# Patient Record
Sex: MALE | Race: WHITE | Employment: OTHER | URBAN - METROPOLITAN AREA
[De-identification: names, ages, dates, MRNs, and addresses within clinical notes are randomized per-mention and may not be internally consistent; named-entity substitution may affect disease eponyms.]

---

## 2020-01-01 ENCOUNTER — HOSPITAL ENCOUNTER (INPATIENT)
Age: 70
LOS: 6 days | Discharge: HOME | DRG: 853 | End: 2020-01-07
Attending: FAMILY MEDICINE | Admitting: INTERNAL MEDICINE
Payer: MEDICARE

## 2020-01-01 PROBLEM — J94.2 HEMOTHORAX: Status: ACTIVE | Noted: 2020-01-01

## 2020-01-01 LAB
ANION GAP SERPL CALCULATED.3IONS-SCNC: 14 MMOL/L (ref 3–16)
BASOPHILS ABSOLUTE: 0.1 K/UL (ref 0–0.2)
BASOPHILS RELATIVE PERCENT: 1 %
BUN BLDV-MCNC: 16 MG/DL (ref 7–20)
CALCIUM SERPL-MCNC: 8.5 MG/DL (ref 8.3–10.6)
CHLORIDE BLD-SCNC: 99 MMOL/L (ref 99–110)
CO2: 21 MMOL/L (ref 21–32)
CREAT SERPL-MCNC: 0.9 MG/DL (ref 0.8–1.3)
EOSINOPHILS ABSOLUTE: 0.1 K/UL (ref 0–0.6)
EOSINOPHILS RELATIVE PERCENT: 0.7 %
GFR AFRICAN AMERICAN: >60
GFR NON-AFRICAN AMERICAN: >60
GLUCOSE BLD-MCNC: 225 MG/DL (ref 70–99)
HCT VFR BLD CALC: 28.4 % (ref 40.5–52.5)
HEMOGLOBIN: 9.8 G/DL (ref 13.5–17.5)
LYMPHOCYTES ABSOLUTE: 2.8 K/UL (ref 1–5.1)
LYMPHOCYTES RELATIVE PERCENT: 20.9 %
MCH RBC QN AUTO: 34.4 PG (ref 26–34)
MCHC RBC AUTO-ENTMCNC: 34.5 G/DL (ref 31–36)
MCV RBC AUTO: 99.7 FL (ref 80–100)
MONOCYTES ABSOLUTE: 1.5 K/UL (ref 0–1.3)
MONOCYTES RELATIVE PERCENT: 11 %
NEUTROPHILS ABSOLUTE: 8.8 K/UL (ref 1.7–7.7)
NEUTROPHILS RELATIVE PERCENT: 66.4 %
PDW BLD-RTO: 13.9 % (ref 12.4–15.4)
PLATELET # BLD: 178 K/UL (ref 135–450)
PMV BLD AUTO: 7.5 FL (ref 5–10.5)
POTASSIUM SERPL-SCNC: 4.7 MMOL/L (ref 3.5–5.1)
RBC # BLD: 2.85 M/UL (ref 4.2–5.9)
SODIUM BLD-SCNC: 134 MMOL/L (ref 136–145)
WBC # BLD: 13.3 K/UL (ref 4–11)

## 2020-01-01 PROCEDURE — 80048 BASIC METABOLIC PNL TOTAL CA: CPT

## 2020-01-01 PROCEDURE — 85025 COMPLETE CBC W/AUTO DIFF WBC: CPT

## 2020-01-01 PROCEDURE — 2700000000 HC OXYGEN THERAPY PER DAY

## 2020-01-01 PROCEDURE — 2580000003 HC RX 258: Performed by: INTERNAL MEDICINE

## 2020-01-01 PROCEDURE — 6370000000 HC RX 637 (ALT 250 FOR IP): Performed by: INTERNAL MEDICINE

## 2020-01-01 PROCEDURE — 83880 ASSAY OF NATRIURETIC PEPTIDE: CPT

## 2020-01-01 PROCEDURE — 94761 N-INVAS EAR/PLS OXIMETRY MLT: CPT

## 2020-01-01 PROCEDURE — 2060000000 HC ICU INTERMEDIATE R&B

## 2020-01-01 PROCEDURE — 94640 AIRWAY INHALATION TREATMENT: CPT

## 2020-01-01 PROCEDURE — 84484 ASSAY OF TROPONIN QUANT: CPT

## 2020-01-01 PROCEDURE — 93005 ELECTROCARDIOGRAM TRACING: CPT | Performed by: INTERNAL MEDICINE

## 2020-01-01 PROCEDURE — 2500000003 HC RX 250 WO HCPCS

## 2020-01-01 RX ORDER — PREDNISONE 20 MG/1
40 TABLET ORAL DAILY
Status: DISCONTINUED | OUTPATIENT
Start: 2020-01-01 | End: 2020-01-02

## 2020-01-01 RX ORDER — SODIUM CHLORIDE 9 MG/ML
INJECTION, SOLUTION INTRAVENOUS CONTINUOUS
Status: DISCONTINUED | OUTPATIENT
Start: 2020-01-01 | End: 2020-01-01

## 2020-01-01 RX ORDER — IPRATROPIUM BROMIDE AND ALBUTEROL SULFATE 2.5; .5 MG/3ML; MG/3ML
1 SOLUTION RESPIRATORY (INHALATION)
Status: DISCONTINUED | OUTPATIENT
Start: 2020-01-02 | End: 2020-01-07 | Stop reason: HOSPADM

## 2020-01-01 RX ORDER — DEXTROSE MONOHYDRATE 25 G/50ML
12.5 INJECTION, SOLUTION INTRAVENOUS PRN
Status: DISCONTINUED | OUTPATIENT
Start: 2020-01-01 | End: 2020-01-07 | Stop reason: HOSPADM

## 2020-01-01 RX ORDER — MECLIZINE HYDROCHLORIDE 25 MG/1
1 TABLET ORAL DAILY
COMMUNITY
End: 2020-03-16 | Stop reason: SDUPTHER

## 2020-01-01 RX ORDER — DEXTROSE MONOHYDRATE 50 MG/ML
100 INJECTION, SOLUTION INTRAVENOUS PRN
Status: DISCONTINUED | OUTPATIENT
Start: 2020-01-01 | End: 2020-01-07 | Stop reason: HOSPADM

## 2020-01-01 RX ORDER — PRASUGREL 10 MG/1
10 TABLET, FILM COATED ORAL DAILY
Status: ON HOLD | COMMUNITY
Start: 2010-12-08 | End: 2020-01-07 | Stop reason: HOSPADM

## 2020-01-01 RX ORDER — ALLOPURINOL 300 MG/1
300 TABLET ORAL DAILY
COMMUNITY
End: 2020-03-16 | Stop reason: SDUPTHER

## 2020-01-01 RX ORDER — NICOTINE POLACRILEX 4 MG
15 LOZENGE BUCCAL PRN
Status: DISCONTINUED | OUTPATIENT
Start: 2020-01-01 | End: 2020-01-07 | Stop reason: HOSPADM

## 2020-01-01 RX ORDER — AMLODIPINE BESYLATE 5 MG/1
5 TABLET ORAL DAILY
Status: ON HOLD | COMMUNITY
End: 2020-01-07 | Stop reason: HOSPADM

## 2020-01-01 RX ORDER — SODIUM CHLORIDE 0.9 % (FLUSH) 0.9 %
10 SYRINGE (ML) INJECTION EVERY 12 HOURS SCHEDULED
Status: DISCONTINUED | OUTPATIENT
Start: 2020-01-01 | End: 2020-01-07 | Stop reason: HOSPADM

## 2020-01-01 RX ORDER — ALLOPURINOL 300 MG/1
300 TABLET ORAL DAILY
Status: DISCONTINUED | OUTPATIENT
Start: 2020-01-02 | End: 2020-01-07 | Stop reason: HOSPADM

## 2020-01-01 RX ORDER — CARVEDILOL 25 MG/1
25 TABLET ORAL 2 TIMES DAILY
Status: ON HOLD | COMMUNITY
Start: 2012-05-16 | End: 2020-02-05 | Stop reason: HOSPADM

## 2020-01-01 RX ORDER — LISINOPRIL 10 MG/1
10 TABLET ORAL DAILY
Status: DISCONTINUED | OUTPATIENT
Start: 2020-01-02 | End: 2020-01-02

## 2020-01-01 RX ORDER — SODIUM CHLORIDE 0.9 % (FLUSH) 0.9 %
10 SYRINGE (ML) INJECTION PRN
Status: DISCONTINUED | OUTPATIENT
Start: 2020-01-01 | End: 2020-01-07 | Stop reason: HOSPADM

## 2020-01-01 RX ORDER — ATORVASTATIN CALCIUM 40 MG/1
40 TABLET, FILM COATED ORAL DAILY
Status: ON HOLD | COMMUNITY
Start: 2012-05-16 | End: 2020-02-05 | Stop reason: HOSPADM

## 2020-01-01 RX ORDER — ACETAMINOPHEN 325 MG/1
650 TABLET ORAL EVERY 4 HOURS PRN
Status: DISCONTINUED | OUTPATIENT
Start: 2020-01-01 | End: 2020-01-07 | Stop reason: HOSPADM

## 2020-01-01 RX ORDER — AMLODIPINE BESYLATE 5 MG/1
5 TABLET ORAL DAILY
Status: DISCONTINUED | OUTPATIENT
Start: 2020-01-02 | End: 2020-01-02

## 2020-01-01 RX ORDER — GLIPIZIDE 5 MG/1
5 TABLET ORAL DAILY
Status: DISCONTINUED | OUTPATIENT
Start: 2020-01-02 | End: 2020-01-02

## 2020-01-01 RX ORDER — LISINOPRIL 10 MG/1
10 TABLET ORAL DAILY
Status: ON HOLD | COMMUNITY
Start: 2012-05-16 | End: 2020-01-07 | Stop reason: HOSPADM

## 2020-01-01 RX ORDER — ONDANSETRON 2 MG/ML
4 INJECTION INTRAMUSCULAR; INTRAVENOUS EVERY 6 HOURS PRN
Status: DISCONTINUED | OUTPATIENT
Start: 2020-01-01 | End: 2020-01-06

## 2020-01-01 RX ORDER — GLIPIZIDE 5 MG/1
1 TABLET ORAL DAILY
COMMUNITY
End: 2020-03-16 | Stop reason: SDUPTHER

## 2020-01-01 RX ORDER — IPRATROPIUM BROMIDE AND ALBUTEROL SULFATE 2.5; .5 MG/3ML; MG/3ML
1 SOLUTION RESPIRATORY (INHALATION) EVERY 4 HOURS
Status: DISCONTINUED | OUTPATIENT
Start: 2020-01-01 | End: 2020-01-01

## 2020-01-01 RX ORDER — CARVEDILOL 25 MG/1
25 TABLET ORAL 2 TIMES DAILY
Status: DISCONTINUED | OUTPATIENT
Start: 2020-01-01 | End: 2020-01-02

## 2020-01-01 RX ORDER — ATORVASTATIN CALCIUM 40 MG/1
40 TABLET, FILM COATED ORAL DAILY
Status: DISCONTINUED | OUTPATIENT
Start: 2020-01-02 | End: 2020-01-07 | Stop reason: HOSPADM

## 2020-01-01 RX ORDER — MECLIZINE HCL 12.5 MG/1
25 TABLET ORAL DAILY
Status: DISCONTINUED | OUTPATIENT
Start: 2020-01-02 | End: 2020-01-02

## 2020-01-01 RX ORDER — FEBUXOSTAT 40 MG/1
40 TABLET, FILM COATED ORAL DAILY
Status: ON HOLD | COMMUNITY
End: 2020-02-14 | Stop reason: HOSPADM

## 2020-01-01 RX ORDER — FEBUXOSTAT 40 MG/1
40 TABLET, FILM COATED ORAL DAILY
Status: DISCONTINUED | OUTPATIENT
Start: 2020-01-02 | End: 2020-01-07 | Stop reason: HOSPADM

## 2020-01-01 RX ADMIN — IPRATROPIUM BROMIDE AND ALBUTEROL SULFATE 1 AMPULE: .5; 3 SOLUTION RESPIRATORY (INHALATION) at 23:32

## 2020-01-01 RX ADMIN — Medication 10 ML: at 23:09

## 2020-01-01 RX ADMIN — SODIUM CHLORIDE: 9 INJECTION, SOLUTION INTRAVENOUS at 23:08

## 2020-01-01 SDOH — HEALTH STABILITY: MENTAL HEALTH: HOW OFTEN DO YOU HAVE A DRINK CONTAINING ALCOHOL?: 4 OR MORE TIMES A WEEK

## 2020-01-01 SDOH — HEALTH STABILITY: MENTAL HEALTH: HOW MANY STANDARD DRINKS CONTAINING ALCOHOL DO YOU HAVE ON A TYPICAL DAY?: 5 OR 6

## 2020-01-01 ASSESSMENT — PAIN SCALES - GENERAL
PAINLEVEL_OUTOF10: 0
PAINLEVEL_OUTOF10: 0

## 2020-01-02 ENCOUNTER — APPOINTMENT (OUTPATIENT)
Dept: GENERAL RADIOLOGY | Age: 70
DRG: 853 | End: 2020-01-02
Attending: FAMILY MEDICINE
Payer: MEDICARE

## 2020-01-02 ENCOUNTER — ANESTHESIA EVENT (OUTPATIENT)
Dept: OPERATING ROOM | Age: 70
DRG: 853 | End: 2020-01-02
Payer: MEDICARE

## 2020-01-02 ENCOUNTER — ANESTHESIA (OUTPATIENT)
Dept: OPERATING ROOM | Age: 70
DRG: 853 | End: 2020-01-02
Payer: MEDICARE

## 2020-01-02 VITALS
TEMPERATURE: 95.2 F | DIASTOLIC BLOOD PRESSURE: 25 MMHG | RESPIRATION RATE: 16 BRPM | SYSTOLIC BLOOD PRESSURE: 83 MMHG | OXYGEN SATURATION: 100 %

## 2020-01-02 PROBLEM — J96.01 ACUTE RESPIRATORY FAILURE WITH HYPOXEMIA (HCC): Status: ACTIVE | Noted: 2020-01-02

## 2020-01-02 PROBLEM — E78.5 HYPERLIPIDEMIA: Status: ACTIVE | Noted: 2020-01-02

## 2020-01-02 PROBLEM — Z72.0 TOBACCO ABUSE: Status: ACTIVE | Noted: 2020-01-02

## 2020-01-02 PROBLEM — I25.10 CAD (CORONARY ARTERY DISEASE): Status: ACTIVE | Noted: 2020-01-02

## 2020-01-02 LAB
A/G RATIO: 1.8 (ref 1.1–2.2)
ABO/RH: NORMAL
ALBUMIN SERPL-MCNC: 2.1 G/DL (ref 3.4–5)
ALP BLD-CCNC: 29 U/L (ref 40–129)
ALT SERPL-CCNC: 103 U/L (ref 10–40)
ANION GAP SERPL CALCULATED.3IONS-SCNC: 10 MMOL/L (ref 3–16)
ANION GAP SERPL CALCULATED.3IONS-SCNC: 10 MMOL/L (ref 3–16)
ANION GAP SERPL CALCULATED.3IONS-SCNC: 15 MMOL/L (ref 3–16)
ANISOCYTOSIS: ABNORMAL
ANTIBODY SCREEN: NORMAL
APPEARANCE FLUID: NORMAL
APTT: 27.8 SEC (ref 24.2–36.2)
AST SERPL-CCNC: 122 U/L (ref 15–37)
ATYPICAL LYMPHOCYTE RELATIVE PERCENT: 1 % (ref 0–6)
BANDED NEUTROPHILS RELATIVE PERCENT: 26 % (ref 0–7)
BASE EXCESS ARTERIAL: -10 (ref -3–3)
BASE EXCESS ARTERIAL: -12.3 MMOL/L (ref -3–3)
BASE EXCESS ARTERIAL: -13 (ref -3–3)
BASE EXCESS ARTERIAL: -13 (ref -3–3)
BASE EXCESS ARTERIAL: -4.2 MMOL/L (ref -3–3)
BASE EXCESS ARTERIAL: -5.6 MMOL/L (ref -3–3)
BASE EXCESS ARTERIAL: -6 (ref -3–3)
BASE EXCESS ARTERIAL: -6.8 MMOL/L (ref -3–3)
BASE EXCESS VENOUS: -11 (ref -3–3)
BASOPHILS ABSOLUTE: 0 K/UL (ref 0–0.2)
BASOPHILS ABSOLUTE: 0.1 K/UL (ref 0–0.2)
BASOPHILS RELATIVE PERCENT: 0.2 %
BASOPHILS RELATIVE PERCENT: 1 %
BILIRUB SERPL-MCNC: 0.7 MG/DL (ref 0–1)
BLOOD BANK DISPENSE STATUS: NORMAL
BLOOD BANK PRODUCT CODE: NORMAL
BPU ID: NORMAL
BUN BLDV-MCNC: 19 MG/DL (ref 7–20)
CALCIUM IONIZED: 0.64 MMOL/L (ref 1.12–1.32)
CALCIUM IONIZED: 0.87 MMOL/L (ref 1.12–1.32)
CALCIUM IONIZED: 1.01 MMOL/L (ref 1.12–1.32)
CALCIUM IONIZED: 1.15 MMOL/L (ref 1.12–1.32)
CALCIUM IONIZED: 1.42 MMOL/L (ref 1.12–1.32)
CALCIUM SERPL-MCNC: 10 MG/DL (ref 8.3–10.6)
CALCIUM SERPL-MCNC: 6.8 MG/DL (ref 8.3–10.6)
CALCIUM SERPL-MCNC: 7.3 MG/DL (ref 8.3–10.6)
CARBOXYHEMOGLOBIN ARTERIAL: 0.5 % (ref 0–1.5)
CARBOXYHEMOGLOBIN ARTERIAL: 0.6 % (ref 0–1.5)
CARBOXYHEMOGLOBIN ARTERIAL: 0.9 % (ref 0–1.5)
CARBOXYHEMOGLOBIN ARTERIAL: 1 % (ref 0–1.5)
CELL COUNT FLUID TYPE: NORMAL
CHLORIDE BLD-SCNC: 101 MMOL/L (ref 99–110)
CHLORIDE BLD-SCNC: 102 MMOL/L (ref 99–110)
CHLORIDE BLD-SCNC: 103 MMOL/L (ref 99–110)
CLOT EVALUATION: NORMAL
CO2: 20 MMOL/L (ref 21–32)
CO2: 21 MMOL/L (ref 21–32)
CO2: 24 MMOL/L (ref 21–32)
COLOR FLUID: NORMAL
CREAT SERPL-MCNC: 1.1 MG/DL (ref 0.8–1.3)
CREAT SERPL-MCNC: 1.5 MG/DL (ref 0.8–1.3)
CREAT SERPL-MCNC: 1.5 MG/DL (ref 0.8–1.3)
DESCRIPTION BLOOD BANK: NORMAL
EKG ATRIAL RATE: 61 BPM
EKG ATRIAL RATE: 64 BPM
EKG DIAGNOSIS: NORMAL
EKG DIAGNOSIS: NORMAL
EKG Q-T INTERVAL: 380 MS
EKG Q-T INTERVAL: 428 MS
EKG QRS DURATION: 110 MS
EKG QRS DURATION: 124 MS
EKG QTC CALCULATION (BAZETT): 445 MS
EKG QTC CALCULATION (BAZETT): 495 MS
EKG R AXIS: 37 DEGREES
EKG R AXIS: 58 DEGREES
EKG T AXIS: 0 DEGREES
EKG T AXIS: 113 DEGREES
EKG VENTRICULAR RATE: 102 BPM
EKG VENTRICULAR RATE: 65 BPM
EOSINOPHILS ABSOLUTE: 0 K/UL (ref 0–0.6)
EOSINOPHILS ABSOLUTE: 0 K/UL (ref 0–0.6)
EOSINOPHILS RELATIVE PERCENT: 0 %
EOSINOPHILS RELATIVE PERCENT: 0.1 %
FIBRINOGEN: 139 MG/DL (ref 200–397)
FIBRINOGEN: 50 MG/DL (ref 200–397)
GFR AFRICAN AMERICAN: 56
GFR AFRICAN AMERICAN: 56
GFR AFRICAN AMERICAN: >60
GFR NON-AFRICAN AMERICAN: 47
GFR NON-AFRICAN AMERICAN: 47
GFR NON-AFRICAN AMERICAN: >60
GLOBULIN: 1.2 G/DL
GLUCOSE BLD-MCNC: 150 MG/DL (ref 70–99)
GLUCOSE BLD-MCNC: 161 MG/DL (ref 70–99)
GLUCOSE BLD-MCNC: 168 MG/DL (ref 70–99)
GLUCOSE BLD-MCNC: 172 MG/DL (ref 70–99)
GLUCOSE BLD-MCNC: 182 MG/DL (ref 70–99)
GLUCOSE BLD-MCNC: 224 MG/DL (ref 70–99)
GLUCOSE BLD-MCNC: 224 MG/DL (ref 70–99)
GLUCOSE BLD-MCNC: 305 MG/DL (ref 70–99)
GLUCOSE BLD-MCNC: 83 MG/DL (ref 70–99)
HCO3 ARTERIAL: 12.9 MMOL/L (ref 21–29)
HCO3 ARTERIAL: 14.7 MMOL/L (ref 21–29)
HCO3 ARTERIAL: 15.6 MMOL/L (ref 21–29)
HCO3 ARTERIAL: 18.3 MMOL/L (ref 21–29)
HCO3 ARTERIAL: 19.7 MMOL/L (ref 21–29)
HCO3 ARTERIAL: 21.6 MMOL/L (ref 21–29)
HCO3 ARTERIAL: 21.6 MMOL/L (ref 21–29)
HCO3 ARTERIAL: 21.8 MMOL/L (ref 21–29)
HCO3 VENOUS: 18.5 MMOL/L (ref 23–29)
HCT VFR BLD CALC: 16.9 % (ref 40.5–52.5)
HCT VFR BLD CALC: 17.8 % (ref 40.5–52.5)
HCT VFR BLD CALC: 29.5 % (ref 40.5–52.5)
HEMOGLOBIN, ART, EXTENDED: 5.9 G/DL (ref 13.5–17.5)
HEMOGLOBIN, ART, EXTENDED: 6.5 G/DL (ref 13.5–17.5)
HEMOGLOBIN, ART, EXTENDED: 7.2 G/DL (ref 13.5–17.5)
HEMOGLOBIN, ART, EXTENDED: 7.7 G/DL (ref 13.5–17.5)
HEMOGLOBIN: 10.2 G/DL (ref 13.5–17.5)
HEMOGLOBIN: 10.5 GM/DL (ref 13.5–17.5)
HEMOGLOBIN: 5.3 GM/DL (ref 13.5–17.5)
HEMOGLOBIN: 5.7 G/DL (ref 13.5–17.5)
HEMOGLOBIN: 6 G/DL (ref 13.5–17.5)
HEMOGLOBIN: 6.8 GM/DL (ref 13.5–17.5)
HEMOGLOBIN: 7 GM/DL (ref 13.5–17.5)
INR BLD: 1.56 (ref 0.86–1.14)
INR BLD: 2.04 (ref 0.86–1.14)
INR BLD: 4.54 (ref 0.86–1.14)
LACTATE: 1.92 MMOL/L (ref 0.4–2)
LACTATE: 10.91 MMOL/L (ref 0.4–2)
LACTATE: 11.9 MMOL/L (ref 0.4–2)
LACTATE: 4.9 MMOL/L (ref 0.4–2)
LACTATE: 8.34 MMOL/L (ref 0.4–2)
LACTIC ACID: 4.6 MMOL/L (ref 0.4–2)
LACTIC ACID: 6.7 MMOL/L (ref 0.4–2)
LV EF: 55 %
LVEF MODALITY: NORMAL
LYMPHOCYTES ABSOLUTE: 0.9 K/UL (ref 1–5.1)
LYMPHOCYTES ABSOLUTE: 1.5 K/UL (ref 1–5.1)
LYMPHOCYTES RELATIVE PERCENT: 5.7 %
LYMPHOCYTES RELATIVE PERCENT: 9 %
LYMPHOCYTES, BODY FLUID: 13 %
MACROPHAGE FLUID: 2 %
MCH RBC QN AUTO: 31 PG (ref 26–34)
MCH RBC QN AUTO: 32.9 PG (ref 26–34)
MCH RBC QN AUTO: 34.5 PG (ref 26–34)
MCHC RBC AUTO-ENTMCNC: 33.6 G/DL (ref 31–36)
MCHC RBC AUTO-ENTMCNC: 33.9 G/DL (ref 31–36)
MCHC RBC AUTO-ENTMCNC: 34.7 G/DL (ref 31–36)
MCV RBC AUTO: 101.6 FL (ref 80–100)
MCV RBC AUTO: 89.4 FL (ref 80–100)
MCV RBC AUTO: 97.9 FL (ref 80–100)
METHEMOGLOBIN ARTERIAL: 0.9 %
METHEMOGLOBIN ARTERIAL: 0.9 %
METHEMOGLOBIN ARTERIAL: 1.5 %
METHEMOGLOBIN ARTERIAL: 1.5 %
MONOCYTE, FLUID: 6 %
MONOCYTES ABSOLUTE: 0 K/UL (ref 0–1.3)
MONOCYTES ABSOLUTE: 1 K/UL (ref 0–1.3)
MONOCYTES RELATIVE PERCENT: 0 %
MONOCYTES RELATIVE PERCENT: 5.8 %
MYELOCYTE PERCENT: 1 %
NEUTROPHIL, FLUID: 79 %
NEUTROPHILS ABSOLUTE: 13.3 K/UL (ref 1.7–7.7)
NEUTROPHILS ABSOLUTE: 14.5 K/UL (ref 1.7–7.7)
NEUTROPHILS RELATIVE PERCENT: 62 %
NEUTROPHILS RELATIVE PERCENT: 88.2 %
NUCLEATED CELLS FLUID: 3181 /CUMM
NUMBER OF CELLS COUNTED FLUID: 100
O2 CONTENT ARTERIAL: 11 ML/DL
O2 CONTENT ARTERIAL: 11 ML/DL
O2 CONTENT ARTERIAL: 8 ML/DL
O2 CONTENT ARTERIAL: 9 ML/DL
O2 SAT, ARTERIAL: 100 % (ref 93–100)
O2 SAT, ARTERIAL: 100 % (ref 93–100)
O2 SAT, ARTERIAL: 92 % (ref 93–100)
O2 SAT, ARTERIAL: 96.3 %
O2 SAT, ARTERIAL: 98.8 %
O2 SAT, ARTERIAL: 99 %
O2 SAT, ARTERIAL: 99 % (ref 93–100)
O2 SAT, ARTERIAL: 99.3 %
O2 SAT, VEN: 100 %
O2 THERAPY: ABNORMAL
PCO2 ARTERIAL: 24.8 MM HG (ref 35–45)
PCO2 ARTERIAL: 39 MMHG (ref 35–45)
PCO2 ARTERIAL: 39.9 MM HG (ref 35–45)
PCO2 ARTERIAL: 43.2 MMHG (ref 35–45)
PCO2 ARTERIAL: 44.4 MMHG (ref 35–45)
PCO2 ARTERIAL: 50.8 MM HG (ref 35–45)
PCO2 ARTERIAL: 53.3 MM HG (ref 35–45)
PCO2 ARTERIAL: 54.7 MMHG (ref 35–45)
PCO2, VEN: 55 MM HG (ref 40–50)
PDW BLD-RTO: 13.8 % (ref 12.4–15.4)
PDW BLD-RTO: 14.7 % (ref 12.4–15.4)
PDW BLD-RTO: 14.8 % (ref 12.4–15.4)
PERFORMED ON: ABNORMAL
PH ARTERIAL: 7.16 (ref 7.35–7.45)
PH ARTERIAL: 7.19 (ref 7.35–7.45)
PH ARTERIAL: 7.2 (ref 7.35–7.45)
PH ARTERIAL: 7.22 (ref 7.35–7.45)
PH ARTERIAL: 7.22 (ref 7.35–7.45)
PH ARTERIAL: 7.26 (ref 7.35–7.45)
PH ARTERIAL: 7.32 (ref 7.35–7.45)
PH ARTERIAL: 7.32 (ref 7.35–7.45)
PH VENOUS: 7.13 (ref 7.35–7.45)
PH VENOUS: 7.34 (ref 7.35–7.45)
PHOSPHORUS: 3.2 MG/DL (ref 2.5–4.9)
PLATELET # BLD: 110 K/UL (ref 135–450)
PLATELET # BLD: 120 K/UL (ref 135–450)
PLATELET # BLD: 80 K/UL (ref 135–450)
PLATELET SLIDE REVIEW: ABNORMAL
PMV BLD AUTO: 7.4 FL (ref 5–10.5)
PMV BLD AUTO: 7.8 FL (ref 5–10.5)
PMV BLD AUTO: 8.1 FL (ref 5–10.5)
PO2 ARTERIAL: 104 MMHG (ref 75–108)
PO2 ARTERIAL: 170.9 MM HG (ref 75–108)
PO2 ARTERIAL: 237 MMHG (ref 75–108)
PO2 ARTERIAL: 253.2 MMHG (ref 75–108)
PO2 ARTERIAL: 255.1 MM HG (ref 75–108)
PO2 ARTERIAL: 342.7 MMHG (ref 75–108)
PO2 ARTERIAL: 359.3 MM HG (ref 75–108)
PO2 ARTERIAL: 79.6 MM HG (ref 75–108)
PO2, VEN: 249 MM HG
POC HEMATOCRIT: 16 % (ref 40.5–52.5)
POC HEMATOCRIT: 20 % (ref 40.5–52.5)
POC HEMATOCRIT: 21 % (ref 40.5–52.5)
POC HEMATOCRIT: 31 % (ref 40.5–52.5)
POC HEMATOCRIT: <10 % (ref 40.5–52.5)
POC POTASSIUM: 2.1 MMOL/L (ref 3.5–5.1)
POC POTASSIUM: 3.8 MMOL/L (ref 3.5–5.1)
POC POTASSIUM: 5.2 MMOL/L (ref 3.5–5.1)
POC POTASSIUM: 5.6 MMOL/L (ref 3.5–5.1)
POC SAMPLE TYPE: ABNORMAL
POC SODIUM: 139 MMOL/L (ref 136–145)
POC SODIUM: 141 MMOL/L (ref 136–145)
POC SODIUM: 144 MMOL/L (ref 136–145)
POC SODIUM: 150 MMOL/L (ref 136–145)
POTASSIUM REFLEX MAGNESIUM: 7.3 MMOL/L (ref 3.5–5.1)
POTASSIUM SERPL-SCNC: 4.5 MMOL/L (ref 3.5–5.1)
POTASSIUM SERPL-SCNC: 5.6 MMOL/L (ref 3.5–5.1)
PRO-BNP: 780 PG/ML (ref 0–124)
PROCALCITONIN: 0.19 NG/ML (ref 0–0.15)
PROTHROMBIN TIME: 18.2 SEC (ref 10–13.2)
PROTHROMBIN TIME: 23.8 SEC (ref 10–13.2)
PROTHROMBIN TIME: 53.5 SEC (ref 10–13.2)
RBC # BLD: 1.66 M/UL (ref 4.2–5.9)
RBC # BLD: 1.82 M/UL (ref 4.2–5.9)
RBC # BLD: 3.3 M/UL (ref 4.2–5.9)
RBC FLUID: NORMAL /CUMM
SLIDE REVIEW: ABNORMAL
SLIDE REVIEW: ABNORMAL
SODIUM BLD-SCNC: 132 MMOL/L (ref 136–145)
SODIUM BLD-SCNC: 136 MMOL/L (ref 136–145)
SODIUM BLD-SCNC: 138 MMOL/L (ref 136–145)
TCO2 ARTERIAL: 14 MMOL/L
TCO2 ARTERIAL: 15.9 MMOL/L
TCO2 ARTERIAL: 17 MMOL/L
TCO2 ARTERIAL: 20 MMOL/L
TCO2 ARTERIAL: 21.1 MMOL/L
TCO2 ARTERIAL: 22.9 MMOL/L
TCO2 ARTERIAL: 23 MMOL/L
TCO2 ARTERIAL: 23.5 MMOL/L
TCO2 CALC VENOUS: 20 MMOL/L
TOTAL CK: 97 U/L (ref 39–308)
TOTAL PROTEIN: 3.3 G/DL (ref 6.4–8.2)
TROPONIN: 0.02 NG/ML
TROPONIN: 0.09 NG/ML
WBC # BLD: 14.9 K/UL (ref 4–11)
WBC # BLD: 16.1 K/UL (ref 4–11)
WBC # BLD: 16.5 K/UL (ref 4–11)

## 2020-01-02 PROCEDURE — 2580000003 HC RX 258: Performed by: NURSE PRACTITIONER

## 2020-01-02 PROCEDURE — 71045 X-RAY EXAM CHEST 1 VIEW: CPT

## 2020-01-02 PROCEDURE — 6360000002 HC RX W HCPCS: Performed by: NURSE PRACTITIONER

## 2020-01-02 PROCEDURE — 36556 INSERT NON-TUNNEL CV CATH: CPT

## 2020-01-02 PROCEDURE — 89051 BODY FLUID CELL COUNT: CPT

## 2020-01-02 PROCEDURE — 0BBG4ZZ EXCISION OF LEFT UPPER LUNG LOBE, PERCUTANEOUS ENDOSCOPIC APPROACH: ICD-10-PCS | Performed by: THORACIC SURGERY (CARDIOTHORACIC VASCULAR SURGERY)

## 2020-01-02 PROCEDURE — 32505 WEDGE RESECT OF LUNG INITIAL: CPT | Performed by: THORACIC SURGERY (CARDIOTHORACIC VASCULAR SURGERY)

## 2020-01-02 PROCEDURE — P9045 ALBUMIN (HUMAN), 5%, 250 ML: HCPCS | Performed by: NURSE PRACTITIONER

## 2020-01-02 PROCEDURE — 84132 ASSAY OF SERUM POTASSIUM: CPT

## 2020-01-02 PROCEDURE — 2700000000 HC OXYGEN THERAPY PER DAY

## 2020-01-02 PROCEDURE — 87070 CULTURE OTHR SPECIMN AEROBIC: CPT

## 2020-01-02 PROCEDURE — 2580000003 HC RX 258: Performed by: NURSE ANESTHETIST, CERTIFIED REGISTERED

## 2020-01-02 PROCEDURE — 93005 ELECTROCARDIOGRAM TRACING: CPT | Performed by: INTERNAL MEDICINE

## 2020-01-02 PROCEDURE — 6360000002 HC RX W HCPCS

## 2020-01-02 PROCEDURE — 2580000003 HC RX 258

## 2020-01-02 PROCEDURE — 99291 CRITICAL CARE FIRST HOUR: CPT | Performed by: INTERNAL MEDICINE

## 2020-01-02 PROCEDURE — 85027 COMPLETE CBC AUTOMATED: CPT

## 2020-01-02 PROCEDURE — 6360000002 HC RX W HCPCS: Performed by: THORACIC SURGERY (CARDIOTHORACIC VASCULAR SURGERY)

## 2020-01-02 PROCEDURE — 36415 COLL VENOUS BLD VENIPUNCTURE: CPT

## 2020-01-02 PROCEDURE — 2709999900 HC NON-CHARGEABLE SUPPLY: Performed by: THORACIC SURGERY (CARDIOTHORACIC VASCULAR SURGERY)

## 2020-01-02 PROCEDURE — 86923 COMPATIBILITY TEST ELECTRIC: CPT

## 2020-01-02 PROCEDURE — P9045 ALBUMIN (HUMAN), 5%, 250 ML: HCPCS

## 2020-01-02 PROCEDURE — 2500000003 HC RX 250 WO HCPCS

## 2020-01-02 PROCEDURE — 0BH18EZ INSERTION OF ENDOTRACHEAL AIRWAY INTO TRACHEA, VIA NATURAL OR ARTIFICIAL OPENING ENDOSCOPIC: ICD-10-PCS | Performed by: INTERNAL MEDICINE

## 2020-01-02 PROCEDURE — 85610 PROTHROMBIN TIME: CPT

## 2020-01-02 PROCEDURE — 84484 ASSAY OF TROPONIN QUANT: CPT

## 2020-01-02 PROCEDURE — 88305 TISSUE EXAM BY PATHOLOGIST: CPT

## 2020-01-02 PROCEDURE — 3600000014 HC SURGERY LEVEL 4 ADDTL 15MIN: Performed by: THORACIC SURGERY (CARDIOTHORACIC VASCULAR SURGERY)

## 2020-01-02 PROCEDURE — P9045 ALBUMIN (HUMAN), 5%, 250 ML: HCPCS | Performed by: NURSE ANESTHETIST, CERTIFIED REGISTERED

## 2020-01-02 PROCEDURE — 82330 ASSAY OF CALCIUM: CPT

## 2020-01-02 PROCEDURE — 2500000003 HC RX 250 WO HCPCS: Performed by: INTERNAL MEDICINE

## 2020-01-02 PROCEDURE — 85384 FIBRINOGEN ACTIVITY: CPT

## 2020-01-02 PROCEDURE — 74018 RADEX ABDOMEN 1 VIEW: CPT

## 2020-01-02 PROCEDURE — 94640 AIRWAY INHALATION TREATMENT: CPT

## 2020-01-02 PROCEDURE — 82947 ASSAY GLUCOSE BLOOD QUANT: CPT

## 2020-01-02 PROCEDURE — 87205 SMEAR GRAM STAIN: CPT

## 2020-01-02 PROCEDURE — 2500000003 HC RX 250 WO HCPCS: Performed by: NURSE PRACTITIONER

## 2020-01-02 PROCEDURE — 88307 TISSUE EXAM BY PATHOLOGIST: CPT

## 2020-01-02 PROCEDURE — 86901 BLOOD TYPING SEROLOGIC RH(D): CPT

## 2020-01-02 PROCEDURE — 6360000002 HC RX W HCPCS: Performed by: INTERNAL MEDICINE

## 2020-01-02 PROCEDURE — 6370000000 HC RX 637 (ALT 250 FOR IP): Performed by: INTERNAL MEDICINE

## 2020-01-02 PROCEDURE — 3700000001 HC ADD 15 MINUTES (ANESTHESIA): Performed by: THORACIC SURGERY (CARDIOTHORACIC VASCULAR SURGERY)

## 2020-01-02 PROCEDURE — 2580000003 HC RX 258: Performed by: INTERNAL MEDICINE

## 2020-01-02 PROCEDURE — 3700000000 HC ANESTHESIA ATTENDED CARE: Performed by: THORACIC SURGERY (CARDIOTHORACIC VASCULAR SURGERY)

## 2020-01-02 PROCEDURE — P9017 PLASMA 1 DONOR FRZ W/IN 8 HR: HCPCS

## 2020-01-02 PROCEDURE — 85730 THROMBOPLASTIN TIME PARTIAL: CPT

## 2020-01-02 PROCEDURE — 32110 EXPLORE/REPAIR CHEST: CPT | Performed by: THORACIC SURGERY (CARDIOTHORACIC VASCULAR SURGERY)

## 2020-01-02 PROCEDURE — 6370000000 HC RX 637 (ALT 250 FOR IP): Performed by: NURSE PRACTITIONER

## 2020-01-02 PROCEDURE — 82550 ASSAY OF CK (CPK): CPT

## 2020-01-02 PROCEDURE — 82803 BLOOD GASES ANY COMBINATION: CPT

## 2020-01-02 PROCEDURE — 37799 UNLISTED PX VASCULAR SURGERY: CPT

## 2020-01-02 PROCEDURE — 83605 ASSAY OF LACTIC ACID: CPT

## 2020-01-02 PROCEDURE — 6370000000 HC RX 637 (ALT 250 FOR IP): Performed by: THORACIC SURGERY (CARDIOTHORACIC VASCULAR SURGERY)

## 2020-01-02 PROCEDURE — 6360000002 HC RX W HCPCS: Performed by: NURSE ANESTHETIST, CERTIFIED REGISTERED

## 2020-01-02 PROCEDURE — 02HV33Z INSERTION OF INFUSION DEVICE INTO SUPERIOR VENA CAVA, PERCUTANEOUS APPROACH: ICD-10-PCS | Performed by: INTERNAL MEDICINE

## 2020-01-02 PROCEDURE — 94750 HC PULMONARY COMPLIANCE STUDY: CPT

## 2020-01-02 PROCEDURE — 3600000004 HC SURGERY LEVEL 4 BASE: Performed by: THORACIC SURGERY (CARDIOTHORACIC VASCULAR SURGERY)

## 2020-01-02 PROCEDURE — 2580000003 HC RX 258: Performed by: THORACIC SURGERY (CARDIOTHORACIC VASCULAR SURGERY)

## 2020-01-02 PROCEDURE — 0WCB0ZZ EXTIRPATION OF MATTER FROM LEFT PLEURAL CAVITY, OPEN APPROACH: ICD-10-PCS | Performed by: THORACIC SURGERY (CARDIOTHORACIC VASCULAR SURGERY)

## 2020-01-02 PROCEDURE — 94761 N-INVAS EAR/PLS OXIMETRY MLT: CPT

## 2020-01-02 PROCEDURE — P9035 PLATELET PHERES LEUKOREDUCED: HCPCS

## 2020-01-02 PROCEDURE — 2000000000 HC ICU R&B

## 2020-01-02 PROCEDURE — 99292 CRITICAL CARE ADDL 30 MIN: CPT | Performed by: INTERNAL MEDICINE

## 2020-01-02 PROCEDURE — 84145 PROCALCITONIN (PCT): CPT

## 2020-01-02 PROCEDURE — 0BBP4ZX EXCISION OF LEFT PLEURA, PERCUTANEOUS ENDOSCOPIC APPROACH, DIAGNOSTIC: ICD-10-PCS | Performed by: THORACIC SURGERY (CARDIOTHORACIC VASCULAR SURGERY)

## 2020-01-02 PROCEDURE — 86850 RBC ANTIBODY SCREEN: CPT

## 2020-01-02 PROCEDURE — 36430 TRANSFUSION BLD/BLD COMPNT: CPT

## 2020-01-02 PROCEDURE — 93306 TTE W/DOPPLER COMPLETE: CPT

## 2020-01-02 PROCEDURE — 85025 COMPLETE CBC W/AUTO DIFF WBC: CPT

## 2020-01-02 PROCEDURE — 32551 INSERTION OF CHEST TUBE: CPT | Performed by: THORACIC SURGERY (CARDIOTHORACIC VASCULAR SURGERY)

## 2020-01-02 PROCEDURE — 94770 HC ETCO2 MONITOR DAILY: CPT

## 2020-01-02 PROCEDURE — 85014 HEMATOCRIT: CPT

## 2020-01-02 PROCEDURE — 94002 VENT MGMT INPAT INIT DAY: CPT

## 2020-01-02 PROCEDURE — 5A1945Z RESPIRATORY VENTILATION, 24-96 CONSECUTIVE HOURS: ICD-10-PCS | Performed by: INTERNAL MEDICINE

## 2020-01-02 PROCEDURE — P9045 ALBUMIN (HUMAN), 5%, 250 ML: HCPCS | Performed by: THORACIC SURGERY (CARDIOTHORACIC VASCULAR SURGERY)

## 2020-01-02 PROCEDURE — 31500 INSERT EMERGENCY AIRWAY: CPT

## 2020-01-02 PROCEDURE — 84100 ASSAY OF PHOSPHORUS: CPT

## 2020-01-02 PROCEDURE — 2500000003 HC RX 250 WO HCPCS: Performed by: THORACIC SURGERY (CARDIOTHORACIC VASCULAR SURGERY)

## 2020-01-02 PROCEDURE — 2720000010 HC SURG SUPPLY STERILE: Performed by: THORACIC SURGERY (CARDIOTHORACIC VASCULAR SURGERY)

## 2020-01-02 PROCEDURE — 0W9B30Z DRAINAGE OF LEFT PLEURAL CAVITY WITH DRAINAGE DEVICE, PERCUTANEOUS APPROACH: ICD-10-PCS | Performed by: THORACIC SURGERY (CARDIOTHORACIC VASCULAR SURGERY)

## 2020-01-02 PROCEDURE — 86900 BLOOD TYPING SEROLOGIC ABO: CPT

## 2020-01-02 PROCEDURE — 80053 COMPREHEN METABOLIC PANEL: CPT

## 2020-01-02 PROCEDURE — 84295 ASSAY OF SERUM SODIUM: CPT

## 2020-01-02 PROCEDURE — 2500000003 HC RX 250 WO HCPCS: Performed by: NURSE ANESTHETIST, CERTIFIED REGISTERED

## 2020-01-02 PROCEDURE — 36620 INSERTION CATHETER ARTERY: CPT

## 2020-01-02 PROCEDURE — P9016 RBC LEUKOCYTES REDUCED: HCPCS

## 2020-01-02 RX ORDER — 0.9 % SODIUM CHLORIDE 0.9 %
250 INTRAVENOUS SOLUTION INTRAVENOUS ONCE
Status: COMPLETED | OUTPATIENT
Start: 2020-01-02 | End: 2020-01-02

## 2020-01-02 RX ORDER — SODIUM CHLORIDE 9 MG/ML
INJECTION, SOLUTION INTRAVENOUS
Status: DISPENSED
Start: 2020-01-02 | End: 2020-01-02

## 2020-01-02 RX ORDER — SODIUM CHLORIDE 9 MG/ML
INJECTION, SOLUTION INTRAVENOUS
Status: COMPLETED
Start: 2020-01-02 | End: 2020-01-02

## 2020-01-02 RX ORDER — FAMOTIDINE 20 MG/1
20 TABLET, FILM COATED ORAL 2 TIMES DAILY
Status: DISCONTINUED | OUTPATIENT
Start: 2020-01-02 | End: 2020-01-07 | Stop reason: HOSPADM

## 2020-01-02 RX ORDER — TRANEXAMIC ACID 100 MG/ML
1000 INJECTION, SOLUTION INTRAVENOUS ONCE
Status: COMPLETED | OUTPATIENT
Start: 2020-01-02 | End: 2020-01-02

## 2020-01-02 RX ORDER — SODIUM CHLORIDE 0.9 % (FLUSH) 0.9 %
10 SYRINGE (ML) INJECTION EVERY 12 HOURS SCHEDULED
Status: DISCONTINUED | OUTPATIENT
Start: 2020-01-02 | End: 2020-01-06

## 2020-01-02 RX ORDER — CEFAZOLIN SODIUM 1 G/3ML
INJECTION, POWDER, FOR SOLUTION INTRAMUSCULAR; INTRAVENOUS PRN
Status: DISCONTINUED | OUTPATIENT
Start: 2020-01-02 | End: 2020-01-02 | Stop reason: SDUPTHER

## 2020-01-02 RX ORDER — CHLORHEXIDINE GLUCONATE 0.12 MG/ML
15 RINSE ORAL 2 TIMES DAILY
Status: DISCONTINUED | OUTPATIENT
Start: 2020-01-02 | End: 2020-01-03

## 2020-01-02 RX ORDER — PROPOFOL 10 MG/ML
INJECTION, EMULSION INTRAVENOUS PRN
Status: DISCONTINUED | OUTPATIENT
Start: 2020-01-02 | End: 2020-01-02 | Stop reason: SDUPTHER

## 2020-01-02 RX ORDER — POTASSIUM CHLORIDE 29.8 MG/ML
20 INJECTION INTRAVENOUS PRN
Status: DISCONTINUED | OUTPATIENT
Start: 2020-01-02 | End: 2020-01-07 | Stop reason: HOSPADM

## 2020-01-02 RX ORDER — METHYLPREDNISOLONE SODIUM SUCCINATE 125 MG/2ML
60 INJECTION, POWDER, LYOPHILIZED, FOR SOLUTION INTRAMUSCULAR; INTRAVENOUS EVERY 8 HOURS
Status: DISCONTINUED | OUTPATIENT
Start: 2020-01-02 | End: 2020-01-02

## 2020-01-02 RX ORDER — SODIUM CHLORIDE 9 MG/ML
INJECTION, SOLUTION INTRAVENOUS CONTINUOUS PRN
Status: DISCONTINUED | OUTPATIENT
Start: 2020-01-02 | End: 2020-01-02 | Stop reason: SDUPTHER

## 2020-01-02 RX ORDER — DOCUSATE SODIUM 100 MG/1
100 CAPSULE, LIQUID FILLED ORAL 2 TIMES DAILY
Status: DISCONTINUED | OUTPATIENT
Start: 2020-01-02 | End: 2020-01-07 | Stop reason: HOSPADM

## 2020-01-02 RX ORDER — MIDAZOLAM HYDROCHLORIDE 1 MG/ML
INJECTION INTRAMUSCULAR; INTRAVENOUS
Status: DISPENSED
Start: 2020-01-02 | End: 2020-01-02

## 2020-01-02 RX ORDER — SODIUM CHLORIDE 9 MG/ML
INJECTION, SOLUTION INTRAVENOUS CONTINUOUS
Status: DISCONTINUED | OUTPATIENT
Start: 2020-01-02 | End: 2020-01-03

## 2020-01-02 RX ORDER — MIDAZOLAM HYDROCHLORIDE 1 MG/ML
2 INJECTION INTRAMUSCULAR; INTRAVENOUS ONCE
Status: COMPLETED | OUTPATIENT
Start: 2020-01-02 | End: 2020-01-02

## 2020-01-02 RX ORDER — MAGNESIUM SULFATE 1 G/100ML
1 INJECTION INTRAVENOUS PRN
Status: DISCONTINUED | OUTPATIENT
Start: 2020-01-02 | End: 2020-01-07 | Stop reason: HOSPADM

## 2020-01-02 RX ORDER — FUROSEMIDE 10 MG/ML
40 INJECTION INTRAMUSCULAR; INTRAVENOUS DAILY
Status: DISCONTINUED | OUTPATIENT
Start: 2020-01-02 | End: 2020-01-04

## 2020-01-02 RX ORDER — CARBOXYMETHYLCELLULOSE SODIUM 10 MG/ML
1 GEL OPHTHALMIC EVERY 4 HOURS
Status: DISCONTINUED | OUTPATIENT
Start: 2020-01-02 | End: 2020-01-03

## 2020-01-02 RX ORDER — SODIUM CHLORIDE 0.9 % (FLUSH) 0.9 %
10 SYRINGE (ML) INJECTION PRN
Status: DISCONTINUED | OUTPATIENT
Start: 2020-01-02 | End: 2020-01-06

## 2020-01-02 RX ORDER — ROCURONIUM BROMIDE 10 MG/ML
INJECTION, SOLUTION INTRAVENOUS PRN
Status: DISCONTINUED | OUTPATIENT
Start: 2020-01-02 | End: 2020-01-02 | Stop reason: SDUPTHER

## 2020-01-02 RX ORDER — LIDOCAINE HYDROCHLORIDE 10 MG/ML
INJECTION, SOLUTION INFILTRATION; PERINEURAL
Status: COMPLETED
Start: 2020-01-02 | End: 2020-01-02

## 2020-01-02 RX ORDER — ALBUMIN, HUMAN INJ 5% 5 %
12.5 SOLUTION INTRAVENOUS ONCE
Status: COMPLETED | OUTPATIENT
Start: 2020-01-02 | End: 2020-01-02

## 2020-01-02 RX ORDER — BUPIVACAINE HYDROCHLORIDE AND EPINEPHRINE 2.5; 5 MG/ML; UG/ML
INJECTION, SOLUTION EPIDURAL; INFILTRATION; INTRACAUDAL; PERINEURAL PRN
Status: DISCONTINUED | OUTPATIENT
Start: 2020-01-02 | End: 2020-01-02 | Stop reason: ALTCHOICE

## 2020-01-02 RX ORDER — ALBUMIN, HUMAN INJ 5% 5 %
50 SOLUTION INTRAVENOUS ONCE
Status: COMPLETED | OUTPATIENT
Start: 2020-01-02 | End: 2020-01-02

## 2020-01-02 RX ORDER — 0.9 % SODIUM CHLORIDE 0.9 %
250 INTRAVENOUS SOLUTION INTRAVENOUS ONCE
Status: DISCONTINUED | OUTPATIENT
Start: 2020-01-02 | End: 2020-01-07 | Stop reason: HOSPADM

## 2020-01-02 RX ORDER — ALBUMIN, HUMAN INJ 5% 5 %
SOLUTION INTRAVENOUS PRN
Status: DISCONTINUED | OUTPATIENT
Start: 2020-01-02 | End: 2020-01-02 | Stop reason: SDUPTHER

## 2020-01-02 RX ORDER — PHENYLEPHRINE HCL IN 0.9% NACL 1 MG/10 ML
SYRINGE (ML) INTRAVENOUS PRN
Status: DISCONTINUED | OUTPATIENT
Start: 2020-01-02 | End: 2020-01-02 | Stop reason: SDUPTHER

## 2020-01-02 RX ORDER — ALBUMIN, HUMAN INJ 5% 5 %
SOLUTION INTRAVENOUS
Status: COMPLETED
Start: 2020-01-02 | End: 2020-01-02

## 2020-01-02 RX ORDER — MIDAZOLAM HYDROCHLORIDE 1 MG/ML
INJECTION INTRAMUSCULAR; INTRAVENOUS
Status: COMPLETED
Start: 2020-01-02 | End: 2020-01-02

## 2020-01-02 RX ORDER — ONDANSETRON 2 MG/ML
4 INJECTION INTRAMUSCULAR; INTRAVENOUS EVERY 6 HOURS PRN
Status: DISCONTINUED | OUTPATIENT
Start: 2020-01-02 | End: 2020-01-07 | Stop reason: HOSPADM

## 2020-01-02 RX ADMIN — PHENYLEPHRINE HYDROCHLORIDE 100 MCG/MIN: 10 INJECTION INTRAVENOUS at 03:06

## 2020-01-02 RX ADMIN — ALBUMIN (HUMAN) 12.5 G: 12.5 INJECTION, SOLUTION INTRAVENOUS at 16:22

## 2020-01-02 RX ADMIN — PROPOFOL 50 MG: 10 INJECTION, EMULSION INTRAVENOUS at 13:22

## 2020-01-02 RX ADMIN — LIDOCAINE HYDROCHLORIDE: 10 INJECTION, SOLUTION INFILTRATION; PERINEURAL at 04:38

## 2020-01-02 RX ADMIN — Medication 15 ML: at 20:36

## 2020-01-02 RX ADMIN — Medication 200 MCG: at 13:33

## 2020-01-02 RX ADMIN — DEXTROSE MONOHYDRATE 5 MCG/MIN: 50 INJECTION, SOLUTION INTRAVENOUS at 00:25

## 2020-01-02 RX ADMIN — MIDAZOLAM 2 MG: 1 INJECTION INTRAMUSCULAR; INTRAVENOUS at 04:35

## 2020-01-02 RX ADMIN — SODIUM CHLORIDE 250 ML: 9 INJECTION, SOLUTION INTRAVENOUS at 09:35

## 2020-01-02 RX ADMIN — CARBOXYMETHYLCELLULOSE SODIUM 1 DROP: 10 GEL OPHTHALMIC at 20:29

## 2020-01-02 RX ADMIN — CEFAZOLIN 2000 MG: 1 INJECTION, POWDER, FOR SOLUTION INTRAMUSCULAR; INTRAVENOUS at 11:27

## 2020-01-02 RX ADMIN — SODIUM BICARBONATE 50 MEQ: 84 INJECTION, SOLUTION INTRAVENOUS at 11:36

## 2020-01-02 RX ADMIN — SODIUM CHLORIDE 1000 ML: 9 INJECTION, SOLUTION INTRAVENOUS at 08:37

## 2020-01-02 RX ADMIN — DEXTROSE MONOHYDRATE 40 MCG/MIN: 50 INJECTION, SOLUTION INTRAVENOUS at 01:20

## 2020-01-02 RX ADMIN — Medication 100 MCG: at 12:21

## 2020-01-02 RX ADMIN — DEXTROSE MONOHYDRATE 50 MCG/MIN: 50 INJECTION, SOLUTION INTRAVENOUS at 06:53

## 2020-01-02 RX ADMIN — MIDAZOLAM 2 MG: 1 INJECTION INTRAMUSCULAR; INTRAVENOUS at 09:22

## 2020-01-02 RX ADMIN — IPRATROPIUM BROMIDE AND ALBUTEROL SULFATE 1 AMPULE: .5; 3 SOLUTION RESPIRATORY (INHALATION) at 20:14

## 2020-01-02 RX ADMIN — ROCURONIUM BROMIDE 50 MG: 10 SOLUTION INTRAVENOUS at 13:21

## 2020-01-02 RX ADMIN — CEFEPIME HYDROCHLORIDE 2 G: 2 INJECTION, POWDER, FOR SOLUTION INTRAVENOUS at 06:25

## 2020-01-02 RX ADMIN — CALCIUM GLUCONATE 2 G: 98 INJECTION, SOLUTION INTRAVENOUS at 08:10

## 2020-01-02 RX ADMIN — ALBUMIN (HUMAN) 12.5 G: 12.5 INJECTION, SOLUTION INTRAVENOUS at 07:53

## 2020-01-02 RX ADMIN — ROCURONIUM BROMIDE 50 MG: 10 SOLUTION INTRAVENOUS at 11:01

## 2020-01-02 RX ADMIN — FENTANYL CITRATE 50 MCG/HR: 50 INJECTION, SOLUTION INTRAMUSCULAR; INTRAVENOUS at 10:47

## 2020-01-02 RX ADMIN — PHENYLEPHRINE HYDROCHLORIDE 300 MCG/MIN: 10 INJECTION INTRAVENOUS at 10:33

## 2020-01-02 RX ADMIN — SODIUM BICARBONATE 25 MEQ: 84 INJECTION, SOLUTION INTRAVENOUS at 07:22

## 2020-01-02 RX ADMIN — Medication 10 ML: at 20:33

## 2020-01-02 RX ADMIN — INSULIN LISPRO 1 UNITS: 100 INJECTION, SOLUTION INTRAVENOUS; SUBCUTANEOUS at 16:32

## 2020-01-02 RX ADMIN — VASOPRESSIN 0.04 UNITS/MIN: 20 INJECTION INTRAVENOUS at 06:07

## 2020-01-02 RX ADMIN — IPRATROPIUM BROMIDE AND ALBUTEROL SULFATE 1 AMPULE: .5; 3 SOLUTION RESPIRATORY (INHALATION) at 16:08

## 2020-01-02 RX ADMIN — VASOPRESSIN 0.04 UNITS/MIN: 20 INJECTION INTRAVENOUS at 10:56

## 2020-01-02 RX ADMIN — INSULIN LISPRO 1 UNITS: 100 INJECTION, SOLUTION INTRAVENOUS; SUBCUTANEOUS at 20:32

## 2020-01-02 RX ADMIN — SODIUM CHLORIDE 2000 ML: 9 INJECTION, SOLUTION INTRAVENOUS at 03:07

## 2020-01-02 RX ADMIN — FUROSEMIDE 40 MG: 10 INJECTION, SOLUTION INTRAMUSCULAR; INTRAVENOUS at 15:10

## 2020-01-02 RX ADMIN — CEFEPIME HYDROCHLORIDE 2 G: 2 INJECTION, POWDER, FOR SOLUTION INTRAVENOUS at 15:14

## 2020-01-02 RX ADMIN — PHENYLEPHRINE HYDROCHLORIDE 300 MCG/MIN: 10 INJECTION INTRAVENOUS at 07:26

## 2020-01-02 RX ADMIN — Medication 10 ML: at 20:29

## 2020-01-02 RX ADMIN — ALBUMIN (HUMAN) 12.5 G: 12.5 INJECTION, SOLUTION INTRAVENOUS at 11:31

## 2020-01-02 RX ADMIN — SODIUM CHLORIDE 250 ML: 9 INJECTION, SOLUTION INTRAVENOUS at 07:27

## 2020-01-02 RX ADMIN — CARBOXYMETHYLCELLULOSE SODIUM 1 DROP: 10 GEL OPHTHALMIC at 23:57

## 2020-01-02 RX ADMIN — HYDROCORTISONE SODIUM SUCCINATE 100 MG: 100 INJECTION, POWDER, FOR SOLUTION INTRAMUSCULAR; INTRAVENOUS at 09:35

## 2020-01-02 RX ADMIN — VANCOMYCIN HYDROCHLORIDE 1250 MG: 10 INJECTION, POWDER, LYOPHILIZED, FOR SOLUTION INTRAVENOUS at 04:50

## 2020-01-02 RX ADMIN — Medication 2 G: at 20:28

## 2020-01-02 RX ADMIN — SODIUM CHLORIDE: 9 INJECTION, SOLUTION INTRAVENOUS at 14:52

## 2020-01-02 RX ADMIN — INSULIN HUMAN 20 UNITS: 100 INJECTION, SOLUTION PARENTERAL at 07:19

## 2020-01-02 RX ADMIN — METHYLPREDNISOLONE SODIUM SUCCINATE 60 MG: 125 INJECTION, POWDER, FOR SOLUTION INTRAMUSCULAR; INTRAVENOUS at 04:43

## 2020-01-02 RX ADMIN — VASOPRESSIN 0.04 UNITS/MIN: 20 INJECTION INTRAVENOUS at 01:01

## 2020-01-02 RX ADMIN — IPRATROPIUM BROMIDE AND ALBUTEROL SULFATE 1 AMPULE: .5; 3 SOLUTION RESPIRATORY (INHALATION) at 08:09

## 2020-01-02 RX ADMIN — SODIUM BICARBONATE 50 MEQ: 84 INJECTION, SOLUTION INTRAVENOUS at 12:12

## 2020-01-02 RX ADMIN — DEXTROSE MONOHYDRATE 50 MCG/MIN: 50 INJECTION, SOLUTION INTRAVENOUS at 06:07

## 2020-01-02 RX ADMIN — MIDAZOLAM: 1 INJECTION INTRAMUSCULAR; INTRAVENOUS at 04:15

## 2020-01-02 RX ADMIN — ALBUMIN (HUMAN) 12.5 G: 12.5 INJECTION, SOLUTION INTRAVENOUS at 07:51

## 2020-01-02 RX ADMIN — FENTANYL CITRATE 50 MCG/HR: 50 INJECTION, SOLUTION INTRAMUSCULAR; INTRAVENOUS at 03:41

## 2020-01-02 RX ADMIN — CARBOXYMETHYLCELLULOSE SODIUM 1 DROP: 10 GEL OPHTHALMIC at 16:25

## 2020-01-02 RX ADMIN — SODIUM CHLORIDE: 9 INJECTION, SOLUTION INTRAVENOUS at 10:56

## 2020-01-02 RX ADMIN — DEXTROSE MONOHYDRATE 2.5 MCG/MIN: 50 INJECTION, SOLUTION INTRAVENOUS at 15:43

## 2020-01-02 RX ADMIN — SODIUM BICARBONATE 50 MEQ: 84 INJECTION, SOLUTION INTRAVENOUS at 07:43

## 2020-01-02 RX ADMIN — SODIUM BICARBONATE 50 MEQ: 84 INJECTION, SOLUTION INTRAVENOUS at 03:16

## 2020-01-02 RX ADMIN — ROCURONIUM BROMIDE 50 MG: 10 SOLUTION INTRAVENOUS at 12:04

## 2020-01-02 RX ADMIN — TRANEXAMIC ACID 1000 MG: 1 INJECTION, SOLUTION INTRAVENOUS at 10:39

## 2020-01-02 ASSESSMENT — PULMONARY FUNCTION TESTS
PIF_VALUE: 23
PIF_VALUE: 28
PIF_VALUE: 26
PIF_VALUE: 23
PIF_VALUE: 28
PIF_VALUE: 27
PIF_VALUE: 27
PIF_VALUE: 19
PIF_VALUE: 28
PIF_VALUE: 26
PIF_VALUE: 19
PIF_VALUE: 20
PIF_VALUE: 27
PIF_VALUE: 25
PIF_VALUE: 29
PIF_VALUE: 19
PIF_VALUE: 23
PIF_VALUE: 20
PIF_VALUE: 27
PIF_VALUE: 30
PIF_VALUE: 24
PIF_VALUE: 28
PIF_VALUE: 22
PIF_VALUE: 25
PIF_VALUE: 20
PIF_VALUE: 28
PIF_VALUE: 23
PIF_VALUE: 19
PIF_VALUE: 25
PIF_VALUE: 28
PIF_VALUE: 25
PIF_VALUE: 22
PIF_VALUE: 19
PIF_VALUE: 57
PIF_VALUE: 24
PIF_VALUE: 27
PIF_VALUE: 23
PIF_VALUE: 24
PIF_VALUE: 23
PIF_VALUE: 23
PIF_VALUE: 19
PIF_VALUE: 24
PIF_VALUE: 22
PIF_VALUE: 33
PIF_VALUE: 23
PIF_VALUE: 23
PIF_VALUE: 28
PIF_VALUE: 23
PIF_VALUE: 20
PIF_VALUE: 23
PIF_VALUE: 28
PIF_VALUE: 22
PIF_VALUE: 19
PIF_VALUE: 28
PIF_VALUE: 19
PIF_VALUE: 19
PIF_VALUE: 28
PIF_VALUE: 24
PIF_VALUE: 40
PIF_VALUE: 20
PIF_VALUE: 27
PIF_VALUE: 21
PIF_VALUE: 23
PIF_VALUE: 23
PIF_VALUE: 16
PIF_VALUE: 24
PIF_VALUE: 26
PIF_VALUE: 17
PIF_VALUE: 24
PIF_VALUE: 23
PIF_VALUE: 24
PIF_VALUE: 23
PIF_VALUE: 22
PIF_VALUE: 20
PIF_VALUE: 16
PIF_VALUE: 20
PIF_VALUE: 19
PIF_VALUE: 25
PIF_VALUE: 25
PIF_VALUE: 23
PIF_VALUE: 23
PIF_VALUE: 19
PIF_VALUE: 24
PIF_VALUE: 28
PIF_VALUE: 21
PIF_VALUE: 15
PIF_VALUE: 6
PIF_VALUE: 28
PIF_VALUE: 19
PIF_VALUE: 19
PIF_VALUE: 23
PIF_VALUE: 26
PIF_VALUE: 23
PIF_VALUE: 19
PIF_VALUE: 23
PIF_VALUE: 19
PIF_VALUE: 28
PIF_VALUE: 26
PIF_VALUE: 20
PIF_VALUE: 24
PIF_VALUE: 28
PIF_VALUE: 19
PIF_VALUE: 28
PIF_VALUE: 19
PIF_VALUE: 24
PIF_VALUE: 28
PIF_VALUE: 28
PIF_VALUE: 23
PIF_VALUE: 25
PIF_VALUE: 20
PIF_VALUE: 21
PIF_VALUE: 24
PIF_VALUE: 20
PIF_VALUE: 23
PIF_VALUE: 28
PIF_VALUE: 25
PIF_VALUE: 14
PIF_VALUE: 23
PIF_VALUE: 28
PIF_VALUE: 28
PIF_VALUE: 21
PIF_VALUE: 19
PIF_VALUE: 21
PIF_VALUE: 28
PIF_VALUE: 20
PIF_VALUE: 23
PIF_VALUE: 23
PIF_VALUE: 5
PIF_VALUE: 25
PIF_VALUE: 26
PIF_VALUE: 26
PIF_VALUE: 28
PIF_VALUE: 20
PIF_VALUE: 28
PIF_VALUE: 25
PIF_VALUE: 28
PIF_VALUE: 25
PIF_VALUE: 26
PIF_VALUE: 25
PIF_VALUE: 24
PIF_VALUE: 27
PIF_VALUE: 23
PIF_VALUE: 21
PIF_VALUE: 27
PIF_VALUE: 2
PIF_VALUE: 23
PIF_VALUE: 20
PIF_VALUE: 19
PIF_VALUE: 23
PIF_VALUE: 16
PIF_VALUE: 28
PIF_VALUE: 22
PIF_VALUE: 25
PIF_VALUE: 20
PIF_VALUE: 22
PIF_VALUE: 13
PIF_VALUE: 9
PIF_VALUE: 24
PIF_VALUE: 30
PIF_VALUE: 28
PIF_VALUE: 25
PIF_VALUE: 25
PIF_VALUE: 23
PIF_VALUE: 40
PIF_VALUE: 19
PIF_VALUE: 24
PIF_VALUE: 23
PIF_VALUE: 19
PIF_VALUE: 24
PIF_VALUE: 26
PIF_VALUE: 19
PIF_VALUE: 24
PIF_VALUE: 23
PIF_VALUE: 28
PIF_VALUE: 23
PIF_VALUE: 23
PIF_VALUE: 28
PIF_VALUE: 26
PIF_VALUE: 26
PIF_VALUE: 32
PIF_VALUE: 19
PIF_VALUE: 21
PIF_VALUE: 28
PIF_VALUE: 19
PIF_VALUE: 28
PIF_VALUE: 26
PIF_VALUE: 28
PIF_VALUE: 20
PIF_VALUE: 23
PIF_VALUE: 19
PIF_VALUE: 19
PIF_VALUE: 24
PIF_VALUE: 28
PIF_VALUE: 20
PIF_VALUE: 20
PIF_VALUE: 23
PIF_VALUE: 26
PIF_VALUE: 24
PIF_VALUE: 25
PIF_VALUE: 23
PIF_VALUE: 25
PIF_VALUE: 19
PIF_VALUE: 30
PIF_VALUE: 25
PIF_VALUE: 20
PIF_VALUE: 19
PIF_VALUE: 19
PIF_VALUE: 34
PIF_VALUE: 28
PIF_VALUE: 28
PIF_VALUE: 25
PIF_VALUE: 23
PIF_VALUE: 23
PIF_VALUE: 27
PIF_VALUE: 28
PIF_VALUE: 16
PIF_VALUE: 23
PIF_VALUE: 23
PIF_VALUE: 9
PIF_VALUE: 26
PIF_VALUE: 28
PIF_VALUE: 19
PIF_VALUE: 21
PIF_VALUE: 25
PIF_VALUE: 28
PIF_VALUE: 24
PIF_VALUE: 17
PIF_VALUE: 22
PIF_VALUE: 25
PIF_VALUE: 23
PIF_VALUE: 18
PIF_VALUE: 20
PIF_VALUE: 20
PIF_VALUE: 19
PIF_VALUE: 28
PIF_VALUE: 28
PIF_VALUE: 24
PIF_VALUE: 25
PIF_VALUE: 23
PIF_VALUE: 31
PIF_VALUE: 25

## 2020-01-02 ASSESSMENT — LIFESTYLE VARIABLES: SMOKING_STATUS: 1

## 2020-01-02 ASSESSMENT — PAIN SCALES - GENERAL
PAINLEVEL_OUTOF10: 0
PAINLEVEL_OUTOF10: 5
PAINLEVEL_OUTOF10: 0
PAINLEVEL_OUTOF10: 0
PAINLEVEL_OUTOF10: 2

## 2020-01-02 NOTE — PROGRESS NOTES
Critical ABG results communicated with Rheba Kehr NP. Order for bicarbonate infusion in place at this time.

## 2020-01-02 NOTE — CONSULTS
last 72 hours. Invalid input(s): [address]     01/02/20  1207 01/02/20  1306   PHART 7.164* 7.323*   BQY1BPJ 50.8* 24.8*   PO2ART 79.6 359.3*       Vent Information  $Ventilation: $Initial Day  Vent Type: 840  Vent Mode: AC/VC+  Vt Ordered: 500 mL  Rate Set: 18 bmp  Peak Flow: 60 L/min  Pressure Support: 0 cmH20  FiO2 : 50 %  Sensitivity: 3  PEEP/CPAP: 10  I Time/ I Time %: 0 s  Cuff Pressure (cm H2O): 30 cm H2O  Humidification Source: [facility]    CXR personally reviewed, complete opacification of the left hemithorax. Assessment:     1. Acute resp failure, vent dependent, mixed   -left lung atelectasis, possible CABP  2. Left hemothorax, unclear bleeding source  3. Hemorrhagic shock  4. Acute blood loss anemia  5. PAL, prerenal  6.  Acute encephalopathy, metabolic    Plan:      -Vent support, titrate sedation  -Titrate vasopressor support, arterial line was placed for hemodynamic monitoring  -Aggressive blood product resuscitation, massive transfusion protocol initiated  -Appreciate thoracic surgery input, defer chest tube management to their expertise  -Empiric atb  -Serial labs  -Nephro involved, if renal function fails to improve will start CRRT  -Bicarbonate gtt for acidosis, stop once improved  -Hold tube feeds until more stable  -ICU level of care, discussed plan with family at the bedside    Due to life threatening hemodynamic instability and resp failure, this patient is critically ill, total critical care time involved in his care was 90 mins      Laura Ferguson MD

## 2020-01-02 NOTE — PROGRESS NOTES
01/02/20 0816   Vent Information   Vt Ordered 500 mL   Rate Set 24 bmp   Pressure Support 0 cmH20   FiO2  50 %   Sensitivity 3   PEEP/CPAP 5   I Time/ I Time % 0 s   Vent Patient Data   Peak Inspiratory Pressure 26 cmH2O   Mean Airway Pressure 15 cmH20   Rate Measured 32 br/min   Vt Exhaled 98 mL   Minute Volume 14.8 Liters   I:E Ratio 2.60:1   Spontaneous Breathing Trial (SBT) RT Doc   Pulse 73   SpO2 96 %   Additional Respiratory  Assessments   End Tidal CO2 41 (%)   Alarm Settings   High Pressure Alarm 45 cmH2O   Low Minute Volume Alarm 2 L/min   High Respiratory Rate 40 br/min     INCREASED RR 24   DECREASED FIO2 TO 50%

## 2020-01-02 NOTE — CARE COORDINATION
Writer went to bedside for initial assessment. Staff at bedside and not appropriate for CM to meet with patient at this time. Patient to return to OR. CM will attempt again later today or tomorrow.  Jaja Darnell RN

## 2020-01-02 NOTE — PROCEDURES
ENDOTRACHEAL INTUBATION    INDICATION: Respiratory failure    TIME OUT: taken    SEDATION Medication: 10 mg etomidate, 100 mg rocuronium, 2 mg IV Versed    PROCEDURE: Using video laryngoscopy, the vocal cords were well visualized and a 8 mm endotracheal tube was place directly through the cords at 25 cm at the mouth. Good breath sounds auscultated bilaterally without sounds over abdomen. Good return of CO2 on the monitor. CXR is pending.

## 2020-01-02 NOTE — PROGRESS NOTES
Nutrition Assessment    Type and Reason for Visit: Initial(New Vent )    Nutrition Recommendations:   NPO for now   Monitor for improvements in hemodynamic status and ability to initiate nutrition support when medically appropriate     Nutrition Assessment: Pt is a 78 y/o male admitted with L pleural effusion, hemothorax. Intubated in the ICU. Hemodynamically unstable requiring 3 high dose vasopressors. Lactic acid 11.9. OGT in place to suction. Not appropriate for TF at this time. Will monitor. Malnutrition Assessment:  · Malnutrition Status: At risk for malnutrition     Nutrition Risk Level: High    Nutrient Needs:  · Estimated Daily Total Kcal: 3525-5003 kcals   · Estimated Daily Protein (g):  gm   · Estimated Daily Total Fluid (ml/day): 1 mL/kcal     Nutrition Diagnosis:   · Problem: Inadequate energy intake  · Etiology: related to Insufficient energy/nutrient consumption     Signs and symptoms:  as evidenced by NPO status due to medical condition    Objective Information:  · Nutrition-Focused Physical Findings: Hypoactive bowel sounds, intubated, sedated, olesya hugger in place   · Wound Type: (Chest tube in place )  · Current Nutrition Therapies:  · Oral Diet Orders: NPO   · Anthropometric Measures:  · Ht: 5' 9\" (175.3 cm)   · Current Body Wt: 214 lb (97.1 kg)  · Ideal Body Wt: 160 lb (72.6 kg),   · BMI Classification: BMI 30.0 - 34.9 Obese Class I    Nutrition Interventions:   Continue NPO  Continued Inpatient Monitoring    Nutrition Evaluation:   · Evaluation: Goals set   · Goals: Tolerate most appropriate form of nutrition therapy this admission     · Monitoring: Nutrition Progression, Weight, Pertinent Labs, Monitor Hemodynamic Status, TF Intake, TF Tolerance, Nausea or Vomiting      Electronically signed by Mona Burr.  Jennifer Thornton RD, LD on 1/2/20 at 10:13 AM    Contact Number: [phone]

## 2020-01-02 NOTE — PROGRESS NOTES
01/02/20 0346   Vent Information   Vent Type 840   Vent Mode AC/VC   Vt Ordered 500 mL   Rate Set 18 bmp   Peak Flow 60 L/min   Pressure Support 0 cmH20   FiO2  70 %   Sensitivity 3   PEEP/CPAP 5   Cuff Pressure (cm H2O) 30 cm H2O   Humidification Source HME   Vent Patient Data   Peak Inspiratory Pressure 25 cmH2O   Mean Airway Pressure 13 cmH20   Rate Measured 24 br/min   Vt Exhaled 616 mL   Minute Volume 12.2 Liters   I:E Ratio 1:2.70   Spontaneous Breathing Trial (SBT) RT Doc   Pulse 92   Breath Sounds   Right Upper Lobe Diminished   Right Middle Lobe Diminished   Right Lower Lobe Diminished   Left Upper Lobe Diminished   Left Lower Lobe Diminished   Additional Respiratory  Assessments   End Tidal CO2 27 (%)   Alarm Settings   High Pressure Alarm 40 cmH2O   Low Minute Volume Alarm 2 L/min   High Respiratory Rate 40 br/min   Low Exhaled Vt  200 mL   ETT (adult)   Placement Date/Time: 01/02/20 0020   Timeout: Patient  Mask Ventilation: Ventilated by mask (1)  Technique: Direct laryngoscopy  Type: Cuffed  Tube Size: 8 mm  Blade Size: 4  Location: Oral  Grade View: Only epiglottis visible  Insertion attempts: 1  ...    Secured at 24 cm   Measured From Lips   ET Placement Right   Secured By Commercial tube romero

## 2020-01-02 NOTE — CONSULTS
PRN  ondansetron (ZOFRAN) injection 4 mg, 4 mg, Intravenous, Q6H PRN  acetaminophen (TYLENOL) tablet 650 mg, 650 mg, Oral, Q4H PRN  glucose (GLUTOSE) 40 % oral gel 15 g, 15 g, Oral, PRN  dextrose 50 % IV solution, 12.5 g, Intravenous, PRN  glucagon (rDNA) injection 1 mg, 1 mg, Intramuscular, PRN  dextrose 5 % solution, 100 mL/hr, Intravenous, PRN  insulin lispro (HUMALOG) injection vial 0-6 Units, 0-6 Units, Subcutaneous, TID WC  insulin lispro (HUMALOG) injection vial 0-3 Units, 0-3 Units, Subcutaneous, Nightly  perflutren lipid microspheres (DEFINITY) injection 1.65 mg, 1.5 mL, Intravenous, ONCE PRN  ipratropium-albuterol (DUONEB) nebulizer solution 1 ampule, 1 ampule, Inhalation, Q4H WA  Facility-Administered Medications Ordered in Other Encounters: rocuronium (ZEMURON) injection, , , PRN       Vitals :     Vitals:    01/02/20 1027   BP: (!) 96/51   Pulse: 71   Resp: 27   Temp: 94.5 °F (34.7 °C)   SpO2:        I & O :       Intake/Output Summary (Last 24 hours) at 1/2/2020 1125  Last data filed at 1/2/2020 1123  Gross per 24 hour   Intake 7928.72 ml   Output 5562 ml   Net 2366.72 ml        Physical Examination :     General appearance: unresponsive, intubated   HEENT: Lips- normal, teeth- ok , oral mucosa- moist  Neck : Mass- no, appears symmetrical, JVD- not raised  Respiratory: Respiratory effort-  On ventilation- FiO2- 100% wheeze- no, crackles - no  Cardiovascular:  Ausculation- No M/R/G, Edema none  Abdomen: visible mass- no, distention- no, scar- no, tenderness- unable to assess                           hepatosplenomegaly-  No--  Musculoskeletal:  clubbing no,cyanosis- no , digital ischemia- no                           muscle strength- patient unable to co-operate     , tone - patient unable to co-operate   Skin: rashes- no , ulcers- no, induration- no, tightening - no  Psychiatric:   Intubated, unable to assess  L- chest tube          LABS:     Recent Labs     01/01/20  2334 01/02/20  0605 01/02/20  1836 01/02/20  0749 01/02/20  1016   WBC 13.3* 14.9* 16.1*  --   --    HGB 9.8* 5.7* 6.0* 5.3* 6.8*   HCT 28.4* 16.9* 17.8*  --   --     110* 80*  --   --      Recent Labs     01/01/20  2334 01/02/20  0605 01/02/20  0742   * 132* 138   K 4.7 7.3* 5.6*   CL 99 101 103   CO2 21 21 20*   BUN 16 19 19   CREATININE 0.9 1.5* 1.5*   GLUCOSE 225* 305* 224*

## 2020-01-02 NOTE — PROGRESS NOTES
RESPIRATORY THERAPY ASSESSMENT    Name:  Amita Riojas  Medical Record Number:  [id number]  Age: 77 y.o. Gender: male  : 1950  Today's Date:  2020  Room:  0424/0424-01    Assessment     Is the patient being admitted for a COPD or Asthma exacerbation? 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 They will be assessed with the first treatment and at least every 72 hours thereafter. The following severity scale will be used to determine frequency of treatment intervention. Smoking History: Pulmonary Disease or Smoking History, Greater than 15 pack year = 2    Social History  Social History     Tobacco Use    Smoking status: Current Every Day Smoker     Packs/day: 2.00     Types: Cigarettes    Smokeless tobacco: Current User   Substance Use Topics    Alcohol use: Yes     Alcohol/week: 6.0 standard drinks     Types: 6 Cans of beer per week     Frequency: 4 or more times a week     Drinks per session: 5 or 6     Binge frequency: Daily or almost daily    Drug use: Not Currently       Recent Surgical History: None = 0  Past Surgical History  No past surgical history on file.     Level of Consciousness: Alert, Oriented, and Cooperative = 0    Level of Activity: Walking with the following criteria are met  a. Incognizant or uncooperative          b. Patients treated with HHN at Home        c. Unable to demonstrate proper use of MDI with spacer     d. RR > 30 bpm   5. Bronchodilators will be delivered via Metered Dose Inhaler (MDI), HHN, Aerogen to intubated patients on mechanical ventilation. 6. Inhalation medication orders will be delivered and/or substituted as outlined below. Aerosolized Medications Ordering and Administration Guidelines:    1. All Medications will be ordered by a physician, and their frequency and/or modality will be adjusted as defined by the patients Respiratory Severity Index (RSI) score. 2. If the patient does not have documented COPD, consider discontinuing anticholinergics when RSI is less than 9.  3. If the bronchospasm worsens (increased RSI), then the bronchodilator frequency can be increased to a maximum of every 4 hours. If greater than every 4 hours is required, the physician will be contacted. 4. If the bronchospasm improves, the frequency of the bronchodilator can be decreased, based on the patient's RSI, but not less than home treatment regimen frequency. 5. Bronchodilator(s) will be discontinued if patient has a RSI less than 9 and has received no scheduled or as needed treatment for 72  Hrs. Patients Ordered on a Mucolytic Agent:    1. Must always be administered with a bronchodilator. 2. Discontinue if patient experiences worsened bronchospasm, or secretions have lessened to the point that the patient is able to clear them with a cough. Anti-inflammatory and Combination Medications:    1. If the patient lacks prior history of lung disease, is not using inhaled anti-inflammatory medication at home, and lacks wheezing by examination or by history for at least 24 hours, contact physician for possible discontinuation.

## 2020-01-02 NOTE — PROGRESS NOTES
Pt arrived to C 234 intubated. Started on levophed and fluids wide open for pressure support. Dr. Tia Valente at bedside. Pt connected to monitor and chlorhexidine  Wipes completed per protocol. Orders for vasopressin obtained. Will continue to monitor.      Report at bedside from [address] [place] [address]

## 2020-01-02 NOTE — PROGRESS NOTES
Left chest tube placed by Dr. Brii Peña. 3200ml out of chest tube output dark bloody and thick. CT hooked up to -20 suction per order.

## 2020-01-02 NOTE — H&P
diaphoretic and became more hypotensive with obtundation. Systolics were in the 72H with a thready pulse. Patient became visibly cyanotic. Rapid response was called. When I reexamine the patient he was in impending respiratory arrest.  Patient was emergently intubated and transferred to ICU. Past Medical History:          Diagnosis Date    CAD (coronary artery disease)     [facility]     [facility]     Gout     Hyperlipidemia     Hypertension     Thyroid disease        Past Surgical History:      No past surgical history on file. Medications Prior to Admission:      Prior to Admission medications    Medication Sig Start Date End Date Taking? Authorizing Provider   atorvastatin (LIPITOR) 40 MG tablet Take 40 mg by mouth daily 5/16/12  Yes Historical Provider, MD   carvedilol (COREG) 25 MG tablet Take 25 mg by mouth 2 times daily 5/16/12  Yes Historical Provider, MD   lisinopril (PRINIVIL;ZESTRIL) 10 MG tablet Take 10 mg by mouth daily 5/16/12  Yes Historical Provider, MD   prasugrel (EFFIENT) 10 MG TABS Take 10 mg by mouth daily 12/8/10  Yes Historical Provider, MD   allopurinol (ZYLOPRIM) 300 MG tablet Take 300 mg by mouth daily   Yes Historical Provider, MD   amLODIPine (NORVASC) 5 MG tablet Take 5 mg by mouth daily   Yes Historical Provider, MD   choline fenofibrate (TRILIPIX) 135 MG CPDR delayed release capsule Take 135 mg by mouth daily    Historical Provider, MD   febuxostat (ULORIC) 40 MG TABS tablet Take 40 mg by mouth daily    Historical Provider, MD   glipiZIDE (GLUCOTROL) 5 MG tablet Take 1 tablet by mouth daily    Historical Provider, MD   meclizine (ANTIVERT) 25 MG tablet Take 1 tablet by mouth daily    Historical Provider, MD       Allergies:  Penicillins    Social History:      The patient currently lives at home    TOBACCO:   reports that he has been smoking cigarettes. He has been smoking about 2.00 packs per day.  He uses smokeless tobacco.  ETOH:   reports current alcohol use of about 6.0 standard drinks of alcohol per week. E-Cigarettes Vaping or Juuling     Questions Responses    E-Cigarette Use     Start Date     Does device contain nicotine? Quit Date     E-Cigarette Type             Family History:  Reviewed in detail and negative for DM, CAD, Cancer, CVA    REVIEW OF SYSTEMS:   Pertinent positives as noted in the HPI. All other systems reviewed and negative. PHYSICAL EXAM PERFORMED:    BP (!) 123/50   Pulse 78   Temp 97.6 °F (36.4 °C) (Axillary)   Resp 8   Ht 5' 9\" (1.753 m)   Wt 214 lb 6.4 oz (97.3 kg)   SpO2 97%   BMI 31.66 kg/m²     General appearance:  No apparent distress, appears stated age and cooperative. HEENT:  Normal cephalic, atraumatic without obvious deformity. Pupils equal, round, and reactive to light. Extra ocular muscles intact. Conjunctivae/corneas clear. Neck: Supple, with full range of motion. No jugular venous distention. Trachea midline. Respiratory: Increased respiratory effort. Wheeze to auscultation in the right lung fields , good air entry. Diminished to absent breath sounds in the left lung fields . Trachea shifted to the right . cardiovascular:  Regular rate and rhythm with normal S1/S2 without murmurs, rubs or gallops. Abdomen: Soft, non-tender, non-distended with normal bowel sounds. Musculoskeletal:  No clubbing, cyanosis or edema bilaterally. Full range of motion without deformity. Skin: Skin color, texture, turgor normal.  No rashes or lesions. Neurologic:  Neurovascularly intact without any focal sensory/motor deficits.  Cranial nerves: II-XII intact, grossly non-focal.  Psychiatric:  Alert and oriented, thought content appropriate, normal insight  Capillary Refill: Brisk,< 3 seconds   Peripheral Pulses: +2 palpable, equal bilaterally       Labs:     Recent Labs     01/01/20  2334   WBC 13.3*   HGB 9.8*   HCT 28.4*        Recent Labs     01/01/20  2334   *   K consulted    Dispo -IP stay, E LOS 3-4 nights    Total critical care time caring for this patient with life threatening, unstable organ failure, including direct patient contact, management of life support systems, review of data including imaging and labs, discussions with other team members and physicians at least 55 min so far today, excluding procedures. Jim Parkinson MD    Thank you No primary care provider on file. for the opportunity to be involved in this patient's care. If you have any questions or concerns please feel free to contact me at [phone].

## 2020-01-02 NOTE — PROGRESS NOTES
Nephrology consult was called to [facility]ph[facility] answering service.

## 2020-01-02 NOTE — PROGRESS NOTES
Recent Labs     01/02/20  0300   INR 1.56*     Recent Labs     01/01/20  2334 01/02/20  0742   TROPONINI 0.02* 0.09*       Urinalysis:    No results found for: Reggie Cornelius, BACTERIA, RBCUA, BLOODU, SPECGRAV, GLUCOSEU    Radiology:  XR CHEST PORTABLE   Final Result   Interval left thoracostomy tube placement. Continued large volume left   pleural effusion with now complete left lung atelectasis. Appropriate endotracheal tube repositioning. XR CHEST PORTABLE   Final Result   Appropriate right IJ central venous catheter positioning. No pneumothorax. Low-lying endotracheal tube. Recommend retracting approximately 2 cm. Ongoing large left pleural effusion with partial left lung atelectasis. The findings were sent to the Radiology Results [address] at 2:28   am on 1/2/2020to be communicated to a licensed caregiver. XR ABDOMEN (KUB) (SINGLE AP VIEW)   Final Result   ET tube and enteric tube as described      There is a large amount of opacification of the left hemithorax with a large   pleural effusion. No pneumothorax         XR CHEST PORTABLE   Final Result   ET tube and enteric tube as described      There is a large amount of opacification of the left hemithorax with a large   pleural effusion.       No pneumothorax                 Assessment/Plan:    Active Hospital Problems    Diagnosis    Acute respiratory failure with hypoxemia (HCC) [J96.01]    Tobacco abuse [Z72.0]    Hyperlipidemia [E78.5]    CAD (coronary artery disease) s/p stent in 2010 [I25.10]    Hemothorax [J94.2]    S/P AVR (aortic valve replacement) [Z95.2]    AS (aortic stenosis) [I35.0]    HTN (hypertension) [I10]     Acute respiratory failure   -Needed emergent intubation due to respiratory decompensation on the floor, likely multifactorial due to pneumonia, COPD exacerbation, large left pleural effusion causing lung collapse  -Placed on mechanical ventilator, adjust vent settings Status: Not ordered    Dispo -than 2 days    Zenaida Lynn MD

## 2020-01-02 NOTE — ANESTHESIA PRE PROCEDURE
Department of Anesthesiology  Preprocedure Note       Name:  Anne Beverly   Age:  77 y.o.  :  1950                                          MRN:  [id number]         Date:  2020      Surgeon: Margo Montenegro):  Pb Newby MD    Procedure: THORACOTOMY DECORTICATION (Left )    Medications prior to admission:   Prior to Admission medications    Medication Sig Start Date End Date Taking?  Authorizing Provider   atorvastatin (LIPITOR) 40 MG tablet Take 40 mg by mouth daily 12  Yes Historical Provider, MD   carvedilol (COREG) 25 MG tablet Take 25 mg by mouth 2 times daily 12  Yes Historical Provider, MD   lisinopril (PRINIVIL;ZESTRIL) 10 MG tablet Take 10 mg by mouth daily 12  Yes Historical Provider, MD   prasugrel (EFFIENT) 10 MG TABS Take 10 mg by mouth daily 12/8/10  Yes Historical Provider, MD   allopurinol (ZYLOPRIM) 300 MG tablet Take 300 mg by mouth daily   Yes Historical Provider, MD   amLODIPine (NORVASC) 5 MG tablet Take 5 mg by mouth daily   Yes Historical Provider, MD   choline fenofibrate (TRILIPIX) 135 MG CPDR delayed release capsule Take 135 mg by mouth daily    Historical Provider, MD   febuxostat (ULORIC) 40 MG TABS tablet Take 40 mg by mouth daily    Historical Provider, MD   glipiZIDE (GLUCOTROL) 5 MG tablet Take 1 tablet by mouth daily    Historical Provider, MD   meclizine (ANTIVERT) 25 MG tablet Take 1 tablet by mouth daily    Historical Provider, MD       Current medications:    Current Facility-Administered Medications   Medication Dose Route Frequency Provider Last Rate Last Dose    norepinephrine (LEVOPHED) 16 mg in dextrose 5 % 250 mL infusion  5 mcg/min Intravenous Continuous Radha Molina MD 42.2 mL/hr at 20 0955 45 mcg/min at 20 0955    vasopressin 20 Units in dextrose 5 % 100 mL infusion  0.04 Units/min Intravenous Continuous Radha Molina MD 12 mL/hr at 20 0607 0.04 Units/min at 20 0607    sodium chloride 0.9 % infusion Or    sodium phosphate 18 mmol in dextrose 5 % 500 mL IVPB  18 mmol Intravenous PRN La Ramos MD        Or    sodium phosphate 24 mmol in dextrose 5 % 500 mL IVPB  24 mmol Intravenous PRN La Ramos MD        prismaSATE BGK 2/0 5,000 mL with calcium chloride 1.1 g solution   Dialysis Continuous MD Quique SinclairTE BGK 2/0 5,000 mL with calcium chloride 1.1 g solution   Dialysis Continuous La Ramos MD        prismaMARCK BGK 2/0 5,000 mL with calcium chloride 1.1 g solution   Dialysis Continuous La Ramos MD        hydrocortisone sodium succinate PF (SOLU-CORTEF) injection 100 mg  100 mg Intravenous Q8H July Pop, APRN - CNP   100 mg at 01/02/20 0935    0.9 % sodium chloride bolus  250 mL Intravenous Once July Pop, APRN - CNP   Stopped at 01/02/20 1135    0.9 % sodium chloride bolus  250 mL Intravenous Once July Pop, APRN - CNP   Stopped at 01/02/20 1135    midazolam (VERSED) 2 MG/2ML injection             sodium chloride 0.9 % infusion             sodium chloride 0.9 % infusion             EPINEPHrine (EPINEPHrine HCL) 5 mg in dextrose 5 % 250 mL infusion  1 mcg/min Intravenous Continuous alix Mares MD        sodium bicarbonate 150 mEq in dextrose 5 % 1,000 mL infusion   Intravenous Continuous July Pop, APRN - CNP        allopurinol (ZYLOPRIM) tablet 300 mg  300 mg Oral Daily Lynne Cleary MD        atorvastatin (LIPITOR) tablet 40 mg  40 mg Oral Daily Lynne Cleary MD        febuxostat (ULORIC) tablet 40 mg  40 mg Oral Daily Lynne Cleary MD   Stopped at 01/02/20 0461    sodium chloride flush 0.9 % injection 10 mL  10 mL Intravenous 2 times per day Lynne Cleary MD   10 mL at 01/01/20 2309    sodium chloride flush 0.9 % injection 10 mL  10 mL Intravenous PRN Lynne Cleary MD        magnesium hydroxide (MILK OF MAGNESIA) 400 MG/5ML suspension 30 mL  30 mL Oral Daily PRN Amy Reyes Substance Use Topics    Alcohol use: Yes     Alcohol/week: 6.0 standard drinks     Types: 6 Cans of beer per week     Frequency: 4 or more times a week     Drinks per session: 5 or 6     Binge frequency: Daily or almost daily                                Ready to quit: No  Counseling given: Yes      Vital Signs (Current):   Vitals:    01/02/20 0947 01/02/20 1002 01/02/20 1014 01/02/20 1027   BP: (!) 119/50 107/74 (!) 98/50 (!) 96/51   Pulse: 72 71 73 71   Resp: 23 29 27 27   Temp: 94.5 °F (34.7 °C) 94.5 °F (34.7 °C) 94.5 °F (34.7 °C) 94.5 °F (34.7 °C)   TempSrc:  Core     SpO2:  93%     Weight:       Height:                                                  BP Readings from Last 3 Encounters:   01/02/20 (!) 96/51       NPO Status:  MN+, SEE MAR                                                                               BMI:   Wt Readings from Last 3 Encounters:   01/01/20 214 lb 6.4 oz (97.3 kg)     Body mass index is 31.66 kg/m².     CBC:   Lab Results   Component Value Date    WBC 16.1 01/02/2020    RBC 1.82 01/02/2020    HGB 6.8 01/02/2020    HCT 17.8 01/02/2020    MCV 97.9 01/02/2020    RDW 14.8 01/02/2020    PLT 80 01/02/2020       CMP:   Lab Results   Component Value Date     01/02/2020    K 5.6 01/02/2020    K 7.3 01/02/2020     01/02/2020    CO2 20 01/02/2020    BUN 19 01/02/2020    CREATININE 1.5 01/02/2020    GFRAA 56 01/02/2020    AGRATIO 1.8 01/02/2020    LABGLOM 47 01/02/2020    GLUCOSE 224 01/02/2020    PROT 3.3 01/02/2020    CALCIUM 10.0 01/02/2020    BILITOT 0.7 01/02/2020    ALKPHOS 29 01/02/2020     01/02/2020     01/02/2020       POC Tests:   Recent Labs     01/02/20  0749 01/02/20  1016   POCGLU 224* 161*   POCNA 141  --    POCK 5.6*  --    POCHCT 16.0* 20.0*       Coags:   Lab Results   Component Value Date    PROTIME 18.2 01/02/2020    INR 1.56 01/02/2020    APTT 27.8 01/02/2020       HCG (If Applicable): No results found for: PREGTESTUR, PREGSERUM, HCG, HCGQUANT ABGs:   Lab Results   Component Value Date    PHART 7.199 01/02/2020    PO2ART 170.9 01/02/2020    BGB6PTW 39.9 01/02/2020    UUF3JLD 15.6 01/02/2020    BEART -13 01/02/2020    R5YMPGMZ 99 01/02/2020        Type & Screen (If Applicable):  No results found for: LABABO, [address]    Anesthesia Evaluation  Patient summary reviewed and Nursing notes reviewed  Airway:        Comment: Intubated/ventilated , cannot evaluate airway   Dental:          Pulmonary:   (+) COPD:  decreased breath sounds (L),  current smoker          Patient did not smoke on day of surgery. Cardiovascular:    (+) hypertension:, valvular problems/murmurs (s/p avr, 2010): AS, CAD: obstructive, CABG/stent (2010):,       ECG reviewed  Rhythm: regular  Rate: abnormal  Echocardiogram reviewed         Beta Blocker:  Dose within 24 Hrs         Neuro/Psych:   (+) psychiatric history (hx etoh):            GI/Hepatic/Renal:   (+) renal disease: ARF,           Endo/Other:    (+) DiabetesType II DM, using insulin, blood dyscrasia: anemia, arthritis (and gout):., .                 Abdominal:   (+) obese,     Abdomen: soft. Vascular:                                        Anesthesia Plan      general     ASA 5 - emergent       Induction: inhalational.    MIPS: Postoperative opioids intended, Prophylactic antiemetics administered and One-lung ventilation. Anesthetic plan and risks discussed with child/children (PT UNABLE TO GIVE CONSENT, SPOKE W/ \" GIRLFRIEND\" , AND DAUGHTER, ALL VOICED UNDERSTANDING OF PLAN AND FLOW. CONSENT GIVEN, NO QUESTIONS). Use of blood products discussed with child/children whom consented to blood products. Plan discussed with CRNA. This pre-anesthesia assessment may be used as a history and physical.    DOS STAFF ADDENDUM:    Pt seen and examined, chart reviewed (including anesthesia, drug and allergy history). No interval changes to history and physical examination.   Anesthetic plan, risks, benefits,

## 2020-01-02 NOTE — CONSULTS
01/02/20 0101    sodium chloride 0.9 % infusion             cefepime (MAXIPIME) 2 g IVPB minibag  2 g Intravenous Q12H Chintan Wright MD        phenylephrine (SHAYY-SYNEPHRINE) 50 mg in dextrose 5 % 250 mL infusion  100 mcg/min Intravenous Continuous Chintan Wright MD 37.5 mL/hr at 01/02/20 0322 125 mcg/min at 01/02/20 0322    fentaNYL (SUBLIMAZE) 1,000 mcg in sodium chloride 0.9 % 100 mL infusion  25 mcg/hr Intravenous Continuous Chintan Wright MD 5 mL/hr at 01/02/20 0341 50 mcg/hr at 01/02/20 0341    vancomycin (VANCOCIN) 1,250 mg in dextrose 5 % 250 mL IVPB  1,250 mg Intravenous Q12H Chintan Wright MD        lidocaine 1 % injection             methylPREDNISolone sodium (SOLU-MEDROL) injection 60 mg  60 mg Intravenous Q8H Chintan Wright MD        allopurinol (ZYLOPRIM) tablet 300 mg  300 mg Oral Daily Chinatn Wright MD        atorvastatin (LIPITOR) tablet 40 mg  40 mg Oral Daily Chintan Wright MD        febuxostat (ULORIC) tablet 40 mg  40 mg Oral Daily Chintan Wright MD        sodium chloride flush 0.9 % injection 10 mL  10 mL Intravenous 2 times per day Chintan Wright MD   10 mL at 01/01/20 2309    sodium chloride flush 0.9 % injection 10 mL  10 mL Intravenous PRN Chintan Wright MD        magnesium hydroxide (MILK OF MAGNESIA) 400 MG/5ML suspension 30 mL  30 mL Oral Daily PRN Chintan Wright MD        ondansetron [facility]) injection 4 mg  4 mg Intravenous Q6H PRN Chintan Wright MD        acetaminophen (TYLENOL) tablet 650 mg  650 mg Oral Q4H PRN Chintan Wright MD        glucose (GLUTOSE) 40 % oral gel 15 g  15 g Oral PRN Chintan Wright MD        dextrose 50 % IV solution  12.5 g Intravenous PRN Chintan Wright MD        glucagon (rDNA) injection 1 mg  1 mg Intramuscular PRN Chintan Wright MD        dextrose 5 % solution  100 mL/hr Intravenous PRN Chintan Wright MD        insulin lispro (HUMALOG) injection vial 0-6 Units  0-6 Units Subcutaneous TID WC Katie Calhoun MD        insulin lispro (HUMALOG) injection vial 0-3 Units  0-3 Units Subcutaneous Nightly Katie Calhoun MD        perflutren lipid microspheres (DEFINITY) injection 1.65 mg  1.5 mL Intravenous ONCE PRN Katie Calohun MD        ipratropium-albuterol (DUONEB) nebulizer solution 1 ampule  1 ampule Inhalation Q4H WA Katie Calhoun MD           Prior to Admission medications    Medication Sig Start Date End Date Taking? Authorizing Provider   atorvastatin (LIPITOR) 40 MG tablet Take 40 mg by mouth daily 5/16/12  Yes Historical Provider, MD   carvedilol (COREG) 25 MG tablet Take 25 mg by mouth 2 times daily 5/16/12  Yes Historical Provider, MD   lisinopril (PRINIVIL;ZESTRIL) 10 MG tablet Take 10 mg by mouth daily 5/16/12  Yes Historical Provider, MD   prasugrel (EFFIENT) 10 MG TABS Take 10 mg by mouth daily 12/8/10  Yes Historical Provider, MD   allopurinol (ZYLOPRIM) 300 MG tablet Take 300 mg by mouth daily   Yes Historical Provider, MD   amLODIPine (NORVASC) 5 MG tablet Take 5 mg by mouth daily   Yes Historical Provider, MD   choline fenofibrate (TRILIPIX) 135 MG CPDR delayed release capsule Take 135 mg by mouth daily    Historical Provider, MD   febuxostat (ULORIC) 40 MG TABS tablet Take 40 mg by mouth daily    Historical Provider, MD   glipiZIDE (GLUCOTROL) 5 MG tablet Take 1 tablet by mouth daily    Historical Provider, MD   meclizine (ANTIVERT) 25 MG tablet Take 1 tablet by mouth daily    Historical Provider, MD         Allergies:  Penicillins    Social History:    TOBACCO:   reports that he has been smoking cigarettes. He has been smoking about 2.00 packs per day. He uses smokeless tobacco.  ETOH:   reports current alcohol use of about 6.0 standard drinks of alcohol per week. DRUGS:   reports previous drug use. Family History:    No family history on file.     REVIEW OF SYSTEMS:      Unobtainable as patient better after 1 amp bicarb which also markedly improved BP    ASSESSMENT AND PLAN:    Large likely hemothorax and likely underlying pneumonia given history and metabolic acidosis    Last stent a year or 2 ago, so will hold effient. Will place chest tube emergently.     Electronically signed by Trell Solorio MD on 1/2/2020 at 4:41 AM

## 2020-01-02 NOTE — PROGRESS NOTES
01/02/20 0759   Vent Information   Vent Type 840   Vent Mode AC/VC+   Vt Ordered 500 mL   Rate Set 18 bmp   Pressure Support 0 cmH20   FiO2  100 %   Sensitivity 3   PEEP/CPAP 5   I Time/ I Time % 0.9 s   Cuff Pressure (cm H2O) 30 cm H2O   Humidification Source HME   Vent Patient Data   Peak Inspiratory Pressure 14 cmH2O   Mean Airway Pressure 7.4 cmH20   Rate Measured 20 br/min   Vt Exhaled 570 mL   Minute Volume 9.82 Liters   I:E Ratio 1:3   Plateau Pressure 20 [id number]   Static Compliance 35 mL/cmH2O   Spontaneous Breathing Trial (SBT) RT Doc   Contraindications to SBT? FiO2 > 50%   Pulse 74   SpO2 100 %   Breath Sounds   Right Upper Lobe Diminished   Right Middle Lobe Diminished   Right Lower Lobe Diminished   Left Upper Lobe Diminished   Left Lower Lobe Diminished   Additional Respiratory  Assessments   Resp 18   End Tidal CO2 42 (%)   Position Semi-Jones's   Oral Care Mouth suctioned   Alarm Settings   High Pressure Alarm 45 cmH2O   Low Minute Volume Alarm 2 L/min   High Respiratory Rate 40 br/min   Low Exhaled Vt  200 mL   ETT (adult)   Placement Date/Time: 01/02/20 0020   Timeout: Patient  Mask Ventilation: Ventilated by mask (1)  Technique: Direct laryngoscopy  Type: Cuffed  Tube Size: 8 mm  Blade Size: 4  Location: Oral  Grade View: Only epiglottis visible  Insertion attempts: 1  ...    Secured at 24 cm   Measured From Lips   ET Placement Right   Secured By Commercial tube romero   Site Condition Dry   AMBU/SX HOB  SUCTIONED FOR LARGE YELLOW THICK

## 2020-01-02 NOTE — PROCEDURES
PROCEDURE: Central venous line placement right internal jugular vein    CONSENT:  Emergent    TIME OUT:  The patient and procedure were properly identified with the nurse in the room. STERILE PREP:  Full maximum sterile field/barrier technique was followed (with cap and mask and sterile gown and sterile gloves and large sterile sheet and hand hygeine and 2% chlorhexidine for cutaneous antisepsis). LOCAL ANESTHETIC:   Aqueous lidocaine 1%    PROCEDURE:   Using direct ultrasound guidance, a right internal jugular vein central venous catheter was placed using a modified seldinger technique without difficulty. Excellent return of non-pulsatile, dark venous blood from all lumens. All lumens were flushed with normal saline. Minimal bleeding occurred and there was hemostasis prior to conclusion of procedure. Catheter was sutured in place and a sterile dressing with biopatch was placed.     COMPLICATIONS: None    CHEST XRAY REVIEWED: Central line in place with tip in the SVC

## 2020-01-02 NOTE — PROGRESS NOTES
O negative PRBC initated, unit number [id number] un-crossed matched began as emergent transfusion secondary to low h/h and BP. Dual sign off with Sary Radford RN and Erna Abbott.

## 2020-01-02 NOTE — PLAN OF CARE
Pt high fall risk. Instructed to use call light before getting out of bed. Call light within reach. Bed in low position. Bed alarm on. Will continue to monitor. Patient is not demonstrating indications of the need to be restrained at this time. Visual safety checks performed every hour, and restraint monitoring performed every two hours. Patient has no signs of injuries from restraints at this time. Educated pt on the need for the restraints, no evidence of learning. Will continue to monitor.

## 2020-01-02 NOTE — PROGRESS NOTES
[address] Rodriguez Horse at bedside, VS obtained pt became diaphoretic, restless, 's, shallow tachypneic breathing, respiratory notified for breathing tx.       2332 during breathing tx, diaphoretic, restless, urinated on self  and pale in color. PerfectServe Dr Taz Daniel    [phone] Hospital or Facility: [facility] From: Alexei George RE: Gilmer Humbertoemmanuel 1950 RM: [id number] Pt is pale, diaphoretic, struggling to breath even after breathing treatment and restless. Will you please stop back by, condition has changed since you left.  Not sure he can wait for a chest tube in the AM    1215 bedside report given to Anival Carlisle

## 2020-01-02 NOTE — PROGRESS NOTES
01/02/20 1402   Vent Information   Vent Type 840   Vent Mode AC/VC+   Vt Ordered 500 mL   Rate Set 18 bmp   FiO2  50 %   Sensitivity 3   PEEP/CPAP 10   Cuff Pressure (cm H2O) 30 cm H2O   Humidification Source HME   Vent Patient Data   Peak Inspiratory Pressure 27 cmH2O   Mean Airway Pressure 14 cmH20   Rate Measured 18 br/min   Vt Exhaled 499 mL   Minute Volume 9 Liters   I:E Ratio 1:2   Plateau Pressure 18 [id number]   Static Compliance 40 mL/cmH2O   Spontaneous Breathing Trial (SBT) RT Doc   Pulse 67   SpO2 97 %   Breath Sounds   Right Upper Lobe Diminished   Right Middle Lobe Diminished   Right Lower Lobe Diminished   Left Upper Lobe Diminished   Left Lower Lobe Diminished   Additional Respiratory  Assessments   Resp 16   End Tidal CO2 34 (%)   Position Semi-Jones's   Alarm Settings   High Pressure Alarm 45 cmH2O   Low Minute Volume Alarm 2 L/min   Apnea (secs) 20 secs   High Respiratory Rate 40 br/min   Low Exhaled Vt  200 mL   [REMOVED] ETT (adult)   Removal Date/Time: 01/02/20 1344  Placement Date/Time: 01/02/20 0020   Timeout: Patient  Mask Ventilation: Ventilated by mask (1)  Technique: Direct laryngoscopy  Type: Cuffed  Tube Size: 8 mm  Blade Size: 4  Location: Oral  Grade View: Only epiglotti. ..    Secured at 24 cm   Measured From Lips   ET Placement Right   Secured By Commercial tube romero   Site Condition Dry   ambu/sx hob

## 2020-01-03 ENCOUNTER — APPOINTMENT (OUTPATIENT)
Dept: GENERAL RADIOLOGY | Age: 70
DRG: 853 | End: 2020-01-03
Attending: FAMILY MEDICINE
Payer: MEDICARE

## 2020-01-03 LAB
ANION GAP SERPL CALCULATED.3IONS-SCNC: 9 MMOL/L (ref 3–16)
BASE EXCESS ARTERIAL: 2 MMOL/L (ref -3–3)
BASE EXCESS ARTERIAL: 5 (ref -3–3)
BUN BLDV-MCNC: 27 MG/DL (ref 7–20)
CALCIUM IONIZED: 0.97 MMOL/L (ref 1.12–1.32)
CALCIUM IONIZED: 1.01 MMOL/L (ref 1.12–1.32)
CALCIUM SERPL-MCNC: 7.4 MG/DL (ref 8.3–10.6)
CARBOXYHEMOGLOBIN ARTERIAL: 0.3 % (ref 0–1.5)
CHLORIDE BLD-SCNC: 103 MMOL/L (ref 99–110)
CO2: 27 MMOL/L (ref 21–32)
CREAT SERPL-MCNC: 1.2 MG/DL (ref 0.8–1.3)
GFR AFRICAN AMERICAN: >60
GFR NON-AFRICAN AMERICAN: >60
GLUCOSE BLD-MCNC: 148 MG/DL (ref 70–99)
GLUCOSE BLD-MCNC: 162 MG/DL (ref 70–99)
GLUCOSE BLD-MCNC: 166 MG/DL (ref 70–99)
GLUCOSE BLD-MCNC: 179 MG/DL (ref 70–99)
GLUCOSE BLD-MCNC: 187 MG/DL (ref 70–99)
GLUCOSE BLD-MCNC: 192 MG/DL (ref 70–99)
GLUCOSE BLD-MCNC: 201 MG/DL (ref 70–99)
HCO3 ARTERIAL: 28.1 MMOL/L (ref 21–29)
HCO3 ARTERIAL: 30.7 MMOL/L (ref 21–29)
HCT VFR BLD CALC: 27.1 % (ref 40.5–52.5)
HEMOGLOBIN, ART, EXTENDED: 10.5 G/DL (ref 13.5–17.5)
HEMOGLOBIN: 9.4 G/DL (ref 13.5–17.5)
MAGNESIUM: 1.5 MG/DL (ref 1.8–2.4)
MCH RBC QN AUTO: 30.9 PG (ref 26–34)
MCHC RBC AUTO-ENTMCNC: 34.6 G/DL (ref 31–36)
MCV RBC AUTO: 89.3 FL (ref 80–100)
METHEMOGLOBIN ARTERIAL: 0.8 %
O2 CONTENT ARTERIAL: 14 ML/DL
O2 SAT, ARTERIAL: 95 % (ref 93–100)
O2 SAT, ARTERIAL: 96.5 %
O2 THERAPY: ABNORMAL
PCO2 ARTERIAL: 51.1 MMHG (ref 35–45)
PCO2 ARTERIAL: 54.3 MM HG (ref 35–45)
PDW BLD-RTO: 15.3 % (ref 12.4–15.4)
PERFORMED ON: ABNORMAL
PH ARTERIAL: 7.36 (ref 7.35–7.45)
PH ARTERIAL: 7.36 (ref 7.35–7.45)
PH VENOUS: 7.34 (ref 7.35–7.45)
PH VENOUS: 7.41 (ref 7.35–7.45)
PHOSPHORUS: 3.2 MG/DL (ref 2.5–4.9)
PHOSPHORUS: 4.5 MG/DL (ref 2.5–4.9)
PLATELET # BLD: 128 K/UL (ref 135–450)
PMV BLD AUTO: 8.1 FL (ref 5–10.5)
PO2 ARTERIAL: 79 MM HG (ref 75–108)
PO2 ARTERIAL: 99.4 MMHG (ref 75–108)
POC SAMPLE TYPE: ABNORMAL
POTASSIUM SERPL-SCNC: 4.6 MMOL/L (ref 3.5–5.1)
RBC # BLD: 3.04 M/UL (ref 4.2–5.9)
SODIUM BLD-SCNC: 139 MMOL/L (ref 136–145)
TCO2 ARTERIAL: 29.7 MMOL/L
TCO2 ARTERIAL: 32 MMOL/L
VANCOMYCIN TROUGH: 13.6 UG/ML (ref 10–20)
WBC # BLD: 16.5 K/UL (ref 4–11)

## 2020-01-03 PROCEDURE — 6370000000 HC RX 637 (ALT 250 FOR IP): Performed by: THORACIC SURGERY (CARDIOTHORACIC VASCULAR SURGERY)

## 2020-01-03 PROCEDURE — 2580000003 HC RX 258: Performed by: NURSE PRACTITIONER

## 2020-01-03 PROCEDURE — 94640 AIRWAY INHALATION TREATMENT: CPT

## 2020-01-03 PROCEDURE — 6370000000 HC RX 637 (ALT 250 FOR IP): Performed by: INTERNAL MEDICINE

## 2020-01-03 PROCEDURE — 94003 VENT MGMT INPAT SUBQ DAY: CPT

## 2020-01-03 PROCEDURE — 94770 HC ETCO2 MONITOR DAILY: CPT

## 2020-01-03 PROCEDURE — 85027 COMPLETE CBC AUTOMATED: CPT

## 2020-01-03 PROCEDURE — 6360000002 HC RX W HCPCS: Performed by: INTERNAL MEDICINE

## 2020-01-03 PROCEDURE — 93005 ELECTROCARDIOGRAM TRACING: CPT | Performed by: INTERNAL MEDICINE

## 2020-01-03 PROCEDURE — 2000000000 HC ICU R&B

## 2020-01-03 PROCEDURE — 80202 ASSAY OF VANCOMYCIN: CPT

## 2020-01-03 PROCEDURE — 2580000003 HC RX 258: Performed by: INTERNAL MEDICINE

## 2020-01-03 PROCEDURE — 6370000000 HC RX 637 (ALT 250 FOR IP): Performed by: NURSE PRACTITIONER

## 2020-01-03 PROCEDURE — 82330 ASSAY OF CALCIUM: CPT

## 2020-01-03 PROCEDURE — 97163 PT EVAL HIGH COMPLEX 45 MIN: CPT

## 2020-01-03 PROCEDURE — 6360000002 HC RX W HCPCS: Performed by: NURSE PRACTITIONER

## 2020-01-03 PROCEDURE — 97116 GAIT TRAINING THERAPY: CPT

## 2020-01-03 PROCEDURE — 2500000003 HC RX 250 WO HCPCS: Performed by: INTERNAL MEDICINE

## 2020-01-03 PROCEDURE — 82947 ASSAY GLUCOSE BLOOD QUANT: CPT

## 2020-01-03 PROCEDURE — 80048 BASIC METABOLIC PNL TOTAL CA: CPT

## 2020-01-03 PROCEDURE — 2700000000 HC OXYGEN THERAPY PER DAY

## 2020-01-03 PROCEDURE — 2580000003 HC RX 258: Performed by: THORACIC SURGERY (CARDIOTHORACIC VASCULAR SURGERY)

## 2020-01-03 PROCEDURE — 6360000002 HC RX W HCPCS: Performed by: THORACIC SURGERY (CARDIOTHORACIC VASCULAR SURGERY)

## 2020-01-03 PROCEDURE — 97110 THERAPEUTIC EXERCISES: CPT

## 2020-01-03 PROCEDURE — 94750 HC PULMONARY COMPLIANCE STUDY: CPT

## 2020-01-03 PROCEDURE — 2500000003 HC RX 250 WO HCPCS: Performed by: NURSE PRACTITIONER

## 2020-01-03 PROCEDURE — 94669 MECHANICAL CHEST WALL OSCILL: CPT

## 2020-01-03 PROCEDURE — 83735 ASSAY OF MAGNESIUM: CPT

## 2020-01-03 PROCEDURE — 82803 BLOOD GASES ANY COMBINATION: CPT

## 2020-01-03 PROCEDURE — 84100 ASSAY OF PHOSPHORUS: CPT

## 2020-01-03 PROCEDURE — 99024 POSTOP FOLLOW-UP VISIT: CPT | Performed by: THORACIC SURGERY (CARDIOTHORACIC VASCULAR SURGERY)

## 2020-01-03 PROCEDURE — 99291 CRITICAL CARE FIRST HOUR: CPT | Performed by: INTERNAL MEDICINE

## 2020-01-03 PROCEDURE — 94761 N-INVAS EAR/PLS OXIMETRY MLT: CPT

## 2020-01-03 PROCEDURE — 71045 X-RAY EXAM CHEST 1 VIEW: CPT

## 2020-01-03 RX ORDER — NICOTINE 21 MG/24HR
1 PATCH, TRANSDERMAL 24 HOURS TRANSDERMAL DAILY
Status: DISCONTINUED | OUTPATIENT
Start: 2020-01-03 | End: 2020-01-07 | Stop reason: HOSPADM

## 2020-01-03 RX ORDER — DILTIAZEM HYDROCHLORIDE 5 MG/ML
10 INJECTION INTRAVENOUS ONCE
Status: COMPLETED | OUTPATIENT
Start: 2020-01-03 | End: 2020-01-03

## 2020-01-03 RX ORDER — CARVEDILOL 6.25 MG/1
6.25 TABLET ORAL 2 TIMES DAILY WITH MEALS
Status: DISCONTINUED | OUTPATIENT
Start: 2020-01-03 | End: 2020-01-07

## 2020-01-03 RX ORDER — MAGNESIUM SULFATE IN WATER 40 MG/ML
2 INJECTION, SOLUTION INTRAVENOUS ONCE
Status: COMPLETED | OUTPATIENT
Start: 2020-01-03 | End: 2020-01-03

## 2020-01-03 RX ORDER — HYDROCODONE BITARTRATE AND ACETAMINOPHEN 5; 325 MG/1; MG/1
1 TABLET ORAL EVERY 6 HOURS PRN
Status: DISCONTINUED | OUTPATIENT
Start: 2020-01-03 | End: 2020-01-07 | Stop reason: HOSPADM

## 2020-01-03 RX ADMIN — Medication 2 G: at 03:06

## 2020-01-03 RX ADMIN — HYDROCODONE BITARTRATE AND ACETAMINOPHEN 1 TABLET: 5; 325 TABLET ORAL at 11:44

## 2020-01-03 RX ADMIN — INSULIN LISPRO 1 UNITS: 100 INJECTION, SOLUTION INTRAVENOUS; SUBCUTANEOUS at 20:35

## 2020-01-03 RX ADMIN — DILTIAZEM HYDROCHLORIDE 10 MG: 5 INJECTION INTRAVENOUS at 22:15

## 2020-01-03 RX ADMIN — MAGNESIUM SULFATE HEPTAHYDRATE 2 G: 40 INJECTION, SOLUTION INTRAVENOUS at 06:47

## 2020-01-03 RX ADMIN — ATORVASTATIN CALCIUM 40 MG: 40 TABLET, FILM COATED ORAL at 11:45

## 2020-01-03 RX ADMIN — INSULIN LISPRO 1 UNITS: 100 INJECTION, SOLUTION INTRAVENOUS; SUBCUTANEOUS at 08:22

## 2020-01-03 RX ADMIN — CARVEDILOL 6.25 MG: 6.25 TABLET, FILM COATED ORAL at 14:51

## 2020-01-03 RX ADMIN — Medication 10 ML: at 20:34

## 2020-01-03 RX ADMIN — Medication 15 ML: at 08:16

## 2020-01-03 RX ADMIN — FEBUXOSTAT 40 MG: 40 TABLET ORAL at 11:46

## 2020-01-03 RX ADMIN — FAMOTIDINE 20 MG: 20 TABLET, FILM COATED ORAL at 11:46

## 2020-01-03 RX ADMIN — DOCUSATE SODIUM 100 MG: 100 CAPSULE, LIQUID FILLED ORAL at 20:34

## 2020-01-03 RX ADMIN — HYDROCODONE BITARTRATE AND ACETAMINOPHEN 1 TABLET: 5; 325 TABLET ORAL at 19:21

## 2020-01-03 RX ADMIN — Medication 10 ML: at 08:16

## 2020-01-03 RX ADMIN — IPRATROPIUM BROMIDE AND ALBUTEROL SULFATE 1 AMPULE: .5; 3 SOLUTION RESPIRATORY (INHALATION) at 12:25

## 2020-01-03 RX ADMIN — FAMOTIDINE 20 MG: 20 TABLET, FILM COATED ORAL at 20:34

## 2020-01-03 RX ADMIN — DOCUSATE SODIUM 100 MG: 100 CAPSULE, LIQUID FILLED ORAL at 11:45

## 2020-01-03 RX ADMIN — CEFEPIME HYDROCHLORIDE 2 G: 2 INJECTION, POWDER, FOR SOLUTION INTRAVENOUS at 14:51

## 2020-01-03 RX ADMIN — ALLOPURINOL 300 MG: 300 TABLET ORAL at 11:45

## 2020-01-03 RX ADMIN — INSULIN LISPRO 1 UNITS: 100 INJECTION, SOLUTION INTRAVENOUS; SUBCUTANEOUS at 12:27

## 2020-01-03 RX ADMIN — VANCOMYCIN HYDROCHLORIDE 1250 MG: 1 INJECTION, POWDER, LYOPHILIZED, FOR SOLUTION INTRAVENOUS at 04:50

## 2020-01-03 RX ADMIN — CEFEPIME HYDROCHLORIDE 2 G: 2 INJECTION, POWDER, FOR SOLUTION INTRAVENOUS at 03:40

## 2020-01-03 RX ADMIN — CARBOXYMETHYLCELLULOSE SODIUM 1 DROP: 10 GEL OPHTHALMIC at 08:16

## 2020-01-03 RX ADMIN — IPRATROPIUM BROMIDE AND ALBUTEROL SULFATE 1 AMPULE: .5; 3 SOLUTION RESPIRATORY (INHALATION) at 20:25

## 2020-01-03 RX ADMIN — IPRATROPIUM BROMIDE AND ALBUTEROL SULFATE 1 AMPULE: .5; 3 SOLUTION RESPIRATORY (INHALATION) at 08:10

## 2020-01-03 RX ADMIN — FUROSEMIDE 40 MG: 10 INJECTION, SOLUTION INTRAMUSCULAR; INTRAVENOUS at 08:16

## 2020-01-03 RX ADMIN — INSULIN LISPRO 1 UNITS: 100 INJECTION, SOLUTION INTRAVENOUS; SUBCUTANEOUS at 17:28

## 2020-01-03 RX ADMIN — FENTANYL CITRATE 100 MCG/HR: 50 INJECTION, SOLUTION INTRAMUSCULAR; INTRAVENOUS at 00:44

## 2020-01-03 RX ADMIN — FOLIC ACID: 5 INJECTION, SOLUTION INTRAMUSCULAR; INTRAVENOUS; SUBCUTANEOUS at 10:40

## 2020-01-03 ASSESSMENT — PULMONARY FUNCTION TESTS
PIF_VALUE: 23
PIF_VALUE: 25
PIF_VALUE: 25
PIF_VALUE: 24
PIF_VALUE: 24
PIF_VALUE: 25
PIF_VALUE: 22
PIF_VALUE: 24
PIF_VALUE: 24
PIF_VALUE: 23
PIF_VALUE: 25
PIF_VALUE: 23
PIF_VALUE: 23
PIF_VALUE: 14
PIF_VALUE: 21
PIF_VALUE: 25
PIF_VALUE: 24
PIF_VALUE: 25
PIF_VALUE: 24
PIF_VALUE: 25
PIF_VALUE: 25
PIF_VALUE: 24
PIF_VALUE: 22
PIF_VALUE: 14
PIF_VALUE: 24
PIF_VALUE: 24
PIF_VALUE: 22
PIF_VALUE: 22
PIF_VALUE: 24
PIF_VALUE: 24
PIF_VALUE: 25
PIF_VALUE: 24
PIF_VALUE: 24

## 2020-01-03 ASSESSMENT — PAIN SCALES - GENERAL
PAINLEVEL_OUTOF10: 3
PAINLEVEL_OUTOF10: 3
PAINLEVEL_OUTOF10: 7
PAINLEVEL_OUTOF10: 5
PAINLEVEL_OUTOF10: 8

## 2020-01-03 ASSESSMENT — PAIN DESCRIPTION - PAIN TYPE: TYPE: SURGICAL PAIN

## 2020-01-03 ASSESSMENT — PAIN DESCRIPTION - ORIENTATION: ORIENTATION: LEFT

## 2020-01-03 ASSESSMENT — PAIN DESCRIPTION - LOCATION: LOCATION: BACK;RIB CAGE

## 2020-01-03 NOTE — PROGRESS NOTES
Per open heart protocol/verbal order from Rod Hylton CNP, patient has been successfully liberated from Mechanical Ventilation. RSBI before extubation was 25 with EtCO2 of 31 and SpO2 of 99 on 40% FiO2. Patient extubated and placed on 5 liters/min via nasal cannula. Post extubation SpO2 is 99% with HR  81 bpm and RR 16 breaths/min. Patient had strong cough that was non-productive. Extubation Well tolerated by patient. RN at bedside for extubation and is aware of the patient's change in status.      Electronically signed by Jerris Lundborg, RCP, RRT, RRT-ACCS on 1/3/2020 at 9:37 AM

## 2020-01-03 NOTE — PROGRESS NOTES
Pulmonary & Critical Care Inpatient Progress Note   Christ Kaba MD     REASON FOR TODAY'S VISIT:  Hemothorax, assistance with critical care management    SUBJECTIVE:   Hemodynamics improved, extubated    Scheduled Meds:   folic acid, thiamine, multi-vitamin with vitamin K infusion   Intravenous Daily    nicotine  1 patch Transdermal Daily    carvedilol  6.25 mg Oral BID WC    cefepime  2 g Intravenous Q12H    sodium chloride  250 mL Intravenous Once    sodium chloride flush  10 mL Intravenous 2 times per day    docusate sodium  100 mg Oral BID    famotidine  20 mg Oral BID    vancomycin  1,250 mg Intravenous Q24H    furosemide  40 mg Intravenous Daily    allopurinol  300 mg Oral Daily    atorvastatin  40 mg Oral Daily    febuxostat  40 mg Oral Daily    sodium chloride flush  10 mL Intravenous 2 times per day    insulin lispro  0-6 Units Subcutaneous TID WC    insulin lispro  0-3 Units Subcutaneous Nightly    ipratropium-albuterol  1 ampule Inhalation Q4H WA       Continuous Infusions:   dextrose         PRN Meds:  HYDROcodone 5 mg - acetaminophen, potassium chloride, magnesium sulfate, sodium chloride flush, ondansetron, sodium chloride flush, magnesium hydroxide, ondansetron, acetaminophen, glucose, dextrose, glucagon (rDNA), dextrose, perflutren lipid microspheres    ALLERGIES:  Patient is allergic to penicillins. Objective:   PHYSICAL EXAM:  BP (!) 161/88   Pulse 130   Temp 98.2 °F (36.8 °C) (Core)   Resp 16   Ht 5' 9\" (1.753 m)   Wt 216 lb 11.4 oz (98.3 kg)   SpO2 93%   BMI 32.00 kg/m²    Physical Exam  Constitutional:       General: He is not in acute distress. Appearance: He is well-developed. He is not diaphoretic. HENT:      Head: Normocephalic and atraumatic. Mouth/Throat:      Pharynx: No oropharyngeal exudate. Eyes:      Pupils: Pupils are equal, round, and reactive to light. Neck:      Musculoskeletal: Neck supple. Vascular: No JVD.    Cardiovascular: Heart sounds: Normal heart sounds. No murmur. No friction rub. No gallop. Pulmonary:      Effort: Pulmonary effort is normal.      Breath sounds: No wheezing or rales. Abdominal:      General: Bowel sounds are normal. There is no distension. Palpations: Abdomen is soft. Tenderness: There is no tenderness. Musculoskeletal: Normal range of motion. Lymphadenopathy:      Cervical: No cervical adenopathy. Skin:     General: Skin is warm and dry. Findings: No rash. Neurological:      Mental Status: He is alert. Cranial Nerves: No cranial nerve deficit. Comments: CN 2-12 grossly intact            Data Reviewed:   LABS:  CBC:  Recent Labs     01/02/20  0742  01/02/20  1207 01/02/20  1433 01/03/20  0550   WBC 16.1*  --   --  16.5* 16.5*   HGB 6.0*   < > 7.0* 10.2* 9.4*   HCT 17.8*  --   --  29.5* 27.1*   MCV 97.9  --   --  89.4 89.3   PLT 80*  --   --  120* 128*    < > = values in this interval not displayed. BMP:  Recent Labs     01/02/20  0742 01/02/20  1519 01/03/20  0000 01/03/20  0550 01/03/20  0819    136  --  139  --    K 5.6* 4.5  --  4.6  --     102  --  103  --    CO2 20* 24  --  27  --    PHOS  --  3.2 4.5  --  3.2   BUN 19 19  --  27*  --    CREATININE 1.5* 1.1  --  1.2  --      LIVER PROFILE:   Recent Labs     01/02/20  0742   *   *   BILITOT 0.7   ALKPHOS 29*     PT/INR:  Recent Labs     01/02/20  0300 01/02/20  1320 01/02/20  1433   PROTIME 18.2* 53.5* 23.8*   INR 1.56* 4.54* 2.04*     APTT:   Recent Labs     01/02/20  0300   APTT 27.8     UA:No results for input(s): NITRITE, COLORU, PHUR, LABCAST, WBCUA, RBCUA, MUCUS, TRICHOMONAS, YEAST, BACTERIA, CLARITYU, SPECGRAV, LEUKOCYTESUR, UROBILINOGEN, BILIRUBINUR, BLOODU, GLUCOSEU, AMORPHOUS in the last 72 hours.     Invalid input(s): KETONESU  Recent Labs     01/03/20  0550 01/03/20  0851   PHART 7.358 7.360   FIQ9GWP 51.1* 54.3*   PO2ART 99.4 79.0           CXR personally reviewed, left hemothorax          Assessment:     1. Acute resp failure, vent dependent, mixed              -left lung atelectasis, possible CABP  2. Left hemothorax, unclear bleeding source  3. Hemorrhagic shock  4. Acute blood loss anemia  5. PAL, prerenal  6.  Acute encephalopathy, metabolic    Plan:      -Wean supplemental oxygen  -Empiric atb pending cultures  -hemothorax and chest tube management per thoracic surgery  -Monitor hemodynamics  -Nephro following, serial labs to monitor renal function   -Serial chest imaging    Due to life threatening resp failure and associated acute bleeding due to hemothorax this patient is critically ill, total critical care time involved in his care was 31 mins     Richard Sandoval MD

## 2020-01-03 NOTE — PROGRESS NOTES
Pt assessed at this time. Currently no distress noted. Pt ventilated and on levophed and fentanyl gtts with maintenance fluids. Pt arterial line zeroed for accurate pressures. VSS. Warming blanket on pt. Unable to educate pt at this time d/t condition. Will continue to monitor.

## 2020-01-03 NOTE — PROGRESS NOTES
[facility]    [facility]              [phone]                       Plan : PAL getting better after controlling hematoma  Extubated  Ok to remove Noriega from renal perspective    Assessment :     Hyperkalemia  Likely due to PAL  CK normal    Disproportionately high K due to bleeding hematoma  absorption  K normal now    Acute Kidney Injury  Cr 1.5- now 1.2  Due to hypotension- due to hemothorax  Getting better    Hypotension  BP: (117-122)/(59-83) Pulse:  []   On vasopressors  Now off    Acidosis  Severe  Due to hypotension  Lactate is very high  improved    Large left hemothorax- sp emergent left thoracotomy, left upper lobe wedge resection    CAD  Sp AVR    [facility] would like to thank Elizabeth Rocha MD   for opportunity to serve this patient      Please call with questions at-   24 Hrs Answering service [phone] or  7 am- 5 pm via Perfect serve or cell phone  Dr.Sudhir Kyler Barrow          CC/reason for consult :     PAL     HPI :     From consult note-     Haley Yu is a 77 y.o. male presented to   the hospital on 1/1/2020 with syncope  To outside hospital. He is unable to provide  Details. Per family, he also fell at bathroom  And was down for at least an hour. He was  Eventually able to crawl and call his family. Per family he has had flu like symptoms for  A week. 911 was call, when EMS arrived  His BP was 50s, but improved to normal   On route. However now needing two vasopressors. He has has CTA done which showed left side  Pleural effusion, and has chest tube with profuse   Bleeding. He needed intubation and mechanical  Ventilation. He is in ICU. We are consulted for PAL.      Interval History:     Making urine now  Extubated this morning    ROS:     Seen with- NP and RN  Family    SOB-   none  Edema-   none  Nausea/vomiting/diarrhea-   none  Poor appetite/oral intake-  No  Confusion  No  Difficulty passing urine-  No  Drop in urine output-  No  Any

## 2020-01-03 NOTE — PROGRESS NOTES
Physical Therapy    Facility/Department: [facility] C2 CARD TELEMETRY  Initial Assessment and Treatment    NAME: Manuel Moya  : 1950  MRN: [id number]    Date of Service: 1/3/2020    Discharge Recommendations:  Subacute/Skilled Nursing Facility   PT Equipment Recommendations  Equipment Needed: No  Other: defer to facility. Barriers to home discharge:   [x] Steps to access home entry or bed/bath:   [x] No rail with steps to access home entry or bed/bath   [x] Reported available assist at home upon discharge limited   [] Patient or family requests DC to other than home    [] Patient reports expectation of post acute Discharge disposition to other than home   [x] Other: Patient lives alone. Assessment   Body structures, Functions, Activity limitations: Decreased functional mobility ; Decreased cognition;Decreased high-level IADLs;Decreased posture;Decreased ADL status; Decreased endurance;Decreased fine motor control;Decreased coordination;Decreased strength;Decreased balance;Decreased safe awareness; Increased pain  Assessment: Patient is a 77year old male who was [facility] to [facility] with hemothorax and is now status post left thoracotomy, evacuation of hematoma and left upper lobe wedge resection. Patient is significantly below his prior independent level of function. Patient's mobility today was limited by the therapy deficits listed above. In particular patient's mobility was limited by poor standing balance, increased confusion, increased tremors and decreased strength. PT recommends that this patient receive skilled PT in the SNF setting, when medically stable, in order to address these defictis and to help this patient maximize his safety and independence with all functional mobility. PT to continue to follow. Treatment Diagnosis: decreased independence with mobility.    Specific instructions for Next Treatment: progress mobility as tolerated  Prognosis: Good  Decision Making: High Complexity  PT Education: Subjective  Subjective: Patient agreed to participate in therapy session. Pain Screening  Patient Currently in Pain: Yes  Pain Assessment  Pain Assessment: 0-10  Pain Level: 8  Pain Type: Surgical pain  Pain Location: Back;Rib cage  Pain Orientation: Left  Non-Pharmaceutical Pain Intervention(s): Repositioned;Rest;Ambulation/Increased Activity; Therapeutic presence  Vital Signs  Patient Currently in Pain: Yes  Pre Treatment Pain Screening  Intervention List: Patient able to continue with treatment    Orientation  Orientation  Overall Orientation Status: Impaired  Orientation Level: Oriented to person;Oriented to place;Oriented to time;Disoriented to situation(unsure of today's date or situation. )  Social/Functional History  Social/Functional History  Lives With: Alone  Type of Home: Trailer  Home Layout: One level  Home Access: Stairs to enter without rails  Entrance Stairs - Number of Steps: 2  Bathroom Shower/Tub: Tub only  Bathroom Toilet: Handicap height  Bathroom Equipment: (no bath DME)  Home Equipment: (no DME)  ADL Assistance: Independent  Homemaking Assistance: Independent  Homemaking Responsibilities: Yes  Ambulation Assistance: Independent  Transfer Assistance: Independent  Active : Yes  Occupation: Retired  Type of occupation: heavy   Leisure & Hobbies: plays [employer], [employer]. Cognition   Cognition  Overall Cognitive Status: Exceptions  Arousal/Alertness: Delayed responses to stimuli  Following Commands: Follows multistep commands with increased time; Follows multistep commands with repitition  Attention Span: Attends with cues to redirect  Memory: Decreased recall of recent events  Safety Judgement: Decreased awareness of need for assistance;Decreased awareness of need for safety  Problem Solving: Assistance required to generate solutions;Assistance required to correct errors made;Assistance required to identify errors made;Assistance required to implement solutions;Decreased rotation;Staggers  Distance: 3 feet (bed <>chair) total ambulation distance limited due to patient's fatigue, pain, decreased command following and poor standing balance. Comments: No complaints of shortness of breath, chest pain or dizziness. Multiple losses of balance. Max assist to correct. Stairs/Curb  Stairs?: No     Balance  Posture: Fair  Sitting - Static: Fair  Sitting - Dynamic: Fair  Standing - Static: Poor  Standing - Dynamic: Poor(with RW)  Exercises  Straight Leg Raise: 2 x 10 bilateral AROM  Quad Sets: 1 x 10   Gluteal Sets: 1 x 10 bilateral  Hip Flexion: 2 x 15 bilateral AROM (seated in chair)  Hip Abduction: 2 x 15 bilateral (supine)  Knee Long Arc Quad: 2 x 15 bilateral AROM (seated in chair)  Ankle Pumps: 2 x 10 bilateral  Comments: cueing for sequencing and technique. Other exercises  Other exercises?: No     Plan   Plan  Times per week: 5-7 times a week while in ICU  Plan weeks: 1 week 1/10/19  Specific instructions for Next Treatment: progress mobility as tolerated  Current Treatment Recommendations: Strengthening, ROM, Balance Training, Transfer Training, Endurance Training, Gait Training, ADL/Self-care Training, Patient/Caregiver Education & Training, Stair training, Pain Management, Functional Mobility Training, Safety Education & Training, Positioning  Safety Devices  Type of devices: Left in chair, Call light within reach, Chair alarm in place, Gait belt, Patient at risk for falls, Nurse notified, All fall risk precautions in place    AM-PAC Score     AM-PAC Inpatient Mobility without Stair Climbing Raw Score : 12 (01/03/20 1239)  AM-PAC Inpatient without Stair Climbing T-Scale Score : 37.26 (01/03/20 1239)  Mobility Inpatient CMS 0-100% Score: 63.03 (01/03/20 1239)  Mobility Inpatient without Stair CMS G-Code Modifier : CL (01/03/20 [facility]9)       Goals  Short term goals  Time Frame for Short term goals: 1 week 1/10/19  Short term goal 1: Supine <> sit with modified independence. Short term goal 2: Sit <> stand with modified independence. Short term goal 3: Bed <> chair with LRAD and modified independence. Short term goal 4: Ambulate 150 feet with LRAD and modified independence. Short term goal 5: Ascend 2 steps with modified independence. Patient Goals   Patient goals : \"To feel better. \" \"To go home. \"       Therapy Time   Individual Concurrent Group Co-treatment   Time In 6339         Time Out 1210         Minutes 44         Timed Code Treatment Minutes: 34 Minutes(10 minute evaluation)       Bryce Lama, PT

## 2020-01-03 NOTE — PROGRESS NOTES
Pt instructed on the use of the acapella. Education included how to use the acapella, what it is used for, and the frequency that it should be used. Pt verbalized understanding.      Electronically signed by Bea Penaloza RCP, RRT, RRT-ACCS on 1/3/2020 at 12:29 PM

## 2020-01-03 NOTE — OP NOTE
male who presented to  our attention at [facility] status post mechanical fall. The  patient was found to be in extremis in the intensive care unit,  hypotensive with hemodynamic instability, on three different  vasopressors, with active volume and blood transfusion resuscitation. Earlier this morning, the patient on chest x-ray was found to have  opacified left hemithorax and a chest tube was placed. The patient had  high bloody chest tube output and this was clamped and became critically  unstable. The patient has additional history, has been on Effient, has  history of stents and open heart surgery. Risks, benefits, and  alternatives were explained bedside to the family including multiple  organ failure, prolonged intubation, stroke, bleeding with need for  further transfusion and reoperation, myocardial infarction, and death. The patient's girlfriend, daughter, and other relatives expressed  understanding and decided to go forth with surgery. OPERATIVE PROCEDURE:  The patient was brought to the operating room  emergently, transferred to the operating table. Double lumen  endotracheal tube was placed by the Anesthesia department. The patient  was placed in the right lateral decubitus position. The patient's left  chest was prepped and draped and he was padded appropriately. Time-out  was performed with everyone in agreeance of the correct patient,  identified by name and the correct surgical side, left side being the  correct side. A fourth intercostal space posterolateral thoracotomy was  carried out in standard fashion and access was gained carefully into the  pleural cavity. Mendy Jumbo blood was found as we entered this pleural space. Three liters of hematoma was removed with clots and sanguineous fluid. The chest cavity was packed with multiple lap sponges.   It was noted  that during the case, even with suction on the lungs, that the lungs  were not deflating and so, the endotracheal tube double lumen endotracheal tube was switched  to a single lumen endotracheal tube and a bronchoscopy was performed in  standard fashion. Both airways were intubated and suctioned out. All  instruments, needles and sponges were accounted for at the end of the  case.         Niru Siddiqui MD    D: 01/02/2020 21:49:19       T: 01/03/2020 1:10:51     [id number]  Job#: [id number]     Doc#: [id number]    CC:

## 2020-01-03 NOTE — PROGRESS NOTES
SBT initiated at this time with a PS of 8 cmH2O. Vital signs stable and RN at bedside with patient. Patient does not have any signs/symptoms of respiratory distress/increased WOB at this time.     Electronically signed by Christian Hernandes RCP, RRT, RRT-ACCS on 1/3/2020 at 8:16 AM        01/03/20 0813   Vent Information   Vent Type 840   Vent Mode PS   Vt Ordered 500 mL   Peak Flow 55 L/min   Pressure Support 8 cmH20   FiO2  40 %   Sensitivity 3   PEEP/CPAP 5   Vent Patient Data   Peak Inspiratory Pressure 14 cmH2O   Mean Airway Pressure 8 cmH20   Rate Measured 12 br/min   Vt Exhaled 783 mL   Spontaneous  mL   Minute Volume 10.8 Liters   I:E Ratio 1:3.40   Spontaneous Breathing Trial (SBT) RT Doc   Pulse 77   SpO2 100 %   RSBI Calculated 15.6   Additional Respiratory  Assessments   End Tidal CO2 38 (%)   Alarm Settings   High Pressure Alarm 45 cmH2O   Low Minute Volume Alarm 2 L/min   High Respiratory Rate 40 br/min

## 2020-01-03 NOTE — PROGRESS NOTES
Dr. Shoshana Su and Amilcar Ozuna bedside to see patient. Okay to wean vent and SBT patient, orders for precedex if needed for DTs and will order beer for patient when able to swallow.

## 2020-01-03 NOTE — PROGRESS NOTES
01/02/20 2014   Vent Information   Vent Type 840   Vent Mode AC/VC+   Vt Ordered 500 mL   Rate Set 18 bmp   Pressure Support 0 cmH20   FiO2  40 %   Sensitivity 3   PEEP/CPAP 10   I Time/ I Time % 1 s   Cuff Pressure (cm H2O) 30 cm H2O   Humidification Source HME   Vent Patient Data   Peak Inspiratory Pressure 23 cmH2O   Mean Airway Pressure 15 cmH20   Rate Measured 19 br/min   Vt Exhaled 568 mL   Minute Volume 9.52 Liters   I:E Ratio 1:2.30   Plateau Pressure 22 OAV81   Static Compliance 47.3 mL/cmH2O   Cough/Sputum   Sputum How Obtained Endotracheal   Cough Non-productive   Spontaneous Breathing Trial (SBT) RT Doc   Pulse 80   SpO2 97 %   Breath Sounds   Right Upper Lobe Diminished   Right Middle Lobe Diminished   Right Lower Lobe Diminished   Left Upper Lobe Diminished   Left Lower Lobe Diminished   Additional Respiratory  Assessments   End Tidal CO2 40 (%)   Position Semi-Jones's   Alarm Settings   High Pressure Alarm 45 cmH2O   Low Minute Volume Alarm 2 L/min   High Respiratory Rate 40 br/min   Low Exhaled Vt  200 mL   [REMOVED] ETT (adult)   Removal Date/Time: 01/02/20 1344  Placement Date/Time: 01/02/20 0020   Timeout: Patient  Mask Ventilation: Ventilated by mask (1)  Technique: Direct laryngoscopy  Type: Cuffed  Tube Size: 8 mm  Blade Size: 4  Location: Oral  Grade View: Only epiglotti. ..    Secured at 24 cm   Measured From Lips   ET Placement Right   Secured By Commercial tube romero   Site Condition Dry

## 2020-01-03 NOTE — PLAN OF CARE
Problem: Falls - Risk of:  Goal: Will remain free from falls  Description  Will remain free from falls  1/2/2020 2130 by Shana Donnelly RN  Outcome: Ongoing  1/2/2020 1231 by Verena James RN  Outcome: Ongoing  Goal: Absence of physical injury  Description  Absence of physical injury  1/2/2020 2130 by Shana Donnelly RN  Outcome: Ongoing  1/2/2020 1231 by Verena James RN  Outcome: Ongoing     Problem: Restraint Use - Nonviolent/Non-Self-Destructive Behavior:  Goal: Absence of restraint indications  Description  Absence of restraint indications  1/2/2020 2130 by Shana Donnelly RN  Outcome: Ongoing  1/2/2020 1231 by Verena James RN  Outcome: Ongoing  Goal: Absence of restraint-related injury  Description  Absence of restraint-related injury  1/2/2020 2130 by Shana Donnelly RN  Outcome: Ongoing  1/2/2020 1231 by Verena James RN  Outcome: Ongoing     Problem: Nutrition  Goal: Optimal nutrition therapy  1/2/2020 1231 by Verena James RN  Outcome: Ongoing  1/2/2020 1015 by Paola Kat RD, LD  Outcome: Ongoing  Note:   Nutrition Problem: Inadequate energy intake  Intervention: Food and/or Nutrient Delivery: Continue NPO  Nutritional Goals: Tolerate most appropriate form of nutrition therapy this admission        Problem: Risk for Impaired Skin Integrity  Goal: Tissue integrity - skin and mucous membranes  Description  Structural intactness and normal physiological function of skin and  mucous membranes.   1/2/2020 2130 by Shana Donnelly RN  Outcome: Ongoing  1/2/2020 1231 by Verena James RN  Outcome: Ongoing

## 2020-01-03 NOTE — PROGRESS NOTES
Hospitalist Progress Note      PCP: No primary care provider on file. Date of Admission: 1/1/2020    Chief Complaint: Fall, left side hemothorax    Hospital Course: See H&P    Subjective:   Patient is up in bed, comfortable, not in distress. Extubated earlier this morning. Complaining of left-sided chest pain at operative site. Denies any shortness of breath or cough. No new event overnight noted.       Medications:  Reviewed    Infusion Medications    dextrose       Scheduled Medications    folic acid, thiamine, multi-vitamin with vitamin K infusion   Intravenous Daily    nicotine  1 patch Transdermal Daily    cefepime  2 g Intravenous Q12H    sodium chloride  250 mL Intravenous Once    sodium chloride flush  10 mL Intravenous 2 times per day    docusate sodium  100 mg Oral BID    famotidine  20 mg Oral BID    vancomycin  1,250 mg Intravenous Q24H    furosemide  40 mg Intravenous Daily    allopurinol  300 mg Oral Daily    atorvastatin  40 mg Oral Daily    febuxostat  40 mg Oral Daily    sodium chloride flush  10 mL Intravenous 2 times per day    insulin lispro  0-6 Units Subcutaneous TID WC    insulin lispro  0-3 Units Subcutaneous Nightly    ipratropium-albuterol  1 ampule Inhalation Q4H WA     PRN Meds: HYDROcodone 5 mg - acetaminophen, potassium chloride, magnesium sulfate, sodium chloride flush, ondansetron, sodium chloride flush, magnesium hydroxide, ondansetron, acetaminophen, glucose, dextrose, glucagon (rDNA), dextrose, perflutren lipid microspheres      Intake/Output Summary (Last 24 hours) at 1/3/2020 1152  Last data filed at 1/3/2020 1040  Gross per 24 hour   Intake 2325.33 ml   Output 3565 ml   Net -1239.67 ml       Physical Exam Performed:    /81   Pulse 99   Temp 98 °F (36.7 °C) (Core)   Resp 20   Ht 5' 9\" (1.753 m)   Wt 216 lb 11.4 oz (98.3 kg)   SpO2 93%   BMI 32.00 kg/m²     General appearance: No apparent distress, appears stated age and account differences in technique, there has been no significant   change in the appearance of the chest since the examination of 01/02/2020. XR CHEST PORTABLE   Final Result   Resolution of large left pleural effusion status post chest tube placement. No evidence of pneumothorax. Ground-glass opacity in the mid and left lower   lung may represent atelectasis or edema         XR CHEST PORTABLE   Final Result   Interval left thoracostomy tube placement. Continued large volume left   pleural effusion with now complete left lung atelectasis. Appropriate endotracheal tube repositioning. XR CHEST PORTABLE   Final Result   Appropriate right IJ central venous catheter positioning. No pneumothorax. Low-lying endotracheal tube. Recommend retracting approximately 2 cm. Ongoing large left pleural effusion with partial left lung atelectasis. The findings were sent to the Radiology Results [address] at 2:28   am on 1/2/2020to be communicated to a licensed caregiver. XR ABDOMEN (KUB) (SINGLE AP VIEW)   Final Result   ET tube and enteric tube as described      There is a large amount of opacification of the left hemithorax with a large   pleural effusion. No pneumothorax         XR CHEST PORTABLE   Final Result   ET tube and enteric tube as described      There is a large amount of opacification of the left hemithorax with a large   pleural effusion.       No pneumothorax                 Assessment/Plan:    Active Hospital Problems    Diagnosis    Acute respiratory failure with hypoxemia (HCC) [J96.01]    Tobacco abuse [Z72.0]    Hyperlipidemia [E78.5]    CAD (coronary artery disease) s/p stent in 2010 [I25.10]    Hemothorax [J94.2]    S/P AVR (aortic valve replacement) [Z95.2]    AS (aortic stenosis) [I35.0]    HTN (hypertension) [I10]     Acute respiratory failure   -Needed emergent intubation due to respiratory decompensation on the floor, likely multifactorial due to pneumonia, COPD exacerbation, large left pleural effusion causing lung collapse  -Placed on mechanical ventilator, adjust vent settings accordingly  -Consult pulmonology for vent management  -Reviewed ABGs and repeat as needed  -duo nebs Q4H  -IV Solu-Medrol as patient was wheezing and I suspect COPD with bronchoconstriction  Clinically improving gradually, patient was extubated earlier this morning and now on nasal cannula oxygen. Pulmonary following.     Suspected PNA with syn-pneumonic effusion  Initial WBC 22,000 in OSH, improved with Vanco plus cefepime, patient also complained of one-week productive cough and shortness of breath  -Continue IV vancomycin plus cefepime  -Sputum culture  -CT surgery performing thoracocentesis and chest tube placement, will get fluid cultures to rule out infectious process     Suspect COPD  Significant smoking history, wheezing on exam  -On bronchodilator nebs and IV steroids     Hemorrhagic shock  -Likely septic due to pneumonia and hemorrhagic shock  -TLC placed in right IJ  -On vasopressor support with Levophed, vasopressin, Erick-Synephrine  -2 L IV fluids given, will check CVP  -On broad-spectrum empiric antibiotic coverage  -2D echo to rule out cardiogenic shock  -Likely due to massive blood loss through left-sided chest tube. Patient received 4 unit of packed RBC, multiple rounds of IV fluid boluses, FFP, and on 3+ pressure support.     Large Left pleural effusion/hemothorax  -Likely para pneumonic effusion, cannot rule out traumatic hemothorax, patient on Plavix  -CT surgery consult appreciated, performing chest tube placement now  -Patient has massive amount of blood loss through left-sided chest tube. CT surgery Dr. Nimo Todd at bedside. Discussed with critical care nurse. Plan to go in OR for surgical intervention to stop bleeding. S/p Surgery on 1/2/20-Procedure(s):  1. EMERGENT LEFT THORACOTOMY  2. EVACUATION OF HEMATOMA   3.  CHEST WALL

## 2020-01-04 ENCOUNTER — APPOINTMENT (OUTPATIENT)
Dept: GENERAL RADIOLOGY | Age: 70
DRG: 853 | End: 2020-01-04
Attending: FAMILY MEDICINE
Payer: MEDICARE

## 2020-01-04 LAB
ANION GAP SERPL CALCULATED.3IONS-SCNC: 9 MMOL/L (ref 3–16)
BUN BLDV-MCNC: 26 MG/DL (ref 7–20)
CALCIUM IONIZED: 1.05 MMOL/L (ref 1.12–1.32)
CALCIUM SERPL-MCNC: 7.6 MG/DL (ref 8.3–10.6)
CHLORIDE BLD-SCNC: 96 MMOL/L (ref 99–110)
CO2: 30 MMOL/L (ref 21–32)
CREAT SERPL-MCNC: 0.7 MG/DL (ref 0.8–1.3)
EKG ATRIAL RATE: 125 BPM
EKG DIAGNOSIS: NORMAL
EKG Q-T INTERVAL: 382 MS
EKG QRS DURATION: 156 MS
EKG QTC CALCULATION (BAZETT): 551 MS
EKG R AXIS: 7 DEGREES
EKG T AXIS: -32 DEGREES
EKG VENTRICULAR RATE: 125 BPM
GFR AFRICAN AMERICAN: >60
GFR NON-AFRICAN AMERICAN: >60
GLUCOSE BLD-MCNC: 130 MG/DL (ref 70–99)
GLUCOSE BLD-MCNC: 138 MG/DL (ref 70–99)
GLUCOSE BLD-MCNC: 156 MG/DL (ref 70–99)
GLUCOSE BLD-MCNC: 217 MG/DL (ref 70–99)
HCT VFR BLD CALC: 24.5 % (ref 40.5–52.5)
HEMOGLOBIN: 8.4 G/DL (ref 13.5–17.5)
MAGNESIUM: 1.7 MG/DL (ref 1.8–2.4)
MCH RBC QN AUTO: 30.8 PG (ref 26–34)
MCHC RBC AUTO-ENTMCNC: 34.4 G/DL (ref 31–36)
MCV RBC AUTO: 89.6 FL (ref 80–100)
PDW BLD-RTO: 15.3 % (ref 12.4–15.4)
PERFORMED ON: ABNORMAL
PH VENOUS: 7.4 (ref 7.35–7.45)
PHOSPHORUS: 1.6 MG/DL (ref 2.5–4.9)
PLATELET # BLD: 114 K/UL (ref 135–450)
PMV BLD AUTO: 8.3 FL (ref 5–10.5)
POTASSIUM SERPL-SCNC: 3.8 MMOL/L (ref 3.5–5.1)
RBC # BLD: 2.73 M/UL (ref 4.2–5.9)
SODIUM BLD-SCNC: 135 MMOL/L (ref 136–145)
VANCOMYCIN RANDOM: 6.8 UG/ML
WBC # BLD: 15.1 K/UL (ref 4–11)

## 2020-01-04 PROCEDURE — 6360000002 HC RX W HCPCS: Performed by: INTERNAL MEDICINE

## 2020-01-04 PROCEDURE — 99232 SBSQ HOSP IP/OBS MODERATE 35: CPT | Performed by: INTERNAL MEDICINE

## 2020-01-04 PROCEDURE — 97530 THERAPEUTIC ACTIVITIES: CPT

## 2020-01-04 PROCEDURE — 84100 ASSAY OF PHOSPHORUS: CPT

## 2020-01-04 PROCEDURE — 85027 COMPLETE CBC AUTOMATED: CPT

## 2020-01-04 PROCEDURE — 6370000000 HC RX 637 (ALT 250 FOR IP): Performed by: THORACIC SURGERY (CARDIOTHORACIC VASCULAR SURGERY)

## 2020-01-04 PROCEDURE — 80048 BASIC METABOLIC PNL TOTAL CA: CPT

## 2020-01-04 PROCEDURE — 2700000000 HC OXYGEN THERAPY PER DAY

## 2020-01-04 PROCEDURE — 6370000000 HC RX 637 (ALT 250 FOR IP): Performed by: INTERNAL MEDICINE

## 2020-01-04 PROCEDURE — 93010 ELECTROCARDIOGRAM REPORT: CPT | Performed by: INTERNAL MEDICINE

## 2020-01-04 PROCEDURE — 94761 N-INVAS EAR/PLS OXIMETRY MLT: CPT

## 2020-01-04 PROCEDURE — 6370000000 HC RX 637 (ALT 250 FOR IP): Performed by: NURSE PRACTITIONER

## 2020-01-04 PROCEDURE — 84443 ASSAY THYROID STIM HORMONE: CPT

## 2020-01-04 PROCEDURE — 2000000000 HC ICU R&B

## 2020-01-04 PROCEDURE — 97110 THERAPEUTIC EXERCISES: CPT

## 2020-01-04 PROCEDURE — 2580000003 HC RX 258: Performed by: INTERNAL MEDICINE

## 2020-01-04 PROCEDURE — 71045 X-RAY EXAM CHEST 1 VIEW: CPT

## 2020-01-04 PROCEDURE — 82330 ASSAY OF CALCIUM: CPT

## 2020-01-04 PROCEDURE — 80202 ASSAY OF VANCOMYCIN: CPT

## 2020-01-04 PROCEDURE — 2580000003 HC RX 258: Performed by: THORACIC SURGERY (CARDIOTHORACIC VASCULAR SURGERY)

## 2020-01-04 PROCEDURE — 83735 ASSAY OF MAGNESIUM: CPT

## 2020-01-04 PROCEDURE — 94150 VITAL CAPACITY TEST: CPT

## 2020-01-04 PROCEDURE — 2500000003 HC RX 250 WO HCPCS: Performed by: NURSE PRACTITIONER

## 2020-01-04 PROCEDURE — 94640 AIRWAY INHALATION TREATMENT: CPT

## 2020-01-04 PROCEDURE — 94669 MECHANICAL CHEST WALL OSCILL: CPT

## 2020-01-04 PROCEDURE — 2580000003 HC RX 258: Performed by: NURSE PRACTITIONER

## 2020-01-04 PROCEDURE — 6360000002 HC RX W HCPCS: Performed by: NURSE PRACTITIONER

## 2020-01-04 PROCEDURE — 97166 OT EVAL MOD COMPLEX 45 MIN: CPT

## 2020-01-04 PROCEDURE — 6360000002 HC RX W HCPCS: Performed by: THORACIC SURGERY (CARDIOTHORACIC VASCULAR SURGERY)

## 2020-01-04 RX ORDER — POTASSIUM CHLORIDE 7.45 MG/ML
10 INJECTION INTRAVENOUS
Status: COMPLETED | OUTPATIENT
Start: 2020-01-04 | End: 2020-01-04

## 2020-01-04 RX ORDER — MAGNESIUM SULFATE IN WATER 40 MG/ML
2 INJECTION, SOLUTION INTRAVENOUS ONCE
Status: DISCONTINUED | OUTPATIENT
Start: 2020-01-04 | End: 2020-01-07 | Stop reason: HOSPADM

## 2020-01-04 RX ADMIN — HYDROCODONE BITARTRATE AND ACETAMINOPHEN 1 TABLET: 5; 325 TABLET ORAL at 17:58

## 2020-01-04 RX ADMIN — VANCOMYCIN HYDROCHLORIDE 1000 MG: 10 INJECTION, POWDER, LYOPHILIZED, FOR SOLUTION INTRAVENOUS at 05:49

## 2020-01-04 RX ADMIN — CEFEPIME HYDROCHLORIDE 2 G: 2 INJECTION, POWDER, FOR SOLUTION INTRAVENOUS at 17:04

## 2020-01-04 RX ADMIN — IPRATROPIUM BROMIDE AND ALBUTEROL SULFATE 1 AMPULE: .5; 3 SOLUTION RESPIRATORY (INHALATION) at 16:04

## 2020-01-04 RX ADMIN — MAGNESIUM SULFATE HEPTAHYDRATE 1 G: 1 INJECTION, SOLUTION INTRAVENOUS at 17:59

## 2020-01-04 RX ADMIN — ALLOPURINOL 300 MG: 300 TABLET ORAL at 09:06

## 2020-01-04 RX ADMIN — DOCUSATE SODIUM 100 MG: 100 CAPSULE, LIQUID FILLED ORAL at 09:06

## 2020-01-04 RX ADMIN — FAMOTIDINE 20 MG: 20 TABLET, FILM COATED ORAL at 09:06

## 2020-01-04 RX ADMIN — CARVEDILOL 6.25 MG: 6.25 TABLET, FILM COATED ORAL at 09:06

## 2020-01-04 RX ADMIN — Medication 10 ML: at 19:39

## 2020-01-04 RX ADMIN — DOCUSATE SODIUM 100 MG: 100 CAPSULE, LIQUID FILLED ORAL at 19:38

## 2020-01-04 RX ADMIN — CEFEPIME HYDROCHLORIDE 2 G: 2 INJECTION, POWDER, FOR SOLUTION INTRAVENOUS at 03:05

## 2020-01-04 RX ADMIN — IPRATROPIUM BROMIDE AND ALBUTEROL SULFATE 1 AMPULE: .5; 3 SOLUTION RESPIRATORY (INHALATION) at 19:58

## 2020-01-04 RX ADMIN — Medication 10 ML: at 09:10

## 2020-01-04 RX ADMIN — VANCOMYCIN HYDROCHLORIDE 1000 MG: 10 INJECTION, POWDER, LYOPHILIZED, FOR SOLUTION INTRAVENOUS at 18:46

## 2020-01-04 RX ADMIN — INSULIN LISPRO 1 UNITS: 100 INJECTION, SOLUTION INTRAVENOUS; SUBCUTANEOUS at 20:36

## 2020-01-04 RX ADMIN — CARVEDILOL 6.25 MG: 6.25 TABLET, FILM COATED ORAL at 17:59

## 2020-01-04 RX ADMIN — IPRATROPIUM BROMIDE AND ALBUTEROL SULFATE 1 AMPULE: .5; 3 SOLUTION RESPIRATORY (INHALATION) at 09:01

## 2020-01-04 RX ADMIN — FUROSEMIDE 40 MG: 10 INJECTION, SOLUTION INTRAMUSCULAR; INTRAVENOUS at 09:06

## 2020-01-04 RX ADMIN — POTASSIUM CHLORIDE 10 MEQ: 7.46 INJECTION, SOLUTION INTRAVENOUS at 19:06

## 2020-01-04 RX ADMIN — FOLIC ACID: 5 INJECTION, SOLUTION INTRAMUSCULAR; INTRAVENOUS; SUBCUTANEOUS at 13:01

## 2020-01-04 RX ADMIN — POTASSIUM CHLORIDE 10 MEQ: 7.46 INJECTION, SOLUTION INTRAVENOUS at 18:03

## 2020-01-04 RX ADMIN — MAGNESIUM SULFATE HEPTAHYDRATE 1 G: 1 INJECTION, SOLUTION INTRAVENOUS at 15:48

## 2020-01-04 RX ADMIN — HYDROCODONE BITARTRATE AND ACETAMINOPHEN 1 TABLET: 5; 325 TABLET ORAL at 07:13

## 2020-01-04 RX ADMIN — IPRATROPIUM BROMIDE AND ALBUTEROL SULFATE 1 AMPULE: .5; 3 SOLUTION RESPIRATORY (INHALATION) at 12:41

## 2020-01-04 RX ADMIN — INSULIN LISPRO 1 UNITS: 100 INJECTION, SOLUTION INTRAVENOUS; SUBCUTANEOUS at 09:11

## 2020-01-04 RX ADMIN — FAMOTIDINE 20 MG: 20 TABLET, FILM COATED ORAL at 19:38

## 2020-01-04 RX ADMIN — ATORVASTATIN CALCIUM 40 MG: 40 TABLET, FILM COATED ORAL at 09:06

## 2020-01-04 ASSESSMENT — PAIN SCALES - GENERAL
PAINLEVEL_OUTOF10: 5
PAINLEVEL_OUTOF10: 8
PAINLEVEL_OUTOF10: 0

## 2020-01-04 NOTE — PROGRESS NOTES
performed by Bernardino Woody MD at [address]       Level of Consciousness: Alert, Oriented, and Cooperative = 0    Level of Activity: Mostly sedentary, minimal walking = 2    Respiratory Pattern: Regular Pattern; RR 8-20 = 0    Breath Sounds: Diminished unilaterally = 1    Sputum  Sputum Color: Cloudy, Red, Tenacity: Thick, Sputum How Obtained: None  Cough: Strong, productive = 1    Vital Signs   /80   Pulse 102   Temp 99.3 °F (37.4 °C) (Core)   Resp 18   Ht 5' 9\" (1.753 m)   Wt 213 lb 10 oz (96.9 kg)   SpO2 93%   BMI 31.55 kg/m²   SPO2 (COPD values may differ): 88-89% on room air or greater than 92% on FiO2 28- 35% = 2    Peak Flow (asthma only): not applicable = 0    RSI: 7-8 = BID and Q4HPRN (every four hours as needed) for dyspnea        Plan       Goals: medication delivery, mobilize retained secretions, volume expansion and improve oxygenation    Patient/caregiver was educated on the proper method of use for Respiratory Care Devices:  Yes      Level of patient/caregiver understanding able to:   ? Verbalize understanding   ? Demonstrate understanding       ? Teach back        ? Needs reinforcement       ? No available caregiver               ? Other:     Response to education:  Very Good     Is patient being placed on Home Treatment Regimen? No     Does the patient have everything they need prior to discharge? NA     Comments: spoke with pt,  Will cont rxs as ordered at [facility] time    Plan of Care: as above    Electronically signed by Jaya Baker RCP on 1/4/2020 at 4:06 PM    Respiratory Protocol Guidelines     1. Assessment and treatment by Respiratory Therapy will be initiated for medication and therapeutic interventions upon initiation of aerosolized medication. 2. Physician will be contacted for respiratory rate (RR) greater than 35 breaths per minute. Therapy will be held for heart rate (HR) greater than 140 beats per minute, pending direction from physician.   3. Bronchodilators will be administered via Metered Dose Inhaler (MDI) with spacer when the following criteria are met:  a. Alert and cooperative     b. HR < 140 bpm  c. RR < 30 bpm                d. Can demonstrate a 2-3 second inspiratory hold  4. Bronchodilators will be administered via Hand Held Nebulizer [facility]) to patients when ANY of the following criteria are met  a. Incognizant or uncooperative          b. Patients treated with HHN at Home        c. Unable to demonstrate proper use of MDI with spacer     d. RR > 30 bpm   5. Bronchodilators will be delivered via Metered Dose Inhaler (MDI), HHN, Aerogen to intubated patients on mechanical ventilation. 6. Inhalation medication orders will be delivered and/or substituted as outlined below. Aerosolized Medications Ordering and Administration Guidelines:    1. All Medications will be ordered by a physician, and their frequency and/or modality will be adjusted as defined by the patients Respiratory Severity Index (RSI) score. 2. If the patient does not have documented COPD, consider discontinuing anticholinergics when RSI is less than 9.  3. If the bronchospasm worsens (increased RSI), then the bronchodilator frequency can be increased to a maximum of every 4 hours. If greater than every 4 hours is required, the physician will be contacted. 4. If the bronchospasm improves, the frequency of the bronchodilator can be decreased, based on the patient's RSI, but not less than home treatment regimen frequency. 5. Bronchodilator(s) will be discontinued if patient has a RSI less than 9 and has received no scheduled or as needed treatment for 72  Hrs. Patients Ordered on a Mucolytic Agent:    1. Must always be administered with a bronchodilator. 2. Discontinue if patient experiences worsened bronchospasm, or secretions have lessened to the point that the patient is able to clear them with a cough. Anti-inflammatory and Combination Medications:    1.  If the patient lacks prior

## 2020-01-04 NOTE — PROGRESS NOTES
Dr. Sherlyn Pena bedside; update on patient status provided. Orders updated/modified; reviewed and acknowledged. Will continue to monitor.     Dimitris Salazar RN

## 2020-01-04 NOTE — PROGRESS NOTES
technique, there has been no significant   change in the appearance of the chest since the examination of 01/02/2020. XR CHEST PORTABLE   Final Result   Resolution of large left pleural effusion status post chest tube placement. No evidence of pneumothorax. Ground-glass opacity in the mid and left lower   lung may represent atelectasis or edema         XR CHEST PORTABLE   Final Result   Interval left thoracostomy tube placement. Continued large volume left   pleural effusion with now complete left lung atelectasis. Appropriate endotracheal tube repositioning. XR CHEST PORTABLE   Final Result   Appropriate right IJ central venous catheter positioning. No pneumothorax. Low-lying endotracheal tube. Recommend retracting approximately 2 cm. Ongoing large left pleural effusion with partial left lung atelectasis. The findings were sent to the Radiology Results [address] at 2:28   am on 1/2/2020to be communicated to a licensed caregiver. XR ABDOMEN (KUB) (SINGLE AP VIEW)   Final Result   ET tube and enteric tube as described      There is a large amount of opacification of the left hemithorax with a large   pleural effusion. No pneumothorax         XR CHEST PORTABLE   Final Result   ET tube and enteric tube as described      There is a large amount of opacification of the left hemithorax with a large   pleural effusion.       No pneumothorax                 Assessment/Plan:    Active Hospital Problems    Diagnosis    Acute respiratory failure with hypoxemia (HCC) [J96.01]    Tobacco abuse [Z72.0]    Hyperlipidemia [E78.5]    CAD (coronary artery disease) s/p stent in 2010 [I25.10]    Hemothorax [J94.2]    S/P AVR (aortic valve replacement) [Z95.2]    AS (aortic stenosis) [I35.0]    HTN (hypertension) [I10]     Acute respiratory failure   -Needed emergent intubation due to respiratory decompensation on the floor, likely multifactorial due to pneumonia,

## 2020-01-04 NOTE — PROGRESS NOTES
[facility]    [facility]              [phone]                       Plan : PAL getting better after controlling hematoma  Extubated  Ok to remove Noriega from renal perspective    Hold of lasix due to hypotension    Assessment :     Hyperkalemia  Likely due to PAL  CK normal    Disproportionately high K due to bleeding hematoma  absorption  K normal now    Acute Kidney Injury  Cr 1.5- now 0.7  Due to hypotension- due to hemothorax  Getting better    Hypotension  BP: ()/(59-80) Pulse:  []   On vasopressors  Now off  BP on low side  But asymptomatic  On fluids    Acidosis  Severe  Due to hypotension  Lactate is very high  improved    Large left hemothorax- sp emergent left thoracotomy, left upper lobe wedge resection    CAD  Sp AVR    [facility] would like to thank Norman Robert MD   for opportunity to serve this patient      Please call with questions at-   24 Hrs Answering service [phone] or  7 am- 5 pm via Perfect serve or cell phone  Dr.Sudhir Last Mascorro          CC/reason for consult :     PAL     HPI :     From consult note-     Shaun Santos is a 77 y.o. male presented to   the hospital on 1/1/2020 with syncope  To outside hospital. He is unable to provide  Details. Per family, he also fell at bathroom  And was down for at least an hour. He was  Eventually able to crawl and call his family. Per family he has had flu like symptoms for  A week. 911 was call, when EMS arrived  His BP was 50s, but improved to normal   On route. However now needing two vasopressors. He has has CTA done which showed left side  Pleural effusion, and has chest tube with profuse   Bleeding. He needed intubation and mechanical  Ventilation. He is in ICU. We are consulted for PAL.      Interval History:     Making urine now  On lasix  Low bp  But asymptomatic    ROS:     Seen with-  family  Family    SOB-   none  Edema-   none  Nausea/vomiting/diarrhea-   none  Poor 01/03/20  0550 01/03/20  0819 01/04/20  0415     --  139  --  135*   K 4.5  --  4.6  --  3.8     --  103  --  96*   CO2 24  --  27  --  30   BUN 19  --  27*  --  26*   CREATININE 1.1  --  1.2  --  0.7*   GLUCOSE 182*  --  201*  --  130*   MG  --   --  1.50*  --  1.70*   PHOS 3.2 4.5  --  3.2 1.6* none

## 2020-01-04 NOTE — PROGRESS NOTES
CC: s/p fall. S/p emergent Left Thoracotomy and evacuation of left Hemothorax, Left Upper lobe wedge resection on 1/2/2020    No major events overnight. HD stable, on rate controlled Afib. BB restarted. Cardizem drip was stopped    Hb: 84. Total CT output from both tubes 400cc/24hr overnight. No airleak on neither of the tubes. Put chest tubes to water seal today, chest X ray in am.    Noriega can come out from CT surgical standpoint can come out.

## 2020-01-04 NOTE — PROGRESS NOTES
4 eyes assessment and report completed with previous RN. Noriega to gravity. CT x2 on left; -20 suction; no signs of crepitus; SPO2 stable on 3 L NC. Advancing diet post toleration of full liquids. Up to side of bed. Full assessment completed and documented on flowsheet. Call light and overbed table within reach. Monitoring continues.     Yahir Hall, RN

## 2020-01-04 NOTE — PROGRESS NOTES
Perfect Serve:    [phone] Hospital or Facility: A From: Jada Polo MG= 1.7; K+= 3.8; phos = 1.6 CT surgery was replacements orders by hospitalists (want mg level to stay at 2.0). let me know if any questions.  thank you:) Need Callback: NO CALLBACK REQ CCU      Raissa Little RN

## 2020-01-04 NOTE — PROGRESS NOTES
Patient refusing blood draw from central line. Informed doctor needs lab results to monitor progress.

## 2020-01-04 NOTE — PROGRESS NOTES
Pulmonary & Critical Care Inpatient Progress Note   Antonia Perales MD     REASON FOR TODAY'S VISIT:  Hemothorax, assistance with critical care management    SUBJECTIVE:   On simple nasal cannula  Tolerating PO   No new complaints    Scheduled Meds:   vancomycin  1,000 mg Intravenous I73O    folic acid, thiamine, multi-vitamin with vitamin K infusion   Intravenous Daily    nicotine  1 patch Transdermal Daily    carvedilol  6.25 mg Oral BID WC    cefepime  2 g Intravenous Q12H    sodium chloride  250 mL Intravenous Once    sodium chloride flush  10 mL Intravenous 2 times per day    docusate sodium  100 mg Oral BID    famotidine  20 mg Oral BID    furosemide  40 mg Intravenous Daily    allopurinol  300 mg Oral Daily    atorvastatin  40 mg Oral Daily    febuxostat  40 mg Oral Daily    sodium chloride flush  10 mL Intravenous 2 times per day    insulin lispro  0-6 Units Subcutaneous TID WC    insulin lispro  0-3 Units Subcutaneous Nightly    ipratropium-albuterol  1 ampule Inhalation Q4H WA       Continuous Infusions:   dextrose         PRN Meds:  HYDROcodone 5 mg - acetaminophen, potassium chloride, magnesium sulfate, sodium chloride flush, ondansetron, sodium chloride flush, magnesium hydroxide, ondansetron, acetaminophen, glucose, dextrose, glucagon (rDNA), dextrose, perflutren lipid microspheres    ALLERGIES:  Patient is allergic to penicillins. Objective:   PHYSICAL EXAM:  /67   Pulse 92   Temp 99.3 °F (37.4 °C) (Core)   Resp 17   Ht 5' 9\" (1.753 m)   Wt 213 lb 10 oz (96.9 kg)   SpO2 92%   BMI 31.55 kg/m²    Physical Exam  Constitutional:       General: He is not in acute distress. Appearance: He is well-developed. He is not diaphoretic. HENT:      Head: Normocephalic and atraumatic. Mouth/Throat:      Pharynx: No oropharyngeal exudate. Eyes:      Pupils: Pupils are equal, round, and reactive to light. Neck:      Musculoskeletal: Neck supple. Vascular: No JVD. lung atelectasis, possible CABP  2. Left hemothorax, unclear bleeding source  3. Hemorrhagic shock  4. Acute blood loss anemia  5. PAL, prerenal  6.  Acute encephalopathy, metabolic    Plan:      -Wean supplemental oxygen, add diuresis if not improving  -Empiric atb for five days  -hemothorax and chest tube management per thoracic surgery  -Monitor hemodynamics, presently stable  -Nephro following, serial labs to monitor renal function   -OK to leave ICU from our standpoint    Please contact with questions    Berry Espinosa MD

## 2020-01-04 NOTE — PROGRESS NOTES
Occupational Therapy   Occupational Therapy Initial Assessment/Treatment   Date: 2020   Patient Name: Ping Almeida  MRN: [id number]     : 1950    Date of Service: 2020    Discharge Recommendations:  [address], Continue to assess pending progress, IP Rehab  OT Equipment Recommendations  Other: defer    Assessment   Performance deficits / Impairments: Decreased functional mobility ; Decreased ADL status; Decreased posture;Decreased balance;Decreased safe awareness;Decreased endurance;Decreased high-level IADLs    Assessment: Pt 78 yo male functioning with deficits in the areas listed above following emergent left thoracotomy and evacuation of left Hemothorax, Left Upper lobe wedge resection. Pt reports IND PLOF and lives at home alone with bathtub and no DME. Pt is currently at a min A x1-2 for transfers and functional mobility for safety awareness with line management. Pt educated on importance of BUE exercises to help with edema in both hands. Pt would benefit from skilled rehab to return to prior independent level. Prognosis: Good  Decision Making: Medium Complexity  OT Education: OT Role;Plan of Care;Transfer Training;ADL Adaptive Strategies; Family Education; Energy Conservation  REQUIRES OT FOLLOW UP: Yes  Activity Tolerance  Activity Tolerance: Patient Tolerated treatment well  Safety Devices  Safety Devices in place: Yes  Type of devices: Call light within reach; Left in chair;Gait belt;Nurse notified           Patient Diagnosis(es): There were no encounter diagnoses. has a past medical history of CAD (coronary artery disease), COPD (chronic obstructive pulmonary disease) ([facility] [address] Diabetes mellitus ([facility] [address] Gout, Hyperlipidemia, Hypertension, and Thyroid disease. has a past surgical history that includes thoracotomy (Left, 2020).            Restrictions  Restrictions/Precautions  Restrictions/Precautions: Cardiac, Fall Risk, General Precautions, Up as Tolerated  Position Activity Restriction  Other position/activity restrictions: Up as tolerated. 2 chest tubes. Noriega Catheter. IV. Telemetry. 3L    Subjective   General  Chart Reviewed: Yes  Patient assessed for rehabilitation services?: Yes  Family / Caregiver Present: Yes(girlfriend)  Referring Practitioner: MIKO Mccartney CNP  Diagnosis: hemothorax  Subjective  Subjective: Pt agreeable to therapy  General Comment  Comments: RN approved therapy  Patient Currently in Pain: No  Vital Signs  Temp Source: Core  Pulse: 106  Heart Rate Source: Monitor  BP: 107/71  BP Location: Right upper arm  BP Upper/Lower: Upper  MAP (mmHg): 83  Level of Consciousness: Alert  Patient Currently in Pain: No  Oxygen Therapy  SpO2: 94 %  Pulse Oximeter Device Mode: Continuous  Pulse Oximeter Device Location: Finger  O2 Device: Nasal cannula  O2 Flow Rate (L/min): 3 L/min  Patient Observation  Observations: pt working well with acabenedicta     Social/Functional History  Social/Functional History  Lives With: Alone  Type of Home: Trailer  Home Layout: One level  Home Access: Stairs to enter without rails  Entrance Stairs - Number of Steps: 2  Bathroom Shower/Tub: Tub only  Bathroom Toilet: Handicap height  Bathroom Equipment: (no bath DME)  Home Equipment: (no DME)  ADL Assistance: Independent  Homemaking Assistance: Independent  Homemaking Responsibilities: Yes  Ambulation Assistance: Independent  Transfer Assistance: Independent  Active : Yes  Occupation: Retired  Type of occupation: heavy   Leisure & Hobbies: plays [employer], [employer].         Objective   Vision: Impaired  Vision Exceptions: Wears glasses at all times  Hearing: Within functional limits    Orientation  Overall Orientation Status: Within Functional Limits  Observation/Palpation  Posture: Fair  Edema: edema noted in both hands   Balance  Sitting Balance: Stand by assistance(seated EOB)  Standing Balance: Minimal assistance(RW)  Functional Mobility  Functional - Mobility Device: Rolling Walker  Activity: Other(bed to chair)  Assist Level: Dependent/Total  Functional Mobility Comments: min A x2, cues needed for safety awareness and line management  ADL  LE Dressing: Dependent/Total(scks)  Toileting: Dependent/Total(hilton)  Additional Comments: pt declined further adls   Tone RUE  RUE Tone: Normotonic  Tone LUE  LUE Tone: Normotonic  Coordination  Movements Are Fluid And Coordinated: Yes     Bed mobility  Supine to Sit: Minimal assistance(HHA)  Sit to Supine: Unable to assess(up in chair at end of session)  Scooting: Minimal assistance(to EOB)  Transfers  Sit to stand: Minimal assistance  Stand to sit: Minimal assistance  Transfer Comments: RW     Cognition  Overall Cognitive Status: Exceptions  Following Commands:  Follows multistep commands with repitition  Safety Judgement: Decreased awareness of need for assistance  Problem Solving: Assistance required to generate solutions           Type of ROM/Therapeutic Exercise  Type of ROM/Therapeutic Exercise: AROM  Comment: BUE seated   Exercises  Elbow Flexion: x15  Elbow Extension: x15  Supination: x15  Pronation: x15  Wrist Flexion: x15  Wrist Extension: x15  Grasp/Release: x15     LUE AROM (degrees)  LUE AROM : WFL  RUE AROM (degrees)  RUE AROM : WFL  LUE Strength  Gross LUE Strength: WFL  RUE Strength  Gross RUE Strength: WFL                   Plan   Plan  Times per week: 4-5x/wk  Current Treatment Recommendations: Strengthening, Balance Training, Functional Mobility Training, Safety Education & Training, Self-Care / ADL      AM-[facility] Score        -[facility] Inpatient Daily Activity Raw Score: 14 (01/04/20 1051)  AM-PAC Inpatient ADL T-Scale Score : 33.39 (01/04/20 1051)  ADL Inpatient CMS 0-100% Score: 59.67 (01/04/20 1051)  ADL Inpatient CMS G-Code Modifier : CK (01/04/20 1051)    Goals  Short term goals  Time Frame for Short term goals: 1 week (1/11/20)  Short term goal 1: Pt will complete toilet transfer

## 2020-01-04 NOTE — PROGRESS NOTES
recliner to RW Pako x 2, cues for hand placement and sequence     Ambulation  Ambulation?: Yes  Ambulation 1  Surface: level tile  Device: Rolling Walker  Other Apparatus: O2(3L O2 NC)  Assistance: Minimal assistance  Quality of Gait: Pt ambulates with decreased beena, wide VANIA, B decreased step length/height, B decreased toe clearance, wide VANIA, shuffling gait pattern, forward flexed trunk. Cues provided for safety and sequence and for safety with positioning of RW. Pt mildly unsteady with Pako x 2 to maintain balance. Gait Deviations: Slow Beena;Decreased step length;Decreased step height;Decreased arm swing;Decreased head and trunk rotation;Staggers  Distance: 5' (Bed to chair)  Comments: Distances limited secondary to decreased strength  Stairs/Curb  Stairs?: No        Balance  Posture: Fair  Sitting - Static: Fair  Sitting - Dynamic: Fair  Standing - Static: Fair;-(CGA to Pako with RW)  Standing - Dynamic: Poor; +(Pako with RW)     Exercises  Hip Flexion: Seated marches BLE x 15  Knee Long Arc Quad: Seated BLE x 15  Ankle Pumps: Seated BLE x 15  Comments: cueing for sequencing and technique. Other exercises  Other exercises?: No                          AM-PAC Score     AM-PAC Inpatient Mobility without Stair Climbing Raw Score : 12 (01/04/20 1315)  AM-PAC Inpatient without Stair Climbing T-Scale Score : 37.26 (01/04/20 1315)  Mobility Inpatient CMS 0-100% Score: 63.03 (01/04/20 1315)  Mobility Inpatient without Stair CMS G-Code Modifier : CL (01/04/20 1315)       Goals  Short term goals  Time Frame for Short term goals: 1 week 1/10/19  Short term goal 1: Supine <> sit with modified independence. Short term goal 2: Sit <> stand with modified independence. Short term goal 3: Bed <> chair with LRAD and modified independence. Short term goal 4: Ambulate 150 feet with LRAD and modified independence. Short term goal 5: Ascend 2 steps with modified independence. Patient Goals   Patient goals :  \"To feel better. \" \"To go home. \"    Plan    Plan  Times per week: 5-7 times a week while in ICU  Times per day: Daily  Plan weeks: 1 week 1/10/19  Specific instructions for Next Treatment: progress mobility as tolerated  Current Treatment Recommendations: Strengthening, ROM, Balance Training, Transfer Training, Endurance Training, Gait Training, ADL/Self-care Training, Patient/Caregiver Education & Training, Stair training, Pain Management, Functional Mobility Training, Safety Education & Training, Positioning  Safety Devices  Type of devices: Left in chair, Call light within reach, Chair alarm in place, Gait belt, Patient at risk for falls, Nurse notified, All fall risk precautions in place  Restraints  Initially in place: No     Therapy Time   Individual Concurrent Group Co-treatment   Time In 0943         Time Out 1014         Minutes 31         Timed Code Treatment Minutes: Fran 91, PT, DPT

## 2020-01-05 ENCOUNTER — APPOINTMENT (OUTPATIENT)
Dept: GENERAL RADIOLOGY | Age: 70
DRG: 853 | End: 2020-01-05
Attending: FAMILY MEDICINE
Payer: MEDICARE

## 2020-01-05 LAB
ANION GAP SERPL CALCULATED.3IONS-SCNC: 7 MMOL/L (ref 3–16)
BODY FLUID CULTURE, STERILE: NORMAL
BUN BLDV-MCNC: 17 MG/DL (ref 7–20)
CALCIUM IONIZED: 1.03 MMOL/L (ref 1.12–1.32)
CALCIUM SERPL-MCNC: 7.9 MG/DL (ref 8.3–10.6)
CHLORIDE BLD-SCNC: 94 MMOL/L (ref 99–110)
CO2: 33 MMOL/L (ref 21–32)
CREAT SERPL-MCNC: <0.5 MG/DL (ref 0.8–1.3)
GFR AFRICAN AMERICAN: >60
GFR NON-AFRICAN AMERICAN: >60
GLUCOSE BLD-MCNC: 129 MG/DL (ref 70–99)
GLUCOSE BLD-MCNC: 140 MG/DL (ref 70–99)
GLUCOSE BLD-MCNC: 152 MG/DL (ref 70–99)
GLUCOSE BLD-MCNC: 158 MG/DL (ref 70–99)
GLUCOSE BLD-MCNC: 220 MG/DL (ref 70–99)
GRAM STAIN RESULT: NORMAL
HCT VFR BLD CALC: 26.2 % (ref 40.5–52.5)
HEMOGLOBIN: 8.9 G/DL (ref 13.5–17.5)
MAGNESIUM: 1.8 MG/DL (ref 1.8–2.4)
MCH RBC QN AUTO: 30.9 PG (ref 26–34)
MCHC RBC AUTO-ENTMCNC: 34.1 G/DL (ref 31–36)
MCV RBC AUTO: 90.5 FL (ref 80–100)
PDW BLD-RTO: 14.7 % (ref 12.4–15.4)
PERFORMED ON: ABNORMAL
PH VENOUS: 7.42 (ref 7.35–7.45)
PHOSPHORUS: 1.6 MG/DL (ref 2.5–4.9)
PLATELET # BLD: 144 K/UL (ref 135–450)
PMV BLD AUTO: 7.7 FL (ref 5–10.5)
POTASSIUM SERPL-SCNC: 3.6 MMOL/L (ref 3.5–5.1)
RBC # BLD: 2.9 M/UL (ref 4.2–5.9)
SODIUM BLD-SCNC: 134 MMOL/L (ref 136–145)
TSH REFLEX: 3.3 UIU/ML (ref 0.27–4.2)
VANCOMYCIN TROUGH: 8.4 UG/ML (ref 10–20)
WBC # BLD: 12.5 K/UL (ref 4–11)

## 2020-01-05 PROCEDURE — 2700000000 HC OXYGEN THERAPY PER DAY

## 2020-01-05 PROCEDURE — 6370000000 HC RX 637 (ALT 250 FOR IP): Performed by: THORACIC SURGERY (CARDIOTHORACIC VASCULAR SURGERY)

## 2020-01-05 PROCEDURE — 83735 ASSAY OF MAGNESIUM: CPT

## 2020-01-05 PROCEDURE — 6360000002 HC RX W HCPCS: Performed by: INTERNAL MEDICINE

## 2020-01-05 PROCEDURE — 94669 MECHANICAL CHEST WALL OSCILL: CPT

## 2020-01-05 PROCEDURE — 94761 N-INVAS EAR/PLS OXIMETRY MLT: CPT

## 2020-01-05 PROCEDURE — 6370000000 HC RX 637 (ALT 250 FOR IP): Performed by: INTERNAL MEDICINE

## 2020-01-05 PROCEDURE — 2580000003 HC RX 258

## 2020-01-05 PROCEDURE — 2580000003 HC RX 258: Performed by: INTERNAL MEDICINE

## 2020-01-05 PROCEDURE — 36592 COLLECT BLOOD FROM PICC: CPT

## 2020-01-05 PROCEDURE — 84100 ASSAY OF PHOSPHORUS: CPT

## 2020-01-05 PROCEDURE — 6360000002 HC RX W HCPCS: Performed by: NURSE PRACTITIONER

## 2020-01-05 PROCEDURE — 80048 BASIC METABOLIC PNL TOTAL CA: CPT

## 2020-01-05 PROCEDURE — 2500000003 HC RX 250 WO HCPCS: Performed by: NURSE PRACTITIONER

## 2020-01-05 PROCEDURE — 82330 ASSAY OF CALCIUM: CPT

## 2020-01-05 PROCEDURE — 2580000003 HC RX 258: Performed by: NURSE PRACTITIONER

## 2020-01-05 PROCEDURE — 2580000003 HC RX 258: Performed by: THORACIC SURGERY (CARDIOTHORACIC VASCULAR SURGERY)

## 2020-01-05 PROCEDURE — 71045 X-RAY EXAM CHEST 1 VIEW: CPT

## 2020-01-05 PROCEDURE — 80202 ASSAY OF VANCOMYCIN: CPT

## 2020-01-05 PROCEDURE — 99222 1ST HOSP IP/OBS MODERATE 55: CPT | Performed by: INTERNAL MEDICINE

## 2020-01-05 PROCEDURE — 85027 COMPLETE CBC AUTOMATED: CPT

## 2020-01-05 PROCEDURE — 94640 AIRWAY INHALATION TREATMENT: CPT

## 2020-01-05 PROCEDURE — 6370000000 HC RX 637 (ALT 250 FOR IP): Performed by: NURSE PRACTITIONER

## 2020-01-05 PROCEDURE — 2000000000 HC ICU R&B

## 2020-01-05 RX ORDER — POTASSIUM CHLORIDE 20 MEQ/1
20 TABLET, EXTENDED RELEASE ORAL 2 TIMES DAILY
Status: DISCONTINUED | OUTPATIENT
Start: 2020-01-05 | End: 2020-01-05

## 2020-01-05 RX ORDER — SODIUM CHLORIDE 9 MG/ML
INJECTION, SOLUTION INTRAVENOUS
Status: COMPLETED
Start: 2020-01-05 | End: 2020-01-05

## 2020-01-05 RX ORDER — MAGNESIUM SULFATE IN WATER 40 MG/ML
2 INJECTION, SOLUTION INTRAVENOUS ONCE
Status: COMPLETED | OUTPATIENT
Start: 2020-01-05 | End: 2020-01-05

## 2020-01-05 RX ADMIN — CARVEDILOL 6.25 MG: 6.25 TABLET, FILM COATED ORAL at 08:39

## 2020-01-05 RX ADMIN — HYDROCODONE BITARTRATE AND ACETAMINOPHEN 1 TABLET: 5; 325 TABLET ORAL at 00:05

## 2020-01-05 RX ADMIN — VANCOMYCIN HYDROCHLORIDE 1000 MG: 10 INJECTION, POWDER, LYOPHILIZED, FOR SOLUTION INTRAVENOUS at 18:16

## 2020-01-05 RX ADMIN — MAGNESIUM SULFATE HEPTAHYDRATE 2 G: 40 INJECTION, SOLUTION INTRAVENOUS at 14:45

## 2020-01-05 RX ADMIN — HYDROCODONE BITARTRATE AND ACETAMINOPHEN 1 TABLET: 5; 325 TABLET ORAL at 10:57

## 2020-01-05 RX ADMIN — IPRATROPIUM BROMIDE AND ALBUTEROL SULFATE 1 AMPULE: .5; 3 SOLUTION RESPIRATORY (INHALATION) at 08:25

## 2020-01-05 RX ADMIN — DOCUSATE SODIUM 100 MG: 100 CAPSULE, LIQUID FILLED ORAL at 08:39

## 2020-01-05 RX ADMIN — CEFEPIME HYDROCHLORIDE 2 G: 2 INJECTION, POWDER, FOR SOLUTION INTRAVENOUS at 02:19

## 2020-01-05 RX ADMIN — ATORVASTATIN CALCIUM 40 MG: 40 TABLET, FILM COATED ORAL at 08:39

## 2020-01-05 RX ADMIN — SODIUM CHLORIDE 500 ML: 9 INJECTION, SOLUTION INTRAVENOUS at 21:55

## 2020-01-05 RX ADMIN — VANCOMYCIN HYDROCHLORIDE 1000 MG: 10 INJECTION, POWDER, LYOPHILIZED, FOR SOLUTION INTRAVENOUS at 05:19

## 2020-01-05 RX ADMIN — HYDROCODONE BITARTRATE AND ACETAMINOPHEN 1 TABLET: 5; 325 TABLET ORAL at 07:07

## 2020-01-05 RX ADMIN — POTASSIUM CHLORIDE 20 MEQ: 20 TABLET, EXTENDED RELEASE ORAL at 14:45

## 2020-01-05 RX ADMIN — FOLIC ACID: 5 INJECTION, SOLUTION INTRAMUSCULAR; INTRAVENOUS; SUBCUTANEOUS at 08:45

## 2020-01-05 RX ADMIN — INSULIN LISPRO 1 UNITS: 100 INJECTION, SOLUTION INTRAVENOUS; SUBCUTANEOUS at 08:21

## 2020-01-05 RX ADMIN — FEBUXOSTAT 40 MG: 40 TABLET ORAL at 08:44

## 2020-01-05 RX ADMIN — Medication 10 ML: at 08:42

## 2020-01-05 RX ADMIN — DOCUSATE SODIUM 100 MG: 100 CAPSULE, LIQUID FILLED ORAL at 19:57

## 2020-01-05 RX ADMIN — ALLOPURINOL 300 MG: 300 TABLET ORAL at 08:39

## 2020-01-05 RX ADMIN — INSULIN LISPRO 1 UNITS: 100 INJECTION, SOLUTION INTRAVENOUS; SUBCUTANEOUS at 19:58

## 2020-01-05 RX ADMIN — IPRATROPIUM BROMIDE AND ALBUTEROL SULFATE 1 AMPULE: .5; 3 SOLUTION RESPIRATORY (INHALATION) at 20:07

## 2020-01-05 RX ADMIN — FAMOTIDINE 20 MG: 20 TABLET, FILM COATED ORAL at 08:39

## 2020-01-05 RX ADMIN — CEFEPIME HYDROCHLORIDE 2 G: 2 INJECTION, POWDER, FOR SOLUTION INTRAVENOUS at 14:44

## 2020-01-05 RX ADMIN — FAMOTIDINE 20 MG: 20 TABLET, FILM COATED ORAL at 19:57

## 2020-01-05 RX ADMIN — VANCOMYCIN HYDROCHLORIDE 500 MG: 500 INJECTION, POWDER, LYOPHILIZED, FOR SOLUTION INTRAVENOUS at 21:53

## 2020-01-05 RX ADMIN — INSULIN LISPRO 1 UNITS: 100 INJECTION, SOLUTION INTRAVENOUS; SUBCUTANEOUS at 18:00

## 2020-01-05 RX ADMIN — IPRATROPIUM BROMIDE AND ALBUTEROL SULFATE 1 AMPULE: .5; 3 SOLUTION RESPIRATORY (INHALATION) at 16:25

## 2020-01-05 RX ADMIN — Medication 10 ML: at 19:57

## 2020-01-05 RX ADMIN — INSULIN LISPRO 1 UNITS: 100 INJECTION, SOLUTION INTRAVENOUS; SUBCUTANEOUS at 12:55

## 2020-01-05 RX ADMIN — CARVEDILOL 6.25 MG: 6.25 TABLET, FILM COATED ORAL at 17:50

## 2020-01-05 ASSESSMENT — PAIN SCALES - GENERAL
PAINLEVEL_OUTOF10: 0
PAINLEVEL_OUTOF10: 1
PAINLEVEL_OUTOF10: 9
PAINLEVEL_OUTOF10: 8
PAINLEVEL_OUTOF10: 5
PAINLEVEL_OUTOF10: 8
PAINLEVEL_OUTOF10: 0

## 2020-01-05 NOTE — PROGRESS NOTES
Hospitalist Progress Note      PCP: No primary care provider on file. Date of Admission: 1/1/2020    Chief Complaint: Fall, left side hemothorax    Hospital Course: See H&P    Subjective: On room air. Family at bedside. Chest tube on the left side noted. Medications:  Reviewed    Infusion Medications    dextrose       Scheduled Medications    potassium chloride  20 mEq Oral BID    vancomycin  1,000 mg Intravenous Q12H    magnesium sulfate  2 g Intravenous Once    folic acid, thiamine, multi-vitamin with vitamin K infusion   Intravenous Daily    nicotine  1 patch Transdermal Daily    carvedilol  6.25 mg Oral BID WC    cefepime  2 g Intravenous Q12H    sodium chloride  250 mL Intravenous Once    sodium chloride flush  10 mL Intravenous 2 times per day    docusate sodium  100 mg Oral BID    famotidine  20 mg Oral BID    allopurinol  300 mg Oral Daily    atorvastatin  40 mg Oral Daily    febuxostat  40 mg Oral Daily    sodium chloride flush  10 mL Intravenous 2 times per day    insulin lispro  0-6 Units Subcutaneous TID WC    insulin lispro  0-3 Units Subcutaneous Nightly    ipratropium-albuterol  1 ampule Inhalation Q4H WA     PRN Meds: HYDROcodone 5 mg - acetaminophen, potassium chloride, magnesium sulfate, sodium chloride flush, ondansetron, sodium chloride flush, magnesium hydroxide, ondansetron, acetaminophen, glucose, dextrose, glucagon (rDNA), dextrose, perflutren lipid microspheres      Intake/Output Summary (Last 24 hours) at 1/5/2020 1332  Last data filed at 1/5/2020 0845  Gross per 24 hour   Intake 1346 ml   Output 1770 ml   Net -424 ml       Physical Exam Performed:    /67   Pulse 92   Temp 97.5 °F (36.4 °C) (Axillary)   Resp 22   Ht 5' 9\" (1.753 m)   Wt 212 lb 1.3 oz (96.2 kg)   SpO2 95%   BMI 31.32 kg/m²     General appearance: No apparent distress, appears stated age and cooperative. HEENT: Pupils equal, round, and reactive to light. Conjunctivae/corneas clear. Neck: Supple, with full range of motion. No jugular venous distention. Trachea midline. Respiratory:  Normal respiratory effort. Clear to auscultation,crackles in left base noted. .CT in place- to water seal.   Cardiovascular: irregular, without murmurs, rubs or gallops. Abdomen: Soft, non-tender, non-distended with normal bowel sounds. Musculoskeletal: No clubbing, cyanosis or edema bilaterally. Full range of motion without deformity. Skin: Skin color, texture, turgor normal.  No rashes or lesions. Neurologic:  Neurovascularly intact without any focal sensory/motor deficits. Cranial nerves: II-XII intact, grossly non-focal.  Psychiatric: Alert and oriented, thought content appropriate, normal insight  Capillary Refill: Brisk,< 3 seconds   Peripheral Pulses: +2 palpable, equal bilaterally       Labs:   Recent Labs     01/03/20  0550 01/04/20  0415 01/05/20  0908   WBC 16.5* 15.1* 12.5*   HGB 9.4* 8.4* 8.9*   HCT 27.1* 24.5* 26.2*   * 114* 144     Recent Labs     01/03/20  0550 01/03/20  0819 01/04/20  0415 01/05/20  0435     --  135* 134*   K 4.6  --  3.8 3.6     --  96* 94*   CO2 27  --  30 33*   BUN 27*  --  26* 17   CREATININE 1.2  --  0.7* <0.5*   CALCIUM 7.4*  --  7.6* 7.9*   PHOS  --  3.2 1.6* 1.6*     No results for input(s): AST, ALT, BILIDIR, BILITOT, ALKPHOS in the last 72 hours. Recent Labs     01/02/20  1433   INR 2.04*     No results for input(s): Claudette Dapper in the last 72 hours. Urinalysis:    No results found for: Liddie Amen, BACTERIA, RBCUA, BLOODU, SPECGRAV, [address]    Radiology:  XR CHEST PORTABLE   Final Result   No significant change in pulmonary vascular congestion centrally and streaky   left basilar opacity. XR CHEST 1 VW   Final Result   No significant change in appearance of the chest with persistent pulmonary   vascular congestion centrally.          XR CHEST PORTABLE   Preliminary Result   Taking into account

## 2020-01-05 NOTE — PROGRESS NOTES
CXR results:    No significant change in pulmonary vascular congestion centrally and streaky   left basilar opacity.          Ronny Phelps RN

## 2020-01-05 NOTE — PROGRESS NOTES
Results for Tamela Weaver (MRN [id number]) as of 1/5/2020 09:00   Ref.  Range 1/5/2020 04:35   Sodium Latest Ref Range: 136 - 145 mmol/L 134 (L)   Potassium Latest Ref Range: 3.5 - 5.1 mmol/L 3.6   Chloride Latest Ref Range: 99 - 110 mmol/L 94 (L)   CO2 Latest Ref Range: 21 - 32 mmol/L 33 (H)   BUN Latest Ref Range: 7 - 20 mg/dL 17   Creatinine Latest Ref Range: 0.8 - 1.3 mg/dL <0.5 (L)   Anion Gap Latest Ref Range: 3 - 16  7   Calcium, Ion Latest Ref Range: 1.12 - 1.32 mmol/L 1.03 (L)   GFR Non- Latest Ref Range: >60  >60   GFR  Latest Ref Range: >60  >60   Magnesium Latest Ref Range: 1.80 - 2.40 mg/dL 1.80   Glucose Latest Ref Range: 70 - 99 mg/dL 129 (H)   Calcium Latest Ref Range: 8.3 - 10.6 mg/dL 7.9 (L)   Phosphorus Latest Ref Range: 2.5 - 4.9 mg/dL 1.6 (L)       Mu Nguyen RN

## 2020-01-05 NOTE — PROGRESS NOTES
K+ and Mg+ replacement infusing per [facility]    Completed:  10 mEq K+; 1 g MG+    Infusing currently: K+ 10 mEq ( #2 for a total of 20 meq) and MG+ 1g (for a total of 2 g)

## 2020-01-05 NOTE — CONSULTS
Neurologic: Normal gross motor and sensory exam.         Labs  Recent Labs     01/04/20  0415 01/05/20  0908   WBC 15.1* 12.5*   HGB 8.4* 8.9*   HCT 24.5* 26.2*   MCV 89.6 90.5   * 144     Recent Labs     01/04/20  0415 01/05/20  0435   CREATININE 0.7* <0.5*   BUN 26* 17   * 134*   K 3.8 3.6   CL 96* 94*   CO2 30 33*     Recent Labs     01/02/20  1320 01/02/20  1433   INR 4.54* 2.04*   PROTIME 53.5* 23.8*     No results for input(s): TROPONINI in the last 72 hours. Invalid input(s): PRO-BNP  No results for input(s): CHOL, HDL in the last 72 hours. Invalid input(s): LDL, TG      Imaging:  I have reviewed the below testing personally and my interpretation is below. EKG:Atrial fibrillation with rapid ventricular responseRight bundle branch blockST and T changes, consider lateral ischemiaConfirmed by Emily Pena MD, Virginia Rivera ([id number]) on 1/4/2020 12:52:30 PM  CXR:      Assessment:  77 y.o. patient with:  Active Problems:    Hemothorax    Acute respiratory failure with hypoxemia (HCC)    Tobacco abuse    AS (aortic stenosis)    CAD (coronary artery disease) s/p stent in 2010    HTN (hypertension)    Hyperlipidemia    S/P AVR (aortic valve replacement)  Resolved Problems:    * No resolved hospital problems. *  Atrial fibrillation     Plan:  1. Rate control to keep his heart rate less than 100.  2. The patient's most recent surgery and acute blood loss anemia will likely continue to provide trigger for elevated heart rates. Until these underlying issues are corrected I suspect that his heart rate will remain elevated. 3.  Does not seem like he is an appropriate candidate for anticoagulation. With his recent hemothorax and procedural recovery, anticoagulation is likely to be unsafe. We did discuss this with the cardiothoracic surgery team.  We will look forward to some point down the remote future we can start anticoagulation.   4.  Echocardiogram will be helpful to document his left ventricular function left atrial size and aortic valve status. 5.  Can feel comfortable that we can hold his antiplatelet therapy at this point also until his bleeding issues are resolved. All questions and concerns were addressed to the patient/family. Alternatives to my treatment were discussed. The note was completed using EMR. Every effort was made to ensure accuracy; however, inadvertent computerized transcription errors may be present.     Rita Vasquez MD, [id number], [place], [place]  [phone] [facility] office  [phone] [facility]  1/5/2020  10:34 AM

## 2020-01-05 NOTE — PROGRESS NOTES
[facility]    [facility] [employer]              [phone]                       Plan :     Renal function normal  Now  Low sodium  No changes today    Assessment :     Hyperkalemia  Likely due to PAL  CK normal    Disproportionately high K due to bleeding hematoma  absorption  K normal now    Acute Kidney Injury  Cr 1.5- now 0.7  Due to hypotension- due to hemothorax  Getting better    Hypotension  BP: (106-135)/(65-86) Pulse:  []   On vasopressors  Now off  BP on low side  But asymptomatic  On fluids    Acidosis  Severe  Due to hypotension  Lactate is very high  improved    Large left hemothorax- sp emergent left thoracotomy, left upper lobe wedge resection    CAD  Sp AVR    [facility] would like to thank Lupe Wilde MD   for opportunity to serve this patient      Please call with questions at-   24 Hrs Answering service [phone] or  7 am- 5 pm via Perfect serve or cell phone  Dr.Sudhir Crystal Duggan          CC/reason for consult :     PAL     HPI :     From consult note-     Mike Mary is a 77 y.o. male presented to   the hospital on 1/1/2020 with syncope  To outside hospital. He is unable to provide  Details. Per family, he also fell at bathroom  And was down for at least an hour. He was  Eventually able to crawl and call his family. Per family he has had flu like symptoms for  A week. 911 was call, when EMS arrived  His BP was 50s, but improved to normal   On route. However now needing two vasopressors. He has has CTA done which showed left side  Pleural effusion, and has chest tube with profuse   Bleeding. He needed intubation and mechanical  Ventilation. He is in ICU. We are consulted for PAL.      Interval History:     Making urine now  On lasix  Low bp  But asymptomatic    ROS:     Seen with-  family  Family    SOB-   none  Edema-   none  Nausea/vomiting/diarrhea-   none  Poor appetite/oral intake-  No  Confusion  No  Difficulty passing urine-  No  Drop in urine

## 2020-01-05 NOTE — PROGRESS NOTES
Dr. Mendiola Bad bedside and update on patient status.     Due to volume of chest tube drainage, CT x2 remain in place and to waterseal.  Will re-evaluate in AM.    Mu Nguyen RN

## 2020-01-05 NOTE — PROGRESS NOTES
ECHO Findings from:  01/02/2020      Left Ventricle   Left ventricular systolic function is normal with ejection fraction   estimated at 55%. No regional wall motion abnormalities. There is   mild-moderate concentric left ventricular hypertrophy. Grade II diastolic   dysfunction with elevated left ventricular filling pressure. Left ventricular cavity size is normal.      Mitral Valve   Mild mitral annular calcification. Mild thickening of the anterior leaflet of mitral valve. Mild mitral regurgitation. Left Atrium   The left atrium is severely dilated. Aortic Valve   A bioprosthetic aortic valve appears well seated with a peak velocity of   4.01 msec., a maximum gradient of 64.32 mmHg and a mean gradient of 41 mmHg   this is consistent with severe aortic stenosis. Individual aortic valve   leaflets are not clearly visualized. No aortic regurgitation. Aorta   The aortic root is normal in size. Right Ventricle   The right ventricle is not well visualized. Tricuspid Valve   Tricuspid valve is structurally normal.   Trivial tricuspid regurgitation. Inadequate tricuspid regurgitation jet to estimate systolic artery pressure   (SPAP). Right Atrium   The right atrium is mildly dilated. Right atrial area is 22.9 cm2. Pulmonic Valve   The pulmonic valve is structurally normal.   No pulmonic regurgitation. Pericardial Effusion   No pericardial effusion noted. Pleural Effusion   No pleural effusion. Miscellaneous   IVC size is normal (<2.1 cm) but collapses < 50% with respiration consistent   with elevated RA pressure (8 mmHg). Pt on mechanical ventilation.      M-Mode/2D Measurements (cm)      LV Diastolic Dimension: 3.91 cm LV Systolic Dimension: 0.95 cm   LV Septum Diastolic: 8.99 cm   LV PW Diastolic: 8.36 cm        AO Root Dimension: 2.5 cm                                   LA Dimension: 4.1 cm                                   LA Area: 29.4 cm2   LVOT: 2 cm LA volume/Index: 107 ml /50 ml/m2     Doppler Measurements      AV Peak Velocity: 401 cm/s     MV Peak E-Wave: 117 cm/s   AV Peak Gradient: 64.32 mmHg   MV Peak A-Wave: 103 cm/s   AV Mean Gradient: 41 mmHg      MV E/A Ratio: 1.14   LVOT Peak Velocity: 99.3 cm/s   AV Area (Continuity):0.9 cm2      E' Septal Velocity: 4.73 cm/s   E' Lateral Velocity: 4.46 cm/s   PV Peak Velocity: 129 cm/s   PV Peak Gradient: 6.66 mmHg      Aortic Valve      Peak Velocity: 401 cm/s    Mean Velocity: 289 cm/s   Peak Gradient: 64.32 mmHg  Mean Gradient: 41 mmHg   Area (continuity): 0.9 cm2   AV VTI: 77.9 cm     Aorta      Aortic Root: 2.5 cm   LVOT Diameter: 2 cm

## 2020-01-05 NOTE — PROGRESS NOTES
Dr. Watson [facility] bedside; update on patient status. Verbal orders for potassium 20 mEq tab and magnesium replacement 2 grams IV    K+ = 3.6  Mg+ = 1.80    Orders repeated, confirmed, signed.     Daniel Ruth RN

## 2020-01-05 NOTE — PROGRESS NOTES
vanc trough collected and sent to the lab per order. Pharmacy notified it has been sent.     Yahir Hall RN

## 2020-01-06 ENCOUNTER — APPOINTMENT (OUTPATIENT)
Dept: GENERAL RADIOLOGY | Age: 70
DRG: 853 | End: 2020-01-06
Attending: FAMILY MEDICINE
Payer: MEDICARE

## 2020-01-06 LAB
ANION GAP SERPL CALCULATED.3IONS-SCNC: 8 MMOL/L (ref 3–16)
BUN BLDV-MCNC: 13 MG/DL (ref 7–20)
CALCIUM IONIZED: 1.05 MMOL/L (ref 1.12–1.32)
CALCIUM SERPL-MCNC: 8.1 MG/DL (ref 8.3–10.6)
CHLORIDE BLD-SCNC: 90 MMOL/L (ref 99–110)
CO2: 32 MMOL/L (ref 21–32)
CREAT SERPL-MCNC: 0.6 MG/DL (ref 0.8–1.3)
GFR AFRICAN AMERICAN: >60
GFR NON-AFRICAN AMERICAN: >60
GLUCOSE BLD-MCNC: 134 MG/DL (ref 70–99)
GLUCOSE BLD-MCNC: 152 MG/DL (ref 70–99)
GLUCOSE BLD-MCNC: 158 MG/DL (ref 70–99)
GLUCOSE BLD-MCNC: 162 MG/DL (ref 70–99)
GLUCOSE BLD-MCNC: 184 MG/DL (ref 70–99)
HCT VFR BLD CALC: 24.1 % (ref 40.5–52.5)
HEMOGLOBIN: 8.2 G/DL (ref 13.5–17.5)
MAGNESIUM: 1.8 MG/DL (ref 1.8–2.4)
MCH RBC QN AUTO: 31.4 PG (ref 26–34)
MCHC RBC AUTO-ENTMCNC: 34.1 G/DL (ref 31–36)
MCV RBC AUTO: 91.9 FL (ref 80–100)
PDW BLD-RTO: 15 % (ref 12.4–15.4)
PERFORMED ON: ABNORMAL
PH VENOUS: 7.44 (ref 7.35–7.45)
PHOSPHORUS: 2.1 MG/DL (ref 2.5–4.9)
PLATELET # BLD: 156 K/UL (ref 135–450)
PMV BLD AUTO: 7.6 FL (ref 5–10.5)
POTASSIUM SERPL-SCNC: 3.8 MMOL/L (ref 3.5–5.1)
RBC # BLD: 2.63 M/UL (ref 4.2–5.9)
SODIUM BLD-SCNC: 130 MMOL/L (ref 136–145)
WBC # BLD: 11.6 K/UL (ref 4–11)

## 2020-01-06 PROCEDURE — 80048 BASIC METABOLIC PNL TOTAL CA: CPT

## 2020-01-06 PROCEDURE — 94761 N-INVAS EAR/PLS OXIMETRY MLT: CPT

## 2020-01-06 PROCEDURE — 2500000003 HC RX 250 WO HCPCS: Performed by: NURSE PRACTITIONER

## 2020-01-06 PROCEDURE — 6370000000 HC RX 637 (ALT 250 FOR IP): Performed by: INTERNAL MEDICINE

## 2020-01-06 PROCEDURE — 6370000000 HC RX 637 (ALT 250 FOR IP): Performed by: THORACIC SURGERY (CARDIOTHORACIC VASCULAR SURGERY)

## 2020-01-06 PROCEDURE — 99233 SBSQ HOSP IP/OBS HIGH 50: CPT | Performed by: INTERNAL MEDICINE

## 2020-01-06 PROCEDURE — 97116 GAIT TRAINING THERAPY: CPT

## 2020-01-06 PROCEDURE — 85027 COMPLETE CBC AUTOMATED: CPT

## 2020-01-06 PROCEDURE — 94669 MECHANICAL CHEST WALL OSCILL: CPT

## 2020-01-06 PROCEDURE — 6360000002 HC RX W HCPCS: Performed by: NURSE PRACTITIONER

## 2020-01-06 PROCEDURE — 2700000000 HC OXYGEN THERAPY PER DAY

## 2020-01-06 PROCEDURE — 82330 ASSAY OF CALCIUM: CPT

## 2020-01-06 PROCEDURE — 84100 ASSAY OF PHOSPHORUS: CPT

## 2020-01-06 PROCEDURE — 6360000002 HC RX W HCPCS: Performed by: INTERNAL MEDICINE

## 2020-01-06 PROCEDURE — 97110 THERAPEUTIC EXERCISES: CPT

## 2020-01-06 PROCEDURE — 71045 X-RAY EXAM CHEST 1 VIEW: CPT

## 2020-01-06 PROCEDURE — 36592 COLLECT BLOOD FROM PICC: CPT

## 2020-01-06 PROCEDURE — 94640 AIRWAY INHALATION TREATMENT: CPT

## 2020-01-06 PROCEDURE — 2000000000 HC ICU R&B

## 2020-01-06 PROCEDURE — 2580000003 HC RX 258: Performed by: INTERNAL MEDICINE

## 2020-01-06 PROCEDURE — 83735 ASSAY OF MAGNESIUM: CPT

## 2020-01-06 PROCEDURE — 97530 THERAPEUTIC ACTIVITIES: CPT

## 2020-01-06 PROCEDURE — 2580000003 HC RX 258: Performed by: NURSE PRACTITIONER

## 2020-01-06 PROCEDURE — 99024 POSTOP FOLLOW-UP VISIT: CPT | Performed by: THORACIC SURGERY (CARDIOTHORACIC VASCULAR SURGERY)

## 2020-01-06 PROCEDURE — 6370000000 HC RX 637 (ALT 250 FOR IP): Performed by: NURSE PRACTITIONER

## 2020-01-06 RX ADMIN — INSULIN LISPRO 1 UNITS: 100 INJECTION, SOLUTION INTRAVENOUS; SUBCUTANEOUS at 12:00

## 2020-01-06 RX ADMIN — IPRATROPIUM BROMIDE AND ALBUTEROL SULFATE 1 AMPULE: .5; 3 SOLUTION RESPIRATORY (INHALATION) at 20:04

## 2020-01-06 RX ADMIN — CEFEPIME HYDROCHLORIDE 2 G: 2 INJECTION, POWDER, FOR SOLUTION INTRAVENOUS at 15:18

## 2020-01-06 RX ADMIN — IPRATROPIUM BROMIDE AND ALBUTEROL SULFATE 1 AMPULE: .5; 3 SOLUTION RESPIRATORY (INHALATION) at 15:38

## 2020-01-06 RX ADMIN — CEFEPIME HYDROCHLORIDE 2 G: 2 INJECTION, POWDER, FOR SOLUTION INTRAVENOUS at 02:59

## 2020-01-06 RX ADMIN — CARVEDILOL 6.25 MG: 6.25 TABLET, FILM COATED ORAL at 08:40

## 2020-01-06 RX ADMIN — MUPIROCIN: 20 OINTMENT TOPICAL at 10:25

## 2020-01-06 RX ADMIN — ALLOPURINOL 300 MG: 300 TABLET ORAL at 08:40

## 2020-01-06 RX ADMIN — DOCUSATE SODIUM 100 MG: 100 CAPSULE, LIQUID FILLED ORAL at 21:04

## 2020-01-06 RX ADMIN — CARVEDILOL 6.25 MG: 6.25 TABLET, FILM COATED ORAL at 17:32

## 2020-01-06 RX ADMIN — HYDROCODONE BITARTRATE AND ACETAMINOPHEN 1 TABLET: 5; 325 TABLET ORAL at 15:19

## 2020-01-06 RX ADMIN — DOCUSATE SODIUM 100 MG: 100 CAPSULE, LIQUID FILLED ORAL at 08:40

## 2020-01-06 RX ADMIN — INSULIN LISPRO 1 UNITS: 100 INJECTION, SOLUTION INTRAVENOUS; SUBCUTANEOUS at 17:32

## 2020-01-06 RX ADMIN — MUPIROCIN: 20 OINTMENT TOPICAL at 21:04

## 2020-01-06 RX ADMIN — FOLIC ACID: 5 INJECTION, SOLUTION INTRAMUSCULAR; INTRAVENOUS; SUBCUTANEOUS at 10:25

## 2020-01-06 RX ADMIN — INSULIN LISPRO 1 UNITS: 100 INJECTION, SOLUTION INTRAVENOUS; SUBCUTANEOUS at 08:41

## 2020-01-06 RX ADMIN — FAMOTIDINE 20 MG: 20 TABLET, FILM COATED ORAL at 08:40

## 2020-01-06 RX ADMIN — FAMOTIDINE 20 MG: 20 TABLET, FILM COATED ORAL at 21:04

## 2020-01-06 RX ADMIN — FEBUXOSTAT 40 MG: 40 TABLET ORAL at 08:41

## 2020-01-06 RX ADMIN — INSULIN LISPRO 1 UNITS: 100 INJECTION, SOLUTION INTRAVENOUS; SUBCUTANEOUS at 21:05

## 2020-01-06 RX ADMIN — VANCOMYCIN HYDROCHLORIDE 1.5 G: 10 INJECTION, POWDER, LYOPHILIZED, FOR SOLUTION INTRAVENOUS at 05:31

## 2020-01-06 RX ADMIN — VANCOMYCIN HYDROCHLORIDE 1.5 G: 10 INJECTION, POWDER, LYOPHILIZED, FOR SOLUTION INTRAVENOUS at 17:38

## 2020-01-06 RX ADMIN — HYDROCODONE BITARTRATE AND ACETAMINOPHEN 1 TABLET: 5; 325 TABLET ORAL at 08:40

## 2020-01-06 RX ADMIN — Medication 10 ML: at 08:40

## 2020-01-06 RX ADMIN — ATORVASTATIN CALCIUM 40 MG: 40 TABLET, FILM COATED ORAL at 08:40

## 2020-01-06 RX ADMIN — HYDROCODONE BITARTRATE AND ACETAMINOPHEN 1 TABLET: 5; 325 TABLET ORAL at 01:10

## 2020-01-06 ASSESSMENT — PAIN SCALES - GENERAL
PAINLEVEL_OUTOF10: 0
PAINLEVEL_OUTOF10: 7
PAINLEVEL_OUTOF10: 8
PAINLEVEL_OUTOF10: 7
PAINLEVEL_OUTOF10: 0
PAINLEVEL_OUTOF10: 0
PAINLEVEL_OUTOF10: 7

## 2020-01-06 NOTE — PROGRESS NOTES
Physical Therapy  Facility/Department: [facility] C2 CARD TELEMETRY  Daily Treatment Note  NAME: Yanira Haji  : 1950  MRN: [id number]    Date of Service: 2020    Discharge Recommendations:  24 hour supervision or assist, Home with Home health PT   PT Equipment Recommendations  Equipment Needed: Yes  Mobility Devices: Shaun Lobe: Rolling    Assessment   Body structures, Functions, Activity limitations: Decreased functional mobility ; Decreased cognition;Decreased high-level IADLs;Decreased posture;Decreased ADL status; Decreased endurance;Decreased fine motor control;Decreased coordination;Decreased strength;Decreased balance;Decreased safe awareness; Increased pain  Assessment: Patient is progressing well towards PT goals. He is SBA for transfers and bed mobility, CGA-SBA for 100' ambulation using rollator. Today pt completed 1 step up onto stepstool and should progress to 2 steps on stairs next session. Pt is very determined to d/c home and girlfriend does acknowledge ability to be present 24/7 initially. Based on progress towards goals, xochitl d/c home with 24/7 assist and [facility] PT. Prognosis: Good  PT Education: Goals;PT Role;Plan of Care;Home Exercise Program;Precautions;Transfer Training;Energy Conservation;General Safety; Disease Specific Education;Gait Training; Injury Prevention  Patient Education: patient verbalized understanding  REQUIRES PT FOLLOW UP: Yes  Activity Tolerance  Activity Tolerance: Patient Tolerated treatment well;Patient limited by endurance  Activity Tolerance: BP supine 121/72  SpO2 95%. /78 HR 93 after SPT to chair. /92 SpO2 86% after ambulation. Patient Diagnosis(es): There were no encounter diagnoses. has a past medical history of CAD (coronary artery disease), COPD (chronic obstructive pulmonary disease) ([facility] [address] Diabetes mellitus ([facility] [address] Gout, Hyperlipidemia, Hypertension, and Thyroid disease.    has a past surgical history that includes thoracotomy (Left, 1/2/2020). Restrictions  Restrictions/Precautions  Restrictions/Precautions: Cardiac, Fall Risk, General Precautions, Up as Tolerated  Position Activity Restriction  Other position/activity restrictions: Up as tolerated. 2 chest tubes. Noriega Catheter. IV. Telemetry. Room air. Subjective   General  Chart Reviewed: Yes  Family / Caregiver Present: Yes(girlfriend)  Subjective  Subjective: Pt resting in bed on approach, agreeable to PT tx  General Comment  Comments: RN cleared pt for session  Pain Screening  Patient Currently in Pain: No              Objective   Bed mobility  Supine to Sit: Stand by assistance(HOB elevated)  Sit to Supine: Unable to assess(Up to chair to end session)     Transfers  Sit to Stand: Stand by assistance(from EOB and from armchair)  Stand to sit: Stand by assistance  Bed to Chair: Stand by assistance  Comment: 3x STS from armchair, all SBA. Pt needed consistent cuing for hand placement in sit<>stand and stated that he believes it is unsafe to reach back for chair when sitting. Ambulation  Ambulation?: Yes  Ambulation 1  Surface: level tile  Device: Rollator  Assistance: Stand by assistance  Quality of Gait: Pt ambulates with decreased ruben, wide VANIA, B decreased step length/height, B decreased toe clearance, wide VANAI, shuffling gait pattern, forward flexed trunk. Pt manages 0MY (I) and safely during turns. Gait Deviations: Slow Ruben; Increased VANIA; Decreased step length;Decreased step height;Shuffles;Decreased head and trunk rotation  Distance: 100'   Comments: Pt reports that \"that thing is unsafe,\" referring to RW, and states that he wants to use 4WW upon d/c. He cannot further elaborate and he denies further ambulation trial with RW. Based on demonstrated ambulation, pt likely to be safe with RW. Stairs/Curb  Stairs?: Yes  Stairs  # Steps : 1  Stairs Height: 6\"  Rails: Bilateral(Pt steps onto portable stepstool using handrail on R side and light therapist HHA on L. )  Assistance: Contact guard assistance  Comment: Pt demonstrates adequate strength to complete without physical assist and no LOB. Pt steps up fwd and steps down backward. Progress to stairs at end of C2 hickey next session. Exercises  Hip Flexion: x15 BLE  Knee Long Arc Quad: x15 BLE  Ankle Pumps: x15 BLE heel/toe raises                        AM-PAC Score  AM-PAC Inpatient Mobility Raw Score : 18 (01/06/20 1526)  AM-PAC Inpatient T-Scale Score : 43.63 (01/06/20 1526)  Mobility Inpatient CMS 0-100% Score: 46.58 (01/06/20 1526)  Mobility Inpatient CMS G-Code Modifier : CK (01/06/20 1526)          Goals  Short term goals  Time Frame for Short term goals: 1 week 1/10/19  Short term goal 1: Supine <> sit with modified independence - 1/06/19 PROGRESSING  Short term goal 2: Sit <> stand with modified independence - 1/06/19 PROGRESSING  Short term goal 3: Bed <> chair with LRAD and modified independence - 1/06/19 PROGRESSING  Short term goal 4: Ambulate 150 feet with LRAD and modified independence - 1/06/19 PROGRESSING  Short term goal 5: Ascend 2 steps with modified independence - 1/06/19 PROGRESSING  Patient Goals   Patient goals : \"To feel better. \" \"To go home. \"    Plan    Plan  Times per week: 5-7 times a week while in ICU  Times per day: Daily  Plan weeks: 1 week 1/10/19  Specific instructions for Next Treatment: progress mobility as tolerated  Current Treatment Recommendations: Strengthening, ROM, Balance Training, Transfer Training, Endurance Training, Gait Training, ADL/Self-care Training, Patient/Caregiver Education & Training, Stair training, Pain Management, Functional Mobility Training, Safety Education & Training, Positioning  Safety Devices  Type of devices: Left in chair, Call light within reach, Chair alarm in place, Gait belt, Patient at risk for falls, Nurse notified, All fall risk precautions in place  Restraints  Initially in place: No     Therapy Time   Individual Concurrent Group Co-treatment Time In 0932         Time Out 1022         Minutes 50         Timed Code Treatment Minutes: [address] [facility], [address] [facility] , DPT

## 2020-01-06 NOTE — PROGRESS NOTES
Nutrition Assessment (Low Risk)    Type and Reason for Visit: Reassess    Nutrition Recommendations:   Continue current diet and ONS per MD  Monitor nutrition adequacy, pertinent labs, bowel habits, wt changes, and clinical progress    Nutrition Assessment:  Pt with significant improvement since last RD visit. He is extubated, s/p OR and hemodynamically stable. Ordered on carb controlled diet. Beer added to meals per MD. Pt reporting fair appetite. Consuming mostly % of meals. No concerns reported. Malnutrition Assessment:  · Malnutrition Status: No malnutrition    Nutrition Risk Level   Risk Level: Low    Nutrition Diagnosis:   · Problem: Inadequate energy intake  · Etiology: Insufficient energy/nutrient consumption    Signs and symptoms: NPO status due to medical condition    Nutrition Intervention:  Food and/or Delivery: Continue current diet  Nutrition Education/Counseling/Coordination of Care:  Continued Inpatient Monitoring      Electronically signed by Catarina Curran.  Chiara Finn RD, LD on 1/6/20 at 12:02 PM    Contact Number: [phone]

## 2020-01-06 NOTE — PROGRESS NOTES
CVTS Thoracic Progress Note:          CC:  Post op follow up 1/02/2020 Emergent left thoracotomy. Evacuation of hematoma. Chest wall hemostasis. Left pleural biopsy. Left upper lobe wedge resection. Subj: awake, sitting up on side of bed, girlfriend visiting at bedside. Obj:    Blood pressure 121/82, pulse 109, temperature 97.7 °F (36.5 °C), temperature source Oral, resp. rate 18, height 5' 9\" (1.753 m), weight 209 lb 7 oz (95 kg), SpO2 (!) 80 %. Lungs diminished in bases   Abdomen soft, nontender   Incisions with occlusive dressings, CDI   Chest tube # 1 with 0/70/10= 80 ml drainage in last 24 hours/no airleak   Chest tube # 2 with 30/80/90= 200 ml drainage in last 24 hours/no airleak    Diagnostics:   Recent Labs     01/04/20  0415 01/05/20  0908 01/06/20  0410   WBC 15.1* 12.5* 11.6*   HGB 8.4* 8.9* 8.2*   HCT 24.5* 26.2* 24.1*   * 144 156                                                                  Recent Labs     01/04/20  0415 01/05/20  0435 01/06/20  0410   * 134* 130*   K 3.8 3.6 3.8   CL 96* 94* 90*   CO2 30 33* 32   BUN 26* 17 13   CREATININE 0.7* <0.5* 0.6*   GLUCOSE 130* 129* 134*            Recent Labs     01/04/20  0415 01/05/20  0435 01/06/20  0410   MG 1.70* 1.80 1.80      CXR: 1/02/2020 s/p surgical intervention- Resolution of large hemothorax. No evidence of ptx. Assess/Plan:   Care per primary team     Hemothorax: s/p evacuation  Chest tube to waterseal over the weekend. Too much drainage to remove. Anticipate chest tube #1 (possibly both) will meet criteria today for removal.     ________________________________________________________________    SCIP Measures:     · Noriega catheter for:  ? IV Diuresis  ? Accurate I/O  ? D/C today  ·   · Antibiotics:  ? Have been stopped  ? Prophylaxis (POD #1 only)  ?  Continued for:     ________________________________________________________________    George De La Fuente CNP  1/6/2020  11:36 AM

## 2020-01-06 NOTE — PROGRESS NOTES
pneumonia and left pleural effusion, was given vancomycin and cefepime and admitted to the hospital.  Patient then had a CTA of the chest on 1/1/2020 which showed a large left-sided pleural effusion with mixed density probable hemothorax. Patient was transferred to [facility] for further management of this. Patient became severely dyspneic lightheaded, sweaty and diaphoretic and became more hypotensive with obtundation. Systolics were in the 46P with a thready pulse. Patient became visibly cyanotic. Rapid response was called. When I reexamine the patient he was in impending respiratory arrest.  Patient was emergently intubated and transferred to ICU. \"      Pneumonia  - vanc and cefepime    COPD  - inhaled bronchodilators. Encouraged smoking cessation. Large L hemithorax, with hemorrhagic shock  - required emergent thoracotomy and evaculation of hematoma, and XENA wedge resection. Postop chest tubes removed 1/6.    - benign pathology  - transfused 4u pRBCs and FFP. Weaned off vasopressors    Acute hypoxic respiratory failure  - due to above issues, treated accordingly. Successfully extubated 1/3.     - wean to RA as tolerated. The patient has no supplemental O2 requirement at baseline. CAD with h/o stenting  - cardiology recommended holding antiplatelet therapy. F/u with cardiology as outpatient. - statin, carvedilol    New onset Afib, complicated by RVR  - no anticoagulation per cardiology. Could reconsider in the future as outpatient. - carvedilol    Alcoholism  - he admits to 8 beers per day. Monitored informally for withdrawal, there was none. Severe stenosis of his bioprosthetic aortic valve  - patient will need to f/u with his surgeon as outpatient. He has already undergone two surgeries for this.        DVT Prophylaxis: SCDs  Diet: Dietary Nutrition Supplements: Other Oral Supplement (see comment)  DIET CARB CONTROL; Carb Control: 3 carb choices (45 gms)/meal  Code Status: Full

## 2020-01-06 NOTE — CARE COORDINATION
Writer met with patient and girlfriend at bedside to discuss therapy recommendations of SNF. Patient states he was planning to go home with [facility]. Writer explained therapy recommendations to him and he would like to work with therapy again today and readdress. Goal for him is to return home. Spoke to [employer] who stated that therapy is set to come see him soon. Will readdress later today.   Fady Schwab RN

## 2020-01-06 NOTE — PROGRESS NOTES
[address]  [facility]   Cardiovascular Evaluation    PATIENT: Amita Riojas  DATE: 2020  MRN: [id number]  CSN: [id number]  : 1950    Primary Care Doctor/Referring provider: Dr. Chente Morfin    Reason for evaluation/Chief complaint:   Atrial fibrillation    Subjective: Feeling great. Remains in AF    History of present illness on initial date of evaluation:   Amita Riojas is a 77 y.o. patient who presents to the hospital as a referral from [facility] for shortness of breath, weakness and a near syncopal episode. He was diagnosed with having a large pleural effusion which subsequently underwent drainage. The patient developed post operative bleeding and hemothorax requiring chest tube placement. Postoperatively the patient has had atrial fibrillation. We have been asked to see the patient for further recommendations. Patient has a long extensive history of heart disease. He had aortic valve replacement x2 with tissue valves. He had a remote history of coronary artery stenting and has been on antiplatelet therapy with Effient. Patient denies any previous history of having atrial fibrillation or being on warfarin therapy. He is currently in the medical intensive care unit and without any acute complaints. Patient Active Problem List   Diagnosis    Hemothorax    Acute respiratory failure with hypoxemia (HCC)    Tobacco abuse    AS (aortic stenosis)    CAD (coronary artery disease) s/p stent in     HTN (hypertension)    Hyperlipidemia    S/P AVR (aortic valve replacement)         Cardiac Testing: I have reviewed the findings below. EKG:  ECHO:   STRESS TEST:  CATH:  BYPASS:  VASCULAR:    Past Medical History:   has a past medical history of CAD (coronary artery disease), COPD (chronic obstructive pulmonary disease) ([address] Diabetes mellitus ([address] Gout, Hyperlipidemia, Hypertension, and Thyroid disease.     Surgical History:   has a past surgical history that includes thoracotomy (Left, 1/2/2020). Social History:   reports that he has been smoking cigarettes. He has been smoking about 2.00 packs per day. He uses smokeless tobacco. He reports current alcohol use of about 6.0 standard drinks of alcohol per week. He reports previous drug use. Family History:  No evidence for sudden cardiac death or premature CAD    Medications:  Reviewed and are listed in nursing record. and/or listed below  Outpatient Medications:  Prior to Admission medications    Medication Sig Start Date End Date Taking?  Authorizing Provider   atorvastatin (LIPITOR) 40 MG tablet Take 40 mg by mouth daily 5/16/12  Yes Historical Provider, MD   carvedilol (COREG) 25 MG tablet Take 25 mg by mouth 2 times daily 5/16/12  Yes Historical Provider, MD   lisinopril (PRINIVIL;ZESTRIL) 10 MG tablet Take 10 mg by mouth daily 5/16/12  Yes Historical Provider, MD   prasugrel (EFFIENT) 10 MG TABS Take 10 mg by mouth daily 12/8/10  Yes Historical Provider, MD   allopurinol (ZYLOPRIM) 300 MG tablet Take 300 mg by mouth daily   Yes Historical Provider, MD   amLODIPine (NORVASC) 5 MG tablet Take 5 mg by mouth daily   Yes Historical Provider, MD   choline fenofibrate (TRILIPIX) 135 MG CPDR delayed release capsule Take 135 mg by mouth daily    Historical Provider, MD   febuxostat (ULORIC) 40 MG TABS tablet Take 40 mg by mouth daily    Historical Provider, MD   glipiZIDE (GLUCOTROL) 5 MG tablet Take 1 tablet by mouth daily    Historical Provider, MD   meclizine (ANTIVERT) 25 MG tablet Take 1 tablet by mouth daily    Historical Provider, MD       In-patient schedule medications:   mupirocin   Nasal BID    vancomycin  1,500 mg Intravenous Q12H    magnesium sulfate  2 g Intravenous Once    folic acid, thiamine, multi-vitamin with vitamin K infusion   Intravenous Daily    nicotine  1 patch Transdermal Daily    carvedilol  6.25 mg Oral BID WC    cefepime  2 g Intravenous Q12H    sodium chloride  250 mL Intravenous Once    docusate sodium  100 mg Oral BID    famotidine  20 mg Oral BID    allopurinol  300 mg Oral Daily    atorvastatin  40 mg Oral Daily    febuxostat  40 mg Oral Daily    sodium chloride flush  10 mL Intravenous 2 times per day    insulin lispro  0-6 Units Subcutaneous TID WC    insulin lispro  0-3 Units Subcutaneous Nightly    ipratropium-albuterol  1 ampule Inhalation Q4H WA         Infusion Medications:   dextrose           Allergies:  Penicillins     Review of Systems:       Physical Examination:    [unfilled]  /76   Pulse 112   Temp 98 °F (36.7 °C) (Oral)   Resp 18   Ht 5' 9\" (1.753 m)   Wt 209 lb 7 oz (95 kg)   SpO2 100%   BMI 30.93 kg/m²    Weight: 209 lb 7 oz (95 kg)     Wt Readings from Last 3 Encounters:   01/06/20 209 lb 7 oz (95 kg)       Intake/Output Summary (Last 24 hours) at 1/6/2020 1525  Last data filed at 1/6/2020 0908  Gross per 24 hour   Intake 530 ml   Output 1135 ml   Net -605 ml       General Appearance:  Alert, cooperative, no distress, appears stated age   Head:  Normocephalic, without obvious abnormality, atraumatic   Eyes:  PERRL, conjunctiva/corneas clear       Nose: Nares normal, no drainage or sinus tenderness   Throat: Lips, mucosa, and tongue normal   Neck: Supple, symmetrical, trachea midline, no adenopathy, thyroid: not enlarged, symmetric, no tenderness/mass/nodules, no carotid bruit or JVD       Lungs:   Clear to auscultation bilaterally, respirations unlabored   Chest Wall:  No tenderness or deformity, chest tubes in place.    Heart:  irregular rhythm and fast rate; S1, S2 are normal; no murmur noted; no rub or gallop   Abdomen:   Soft, non-tender, bowel sounds active all four quadrants,  no masses, no organomegaly           Extremities: Extremities normal, atraumatic, no cyanosis or edema   Pulses: 2+ and symmetric   Skin: Skin color, texture, turgor normal, no rashes or lesions   Pysch: Normal mood and affect   Neurologic: Can feel comfortable that we can hold his antiplatelet therapy at this point also until his bleeding issues are resolved. All questions and concerns were addressed to the patient/family. Alternatives to my treatment were discussed. The note was completed using EMR. Every effort was made to ensure accuracy; however, inadvertent computerized transcription errors may be present.     Tarik Bain MD, [facility]  [phone] [facility] office  [phone] Main central  1/6/2020  3:25 PM

## 2020-01-07 ENCOUNTER — TELEPHONE (OUTPATIENT)
Dept: CARDIOTHORACIC SURGERY | Age: 70
End: 2020-01-07

## 2020-01-07 VITALS
HEIGHT: 69 IN | OXYGEN SATURATION: 78 % | SYSTOLIC BLOOD PRESSURE: 116 MMHG | BODY MASS INDEX: 31.02 KG/M2 | HEART RATE: 98 BPM | WEIGHT: 209.44 LBS | TEMPERATURE: 97.5 F | RESPIRATION RATE: 18 BRPM | DIASTOLIC BLOOD PRESSURE: 56 MMHG

## 2020-01-07 LAB
ANION GAP SERPL CALCULATED.3IONS-SCNC: 10 MMOL/L (ref 3–16)
BUN BLDV-MCNC: 13 MG/DL (ref 7–20)
CALCIUM IONIZED: 1.03 MMOL/L (ref 1.12–1.32)
CALCIUM SERPL-MCNC: 8.3 MG/DL (ref 8.3–10.6)
CHLORIDE BLD-SCNC: 93 MMOL/L (ref 99–110)
CO2: 30 MMOL/L (ref 21–32)
CREAT SERPL-MCNC: 0.6 MG/DL (ref 0.8–1.3)
GFR AFRICAN AMERICAN: >60
GFR NON-AFRICAN AMERICAN: >60
GLUCOSE BLD-MCNC: 121 MG/DL (ref 70–99)
GLUCOSE BLD-MCNC: 142 MG/DL (ref 70–99)
GLUCOSE BLD-MCNC: 150 MG/DL (ref 70–99)
HCT VFR BLD CALC: 25.2 % (ref 40.5–52.5)
HEMOGLOBIN: 8.6 G/DL (ref 13.5–17.5)
MAGNESIUM: 1.7 MG/DL (ref 1.8–2.4)
MCH RBC QN AUTO: 31.6 PG (ref 26–34)
MCHC RBC AUTO-ENTMCNC: 34 G/DL (ref 31–36)
MCV RBC AUTO: 92.7 FL (ref 80–100)
PDW BLD-RTO: 15.3 % (ref 12.4–15.4)
PERFORMED ON: ABNORMAL
PERFORMED ON: ABNORMAL
PH VENOUS: 7.45 (ref 7.35–7.45)
PHOSPHORUS: 2.3 MG/DL (ref 2.5–4.9)
PLATELET # BLD: 183 K/UL (ref 135–450)
PMV BLD AUTO: 8 FL (ref 5–10.5)
POTASSIUM SERPL-SCNC: 3.7 MMOL/L (ref 3.5–5.1)
RBC # BLD: 2.71 M/UL (ref 4.2–5.9)
SODIUM BLD-SCNC: 133 MMOL/L (ref 136–145)
VANCOMYCIN TROUGH: 13 UG/ML (ref 10–20)
WBC # BLD: 12.7 K/UL (ref 4–11)

## 2020-01-07 PROCEDURE — 2500000003 HC RX 250 WO HCPCS: Performed by: NURSE PRACTITIONER

## 2020-01-07 PROCEDURE — 6360000002 HC RX W HCPCS: Performed by: INTERNAL MEDICINE

## 2020-01-07 PROCEDURE — 6370000000 HC RX 637 (ALT 250 FOR IP): Performed by: INTERNAL MEDICINE

## 2020-01-07 PROCEDURE — 94669 MECHANICAL CHEST WALL OSCILL: CPT

## 2020-01-07 PROCEDURE — 2580000003 HC RX 258: Performed by: INTERNAL MEDICINE

## 2020-01-07 PROCEDURE — 83735 ASSAY OF MAGNESIUM: CPT

## 2020-01-07 PROCEDURE — 84100 ASSAY OF PHOSPHORUS: CPT

## 2020-01-07 PROCEDURE — 85027 COMPLETE CBC AUTOMATED: CPT

## 2020-01-07 PROCEDURE — 6370000000 HC RX 637 (ALT 250 FOR IP): Performed by: NURSE PRACTITIONER

## 2020-01-07 PROCEDURE — 6360000002 HC RX W HCPCS: Performed by: NURSE PRACTITIONER

## 2020-01-07 PROCEDURE — 94640 AIRWAY INHALATION TREATMENT: CPT

## 2020-01-07 PROCEDURE — 80048 BASIC METABOLIC PNL TOTAL CA: CPT

## 2020-01-07 PROCEDURE — 2580000003 HC RX 258: Performed by: NURSE PRACTITIONER

## 2020-01-07 PROCEDURE — 82330 ASSAY OF CALCIUM: CPT

## 2020-01-07 PROCEDURE — 6370000000 HC RX 637 (ALT 250 FOR IP): Performed by: THORACIC SURGERY (CARDIOTHORACIC VASCULAR SURGERY)

## 2020-01-07 PROCEDURE — 80202 ASSAY OF VANCOMYCIN: CPT

## 2020-01-07 RX ORDER — CARVEDILOL 6.25 MG/1
TABLET ORAL
Status: DISCONTINUED
Start: 2020-01-07 | End: 2020-01-07 | Stop reason: HOSPADM

## 2020-01-07 RX ORDER — CARVEDILOL 6.25 MG/1
12.5 TABLET ORAL 2 TIMES DAILY WITH MEALS
Status: DISCONTINUED | OUTPATIENT
Start: 2020-01-07 | End: 2020-01-07 | Stop reason: HOSPADM

## 2020-01-07 RX ORDER — NICOTINE 21 MG/24HR
1 PATCH, TRANSDERMAL 24 HOURS TRANSDERMAL DAILY
Qty: 30 PATCH | Refills: 0 | Status: ON HOLD | OUTPATIENT
Start: 2020-01-08 | End: 2020-02-05 | Stop reason: HOSPADM

## 2020-01-07 RX ORDER — CARVEDILOL 6.25 MG/1
6.25 TABLET ORAL ONCE
Status: COMPLETED | OUTPATIENT
Start: 2020-01-07 | End: 2020-01-07

## 2020-01-07 RX ORDER — HYDROCODONE BITARTRATE AND ACETAMINOPHEN 5; 325 MG/1; MG/1
1 TABLET ORAL EVERY 6 HOURS PRN
Qty: 10 TABLET | Refills: 0 | Status: ON HOLD | OUTPATIENT
Start: 2020-01-07 | End: 2020-02-05 | Stop reason: HOSPADM

## 2020-01-07 RX ADMIN — DOCUSATE SODIUM 100 MG: 100 CAPSULE, LIQUID FILLED ORAL at 07:55

## 2020-01-07 RX ADMIN — FOLIC ACID: 5 INJECTION, SOLUTION INTRAMUSCULAR; INTRAVENOUS; SUBCUTANEOUS at 09:35

## 2020-01-07 RX ADMIN — Medication 10 ML: at 08:07

## 2020-01-07 RX ADMIN — CARVEDILOL 6.25 MG: 6.25 TABLET, FILM COATED ORAL at 09:53

## 2020-01-07 RX ADMIN — ATORVASTATIN CALCIUM 40 MG: 40 TABLET, FILM COATED ORAL at 07:55

## 2020-01-07 RX ADMIN — CEFEPIME HYDROCHLORIDE 2 G: 2 INJECTION, POWDER, FOR SOLUTION INTRAVENOUS at 02:42

## 2020-01-07 RX ADMIN — FAMOTIDINE 20 MG: 20 TABLET, FILM COATED ORAL at 07:55

## 2020-01-07 RX ADMIN — HYDROCODONE BITARTRATE AND ACETAMINOPHEN 1 TABLET: 5; 325 TABLET ORAL at 04:43

## 2020-01-07 RX ADMIN — CARVEDILOL 6.25 MG: 6.25 TABLET, FILM COATED ORAL at 04:00

## 2020-01-07 RX ADMIN — INSULIN LISPRO 1 UNITS: 100 INJECTION, SOLUTION INTRAVENOUS; SUBCUTANEOUS at 08:00

## 2020-01-07 RX ADMIN — INSULIN LISPRO 1 UNITS: 100 INJECTION, SOLUTION INTRAVENOUS; SUBCUTANEOUS at 12:03

## 2020-01-07 RX ADMIN — VANCOMYCIN HYDROCHLORIDE 1.5 G: 10 INJECTION, POWDER, LYOPHILIZED, FOR SOLUTION INTRAVENOUS at 07:30

## 2020-01-07 RX ADMIN — ALLOPURINOL 300 MG: 300 TABLET ORAL at 07:55

## 2020-01-07 RX ADMIN — MUPIROCIN: 20 OINTMENT TOPICAL at 07:54

## 2020-01-07 RX ADMIN — FEBUXOSTAT 40 MG: 40 TABLET ORAL at 08:06

## 2020-01-07 RX ADMIN — IPRATROPIUM BROMIDE AND ALBUTEROL SULFATE 1 AMPULE: .5; 3 SOLUTION RESPIRATORY (INHALATION) at 09:06

## 2020-01-07 ASSESSMENT — PAIN DESCRIPTION - LOCATION: LOCATION: BACK;RIB CAGE

## 2020-01-07 ASSESSMENT — PAIN SCALES - GENERAL
PAINLEVEL_OUTOF10: 2
PAINLEVEL_OUTOF10: 7
PAINLEVEL_OUTOF10: 7
PAINLEVEL_OUTOF10: 0

## 2020-01-07 ASSESSMENT — PAIN DESCRIPTION - PAIN TYPE: TYPE: SURGICAL PAIN

## 2020-01-07 ASSESSMENT — PAIN DESCRIPTION - ORIENTATION: ORIENTATION: LEFT

## 2020-01-07 NOTE — DISCHARGE SUMMARY
obtundation.  Systolics were in the 49W with a thready pulse.  Patient became visibly cyanotic.  Rapid response was called.  When I reexamine the patient he was in impending respiratory arrest.  Patient was emergently intubated and transferred to ICU. \"       Pneumonia  - completed course of vanc and cefepime  - with sepsis present on arrival     COPD  - inhaled bronchodilators. Encouraged smoking cessation.      Large L hemithorax, with hemorrhagic shock  - required emergent thoracotomy and evaculation of hematoma, and XENA wedge resection. Postop chest tubes removed 1/6.    - benign pathology  - transfused 4u pRBCs and FFP. Weaned off vasopressors     Acute hypoxic respiratory failure  - due to above issues, treated accordingly. Successfully extubated 1/3.     - weaned to RA. The patient has no supplemental O2 requirement at baseline.       CAD with h/o stenting  - cardiology recommended holding antiplatelet therapy. F/u with cardiology as outpatient to consider resuming in the future. - statin, carvedilol     New onset Afib, complicated by RVR  - OK to hold anticoagulation until safe to resume per cardiology, but this should be resumed when bleeding risk is acceptable. Will need to reconsider in the future as outpatient, perhaps in a month or so. F/u with CT surgery as outpatient for re-evaluation of his bleeding risk. - carvedilol     Alcoholism  - he admits to 8 beers per day. Monitored informally for withdrawal, there was none. Encouraged cessation.     Severe stenosis of his bioprosthetic aortic valve  - patient will need to f/u with his surgeon as outpatient. He has already undergone two surgeries for this. Physical Exam Performed:     /63   Pulse 93   Temp 97.7 °F (36.5 °C) (Oral)   Resp 20   Ht 5' 9\" (1.753 m)   Wt 209 lb 7 oz (95 kg)   SpO2 97%   BMI 30.93 kg/m²       General appearance: No apparent distress, appears stated age and cooperative.   HEENT: Pupils equal, round, and reactive to light. Conjunctivae/corneas clear. Neck: Supple, with full range of motion. No jugular venous distention. Trachea midline. Respiratory:  Normal respiratory effort. Clear to auscultation, bilaterally without Rales/Wheezes/Rhonchi. Cardiovascular:  irregular rhythm, normal rate, with normal S1/S2 without murmurs, rubs or gallops. Abdomen: Soft, non-tender, non-distended with normal bowel sounds. Musculoskeletal: No clubbing, cyanosis or edema bilaterally. Full range of motion without deformity. Skin: Skin color, texture, turgor normal.  No rashes or lesions. Neurologic:  Neurovascularly intact without any focal sensory/motor deficits. Cranial nerves: II-XII intact, grossly non-focal.  Psychiatric: Alert and oriented, thought content appropriate, normal insight  Capillary Refill: Brisk,< 3 seconds   Peripheral Pulses: +2 palpable, equal bilaterally       Labs: For convenience and continuity at follow-up the following most recent labs are provided:      CBC:    Lab Results   Component Value Date    WBC 12.7 01/07/2020    HGB 8.6 01/07/2020    HCT 25.2 01/07/2020     01/07/2020       Renal:    Lab Results   Component Value Date     01/07/2020    K 3.7 01/07/2020    K 7.3 01/02/2020    CL 93 01/07/2020    CO2 30 01/07/2020    BUN 13 01/07/2020    CREATININE 0.6 01/07/2020    CALCIUM 8.3 01/07/2020    PHOS 2.3 01/07/2020         Significant Diagnostic Studies    Radiology:   XR CHEST PORTABLE   Final Result   Left-sided chest tubes have been removed. No pneumothorax. No significant   change otherwise. XR CHEST PORTABLE   Final Result   No significant change in pulmonary vascular congestion centrally and streaky   left basilar opacity. XR CHEST 1 VW   Final Result   No significant change in appearance of the chest with persistent pulmonary   vascular congestion centrally.          XR CHEST PORTABLE   Final Result   Taking into account differences in technique, is still severely narrow and not opening well. Code Status:  Full Code     Activity: activity as tolerated    Diet: cardiac diet      Discharge Medications:     Current Discharge Medication List           Details   HYDROcodone-acetaminophen (NORCO) 5-325 MG per tablet Take 1 tablet by mouth every 6 hours as needed for Pain for up to 3 days. Qty: 10 tablet, Refills: 0    Comments: Reduce doses taken as pain becomes manageable  Associated Diagnoses: Hemothorax      nicotine (NICODERM CQ) 14 MG/24HR Place 1 patch onto the skin daily  Qty: 30 patch, Refills: 0              Details   atorvastatin (LIPITOR) 40 MG tablet Take 40 mg by mouth daily      carvedilol (COREG) 25 MG tablet Take 25 mg by mouth 2 times daily      allopurinol (ZYLOPRIM) 300 MG tablet Take 300 mg by mouth daily      choline fenofibrate (TRILIPIX) 135 MG CPDR delayed release capsule Take 135 mg by mouth daily      febuxostat (ULORIC) 40 MG TABS tablet Take 40 mg by mouth daily      glipiZIDE (GLUCOTROL) 5 MG tablet Take 1 tablet by mouth daily      meclizine (ANTIVERT) 25 MG tablet Take 1 tablet by mouth daily               Time Spent on discharge is more than 30 minutes in the examination, evaluation, counseling and review of medications and discharge plan. Signed:    Sherine Cortez MD   1/7/2020      Thank you No primary care provider on file. for the opportunity to be involved in this patient's care. If you have any questions or concerns please feel free to contact me at [phone]. Danial Sandoval was evaluated today and a DME order was entered for a standard walker because he requires this to successfully complete daily living tasks of ambulating. A standard walker is necessary due to the patient's impaired ambulation and mobility restrictions and he can ambulate only by using a walker instead of a lesser assistive device, such as a cane or crutch.   The need for this equipment was discussed with the patient and he

## 2020-01-07 NOTE — PROGRESS NOTES
Reviewed with patient his medication instructions, dressing supplies & how to change it; phone number to schedule follow up appt with Dr. Kevin Urena. Pt apparently is planning to stop @ a pharmacy on the way home to look in to buying a walker since he wasn't happy to hear that he had a $500 deductible in order to get one before leaving. Pt discharged home with a friend via w/c to awaiting car. Pt had his cell phone &  with him as well as his norco prescription, nicotine patches, dressing supplies & respiratory exercise equipment.

## 2020-01-07 NOTE — PROGRESS NOTES
CVTS Thoracic Progress Note:          CC:  Post op follow up 1/02/2020 Emergent left thoracotomy. Evacuation of hematoma. Chest wall hemostasis. Left pleural biopsy. Left upper lobe wedge resection. Subj: awake, sitting up on side of bed, in NAD, saturating mid 90's on RA. Obj:    Blood pressure 124/63, pulse 93, temperature 97.7 °F (36.5 °C), temperature source Oral, resp. rate 20, height 5' 9\" (1.753 m), weight 209 lb 7 oz (95 kg), SpO2 97 %. Lungs diminished in bases   Abdomen soft, nontender   Incisions with occlusive dressings, CDI    Diagnostics:   Recent Labs     01/05/20  0908 01/06/20  0410 01/07/20  0515   WBC 12.5* 11.6* 12.7*   HGB 8.9* 8.2* 8.6*   HCT 26.2* 24.1* 25.2*    156 183                                                                  Recent Labs     01/05/20  0435 01/06/20  0410 01/07/20  0515   * 130* 133*   K 3.6 3.8 3.7   CL 94* 90* 93*   CO2 33* 32 30   BUN 17 13 13   CREATININE <0.5* 0.6* 0.6*   GLUCOSE 129* 134* 121*            Recent Labs     01/05/20  0435 01/06/20  0410 01/07/20  0515   MG 1.80 1.80 1.70*      CXR: 1/02/2020 s/p surgical intervention- Resolution of large hemothorax. No evidence of ptx. CXR: 1/07/2020 s/p chest tube removal- No ptx    Assess/Plan:   Care per primary team     Hemothorax: s/p evacuation  Chest tubes successfully removed 1/06, no ptx. Will sign off at this time. Call with questions. Pt will need to follow up with Dr. Atkinson in one week.      ________________________________________________________________    Bree Ryan Measures:     · Noriega catheter for:  ? IV Diuresis  ? Accurate I/O  ? D/C today  ·   · Antibiotics:  ? Have been stopped  ? Prophylaxis (POD #1 only)  ?  Continued for:     ________________________________________________________________    Shyann Srivastava CNP  1/7/2020  10:08 AM

## 2020-01-07 NOTE — DISCHARGE INSTR - COC
Continuity of Care Form    Patient Name: Aliza Biswas   :  1950  MRN:  [id number]    Admit date:  2020  Discharge date:  ***    Code Status Order: Full Code   Advance Directives:   Advance Care Flowsheet Documentation     Date/Time Healthcare Directive Type of Healthcare Directive Copy in [address] Agent's Name Healthcare Agent's Phone Number    [phone]  No, patient does not have an advance directive for healthcare treatment -- -- -- -- --          Admitting Physician:  Rolf Méndez MD  PCP: No primary care provider on file. Discharging Nurse: [facility] Unit/Room#: 3526/0987-28  Discharging Unit Phone Number: ***    Emergency Contact:   Extended Emergency Contact Information  Primary Emergency Contact: Ileana Lyle[address] Phone: [phone]  Relation: Child  Preferred language: English   needed?  No    Past Surgical History:  Past Surgical History:   Procedure Laterality Date    THORACOTOMY Left 2020    EMERGENT THORACOTOMY, EVACUATION OF HEMOTOMA CHEST WALL HEMOSTASIS, PLEUREAL BIOPSY, LEFT UPPER LOBE RESECTION performed by Vincent Henley MD at [address]       Immunization History:   Immunization History   Administered Date(s) Administered    Influenza Virus Vaccine 2019    Pneumococcal Conjugate 7-valent (Roseanna Brooking) 2019       Active Problems:  Patient Active Problem List   Diagnosis Code    Hemothorax J94.2    Acute respiratory failure with hypoxemia (HCC) J96.01    Tobacco abuse Z72.0    AS (aortic stenosis) I35.0    CAD (coronary artery disease) s/p stent in  I25.10    HTN (hypertension) I10    Hyperlipidemia E78.5    S/P AVR (aortic valve replacement) Z95.2       Isolation/Infection:   Isolation          No Isolation        Patient Infection Status     None to display          Nurse Assessment:  Last Vital Signs: /63   Pulse 93   Temp 97.7 °F (36.5 °C) (Oral)   Resp 20   Ht 5' 9\" only, NOT a DME order):  {EQUIPMENT:[id number]}  Other Treatments: ***    Patient's personal belongings (please select all that are sent with patient):  {CHP DME Belongings:[id number]}    RN SIGNATURE:  {Esignature:[id number]}    CASE MANAGEMENT/SOCIAL WORK SECTION    Inpatient Status Date: 1/1/20    Readmission Risk Assessment Score:  Readmission Risk              Risk of Unplanned Readmission:        13           Discharging to Facility/ Agency   · Name: [address] [place] [address] Drive   · Address:  · Phone:  · Fax:    Dialysis Facility (if applicable)   · Name:  · Address:  · Dialysis Schedule:  · Phone:  · Fax:    / signature: Electronically signed by Bryan Benjamin RN on 1/7/20 at 10:38 AM    PHYSICIAN SECTION    Prognosis: Good    Condition at Discharge: Stable    Rehab Potential (if transferring to Rehab): Good    Recommended Labs or Other Treatments After Discharge: Follow up with PCP within 1-2 weeks. Follow up with cardiology within one month to consider restarting aspirin or a blood thinner. Follow up with your heart surgeon regarding your heart valve - it is still severely narrow and not opening well. Physician Certification: I certify the above information and transfer of Pj Maldonado  is necessary for the continuing treatment of the diagnosis listed and that he requires [facility] for less 30 days.      Update Admission H&P: No change in H&P    PHYSICIAN SIGNATURE:  Electronically signed by Tequila Perez MD on 1/7/20 at 9:54 AM

## 2020-01-07 NOTE — CARE COORDINATION
Writer met with patient at bedside to discuss discharge plans. Patient still feels he would like to discharge home with [facility]. CASE MANAGEMENT DISCHARGE SUMMARY      Discharge to: home with [address] [place] ordered/agency: Patient asking for a walker as he does not have one. Therapy documented use. Writer sent message to Dr Sera Villanueva asking if he felt it necessary. Awaiting response. Transportation: private   Family/car:Girlfriend to transport home. Notified: Patient aware of discharge plans and in agreement. Family: patient to notify family   Facility/Agency: [facility] faxed to [facility]   RN: Jenn Monge RN aware.      Kirill Danielson RN

## 2020-01-08 ENCOUNTER — APPOINTMENT (OUTPATIENT)
Dept: GENERAL RADIOLOGY | Age: 70
DRG: 216 | End: 2020-01-08
Attending: INTERNAL MEDICINE
Payer: MEDICARE

## 2020-01-08 ENCOUNTER — HOSPITAL ENCOUNTER (INPATIENT)
Age: 70
LOS: 28 days | Discharge: NURSING, REHAB OR LONG-TERM CARE | DRG: 216 | End: 2020-02-05
Attending: INTERNAL MEDICINE | Admitting: THORACIC SURGERY (CARDIOTHORACIC VASCULAR SURGERY)
Payer: MEDICARE

## 2020-01-08 PROBLEM — I95.9 HYPOTENSION: Status: ACTIVE | Noted: 2020-01-08

## 2020-01-08 LAB
GLUCOSE BLD-MCNC: 177 MG/DL (ref 70–99)
LACTIC ACID: 2.6 MMOL/L (ref 0.4–2)
PERFORMED ON: ABNORMAL
PRO-BNP: 2940 PG/ML (ref 0–124)
TROPONIN: 0.07 NG/ML
TROPONIN: 0.07 NG/ML

## 2020-01-08 PROCEDURE — 94640 AIRWAY INHALATION TREATMENT: CPT

## 2020-01-08 PROCEDURE — 93005 ELECTROCARDIOGRAM TRACING: CPT | Performed by: INTERNAL MEDICINE

## 2020-01-08 PROCEDURE — 94150 VITAL CAPACITY TEST: CPT

## 2020-01-08 PROCEDURE — 2700000000 HC OXYGEN THERAPY PER DAY

## 2020-01-08 PROCEDURE — 84484 ASSAY OF TROPONIN QUANT: CPT

## 2020-01-08 PROCEDURE — 83605 ASSAY OF LACTIC ACID: CPT

## 2020-01-08 PROCEDURE — 2580000003 HC RX 258

## 2020-01-08 PROCEDURE — 6360000002 HC RX W HCPCS: Performed by: INTERNAL MEDICINE

## 2020-01-08 PROCEDURE — 83540 ASSAY OF IRON: CPT

## 2020-01-08 PROCEDURE — P9047 ALBUMIN (HUMAN), 25%, 50ML: HCPCS

## 2020-01-08 PROCEDURE — 36415 COLL VENOUS BLD VENIPUNCTURE: CPT

## 2020-01-08 PROCEDURE — 94761 N-INVAS EAR/PLS OXIMETRY MLT: CPT

## 2020-01-08 PROCEDURE — 83880 ASSAY OF NATRIURETIC PEPTIDE: CPT

## 2020-01-08 PROCEDURE — 2060000000 HC ICU INTERMEDIATE R&B

## 2020-01-08 PROCEDURE — 2500000003 HC RX 250 WO HCPCS

## 2020-01-08 PROCEDURE — 71045 X-RAY EXAM CHEST 1 VIEW: CPT

## 2020-01-08 PROCEDURE — 83550 IRON BINDING TEST: CPT

## 2020-01-08 PROCEDURE — 6360000002 HC RX W HCPCS

## 2020-01-08 RX ORDER — DOBUTAMINE HYDROCHLORIDE 200 MG/100ML
3 INJECTION INTRAVENOUS CONTINUOUS
Status: DISCONTINUED | OUTPATIENT
Start: 2020-01-08 | End: 2020-01-13

## 2020-01-08 RX ORDER — NICOTINE 21 MG/24HR
1 PATCH, TRANSDERMAL 24 HOURS TRANSDERMAL DAILY
Status: DISCONTINUED | OUTPATIENT
Start: 2020-01-08 | End: 2020-01-21

## 2020-01-08 RX ORDER — SODIUM CHLORIDE 0.9 % (FLUSH) 0.9 %
10 SYRINGE (ML) INJECTION PRN
Status: DISCONTINUED | OUTPATIENT
Start: 2020-01-08 | End: 2020-01-21

## 2020-01-08 RX ORDER — ALLOPURINOL 300 MG/1
300 TABLET ORAL DAILY
Status: DISCONTINUED | OUTPATIENT
Start: 2020-01-08 | End: 2020-01-21

## 2020-01-08 RX ORDER — FUROSEMIDE 10 MG/ML
20 INJECTION INTRAMUSCULAR; INTRAVENOUS ONCE
Status: COMPLETED | OUTPATIENT
Start: 2020-01-08 | End: 2020-01-08

## 2020-01-08 RX ORDER — SODIUM CHLORIDE 0.9 % (FLUSH) 0.9 %
10 SYRINGE (ML) INJECTION EVERY 12 HOURS SCHEDULED
Status: DISCONTINUED | OUTPATIENT
Start: 2020-01-08 | End: 2020-01-21

## 2020-01-08 RX ORDER — ONDANSETRON 2 MG/ML
4 INJECTION INTRAMUSCULAR; INTRAVENOUS EVERY 6 HOURS PRN
Status: DISCONTINUED | OUTPATIENT
Start: 2020-01-08 | End: 2020-01-09

## 2020-01-08 RX ORDER — ATORVASTATIN CALCIUM 40 MG/1
40 TABLET, FILM COATED ORAL DAILY
Status: DISCONTINUED | OUTPATIENT
Start: 2020-01-08 | End: 2020-01-21

## 2020-01-08 RX ADMIN — ALBUTEROL SULFATE 2.5 MG: 2.5 SOLUTION RESPIRATORY (INHALATION) at 17:57

## 2020-01-08 RX ADMIN — FUROSEMIDE 20 MG: 10 INJECTION, SOLUTION INTRAMUSCULAR; INTRAVENOUS at 17:55

## 2020-01-08 RX ADMIN — ALBUTEROL SULFATE 2.5 MG: 2.5 SOLUTION RESPIRATORY (INHALATION) at 21:42

## 2020-01-08 ASSESSMENT — PAIN SCALES - GENERAL
PAINLEVEL_OUTOF10: 0
PAINLEVEL_OUTOF10: 0

## 2020-01-08 NOTE — H&P
Hospital Medicine History & Physical      PCP: No primary care provider on file. Date of Admission: 1/8/2020    Date of Service: Pt seen/examined on 01/08/20 and Admitted to Inpatient with expected LOS greater than two midnights due to medical therapy. Chief Complaint:  SOB      History Of Present Illness:    77 y.o. male who presented to [facility] with SOB. Patient was discharged from [facility] for pneumonia and a large L hemothorax. He was discharged home much improved and felt well yesterday. However, overnight, patient became increasingly SOB. This worsened this AM and presented to OSH. Patient has known severe aortic stenosis and is being evaluated by CT surgery as an outpatient for repair of his bioprosthetic valve. At OSH, he was found to be having acute heart failure and mild cardiogenic shock. He was started on low dose dobutamine with good response and transferred to [facility]. Past Medical History:          Diagnosis Date    CAD (coronary artery disease)     COPD [facility] obstructive [facility]) [facility] [address] [facility]     Diabetes mellitus ([facility] [address] [facility]     Gout     Hyperlipidemia     Hypertension     Thyroid disease        Past Surgical History:          Procedure Laterality Date    THORACOTOMY Left 1/2/2020    EMERGENT THORACOTOMY, EVACUATION OF HEMOTOMA CHEST WALL HEMOSTASIS, PLEUREAL BIOPSY, LEFT UPPER LOBE RESECTION performed by Pb Newby MD at [address]       Medications Prior to Admission:      Prior to Admission medications    Medication Sig Start Date End Date Taking? Authorizing Provider   HYDROcodone-acetaminophen (NORCO) 5-325 MG per tablet Take 1 tablet by mouth every 6 hours as needed for Pain for up to 3 days.  1/7/20 1/10/20  Fitz Elliott MD   nicotine (NICODERM CQ) 14 MG/24HR Place 1 patch onto the skin daily 1/8/20   Fitz Elliott MD   atorvastatin (LIPITOR) 40 MG tablet Take 40 mg by mouth daily 5/16/12   Historical Provider, MD   carvedilol (COREG) 25 MG tablet Take 25 mg by mouth 2 times daily 5/16/12   Historical Provider, MD   choline fenofibrate (TRILIPIX) 135 MG CPDR delayed release capsule Take 135 mg by mouth daily    Historical Provider, MD   febuxostat (ULORIC) 40 MG TABS tablet Take 40 mg by mouth daily    Historical Provider, MD   glipiZIDE (GLUCOTROL) 5 MG tablet Take 1 tablet by mouth daily    Historical Provider, MD   meclizine (ANTIVERT) 25 MG tablet Take 1 tablet by mouth daily    Historical Provider, MD   allopurinol (ZYLOPRIM) 300 MG tablet Take 300 mg by mouth daily    Historical Provider, MD       Allergies:  Penicillins    Social History:      The patient currently lives at home    TOBACCO:   reports that he has been smoking cigarettes. He has been smoking about 2.00 packs per day. He uses smokeless tobacco.  ETOH:   reports current alcohol use of about 6.0 standard drinks of alcohol per week. E-Cigarettes Vaping or Juuling     Questions Responses    E-Cigarette Use     Start Date     Does device contain nicotine? Quit Date     E-Cigarette Type             Family History:      Reviewed in detail and negative for DM, CAD, Cancer, CVA. Positive as follows:    No family history on file. REVIEW OF SYSTEMS:   Pertinent positives as noted in the HPI. All other systems reviewed and negative. PHYSICAL EXAM PERFORMED:    /88   Pulse 111   Temp 97.8 °F (36.6 °C) (Oral)   Resp 18   SpO2 98%     General appearance:  No apparent distress, appears stated age and cooperative. HEENT:  Normal cephalic, atraumatic without obvious deformity. Pupils equal, round, and reactive to light. Extra ocular muscles intact. Conjunctivae/corneas clear. Neck: Supple, with full range of motion. No jugular venous distention. Trachea midline. Respiratory:  Normal respiratory effort. Diffuse rales bilaterally without Wheezes/Rhonchi. Cardiovascular:  Regular rate and rhythm with normal S1/S2 without murmurs, rubs or gallops.   Abdomen: Soft, non-tender, non-distended with normal bowel sounds. Musculoskeletal:  No clubbing, cyanosis or edema bilaterally. Full range of motion without deformity. Skin: Skin color, texture, turgor normal.  No rashes or lesions. Neurologic:  Neurovascularly intact without any focal sensory/motor deficits. Cranial nerves: II-XII intact, grossly non-focal.  Psychiatric:  Alert and oriented, thought content appropriate, normal insight  Capillary Refill: Brisk,< 3 seconds   Peripheral Pulses: +2 palpable, equal bilaterally       Labs:     Recent Labs     01/06/20  0410 01/07/20  0515   WBC 11.6* 12.7*   HGB 8.2* 8.6*   HCT 24.1* 25.2*    183     Recent Labs     01/06/20  0410 01/07/20  0515   * 133*   K 3.8 3.7   CL 90* 93*   CO2 32 30   BUN 13 13   CREATININE 0.6* 0.6*   CALCIUM 8.1* 8.3   PHOS 2.1* 2.3*     No results for input(s): AST, ALT, BILIDIR, BILITOT, ALKPHOS in the last 72 hours. No results for input(s): INR in the last 72 hours. No results for input(s): Avani Millin in the last 72 hours. Urinalysis:    No results found for: Rod Hazel, BACTERIA, [facility], [facility]    Radiology:     CXR: I have reviewed the CXR with the following interpretation: none  EKG:  I have reviewed the EKG with the following interpretation: none    XR CHEST PORTABLE    (Results Pending)       ASSESSMENT:    Active Hospital Problems    Diagnosis Date Noted    Hypotension [I95.9] 01/08/2020         PLAN:  Cardiogenic shock  - on dobutamine gtt  - cardiology consulted  - poor overall prognosis without intervention of his aortic valve  - tele    Acute on chronic diastolic heart failure  - last echo 1/20 with EF 55%, grade II diastolic dysfunction, bioprosthetic aortic valve with severe stenosis  - on dobutamine as above  - holding BB, ACE  - started IV lasix 20mg X1.     Severe aortic stenosis  - patient is s/p aortic valve replacement with subsequent repair  - cardiology consulted  - likely underlying cause of heart failure and shock  - CT surgery consulted. Already being evaluated as outpatient for possible further repair. Acute hypoxic respiratory failure  - 2/2 CHF  - CXR pending. Has diffuse rales, reportedly had pulmonary edema on CXR at OSH  - Continue O2 as needed, wean as able    Elevated trop with known CAD  - Trop 0.21 at OSH  - EKG pending  - Denies chest pain  - likely related to severe Aortic stenosis and cardiogenic shock  - continue to trend troponin  - cardiology consulted  - continue statin. Holding ASA due to recent hemothorax    Paroxysmal afib  - rate controlled  - holding coreg as above  - off AC at present due to recent hemothorax    COPD  - no evidence of exacerbation  - PRN nebs ordered    HLD  - continue statin    Tobacco dependence  - counseling given. Nicotine replacement ordered    DVT Prophylaxis: lovenox  Diet: DIET LOW SODIUM 2 GM;  Diet NPO, After Midnight  Code Status: DNR-CCA    PT/OT Eval Status: not ordered    Dispo - at least 2-3 days       Floyd Morris MD    Thank you No primary care provider on file. for the opportunity to be involved in this patient's care. If you have any questions or concerns please feel free to contact me at [phone].

## 2020-01-08 NOTE — PROGRESS NOTES
1/8/20 @ 17:02 left ms for [address] [place] [address] Cardiology for Cardiac consult.  Elliott Vasquez

## 2020-01-08 NOTE — PROGRESS NOTES
RESPIRATORY THERAPY ASSESSMENT    Name:  Shaun Santos  Medical Record Number:  [id number]  Age: 77 y.o. Gender: male  : 1950  Today's Date:  2020  Room:  [facility]2/0422-01    Assessment     Is the patient being admitted for a COPD or Asthma exacerbation? No   (If yes the patient will be seen every 4 hours for the first 24 hours and then reassessed)    Patient Admission Diagnosis      Allergies  Allergies   Allergen Reactions    Penicillins Hives       Minimum Predicted Vital Capacity:     1035          Actual Vital Capacity:      500              Pulmonary History:COPD  Home Oxygen Therapy:  room air  Home Respiratory Therapy:None   Current Respiratory Therapy:  PROVENTIL  HHN Q6 H PRN  Treatment Type: HHN, IS  Medications: Albuterol    Respiratory Severity Index(RSI)   Patients with orders for inhalation medications, oxygen, or any therapeutic treatment modality will be placed on Respiratory Protocol. They will be assessed with the first treatment and at least every 72 hours thereafter. The following severity scale will be used to determine frequency of treatment intervention. Smoking History: Pulmonary Disease or Smoking History, Greater than 15 pack year = 2    Social History  Social History     Tobacco Use    Smoking status: Current Every Day Smoker     Packs/day: 2.00     Types: Cigarettes    Smokeless tobacco: Current User   Substance Use Topics    Alcohol use:  Yes     Alcohol/week: 6.0 standard drinks     Types: 6 Cans of beer per week     Frequency: 4 or more times a week     Drinks per session: 5 or 6     Binge frequency: Daily or almost daily    Drug use: Not Currently       Recent Surgical History: Thoracic or Upper Airway = 3  Past Surgical History  Past Surgical History:   Procedure Laterality Date    THORACOTOMY Left 2020    EMERGENT THORACOTOMY, EVACUATION OF HEMOTOMA CHEST WALL HEMOSTASIS, PLEUREAL BIOPSY, LEFT UPPER LOBE RESECTION performed by Maira Bill MD at [address] physician for possible discontinuation.

## 2020-01-09 ENCOUNTER — APPOINTMENT (OUTPATIENT)
Dept: GENERAL RADIOLOGY | Age: 70
DRG: 216 | End: 2020-01-09
Attending: INTERNAL MEDICINE
Payer: MEDICARE

## 2020-01-09 PROBLEM — D72.829 LEUKOCYTOSIS: Status: ACTIVE | Noted: 2020-01-09

## 2020-01-09 PROBLEM — J98.01 ACUTE BRONCHOSPASM: Status: ACTIVE | Noted: 2020-01-09

## 2020-01-09 PROBLEM — J98.11 ATELECTASIS: Status: ACTIVE | Noted: 2020-01-09

## 2020-01-09 PROBLEM — I48.91 ATRIAL FIBRILLATION WITH RVR (HCC): Status: ACTIVE | Noted: 2020-01-09

## 2020-01-09 PROBLEM — R09.89 PULMONARY VENOUS CONGESTION: Status: ACTIVE | Noted: 2020-01-09

## 2020-01-09 PROBLEM — R73.9 HYPERGLYCEMIA: Status: ACTIVE | Noted: 2020-01-09

## 2020-01-09 PROBLEM — R91.8 PULMONARY INFILTRATE: Status: ACTIVE | Noted: 2020-01-09

## 2020-01-09 LAB
ANION GAP SERPL CALCULATED.3IONS-SCNC: 11 MMOL/L (ref 3–16)
ANION GAP SERPL CALCULATED.3IONS-SCNC: 11 MMOL/L (ref 3–16)
APPEARANCE BAL (LAVAGE): ABNORMAL
BASE EXCESS VENOUS: 3.8 MMOL/L (ref -3–3)
BUN BLDV-MCNC: 20 MG/DL (ref 7–20)
BUN BLDV-MCNC: 21 MG/DL (ref 7–20)
CALCIUM IONIZED: 1.09 MMOL/L (ref 1.12–1.32)
CALCIUM SERPL-MCNC: 8.2 MG/DL (ref 8.3–10.6)
CALCIUM SERPL-MCNC: 8.7 MG/DL (ref 8.3–10.6)
CARBOXYHEMOGLOBIN: 1.6 % (ref 0–1.5)
CHLORIDE BLD-SCNC: 94 MMOL/L (ref 99–110)
CHLORIDE BLD-SCNC: 94 MMOL/L (ref 99–110)
CHOLESTEROL, TOTAL: 93 MG/DL (ref 0–199)
CLOT EVALUATION BAL: ABNORMAL
CO2: 29 MMOL/L (ref 21–32)
CO2: 30 MMOL/L (ref 21–32)
COLOR LAVAGE: COLORLESS
CREAT SERPL-MCNC: 0.6 MG/DL (ref 0.8–1.3)
CREAT SERPL-MCNC: 0.6 MG/DL (ref 0.8–1.3)
EKG ATRIAL RATE: 104 BPM
EKG DIAGNOSIS: NORMAL
EKG Q-T INTERVAL: 406 MS
EKG QRS DURATION: 138 MS
EKG QTC CALCULATION (BAZETT): 569 MS
EKG R AXIS: 36 DEGREES
EKG T AXIS: 191 DEGREES
EKG VENTRICULAR RATE: 118 BPM
EPITHELIAL CELLS FLUID: 41 %
GFR AFRICAN AMERICAN: >60
GFR AFRICAN AMERICAN: >60
GFR NON-AFRICAN AMERICAN: >60
GFR NON-AFRICAN AMERICAN: >60
GLUCOSE BLD-MCNC: 187 MG/DL (ref 70–99)
GLUCOSE BLD-MCNC: 187 MG/DL (ref 70–99)
HCO3 VENOUS: 28.8 MMOL/L (ref 23–29)
HCT VFR BLD CALC: 25 % (ref 40.5–52.5)
HDLC SERPL-MCNC: 31 MG/DL (ref 40–60)
HEMOGLOBIN: 8.4 G/DL (ref 13.5–17.5)
LACTIC ACID, SEPSIS: 1.5 MMOL/L (ref 0.4–1.9)
LDL CHOLESTEROL CALCULATED: 45 MG/DL
LYMPHOCYTES, BAL: 26 % (ref 5–10)
MACROPHAGES, BAL: 2 % (ref 90–95)
MAGNESIUM: 1.8 MG/DL (ref 1.8–2.4)
MAGNESIUM: 1.9 MG/DL (ref 1.8–2.4)
MCH RBC QN AUTO: 31.8 PG (ref 26–34)
MCHC RBC AUTO-ENTMCNC: 33.7 G/DL (ref 31–36)
MCV RBC AUTO: 94.5 FL (ref 80–100)
METHEMOGLOBIN VENOUS: 0.9 %
MONOCYTES, BAL: 2 %
MONONUCLEAR UNIDENTIFIED CELLS FLUID BAL: 4 %
NUMBER OF CELLS COUNTED BAL (LAVAGE): 100
O2 CONTENT, VEN: 12 VOL %
O2 SAT, VEN: 96 %
O2 THERAPY: ABNORMAL
PATH REVIEW BAL (LAVAGE): YES
PCO2, VEN: 45.8 MMHG (ref 40–50)
PDW BLD-RTO: 16.3 % (ref 12.4–15.4)
PH VENOUS: 7.42 (ref 7.35–7.45)
PH VENOUS: 7.42 (ref 7.35–7.45)
PLATELET # BLD: 220 K/UL (ref 135–450)
PMV BLD AUTO: 7.8 FL (ref 5–10.5)
PO2, VEN: 93.1 MMHG (ref 25–40)
POTASSIUM SERPL-SCNC: 3.8 MMOL/L (ref 3.5–5.1)
POTASSIUM SERPL-SCNC: 4 MMOL/L (ref 3.5–5.1)
RAPID INFLUENZA  B AGN: NEGATIVE
RAPID INFLUENZA A AGN: NEGATIVE
RBC # BLD: 2.64 M/UL (ref 4.2–5.9)
RBC, BAL: 350 /CUMM
SEGMENTED NEUTROPHILS, BAL: 25 % (ref 5–10)
SODIUM BLD-SCNC: 134 MMOL/L (ref 136–145)
SODIUM BLD-SCNC: 135 MMOL/L (ref 136–145)
TCO2 CALC VENOUS: 30 MMOL/L
TRIGL SERPL-MCNC: 87 MG/DL (ref 0–150)
VLDLC SERPL CALC-MCNC: 17 MG/DL
WBC # BLD: 14.5 K/UL (ref 4–11)
WBC/EPI CELLS BAL: 260 /CUMM

## 2020-01-09 PROCEDURE — 6360000002 HC RX W HCPCS: Performed by: INTERNAL MEDICINE

## 2020-01-09 PROCEDURE — 99152 MOD SED SAME PHYS/QHP 5/>YRS: CPT | Performed by: INTERNAL MEDICINE

## 2020-01-09 PROCEDURE — 2500000003 HC RX 250 WO HCPCS: Performed by: INTERNAL MEDICINE

## 2020-01-09 PROCEDURE — 0B9F8ZX DRAINAGE OF RIGHT LOWER LUNG LOBE, VIA NATURAL OR ARTIFICIAL OPENING ENDOSCOPIC, DIAGNOSTIC: ICD-10-PCS | Performed by: INTERNAL MEDICINE

## 2020-01-09 PROCEDURE — 94761 N-INVAS EAR/PLS OXIMETRY MLT: CPT

## 2020-01-09 PROCEDURE — 36430 TRANSFUSION BLD/BLD COMPNT: CPT

## 2020-01-09 PROCEDURE — 94640 AIRWAY INHALATION TREATMENT: CPT

## 2020-01-09 PROCEDURE — 82803 BLOOD GASES ANY COMBINATION: CPT

## 2020-01-09 PROCEDURE — 87205 SMEAR GRAM STAIN: CPT

## 2020-01-09 PROCEDURE — 87186 SC STD MICRODIL/AGAR DIL: CPT

## 2020-01-09 PROCEDURE — 71045 X-RAY EXAM CHEST 1 VIEW: CPT

## 2020-01-09 PROCEDURE — 2580000003 HC RX 258: Performed by: INTERNAL MEDICINE

## 2020-01-09 PROCEDURE — 89051 BODY FLUID CELL COUNT: CPT

## 2020-01-09 PROCEDURE — 87804 INFLUENZA ASSAY W/OPTIC: CPT

## 2020-01-09 PROCEDURE — 87070 CULTURE OTHR SPECIMN AEROBIC: CPT

## 2020-01-09 PROCEDURE — 99223 1ST HOSP IP/OBS HIGH 75: CPT | Performed by: INTERNAL MEDICINE

## 2020-01-09 PROCEDURE — 87116 MYCOBACTERIA CULTURE: CPT

## 2020-01-09 PROCEDURE — 88112 CYTOPATH CELL ENHANCE TECH: CPT

## 2020-01-09 PROCEDURE — 36415 COLL VENOUS BLD VENIPUNCTURE: CPT

## 2020-01-09 PROCEDURE — 87106 FUNGI IDENTIFICATION YEAST: CPT

## 2020-01-09 PROCEDURE — 83735 ASSAY OF MAGNESIUM: CPT

## 2020-01-09 PROCEDURE — 82330 ASSAY OF CALCIUM: CPT

## 2020-01-09 PROCEDURE — 87102 FUNGUS ISOLATION CULTURE: CPT

## 2020-01-09 PROCEDURE — 2709999900 HC NON-CHARGEABLE SUPPLY: Performed by: INTERNAL MEDICINE

## 2020-01-09 PROCEDURE — 99221 1ST HOSP IP/OBS SF/LOW 40: CPT | Performed by: THORACIC SURGERY (CARDIOTHORACIC VASCULAR SURGERY)

## 2020-01-09 PROCEDURE — 80061 LIPID PANEL: CPT

## 2020-01-09 PROCEDURE — 2700000000 HC OXYGEN THERAPY PER DAY

## 2020-01-09 PROCEDURE — 31624 DX BRONCHOSCOPE/LAVAGE: CPT | Performed by: INTERNAL MEDICINE

## 2020-01-09 PROCEDURE — 80048 BASIC METABOLIC PNL TOTAL CA: CPT

## 2020-01-09 PROCEDURE — 6370000000 HC RX 637 (ALT 250 FOR IP): Performed by: INTERNAL MEDICINE

## 2020-01-09 PROCEDURE — 0BC18ZZ EXTIRPATION OF MATTER FROM TRACHEA, VIA NATURAL OR ARTIFICIAL OPENING ENDOSCOPIC: ICD-10-PCS | Performed by: INTERNAL MEDICINE

## 2020-01-09 PROCEDURE — 0B9D8ZX DRAINAGE OF RIGHT MIDDLE LUNG LOBE, VIA NATURAL OR ARTIFICIAL OPENING ENDOSCOPIC, DIAGNOSTIC: ICD-10-PCS | Performed by: INTERNAL MEDICINE

## 2020-01-09 PROCEDURE — P9016 RBC LEUKOCYTES REDUCED: HCPCS

## 2020-01-09 PROCEDURE — 87153 DNA/RNA SEQUENCING: CPT

## 2020-01-09 PROCEDURE — 85027 COMPLETE CBC AUTOMATED: CPT

## 2020-01-09 PROCEDURE — 93880 EXTRACRANIAL BILAT STUDY: CPT

## 2020-01-09 PROCEDURE — 94150 VITAL CAPACITY TEST: CPT

## 2020-01-09 PROCEDURE — 82607 VITAMIN B-12: CPT

## 2020-01-09 PROCEDURE — 88305 TISSUE EXAM BY PATHOLOGIST: CPT

## 2020-01-09 PROCEDURE — 0B9G8ZX DRAINAGE OF LEFT UPPER LUNG LOBE, VIA NATURAL OR ARTIFICIAL OPENING ENDOSCOPIC, DIAGNOSTIC: ICD-10-PCS | Performed by: INTERNAL MEDICINE

## 2020-01-09 PROCEDURE — 31645 BRNCHSC W/THER ASPIR 1ST: CPT | Performed by: INTERNAL MEDICINE

## 2020-01-09 PROCEDURE — 87015 SPECIMEN INFECT AGNT CONCNTJ: CPT

## 2020-01-09 PROCEDURE — 83605 ASSAY OF LACTIC ACID: CPT

## 2020-01-09 PROCEDURE — 3609010800 HC BRONCHOSCOPY ALVEOLAR LAVAGE: Performed by: INTERNAL MEDICINE

## 2020-01-09 PROCEDURE — 87077 CULTURE AEROBIC IDENTIFY: CPT

## 2020-01-09 PROCEDURE — 2060000000 HC ICU INTERMEDIATE R&B

## 2020-01-09 PROCEDURE — 87206 SMEAR FLUORESCENT/ACID STAI: CPT

## 2020-01-09 PROCEDURE — 87631 RESP VIRUS 3-5 TARGETS: CPT

## 2020-01-09 RX ORDER — IPRATROPIUM BROMIDE AND ALBUTEROL SULFATE 2.5; .5 MG/3ML; MG/3ML
1 SOLUTION RESPIRATORY (INHALATION)
Status: DISCONTINUED | OUTPATIENT
Start: 2020-01-10 | End: 2020-01-11

## 2020-01-09 RX ORDER — POTASSIUM CHLORIDE 7.45 MG/ML
10 INJECTION INTRAVENOUS PRN
Status: DISCONTINUED | OUTPATIENT
Start: 2020-01-09 | End: 2020-01-21

## 2020-01-09 RX ORDER — PROCHLORPERAZINE EDISYLATE 5 MG/ML
10 INJECTION INTRAMUSCULAR; INTRAVENOUS EVERY 6 HOURS PRN
Status: DISCONTINUED | OUTPATIENT
Start: 2020-01-09 | End: 2020-01-21

## 2020-01-09 RX ORDER — ACETYLCYSTEINE 200 MG/ML
SOLUTION ORAL; RESPIRATORY (INHALATION) PRN
Status: DISCONTINUED | OUTPATIENT
Start: 2020-01-09 | End: 2020-01-09 | Stop reason: ALTCHOICE

## 2020-01-09 RX ORDER — POTASSIUM CHLORIDE 20 MEQ/1
40 TABLET, EXTENDED RELEASE ORAL PRN
Status: DISCONTINUED | OUTPATIENT
Start: 2020-01-09 | End: 2020-01-21

## 2020-01-09 RX ORDER — METHYLPREDNISOLONE SODIUM SUCCINATE 125 MG/2ML
80 INJECTION, POWDER, LYOPHILIZED, FOR SOLUTION INTRAMUSCULAR; INTRAVENOUS ONCE
Status: COMPLETED | OUTPATIENT
Start: 2020-01-09 | End: 2020-01-09

## 2020-01-09 RX ORDER — FENTANYL CITRATE 50 UG/ML
INJECTION, SOLUTION INTRAMUSCULAR; INTRAVENOUS PRN
Status: DISCONTINUED | OUTPATIENT
Start: 2020-01-09 | End: 2020-01-09 | Stop reason: ALTCHOICE

## 2020-01-09 RX ORDER — LIDOCAINE HYDROCHLORIDE 20 MG/ML
INJECTION, SOLUTION INFILTRATION; PERINEURAL PRN
Status: DISCONTINUED | OUTPATIENT
Start: 2020-01-09 | End: 2020-01-09 | Stop reason: ALTCHOICE

## 2020-01-09 RX ORDER — FUROSEMIDE 10 MG/ML
40 INJECTION INTRAMUSCULAR; INTRAVENOUS 2 TIMES DAILY
Status: DISCONTINUED | OUTPATIENT
Start: 2020-01-09 | End: 2020-01-15

## 2020-01-09 RX ORDER — MAGNESIUM SULFATE 1 G/100ML
1 INJECTION INTRAVENOUS ONCE
Status: COMPLETED | OUTPATIENT
Start: 2020-01-09 | End: 2020-01-09

## 2020-01-09 RX ORDER — MIDAZOLAM HYDROCHLORIDE 5 MG/ML
INJECTION INTRAMUSCULAR; INTRAVENOUS PRN
Status: DISCONTINUED | OUTPATIENT
Start: 2020-01-09 | End: 2020-01-09 | Stop reason: ALTCHOICE

## 2020-01-09 RX ORDER — 0.9 % SODIUM CHLORIDE 0.9 %
INTRAVENOUS SOLUTION INTRAVENOUS CONTINUOUS PRN
Status: COMPLETED | OUTPATIENT
Start: 2020-01-09 | End: 2020-01-09

## 2020-01-09 RX ORDER — ASPIRIN 81 MG/1
81 TABLET, CHEWABLE ORAL DAILY
Status: DISCONTINUED | OUTPATIENT
Start: 2020-01-09 | End: 2020-01-21

## 2020-01-09 RX ORDER — MAGNESIUM HYDROXIDE 1200 MG/15ML
LIQUID ORAL CONTINUOUS PRN
Status: COMPLETED | OUTPATIENT
Start: 2020-01-09 | End: 2020-01-09

## 2020-01-09 RX ORDER — MAGNESIUM SULFATE 1 G/100ML
1 INJECTION INTRAVENOUS PRN
Status: DISCONTINUED | OUTPATIENT
Start: 2020-01-09 | End: 2020-01-21

## 2020-01-09 RX ADMIN — ALBUTEROL SULFATE 2.5 MG: 2.5 SOLUTION RESPIRATORY (INHALATION) at 07:16

## 2020-01-09 RX ADMIN — DEXTROSE MONOHYDRATE 1 MG/MIN: 50 INJECTION, SOLUTION INTRAVENOUS at 02:08

## 2020-01-09 RX ADMIN — ALBUTEROL SULFATE 2.5 MG: 2.5 SOLUTION RESPIRATORY (INHALATION) at 11:11

## 2020-01-09 RX ADMIN — ASPIRIN 81 MG 81 MG: 81 TABLET ORAL at 18:28

## 2020-01-09 RX ADMIN — FUROSEMIDE 40 MG: 10 INJECTION, SOLUTION INTRAMUSCULAR; INTRAVENOUS at 10:04

## 2020-01-09 RX ADMIN — MAGNESIUM SULFATE HEPTAHYDRATE 1 G: 1 INJECTION, SOLUTION INTRAVENOUS at 10:04

## 2020-01-09 RX ADMIN — METHYLPREDNISOLONE SODIUM SUCCINATE 80 MG: 125 INJECTION, POWDER, FOR SOLUTION INTRAMUSCULAR; INTRAVENOUS at 10:38

## 2020-01-09 RX ADMIN — ALBUTEROL SULFATE 2.5 MG: 2.5 SOLUTION RESPIRATORY (INHALATION) at 15:31

## 2020-01-09 RX ADMIN — DOBUTAMINE HYDROCHLORIDE 4 MCG/KG/MIN: 200 INJECTION INTRAVENOUS at 00:39

## 2020-01-09 RX ADMIN — Medication 10 ML: at 20:47

## 2020-01-09 RX ADMIN — AMIODARONE HYDROCHLORIDE 150 MG: 50 INJECTION, SOLUTION INTRAVENOUS at 01:55

## 2020-01-09 RX ADMIN — FUROSEMIDE 40 MG: 10 INJECTION, SOLUTION INTRAMUSCULAR; INTRAVENOUS at 18:27

## 2020-01-09 RX ADMIN — ALBUTEROL SULFATE 2.5 MG: 2.5 SOLUTION RESPIRATORY (INHALATION) at 19:36

## 2020-01-09 RX ADMIN — ATORVASTATIN CALCIUM 40 MG: 40 TABLET, FILM COATED ORAL at 08:35

## 2020-01-09 RX ADMIN — CALCIUM GLUCONATE 2 G: 98 INJECTION, SOLUTION INTRAVENOUS at 01:55

## 2020-01-09 RX ADMIN — Medication 10 ML: at 08:31

## 2020-01-09 RX ADMIN — ENOXAPARIN SODIUM 40 MG: 40 INJECTION SUBCUTANEOUS at 08:35

## 2020-01-09 RX ADMIN — SODIUM NITROPRUSSIDE 0.5 MCG/KG/MIN: 25 INJECTION, SOLUTION, CONCENTRATE INTRAVENOUS at 10:38

## 2020-01-09 RX ADMIN — ALLOPURINOL 300 MG: 300 TABLET ORAL at 08:35

## 2020-01-09 RX ADMIN — AMIODARONE HYDROCHLORIDE 0.5 MG/MIN: 50 INJECTION, SOLUTION INTRAVENOUS at 10:01

## 2020-01-09 RX ADMIN — ALBUTEROL SULFATE 2.5 MG: 2.5 SOLUTION RESPIRATORY (INHALATION) at 02:28

## 2020-01-09 ASSESSMENT — PAIN SCALES - GENERAL
PAINLEVEL_OUTOF10: 0
PAINLEVEL_OUTOF10: 0

## 2020-01-09 NOTE — PROGRESS NOTES
Speech Language Pathology  Attempt    Acknowledged order for SLP eval/treat, chart reviewed, evaluation attempted. Spoke with RN - pt reportedly NPO currently for a planned procedure. SLP will follow up with patient for eval as able / appropriate. Thank you!      Michael Mac 92 #[phone]  Speech-Language Pathologist

## 2020-01-09 NOTE — PROGRESS NOTES
Message sent to hospitalist.     [phone] Hospital or Facility: [facility] From: Kishore Gold RE: efraín cesar 1950 pt doesn't not have an order in for dobutamine that is infusing. currently infusing at 5 mcg/kg/min or 15 ml/hr. hr at rest is 113 remains in afib, I'm gonna place a dry dressing over his incision site from the previous surgery.  Need Callback: NO CALLBACK REQ C4 PROGRESSIVE CARE ROUTINE

## 2020-01-09 NOTE — CONSULTS
he mentions he knows about his diet. He did not know about limitation of fluid. Reviewed label reading for sodium content, advised on 2500 mg daily and FR of 64 oz daily. Pt believes he drinks more fluid than this daily. Written education left with pt and encouraged for him to continue to review. Patient provided with verbal and written education on CHF signs/symptoms, discharge medications, daily weights, low sodium diet, activity and follow-up. Heart Failure self-management education/written zone tool reviewed and encouraged to call at early signs of worsening CHF or when in yellow zone. Notified patient of scheduled hospital follow-up appointment with Dr Andrew Tracy for date TBD pending his hospital course and possible surgery plans. Instructed patient to call the doctor post discharge if they experience shortness of breath, chest pain, swelling, cough, or weight gain of 3 pounds in a day / 5 pounds in a week. Also, notified patient to call the doctor with dizziness, increased fatigue, decreased or difficulty urinating. Pt education needs reinforcement. No additional questions at this time. Education Time: 20 Minutes    Recommendations:  1. Encourage follow-up appointment compliance. Next appointment: TBD pending possible surgical plans. 2. Educate further on fluid restriction 48 oz - 64 oz during inpatient stay so they can understand how to measure intake at home. 3. Review sodium restrictions. Encourage patient to not add table salt to their foods and avoid foods that are high in sodium. 4. Continue to educate on S/S. Stress the importance of calling the MD with the earliest signs of an acute exacerbation. 5. Emphasize daily weights - instruct patient to call the MD if they gain 2-3 lb in a day or 5 lb in a week. 6. Provided patient with CHF Resource Line for questions and concerns. 7. ? Discharge needs - [facility] versus SNF for rehab if surgery occurs this admission. Pt lives alone. 1/9/2020 10:43 AM

## 2020-01-09 NOTE — FLOWSHEET NOTE
01/09/20 1414   Encounter Summary   Services provided to: Patient   Referral/Consult From: Nurse   Support System Family members   Continue Visiting   (1-9, initial, patient states has ADs.)   Complexity of Encounter Moderate   Length of Encounter 15 minutes  (Visit interrupted by visitors.)   Advance Directives (For Healthcare)   Healthcare Directive Yes, patient has an advance directive for healthcare treatment   Copy in Chart Other (Comment)  (Asked patient to bring copy if possible)    received referral for Code status documents? Patient is a DNR-CCA. When asked about advanced directives. He states he already has them but has not been at this hospital in the past.  He was unable to states who he named as a proxy if he loses decision-making capacity. I offered to help him complete new documents if he desires. He states he is facing heart surgery. I offered to return to discuss further if he wanted to considering updating documents. I asked him to try to obtain a copy of documents if possible.

## 2020-01-09 NOTE — PROGRESS NOTES
Notified Cardiothoracic Surgery office staff, Oumar Sahni, @ 0900 1/9/20 re: cardiothoracic consult. Pt's nurse, Maggie Sim, is aware. -[address] [facility]

## 2020-01-09 NOTE — PROGRESS NOTES
HOSPITAL MEDICINE  Nocturnist / Cross-Cover Note  Dr. Maci Thomason MD    SITUATION:  Responded well in terms of perfusion to dobutamine 5mcg/kg/min. HR increased though to the 120s-130s sustained in atrial fibrillation. Graudally tapered dobutamine infusion rate as tolerated. Began amiodarone IV loading for rate and rhythm control while on dobutamine.       Patient Vitals for the past 24 hrs:   BP Temp Temp src Pulse Resp SpO2 Height Weight   01/08/20 2333 108/70 97.6 °F (36.4 °C) Oral 120 20 98 % -- --   01/08/20 2149 -- -- -- -- -- -- 5' 10\" (1.778 m) 205 lb 12.8 oz (93.4 kg)   01/08/20 2143 -- -- -- -- -- 94 % -- --   01/08/20 2018 118/80 97.3 °F (36.3 °C) Oral 128 22 98 % -- --   01/08/20 1800 -- -- -- -- -- 96 % -- --   01/08/20 1548 125/88 97.8 °F (36.6 °C) Oral 111 18 98 % -- --

## 2020-01-09 NOTE — CONSULTS
Department of Cardiovascular & Thoracic Surgery  History and Physical          DIAGNOSIS: Severe bioprosthetic aortic valve stenosis    CHIEF COMPLAINT:  SOB    History Obtained From:  patient, electronic medical record    HISTORY OF PRESENT ILLNESS:      The patient is a 77 y.o. male with significant past medical history of tobacco use, AVR (2000 and 2011), HTN, HLD, T2DM, COPD, CAD (with PCI) and Gout. He was recently treated at [facility]  for a large left hemothorax (after a fall, and on Effient) and discharged home on 1/07. That night he had increasing shortness of breath, went to OSH, and was subsequently transferred here for further eval and treatment. Upon presentation he was hypotensive and in acute on chronic diastolic HF. He was started on dobutamine and diuresed. Cardiology was consulted. Per their note, a ZOEY and/or cardiac cath will be needed to quantify the severity of the AS. We have been consulted for possible surgical intervention. Past Medical History:        Diagnosis Date    CAD (coronary artery disease)     COPD (chronic obstructive pulmonary disease) ([facility] [address]     Diabetes mellitus ([facility] [address]     Gout     Hyperlipidemia     Hypertension     Thyroid disease        Past Surgical History:        Procedure Laterality Date    THORACOTOMY Left 1/2/2020    EMERGENT THORACOTOMY, EVACUATION OF HEMOTOMA CHEST WALL HEMOSTASIS, PLEUREAL BIOPSY, LEFT UPPER LOBE RESECTION performed by Vincent Henley MD at [address]       Medications Prior to Admission:   Medications Prior to Admission: HYDROcodone-acetaminophen (NORCO) 5-325 MG per tablet, Take 1 tablet by mouth every 6 hours as needed for Pain for up to 3 days.   atorvastatin (LIPITOR) 40 MG tablet, Take 40 mg by mouth daily  carvedilol (COREG) 25 MG tablet, Take 25 mg by mouth 2 times daily  choline fenofibrate (TRILIPIX) 135 MG CPDR delayed release capsule, Take 135 mg by mouth daily  febuxostat (ULORIC) 40 MG TABS tablet, Take 40 mg by mouth daily  glipiZIDE (GLUCOTROL) 5 MG tablet, Take 1 tablet by mouth daily  meclizine (ANTIVERT) 25 MG tablet, Take 1 tablet by mouth daily  allopurinol (ZYLOPRIM) 300 MG tablet, Take 300 mg by mouth daily  nicotine (NICODERM CQ) 14 MG/24HR, Place 1 patch onto the skin daily    Allergies:  Penicillins    Social History:    TOBACCO:   reports that he has been smoking cigarettes. He has been smoking about 2.00 packs per day. He uses smokeless tobacco.  ETOH:   reports current alcohol use of about 6.0 standard drinks of alcohol per week. CAFFEINE ABUSE:  No  DRUGS:  Denies using recreational drugs  LIFESTYLE: somewhat sedentary    MARITAL STATUS:  single  OCCUPATION: unknown    Family History:    No family history on file. REVIEW OF SYSTEMS:      Constitutional:  No night sweats, headaches, weight loss. Eyes:  No glaucoma, cataracts. ENMT:  No nosebleeds, deviated septum. Cardiac:  No chest pain. +atrial fibrillation (rate controlled)  Vascular:  No claudication, varicosities. GI:  No PUD, heartburn. :  No kidney stones, frequent UTIs  Musculoskeletal:  No arthritis, gout. Respiratory:  No emphysema, asthma. +SOB, +COPD  Integumentary:  No dermatitis, itching, rash. Neurological:  No stroke, TIAs, seizures. Psychiatric:  No depression, anxiety. Endocrine: No thyroid issues. +T2DM  Hematologic: +recent bleeding episode during last hospital stay, +easy bruising. Immunologic:  No known cancer, steroid therapies. PHYSICAL EXAM:    VITALS:  /69   Pulse 108   Temp 97.8 °F (36.6 °C) (Oral)   Resp 22   Ht 5' 10\" (1.778 m)   Wt 204 lb 12.8 oz (92.9 kg)   SpO2 97%   BMI 29.39 kg/m²     Constitutional:   Well developed and nourished male. No acute distress. +overweight. Eyes:  lids and lashes normal, pupils equal, round and reactive to light, extra ocular muscles intact, sclera clear, conjunctiva normal    Head/ENT:   normal teeth, gums, & palate. Moist mucus membranes.   No cyanosis or pallor. Neck:  supple, symmetrical, trachea midline, no lymphadenopathy, no jugular venous distension, no carotid bruits and MASSES:  no masses. No thyromegaly. Lungs:  no increased work of breathing, good air exchange, no retractions and clear to auscultation, no crackles or wheezing. No tactile fremitus. +diminished bilateral BS    Cardiovascular: S1, S2 normal, irregular rate, currently in atrial fibrillation (HR in low 847'N), +systolic murmur 3/6. Apical impulse in 5th intercostal space. Pulses:  Right dorsalis pedis 2, Left dorsalis pedis 2, Right posterior tibial 1, Left Posterior tibial 1, Right radial 2, and Left radial 2. Abdomen:  normal bowel sounds, non-tender, aorta normal and bruits absent. No hepatosplenomegaly or masses. Musculoskeletal:  Back is straight and non-tender, full ROM of upper and lower extremities. No kyphosis or scoliosis. Extremities:  Warm, pink, no clubbing, cyanosis, petechiae, ischemia, or deformities. 1+ BLE peripheral edema.     Skin: no rashes, no ecchymoses, no petechiae, no nodules, no jaundice    Neurological/Psychiatric: oriented, normal mood, CN II-XII intact    DATA:    EK20 Atrial fibrillation with rapid ventricular responseRight bundle branch blockT wave abnormality, consider inferolateral ischemia or digitalis effectAbnormal ECGWhen compared with ECG of 2020 21:51,No significant change was found     CBC with Differential:    Lab Results   Component Value Date    WBC 14.5 2020    RBC 2.64 2020    HGB 8.4 2020    HCT 25.0 2020     2020    MCV 94.5 2020    MCH 31.8 2020    MCHC 33.7 2020    RDW 16.3 2020    BANDSPCT 26 2020    LYMPHOPCT 5.7 2020    MONOPCT 5.8 2020    MYELOPCT 1 2020    BASOPCT 0.2 2020    MONOSABS 1.0 2020    LYMPHSABS 0.9 2020    EOSABS 0.0 2020    BASOSABS 0.0 2020     CMP:    Lab Results   Component wall motion abnormalities. There is   mild-moderate concentric left ventricular hypertrophy. Grade II diastolic   dysfunction with elevated left ventricular filling pressure. Left ventricular cavity size is normal.      Mitral Valve   Mild mitral annular calcification. Mild thickening of the anterior leaflet of mitral valve. Mild mitral regurgitation. Left Atrium   The left atrium is severely dilated. Aortic Valve   A bioprosthetic aortic valve appears well seated with a peak velocity of   4.01 msec., a maximum gradient of 64.32 mmHg and a mean gradient of 41 mmHg   this is consistent with severe aortic stenosis. Individual aortic valve   leaflets are not clearly visualized. No aortic regurgitation. Aorta   The aortic root is normal in size. Right Ventricle   The right ventricle is not well visualized. Tricuspid Valve   Tricuspid valve is structurally normal.   Trivial tricuspid regurgitation. Inadequate tricuspid regurgitation jet to estimate systolic artery pressure   (SPAP). Right Atrium   The right atrium is mildly dilated. Right atrial area is 22.9 cm2. Pulmonic Valve   The pulmonic valve is structurally normal.   No pulmonic regurgitation. Pericardial Effusion   No pericardial effusion noted. Pleural Effusion   No pleural effusion. ASSESSMENT AND PLAN:  STS Cardiac Surgery Risk profile: AVR     Mortality:  7.09%  Renal Failure:  3.86%  Permanent Stroke:  2.72%  Prolonged Ventilation:  21.72%  Deep Sternal Infection:  0.50%  Reoperation:  6.12%  Morbidity and Mortality:  31.69%  Short LOS:  12.42%  Long LOS:  27.39%    Pt is a 78 yo overweight current smoker whose 2nd AVR is now showing severe stenosis. Cardiology is also following and is recommending ZOEY and/or cath. We are in agreement with both procedures being performed to help quantify AS as well as evaluate his coronary arteries.  We will continue with preoperative testing and risk stratification. Carotid duplex. Jennie Brothers, CNP   1/9/2020  2:29 PM       ___________________________________________________________________________________  77year old male s/p bioprosthetic aortic valve replacements x 2, recently presented s/p mechanical fall with Left hemothorax requiring emergent thoracotomy. Conversation with cardiology regarding high risk cardiac surgery versus TAVR. I have discussed risks of re-do cardiac surgery with the patient. STS has been calculated above. Recommend for TAVR placement if favorable anatomy. ZOEY has been ordered. Please reconsult as needed. Case d/w Dr. Susana Delgado.      Liz Mary MD  Cardiothoracic Surgery

## 2020-01-09 NOTE — PROGRESS NOTES
Hospitalist Progress Note      PCP: No primary care provider on file. Date of Admission: 1/8/2020    Chief Complaint:  SOB     History Of Present Illness:    \"69 y.o. male who presented to [facility] with SOB. Patient was discharged from [facility] for pneumonia and a large L hemothorax. He was discharged home much improved and felt well yesterday. However, overnight, patient became increasingly SOB. This worsened this AM and presented to OSH. Patient has known severe aortic stenosis and is being evaluated by CT surgery as an outpatient for repair of his bioprosthetic valve. At OSH, he was found to be having acute heart failure and mild cardiogenic shock. He was started on low dose dobutamine with good response and transferred to [facility]. \"      Subjective:  He feels much better. Less dyspnea. CXR yesterday evening didn't show pulmonary edema. No chest pain. Not coughing much.         Medications:  Reviewed    Infusion Medications    amiodarone 0.5 mg/min (01/09/20 0830)    DOBUTamine 3 mcg/kg/min (01/09/20 0143)     Scheduled Medications    magnesium sulfate  1 g Intravenous Once    allopurinol  300 mg Oral Daily    atorvastatin  40 mg Oral Daily    nicotine  1 patch Transdermal Daily    sodium chloride flush  10 mL Intravenous 2 times per day    enoxaparin  40 mg Subcutaneous Daily    albuterol  2.5 mg Nebulization Q4H WA     PRN Meds: potassium chloride **OR** potassium alternative oral replacement **OR** potassium chloride, magnesium sulfate, prochlorperazine, sodium chloride flush, magnesium hydroxide      Intake/Output Summary (Last 24 hours) at 1/9/2020 0921  Last data filed at 1/9/2020 0555  Gross per 24 hour   Intake 0 ml   Output 300 ml   Net -300 ml       Physical Exam Performed:    /75   Pulse 115   Temp 97.9 °F (36.6 °C) (Oral)   Resp 22   Ht 5' 10\" (1.778 m)   Wt 204 lb 12.8 oz (92.9 kg)   SpO2 98%   BMI 29.39 kg/m²     General appearance: No apparent distress, appears stated Nadia Gandara with SOB. Patient was discharged from [facility] for pneumonia and a large L hemothorax. He was discharged home much improved and felt well yesterday. However, overnight, patient became increasingly SOB. This worsened this AM and presented to OSH. Patient has known severe aortic stenosis and is being evaluated by CT surgery as an outpatient for repair of his bioprosthetic valve. At OSH, he was found to be having acute heart failure and mild cardiogenic shock. He was started on low dose dobutamine with good response and transferred to [facility]. \"      Acute on chronic diastolic CHF, suspect due to severe bioprosthetic AS, with cardiogenic shock diagnosed on admission  - furosemide given IV on admission. Cardiology consulted. - dobutamine gtt started at OSH.  - CT surgery consulted regarding the aortic valve. He says he had a porcine valve put in 20 years ago, then exchanged for a bovine valve 8 years ago. Paroxysmal Afib with RVR  - held carvedilol initially. Not on anticoagulation due to recent hemothorax. COPD  - inhaled bronchodilators. Completed a course of vanc and cefepime last admission. Lungs with severe rhonchi and gurgly breath sounds, pulm consulted. - he quit smoking during the last admission, had been smoking 2 PPD. Acute hypoxic respiratory failure  - due to above issues, treat accordingly. Elevated troponin  - likely demand ischemia due to above issues. Cardiology consulted. DVT Prophylaxis: enoxaparin  Diet: Diet NPO, After Midnight  Code Status: DNR-CCA    PT/OT Eval Status: order when stable    Dispo - perhaps 1/14, when he seems less likely to quickly decompensate again as outpatient. He went home [place] [id number] last time.        Kaylan Freitas MD

## 2020-01-09 NOTE — CONSULTS
[address] [facility]  [phone]      Attending Physician: Trudy Caban MD  Reason for Consultation/Chief Complaint: Shortness of breath    Subjective   History of Present Illness:  Laurita Carbajal is a 77 y.o. patient who presented to the hospital with complaints of shortness of breath over the last 1 to 2 days. He was status post recent admission to the hospital from January 1 to January 7, 2020. He was transferred from [facility] where he originally was admitted on December 31, 2019 with syncope. He was diagnosed originally with pneumonia at [facility] was noted of a left pleural effusion, was given antibiotics but ultimately a CT scan showed a large left pleural effusion and probable hemothorax for which he was transferred to [facility] [place].  He was in distress and was intubated and hypotensive and CT surgery was consulted and chest tubes were placed (hemothorax was felt to be related to pneumonia). He required blood transfusion with 4 units of packed red blood cells. Course was noteworthy as well for atrial fibrillation. Our service was asked to consult on the patient and care was directed towards rate control for atrial fibrillation and he was felt to not be a good candidate for anticoagulation. He had an echocardiogram during that recent hospital admission which showed normal left ventricular function however there was concerns for severe bioprosthetic aortic valve stenosis. Patient has a cardiac history which dates back to at least 2011 when he was being evaluated and treated through [address] [place] [address] with Dr. Georgette Muller and had had 2 bioprosthetic aortic valve surgeries in 2000 and 2011. In 2011 he had a 23 mm magna ease pericardial tissue valve.   Records were reviewed through care everywhere and the discharge summary from the 2011 surgery indicates that he had had prior CABG surgery in 2000 with a previous bypass graft to his OM and previous stenting around 2010 of his circumflex artery. He did have a catheterization prior to the second aortic valve surgery and reports indicate he had patent coronary arteries. As such, records indicate that he did not have bypass surgery in 2011. In 2012, records indicate that he had been on Effient due to stent from the previous year. He presented back to the hospital on January 8, 2019 and was noted to be hypotensive and it was felt he had acute on chronic diastolic heart failure. As result of concerns for bioprosthetic aortic valve stenosis, he was placed on dobutamine drip and diuretics were ordered. Patient feels better today. Troponin levels were found to be elevated 0.07, proBNP was 2940. His weight at discharge is 209 pounds and currently his weight is between 204-205 pounds. Past Medical History:   has a past medical history of CAD (coronary artery disease), COPD (chronic obstructive pulmonary disease) ([address] Diabetes mellitus ([facility] [address] Gout, Hyperlipidemia, Hypertension, and Thyroid disease. Surgical History:   has a past surgical history that includes thoracotomy (Left, 1/2/2020). Social History:   reports that he has been smoking cigarettes. He has been smoking about 2.00 packs per day. He uses smokeless tobacco. He reports current alcohol use of about 6.0 standard drinks of alcohol per week. He reports previous drug use. Home Medications:  Were reviewed and are listed in nursing record and/or below  Prior to Admission medications    Medication Sig Start Date End Date Taking? Authorizing Provider   HYDROcodone-acetaminophen (NORCO) 5-325 MG per tablet Take 1 tablet by mouth every 6 hours as needed for Pain for up to 3 days.  1/7/20 1/10/20 Yes Missy Montgomery MD   atorvastatin (LIPITOR) 40 MG tablet Take 40 mg by mouth daily 5/16/12  Yes Historical Provider, MD   carvedilol (COREG) 25 MG tablet Take 25 mg by mouth 2 times daily 5/16/12  Yes Historical Provider, MD choline fenofibrate (TRILIPIX) 135 MG CPDR delayed release capsule Take 135 mg by mouth daily   Yes Historical Provider, MD   febuxostat (ULORIC) 40 MG TABS tablet Take 40 mg by mouth daily   Yes Historical Provider, MD   glipiZIDE (GLUCOTROL) 5 MG tablet Take 1 tablet by mouth daily   Yes Historical Provider, MD   meclizine (ANTIVERT) 25 MG tablet Take 1 tablet by mouth daily   Yes Historical Provider, MD   allopurinol (ZYLOPRIM) 300 MG tablet Take 300 mg by mouth daily   Yes Historical Provider, MD   nicotine (NICODERM CQ) 14 MG/24HR Place 1 patch onto the skin daily 1/8/20   Equilla Brunner, MD        CURRENT Medications:  potassium chloride (KLOR-CON M) extended release tablet 40 mEq, PRN    Or  potassium bicarb-citric acid (EFFER-K) effervescent tablet 40 mEq, PRN    Or  potassium chloride 10 mEq/100 mL IVPB (Peripheral Line), PRN  magnesium sulfate 1 g in dextrose 5% 100 mL IVPB, PRN  amiodarone (CORDARONE) 450 mg in dextrose 5 % 250 mL infusion, Continuous  prochlorperazine (COMPAZINE) injection 10 mg, Q6H PRN  furosemide (LASIX) injection 40 mg, BID  nitroPRUSSide (NIPRIDE) 50 mg in dextrose 5 % 250 mL infusion, Continuous  allopurinol (ZYLOPRIM) tablet 300 mg, Daily  atorvastatin (LIPITOR) tablet 40 mg, Daily  nicotine (NICODERM CQ) 14 MG/24HR 1 patch, Daily  sodium chloride flush 0.9 % injection 10 mL, 2 times per day  sodium chloride flush 0.9 % injection 10 mL, PRN  magnesium hydroxide (MILK OF MAGNESIA) 400 MG/5ML suspension 30 mL, Daily PRN  albuterol (PROVENTIL) nebulizer solution 2.5 mg, Q4H WA  DOBUTamine (DOBUTREX) 500 mg in dextrose 5 % 250 mL infusion, Continuous        Allergies:  Penicillins     Review of Systems:   A 14 point review of symptoms completed. Pertinent positives identified in the HPI, all other review of symptoms negative as below.       Objective   PHYSICAL EXAM:    Vitals:    01/09/20 1127   BP: (!) 137/90   Pulse: 101   Resp: 22   Temp: 97.8 °F (36.6 °C)   SpO2: 97%    Weight: 204 lb 12.8 oz (92.9 kg)         General Appearance:  Alert, cooperative, no distress, appears stated age   Head:  Normocephalic, without obvious abnormality, atraumatic   Eyes:  PERRL, conjunctiva/corneas clear   Nose: Nares normal, no drainage or sinus tenderness   Throat: Lips, mucosa, and tongue normal   Neck: Supple, symmetrical, trachea midline, no adenopathy, thyroid: not enlarged, symmetric, no tenderness/mass/nodules, elevated JVP   Lungs:    Decreased breath sounds, respirations mildly labored   Chest Wall:  No deformity or tenderness   Heart:  Regular rate and rhythm, S1, S2 decreased, 3-6 systolic and diastolic murmur   Abdomen:   Soft, non-tender, bowel sounds active all four quadrants,  no masses, no organomegaly   Extremities: Extremities normal, atraumatic, no cyanosis but does have +1 bilateral lower extremity edema   Pulses: 2+ and symmetric, in upper extremities   Skin: Skin color, texture, turgor normal, no rashes or lesions   Pysch: Normal mood and affect   Neurologic: Normal gross motor and sensory exam.         Labs   CBC:   Lab Results   Component Value Date    WBC 14.5 01/09/2020    RBC 2.64 01/09/2020    HGB 8.4 01/09/2020    HCT 25.0 01/09/2020    MCV 94.5 01/09/2020    RDW 16.3 01/09/2020     01/09/2020     CMP:  Lab Results   Component Value Date     01/09/2020    K 4.0 01/09/2020    K 7.3 01/02/2020    CL 94 01/09/2020    CO2 29 01/09/2020    BUN 20 01/09/2020    CREATININE 0.6 01/09/2020    GFRAA >60 01/09/2020    AGRATIO 1.8 01/02/2020    LABGLOM >60 01/09/2020    GLUCOSE 187 01/09/2020    PROT 3.3 01/02/2020    CALCIUM 8.7 01/09/2020    BILITOT 0.7 01/02/2020    ALKPHOS 29 01/02/2020     01/02/2020     01/02/2020     PT/INR:  No results found for: PTINR  HgBA1c:No results found for: LABA1C  Lab Results   Component Value Date    CKTOTAL 97 01/02/2020    TROPONINI 0.07 (H) 01/08/2020         Cardiac Data     Last EKG: Atrial fibrillation with rapid

## 2020-01-09 NOTE — PROGRESS NOTES
Brittnee BARNEY  [address] or Facility: [facility] From: Jem Horn RE: Pj Maldonado RM: [id number] Completed EKG.  Patient is in AFIB RVR right bundle branch block Need Callback: NO CALLBACK REQ C4 PROGRESSIVE CARE  Read 6:49 PM

## 2020-01-09 NOTE — PROGRESS NOTES
Message sent to [address] [facility] [address] or Facility: [facility] From: Óscar Guzman RE: efraín mauriciosarina 1950 pt hr has been 110 -120 most of the night, now he is starting to jump up into the 130 for a few moments then come back down. cont to be in afib when his rate goes down to the 110's.  Need Callback: NEEDS CALLBACK C4 PROGRESSIVE CARE ROUTINE

## 2020-01-10 ENCOUNTER — APPOINTMENT (OUTPATIENT)
Dept: CT IMAGING | Age: 70
DRG: 216 | End: 2020-01-10
Attending: INTERNAL MEDICINE
Payer: MEDICARE

## 2020-01-10 LAB
ANION GAP SERPL CALCULATED.3IONS-SCNC: 8 MMOL/L (ref 3–16)
BUN BLDV-MCNC: 21 MG/DL (ref 7–20)
CALCIUM SERPL-MCNC: 8.4 MG/DL (ref 8.3–10.6)
CHLORIDE BLD-SCNC: 94 MMOL/L (ref 99–110)
CO2: 33 MMOL/L (ref 21–32)
CREAT SERPL-MCNC: 0.6 MG/DL (ref 0.8–1.3)
GFR AFRICAN AMERICAN: >60
GFR NON-AFRICAN AMERICAN: >60
GLUCOSE BLD-MCNC: 194 MG/DL (ref 70–99)
HCT VFR BLD CALC: 25 % (ref 40.5–52.5)
HEMOGLOBIN: 8.2 G/DL (ref 13.5–17.5)
IRON SATURATION: 14 % (ref 20–50)
IRON: 42 UG/DL (ref 59–158)
MAGNESIUM: 2.1 MG/DL (ref 1.8–2.4)
MCH RBC QN AUTO: 31 PG (ref 26–34)
MCHC RBC AUTO-ENTMCNC: 33 G/DL (ref 31–36)
MCV RBC AUTO: 94.1 FL (ref 80–100)
PATH CONSULT FLUID: NORMAL
PDW BLD-RTO: 17 % (ref 12.4–15.4)
PLATELET # BLD: 266 K/UL (ref 135–450)
PMV BLD AUTO: 7.9 FL (ref 5–10.5)
POTASSIUM SERPL-SCNC: 3.7 MMOL/L (ref 3.5–5.1)
RBC # BLD: 2.65 M/UL (ref 4.2–5.9)
SODIUM BLD-SCNC: 135 MMOL/L (ref 136–145)
TOTAL IRON BINDING CAPACITY: 292 UG/DL (ref 260–445)
WBC # BLD: 17.1 K/UL (ref 4–11)

## 2020-01-10 PROCEDURE — 92610 EVALUATE SWALLOWING FUNCTION: CPT

## 2020-01-10 PROCEDURE — 93308 TTE F-UP OR LMTD: CPT

## 2020-01-10 PROCEDURE — 80048 BASIC METABOLIC PNL TOTAL CA: CPT

## 2020-01-10 PROCEDURE — 6370000000 HC RX 637 (ALT 250 FOR IP): Performed by: INTERNAL MEDICINE

## 2020-01-10 PROCEDURE — 2580000003 HC RX 258: Performed by: INTERNAL MEDICINE

## 2020-01-10 PROCEDURE — 97530 THERAPEUTIC ACTIVITIES: CPT

## 2020-01-10 PROCEDURE — 6360000002 HC RX W HCPCS: Performed by: INTERNAL MEDICINE

## 2020-01-10 PROCEDURE — 94640 AIRWAY INHALATION TREATMENT: CPT

## 2020-01-10 PROCEDURE — 92526 ORAL FUNCTION THERAPY: CPT

## 2020-01-10 PROCEDURE — 99233 SBSQ HOSP IP/OBS HIGH 50: CPT | Performed by: NURSE PRACTITIONER

## 2020-01-10 PROCEDURE — 2500000003 HC RX 250 WO HCPCS: Performed by: INTERNAL MEDICINE

## 2020-01-10 PROCEDURE — 6370000000 HC RX 637 (ALT 250 FOR IP): Performed by: NURSE PRACTITIONER

## 2020-01-10 PROCEDURE — 94761 N-INVAS EAR/PLS OXIMETRY MLT: CPT

## 2020-01-10 PROCEDURE — 97166 OT EVAL MOD COMPLEX 45 MIN: CPT

## 2020-01-10 PROCEDURE — 85027 COMPLETE CBC AUTOMATED: CPT

## 2020-01-10 PROCEDURE — 2060000000 HC ICU INTERMEDIATE R&B

## 2020-01-10 PROCEDURE — 97116 GAIT TRAINING THERAPY: CPT

## 2020-01-10 PROCEDURE — 36415 COLL VENOUS BLD VENIPUNCTURE: CPT

## 2020-01-10 PROCEDURE — 70450 CT HEAD/BRAIN W/O DYE: CPT

## 2020-01-10 PROCEDURE — 99233 SBSQ HOSP IP/OBS HIGH 50: CPT | Performed by: INTERNAL MEDICINE

## 2020-01-10 PROCEDURE — 83735 ASSAY OF MAGNESIUM: CPT

## 2020-01-10 PROCEDURE — 2700000000 HC OXYGEN THERAPY PER DAY

## 2020-01-10 PROCEDURE — 97162 PT EVAL MOD COMPLEX 30 MIN: CPT

## 2020-01-10 RX ORDER — AMIODARONE HYDROCHLORIDE 200 MG/1
200 TABLET ORAL DAILY
Status: DISCONTINUED | OUTPATIENT
Start: 2020-01-10 | End: 2020-01-21

## 2020-01-10 RX ORDER — METHYLPREDNISOLONE SODIUM SUCCINATE 40 MG/ML
40 INJECTION, POWDER, LYOPHILIZED, FOR SOLUTION INTRAMUSCULAR; INTRAVENOUS ONCE
Status: COMPLETED | OUTPATIENT
Start: 2020-01-10 | End: 2020-01-10

## 2020-01-10 RX ORDER — METHYLPREDNISOLONE SODIUM SUCCINATE 40 MG/ML
40 INJECTION, POWDER, LYOPHILIZED, FOR SOLUTION INTRAMUSCULAR; INTRAVENOUS EVERY 12 HOURS
Status: DISCONTINUED | OUTPATIENT
Start: 2020-01-10 | End: 2020-01-12

## 2020-01-10 RX ORDER — TRAMADOL HYDROCHLORIDE 50 MG/1
50 TABLET ORAL ONCE
Status: COMPLETED | OUTPATIENT
Start: 2020-01-10 | End: 2020-01-10

## 2020-01-10 RX ADMIN — TRAMADOL HYDROCHLORIDE 50 MG: 50 TABLET, FILM COATED ORAL at 21:40

## 2020-01-10 RX ADMIN — IPRATROPIUM BROMIDE AND ALBUTEROL SULFATE 1 AMPULE: .5; 3 SOLUTION RESPIRATORY (INHALATION) at 11:52

## 2020-01-10 RX ADMIN — ATORVASTATIN CALCIUM 40 MG: 40 TABLET, FILM COATED ORAL at 08:49

## 2020-01-10 RX ADMIN — IPRATROPIUM BROMIDE AND ALBUTEROL SULFATE 1 AMPULE: .5; 3 SOLUTION RESPIRATORY (INHALATION) at 15:52

## 2020-01-10 RX ADMIN — AMIODARONE HYDROCHLORIDE 200 MG: 200 TABLET ORAL at 10:55

## 2020-01-10 RX ADMIN — ALLOPURINOL 300 MG: 300 TABLET ORAL at 08:49

## 2020-01-10 RX ADMIN — METHYLPREDNISOLONE SODIUM SUCCINATE 40 MG: 40 INJECTION, POWDER, FOR SOLUTION INTRAMUSCULAR; INTRAVENOUS at 19:05

## 2020-01-10 RX ADMIN — AMIODARONE HYDROCHLORIDE 0.5 MG/MIN: 50 INJECTION, SOLUTION INTRAVENOUS at 02:29

## 2020-01-10 RX ADMIN — IPRATROPIUM BROMIDE AND ALBUTEROL SULFATE 1 AMPULE: .5; 3 SOLUTION RESPIRATORY (INHALATION) at 19:14

## 2020-01-10 RX ADMIN — Medication 10 ML: at 21:43

## 2020-01-10 RX ADMIN — FUROSEMIDE 40 MG: 10 INJECTION, SOLUTION INTRAMUSCULAR; INTRAVENOUS at 08:49

## 2020-01-10 RX ADMIN — Medication 10 ML: at 21:42

## 2020-01-10 RX ADMIN — SODIUM NITROPRUSSIDE 0.5 MCG/KG/MIN: 25 INJECTION, SOLUTION, CONCENTRATE INTRAVENOUS at 04:23

## 2020-01-10 RX ADMIN — IPRATROPIUM BROMIDE AND ALBUTEROL SULFATE 1 AMPULE: .5; 3 SOLUTION RESPIRATORY (INHALATION) at 08:16

## 2020-01-10 RX ADMIN — ASPIRIN 81 MG 81 MG: 81 TABLET ORAL at 08:49

## 2020-01-10 RX ADMIN — FUROSEMIDE 40 MG: 10 INJECTION, SOLUTION INTRAMUSCULAR; INTRAVENOUS at 19:05

## 2020-01-10 RX ADMIN — DOBUTAMINE HYDROCHLORIDE 3 MCG/KG/MIN: 200 INJECTION INTRAVENOUS at 04:58

## 2020-01-10 RX ADMIN — METHYLPREDNISOLONE SODIUM SUCCINATE 40 MG: 40 INJECTION, POWDER, FOR SOLUTION INTRAMUSCULAR; INTRAVENOUS at 10:55

## 2020-01-10 ASSESSMENT — PAIN SCALES - GENERAL
PAINLEVEL_OUTOF10: 0
PAINLEVEL_OUTOF10: 7
PAINLEVEL_OUTOF10: 0
PAINLEVEL_OUTOF10: 5

## 2020-01-10 NOTE — PRE SEDATION
Sedation Pre-Procedure Note    Patient Name: Haley Yu   YOB: 1950  Room/Bed: 2095/7364-23  Medical Record Number: [id number]  Date: 1/9/2020   Time: 11:20 PM       Indication: Respiratory failure, mucous plugging, chest congestion, pulmonary infiltrates along with aortic stenosis and pulmonary venous congestion    Consent: I have discussed with the patient and/or the patient representative the indication, alternatives, and the possible risks and/or complications of the planned procedure and the anesthesia methods. The patient and/or patient representative appear to understand and agree to proceed. Vital Signs:   Vitals:    01/09/20 2044   BP: 109/69   Pulse: 102   Resp: 16   Temp: 98.4 °F (36.9 °C)   SpO2: 97%       Past Medical History:   has a past medical history of CAD (coronary artery disease), COPD (chronic obstructive pulmonary disease) ([facility] [address] Diabetes mellitus ([facility] [address] Gout, Hyperlipidemia, Hypertension, and Thyroid disease. Past Surgical History:   has a past surgical history that includes thoracotomy (Left, 1/2/2020). Medications:   Scheduled Meds:    furosemide  40 mg Intravenous BID    aspirin  81 mg Oral Daily    [START ON 1/10/2020] ipratropium-albuterol  1 ampule Inhalation Q4H WA    allopurinol  300 mg Oral Daily    atorvastatin  40 mg Oral Daily    nicotine  1 patch Transdermal Daily    sodium chloride flush  10 mL Intravenous 2 times per day     Continuous Infusions:    amiodarone 0.5 mg/min (01/09/20 1001)    nitroprusside (NIPRIDE) 50 mg in D5W infusion 0.5 mcg/kg/min (01/09/20 1038)    DOBUTamine 3 mcg/kg/min (01/09/20 0143)     PRN Meds: potassium chloride **OR** potassium alternative oral replacement **OR** potassium chloride, magnesium sulfate, prochlorperazine, perflutren lipid microspheres, sodium chloride flush, magnesium hydroxide  Home Meds:   Prior to Admission medications    Medication Sig Start Date End Date Taking?  Authorizing Provider

## 2020-01-10 NOTE — PROGRESS NOTES
Nutrition Education    Type and Reason for Visit: Patient Education, Consult    Nutrition Assessment:     Patient reported \"eating whatever he wanted\". Poor tolerance of low sodium diet. RD encouraged trying other ways of enhancing foods (patient already has handout on sodium free flavoring tips). Patient verbalized understanding. RD also encouraged heart healthy cook books for fun recipe ideas, provided Beef Stew recipe from recipes. heart.org. Patient reported not eating his meat on tray consistently. Patient agreeable to Ensure with meals. Will continue to monitor. · Verbally reviewed information with Patient  · Written educational materials provided. · Contact name and number provided. · Refer to Patient Education activity for more details.     Electronically signed by Kai Vieyra [address],  on 1/10/20 at 10:50 AM    Contact Number: Office: [phone]; [address]: [phone]

## 2020-01-10 NOTE — PROGRESS NOTES
Hospitalist Progress Note      PCP: No primary care provider on file. Date of Admission: 1/8/2020    Chief Complaint:  SOB     History Of Present Illness:    \"69 y.o. male who presented to [facility] with SOB. Patient was discharged from [facility] for pneumonia and a large L hemothorax. He was discharged home much improved and felt well yesterday. However, overnight, patient became increasingly SOB. This worsened this AM and presented to OSH. Patient has known severe aortic stenosis and is being evaluated by CT surgery as an outpatient for repair of his bioprosthetic valve. At OSH, he was found to be having acute heart failure and mild cardiogenic shock. He was started on low dose dobutamine with good response and transferred to [facility]. \"      Subjective:  He feels well. Less dyspnea. No chest pain. Wheezing profusely today, reordered IV steroids.        Medications:  Reviewed    Infusion Medications    amiodarone 0.5 mg/min (01/10/20 0229)    nitroprusside (NIPRIDE) 50 mg in D5W infusion 0.5 mcg/kg/min (01/10/20 0423)    DOBUTamine 3 mcg/kg/min (01/10/20 2018)     Scheduled Medications    furosemide  40 mg Intravenous BID    aspirin  81 mg Oral Daily    ipratropium-albuterol  1 ampule Inhalation Q4H WA    allopurinol  300 mg Oral Daily    atorvastatin  40 mg Oral Daily    nicotine  1 patch Transdermal Daily    sodium chloride flush  10 mL Intravenous 2 times per day     PRN Meds: potassium chloride **OR** potassium alternative oral replacement **OR** potassium chloride, magnesium sulfate, prochlorperazine, perflutren lipid microspheres, sodium chloride flush, magnesium hydroxide      Intake/Output Summary (Last 24 hours) at 1/10/2020 1014  Last data filed at 1/10/2020 0855  Gross per 24 hour   Intake 1537.71 ml   Output 2750 ml   Net -1212.29 ml       Physical Exam Performed:    BP (!) 102/52   Pulse 74   Temp 97.8 °F (36.6 °C) (Oral)   Resp 22   Ht 5' 10\" (1.778 m)   Wt 204 lb 12.8 oz (92.9 kg) initially. - amiodarone gtt per cardiology. - not on anticoagulation due to recent hemothorax. Continue aspirin. AECOPD  - steroids, inhaled bronchodilators. - completed a course of vanc and cefepime last admission. Lungs with severe rhonchi, wheezing, and gurgly breath sounds, pulm consulted. S/p bronchoscopy 1/10, f/u culture results. - he quit smoking during the last admission, had been smoking 2 PPD. Acute hypoxic respiratory failure  - due to above issues, treat accordingly. Elevated troponin  - likely demand ischemia due to above issues. Cardiology consulted. Muscular deconditioning, mechanical falls  - PT/OT. - he says he had syncope a couple months ago while having a BM. This was presumably vasovagal.  Discussed with CT surgery, f/u B12 and head CT.  TSH wnl. DVT Prophylaxis: SCDs  Diet: DIET LOW SODIUM 2 GM;  Code Status: DNR-CCA    PT/OT Eval Status: eval ordered    Dispo - perhaps 1/14, when he seems less likely to quickly decompensate again as outpatient. He went home with [place] [id number] last time.        Phoebe Chavez MD

## 2020-01-10 NOTE — PROGRESS NOTES
solids. Prognosis  Prognosis  Prognosis for safe diet advancement: good  Individuals consulted  Consulted and agree with results and recommendations: Patient;RN    Education  Patient Education: Pt educated on role of ST, BSE findings and recommendations.   Patient Education Response: Verbalizes understanding;Demonstrated understanding  Safety Devices in place: Yes  Type of devices: Left in bed;Bed alarm in place;Call light within reach;Nurse notified       Therapy Time  SLP Individual Minutes  Time In: 1575  Time Out: 0845  Minutes: [address] [facility], [place] [id number] #[id number]  Speech Language Pathologist

## 2020-01-10 NOTE — PROGRESS NOTES
INPATIENT PULMONARY CRITICAL CARE PROGRESS NOTE      Reason for visit    SOB    SUBJECTIVE: Patient was subjected to a bronchoscopy and large amounts of thick mucous plugs were lavaged out yesterday, patient when seen this morning was saying that he was better, patient was still having some cough with no congestion, patient still has some shortness of breath but better, patient still has some wheezing, patient does not have any chest pain or palpitations, patient was afebrile and hemodynamically [place], patient was in atrial fibrillation with controlled ventricular rate on the monitor, patient was on 2 L of nasal cannula oxygen with saturation of 95%, maintained, patient had good urine output overnight with cumulative fluid balance of -2.1 L, patient's blood sugars are slightly on the higher side, patient was alert and communicative, no other pertinent review of system of concern      Physical Exam:  Blood pressure (!) 120/57, pulse 78, temperature 97.8 °F (36.6 °C), temperature source Oral, resp. rate 22, height 5' 10\" (1.778 m), weight 204 lb 12.8 oz (92.9 kg), SpO2 95 %.'     Constitutional: In no obvious respiratory distress. With increased audible congestion and wheezing along with increased work of breathing   HENT:  Oropharynx is clear and moist. No thyromegaly. Eyes:  Conjunctivae are normal. Pupils equal, round, and reactive to light. No scleral icterus. Neck: . No tracheal deviation present. No obvious thyroid mass. Short enlarged neck  Cardiovascular: Normal rate, regular rhythm, normal heart sounds. No right ventricular heave. (+) lower extremity edema. Pulmonary/Chest: Bilateral decreased wheezes. Bilateral rales. Chest wall is not dull to percussion. No accessory muscle usage or stridor. Patient also had oozing of blood from the chest wall on the left side where chest tube was present, patient also had some prolonged expiration with decreased breath sound density  Abdominal: Soft.  Bowel tip projects approximately 1.6 cm above the quinten. More optimal position would be approximately 2 cm above this level. Sternotomy wires are noted. Enteric tube extends the left upper quadrant. Tubular structure projects over the left lung base. Near complete consolidation of the left hemithorax is noted with large pleural effusion. There is mild ground-glass density on the right without right-sided consolidation. Abdomen: 2 images were provided. Detail is limited. Enteric tube extends into the left upper quadrant. Tip and side port are in the left upper quadrant. There are no disproportionately dilated bowel loops     ET tube and enteric tube as described There is a large amount of opacification of the left hemithorax with a large pleural effusion. No pneumothorax     Ct Head Wo Contrast    Result Date: 1/10/2020  EXAMINATION: CT OF THE HEAD WITHOUT CONTRAST  1/10/2020 12:52 pm TECHNIQUE: CT of the head was performed without the administration of intravenous contrast. Dose modulation, iterative reconstruction, and/or weight based adjustment of the mA/kV was utilized to reduce the radiation dose to as low as reasonably achievable. COMPARISON: None. HISTORY: ORDERING SYSTEM PROVIDED HISTORY: syncope TECHNOLOGIST PROVIDED HISTORY: Reason for exam:->syncope Has a \"code stroke\" or \"stroke alert\" been called? ->No Reason for Exam: syncope Acuity: Acute Type of Exam: Initial Mechanism of Injury: fall x 1 week ago Relevant Medical/Surgical History: pt sts no other problems FINDINGS: BRAIN/VENTRICLES: There is degradation of image quality related to motion/streak artifact. Ventricular system is within normal limits for patient age. No evidence of mass effect or midline shift. Prominence of sulci overlying convexities of cerebral hemispheres and cerebellum consistent with atrophy. Minimal abnormal low-attenuation identified within periventricular/subcortical white matter and central sylvian regions.  Finding findings are generally similar in appearance when compared to the study of 01/02/2020. No evidence of significant recurrent pleural effusion. No evidence of pneumothorax. Taking into account differences in technique, there has been no significant change in the appearance of the chest since the examination of 01/02/2020. Xr Chest Portable    Result Date: 1/5/2020  EXAMINATION: ONE XRAY VIEW OF THE CHEST 1/5/2020 7:09 am COMPARISON: January 4, 2020 HISTORY: ORDERING SYSTEM PROVIDED HISTORY: Followup chest tube TECHNOLOGIST PROVIDED HISTORY: Reason for exam:->Followup chest tube FINDINGS: Cardiac silhouette is enlarged. Again noted are 2 chest tubes in the left lung, unchanged. No pneumothorax. Right IJ catheter with tip in the lower SVC. Streaky left basilar opacity, similar to previous exam.  Pulmonary vascular congestion. No significant change in pulmonary vascular congestion centrally and streaky left basilar opacity. Xr Chest Portable    Result Date: 1/2/2020  EXAMINATION: ONE XRAY VIEW OF THE CHEST 1/2/2020 1:49 pm COMPARISON: Same day HISTORY: ORDERING SYSTEM PROVIDED HISTORY: s/p  - r/o ptx TECHNOLOGIST PROVIDED HISTORY: Reason for exam:->s/p  - r/o ptx Reason for Exam: s/p  - r/o ptx Acuity: Acute Type of Exam: Initial FINDINGS: Endotracheal tube and right IJ line remain in place. There are now 2 left sided chest tubes with tip is located in the thoracic apex and base. There has been resolution of left pleural effusion. There is no pneumothorax. There is ground-glass opacity in the mid and left lower lung. Right lung is grossly clear. There is cardiomegaly     Resolution of large left pleural effusion status post chest tube placement. No evidence of pneumothorax.   Ground-glass opacity in the mid and left lower lung may represent atelectasis or edema     Xr Chest Portable    Result Date: 1/2/2020  EXAMINATION: ONE XRAY VIEW OF THE CHEST 1/2/2020 5:46 am COMPARISON: Chest radiographs 01/02/2020 HISTORY: ORDERING SYSTEM PROVIDED HISTORY: post chest tube placement TECHNOLOGIST PROVIDED HISTORY: Reason for exam:->post chest tube placement Reason for Exam: post chest tube placement FINDINGS: Endotracheal tube now terminates 3.5 cm superior to the quinten. A left thoracostomy tube has been placed. Additional life support devices are unchanged. The entire left lung is now atelectatic. Large left effusion persists. Hypoexpanded right lung, clear. Cardiac silhouette is obscured. Aortic valve replacement present. Interval left thoracostomy tube placement. Continued large volume left pleural effusion with now complete left lung atelectasis. Appropriate endotracheal tube repositioning. Xr Chest Portable    Result Date: 1/2/2020  EXAMINATION: ONE XRAY VIEW OF THE CHEST 1/2/2020 1:41 am COMPARISON: Chest radiograph 01/02/2020 HISTORY: ORDERING SYSTEM PROVIDED HISTORY: Right IJ placement TECHNOLOGIST PROVIDED HISTORY: Reason for exam:->Right IJ placement Reason for Exam: Right IJ placement FINDINGS: Endotracheal tube terminates 0.8 cm superior to the quinten. Esophagogastric tube tip terminates below the diaphragm outside the field of view. Right IJ central venous catheter terminates over the SVC. Large left pleural effusion with partial left lung collapse. Hypoexpanded right lung, clear. No pneumothorax. Left cardiomediastinal borders are obscured. Appropriate right IJ central venous catheter positioning. No pneumothorax. Low-lying endotracheal tube. Recommend retracting approximately 2 cm. Ongoing large left pleural effusion with partial left lung atelectasis. The findings were sent to the Radiology Results [id number] at 2:28 am on 1/2/2020to be communicated to a licensed caregiver. Xr Chest Portable    Result Date: 1/2/2020  EXAMINATION: ONE XRAY VIEW OF THE CHEST; ONE SUPINE XRAY VIEW(S) OF THE ABDOMEN 1/2/2020 12:42 am COMPARISON: None.  HISTORY: ORDERING SYSTEM PROVIDED HISTORY: post intubation TECHNOLOGIST PROVIDED HISTORY: Reason for exam:->post intubation Reason for Exam: post intubation; ORDERING SYSTEM PROVIDED HISTORY: og placement TECHNOLOGIST PROVIDED HISTORY: Reason for exam:->og placement Reason for Exam: ng placement FINDINGS: Chest: Detail of the chest is limited. ET tube is noted. The tip projects approximately 1.6 cm above the quinten. More optimal position would be approximately 2 cm above this level. Sternotomy wires are noted. Enteric tube extends the left upper quadrant. Tubular structure projects over the left lung base. Near complete consolidation of the left hemithorax is noted with large pleural effusion. There is mild ground-glass density on the right without right-sided consolidation. Abdomen: 2 images were provided. Detail is limited. Enteric tube extends into the left upper quadrant. Tip and side port are in the left upper quadrant. There are no disproportionately dilated bowel loops     ET tube and enteric tube as described There is a large amount of opacification of the left hemithorax with a large pleural effusion. No pneumothorax     Xr Chest 1 Vw    Result Date: 1/4/2020  EXAMINATION: ONE XRAY VIEW OF THE CHEST 1/4/2020 9:21 am COMPARISON: January 3, 2020 HISTORY: ORDERING SYSTEM PROVIDED HISTORY: s/p thoracotomy TECHNOLOGIST PROVIDED HISTORY: Reason for exam:->s/p thoracotomy Reason for Exam: s/p thoracotomy Acuity: Acute Type of Exam: Subsequent/Follow-up FINDINGS: Cardiac silhouette is enlarged. Right IJ catheter with tip projecting in the region lower SVC. 2 left-sided chest tubes again noted, similar in appearance to previous exam.  No discernible pneumothorax. Pulmonary vascular congestion. No significant change in appearance of the chest with persistent pulmonary vascular congestion centrally.      Vl Dup Carotid Bilateral    Result Date: 1/9/2020  Vascular Carotid Procedure -- PRELIMINARY SONOGRAPHER REPORT -- Demographics   Patient Name       Edmond Spurling   Date of Study      01/09/2020         Gender              Male   Patient Number     [id number]         Date of Birth       1950   Visit Number       [id number]          Age                 77 year(s)   Accession Number   [id number]          Room Number         0422   Corporate ID       [id number]           Sonographer         Lela Santiago RVT   Ordering Physician Aneta Echols                     APRN - CNP         Physician           Readers  Procedure Type of Study:   Cerebral:Carotid, VL CAROTID DUPLEX BILATERAL. Tech Comments Right Technical limited exam due to movement from excessive respirations. The right internal carotid artery reveals a <50% stenosis, however, there is significant calcific shadowing present and therefore the percent of stenosis may be underestimated. The right vertebral artery demonstrates normal antegrade flow. The right subclavian artery is visualized and demonstrates multiphasic flow. Left Technical limited exam due to movement from excessive respirations. The left internal carotid artery reveals a <50% stenosis, however, there is significant calcific shadowing present and therefore the percent of stenosis may be underestimated. The left vertebral artery demonstrates normal antegrade flow. The left subclavian artery is visualized and demonstrates multiphasic flow. There were no previous studies to use for comparison. Velocities are measured in cm/s ; Diameters are measured in mm Carotid Right Measurements +---------------+----+----+-----+----+ ! Location       ! PSV ! EDV ! Angle! RI  ! +---------------+----+----+-----+----+ ! Prox CCA       !97. 4!75. 8!60   !0.65! +---------------+----+----+-----+----+ ! Mid CCA        !74. 5!22  !60   !0.7 ! +---------------+----+----+-----+----+ ! Dist CCA       !78. 4!25. 1! 60   !0.68! +---------------+----+----+-----+----+ ! Prox ICA       !91. 1!30. 4!42   !0.67! +---------------+----+----+-----+----+ ! Mid ICA        !91. 2!13. 8!63   !0.63! +---------------+----+----+-----+----+ ! Dist ICA       !83. 3!65. 9!60   !0.68! +---------------+----+----+-----+----+ ! Prox ECA       !83.1!    !60   !    ! +---------------+----+----+-----+----+ ! Vertebral      !89. 0!10. 6!60   !0.53! +---------------+----+----+-----+----+ ! Prox Subclavian!51.6!    !60   !    ! +---------------+----+----+-----+----+   - Additional Measurements:ICAPSV/CCAPSV 1.22. ICAEDV/CCAEDV 0.99. Carotid Left Measurements +---------------+----+----+-----+----+ ! Location       ! PSV ! EDV ! Angle! RI  ! +---------------+----+----+-----+----+ ! Prox CCA       !80. 1!24. 8!60   !0.69! +---------------+----+----+-----+----+ ! Mid CCA        !74. 5!25. 5!60   !0.66! +---------------+----+----+-----+----+ ! Dist CCA       !72. 7!15. 5!60   !0.79! +---------------+----+----+-----+----+ ! Prox ICA       !86. 7!17. 9!60   !0.6 ! +---------------+----+----+-----+----+ ! Mid ICA        ! 59  !28  !60   !0.53! +---------------+----+----+-----+----+ ! Dist ICA       !48. 3!23. 6! 60   !0.52! +---------------+----+----+-----+----+ ! Prox ECA       !693 !    !61   !    ! +---------------+----+----+-----+----+ ! Vertebral      !41. 3!15. 1! 60   !0.63! +---------------+----+----+-----+----+ ! Prox Subclavian!78.4!    !61   !    ! +---------------+----+----+-----+----+   - Additional Measurements:ICAPSV/CCAPSV 1.16. ICAEDV/CCAEDV 1.41. Rpt Echo - Conclusions      Summary   Bioprosthetic aortic valve is well seated. A peak velocity of 436 cm/sec,and   a mean pressure gradient of 41 mmhg. Doppler parameters are increased for valve, most consistent with moderate   bioprosthetic AVR stenosis. Trivial aortic regurgitation. Results for Patsy Su (MRN [id number]) as of 1/10/2020 15:13   Ref.  Range 1/9/2020 00:18 1/9/2020 00:19 1/9/2020 05:06 1/10/2020 05:05   Sodium Latest Ref Range: 136 - 145 mmol/L 135 (L)  134 (L) 135 (L)   Potassium Latest Ref Range: 3.5 Pulmonary venous congestion    Atelectasis    Leukocytosis    Hyperglycemia    Acute bronchospasm    Atrial fibrillation with RVR (HCC)    Cardiogenic shock (HCC)    Acute on chronic diastolic congestive heart failure ([facility])  Resolved Problems:    * No resolved hospital problems.  *          Plan:   · Oxygen supplementation to keep saturation between 90 to 94% only  · Pulmonary toilet  · Patient appears to have pulmonary infiltrates with atelectasis along with that patient also appears to have pulmonary venous congestion which may be causing patient's symptomatology  · Patient also has acute bronchospasm on auscultation  · Patient to be given bronchodilators and patient was using only albuterol  which was changed to DuoNeb  · Low-dose IV Solu-Medrol restarted  · Patient does have history of hyperglycemia which will worsen with IV steroids and needs to monitor closely  · Status post bronchoscopy-results are pending  · Smoking cessation advised  · Cardiac medications as per cardiology team  · Transthoracic echo results reviewed  · Cannot be given anticoagulation secondary to recent hemothorax  · Chest wall dressing as per protocol  · Will hold off on any antibiotics for now but will reassess as per clinical status and bronchoscopy findings along with cultures  · Patient was given dobutamine and nitroprusside infusions as per cardiology to decrease afterload  · Keep negative fluid balance  · Monitor input output and BMP  · Correct electrolytes on whenever necessary basis  · PUD prophylaxis as per IM     Further management depending on patient's clinical status and follow-up on above recommendations along with bronchoscopy results and cardiology/cardiothoracic surgery recommendations          Electronically signed by:  Gallito Lynch MD    1/10/2020    3:11 PM.

## 2020-01-10 NOTE — PROGRESS NOTES
(N/A, 1/9/2020). Restrictions  Restrictions/Precautions  Restrictions/Precautions: Up as Tolerated, General Precautions  Position Activity Restriction  Other position/activity restrictions: Up with assist  Vision/Hearing  Vision: Impaired  Vision Exceptions: Wears glasses at all times  Hearing: Within functional limits     Subjective  General  Chart Reviewed: Yes  Patient assessed for rehabilitation services?: Yes  Response To Previous Treatment: Not applicable  Family / Caregiver Present: No  Referring Practitioner: Bernarda Andrews MD  Referral Date : 01/10/20  Diagnosis: SOB  Subjective  Subjective: Pt is agreeable to therapy.   Pain Screening  Patient Currently in Pain: Denies  Vital Signs  Patient Currently in Pain: Denies       Orientation  Orientation  Overall Orientation Status: Within Normal Limits  Social/Functional History  Social/Functional History  Lives With: Alone  Type of Home: Trailer  Home Layout: One level  Home Access: Stairs to enter without rails  Entrance Stairs - Number of Steps: 2 THOMPSON  Bathroom Shower/Tub: Tub/Shower unit  Bathroom Toilet: Handicap height  Bathroom Equipment: (no bathroom DME)  Home Equipment: (no DME)  ADL Assistance: Independent  Homemaking Assistance: Independent  Homemaking Responsibilities: Yes  Ambulation Assistance: Independent  Transfer Assistance: Independent  Active : Yes  Occupation: Retired  Type of occupation: heavy   Leisure & Hobbies: plays guitar, casino  Cognition   Cognition  Overall Cognitive Status: Exceptions  Cognition Comment: pt demos decreased safety awareness    Objective          PROM RLE (degrees)  RLE PROM: WFL  AROM RLE (degrees)  RLE AROM: WFL  PROM LLE (degrees)  LLE PROM: WFL  AROM LLE (degrees)  LLE AROM : WFL  Strength RLE  Strength RLE: Exception  Comment: Grossly 3+/5  Strength LLE  Strength LLE: Exception  Comment: Grossly 3+/5  Tone RLE  RLE Tone: Normotonic  Tone LLE  LLE Tone: Normotonic  Sensation  Overall Sensation Status: WFL  Bed mobility  Supine to Sit: Supervision  Sit to Supine: Unable to assess(Pt up in chair at end of session.)  Transfers  Sit to Stand: Minimal Assistance  Stand to sit: Minimal Assistance  Bed to Chair: Minimal assistance  Comment: Uncontrolled eccentric descent onto the chair. Pt demos symptoms of decreased BP, though vitals stable. Ambulation  Ambulation?: Yes  More Ambulation?: No  Ambulation 1  Surface: level tile  Device: No Device;Hand-Held Assist  Assistance: Minimal assistance  Quality of Gait: Multiple LOB with ambulation, decreased step length, decreased awareness to enviornment, decreased balance without HHA or using objects in the room. Gait Deviations: Slow Beena;Decreased step length;Decreased step height;Decreased arm swing  Distance: 15 feet + 50 feet  Stairs/Curb  Stairs?: No     Balance  Posture: Fair  Sitting - Static: Good  Sitting - Dynamic: Good  Standing - Static: Fair;+  Standing - Dynamic: Fair;+  Comments: without AD. Exercises  Comments: Pt states, \"I can't believe you get paid to move people's legs. \" Declined ther ex this date. Plan   Plan  Times per week: 3-5x  Times per day: Daily  Plan weeks: 5 days (until 1/15/20)  Current Treatment Recommendations: Strengthening, ROM, Balance Training, Transfer Training, Functional Mobility Training, Endurance Training, Stair training, Gait Training, Neuromuscular Re-education, Safety Education & Training, Home Exercise Program, Patient/Caregiver Education & Training, Equipment Evaluation, Education, & procurement  Safety Devices  Type of devices:  All fall risk precautions in place, Call light within reach, Chair alarm in place, Gait belt, Left in chair  Restraints  Initially in place: No    AM-PAC Score  AM-PAC Inpatient Mobility Raw Score : 18 (01/10/20 1556)  AM-PAC Inpatient T-Scale Score : 43.63 (01/10/20 1556)  Mobility Inpatient CMS 0-100% Score: 46.58 (01/10/20 1556)  Mobility Inpatient CMS G-Code Modifier : CK (01/10/20 1556)        Goals  Short term goals  Time Frame for Short term goals: 4-5 days (unless otherwise stated) by 1/15/20  Short term goal 1: Pt will perform bed mobility with independence. Short term goal 2: Pt will perform sit<>stand with SBA. Short term goal 3: Pt will ambulate 100 feet with no AD, no LOB, and O2 stats remaining above 92% and CGA. Short term goal 4: Pt will perform 12-15 reps of BLE ther ex for strengthening and balance by 1/13/20. Patient Goals   Patient goals : To go home.      Therapy Time   Individual Concurrent Group Co-treatment   Time In 8735         Time Out 1537         Minutes 21         Timed Code Treatment Minutes: 8 Minutes     Chaya Issa, [place]

## 2020-01-10 NOTE — PROGRESS NOTES
Occupational Therapy   Occupational Therapy Initial Assessment/Treatment   Date: 1/10/2020   Patient Name: Brock Carmichael  MRN: [id number]     : 1950    Date of Service: 1/10/2020    Discharge Recommendations:  [address], Continue to assess pending progress  OT Equipment Recommendations  Other: defer    Assessment   Performance deficits / Impairments: Decreased functional mobility ; Decreased safe awareness;Decreased balance;Decreased ADL status; Decreased cognition;Decreased high-level IADLs    Assessment: Pt 78 yo male presenting with deficits in the areas listed above following SOB at home. Pt was recently d/c from hospital and then readmitted. Pt demo'd poor safety awareness with min A for functional ambulation. Pt required min A to correct multiple LOBs. Pt would benefit from skilled OT services to return to baseline functioning. Prognosis: Good  Decision Making: Medium Complexity  OT Education: OT Role;Plan of Care;Transfer Training;Energy Conservation;IADL Safety  REQUIRES OT FOLLOW UP: Yes  Activity Tolerance  Activity Tolerance: Patient Tolerated treatment well  Activity Tolerance: O2 96% on 2.5L at rest, O2 93% on RA after ambulation, RN notified, O2 returned to pt at end of session  Safety Devices  Safety Devices in place: Yes  Type of devices: Call light within reach; Chair alarm in place; Left in chair;Gait belt;Nurse notified           Patient Diagnosis(es): There were no encounter diagnoses. has a past medical history of CAD (coronary artery disease), COPD (chronic obstructive pulmonary disease) ([facility] [address] Diabetes mellitus ([facility] [address] Gout, Hyperlipidemia, Hypertension, and Thyroid disease. has a past surgical history that includes thoracotomy (Left, 2020) and bronchoscopy (N/A, 2020).            Restrictions  Restrictions/Precautions  Restrictions/Precautions: Up as Tolerated, General Precautions  Position Activity Restriction  Other position/activity restrictions: Up with assist    Subjective   General  Chart Reviewed: Yes  Patient assessed for rehabilitation services?: Yes  Family / Caregiver Present: No  Referring Practitioner: Bernarda Andrews MD  Subjective  Subjective: Pt agreeable to therapy  General Comment  Comments: RN approved therapy  Patient Currently in Pain: Denies  Vital Signs  Patient Currently in Pain: Denies  Oxygen Therapy  SpO2: 98 %  O2 Flow Rate (L/min): 2 L/min     Social/Functional History  Social/Functional History  Lives With: Alone  Type of Home: Trailer  Home Layout: One level  Home Access: Stairs to enter without rails  Entrance Stairs - Number of Steps: 2 THOMPSON  Bathroom Shower/Tub: Tub/Shower unit  Bathroom Toilet: Handicap height  Bathroom Equipment: (no bathroom DME)  Home Equipment: (no DME)  ADL Assistance: [address] [place] [address] Avenue: Independent  Homemaking Responsibilities: Yes  Ambulation Assistance: Independent  Transfer Assistance: Independent  Active : Yes  Occupation: Retired  Type of occupation: heavy   Leisure & Hobbies: plays [employer], [employer]       Objective        Orientation  Overall Orientation Status: Within Functional Limits  Observation/Palpation  Posture: Fair  Balance  Sitting Balance: Stand by assistance  Standing Balance: Contact guard assistance(no AD)  Functional Mobility  Functional - Mobility Device: No device  Activity: Other  Assist Level: Minimal assistance  Functional Mobility Comments: min A to correct multiplet LOB with ambulation  ADL  Feeding: Independent  Additional Comments: pt declined further adls   Tone RUE  RUE Tone: Normotonic  Tone LUE  LUE Tone: Normotonic  Coordination  Movements Are Fluid And Coordinated: Yes     Bed mobility  Supine to Sit: Supervision  Sit to Supine: Unable to assess(up in chair at end of session)  Transfers  Sit to stand: Stand by assistance  Stand to sit: Stand by assistance     Cognition  Overall Cognitive Status: Exceptions  Cognition Comment: pt crispin decreased safety awareness        Sensation  Overall Sensation Status: WFL        LUE AROM (degrees)  LUE AROM : WFL  RUE AROM (degrees)  RUE AROM : WFL  LUE Strength  Gross LUE Strength: WFL  RUE Strength  Gross RUE Strength: WFL                   Plan   Plan  Times per week: 3-5x/wk      AM-PAC Score        AM-PAC Inpatient Daily Activity Raw Score: 15 (01/10/20 1602)  AM-PAC Inpatient ADL T-Scale Score : 34.69 (01/10/20 1602)  ADL Inpatient CMS 0-100% Score: 56.46 (01/10/20 1602)  ADL Inpatient CMS G-Code Modifier : CK (01/10/20 1602)    Goals  Short term goals  Time Frame for Short term goals: 1 week (1/17/2020)  Short term goal 1: Pt will complete toilet transfer with S.   Short term goal 2: PT will complete bathroom mobility with S.   Short term goal 3: Pt will complete 15-20 reps of BUE exercises for increased strength.  (1/14/20)  Patient Goals   Patient goals : \"to go home\"       Therapy Time   Individual Concurrent Group Co-treatment   Time In 1516(10 minutes for evaluation)         Time Out 1537         Minutes 21         Timed Code Treatment Minutes: Via JANET Gonzalez/PURA

## 2020-01-10 NOTE — CONSULTS
INPATIENT PULMONARY CRITICAL CARE CONSULT NOTE      Chief Complaint/Referring Provider:  Patient is being seen at the request of Dr. Omid Medel  for a consultation for SOB     Presenting HPI: Patient was brought to the hospital with increasing shortness of breath    As per admitting provider- 77 y.o. male who presented to [facility] with SOB. Patient was discharged from [facility] for pneumonia and a large L hemothorax. He was discharged home much improved and felt well yesterday. However, overnight, patient became increasingly SOB. This worsened this AM and presented to OSH. Patient has known severe aortic stenosis and is being evaluated by CT surgery as an outpatient for repair of his bioprosthetic valve. At OSH, he was found to be having acute heart failure and mild cardiogenic shock. He was started on low dose dobutamine with good response and transferred to [facility].     Patient continues to be symptomatic and was having increased shortness of breath and a pulmonary consult was requested, patient when seen was having increased shortness of breath with increased work of breathing, patient was having cough which is congested but patient was not able to expectorate much, along with shortness of breath patient had some wheezing, patient was not complaining of any chest pain, patient had some oozing of blood from the chest tube site which was recently DC'd and had been placed because of hemothorax, patient does not complain of any increasing rhinorrhea or nasal congestion, patient was not complaining of sore throat or difficulty in swallowing, no coughing or choking while eating, patient was having chest heaviness but no pleuritic chest pain per se, patient was not having any significant palpitations or diaphoresis, patient was complaining of any nausea vomiting or any increasing leg edema,  Patient when seen was afebrile and hemodynamically Mathen, patient was on 2 L of nasal cannula oxygen with saturation of 98%, patient had Acuity: Acute   Type of Exam: Initial       FINDINGS:   There is vascular congestion without overt edema. Page Dirk is a persistent   nodule like density in the right mid lung at the level of the hilum. Ill-defined left mid to lower lung airspace disease remains present, no   significant change.  No evidence of pneumothorax or pleural effusion.           Impression   Vascular congestion without overt edema.       Persistent nodule like density of the right mid lung.  Follow-up to ensure   resolution and exclude malignancy is needed.       Unchanged left-sided atelectasis versus pneumonia over the past 24 hours     A. Pleural biopsy:       - Benign, consistent with a dense hyalinized fibrous pleural plaque       with focal calcification.     - Small fragment of benign fibrous and mesothelial tissue with chronic       inflammation.     - No neoplasm. B. Left upper lobe lung, wedge resection:       - Benign lung parenchyma with patchy subpleural fibrosis and scarring       and emphysematous changes.     - Marked congestion and focal intra-alveolar hemorrhage and numerous       intra-alveolar macrophages.     - Negative for granulomatous inflammation or neoplasm; see comment. COMMENT:  The patient had a recent redo of an aortic valve prosthesis on 12-27-19,  and subsequently had to undergo an emergent thoracotomy for evacuation of  a left hematoma/hemothorax. The pleural biopsy shows dense hyalinized fibrous tissue consistent with  a pleural plaque with focal calcification. The left upper lobe lung wedge  did not show any gross lesions. Sections show marked subpleural fibrosis  with fibrous scarring and emphysematous changes. There is parenchymal  congestion with focal intra-alveolar unorganized hemorrhage and numerous  intra-alveolar macrophages.  No pneumonitis is seen and no granulomatous  inflammation or neoplasm is seen.          Assessment:  Active Problems:    Acute respiratory failure with hypoxemia on whenever necessary basis  · PUD prophylaxis as per IM    Further management depending on patient's clinical status and follow-up on above recommendations along with bronchoscopy findings    Case discussed with patient, Dr. Charles So and nursing            Electronically signed by:  Walker Weinstien MD    1/9/2020    10:58 PM.

## 2020-01-10 NOTE — PROGRESS NOTES
Lasix 40 mg bid  2. Continue Dobutamine and Nipride while diuresing  3. Avoid hypotension as able  4. Change Amio to po  5. Continue ASA but holding other anticoagulation   6. Planning further evaluation once breathing/ clinical status improved    LibradoMIKO cat Arm, CNP, 1/10/2020, 10:08 AM  [place]   [phone]       Telemetry: Afib   NYHA: IV    Physical Exam:  General:  Awake, alert, NAD  Skin:  Warm and dry  Neck:  JVP 10 cm  Chest:  Course bilat with scattered wheezes, + rales  Cardiovascular:  Irreg Irreg, normal S1S2, 3-4/6 ALANNAH, no g/r  Abdomen:  Soft, nontender, +bowel sounds  Extremities:  1+ non-pitting BLE edema      Medications:    furosemide  40 mg Intravenous BID    aspirin  81 mg Oral Daily    ipratropium-albuterol  1 ampule Inhalation Q4H WA    allopurinol  300 mg Oral Daily    atorvastatin  40 mg Oral Daily    nicotine  1 patch Transdermal Daily    sodium chloride flush  10 mL Intravenous 2 times per day      amiodarone 0.5 mg/min (01/10/20 0229)    nitroprusside (NIPRIDE) 50 mg in D5W infusion 0.5 mcg/kg/min (01/10/20 0423)    DOBUTamine 3 mcg/kg/min (01/10/20 0458)       Lab Data: Lab results independently reviewed by myself 1/10/20   CBC:   Recent Labs     01/09/20  0506 01/10/20  0505   WBC 14.5* 17.1*   HGB 8.4* 8.2*    266     BMP:    Recent Labs     01/09/20  0018 01/09/20  0506 01/10/20  0505   * 134* 135*   K 3.8 4.0 3.7   CO2 30 29 33*   BUN 21* 20 21*   CREATININE 0.6* 0.6* 0.6*     INR:  No results for input(s): INR in the last 72 hours. BNP:    Recent Labs     01/08/20  1713   PROBNP 2,940*     Cardiac Enzymes:   Recent Labs     01/08/20  1713 01/08/20 2005   TROPONINI 0.07* 0.07*     Lipids:   Lab Results   Component Value Date    TRIG 87 01/09/2020    HDL 31 01/09/2020    LDLCALC 45 01/09/2020       Cardiac Imaging:   Limited echo 1/10/20:   Summary   Bioprosthetic aortic valve is well seated.  A peak velocity of 436 cm/sec,and a

## 2020-01-11 LAB
ANION GAP SERPL CALCULATED.3IONS-SCNC: 11 MMOL/L (ref 3–16)
BUN BLDV-MCNC: 19 MG/DL (ref 7–20)
CALCIUM SERPL-MCNC: 8.6 MG/DL (ref 8.3–10.6)
CHLORIDE BLD-SCNC: 91 MMOL/L (ref 99–110)
CO2: 35 MMOL/L (ref 21–32)
CREAT SERPL-MCNC: 0.6 MG/DL (ref 0.8–1.3)
EKG ATRIAL RATE: 84 BPM
EKG DIAGNOSIS: NORMAL
EKG Q-T INTERVAL: 470 MS
EKG QRS DURATION: 152 MS
EKG QTC CALCULATION (BAZETT): 555 MS
EKG R AXIS: 26 DEGREES
EKG T AXIS: 230 DEGREES
EKG VENTRICULAR RATE: 84 BPM
GFR AFRICAN AMERICAN: >60
GFR NON-AFRICAN AMERICAN: >60
GLUCOSE BLD-MCNC: 187 MG/DL (ref 70–99)
HCT VFR BLD CALC: 28.7 % (ref 40.5–52.5)
HEMOGLOBIN: 9.7 G/DL (ref 13.5–17.5)
INFLUENZA A BY PCR: NOT DETECTED
INFLUENZA B BY PCR: NOT DETECTED
MAGNESIUM: 2.1 MG/DL (ref 1.8–2.4)
MCH RBC QN AUTO: 31.5 PG (ref 26–34)
MCHC RBC AUTO-ENTMCNC: 33.8 G/DL (ref 31–36)
MCV RBC AUTO: 93.4 FL (ref 80–100)
PDW BLD-RTO: 17.4 % (ref 12.4–15.4)
PLATELET # BLD: 330 K/UL (ref 135–450)
PMV BLD AUTO: 7.6 FL (ref 5–10.5)
POTASSIUM SERPL-SCNC: 3.8 MMOL/L (ref 3.5–5.1)
PRO-BNP: 2039 PG/ML (ref 0–124)
RBC # BLD: 3.07 M/UL (ref 4.2–5.9)
RSV BY PCR: NOT DETECTED
RSV SOURCE: NORMAL
SODIUM BLD-SCNC: 137 MMOL/L (ref 136–145)
VITAMIN B-12: 1950 PG/ML (ref 211–911)
WBC # BLD: 15.3 K/UL (ref 4–11)

## 2020-01-11 PROCEDURE — 6370000000 HC RX 637 (ALT 250 FOR IP): Performed by: INTERNAL MEDICINE

## 2020-01-11 PROCEDURE — 94761 N-INVAS EAR/PLS OXIMETRY MLT: CPT

## 2020-01-11 PROCEDURE — 93010 ELECTROCARDIOGRAM REPORT: CPT | Performed by: INTERNAL MEDICINE

## 2020-01-11 PROCEDURE — 99232 SBSQ HOSP IP/OBS MODERATE 35: CPT | Performed by: INTERNAL MEDICINE

## 2020-01-11 PROCEDURE — 83735 ASSAY OF MAGNESIUM: CPT

## 2020-01-11 PROCEDURE — 94150 VITAL CAPACITY TEST: CPT

## 2020-01-11 PROCEDURE — 6360000002 HC RX W HCPCS: Performed by: INTERNAL MEDICINE

## 2020-01-11 PROCEDURE — 2580000003 HC RX 258: Performed by: INTERNAL MEDICINE

## 2020-01-11 PROCEDURE — 2700000000 HC OXYGEN THERAPY PER DAY

## 2020-01-11 PROCEDURE — 93005 ELECTROCARDIOGRAM TRACING: CPT | Performed by: NURSE PRACTITIONER

## 2020-01-11 PROCEDURE — 94640 AIRWAY INHALATION TREATMENT: CPT

## 2020-01-11 PROCEDURE — 2060000000 HC ICU INTERMEDIATE R&B

## 2020-01-11 PROCEDURE — 83880 ASSAY OF NATRIURETIC PEPTIDE: CPT

## 2020-01-11 PROCEDURE — 6370000000 HC RX 637 (ALT 250 FOR IP): Performed by: NURSE PRACTITIONER

## 2020-01-11 PROCEDURE — 36415 COLL VENOUS BLD VENIPUNCTURE: CPT

## 2020-01-11 PROCEDURE — 99222 1ST HOSP IP/OBS MODERATE 55: CPT | Performed by: THORACIC SURGERY (CARDIOTHORACIC VASCULAR SURGERY)

## 2020-01-11 PROCEDURE — 99233 SBSQ HOSP IP/OBS HIGH 50: CPT | Performed by: NURSE PRACTITIONER

## 2020-01-11 PROCEDURE — 2500000003 HC RX 250 WO HCPCS: Performed by: INTERNAL MEDICINE

## 2020-01-11 PROCEDURE — 80048 BASIC METABOLIC PNL TOTAL CA: CPT

## 2020-01-11 PROCEDURE — 85027 COMPLETE CBC AUTOMATED: CPT

## 2020-01-11 RX ORDER — IPRATROPIUM BROMIDE AND ALBUTEROL SULFATE 2.5; .5 MG/3ML; MG/3ML
1 SOLUTION RESPIRATORY (INHALATION) 2 TIMES DAILY
Status: DISCONTINUED | OUTPATIENT
Start: 2020-01-12 | End: 2020-01-17

## 2020-01-11 RX ORDER — TRAMADOL HYDROCHLORIDE 50 MG/1
50 TABLET ORAL EVERY 6 HOURS PRN
Status: DISCONTINUED | OUTPATIENT
Start: 2020-01-11 | End: 2020-01-21

## 2020-01-11 RX ADMIN — SODIUM NITROPRUSSIDE 0.5 MCG/KG/MIN: 25 INJECTION, SOLUTION, CONCENTRATE INTRAVENOUS at 02:11

## 2020-01-11 RX ADMIN — Medication 10 ML: at 20:41

## 2020-01-11 RX ADMIN — METHYLPREDNISOLONE SODIUM SUCCINATE 40 MG: 40 INJECTION, POWDER, FOR SOLUTION INTRAMUSCULAR; INTRAVENOUS at 18:20

## 2020-01-11 RX ADMIN — SODIUM NITROPRUSSIDE 0.5 MCG/KG/MIN: 25 INJECTION, SOLUTION, CONCENTRATE INTRAVENOUS at 20:41

## 2020-01-11 RX ADMIN — IPRATROPIUM BROMIDE AND ALBUTEROL SULFATE 1 AMPULE: .5; 3 SOLUTION RESPIRATORY (INHALATION) at 16:01

## 2020-01-11 RX ADMIN — IPRATROPIUM BROMIDE AND ALBUTEROL SULFATE 1 AMPULE: .5; 3 SOLUTION RESPIRATORY (INHALATION) at 11:50

## 2020-01-11 RX ADMIN — METHYLPREDNISOLONE SODIUM SUCCINATE 40 MG: 40 INJECTION, POWDER, FOR SOLUTION INTRAMUSCULAR; INTRAVENOUS at 06:46

## 2020-01-11 RX ADMIN — ATORVASTATIN CALCIUM 40 MG: 40 TABLET, FILM COATED ORAL at 09:53

## 2020-01-11 RX ADMIN — ASPIRIN 81 MG 81 MG: 81 TABLET ORAL at 09:53

## 2020-01-11 RX ADMIN — FUROSEMIDE 40 MG: 10 INJECTION, SOLUTION INTRAMUSCULAR; INTRAVENOUS at 09:53

## 2020-01-11 RX ADMIN — AMIODARONE HYDROCHLORIDE 200 MG: 200 TABLET ORAL at 09:53

## 2020-01-11 RX ADMIN — TRAMADOL HYDROCHLORIDE 50 MG: 50 TABLET, FILM COATED ORAL at 14:25

## 2020-01-11 RX ADMIN — ALLOPURINOL 300 MG: 300 TABLET ORAL at 09:53

## 2020-01-11 RX ADMIN — FUROSEMIDE 40 MG: 10 INJECTION, SOLUTION INTRAMUSCULAR; INTRAVENOUS at 18:19

## 2020-01-11 RX ADMIN — IPRATROPIUM BROMIDE AND ALBUTEROL SULFATE 1 AMPULE: .5; 3 SOLUTION RESPIRATORY (INHALATION) at 19:39

## 2020-01-11 RX ADMIN — Medication 10 ML: at 09:54

## 2020-01-11 RX ADMIN — IPRATROPIUM BROMIDE AND ALBUTEROL SULFATE 1 AMPULE: .5; 3 SOLUTION RESPIRATORY (INHALATION) at 08:01

## 2020-01-11 RX ADMIN — DOBUTAMINE HYDROCHLORIDE 3 MCG/KG/MIN: 200 INJECTION INTRAVENOUS at 13:44

## 2020-01-11 RX ADMIN — TRAMADOL HYDROCHLORIDE 50 MG: 50 TABLET, FILM COATED ORAL at 05:39

## 2020-01-11 ASSESSMENT — PAIN SCALES - GENERAL
PAINLEVEL_OUTOF10: 0
PAINLEVEL_OUTOF10: 8
PAINLEVEL_OUTOF10: 8
PAINLEVEL_OUTOF10: 7
PAINLEVEL_OUTOF10: 0

## 2020-01-11 ASSESSMENT — ENCOUNTER SYMPTOMS: SHORTNESS OF BREATH: 1

## 2020-01-11 NOTE — PLAN OF CARE
Patient's EF (Ejection Fraction) is greater than 40%    Heart Failure Medications:  Diuretics[de-identified] Furosemide  ACE[de-identified] None  ARB[de-identified] None  ARNI[de-identified] None  Evidenced Based Beta Blocker[de-identified] None    Patient's weights and intake/output reviewed: Yes    Patient's Last Weight: 204 lbs obtained by standing scale. Difference of 1lbs less than last documented weight. Intake/Output Summary (Last 24 hours) at 1/10/2020 1952  Last data filed at 1/10/2020 1911  Gross per 24 hour   Intake 1957.71 ml   Output 2475 ml   Net -517.29 ml       Comorbidities Reviewed Yes    Patient has a past medical history of CAD (coronary artery disease), COPD (chronic obstructive pulmonary disease) ([facility] [address] Diabetes mellitus ([facility] [address] Gout, Hyperlipidemia, Hypertension, and Thyroid disease. >>For CHF and Comorbidity documentation on Education Time and Topics, please see Education Tab    Patient stated Daily Functional Goal: (Note:help the patient identify a challenging but achievable goal per shift that can aid in progression towards increased functional capacity at discharge ie sit in the chair for x amount of time, stand or walk with minimal assistance  Pt resting in bed at this time on 2 L O2. Pt denies shortness of breath. Pt without lower extremity edema.      Patient and/or Family's stated Goal of Care this Admission: better understand heart failure and disease management and be more comfortable prior to discharge

## 2020-01-11 NOTE — PROGRESS NOTES
[address]  Cardiology  Progress Note    Admission date:  2020    Reason for follow up visit: Aortic stenosis, CHF, CAD    HPI/CC: Brock Carmichael is a 77 y.o. male who presented to the OSH for shortness of breath 2020. [place] to have CHF, treated with dobutamine and nipride along with IV diuretics and transferred to [facility]. Subjective: No chest pain, breathing improved, on 2L NC, room air at home. Vitals:  Blood pressure (!) 146/81, pulse 87, temperature 98.3 °F (36.8 °C), temperature source Oral, resp. rate 20, height 5' 10\" (1.778 m), weight 204 lb 12.8 oz (92.9 kg), SpO2 97 %.   Temp  Av.9 °F (36.6 °C)  Min: 97.6 °F (36.4 °C)  Max: 98.3 °F (36.8 °C)  Pulse  Av.6  Min: 74  Max: 101  BP  Min: 102/52  Max: 146/81  SpO2  Av.5 %  Min: 85 %  Max: 100 %    24 hour I/O    Intake/Output Summary (Last 24 hours) at 2020 0734  Last data filed at 2020 0550  Gross per 24 hour   Intake 1142 ml   Output 2075 ml   Net -933 ml     Current Facility-Administered Medications   Medication Dose Route Frequency Provider Last Rate Last Dose    traMADol (ULTRAM) tablet 50 mg  50 mg Oral Q6H PRN Carver Ahumada, MD   50 mg at 20 0539    methylPREDNISolone sodium (SOLU-MEDROL) injection 40 mg  40 mg Intravenous Q12H Alisha Friday, MD   40 mg at 20 0646    amiodarone (CORDARONE) tablet 200 mg  200 mg Oral Daily MIKO Jamil - CNP   200 mg at 01/10/20 1055    potassium chloride (KLOR-CON M) extended release tablet 40 mEq  40 mEq Oral PRN Carver Ahumada, MD        Or    potassium bicarb-citric acid (EFFER-K) effervescent tablet 40 mEq  40 mEq Oral PRN Carver Ahumada, MD        Or    potassium chloride 10 mEq/100 mL IVPB (Peripheral Line)  10 mEq Intravenous PRN Carver Ahumada, MD        magnesium sulfate 1 g in dextrose 5% 100 mL IVPB  1 g Intravenous PRN Carver Ahumada, MD        prochlorperazine (COMPAZINE) injection 10 mg  10 mg Intravenous Q6H PRN Jules Selby Date    INR 2.04 01/02/2020    INR 4.54 01/02/2020    INR 1.56 01/02/2020    PROTIME 23.8 01/02/2020    PROTIME 53.5 01/02/2020    PROTIME 18.2 01/02/2020     PTT No results found for: PTT   Lab Results   Component Value Date    MG 2.10 01/11/2020    No results found for: TSH    All labs and imaging reviewed today    Assessment:  Acute on chronic diastolic CHF: improved, -9.8B  CAD: s/p reported history of PCI  Aortic stenosis: moderate; s/p bioprosthetic AVR 2011 and 2003  Paroxysmal atrial fibrillation: sinus currently   - not on AC due to recent anemia and hemothorax  Cardiogenic shock: stable  Hemothorax: noted during admission 12/31/19-1/7/2020, required chest tube and blood transfusion, likely due to pneumonia  HTN  HLD  Leukocytosis  Acute respiratory failure    Plan:   1. EKG for QTc  2. Continue IV diuresis along with dobutamine and nipride another day  3. Continue aspirin and amiodarone  4. No anticoagulation due to recent anemia and hemothorax  5. Add beta blocker once stable  6.  Possible ZOEY/LHC Monday 1/13/2020    MIKO Parish-CNP  [facility]  [phone]

## 2020-01-11 NOTE — FLOWSHEET NOTE
Date First Assessed/Time First Assessed: 01/08/20 2000   Present on Hospital Admission: Yes  Primary Wound Type: Incision  Location: Back  Wound Location Orientation: Lateral;Left   Wound Assessment Clean;Dry; Intact   Luz-wound Assessment Clean;Dry; Intact  ([place] - dressing in place)   Closure Approximated  ([place]- surgical glue)   Drainage Amount None   Odor None   Dressing/Treatment   ([place])   Dressing Status Clean;Dry; Intact   Incision 01/02/20 Chest Lateral;Left;Lower   Date First Assessed: 01/02/20   Present on Hospital Admission: No  Primary Wound Type: Incision  Location: Chest  Wound Location Orientation: Lateral;Left;Lower   Wound Assessment Other (Comment)  ([place] - dressing in place)   Drainage Amount Small   Drainage Description Sanguinous; Yellow   Odor None   Dressing Status Intact; Old drainage   Psychosocial   Psychosocial (WDL) WDL   Patient Behaviors Calm; Cooperative

## 2020-01-11 NOTE — PROGRESS NOTES
Hospitalist Progress Note      PCP: No primary care provider on file. Date of Admission: 1/8/2020    Chief Complaint:  SOB     History Of Present Illness:    \"69 y.o. male who presented to [facility] with SOB. Patient was discharged from [facility] for pneumonia and a large L hemothorax. He was discharged home much improved and felt well yesterday. However, overnight, patient became increasingly SOB. This worsened this AM and presented to OSH. Patient has known severe aortic stenosis and is being evaluated by CT surgery as an outpatient for repair of his bioprosthetic valve. At OSH, he was found to be having acute heart failure and mild cardiogenic shock. He was started on low dose dobutamine with good response and transferred to [facility]. \"      Subjective:  He feels well in general.  Less dyspnea. No chest pain.         Medications:  Reviewed    Infusion Medications    nitroprusside (NIPRIDE) 50 mg in D5W infusion 0.5 mcg/kg/min (01/11/20 0211)    DOBUTamine 3 mcg/kg/min (01/10/20 0458)     Scheduled Medications    methylPREDNISolone  40 mg Intravenous Q12H    amiodarone  200 mg Oral Daily    furosemide  40 mg Intravenous BID    aspirin  81 mg Oral Daily    ipratropium-albuterol  1 ampule Inhalation Q4H WA    allopurinol  300 mg Oral Daily    atorvastatin  40 mg Oral Daily    nicotine  1 patch Transdermal Daily    sodium chloride flush  10 mL Intravenous 2 times per day     PRN Meds: traMADol, potassium chloride **OR** potassium alternative oral replacement **OR** potassium chloride, magnesium sulfate, prochlorperazine, perflutren lipid microspheres, sodium chloride flush, magnesium hydroxide      Intake/Output Summary (Last 24 hours) at 1/11/2020 0842  Last data filed at 1/11/2020 0550  Gross per 24 hour   Intake 1142 ml   Output 2075 ml   Net -933 ml       Physical Exam Performed:    /81   Pulse 83   Temp 97.6 °F (36.4 °C) (Oral)   Resp 20   Ht 5' 10\" (1.778 m)   Wt 199 lb 14.4 oz (90.7 kg) SpO2 90%   BMI 28.68 kg/m²     General appearance: No apparent distress, appears stated age and cooperative. HEENT: Pupils equal, round, and reactive to light. Conjunctivae/corneas clear. Neck: Supple, with full range of motion. No jugular venous distention. Trachea midline. Respiratory:  Normal respiratory effort. Prominent bilateral rhonchi and wheezing have resolved. Bibasilar rales are present. Mucus no longer heard gurgling in large airways. Cardiovascular: tachycardic rate and rhythm with without rubs or gallops. 3/6 systolic murmur at the RUSB. Abdomen: Soft, non-tender, non-distended with normal bowel sounds. Musculoskeletal: No clubbing, cyanosis or edema bilaterally. Full range of motion without deformity. Skin: Skin color, texture, turgor normal.  No rashes or lesions. Neurologic:  Neurovascularly intact without any focal sensory/motor deficits. Cranial nerves: II-XII intact, grossly non-focal.  Psychiatric: Alert and oriented, thought content appropriate, normal insight  Capillary Refill: Brisk,< 3 seconds   Peripheral Pulses: +2 palpable, equal bilaterally       Labs:   Recent Labs     01/09/20  0506 01/10/20  0505 01/11/20  0518   WBC 14.5* 17.1* 15.3*   HGB 8.4* 8.2* 9.7*   HCT 25.0* 25.0* 28.7*    266 330     Recent Labs     01/09/20  0506 01/10/20  0505 01/11/20  0518   * 135* 137   K 4.0 3.7 3.8   CL 94* 94* 91*   CO2 29 33* 35*   BUN 20 21* 19   CREATININE 0.6* 0.6* 0.6*   CALCIUM 8.7 8.4 8.6     No results for input(s): AST, ALT, BILIDIR, BILITOT, ALKPHOS in the last 72 hours. No results for input(s): INR in the last 72 hours. Recent Labs     01/08/20  1713 01/08/20 2005   TROPONINI 0.07* 0.07*       Urinalysis:    No results found for: Geri Honer, BACTERIA, RBCUA, BLOODU, SPECGRAV, GLUCOSEU    Radiology:  CT HEAD WO CONTRAST   Preliminary Result   No evidence of acute intracranial abnormality on a study mildly limited as   described above.          VL DUP valve put in 20 years ago, then exchanged for a bovine valve 8 years ago. CT surgery recommends TAVR. It was severely stenosed per 1/2 TTE, then repeat TTE on this admission showed that it was moderately stenosed. Cardiology is considering ZOEY and [facility]. Paroxysmal Afib with RVR  - held carvedilol initially. - amiodarone gtt per cardiology. - not on anticoagulation due to recent hemothorax. Continue aspirin. AECOPD  - steroids, inhaled bronchodilators. - completed a course of vanc and cefepime last admission. Lungs with severe rhonchi, wheezing, and gurgly breath sounds, pulm consulted. S/p bronchoscopy 1/10, f/u culture results. - he quit smoking during the last admission, had been smoking 2 PPD. Acute hypoxic respiratory failure  - due to above issues, treat accordingly. Elevated troponin  - likely demand ischemia due to above issues. Cardiology consulted. Aspirin as above. Muscular deconditioning, mechanical falls  - PT/OT. - he says he had syncope a couple months ago while having a BM. This was presumably vasovagal.  Discussed with CT surgery, f/u B12, head CT normal.  TSH wnl. DVT Prophylaxis: SCDs  Diet: DIET LOW SODIUM 2 GM;  Dietary Nutrition Supplements: Standard High Calorie Oral Supplement  Code Status: DNR-CCA    PT/OT Eval Status: rec'd SNF    Dispo - perhaps 1/13-14, when he seems less likely to quickly decompensate again as outpatient. He went home with [facility] last time.        Benitez Kaba MD

## 2020-01-11 NOTE — PROGRESS NOTES
Patient's EF (Ejection Fraction) is greater than 40%    Heart Failure Medications:  Diuretics[de-identified] Furosemide, Torsemide, Spironolactone, Metalozone, Other and None  ACE[de-identified] Lisinopril, Enalapril,  Ramipril, Other and None  ARB[de-identified] Valsartan, Losartan, Other and None  ARNI[de-identified] None  Evidenced Based Beta Blocker[de-identified] Metoprolol SUCCinate- Toprol XL, Carvedilol- Coreg, Bisoprolol and None    Patient's weights and intake/output reviewed: No    Patient's Last Weight:199.9   199.9 lbs. lbs obtained by standing scale. Difference of 4 lbs less than last documented weight. Intake/Output Summary (Last 24 hours) at 1/11/2020 0918  Last data filed at 1/11/2020 0600  Gross per 24 hour   Intake 1382 ml   Output 2425 ml   Net -1043 ml       Comorbidities Reviewed Yes    Patient has a past medical history of CAD (coronary artery disease), COPD (chronic obstructive pulmonary disease) ([facility] [address] Diabetes mellitus ([facility] [address] Gout, Hyperlipidemia, Hypertension, and Thyroid disease. >>For CHF and Comorbidity documentation on Education Time and Topics, please see Education Tab    Patient stated Daily Functional Goal: (Note:help the patient identify a challenging but achievable goal per shift that can aid in progression towards increased functional capacity at discharge ie sit in the chair for x amount of time, Decrease walk time by x seconds, stand or walk with minimal assistance, decrease pain to a level of x/10, etc) Please Delete Instructions and Fill In with the     Pt resting in bed at this time on 1.5 L O2. Pt denies shortness of breath. Pt with pitting lower extremity edema.      Patient and/or Family's stated Goal of Care this Admission: reduce shortness of breath, increase activity tolerance, better understand heart failure and disease management, be more comfortable and reduce lower extremity edema prior to discharge

## 2020-01-11 NOTE — FLOWSHEET NOTE
01/11/20 1630   Assessment   Charting Type Reassessment   Neurological   Neuro (WDL) X   Level of Consciousness 0   Orientation Level Oriented to place;Oriented to time;Oriented to situation;Disoriented to place   Cognition Appropriate judgement; Appropriate attention/concentration; Appropriate for developmental age; Appropriate safety awareness; Follows commands   Language Clear; Appropriate for developmental age   Size R Pupil (mm) 3   R Pupil Shape Round   R Pupil Reaction Brisk   Size L Pupil (mm) 3   L Pupil Shape Round   L Pupil Reaction Brisk   R Hand  Strong   L Hand  Strong   R Foot Dorsiflexion Strong   L Foot Dorsiflexion Strong   R Foot Plantar Flexion Strong   L Foot Plantar Flexion Strong   RUE Motor Response Responds to command   LUE Motor Response Responds to command   RLE Motor Response Responds to command   LLE Motor Response Responds to command   Gag Present   Leon Coma Scale   Eye Opening 4   Best Verbal Response 5   Best Motor Response 6   Leon Coma Scale Score 15   HEENT   HEENT (WDL) X   Right Eye Impaired vision; Intact   Left Eye Impaired vision; Intact   Teeth Dentures lower;Dentures upper   Respiratory   Respiratory (WDL) X   Respiratory Pattern Regular   Respiratory Depth Normal   Respiratory Quality/Effort Unlabored   Chest Assessment Chest expansion symmetrical;Trachea midline   L Breath Sounds Diminished; Expiratory Wheezes   R Breath Sounds Diminished   Breath Sounds   Right Upper Lobe Clear;Diminished   Right Middle Lobe Clear;Diminished   Right Lower Lobe Diminished;Fine Crackles   Left Upper Lobe Diminished;Fine Crackles   Left Lower Lobe Diminished;Fine Crackles   Cough/Sputum   Cough Non-productive   Cough Description   Sputum How Obtained Spontaneous cough   Cardiac   Cardiac (WDL) X   Cardiac Regularity Irregular   Heart Sounds S1, S2   Cardiac Rhythm Atrial fibrillation   Ectopy PVC   Ectopy Frequency Occasional   Cardiac Monitor   Telemetry Monitor On Yes   Telemetry Date First Assessed/Time First Assessed: 01/08/20 2000   Present on Hospital Admission: Yes  Primary Wound Type: Incision  Location: Back  Wound Location Orientation: Lateral;Left   Wound Assessment Clean;Dry; Intact   Luz-wound Assessment Clean;Dry; Intact   Closure Approximated  ([place]- surgical glue)   Drainage Amount None   Odor None   Dressing/Treatment   ([place])   Dressing Status Clean;Dry; Intact   Incision 01/02/20 Chest Lateral;Left;Lower   Date First Assessed: 01/02/20   Present on Hospital Admission: No  Primary Wound Type: Incision  Location: Chest  Wound Location Orientation: Lateral;Left;Lower   Wound Assessment Other (Comment)  ([place] - dressing in place)   Drainage Amount Small   Drainage Description Sanguinous; Yellow   Odor None   Dressing Status Intact; Old drainage   Psychosocial   Psychosocial (WDL) WDL   Patient Behaviors Calm; Cooperative

## 2020-01-11 NOTE — PROGRESS NOTES
RESPIRATORY THERAPY ASSESSMENT    Name:  Erin Quintanilla  Medical Record Number:  [id number]  Age: 77 y.o. Gender: male  : 1950  Today's Date:  2020  Room:  [facility]0422-01    Assessment     Is the patient being admitted for a COPD or Asthma exacerbation? No   (If yes the patient will be seen every 4 hours for the first 24 hours and then reassessed)    Patient Admission Diagnosis      Allergies  Allergies   Allergen Reactions    Penicillins Hives       Minimum Predicted Vital Capacity:     1035          Actual Vital Capacity:      1000              Pulmonary History:No history  Home Oxygen Therapy:  room air  Home Respiratory Therapy:None   Current Respiratory Therapy:  Duoneb  Treatment Type: HHN, IS  Medications: Albuterol/Ipratropium    Respiratory Severity Index(RSI)   Patients with orders for inhalation medications, oxygen, or any therapeutic treatment modality will be placed on Respiratory Protocol. They will be assessed with the first treatment and at least every 72 hours thereafter. The following severity scale will be used to determine frequency of treatment intervention. Smoking History: Pulmonary Disease or Smoking History, Greater than 15 pack year = 2    Social History  Social History     Tobacco Use    Smoking status: Current Every Day Smoker     Packs/day: 2.00     Types: Cigarettes    Smokeless tobacco: Current User   Substance Use Topics    Alcohol use:  Yes     Alcohol/week: 6.0 standard drinks     Types: 6 Cans of beer per week     Frequency: 4 or more times a week     Drinks per session: 5 or 6     Binge frequency: Daily or almost daily    Drug use: Not Currently       Recent Surgical History: Thoracic or Upper Airway = 3  Past Surgical History  Past Surgical History:   Procedure Laterality Date    BRONCHOSCOPY N/A 2020    BRONCHOSCOPY ALVEOLAR LAVAGE performed by Deny Diaz MD at [address] [facility] Left 2020    EMERGENT THORACOTOMY, EVACUATION OF

## 2020-01-11 NOTE — PROGRESS NOTES
orbits demonstrate no acute abnormality. There is evidence of prior surgery involving both optic globes. SINUSES: The visualized paranasal sinuses and mastoid air cells demonstrate no acute abnormality. Very tiny rounded density along medial right maxillary sinus (image 1) could be related to mucosal thickening versus very tiny mucous retention cyst or polyp. Subtle filling defects within the external auditory canals likely related to retained cerumen. SOFT TISSUES/SKULL:  No acute abnormality of the visualized skull or soft tissues. No evidence of acute intracranial abnormality on a study mildly limited as described above. Xr Chest Portable    Result Date: 1/9/2020  EXAMINATION: ONE XRAY VIEW OF THE CHEST 1/9/2020 9:01 am COMPARISON: January 8, 2020 HISTORY: ORDERING SYSTEM PROVIDED HISTORY: eval pulm edema TECHNOLOGIST PROVIDED HISTORY: Reason for exam:->eval pulm edema Reason for Exam: f/u pleural effusion Acuity: Acute Type of Exam: Initial FINDINGS: There is vascular congestion without overt edema. There is a persistent nodule like density in the right mid lung at the level of the hilum. Ill-defined left mid to lower lung airspace disease remains present, no significant change. No evidence of pneumothorax or pleural effusion. Vascular congestion without overt edema. Persistent nodule like density of the right mid lung. Follow-up to ensure resolution and exclude malignancy is needed. Unchanged left-sided atelectasis versus pneumonia over the past 24 hours     Xr Chest Portable    Result Date: 1/8/2020  EXAMINATION: ONE XRAY VIEW OF THE CHEST 1/8/2020 4:31 pm COMPARISON: 01/06/2020 HISTORY: ORDERING SYSTEM PROVIDED HISTORY: SOB TECHNOLOGIST PROVIDED HISTORY: Reason for exam:->SOB FINDINGS: Status post median sternotomy. Stable cardiomegaly. Right-sided central venous catheter has been removed. Right midlung airspace opacities. No other change identified.      Right midlung zone airspace opacities could represent atelectasis or pneumonia     Xr Chest Portable    Result Date: 1/6/2020  EXAMINATION: ONE XRAY VIEW OF THE CHEST 1/6/2020 4:35 pm COMPARISON: January 5, 2020 HISTORY: ORDERING SYSTEM PROVIDED HISTORY: Post chest tube removal TECHNOLOGIST PROVIDED HISTORY: Reason for exam:->Post chest tube removal Reason for Exam: post chest tube removal Acuity: Acute Type of Exam: Initial FINDINGS: Cardiac silhouette is enlarged. Right IJ catheter with tip projecting in the region lower SVC. No pneumothorax. Hazy airspace opacities throughout the lungs, similar to previous exam.  Removal of left-sided chest tube. No pneumothorax. Left-sided chest tubes have been removed. No pneumothorax. No significant change otherwise. Xr Chest Portable    Result Date: 1/6/2020  EXAMINATION: ONE XRAY VIEW OF THE CHEST 1/3/2020 5:50 am COMPARISON: Prior studies most recent 01/02/2020. HISTORY: ORDERING SYSTEM PROVIDED HISTORY: vent/ chest tubes TECHNOLOGIST PROVIDED HISTORY: Reason for exam:->vent/ chest tubes Reason for Exam: vent/ chest tubes FINDINGS: Film technique is mildly suboptimal due to patient body habitus. Endotracheal tube remains in place and is unchanged in position. Left-sided thoracostomy tubes are again identified and unchanged in position. Right internal jugular central line remains in place. Cardiopericardial silhouette is unchanged. There are atherosclerotic changes of the aorta. There is persistent ill-defined parenchymal disease in the left mid/lower lung zones and right infrahilar region. Taking into account differences in technique, findings are generally similar in appearance when compared to the study of 01/02/2020. No evidence of significant recurrent pleural effusion. No evidence of pneumothorax. Taking into account differences in technique, there has been no significant change in the appearance of the chest since the examination of 01/02/2020.      Xr Chest tip projects approximately 1.6 cm above the quinten. More optimal position would be approximately 2 cm above this level. Sternotomy wires are noted. Enteric tube extends the left upper quadrant. Tubular structure projects over the left lung base. Near complete consolidation of the left hemithorax is noted with large pleural effusion. There is mild ground-glass density on the right without right-sided consolidation. Abdomen: 2 images were provided. Detail is limited. Enteric tube extends into the left upper quadrant. Tip and side port are in the left upper quadrant. There are no disproportionately dilated bowel loops     ET tube and enteric tube as described There is a large amount of opacification of the left hemithorax with a large pleural effusion. No pneumothorax     Xr Chest 1 Vw    Result Date: 1/4/2020  EXAMINATION: ONE XRAY VIEW OF THE CHEST 1/4/2020 9:21 am COMPARISON: January 3, 2020 HISTORY: ORDERING SYSTEM PROVIDED HISTORY: s/p thoracotomy TECHNOLOGIST PROVIDED HISTORY: Reason for exam:->s/p thoracotomy Reason for Exam: s/p thoracotomy Acuity: Acute Type of Exam: Subsequent/Follow-up FINDINGS: Cardiac silhouette is enlarged. Right IJ catheter with tip projecting in the region lower SVC. 2 left-sided chest tubes again noted, similar in appearance to previous exam.  No discernible pneumothorax. Pulmonary vascular congestion. No significant change in appearance of the chest with persistent pulmonary vascular congestion centrally.      Vl Dup Carotid Bilateral    Result Date: 1/9/2020  Vascular Carotid Procedure -- PRELIMINARY SONOGRAPHER REPORT --   Demographics   Patient Name       Mariella Ang   Date of Study      01/09/2020         Gender              Male   Patient Number     [id number]         Date of Birth       1950   Visit Number       [id number]          Age                 77 year(s)   Accession Number   [id number]          Room Number         [address] ID       [id number] Sonographer         CAITLIN FultonT   Ordering Physician [facility]                     APRN - CNP         Physician           Readers  Procedure Type of Study:   Cerebral:Carotid, VL CAROTID DUPLEX BILATERAL. Tech Comments Right Technical limited exam due to movement from excessive respirations. The right internal carotid artery reveals a <50% stenosis, however, there is significant calcific shadowing present and therefore the percent of stenosis may be underestimated. The right vertebral artery demonstrates normal antegrade flow. The right subclavian artery is visualized and demonstrates multiphasic flow. Left Technical limited exam due to movement from excessive respirations. The left internal carotid artery reveals a <50% stenosis, however, there is significant calcific shadowing present and therefore the percent of stenosis may be underestimated. The left vertebral artery demonstrates normal antegrade flow. The left subclavian artery is visualized and demonstrates multiphasic flow. There were no previous studies to use for comparison. Velocities are measured in cm/s ; Diameters are measured in mm Carotid Right Measurements +---------------+----+----+-----+----+ ! Location       ! PSV ! EDV ! Angle! RI  ! +---------------+----+----+-----+----+ ! Prox CCA       !97. 2!18. 8!60   !0.65! +---------------+----+----+-----+----+ ! Mid CCA        !74. 5!22  !60   !0.7 ! +---------------+----+----+-----+----+ ! Dist CCA       !78. 4!25. 1! 60   !0.68! +---------------+----+----+-----+----+ ! Prox ICA       !91. 1!30. 4!42   !0.67! +---------------+----+----+-----+----+ ! Mid ICA        !91. 3!18. 1!09   !0.63! +---------------+----+----+-----+----+ ! Dist ICA       !83. 2!55. 9!60   !0.68! +---------------+----+----+-----+----+ ! Prox ECA       !83.1!    !60   !    ! +---------------+----+----+-----+----+ ! Vertebral      !89. 8!18. 6!60   !0.53! +---------------+----+----+-----+----+ ! Prox

## 2020-01-11 NOTE — PROGRESS NOTES
Consultation H&P    Date of Admission:  1/8/2020  3:33 PM  Date of Consultation:  1/11/2020    PCP:  No primary care provider on file. Chief Complaint: Shortness of breath    History of Present Illness: We are asked to see this patient in consultation by Dr. parker  Patricia Tena is a 77 y.o. male who shortness of breath worse with exertion progressively getting worse underwent reevaluation which showed severe aortic valve stenosis for the third time     Past Medical History:  Past Medical History:   Diagnosis Date    CAD (coronary artery disease)     [facility]chronic obstructive pulmonary disease) ([facility] [address]     Diabetes mellitus ([facility] [address]     Gout     Hyperlipidemia     Hypertension     Thyroid disease        Past Surgical History:  Past Surgical History:   Procedure Laterality Date    BRONCHOSCOPY N/A 1/9/2020    BRONCHOSCOPY ALVEOLAR LAVAGE performed by Jose Daniel Candelario MD at [address] Left 1/2/2020    EMERGENT THORACOTOMY, EVACUATION OF HEMOTOMA CHEST WALL HEMOSTASIS, PLEUREAL BIOPSY, LEFT UPPER LOBE RESECTION performed by Maria Eugenia Allen MD at [address] Medications:   Prior to Admission medications    Medication Sig Start Date End Date Taking?  Authorizing Provider   atorvastatin (LIPITOR) 40 MG tablet Take 40 mg by mouth daily 5/16/12  Yes Historical Provider, MD   carvedilol (COREG) 25 MG tablet Take 25 mg by mouth 2 times daily 5/16/12  Yes Historical Provider, MD   choline fenofibrate (TRILIPIX) 135 MG CPDR delayed release capsule Take 135 mg by mouth daily   Yes Historical Provider, MD   febuxostat (ULORIC) 40 MG TABS tablet Take 40 mg by mouth daily   Yes Historical Provider, MD   glipiZIDE (GLUCOTROL) 5 MG tablet Take 1 tablet by mouth daily   Yes Historical Provider, MD   meclizine (ANTIVERT) 25 MG tablet Take 1 tablet by mouth daily   Yes Historical Provider, MD   allopurinol (ZYLOPRIM) 300 MG tablet Take 300 mg by mouth daily   Yes Historical Provider, MD   nicotine (NICODERM CQ) 14 MG/24HR Place 1 patch onto the skin daily 1/8/20   Fitz Elliott MD        Facility Administered Medications:   Janette Failing ON 1/12/2020] ipratropium-albuterol  1 ampule Inhalation BID    methylPREDNISolone  40 mg Intravenous Q12H    amiodarone  200 mg Oral Daily    furosemide  40 mg Intravenous BID    aspirin  81 mg Oral Daily    allopurinol  300 mg Oral Daily    atorvastatin  40 mg Oral Daily    nicotine  1 patch Transdermal Daily    sodium chloride flush  10 mL Intravenous 2 times per day       Allergies: Allergies   Allergen Reactions    Penicillins Hives        Social History:    Working:   Caffeine:   Lifestyle:    Social History     Socioeconomic History    Marital status: Single     Spouse name: Not on file    Number of children: Not on file    Years of education: Not on file    Highest education level: Not on file   Occupational History    Not on file   Social Needs    Financial resource strain: Not on file    Food insecurity:     Worry: Not on file     Inability: Not on file    Transportation needs:     Medical: Not on file     Non-medical: Not on file   Tobacco Use    Smoking status: Current Every Day Smoker     Packs/day: 2.00     Types: Cigarettes    Smokeless tobacco: Current User   Substance and Sexual Activity    Alcohol use:  Yes     Alcohol/week: 6.0 standard drinks     Types: 6 Cans of beer per week     Frequency: 4 or more times a week     Drinks per session: 5 or 6     Binge frequency: Daily or almost daily    Drug use: Not Currently    Sexual activity: Not on file   Lifestyle    Physical activity:     Days per week: Not on file     Minutes per session: Not on file    Stress: Not on file   Relationships    Social connections:     Talks on phone: Not on file     Gets together: Not on file     Attends Sikhism service: Not on file     Active member of club or organization: Not on file     Attends meetings of clubs or organizations: Not on file     Relationship status: Not on file    Intimate partner violence:     Fear of current or ex partner: Not on file     Emotionally abused: Not on file     Physically abused: Not on file     Forced sexual activity: Not on file   Other Topics Concern    Not on file   Social History Narrative    Not on file       Family History:  Heart Disease:   Stroke:   Cancer:   Diabetes:   Hypertension:   Aneurysm/PVD:     No family history on file. Review of Systems:  Constitutional:  No night sweats, headaches, weight loss. Eyes:  No glaucoma, cataracts. ENMT:  No nosebleeds, deviated septum. Cardiac:  No arrhythmias, previous MI. Vascular:  No claudication, varicosities. GI:  No PUD, heartburn. :  No kidney stones, frequent UTIs  Musculoskeletal:  No arthritis, gout. Respiratory:  No SOB, emphysema, asthma. Integumentary:  No dermatitis, itching, rash. Neurological:  No stroke, TIAs, seizures. Psychiatric:  No depression, anxiety. Endocrine: No diabetes, thyroid issues. Hematologic:  No bleeding, easy bruising. Immunologic:  No known cancer, steroid therapies. Physical Examination:    BP (!) 145/68   Pulse 94   Temp 97.8 °F (36.6 °C) (Oral)   Resp 20   Ht 5' 10\" (1.778 m)   Wt 199 lb 14.4 oz (90.7 kg)   SpO2 97%   BMI 28.68 kg/m²      BP RUE:  BP LUE:   Admission Weight: 205 lb 12.8 oz (93.4 kg)   Hand dominance:    General appearance: NAD, well nourished  Eyes: anicteric, PERRLA  ENMT: no scars or lesions, no nasal deformity, normal dentition, no cyanosis of oral mucosa  Neck: no masses, no thyroid enlargement, no JVD. Respiratory: effort is unlabored, symmetric, no crackles, wheezes or rubs. No palpable/percussable abnormalities. Cardiovascular: regular, no murmur. PMI normal, no thrill. No carotid bruits. No edema or varicosities. Abdominal aorta cannot be appreciated given body habitus. GI: abdomen soft, nondistended, no organomegaly. No masses.   Lymphatic: no cervical/supraclavicular adenopathy  Musculoskeletal: strength and tone normal. Full ROM. No scoliosis. Extremities: warm and pink. No clubbing or petechiae. Skin: no dermatitis or ulceration. No nodularity or induration. Neuro: CN grossly intact. Sensation and motor function grossly intact. Psychiatric: oriented, appropriate mood/affect. MEDICAL DECISION MAKING/TESTING  Studies personally reviewed. Summary   Bioprosthetic aortic valve is well seated. A peak velocity of 436 cm/sec,and   a mean pressure gradient of 41 mmhg. Doppler parameters are increased for valve, most consistent with moderate   bioprosthetic AVR stenosis. Trivial aortic regurgitation. Signature    Labs:   CBC:   Recent Labs     01/09/20  0506 01/10/20  0505 01/11/20  0518   WBC 14.5* 17.1* 15.3*   HGB 8.4* 8.2* 9.7*   HCT 25.0* 25.0* 28.7*   MCV 94.5 94.1 93.4    266 330     BMP:   Recent Labs     01/09/20  0506 01/10/20  0505 01/11/20  0518   * 135* 137   K 4.0 3.7 3.8   CL 94* 94* 91*   CO2 29 33* 35*   BUN 20 21* 19   CREATININE 0.6* 0.6* 0.6*   CALCIUM 8.7 8.4 8.6   MG 1.90 2.10 2.10     Cardiac Enzymes:   Recent Labs     01/08/20 2005   TROPONINI 0.07*     PT/INR: No results for input(s): PROTIME, INR in the last 72 hours. APTT: No results for input(s): APTT in the last 72 hours.   Liver Profile:  Lab Results   Component Value Date     01/02/2020     01/02/2020    BILITOT 0.7 01/02/2020    ALKPHOS 29 01/02/2020     Lab Results   Component Value Date    CHOL 93 01/09/2020    HDL 31 01/09/2020    TRIG 87 01/09/2020     UA: No results found for: NITRITE, COLORU, PHUR, LABCAST, [address], RBCUA, MUCUS, TRICHOMONAS, YEAST, BACTERIA, CLARITYU, SPECGRAV, LEUKOCYTESUR, UROBILINOGEN, BILIRUBINUR, BLOODU, GLUCOSEU, AMORPHOUS    History obtained: chart, pt    Risk factors: Previous aortic valve surgery       ASSESSMENT AND PLAN:  STS Cardiac Surgery Risk profile: AVR      Mortality:  7.09%  Renal Failure:

## 2020-01-11 NOTE — PLAN OF CARE
tolerance, better understand heart failure and disease management, be more comfortable, and reduce lower extremity edema prior to discharge         Goal: Patient will achieve/maintain normal respiratory rate/effort  Description  Respiratory rate and effort will be within normal limits for the patient  Outcome: Ongoing     Problem: HEMODYNAMIC STATUS  Goal: Patient has stable vital signs and fluid balance  Outcome: Ongoing     Problem: FLUID AND ELECTROLYTE IMBALANCE  Goal: Fluid and electrolyte balance are achieved/maintained  Outcome: Ongoing

## 2020-01-12 LAB
ANION GAP SERPL CALCULATED.3IONS-SCNC: 11 MMOL/L (ref 3–16)
BUN BLDV-MCNC: 19 MG/DL (ref 7–20)
CALCIUM SERPL-MCNC: 8.5 MG/DL (ref 8.3–10.6)
CHLORIDE BLD-SCNC: 89 MMOL/L (ref 99–110)
CO2: 35 MMOL/L (ref 21–32)
CREAT SERPL-MCNC: 0.6 MG/DL (ref 0.8–1.3)
GFR AFRICAN AMERICAN: >60
GFR NON-AFRICAN AMERICAN: >60
GLUCOSE BLD-MCNC: 236 MG/DL (ref 70–99)
GLUCOSE BLD-MCNC: 281 MG/DL (ref 70–99)
GLUCOSE BLD-MCNC: 317 MG/DL (ref 70–99)
GLUCOSE BLD-MCNC: 382 MG/DL (ref 70–99)
MAGNESIUM: 2 MG/DL (ref 1.8–2.4)
PERFORMED ON: ABNORMAL
POTASSIUM SERPL-SCNC: 3.7 MMOL/L (ref 3.5–5.1)
SODIUM BLD-SCNC: 135 MMOL/L (ref 136–145)

## 2020-01-12 PROCEDURE — 36415 COLL VENOUS BLD VENIPUNCTURE: CPT

## 2020-01-12 PROCEDURE — 83735 ASSAY OF MAGNESIUM: CPT

## 2020-01-12 PROCEDURE — 6370000000 HC RX 637 (ALT 250 FOR IP): Performed by: INTERNAL MEDICINE

## 2020-01-12 PROCEDURE — 6370000000 HC RX 637 (ALT 250 FOR IP): Performed by: NURSE PRACTITIONER

## 2020-01-12 PROCEDURE — 6360000002 HC RX W HCPCS: Performed by: INTERNAL MEDICINE

## 2020-01-12 PROCEDURE — 94640 AIRWAY INHALATION TREATMENT: CPT

## 2020-01-12 PROCEDURE — 2060000000 HC ICU INTERMEDIATE R&B

## 2020-01-12 PROCEDURE — 2500000003 HC RX 250 WO HCPCS: Performed by: INTERNAL MEDICINE

## 2020-01-12 PROCEDURE — 99232 SBSQ HOSP IP/OBS MODERATE 35: CPT | Performed by: INTERNAL MEDICINE

## 2020-01-12 PROCEDURE — 2700000000 HC OXYGEN THERAPY PER DAY

## 2020-01-12 PROCEDURE — 99232 SBSQ HOSP IP/OBS MODERATE 35: CPT | Performed by: NURSE PRACTITIONER

## 2020-01-12 PROCEDURE — 94761 N-INVAS EAR/PLS OXIMETRY MLT: CPT

## 2020-01-12 PROCEDURE — 80048 BASIC METABOLIC PNL TOTAL CA: CPT

## 2020-01-12 PROCEDURE — 83036 HEMOGLOBIN GLYCOSYLATED A1C: CPT

## 2020-01-12 PROCEDURE — 2580000003 HC RX 258: Performed by: INTERNAL MEDICINE

## 2020-01-12 RX ORDER — DEXTROSE MONOHYDRATE 25 G/50ML
12.5 INJECTION, SOLUTION INTRAVENOUS PRN
Status: DISCONTINUED | OUTPATIENT
Start: 2020-01-12 | End: 2020-01-21

## 2020-01-12 RX ORDER — NICOTINE POLACRILEX 4 MG
15 LOZENGE BUCCAL PRN
Status: DISCONTINUED | OUTPATIENT
Start: 2020-01-12 | End: 2020-01-21

## 2020-01-12 RX ORDER — DEXTROSE MONOHYDRATE 50 MG/ML
100 INJECTION, SOLUTION INTRAVENOUS PRN
Status: DISCONTINUED | OUTPATIENT
Start: 2020-01-12 | End: 2020-01-21

## 2020-01-12 RX ORDER — INSULIN GLARGINE 100 [IU]/ML
10 INJECTION, SOLUTION SUBCUTANEOUS NIGHTLY
Status: DISCONTINUED | OUTPATIENT
Start: 2020-01-12 | End: 2020-01-21

## 2020-01-12 RX ORDER — PREDNISONE 20 MG/1
20 TABLET ORAL DAILY
Status: DISCONTINUED | OUTPATIENT
Start: 2020-01-12 | End: 2020-01-15

## 2020-01-12 RX ADMIN — Medication 10 ML: at 08:36

## 2020-01-12 RX ADMIN — TRAMADOL HYDROCHLORIDE 50 MG: 50 TABLET, FILM COATED ORAL at 10:58

## 2020-01-12 RX ADMIN — ATORVASTATIN CALCIUM 40 MG: 40 TABLET, FILM COATED ORAL at 08:35

## 2020-01-12 RX ADMIN — METHYLPREDNISOLONE SODIUM SUCCINATE 40 MG: 40 INJECTION, POWDER, FOR SOLUTION INTRAMUSCULAR; INTRAVENOUS at 06:10

## 2020-01-12 RX ADMIN — IPRATROPIUM BROMIDE AND ALBUTEROL SULFATE 1 AMPULE: .5; 3 SOLUTION RESPIRATORY (INHALATION) at 08:10

## 2020-01-12 RX ADMIN — ALLOPURINOL 300 MG: 300 TABLET ORAL at 08:35

## 2020-01-12 RX ADMIN — FUROSEMIDE 40 MG: 10 INJECTION, SOLUTION INTRAMUSCULAR; INTRAVENOUS at 17:12

## 2020-01-12 RX ADMIN — PREDNISONE 20 MG: 20 TABLET ORAL at 13:18

## 2020-01-12 RX ADMIN — AMIODARONE HYDROCHLORIDE 200 MG: 200 TABLET ORAL at 08:35

## 2020-01-12 RX ADMIN — ASPIRIN 81 MG 81 MG: 81 TABLET ORAL at 08:35

## 2020-01-12 RX ADMIN — FUROSEMIDE 40 MG: 10 INJECTION, SOLUTION INTRAMUSCULAR; INTRAVENOUS at 08:35

## 2020-01-12 RX ADMIN — INSULIN LISPRO 8 UNITS: 100 INJECTION, SOLUTION INTRAVENOUS; SUBCUTANEOUS at 12:35

## 2020-01-12 RX ADMIN — SODIUM NITROPRUSSIDE 0.5 MCG/KG/MIN: 25 INJECTION, SOLUTION, CONCENTRATE INTRAVENOUS at 16:10

## 2020-01-12 RX ADMIN — IPRATROPIUM BROMIDE AND ALBUTEROL SULFATE 1 AMPULE: .5; 3 SOLUTION RESPIRATORY (INHALATION) at 19:33

## 2020-01-12 RX ADMIN — INSULIN LISPRO 6 UNITS: 100 INJECTION, SOLUTION INTRAVENOUS; SUBCUTANEOUS at 17:15

## 2020-01-12 RX ADMIN — INSULIN GLARGINE 10 UNITS: 100 INJECTION, SOLUTION SUBCUTANEOUS at 20:58

## 2020-01-12 RX ADMIN — Medication 10 ML: at 06:11

## 2020-01-12 RX ADMIN — INSULIN LISPRO 4 UNITS: 100 INJECTION, SOLUTION INTRAVENOUS; SUBCUTANEOUS at 20:59

## 2020-01-12 RX ADMIN — DOBUTAMINE HYDROCHLORIDE 3 MCG/KG/MIN: 200 INJECTION INTRAVENOUS at 16:09

## 2020-01-12 ASSESSMENT — PAIN SCALES - GENERAL
PAINLEVEL_OUTOF10: 0
PAINLEVEL_OUTOF10: 6

## 2020-01-12 ASSESSMENT — PAIN DESCRIPTION - PAIN TYPE: TYPE: SURGICAL PAIN

## 2020-01-12 ASSESSMENT — PAIN DESCRIPTION - LOCATION: LOCATION: BACK;RIB CAGE

## 2020-01-12 ASSESSMENT — ENCOUNTER SYMPTOMS: SHORTNESS OF BREATH: 1

## 2020-01-12 ASSESSMENT — PAIN DESCRIPTION - ORIENTATION: ORIENTATION: LEFT

## 2020-01-12 NOTE — DISCHARGE INSTR - COC
replacement) Z95.2    Hypotension I95.9    Pulmonary infiltrate R91.8    Pulmonary venous congestion R09.89    Atelectasis J98.11    Leukocytosis D72.829    Hyperglycemia R73.9    Acute bronchospasm J98.01    Atrial fibrillation with RVR ([facility]) I48.91    Cardiogenic shock ([facility]) R57.0    Acute on chronic diastolic congestive heart failure ([facility]) I50.33       Isolation/Infection:   Isolation          No Isolation        Patient Infection Status     None to display          Nurse Assessment:  Last Vital Signs: BP (!) 109/54   Pulse 84   Temp 98.1 °F (36.7 °C) (Oral)   Resp 18   Ht 5' 10\" (1.778 m)   Wt 199 lb 6.4 oz (90.4 kg)   SpO2 95%   BMI 28.61 kg/m²     Last documented pain score (0-10 scale): Pain Level: 6  Last Weight:   Wt Readings from Last 1 Encounters:   01/12/20 199 lb 6.4 oz (90.4 kg)     Mental Status:  alert    IV Access:  - None    Nursing Mobility/ADLs:  Walking   Assisted  Transfer  Assisted  Bathing  Assisted  Dressing  Assisted  Toileting  Assisted  Feeding  Assisted  Med Admin  Assisted  Med Delivery   none    Wound Care Documentation and Therapy:        Elimination:  Continence:   · Bowel: No  · Bladder: No  Urinary Catheter: None   Colostomy/Ileostomy/Ileal Conduit: No       Date of Last BM: 2/4    Intake/Output Summary (Last 24 hours) at 1/12/2020 1358  Last data filed at 1/12/2020 1058  Gross per 24 hour   Intake 1538.04 ml   Output 2650 ml   Net -1111.96 ml     I/O last 3 completed shifts: In: 2258 [P.O.:1710;  I.V.:548]  Out: 2925 [Urine:2925]    Safety Concerns:     History of Falls (last 30 days)    Impairments/Disabilities:      None    Nutrition Therapy:  Current Nutrition Therapy:   - Oral Diet:  General    Routes of Feeding: Oral  Liquids: Nectar Thick Liquids  Daily Fluid Restriction: yes - amount 2000  Last Modified Barium Swallow with Video (Video Swallowing Test): not done    Treatments at the Time of Hospital Discharge:   Respiratory Treatments: None  Oxygen Therapy:

## 2020-01-12 NOTE — PROGRESS NOTES
extremities. Neurologic:  alert and oriented  when seen         Results:  CBC:   Recent Labs     01/10/20  0505 01/11/20  0518   WBC 17.1* 15.3*   HGB 8.2* 9.7*   HCT 25.0* 28.7*   MCV 94.1 93.4    330     BMP:   Recent Labs     01/10/20  0505 01/11/20  0518 01/12/20  0513   * 137 135*   K 3.7 3.8 3.7   CL 94* 91* 89*   CO2 33* 35* 35*   BUN 21* 19 19   CREATININE 0.6* 0.6* 0.6*       Imaging:  I have reviewed radiology images personally. CT HEAD WO CONTRAST   Preliminary Result   No evidence of acute intracranial abnormality on a study mildly limited as   described above. VL DUP CAROTID BILATERAL   Final Result      XR CHEST PORTABLE   Final Result   Vascular congestion without overt edema. Persistent nodule like density of the right mid lung. Follow-up to ensure   resolution and exclude malignancy is needed. Unchanged left-sided atelectasis versus pneumonia over the past 24 hours         XR CHEST PORTABLE   Final Result   Right midlung zone airspace opacities could represent atelectasis or pneumonia           Results for Susan Strong (MRN [id number]) as of 1/12/2020 11:40   Ref.  Range 1/9/2020 05:06 1/10/2020 05:05 1/11/2020 05:18 1/12/2020 05:13   Sodium Latest Ref Range: 136 - 145 mmol/L 134 (L) 135 (L) 137 135 (L)   Potassium Latest Ref Range: 3.5 - 5.1 mmol/L 4.0 3.7 3.8 3.7   Chloride Latest Ref Range: 99 - 110 mmol/L 94 (L) 94 (L) 91 (L) 89 (L)   CO2 Latest Ref Range: 21 - 32 mmol/L 29 33 (H) 35 (H) 35 (H)   BUN Latest Ref Range: 7 - 20 mg/dL 20 21 (H) 19 19   Creatinine Latest Ref Range: 0.8 - 1.3 mg/dL 0.6 (L) 0.6 (L) 0.6 (L) 0.6 (L)   Anion Gap Latest Ref Range: 3 - 16  11 8 11 11   GFR Non- Latest Ref Range: >60  >60 >60 >60 >60   GFR  Latest Ref Range: >60  >60 >60 >60 >60   Magnesium Latest Ref Range: 1.80 - 2.40 mg/dL 1.90 2.10 2.10 2.00   Glucose Latest Ref Range: 70 - 99 mg/dL 187 (H) 194 (H) 187 (H) 236 (H)   Calcium Latest Ref

## 2020-01-12 NOTE — PROGRESS NOTES
[address]  Cardiology  Progress Note    Admission date:  2020    Reason for follow up visit: Aortic stenosis, CHF, CAD    HPI/CC: Laurita Carbajal is a 77 y.o. male who presented to the OSH for shortness of breath 2020. [place] to have CHF, treated with dobutamine and nipride along with IV diuretics and transferred to [facility]. Subjective: Denies chest pain, shortness of breath, palpitations and dizziness. On 1.5L NC, room air at home. Vitals:  Blood pressure 126/70, pulse 86, temperature 97.8 °F (36.6 °C), temperature source Oral, resp. rate 20, height 5' 10\" (1.778 m), weight 199 lb 6.4 oz (90.4 kg), SpO2 95 %.   Temp  Av.7 °F (36.5 °C)  Min: 97.5 °F (36.4 °C)  Max: 98.1 °F (36.7 °C)  Pulse  Av.2  Min: 71  Max: 94  BP  Min: 97/53  Max: 145/68  SpO2  Av.3 %  Min: 90 %  Max: 98 %    24 hour I/O    Intake/Output Summary (Last 24 hours) at 2020 0714  Last data filed at 2020 3264  Gross per 24 hour   Intake 2258.04 ml   Output 2925 ml   Net -666.96 ml     Current Facility-Administered Medications   Medication Dose Route Frequency Provider Last Rate Last Dose    traMADol (ULTRAM) tablet 50 mg  50 mg Oral Q6H PRN Jose L Tijerina MD   50 mg at 20 1425    ipratropium-albuterol (DUONEB) nebulizer solution 1 ampule  1 ampule Inhalation BID Mariana Bill MD   1 ampule at 20 1939    methylPREDNISolone sodium (SOLU-MEDROL) injection 40 mg  40 mg Intravenous Q12H Trudy Caban MD   40 mg at 20 0610    amiodarone (CORDARONE) tablet 200 mg  200 mg Oral Daily MIKO Hatch - CNP   200 mg at 20 0774    potassium chloride (KLOR-CON M) extended release tablet 40 mEq  40 mEq Oral PRN Jose L Tijerina MD        Or    potassium bicarb-citric acid (EFFER-K) effervescent tablet 40 mEq  40 mEq Oral PRN Jose L Tijerina MD        Or    potassium chloride 10 mEq/100 mL IVPB (Peripheral Line)  10 mEq Intravenous PRN Jose L Tijerina MD        magnesium sulfate 1 g in dextrose 5% 100 mL IVPB  1 g Intravenous PRN Giulia Pan MD        prochlorperazine (COMPAZINE) injection 10 mg  10 mg Intravenous Q6H PRN Adonis Gill MD        furosemide (LASIX) injection 40 mg  40 mg Intravenous BID Cha Ulloa MD   40 mg at 01/11/20 1819    nitroPRUSSide (NIPRIDE) 50 mg in dextrose 5 % 250 mL infusion  0.5 mcg/kg/min Intravenous Continuous Cha Ulloa MD 13.9 mL/hr at 01/11/20 2041 0.5 mcg/kg/min at 01/11/20 2041    aspirin chewable tablet 81 mg  81 mg Oral Daily Cha Ulloa MD   81 mg at 01/11/20 6755    perflutren lipid microspheres (DEFINITY) injection 1.65 mg  1.5 mL Intravenous ONCE PRN Cha Ulloa MD        allopurinol (ZYLOPRIM) tablet 300 mg  300 mg Oral Daily Jimenez Bruno MD   300 mg at 01/11/20 4401    atorvastatin (LIPITOR) tablet 40 mg  40 mg Oral Daily Jimenez Bruno MD   40 mg at 01/11/20 0953    nicotine (NICODERM CQ) 14 MG/24HR 1 patch  1 patch Transdermal Daily Jimenez Bruno MD   1 patch at 01/11/20 0953    sodium chloride flush 0.9 % injection 10 mL  10 mL Intravenous 2 times per day Jimenez Bruno MD   10 mL at 01/11/20 2041    sodium chloride flush 0.9 % injection 10 mL  10 mL Intravenous PRN Jimenez Bruno MD   10 mL at 01/12/20 0611    magnesium hydroxide (MILK OF MAGNESIA) 400 MG/5ML suspension 30 mL  30 mL Oral Daily PRN Jimenez Bruno MD        DOBUTamine (DOBUTREX) 500 mg in dextrose 5 % 250 mL infusion  3 mcg/kg/min Intravenous Continuous Giulia Pan MD 8.4 mL/hr at 01/11/20 1344 3 mcg/kg/min at 01/11/20 1344     Review of Systems   Constitutional: Negative. Respiratory: Positive for shortness of breath. Cardiovascular: Negative. Neurological: Negative.       Objective:     Telemetry monitor: AF 90s    Physical Exam:  Constitutional:  Comfortable and alert, NAD, appears older than stated age  Eyes: PERRL, sclera nonicteric  Neck:  Supple, no masses, no thyroidmegaly, no JVD  Skin:  Warm and dry; no rash or lesions  Heart: Irregular, normal apex, S1 and S2 normal, harsh murmur  Lungs:  Normal respiratory effort; clear  Abdomen: soft, non tender, + bowel sounds  Extremities:  No BLE edema  Neuro: alert and oriented, moves legs and arms equally, normal mood and affect    Data Reviewed:    Echo 1/10/2020: Bioprosthetic aortic valve is well seated. A peak velocity of 436 cm/sec,and   a mean pressure gradient of 41 mmhg. Doppler parameters are increased for valve, most consistent with moderate   bioprosthetic AVR stenosis. Trivial aortic regurgitation. Echo 1/2/2020:  -- Left ventricular systolic function is normal with ejection fraction   estimated at 55%. No regional wall motion abnormalities. There is mild-moderate concentric left ventricular hypertrophy. Grade II   diastolic dysfunction with elevated left ventricular filling pressure. -- The left atrium is severely dilated. The right atrium is mildly dilated. -- A bioprosthetic aortic valve appears well seated with elevated velocities   consistent with severe aortic stenosis. Suggest ZOEY to further evaluate. Pt on mechanical ventilation.     Lab Reviewed:     Renal Profile:  Lab Results   Component Value Date    CREATININE 0.6 01/12/2020    BUN 19 01/12/2020     01/12/2020    K 3.7 01/12/2020    K 7.3 01/02/2020    CL 89 01/12/2020    CO2 35 01/12/2020     CBC:    Lab Results   Component Value Date    WBC 15.3 01/11/2020    RBC 3.07 01/11/2020    HGB 9.7 01/11/2020    HCT 28.7 01/11/2020    MCV 93.4 01/11/2020    RDW 17.4 01/11/2020     01/11/2020     BNP:    Lab Results   Component Value Date    PROBNP 2,039 01/11/2020    PROBNP 2,940 01/08/2020    PROBNP 780 01/01/2020     Fasting Lipid Panel:    Lab Results   Component Value Date    CHOL 93 01/09/2020    HDL 31 01/09/2020    TRIG 87 01/09/2020     Cardiac Enzymes:  CK/MbTroponin  Lab Results   Component Value Date    CKTOTAL 97 01/02/2020    TROPONINI 0.07 01/08/2020     PT/ INR   Lab

## 2020-01-12 NOTE — PLAN OF CARE
Hypertension, and Thyroid disease. >>For CHF and Comorbidity documentation on Education Time and Topics, please see Education Tab    Patient stated Daily Functional Goal:  Be more comfortable before discharge. Pt resting in bed at this time on 1.5 L O2. Pt denies shortness of breath. Pt with pitting lower extremity edema.      Patient and/or Family's stated Goal of Care this Admission: reduce shortness of breath, increase activity tolerance, better understand heart failure and disease management, be more comfortable, and reduce lower extremity edema prior to discharge         Problem: FLUID AND ELECTROLYTE IMBALANCE  Goal: Fluid and electrolyte balance are achieved/maintained  1/12/2020 0443 by Ninoska Arteaga RN  Outcome: Ongoing

## 2020-01-12 NOTE — FLOWSHEET NOTE
01/11/20 2037   Assessment   Charting Type Shift assessment   Neurological   Level of Consciousness 0   Orientation Level Oriented X4   Cognition Appropriate judgement; Appropriate safety awareness; Appropriate attention/concentration; Appropriate for developmental age   Language Clear; Appropriate for developmental age   Size R Pupil (mm) 3   R Pupil Shape Round   R Pupil Reaction Brisk   Size L Pupil (mm) 3   L Pupil Shape Round   L Pupil Reaction Brisk   R Hand  Strong   L Hand  Strong   R Foot Dorsiflexion Strong   L Foot Dorsiflexion Strong   R Foot Plantar Flexion Strong   L Foot Plantar Flexion Strong   RUE Motor Response Responds to command;Normal extension;Normal flexion   LUE Motor Response Responds to command;Normal extension;Normal flexion   RLE Motor Response Responds to command;Normal extension;Normal flexion   LLE Motor Response Responds to command;Normal extension;Normal flexion   Gag Present   Tongue Deviation None   Bernadette Coma Scale   Eye Opening 4   Best Verbal Response 5   Best Motor Response 6   Bernadette Coma Scale Score 15   HEENT   Right Eye Impaired vision   Left Eye Impaired vision   Teeth Dentures upper;Dentures lower   Respiratory   Respiratory Pattern Regular   Respiratory Depth Normal   Respiratory Quality/Effort Unlabored;Dyspnea with exertion   Chest Assessment Chest expansion symmetrical   Breath Sounds   Right Upper Lobe Clear   Right Middle Lobe Diminished   Right Lower Lobe Fine Crackles;Diminished   Left Upper Lobe Diminished   Left Lower Lobe Fine Crackles;Diminished   Cough/Sputum   Cough Dry;Moist;Productive   Frequency Occasional   Cough Description   Sputum Amount Small   Sputum Color UASTIN   Cardiac   Cardiac Regularity Irregular   Heart Sounds S1, S2   Cardiac Rhythm Atrial fibrillation   Rhythm Interpretation   Pulse 78   Cardiac Monitor   Telemetry Monitor On Yes   Telemetry Audible Yes   Telemetry Alarms Set Yes   Telemetry Box Number 52   Gastrointestinal   RUQ Bowel Sounds Active   LUQ Bowel Sounds Active   RLQ Bowel Sounds Active   LLQ Bowel Sounds Active   Abdomen Inspection Rounded; Soft   Tenderness Soft; No rebound   Last BM (including prior to admit) 01/10/20   Peripheral Vascular   RLE Edema Trace   LLE Edema +1;Pitting   Sensation RUE Full sensation   Sensation LUE Full sensation   Sensation RLE Full sensation   Sensation LLE Full sensation   RUE Neurovascular Assessment   Capillary Refill Less than/equal to 3 seconds   Color Appropriate for ethnicity; Ecchymosis   Temperature Warm   R Radial Pulse +1   LUE Neurovascular Assessment   Capillary Refill Less than/equal to 3 seconds   Color Appropriate for ethnicity; Ecchymosis   L Radial Pulse +1   RLE Neurovascular Assessment   Capillary Refill Less than/equal to 3 seconds   Color Appropriate for ethnicity   Temperature Warm   R Pedal Pulse +1   LLE Neurovascular Assessment   Capillary Refill Less than/equal to 3 seconds   Color Appropriate for ethnicity   Temperature Warm   L Pedal Pulse +1   Skin Color/Condition   Skin Color Appropriate for ethnicity; Ecchymosis   Skin Condition/Temp Dry;Poor turgor; Warm   Skin Integrity   Skin Integrity Other (Comment)  (Left lateral s/p wedge resection incision site dressing CDI)   Preventative Dressing No   Musculoskeletal   RL Extremity Full movement   LL Extremity Full movement   Genitourinary   Genitourinary (WDL) WDL   Flank Tenderness No   Suprapubic Tenderness No   Dysuria No   Anus/Rectum   Anus/Rectum (WDL) WDL   Incision 01/08/20 Back Lateral;Left   Date First Assessed/Time First Assessed: 01/08/20 2000   Present on Hospital Admission: Yes  Primary Wound Type: Incision  Location: Back  Wound Location Orientation: Lateral;Left   Wound Assessment Clean;Dry; Intact   Luz-wound Assessment Dry   Closure Approximated   Dressing/Treatment Dry dressing; Foam   Dressing Changed Changed/New  (1/11/20 Per Day shift RN)   Incision 01/02/20 Chest Lateral;Left;Lower   Date First Assessed:

## 2020-01-12 NOTE — PROGRESS NOTES
evidence of acute intracranial abnormality on a study mildly limited as   described above. VL DUP CAROTID BILATERAL   Final Result      XR CHEST PORTABLE   Final Result   Vascular congestion without overt edema. Persistent nodule like density of the right mid lung. Follow-up to ensure   resolution and exclude malignancy is needed. Unchanged left-sided atelectasis versus pneumonia over the past 24 hours         XR CHEST PORTABLE   Final Result   Right midlung zone airspace opacities could represent atelectasis or pneumonia                 Assessment/Plan:    Active Hospital Problems    Diagnosis    Cardiogenic shock ([address] [R57.0]    Acute on chronic diastolic congestive heart failure (HCC) [I50.33]    Pulmonary infiltrate [R91.8]    Pulmonary venous congestion [R09.89]    Atelectasis [J98.11]    Leukocytosis [D72.829]    Hyperglycemia [R73.9]    Acute bronchospasm [J98.01]    Atrial fibrillation with RVR ([facility]) [I48.91]    Hypotension [I95.9]    Acute respiratory failure with hypoxemia ([facility]) [J96.01]    Tobacco abuse [Z72.0]    S/P AVR (aortic valve replacement) [Z95.2]    AS (aortic stenosis) [I35.0]       \"69 y.o. male who presented to [facility] with SOB. Patient was discharged from [facility] for pneumonia and a large L hemothorax. He was discharged home much improved and felt well yesterday. However, overnight, patient became increasingly SOB. This worsened this AM and presented to OSH. Patient has known severe aortic stenosis and is being evaluated by CT surgery as an outpatient for repair of his bioprosthetic valve. At OSH, he was found to be having acute heart failure and mild cardiogenic shock. He was started on low dose dobutamine with good response and transferred to [facility]. \"      Acute on chronic diastolic CHF, suspect related to bioprosthetic AS, with cardiogenic shock diagnosed on admission  - furosemide started IV on admission.    - dobutamine gtt started at OSH.   Cardiology added nitroprusside gtt. - CT surgery consulted regarding the aortic valve. He says he had a porcine valve put in 20 years ago, then exchanged for a bovine valve 8 years ago. CT surgery recommends TAVR. It was severely stenosed per 1/2 TTE, then repeat TTE on this admission showed that it was moderately stenosed. Cardiology is considering ZOEY and LHC. Paroxysmal Afib with RVR  - held carvedilol initially. - amiodarone per cardiology. - not on anticoagulation due to recent hemothorax. Continue aspirin. AECOPD  - steroids, inhaled bronchodilators. - completed a course of vanc and cefepime last admission. Lungs with severe rhonchi, wheezing, and gurgly breath sounds, pulm consulted. S/p bronchoscopy 1/10, f/u culture results. - he quit smoking during the last admission, had been smoking 2 PPD. Acute hypoxic respiratory failure  - due to above issues, treat accordingly. Elevated troponin  - likely demand ischemia due to above issues. Cardiology consulted. Aspirin as above. DM2  - resume glipizide upon discharge. SSI while here on steroids. F/u A1c. Muscular deconditioning, mechanical falls  - PT/OT. - he says he had syncope a couple months ago while having a BM. This was presumably vasovagal.  Discussed with CT surgery, f/u B12, head CT normal.  TSH wnl. DVT Prophylaxis: SCDs  Diet: DIET LOW SODIUM 2 GM;  Dietary Nutrition Supplements: Standard High Calorie Oral Supplement  Code Status: DNR-CCA    PT/OT Eval Status: rec'd SNF    Dispo - perhaps 1/14, if he seems less likely to quickly decompensate again as outpatient. He went home [place] [id number] last time.        Bridger Baker MD

## 2020-01-13 ENCOUNTER — APPOINTMENT (OUTPATIENT)
Dept: CARDIAC CATH/INVASIVE PROCEDURES | Age: 70
DRG: 216 | End: 2020-01-13
Attending: INTERNAL MEDICINE
Payer: MEDICARE

## 2020-01-13 ENCOUNTER — ANESTHESIA EVENT (OUTPATIENT)
Dept: CARDIAC CATH/INVASIVE PROCEDURES | Age: 70
DRG: 216 | End: 2020-01-13
Payer: MEDICARE

## 2020-01-13 ENCOUNTER — ANESTHESIA (OUTPATIENT)
Dept: CARDIAC CATH/INVASIVE PROCEDURES | Age: 70
DRG: 216 | End: 2020-01-13
Payer: MEDICARE

## 2020-01-13 VITALS
OXYGEN SATURATION: 98 % | RESPIRATION RATE: 16 BRPM | SYSTOLIC BLOOD PRESSURE: 87 MMHG | TEMPERATURE: 98.6 F | DIASTOLIC BLOOD PRESSURE: 50 MMHG

## 2020-01-13 LAB
ANION GAP SERPL CALCULATED.3IONS-SCNC: 8 MMOL/L (ref 3–16)
BASE EXCESS ARTERIAL: 11 (ref -3–3)
BASE EXCESS VENOUS: 10 (ref -3–3)
BASE EXCESS VENOUS: 13 (ref -3–3)
BUN BLDV-MCNC: 20 MG/DL (ref 7–20)
CALCIUM SERPL-MCNC: 8.6 MG/DL (ref 8.3–10.6)
CHLORIDE BLD-SCNC: 92 MMOL/L (ref 99–110)
CO2: 36 MMOL/L (ref 21–32)
CREAT SERPL-MCNC: 0.6 MG/DL (ref 0.8–1.3)
ESTIMATED AVERAGE GLUCOSE: 96.8 MG/DL
GFR AFRICAN AMERICAN: >60
GFR NON-AFRICAN AMERICAN: >60
GLUCOSE BLD-MCNC: 143 MG/DL (ref 70–99)
GLUCOSE BLD-MCNC: 153 MG/DL (ref 70–99)
GLUCOSE BLD-MCNC: 186 MG/DL (ref 70–99)
GLUCOSE BLD-MCNC: 339 MG/DL (ref 70–99)
HBA1C MFR BLD: 5 %
HCO3 ARTERIAL: 34.9 MMOL/L (ref 21–29)
HCO3 VENOUS: 34.3 MMOL/L (ref 23–29)
HCO3 VENOUS: 37.9 MMOL/L (ref 23–29)
HCT VFR BLD CALC: 27.6 % (ref 40.5–52.5)
HEMOGLOBIN: 9.2 G/DL (ref 13.5–17.5)
MAGNESIUM: 2.1 MG/DL (ref 1.8–2.4)
MCH RBC QN AUTO: 31.3 PG (ref 26–34)
MCHC RBC AUTO-ENTMCNC: 33.1 G/DL (ref 31–36)
MCV RBC AUTO: 94.3 FL (ref 80–100)
O2 SAT, ARTERIAL: 87 % (ref 93–100)
O2 SAT, VEN: 48 %
O2 SAT, VEN: 56 %
PCO2 ARTERIAL: 47.2 MM HG (ref 35–45)
PCO2, VEN: 54.6 MM HG (ref 40–50)
PCO2, VEN: 59.5 MM HG (ref 40–50)
PDW BLD-RTO: 18 % (ref 12.4–15.4)
PERFORMED ON: ABNORMAL
PH ARTERIAL: 7.48 (ref 7.35–7.45)
PH VENOUS: 7.41 (ref 7.35–7.45)
PH VENOUS: 7.41 (ref 7.35–7.45)
PLATELET # BLD: 333 K/UL (ref 135–450)
PMV BLD AUTO: 7.4 FL (ref 5–10.5)
PO2 ARTERIAL: 50.5 MM HG (ref 75–108)
PO2, VEN: 27 MM HG
PO2, VEN: 30 MM HG
POC SAMPLE TYPE: ABNORMAL
POTASSIUM SERPL-SCNC: 3.7 MMOL/L (ref 3.5–5.1)
RBC # BLD: 2.93 M/UL (ref 4.2–5.9)
SODIUM BLD-SCNC: 136 MMOL/L (ref 136–145)
TCO2 ARTERIAL: 36 MMOL/L
TCO2 CALC VENOUS: 36 MMOL/L
TCO2 CALC VENOUS: 40 MMOL/L
WBC # BLD: 16.1 K/UL (ref 4–11)

## 2020-01-13 PROCEDURE — 2580000003 HC RX 258: Performed by: INTERNAL MEDICINE

## 2020-01-13 PROCEDURE — 94761 N-INVAS EAR/PLS OXIMETRY MLT: CPT

## 2020-01-13 PROCEDURE — 4A023N8 MEASUREMENT OF CARDIAC SAMPLING AND PRESSURE, BILATERAL, PERCUTANEOUS APPROACH: ICD-10-PCS | Performed by: INTERNAL MEDICINE

## 2020-01-13 PROCEDURE — C1751 CATH, INF, PER/CENT/MIDLINE: HCPCS

## 2020-01-13 PROCEDURE — 6360000002 HC RX W HCPCS: Performed by: INTERNAL MEDICINE

## 2020-01-13 PROCEDURE — 6360000002 HC RX W HCPCS

## 2020-01-13 PROCEDURE — B2161ZZ FLUOROSCOPY OF RIGHT AND LEFT HEART USING LOW OSMOLAR CONTRAST: ICD-10-PCS | Performed by: INTERNAL MEDICINE

## 2020-01-13 PROCEDURE — 93566 NJX CAR CTH SLCTV RV/RA ANG: CPT

## 2020-01-13 PROCEDURE — B3101ZZ FLUOROSCOPY OF THORACIC AORTA USING LOW OSMOLAR CONTRAST: ICD-10-PCS | Performed by: INTERNAL MEDICINE

## 2020-01-13 PROCEDURE — 93460 R&L HRT ART/VENTRICLE ANGIO: CPT | Performed by: INTERNAL MEDICINE

## 2020-01-13 PROCEDURE — C1894 INTRO/SHEATH, NON-LASER: HCPCS

## 2020-01-13 PROCEDURE — 6370000000 HC RX 637 (ALT 250 FOR IP): Performed by: INTERNAL MEDICINE

## 2020-01-13 PROCEDURE — C1725 CATH, TRANSLUMIN NON-LASER: HCPCS

## 2020-01-13 PROCEDURE — C1769 GUIDE WIRE: HCPCS

## 2020-01-13 PROCEDURE — 93460 R&L HRT ART/VENTRICLE ANGIO: CPT

## 2020-01-13 PROCEDURE — 94640 AIRWAY INHALATION TREATMENT: CPT

## 2020-01-13 PROCEDURE — 83735 ASSAY OF MAGNESIUM: CPT

## 2020-01-13 PROCEDURE — 82803 BLOOD GASES ANY COMBINATION: CPT

## 2020-01-13 PROCEDURE — C1887 CATHETER, GUIDING: HCPCS

## 2020-01-13 PROCEDURE — 6360000004 HC RX CONTRAST MEDICATION

## 2020-01-13 PROCEDURE — 93325 DOPPLER ECHO COLOR FLOW MAPG: CPT

## 2020-01-13 PROCEDURE — 93315 ECHO TRANSESOPHAGEAL: CPT

## 2020-01-13 PROCEDURE — 93567 NJX CAR CTH SPRVLV AORTGRPHY: CPT | Performed by: INTERNAL MEDICINE

## 2020-01-13 PROCEDURE — 2580000003 HC RX 258

## 2020-01-13 PROCEDURE — 2700000000 HC OXYGEN THERAPY PER DAY

## 2020-01-13 PROCEDURE — 6370000000 HC RX 637 (ALT 250 FOR IP): Performed by: NURSE PRACTITIONER

## 2020-01-13 PROCEDURE — 2060000000 HC ICU INTERMEDIATE R&B

## 2020-01-13 PROCEDURE — 36415 COLL VENOUS BLD VENIPUNCTURE: CPT

## 2020-01-13 PROCEDURE — 93567 NJX CAR CTH SPRVLV AORTGRPHY: CPT

## 2020-01-13 PROCEDURE — B2111ZZ FLUOROSCOPY OF MULTIPLE CORONARY ARTERIES USING LOW OSMOLAR CONTRAST: ICD-10-PCS | Performed by: INTERNAL MEDICINE

## 2020-01-13 PROCEDURE — 2500000003 HC RX 250 WO HCPCS

## 2020-01-13 PROCEDURE — 85027 COMPLETE CBC AUTOMATED: CPT

## 2020-01-13 PROCEDURE — 3700000000 HC ANESTHESIA ATTENDED CARE

## 2020-01-13 PROCEDURE — 3700000001 HC ADD 15 MINUTES (ANESTHESIA)

## 2020-01-13 PROCEDURE — 93320 DOPPLER ECHO COMPLETE: CPT

## 2020-01-13 PROCEDURE — 80048 BASIC METABOLIC PNL TOTAL CA: CPT

## 2020-01-13 PROCEDURE — 85347 COAGULATION TIME ACTIVATED: CPT

## 2020-01-13 PROCEDURE — 2709999900 HC NON-CHARGEABLE SUPPLY

## 2020-01-13 RX ORDER — CARVEDILOL 6.25 MG/1
6.25 TABLET ORAL 2 TIMES DAILY WITH MEALS
Status: DISCONTINUED | OUTPATIENT
Start: 2020-01-13 | End: 2020-01-14

## 2020-01-13 RX ORDER — HEPARIN SODIUM 1000 [USP'U]/ML
INJECTION, SOLUTION INTRAVENOUS; SUBCUTANEOUS
Status: COMPLETED | OUTPATIENT
Start: 2020-01-13 | End: 2020-01-13

## 2020-01-13 RX ORDER — SODIUM CHLORIDE, SODIUM LACTATE, POTASSIUM CHLORIDE, CALCIUM CHLORIDE 600; 310; 30; 20 MG/100ML; MG/100ML; MG/100ML; MG/100ML
INJECTION, SOLUTION INTRAVENOUS CONTINUOUS PRN
Status: DISCONTINUED | OUTPATIENT
Start: 2020-01-13 | End: 2020-01-13 | Stop reason: SDUPTHER

## 2020-01-13 RX ORDER — SODIUM CHLORIDE 0.9 % (FLUSH) 0.9 %
10 SYRINGE (ML) INJECTION EVERY 12 HOURS SCHEDULED
Status: DISCONTINUED | OUTPATIENT
Start: 2020-01-13 | End: 2020-01-21

## 2020-01-13 RX ORDER — ACETAMINOPHEN 325 MG/1
650 TABLET ORAL EVERY 4 HOURS PRN
Status: DISCONTINUED | OUTPATIENT
Start: 2020-01-13 | End: 2020-01-21

## 2020-01-13 RX ORDER — PROPOFOL 10 MG/ML
INJECTION, EMULSION INTRAVENOUS PRN
Status: DISCONTINUED | OUTPATIENT
Start: 2020-01-13 | End: 2020-01-13 | Stop reason: SDUPTHER

## 2020-01-13 RX ORDER — SODIUM CHLORIDE 0.9 % (FLUSH) 0.9 %
10 SYRINGE (ML) INJECTION PRN
Status: DISCONTINUED | OUTPATIENT
Start: 2020-01-13 | End: 2020-01-21

## 2020-01-13 RX ORDER — MIDAZOLAM HYDROCHLORIDE 5 MG/ML
INJECTION INTRAMUSCULAR; INTRAVENOUS
Status: COMPLETED | OUTPATIENT
Start: 2020-01-13 | End: 2020-01-13

## 2020-01-13 RX ORDER — FENTANYL CITRATE 50 UG/ML
INJECTION, SOLUTION INTRAMUSCULAR; INTRAVENOUS
Status: COMPLETED | OUTPATIENT
Start: 2020-01-13 | End: 2020-01-13

## 2020-01-13 RX ORDER — LIDOCAINE HYDROCHLORIDE 20 MG/ML
INJECTION, SOLUTION INFILTRATION; PERINEURAL PRN
Status: DISCONTINUED | OUTPATIENT
Start: 2020-01-13 | End: 2020-01-13 | Stop reason: SDUPTHER

## 2020-01-13 RX ADMIN — ALLOPURINOL 300 MG: 300 TABLET ORAL at 08:44

## 2020-01-13 RX ADMIN — INSULIN LISPRO 4 UNITS: 100 INJECTION, SOLUTION INTRAVENOUS; SUBCUTANEOUS at 20:55

## 2020-01-13 RX ADMIN — ATORVASTATIN CALCIUM 40 MG: 40 TABLET, FILM COATED ORAL at 08:44

## 2020-01-13 RX ADMIN — IPRATROPIUM BROMIDE AND ALBUTEROL SULFATE 1 AMPULE: .5; 3 SOLUTION RESPIRATORY (INHALATION) at 20:30

## 2020-01-13 RX ADMIN — ASPIRIN 81 MG 81 MG: 81 TABLET ORAL at 08:44

## 2020-01-13 RX ADMIN — HEPARIN SODIUM 1000 UNITS: 1000 INJECTION, SOLUTION INTRAVENOUS; SUBCUTANEOUS at 14:43

## 2020-01-13 RX ADMIN — Medication 10 ML: at 08:45

## 2020-01-13 RX ADMIN — FUROSEMIDE 40 MG: 10 INJECTION, SOLUTION INTRAMUSCULAR; INTRAVENOUS at 18:43

## 2020-01-13 RX ADMIN — LIDOCAINE HYDROCHLORIDE 75 MG: 20 INJECTION, SOLUTION INFILTRATION; PERINEURAL at 11:40

## 2020-01-13 RX ADMIN — HEPARIN SODIUM 5000 UNITS: 1000 INJECTION, SOLUTION INTRAVENOUS; SUBCUTANEOUS at 14:43

## 2020-01-13 RX ADMIN — MIDAZOLAM HYDROCHLORIDE 2 MG: 5 INJECTION INTRAMUSCULAR; INTRAVENOUS at 14:22

## 2020-01-13 RX ADMIN — FUROSEMIDE 40 MG: 10 INJECTION, SOLUTION INTRAMUSCULAR; INTRAVENOUS at 08:44

## 2020-01-13 RX ADMIN — PROPOFOL 300 MG: 10 INJECTION, EMULSION INTRAVENOUS at 11:40

## 2020-01-13 RX ADMIN — FENTANYL CITRATE 25 MCG: 50 INJECTION, SOLUTION INTRAMUSCULAR; INTRAVENOUS at 14:38

## 2020-01-13 RX ADMIN — PREDNISONE 20 MG: 20 TABLET ORAL at 08:44

## 2020-01-13 RX ADMIN — AMIODARONE HYDROCHLORIDE 200 MG: 200 TABLET ORAL at 08:44

## 2020-01-13 RX ADMIN — CARVEDILOL 6.25 MG: 6.25 TABLET, FILM COATED ORAL at 18:43

## 2020-01-13 RX ADMIN — SODIUM CHLORIDE, POTASSIUM CHLORIDE, SODIUM LACTATE AND CALCIUM CHLORIDE: 600; 310; 30; 20 INJECTION, SOLUTION INTRAVENOUS at 11:31

## 2020-01-13 RX ADMIN — IPRATROPIUM BROMIDE AND ALBUTEROL SULFATE 1 AMPULE: .5; 3 SOLUTION RESPIRATORY (INHALATION) at 07:59

## 2020-01-13 RX ADMIN — INSULIN GLARGINE 10 UNITS: 100 INJECTION, SOLUTION SUBCUTANEOUS at 20:55

## 2020-01-13 RX ADMIN — Medication 10 ML: at 20:55

## 2020-01-13 RX ADMIN — FENTANYL CITRATE 25 MCG: 50 INJECTION, SOLUTION INTRAMUSCULAR; INTRAVENOUS at 14:18

## 2020-01-13 ASSESSMENT — LIFESTYLE VARIABLES: SMOKING_STATUS: 1

## 2020-01-13 ASSESSMENT — PAIN SCALES - GENERAL: PAINLEVEL_OUTOF10: 0

## 2020-01-13 NOTE — PROGRESS NOTES
1/13/2020 0749  Gross per 24 hour   Intake 600 ml   Output 2830 ml   Net -2230 ml       Physical Exam Performed:    /78   Pulse 86   Temp 98 °F (36.7 °C) (Oral)   Resp 16   Ht 5' 10\" (1.778 m)   Wt 199 lb 6.4 oz (90.4 kg)   SpO2 94%   BMI 28.61 kg/m²     General appearance: No apparent distress, appears stated age and cooperative. HEENT: Pupils equal, round, and reactive to light. Conjunctivae/corneas clear. Neck: Supple, with full range of motion. No jugular venous distention. Trachea midline. Respiratory:  Normal respiratory effort. Prominent bilateral rhonchi and wheezing have resolved. Bibasilar rales are present. Mucus no longer heard gurgling in large airways. Cardiovascular: tachycardic rate and rhythm with without rubs or gallops. 3/6 systolic murmur at the RUSB. Abdomen: Soft, non-tender, non-distended with normal bowel sounds. Musculoskeletal: No clubbing, cyanosis or edema bilaterally. Full range of motion without deformity. Skin: Skin color, texture, turgor normal.  No rashes or lesions. Neurologic:  Neurovascularly intact without any focal sensory/motor deficits. Cranial nerves: II-XII intact, grossly non-focal.  Psychiatric: Alert and oriented, thought content appropriate, normal insight  Capillary Refill: Brisk,< 3 seconds   Peripheral Pulses: +2 palpable, equal bilaterally       Labs:   Recent Labs     01/11/20  0518 01/13/20  0518   WBC 15.3* 16.1*   HGB 9.7* 9.2*   HCT 28.7* 27.6*    333     Recent Labs     01/11/20  0518 01/12/20  0513 01/13/20  0518    135* 136   K 3.8 3.7 3.7   CL 91* 89* 92*   CO2 35* 35* 36*   BUN 19 19 20   CREATININE 0.6* 0.6* 0.6*   CALCIUM 8.6 8.5 8.6     No results for input(s): AST, ALT, BILIDIR, BILITOT, ALKPHOS in the last 72 hours. No results for input(s): INR in the last 72 hours. No results for input(s): Burt Flatter in the last 72 hours.     Urinalysis:    No results found for: Mike Tsai, [address], BACTERIA, RBCUA, BLOODU, Lindsay Mcpherson    Radiology:  CT HEAD WO CONTRAST   Preliminary Result   No evidence of acute intracranial abnormality on a study mildly limited as   described above. VL DUP CAROTID BILATERAL   Final Result      XR CHEST PORTABLE   Final Result   Vascular congestion without overt edema. Persistent nodule like density of the right mid lung. Follow-up to ensure   resolution and exclude malignancy is needed. Unchanged left-sided atelectasis versus pneumonia over the past 24 hours         XR CHEST PORTABLE   Final Result   Right midlung zone airspace opacities could represent atelectasis or pneumonia                 Assessment/Plan:    Active Hospital Problems    Diagnosis    Cardiogenic shock ([address] [R57.0]    Acute on chronic diastolic congestive heart failure (HCC) [I50.33]    Pulmonary infiltrate [R91.8]    Pulmonary venous congestion [R09.89]    Atelectasis [J98.11]    Leukocytosis [D72.829]    Hyperglycemia [R73.9]    Acute bronchospasm [J98.01]    Atrial fibrillation with RVR ([facility]) [I48.91]    Hypotension [I95.9]    Acute respiratory failure with hypoxemia (HCC) [J96.01]    Tobacco abuse [Z72.0]    S/P AVR (aortic valve replacement) [Z95.2]    AS (aortic stenosis) [I35.0]       \"69 y.o. male who presented to [facility] with SOB. Patient was discharged from [facility] for pneumonia and a large L hemothorax. He was discharged home much improved and felt well yesterday. However, overnight, patient became increasingly SOB. This worsened this AM and presented to OSH. Patient has known severe aortic stenosis and is being evaluated by CT surgery as an outpatient for repair of his bioprosthetic valve. At OSH, he was found to be having acute heart failure and mild cardiogenic shock. He was started on low dose dobutamine with good response and transferred to [facility]. \"      Acute on chronic diastolic CHF, suspect related to bioprosthetic AS, with cardiogenic shock diagnosed on admission  - furosemide started IV on admission.    - dobutamine gtt started at OSH. Cardiology added nitroprusside gtt. - CT surgery consulted regarding the aortic valve. He says he had a porcine valve put in 20 years ago, then exchanged for a bovine valve 8 years ago. It was severely stenosed per 1/2 TTE, then repeat TTE on this admission showed that it was moderately stenosed. CT surgery reportedly recommends TAVR.     - ZOEY and LHC 1/13    Paroxysmal Afib with RVR  - held carvedilol initially. - amiodarone per cardiology. - not on anticoagulation due to recent hemothorax. Continue aspirin. AECOPD  - steroids, inhaled bronchodilators. - completed a course of vanc and cefepime last admission. S/p bronchoscopy 1/10 - the BAL grew pseudomonas fluorescens, but the patient recently completed a course of cefepime and clinically appeared to be doing well without further abx.    - he quit smoking during the last admission, had been smoking 2 PPD. Acute hypoxic respiratory failure  - due to above issues, treat accordingly. Elevated troponin  - likely demand ischemia due to above issues. Cardiology consulted. Aspirin as above. DM2  - resume glipizide upon discharge. SSI while here on steroids. A1c only 5, in the setting of recent major blood loss. Muscular deconditioning, mechanical falls  - PT/OT. - he says he had syncope a couple months ago while having a BM. This was presumably vasovagal.  Discussed with CT surgery, normal B12, head CT normal, TSH wnl. DVT Prophylaxis: SCDs  Diet: Diet NPO Time Specified  Code Status: DNR-CCA    PT/OT Eval Status: rec'd SNF    Dispo - perhaps 1/14-15, pending cardiology and CT surgery input. He went home [place] [phone] last time.        Randy Matamoros MD

## 2020-01-13 NOTE — PROCEDURES
CARDIAC CATHETERIZATION REPORT     Procedure Date:  2020  Patient Name: Brock Carmichael  MRN: [id number] : 1950      INDICATION     Bioprosthetic valve aortic stenosis  Non-STEMI    PROCEDURES PERFORMED     Right heart cath  Left heart catheterization  Aortogram  Coronary angiogam  Coronary cath            PROCEDURE DESCRIPTION   Risks/benefits/alternatives/outcomes were discussed with patient and/or family and informed consent was obtained. Using the [facility] scale, the patient's right radial artery was found to be acceptable for cannulation. Patient was prepped draped in the usual sterile fashion. Local anaesthetic was applied over puncture sites. Using ultrasound guidance with a micropuncture kit a 5 Tajik sheath was inserted into the right internal jugular vein and 5 [place] PA catheter was used for hemodynamic and right heart catheterization assessment. The catheter was removed and the sheath was secured at the end of the case for later removal.  Using a back wall technique, a 6 [place] Terumo sheath was inserted into right radial artery. Verapamil, nitroglycerin were administered through the sheath. Heparin was administered. Initially a 5 [place] AL-1 catheter was used with a straight wire in an attempt to cross the aortic valve but this was unsuccessful. Ultimately the aortic valve was able to be crossed with a multipurpose and straight wire and this was exchanged for a dual-lumen pigtail catheter for aortic valve hemodynamic assessment. Pullback was performed across aortic valve and then the pigtail catheter was exchanged for other coronary catheters. The AL-1 was used for left-sided coronary angiography but it was felt to not entirely select the LAD for which ultimately a 6 [place] AL 0.75 catheter was used for diagnostic angiography. Aortic angiography was obtained with a pigtail catheter as it was difficult to find the right coronary artery as well as in locating grafts.   At the noted.  Vessel is only seen in retrograde filling. BYPASS GRAFTS    No bypass grafts were visualized. 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 ZOEY did show color flow signal near aortic root which may need further characterization and CTA may be needed for this versus cardiac MRI. Add beta-blocker back to medicine regimen.   Add back aspirin, will defer additional anticoagulation given due to recent hemothorax

## 2020-01-13 NOTE — PROGRESS NOTES
air    Sedation/Anesthesia Plan:    As per anesthesiology service. Addend, as per anaesthesia svc for ZOEY, will plan versed/fentanyl for cath    DISCUSSION OF CARDIAC CATHETERIZATION PROCEDURES: The procedures, indications, risks and alternatives have been discussed with the patient and, as appropriate, with the patient's guardian . Risks discussed included, but are not limited to, bleeding, development of blood clots/emboli, damage to blood vessels, renal failure, malignant cardiac arrhythmias, stroke, heart attack, emergent coronary bypass surgery, death, dye allergy. The patient (and guardian as appropriate) expressed understanding of the aforementioned and wished to proceed.               Electronically signed by Ana Little MD on 1/13/2020 at 11:35 AM

## 2020-01-13 NOTE — PROGRESS NOTES
Patient left for cath lab in stable condition with transport. Yoly Costa from [facility] notified.

## 2020-01-13 NOTE — ANESTHESIA POSTPROCEDURE EVALUATION
Department of Anesthesiology  Postprocedure Note    Patient: Aliza Biswas  MRN: [id number]  YOB: 1950  Date of evaluation: 1/13/2020  Time:  1:01 PM     Procedure Summary     Date:  01/13/20 Room / Location:  [facility] Cardiac Cath Lab    Anesthesia Start:  1135 Anesthesia Stop:  2024    Procedure:  TRANSESOPHAGEAL ECHO Diagnosis:      Scheduled Providers:   Responsible Provider:  Mona Trinidad MD    Anesthesia Type:  TIVA ASA Status:  3          Anesthesia Type: TIVA    Patricia Phase I:      Patricia Phase II:      Last vitals: Reviewed and per EMR flowsheets.        Anesthesia Post Evaluation    Patient location during evaluation: PACU  Level of consciousness: awake  Airway patency: patent  Nausea & Vomiting: no nausea  Complications: no  Cardiovascular status: blood pressure returned to baseline  Respiratory status: acceptable  Hydration status: euvolemic

## 2020-01-13 NOTE — ANESTHESIA PRE PROCEDURE
0-12 Units Subcutaneous TID WC Cee Omalley MD   6 Units at 01/12/20 1715    insulin lispro (HUMALOG) injection vial 0-6 Units  0-6 Units Subcutaneous Nightly Cee Omalley MD   4 Units at 01/12/20 2059    traMADol (ULTRAM) tablet 50 mg  50 mg Oral Q6H PRN Tad Meier MD   50 mg at 01/12/20 1058    ipratropium-albuterol (DUONEB) nebulizer solution 1 ampule  1 ampule Inhalation BID Faith Favre, MD   1 ampule at 01/13/20 0759    amiodarone (CORDARONE) tablet 200 mg  200 mg Oral Daily Dougie Banana, APRN - CNP   200 mg at 01/13/20 0844    potassium chloride (KLOR-CON M) extended release tablet 40 mEq  40 mEq Oral PRN Tad Meier MD        Or    potassium bicarb-citric acid (EFFER-K) effervescent tablet 40 mEq  40 mEq Oral PRN Tad Meier MD        Or    potassium chloride 10 mEq/100 mL IVPB (Peripheral Line)  10 mEq Intravenous PRN Tad Meier MD        magnesium sulfate 1 g in dextrose 5% 100 mL IVPB  1 g Intravenous PRN Tad Meier MD        prochlorperazine (COMPAZINE) injection 10 mg  10 mg Intravenous Q6H PRN Cee Omalley MD        furosemide (LASIX) injection 40 mg  40 mg Intravenous BID Christene Soulier, MD   40 mg at 01/13/20 0844    nitroPRUSSide (NIPRIDE) 50 mg in dextrose 5 % 250 mL infusion  0.5 mcg/kg/min Intravenous Continuous Christene Soulier, MD 13.9 mL/hr at 01/12/20 1610 0.5 mcg/kg/min at 01/12/20 1610    aspirin chewable tablet 81 mg  81 mg Oral Daily Christene Soulier, MD   81 mg at 01/13/20 0844    perflutren lipid microspheres (DEFINITY) injection 1.65 mg  1.5 mL Intravenous ONCE PRN Christene Soulier, MD        allopurinol (ZYLOPRIM) tablet 300 mg  300 mg Oral Daily Trudy Scales MD   300 mg at 01/13/20 0844    atorvastatin (LIPITOR) tablet 40 mg  40 mg Oral Daily Trudy Scales MD   40 mg at 01/13/20 0844    nicotine (NICODERM CQ) 14 MG/24HR 1 patch  1 patch Transdermal Daily Trudy Scales MD   1 patch at 01/13/20 0844    sodium chloride flush Smoking status: Current Every Day Smoker     Packs/day: 2.00     Types: Cigarettes    Smokeless tobacco: Current User   Substance Use Topics    Alcohol use: Yes     Alcohol/week: 6.0 standard drinks     Types: 6 Cans of beer per week     Frequency: 4 or more times a week     Drinks per session: 5 or 6     Binge frequency: Daily or almost daily                                Ready to quit: Not Answered  Counseling given: Not Answered      Vital Signs (Current):   Vitals:    01/12/20 1934 01/12/20 2045 01/13/20 0404 01/13/20 0842   BP:  128/64 110/62 123/78   Pulse:  92 84 86   Resp:  18 16 16   Temp:  97.9 °F (36.6 °C) 98 °F (36.7 °C) 98 °F (36.7 °C)   TempSrc:  Oral Oral Oral   SpO2: 93% 94% 92% 94%   Weight:       Height:                                                  BP Readings from Last 3 Encounters:   01/13/20 123/78   01/07/20 (!) 116/56   01/02/20 (!) 83/25       NPO Status: Time of last liquid consumption: 2330                        Time of last solid consumption: 2330                        Date of last liquid consumption: 01/08/20                        Date of last solid food consumption: 01/08/20    BMI:   Wt Readings from Last 3 Encounters:   01/12/20 199 lb 6.4 oz (90.4 kg)   01/06/20 209 lb 7 oz (95 kg)     Body mass index is 28.61 kg/m².     CBC:   Lab Results   Component Value Date    WBC 16.1 01/13/2020    RBC 2.93 01/13/2020    HGB 9.2 01/13/2020    HCT 27.6 01/13/2020    MCV 94.3 01/13/2020    RDW 18.0 01/13/2020     01/13/2020       CMP:   Lab Results   Component Value Date     01/13/2020    K 3.7 01/13/2020    K 7.3 01/02/2020    CL 92 01/13/2020    CO2 36 01/13/2020    BUN 20 01/13/2020    CREATININE 0.6 01/13/2020    GFRAA >60 01/13/2020    AGRATIO 1.8 01/02/2020    LABGLOM >60 01/13/2020    GLUCOSE 153 01/13/2020    PROT 3.3 01/02/2020    CALCIUM 8.6 01/13/2020    BILITOT 0.7 01/02/2020    ALKPHOS 29 01/02/2020     01/02/2020     01/02/2020       POC Tests: Recent Labs     01/13/20  0746   POCGLU 143*       Coags:   Lab Results   Component Value Date    PROTIME 23.8 01/02/2020    INR 2.04 01/02/2020    APTT 27.8 01/02/2020       HCG (If Applicable): No results found for: PREGTESTUR, PREGSERUM, HCG, HCGQUANT     ABGs:   Lab Results   Component Value Date    PHART 7.360 01/03/2020    PO2ART 79.0 01/03/2020    GZU1GDY 54.3 01/03/2020    HNG3JOM 30.7 01/03/2020    BEART 5 01/03/2020    D4CSYLUA 95 01/03/2020        Type & Screen (If Applicable):  No results found for: LABABO, [address]    Anesthesia Evaluation  Patient summary reviewed no history of anesthetic complications:   Airway: Mallampati: II  TM distance: >3 FB   Neck ROM: full  Mouth opening: > = 3 FB Dental:    (+) edentulous      Pulmonary:   (+) COPD:  current smoker                           Cardiovascular:    (+) hypertension:, valvular problems/murmurs (s/p AVR): AS, CAD:, CHF:,                   Neuro/Psych:   Negative Neuro/Psych ROS              GI/Hepatic/Renal: Neg GI/Hepatic/Renal ROS       (-) GERD, liver disease and no renal disease       Endo/Other:    (+) DiabetesType II DM, , .                 Abdominal:           Vascular: negative vascular ROS. Anesthesia Plan      TIVA     ASA 3       Induction: intravenous. Anesthetic plan and risks discussed with patient. Plan discussed with CRNA. All questions answered and agrees with plan.         Apple Freed MD   1/13/2020

## 2020-01-13 NOTE — PROGRESS NOTES
Occupational Therapy/Physical Therapy  Attempted OT/PT tx. Pt is at Cath Lab. Cont OT/PT.   Rey Reese OTR/L  Mohan Wells PT, DPT

## 2020-01-13 NOTE — PLAN OF CARE
Problem: Falls - Risk of:  Goal: Will remain free from falls  Description  Will remain free from falls  Outcome: Met This Shift  Goal: Absence of physical injury  Description  Absence of physical injury  Outcome: Met This Shift     Problem: Pain:  Goal: Control of acute pain  Description  Control of acute pain  Outcome: Met This Shift  Goal: Control of chronic pain  Description  Control of chronic pain  Outcome: Met This Shift     Problem: OXYGENATION/RESPIRATORY FUNCTION  Goal: Patient will maintain patent airway  Outcome: Met This Shift  Goal: Patient will achieve/maintain normal respiratory rate/effort  Description  Respiratory rate and effort will be within normal limits for the patient  Outcome: Met This Shift     Problem: HEMODYNAMIC STATUS  Goal: Patient has stable vital signs and fluid balance  Outcome: Met This Shift     Problem: FLUID AND ELECTROLYTE IMBALANCE  Goal: Fluid and electrolyte balance are achieved/maintained  Outcome: Met This Shift

## 2020-01-14 ENCOUNTER — APPOINTMENT (OUTPATIENT)
Dept: CT IMAGING | Age: 70
DRG: 216 | End: 2020-01-14
Attending: INTERNAL MEDICINE
Payer: MEDICARE

## 2020-01-14 LAB
ANION GAP SERPL CALCULATED.3IONS-SCNC: 8 MMOL/L (ref 3–16)
BUN BLDV-MCNC: 21 MG/DL (ref 7–20)
CALCIUM SERPL-MCNC: 8.2 MG/DL (ref 8.3–10.6)
CHLORIDE BLD-SCNC: 89 MMOL/L (ref 99–110)
CO2: 36 MMOL/L (ref 21–32)
CREAT SERPL-MCNC: 0.7 MG/DL (ref 0.8–1.3)
CULTURE, RESPIRATORY: ABNORMAL
CULTURE, RESPIRATORY: ABNORMAL
GFR AFRICAN AMERICAN: >60
GFR NON-AFRICAN AMERICAN: >60
GLUCOSE BLD-MCNC: 121 MG/DL (ref 70–99)
GLUCOSE BLD-MCNC: 153 MG/DL (ref 70–99)
GLUCOSE BLD-MCNC: 168 MG/DL (ref 70–99)
GLUCOSE BLD-MCNC: 297 MG/DL (ref 70–99)
GLUCOSE BLD-MCNC: 87 MG/DL (ref 70–99)
GRAM STAIN RESULT: ABNORMAL
HCT VFR BLD CALC: 30.4 % (ref 40.5–52.5)
HEMOGLOBIN: 10.2 G/DL (ref 13.5–17.5)
MAGNESIUM: 2 MG/DL (ref 1.8–2.4)
MCH RBC QN AUTO: 31.3 PG (ref 26–34)
MCHC RBC AUTO-ENTMCNC: 33.4 G/DL (ref 31–36)
MCV RBC AUTO: 93.9 FL (ref 80–100)
ORGANISM: ABNORMAL
PDW BLD-RTO: 17.8 % (ref 12.4–15.4)
PERFORMED ON: ABNORMAL
PLATELET # BLD: 329 K/UL (ref 135–450)
PMV BLD AUTO: 7.3 FL (ref 5–10.5)
POC ACT LR: 176 SEC
POC ACT LR: 221 SEC
POC ACT LR: 224 SEC
POC ACT LR: 258 SEC
POTASSIUM REFLEX MAGNESIUM: 3.2 MMOL/L (ref 3.5–5.1)
POTASSIUM SERPL-SCNC: 3.2 MMOL/L (ref 3.5–5.1)
PRO-BNP: 1491 PG/ML (ref 0–124)
RBC # BLD: 3.24 M/UL (ref 4.2–5.9)
SODIUM BLD-SCNC: 133 MMOL/L (ref 136–145)
WBC # BLD: 13.3 K/UL (ref 4–11)

## 2020-01-14 PROCEDURE — 6370000000 HC RX 637 (ALT 250 FOR IP): Performed by: INTERNAL MEDICINE

## 2020-01-14 PROCEDURE — 85027 COMPLETE CBC AUTOMATED: CPT

## 2020-01-14 PROCEDURE — 99231 SBSQ HOSP IP/OBS SF/LOW 25: CPT | Performed by: THORACIC SURGERY (CARDIOTHORACIC VASCULAR SURGERY)

## 2020-01-14 PROCEDURE — 6360000004 HC RX CONTRAST MEDICATION: Performed by: NURSE PRACTITIONER

## 2020-01-14 PROCEDURE — 97110 THERAPEUTIC EXERCISES: CPT

## 2020-01-14 PROCEDURE — 94640 AIRWAY INHALATION TREATMENT: CPT

## 2020-01-14 PROCEDURE — 6360000002 HC RX W HCPCS: Performed by: INTERNAL MEDICINE

## 2020-01-14 PROCEDURE — 2580000003 HC RX 258: Performed by: INTERNAL MEDICINE

## 2020-01-14 PROCEDURE — 97116 GAIT TRAINING THERAPY: CPT

## 2020-01-14 PROCEDURE — 71260 CT THORAX DX C+: CPT

## 2020-01-14 PROCEDURE — 83880 ASSAY OF NATRIURETIC PEPTIDE: CPT

## 2020-01-14 PROCEDURE — 36415 COLL VENOUS BLD VENIPUNCTURE: CPT

## 2020-01-14 PROCEDURE — 80048 BASIC METABOLIC PNL TOTAL CA: CPT

## 2020-01-14 PROCEDURE — 6370000000 HC RX 637 (ALT 250 FOR IP): Performed by: NURSE PRACTITIONER

## 2020-01-14 PROCEDURE — 2060000000 HC ICU INTERMEDIATE R&B

## 2020-01-14 PROCEDURE — 83735 ASSAY OF MAGNESIUM: CPT

## 2020-01-14 RX ORDER — CARVEDILOL 3.12 MG/1
3.12 TABLET ORAL 2 TIMES DAILY WITH MEALS
Status: DISCONTINUED | OUTPATIENT
Start: 2020-01-14 | End: 2020-01-21

## 2020-01-14 RX ADMIN — IOPAMIDOL 75 ML: 755 INJECTION, SOLUTION INTRAVENOUS at 12:53

## 2020-01-14 RX ADMIN — ASPIRIN 81 MG 81 MG: 81 TABLET ORAL at 09:13

## 2020-01-14 RX ADMIN — INSULIN GLARGINE 10 UNITS: 100 INJECTION, SOLUTION SUBCUTANEOUS at 21:27

## 2020-01-14 RX ADMIN — INSULIN LISPRO 6 UNITS: 100 INJECTION, SOLUTION INTRAVENOUS; SUBCUTANEOUS at 17:47

## 2020-01-14 RX ADMIN — Medication 10 ML: at 09:14

## 2020-01-14 RX ADMIN — CARVEDILOL 3.12 MG: 3.12 TABLET, FILM COATED ORAL at 17:47

## 2020-01-14 RX ADMIN — Medication 10 ML: at 21:31

## 2020-01-14 RX ADMIN — Medication 10 ML: at 09:15

## 2020-01-14 RX ADMIN — IPRATROPIUM BROMIDE AND ALBUTEROL SULFATE 1 AMPULE: .5; 3 SOLUTION RESPIRATORY (INHALATION) at 09:00

## 2020-01-14 RX ADMIN — IPRATROPIUM BROMIDE AND ALBUTEROL SULFATE 1 AMPULE: .5; 3 SOLUTION RESPIRATORY (INHALATION) at 19:59

## 2020-01-14 RX ADMIN — CARVEDILOL 6.25 MG: 6.25 TABLET, FILM COATED ORAL at 09:14

## 2020-01-14 RX ADMIN — AMIODARONE HYDROCHLORIDE 200 MG: 200 TABLET ORAL at 09:14

## 2020-01-14 RX ADMIN — ATORVASTATIN CALCIUM 40 MG: 40 TABLET, FILM COATED ORAL at 09:13

## 2020-01-14 RX ADMIN — FUROSEMIDE 40 MG: 10 INJECTION, SOLUTION INTRAMUSCULAR; INTRAVENOUS at 17:47

## 2020-01-14 RX ADMIN — ALLOPURINOL 300 MG: 300 TABLET ORAL at 09:13

## 2020-01-14 RX ADMIN — Medication 10 ML: at 21:30

## 2020-01-14 RX ADMIN — FUROSEMIDE 40 MG: 10 INJECTION, SOLUTION INTRAMUSCULAR; INTRAVENOUS at 09:14

## 2020-01-14 RX ADMIN — INSULIN LISPRO 1 UNITS: 100 INJECTION, SOLUTION INTRAVENOUS; SUBCUTANEOUS at 21:27

## 2020-01-14 RX ADMIN — PREDNISONE 20 MG: 20 TABLET ORAL at 09:13

## 2020-01-14 ASSESSMENT — PAIN SCALES - GENERAL
PAINLEVEL_OUTOF10: 0

## 2020-01-14 NOTE — PROGRESS NOTES
Hospitalist Progress Note      PCP: No primary care provider on file. Date of Admission: 1/8/2020    Chief Complaint:  SOB     History Of Present Illness:    \"69 y.o. male who presented to [facility] with SOB. Patient was discharged from [facility] for pneumonia and a large L hemothorax. He was discharged home much improved and felt well yesterday. However, overnight, patient became increasingly SOB. This worsened this AM and presented to OSH. Patient has known severe aortic stenosis and is being evaluated by CT surgery as an outpatient for repair of his bioprosthetic valve. At OSH, he was found to be having acute heart failure and mild cardiogenic shock. He was started on low dose dobutamine with good response and transferred to [facility]. \"      Subjective:  He feels well and looks well. Lungs clear, no peripheral edema. Planning surgery.       Medications:  Reviewed    Infusion Medications    dextrose       Scheduled Medications    carvedilol  3.125 mg Oral BID WC    sodium chloride flush  10 mL Intravenous 2 times per day    predniSONE  20 mg Oral Daily    insulin glargine  10 Units Subcutaneous Nightly    insulin lispro  0-12 Units Subcutaneous TID WC    insulin lispro  0-6 Units Subcutaneous Nightly    ipratropium-albuterol  1 ampule Inhalation BID    amiodarone  200 mg Oral Daily    furosemide  40 mg Intravenous BID    aspirin  81 mg Oral Daily    allopurinol  300 mg Oral Daily    atorvastatin  40 mg Oral Daily    nicotine  1 patch Transdermal Daily    sodium chloride flush  10 mL Intravenous 2 times per day     PRN Meds: sodium chloride flush, acetaminophen, magnesium hydroxide, glucose, dextrose, glucagon (rDNA), dextrose, traMADol, potassium chloride **OR** potassium alternative oral replacement **OR** potassium chloride, magnesium sulfate, prochlorperazine, perflutren lipid microspheres, sodium chloride flush, magnesium hydroxide      Intake/Output Summary (Last 24 hours) at 1/14/2020 1238  Last data filed at 1/14/2020 0534  Gross per 24 hour   Intake 240 ml   Output 3000 ml   Net -2760 ml       Physical Exam Performed:    BP (!) 93/53   Pulse 68   Temp 97.7 °F (36.5 °C) (Oral)   Resp 18   Ht 5' 10\" (1.778 m)   Wt 189 lb 9.6 oz (86 kg)   SpO2 98%   BMI 27.20 kg/m²     General appearance: No apparent distress, appears stated age and cooperative. HEENT: Pupils equal, round, and reactive to light. Conjunctivae/corneas clear. Neck: Supple, with full range of motion. No jugular venous distention. Trachea midline. Respiratory:  Normal respiratory effort. Prominent bilateral rhonchi and wheezing have resolved. Bibasilar rales are present. Mucus no longer heard gurgling in large airways. Cardiovascular: tachycardic rate and rhythm with without rubs or gallops. 3/6 systolic murmur at the RUSB. Abdomen: Soft, non-tender, non-distended with normal bowel sounds. Musculoskeletal: No clubbing, cyanosis or edema bilaterally. Full range of motion without deformity. Skin: Skin color, texture, turgor normal.  No rashes or lesions. Neurologic:  Neurovascularly intact without any focal sensory/motor deficits. Cranial nerves: II-XII intact, grossly non-focal.  Psychiatric: Alert and oriented, thought content appropriate, normal insight  Capillary Refill: Brisk,< 3 seconds   Peripheral Pulses: +2 palpable, equal bilaterally       Labs:   Recent Labs     01/13/20  0518 01/14/20  0502   WBC 16.1* 13.3*   HGB 9.2* 10.2*   HCT 27.6* 30.4*    329     Recent Labs     01/12/20  0513 01/13/20  0518 01/14/20  0502   * 136 133*   K 3.7 3.7 3.2*  3.2*   CL 89* 92* 89*   CO2 35* 36* 36*   BUN 19 20 21*   CREATININE 0.6* 0.6* 0.7*   CALCIUM 8.5 8.6 8.2*     No results for input(s): AST, ALT, BILIDIR, BILITOT, ALKPHOS in the last 72 hours. No results for input(s): INR in the last 72 hours. No results for input(s): Zulema Bhatia in the last 72 hours.     Urinalysis:    No results found for: Shawn Duenas, BACTERIA, RBCUA, BLOODU, [address]    Radiology:  CT HEAD WO CONTRAST   Preliminary Result   No evidence of acute intracranial abnormality on a study mildly limited as   described above. VL DUP CAROTID BILATERAL   Final Result      XR CHEST PORTABLE   Final Result   Vascular congestion without overt edema. Persistent nodule like density of the right mid lung. Follow-up to ensure   resolution and exclude malignancy is needed. Unchanged left-sided atelectasis versus pneumonia over the past 24 hours         XR CHEST PORTABLE   Final Result   Right midlung zone airspace opacities could represent atelectasis or pneumonia         CT CHEST W CONTRAST    (Results Pending)           Assessment/Plan:    Active Hospital Problems    Diagnosis    Cardiogenic shock ([address] [R57.0]    Acute on chronic diastolic congestive heart failure (HCC) [I50.33]    Pulmonary infiltrate [R91.8]    Pulmonary venous congestion [R09.89]    Atelectasis [J98.11]    Leukocytosis [D72.829]    Hyperglycemia [R73.9]    Acute bronchospasm [J98.01]    Atrial fibrillation with RVR (HCC) [I48.91]    Hypotension [I95.9]    Acute respiratory failure with hypoxemia (HCC) [J96.01]    Tobacco abuse [Z72.0]    S/P AVR (aortic valve replacement) [Z95.2]    AS (aortic stenosis) [I35.0]       \"69 y.o. male who presented to [facility] with SOB. Patient was discharged from [facility] for pneumonia and a large L hemothorax. He was discharged home much improved and felt well yesterday. However, overnight, patient became increasingly SOB. This worsened this AM and presented to OSH. Patient has known severe aortic stenosis and is being evaluated by CT surgery as an outpatient for repair of his bioprosthetic valve. At OSH, he was found to be having acute heart failure and mild cardiogenic shock. He was started on low dose dobutamine with good response and transferred to [facility]. \"      Acute on chronic diastolic CHF, related to moderate to severe bioprosthetic AS, with cardiogenic shock diagnosed on admission  - furosemide started IV on admission.    - dobutamine gtt started at OSH. Cardiology added nitroprusside gtt. Both weaned off 1/13.  - CT surgery consulted regarding the aortic valve. He says he had a porcine valve put in 20 years ago, then exchanged for a bovine valve 8 years ago. It was severely stenosed per 1/2 TTE, then repeat TTE on this admission showed that it was moderately stenosed. 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 CT surgery planning aortic root replacement and fistula repair during this hospital stay. Paroxysmal Afib with RVR  - held carvedilol initially, then restarted at low dose   - amiodarone per cardiology. - not on anticoagulation due to recent hemothorax. Continue aspirin. AECOPD  - steroids, inhaled bronchodilators. - completed a course of vanc and cefepime last admission. S/p bronchoscopy 1/10 - the BAL grew pseudomonas fluorescens, but the patient recently completed a course of cefepime and clinically appeared to be doing well without further abx.    - he quit smoking during the last admission, had been smoking 2 PPD. Acute hypoxic respiratory failure  - due to above issues, treat accordingly. Elevated troponin  - likely demand ischemia due to above issues. Cardiology consulted. Aspirin as above. DM2  - resume glipizide upon discharge. SSI while here on steroids. A1c only 5, in the setting of recent major blood loss. Muscular deconditioning, mechanical falls  - PT/OT. - he says he had syncope a couple months ago while having a BM. This was presumably vasovagal.  Discussed with CT surgery, normal B12, head CT normal, TSH wnl. DVT Prophylaxis: SCDs  Diet: DIET CARDIAC;  Code Status: Full Code    PT/OT Eval Status: rec'd SNF    Dispo - perhaps 1/25, pending post-op course. He [facility] with [facility] last time.        Usha Klein MD

## 2020-01-14 NOTE — PLAN OF CARE
Problem: Falls - Risk of:  Goal: Will remain free from falls  Description  Will remain free from falls  Outcome: Ongoing  Note:   Pt is free of falls at this time. Bed is in the lowest position, wheels locked, side rails up x 2, call light, and personal belongings within reach. Will continue to monitor. Problem: OXYGENATION/RESPIRATORY FUNCTION  Goal: Patient will maintain patent airway  Outcome: Ongoing  Note:     Patient's EF (Ejection Fraction) is greater than 40%    Heart Failure Medications:  Diuretics[de-identified] Lasix  ACE[de-identified] None  ARB[de-identified] None  ARNI[de-identified] None  Evidenced Based Beta Blocker[de-identified] Coreg    Patient's weights and intake/output reviewed: Yes    Patient's Last Weight: 199 lbs obtained by standing scale. Difference of 0 lbs  last documented weight. Intake/Output Summary (Last 24 hours) at 1/14/2020 0516  Last data filed at 1/14/2020 0426  Gross per 24 hour   Intake 200 ml   Output 3280 ml   Net -3080 ml       Comorbidities Reviewed Yes    Patient has a past medical history of CAD (coronary artery disease), COPD (chronic obstructive pulmonary disease) ([facility] [address] Diabetes mellitus ([facility] [address] Gout, Hyperlipidemia, Hypertension, and Thyroid disease. >>For CHF and Comorbidity documentation on Education Time and Topics, please see Education Tab    P  Pt resting in bed at this time on room air. Pt denies shortness of breath. Pt without lower extremity edema.      Patient and/or Family's stated Goal of Care this Admission: reduce shortness of breath, increase activity tolerance, better understand heart failure and disease management, be more comfortable, and reduce lower extremity edema prior to discharge

## 2020-01-14 NOTE — PROGRESS NOTES
[facility] Progress Note    Patient: Jorge Luis Garcia Date of Service: 8/24/2017   YOB: 1964 Admission Date: 8/23/2017   MRN: [id number] Attending: Pat Canas MD       ICU admit date:  8/23/2017  Intubation date:  N/A    Active problems:  - Heroin overdose  - Acute hypoxemic hypercarbic respiratory failure due to the above, improved  - Lactic acidosis, resolved  - Hyperkalemia, resolved  - Leukocytosis  - Elevated troponin  - Elevated LFTs  - Acute kidney injury, improved  - Hyperglycemia  - Hearing loss    Summary: Jorge Luis Garcia is a 53 year old male with a past medical history of diabetes mellitus type 2, polysubstance abuse who was found unresponsive in his car.  Responded well to Narcan, admitted to snorting heroin, but denies suicidal intent.  Was admitted to the ICU for needing repeated doses of Narcan, started on a Narcan drip.  Laboratory abnormalities improved with IV hydration and opiate reversal.    24 hour events: No acute events overnight.  No complaints this morning.  Hearing improving.    ASSESSMENT/PLAN:    Neuro: Initially unresponsive, responded to Narcan, requiring repeat doses.  Admitted to snorting heroin, uses cocaine, drinks 6 beers a day.  Was unable to hear, may have been due to heroin overdose or adulterants.  - Narcan drip titrated off  - Gabapentin PRN anxiety  - CIWA standard  - Consult to Psychiatry for AODA     CV: Initially hypotensive, blood pressure stable after Narcan administration.  Tachycardia improved with treatment of hyperkalemia.  Troponin elevated to 0.22, trended up to 1.49 thus far.  Sinus tachycardia on EKGs, no chest pain or shortness of breath.  - Telemetry monitoring for 24 hours  - Trend troponins until peak  - If no peak or EKG changes, consult to Cardiology     Pulmonary: Acute hypoxemic hypercarbic respiratory failure due to heroin overdose.  CXR unremarkable.  Doing well on room air.  - O2 to keep SpO2 > 92%  - Continuous pulse  RESPIRATORY THERAPY ASSESSMENT    Name:  Nati Peoples  Medical Record Number:  [id number]  Age: 77 y.o. Gender: male  : 1950  Today's Date:  2020  Room:  0422/0422-01    Assessment     Is the patient being admitted for a COPD or Asthma exacerbation? No   (If yes the patient will be seen every 4 hours for the first 24 hours and then reassessed)    Patient Admission Diagnosis      Allergies  Allergies   Allergen Reactions    Penicillins Hives       Minimum Predicted Vital Capacity:     1035          Actual Vital Capacity:      500              Pulmonary History:COPD  Home Oxygen Therapy:  room air  Home Respiratory Therapy:None   Current Respiratory Therapy:  Duoneb txs BID  Treatment Type: HHN  Medications: Albuterol/Ipratropium    Respiratory Severity Index(RSI)   Patients with orders for inhalation medications, oxygen, or any therapeutic treatment modality will be placed on Respiratory Protocol. They will be assessed with the first treatment and at least every 72 hours thereafter. The following severity scale will be used to determine frequency of treatment intervention. Smoking History: Pulmonary Disease or Smoking History, Greater than 15 pack year = 2    Social History  Social History     Tobacco Use    Smoking status: Current Every Day Smoker     Packs/day: 2.00     Types: Cigarettes    Smokeless tobacco: Current User   Substance Use Topics    Alcohol use:  Yes     Alcohol/week: 6.0 standard drinks     Types: 6 Cans of beer per week     Frequency: 4 or more times a week     Drinks per session: 5 or 6     Binge frequency: Daily or almost daily    Drug use: Not Currently       Recent Surgical History: Thoracic or Upper Airway = 3  Past Surgical History  Past Surgical History:   Procedure Laterality Date    BRONCHOSCOPY N/A 2020    BRONCHOSCOPY ALVEOLAR LAVAGE performed by Sacha Grey MD at [address] PLACEMENT  12/07/2010    NURIS- 4.0 x 28 to Cx    THORACOTOMY Left 1/2/2020    EMERGENT THORACOTOMY, EVACUATION OF HEMOTOMA CHEST WALL HEMOSTASIS, PLEUREAL BIOPSY, LEFT UPPER LOBE RESECTION performed by Jm Horowitz MD at [address]       Level of Consciousness: Alert, Oriented, and Cooperative = 0    Level of Activity: Walking with assistance = 1    Respiratory Pattern: Regular Pattern; RR 8-20 = 0    Breath Sounds: Diminshed bilaterally and/or crackles = 2    Sputum  Sputum Color: Unable to assess, Tenacity: Thin, Sputum How Obtained: Spontaneous cough  Cough: Strong, spontaneous, non-productive = 0    Vital Signs   /65   Pulse 61   Temp 98.3 °F (36.8 °C) (Oral)   Resp 20   Ht 5' 10\" (1.778 m)   Wt 189 lb 9.6 oz (86 kg)   SpO2 96%   BMI 27.20 kg/m²   SPO2 (COPD values may differ): Greater than or equal to 92% on room air = 0    Peak Flow (asthma only): not applicable = 0    RSI: 7-8 = BID and Q4HPRN (every four hours as needed) for dyspnea        Plan       Goals: medication delivery and improve oxygenation    Patient/caregiver was educated on the proper method of use for Respiratory Care Devices:  Yes      Level of patient/caregiver understanding able to:   ? Verbalize understanding   ? Demonstrate understanding       ? Teach back        ? Needs reinforcement       ? No available caregiver               ? Other:     Response to education:  Good     Is patient being placed on Home Treatment Regimen? No     Does the patient have everything they need prior to discharge? NA     Comments: chart reviewed and pt assessed    Plan of Care: keep current regimen    Electronically signed by Caroline Alvarado RCP on 1/14/2020 at 4:52 PM    Respiratory Protocol Guidelines     1. Assessment and treatment by Respiratory Therapy will be initiated for medication and therapeutic interventions upon initiation of aerosolized medication.   2. Physician will be contacted for respiratory rate (RR) greater than 35 breaths oximetry     Renal:  Lactic acidosis likely due to acute hypoxemic hypercarbic respiratory failure, resolved.  Hyperkalemia resolved.  Acute kidney injury, unknown baseline Cr, improved.   - Can D/C IVF 100mL/hr if taking good oral intake  - Strict I&O  - Avoid nephrotoxins     ID: Leukocytosis of 17.3, but afebrile, no clear source of infection, improved to 14.2 today. May be due to hemoconcentration vs demargination.  CXR unremarkable.  - Daily CBC     Heme: Likely hemoconcentration, elevated Hgb, resolved.  - Daily CBC  - Heparin for VTE prophylaxis      Endocrine: Hyperglycemia with history of \"borderline\" diabetes - not on medication.  - AC & HS glucose checks  - ISS     GI: No acute issues  - Regular diet  - D/C H2 blocker    Goals/Disposition: Stable for transfer to floor, likely discharge tomorrow after monitoring telemetry and until troponins peak.      ================================================================      I/O last 3 completed shifts:  In: 6339 [P.O.:960; I.V.:5379]  Out: 251 [Urine:251]  No intake/output data recorded.    Vital Last Value 24 Hour Range   Temperature 99.2 °F (37.3 °C) (08/24/17 0457) Temp  Min: 96.7 °F (35.9 °C)  Max: 99.2 °F (37.3 °C)   Pulse 91 (08/24/17 0600) Pulse  Min: 88  Max: 124   Respiratory 16 (08/24/17 0600) Resp  Min: 16  Max: 35   Non-Invasive  Blood Pressure 146/85 (08/24/17 0600) BP  Min: 65/48  Max: 173/107   Pulse Oximetry 96 % (08/24/17 0600) SpO2  Min: 91 %  Max: 100 %   Arterial   Blood Pressure   No Data Recorded     VENT SETTINGS       PHYSICAL EXAM  General:  NAD, comfortable appearing male  HEENT:  PERRL, no conjunctival pallor or scleral icterus, MMM  CV:  RRR, no m/r/g, S1/S2 present  Resp:  CTAB, no wheezing/crackles/rhonchi   Abd:  Soft, NT/ND, +BS throughout  Ext:  No B/L LE edema  Skin:  Nml temp and moisture to touch  Neuro:  A/O x3, strength and sensation intact throughout    LAB RESULTS    Recent Labs  Lab 08/23/17  1345   WBC 17.3*   HCT  53.4*   HGB 17.7*          Recent Labs  Lab 08/23/17  2225 08/23/17  1345   SODIUM 140 139   POTASSIUM 4.7 6.9*   CHLORIDE 106 97*   CO2 21 22   GLUCOSE 229* 263*   BUN 17 17   CREATININE 1.74* 2.57*       Best Practices  Sedation:   N/A  Analgesia:   N/A  SBT:    N/A  Head of Bed:   30 degrees  DVT Proph.:   SCDs (sequential compression devices) and heparin  Nutrition:   oral intake  Glycemic Control:  insulin subcutaneous  Line Necessity:  PIV  Ulcer proph:   Not indicated    Pertinent Reviewed:  Allergies, Medications, Labs, Imaging and Physician and Nursing Notes     Patient seen and examined with  Pat Canas MD  who agrees with the above assessment and plan.     Jane Fernandez MD  PGY-III    8/24/2017 7:12 AM  Pager:  [phone]    Mr. Garcia is significantly improved from critical illness  as documented above. He presented after heroin overdose with hypercapnic respiratory failure but is now improved. Acute kidney injury also improving and his troponin is trending down. Continue to monitor electrolytes and clinical status. Discussed in multidisciplinary ICU rounds. I examined and evaluated the patient with the resident and reviewed imaging and laboratory data. Together, we formulated the diagnostic and therapeutic strategy documented above.       LUIS ENRIQUE Canas MD [facility]  Pager [phone]      per minute. Therapy will be held for heart rate (HR) greater than 140 beats per minute, pending direction from physician. 3. Bronchodilators will be administered via Metered Dose Inhaler (MDI) with spacer when the following criteria are met:  a. Alert and cooperative     b. HR < 140 bpm  c. RR < 30 bpm                d. Can demonstrate a 2-3 second inspiratory hold  4. Bronchodilators will be administered via Hand Held Nebulizer [facility]) to patients when ANY of the following criteria are met  a. Incognizant or uncooperative          b. Patients treated with HHN at Home        c. Unable to demonstrate proper use of MDI with spacer     d. RR > 30 bpm   5. Bronchodilators will be delivered via Metered Dose Inhaler (MDI), HHN, Aerogen to intubated patients on mechanical ventilation. 6. Inhalation medication orders will be delivered and/or substituted as outlined below. Aerosolized Medications Ordering and Administration Guidelines:    1. All Medications will be ordered by a physician, and their frequency and/or modality will be adjusted as defined by the patients Respiratory Severity Index (RSI) score. 2. If the patient does not have documented COPD, consider discontinuing anticholinergics when RSI is less than 9.  3. If the bronchospasm worsens (increased RSI), then the bronchodilator frequency can be increased to a maximum of every 4 hours. If greater than every 4 hours is required, the physician will be contacted. 4. If the bronchospasm improves, the frequency of the bronchodilator can be decreased, based on the patient's RSI, but not less than home treatment regimen frequency. 5. Bronchodilator(s) will be discontinued if patient has a RSI less than 9 and has received no scheduled or as needed treatment for 72  Hrs. Patients Ordered on a Mucolytic Agent:    1. Must always be administered with a bronchodilator.     2. Discontinue if patient experiences worsened bronchospasm, or secretions have lessened to the point that the patient is able to clear them with a cough. Anti-inflammatory and Combination Medications:    1. If the patient lacks prior history of lung disease, is not using inhaled anti-inflammatory medication at home, and lacks wheezing by examination or by history for at least 24 hours, contact physician for possible discontinuation.

## 2020-01-14 NOTE — FLOWSHEET NOTE
to 3 seconds   Color Appropriate for ethnicity   Temperature Warm   L Pedal Pulse +1   Puncture Site Assessment 1   Location Jugular - right   Site Assessment No redness, drainage, swelling or hematoma   Dressing Applied Transparent occlusive dressing   Multiple puncture sites Yes   Location 2 Radial-right    Dressing Applied 2 Transparent occlusive   Skin Color/Condition   Skin Color/Condition (WDL) WDL   Skin Color Appropriate for ethnicity   Skin Condition/Temp Warm;Dry;Swollen   Skin Integrity   Skin Integrity (WDL) X  (Surgical)   Skin Integrity Other (Comment)   Location L flank   Preventative Dressing No   Musculoskeletal   Musculoskeletal (WDL) WDL   RL Extremity Full movement   LL Extremity Full movement   Genitourinary   Genitourinary (WDL) WDL   Anus/Rectum   Anus/Rectum (WDL) WDL   Incision 01/08/20 Back Lateral;Left   Date First Assessed/Time First Assessed: 01/08/20 2000   Present on Hospital Admission: Yes  Primary Wound Type: Incision  Location: Back  Wound Location Orientation: Lateral;Left   Wound Assessment Clean;Dry; Intact   Luz-wound Assessment Clean;Dry; Intact   Closure Approximated   Drainage Amount None   Dressing/Treatment   ([place])   Incision 01/02/20 Chest Lateral;Left;Lower   Date First Assessed: 01/02/20   Present on Hospital Admission: No  Primary Wound Type: Incision  Location: Chest  Wound Location Orientation: Lateral;Left;Lower   Wound Assessment Other (Comment)  ([place])   Drainage Amount Small   Dressing/Treatment Foam   Dressing Status Clean;Dry; Intact   Psychosocial   Psychosocial (WDL) WDL

## 2020-01-14 NOTE — PROGRESS NOTES
[facility]   Daily Cardiovascular Progress Note    Admit Date: 1/8/2020    Chief complaint: Shortness of breath  HPI:     Patient presented to the hospital in January 2020 with shortness of breath and had heart failure as well as recent pneumonia complicated by hemothorax, also being treated for COPD exacerbation. Feels better today, no chest pain no shortness of breath.       Medications/Labs all Reviewed:  Patient Active Problem List   Diagnosis    Hemothorax    Acute respiratory failure with hypoxemia (HCC)    Tobacco abuse    AS (aortic stenosis)    CAD (coronary artery disease) s/p stent in 2010    HTN (hypertension)    Hyperlipidemia    S/P AVR (aortic valve replacement)    Hypotension    Pulmonary infiltrate    Pulmonary venous congestion    Atelectasis    Leukocytosis    Hyperglycemia    Acute bronchospasm    Atrial fibrillation with RVR (HCC)    Cardiogenic shock (HCC)    Acute on chronic diastolic congestive heart failure ([facility])       Medications:  sodium chloride flush 0.9 % injection 10 mL, 2 times per day  sodium chloride flush 0.9 % injection 10 mL, PRN  acetaminophen (TYLENOL) tablet 650 mg, Q4H PRN  magnesium hydroxide (MILK OF MAGNESIA) 400 MG/5ML suspension 30 mL, Daily PRN  carvedilol (COREG) tablet 6.25 mg, BID WC  glucose (GLUTOSE) 40 % oral gel 15 g, PRN  dextrose 50 % IV solution, PRN  glucagon (rDNA) injection 1 mg, PRN  dextrose 5 % solution, PRN  predniSONE (DELTASONE) tablet 20 mg, Daily  insulin glargine (LANTUS) injection vial 10 Units, Nightly  insulin lispro (HUMALOG) injection vial 0-12 Units, TID WC  insulin lispro (HUMALOG) injection vial 0-6 Units, Nightly  traMADol (ULTRAM) tablet 50 mg, Q6H PRN  ipratropium-albuterol (DUONEB) nebulizer solution 1 ampule, BID  amiodarone (CORDARONE) tablet 200 mg, Daily  potassium chloride (KLOR-CON M) extended release tablet 40 mEq, PRN    Or  potassium bicarb-citric acid (EFFER-K) effervescent tablet 40 mEq, 93.9    329     BMP:   Recent Labs     20  0513 20  0518 20  0502   * 136 133*   K 3.7 3.7 3.2*  3.2*   CL 89* 92* 89*   CO2 35* 36* 36*   BUN 19 20 21*   CREATININE 0.6* 0.6* 0.7*     MG:    Recent Labs     20  0513 20  0518 20  0502   MG 2.00 2.10 2.00      PT/INR: No results for input(s): PROTIME, INR in the last 72 hours. APTT: No results for input(s): APTT in the last 72 hours. Cardiac Enzymes: No results for input(s): CKTOTAL, CKMB, CKMBINDEX, TROPONINI in the last 72 hours. Cardiac Studies:  Cardiac catheterization 2020:                         CARDIAC CATHETERIZATION REPORT      Procedure Date:  2020  Patient Name: Amita Riojas  MRN: [id number]      : 1950        INDICATION      Bioprosthetic valve aortic stenosis  Non-STEMI     PROCEDURES PERFORMED      Right heart cath  Left heart catheterization  Aortogram  Coronary angiogam  Coronary cath                 PROCEDURE DESCRIPTION   Risks/benefits/alternatives/outcomes were discussed with patient and/or family and informed consent was obtained. Using the [facility] scale, the patient's right radial artery was found to be acceptable for cannulation. Patient was prepped draped in the usual sterile fashion. Local anaesthetic was applied over puncture sites. Using ultrasound guidance with a micropuncture kit a 5 Georgian sheath was inserted into the right internal jugular vein and 5 [place] PA catheter was used for hemodynamic and right heart catheterization assessment. The catheter was removed and the sheath was secured at the end of the case for later removal.  Using a back wall technique, a 6 Urdu Terumo sheath was inserted into right radial artery. Verapamil, nitroglycerin were administered through the sheath. Heparin was administered. Initially a 5 [place] AL-1 catheter was used with a straight wire in an attempt to cross the aortic valve but this was unsuccessful.   Ultimately the aortic valve was able to be crossed with a multipurpose and straight wire and this was exchanged for a dual-lumen pigtail catheter for aortic valve hemodynamic assessment. Pullback was performed across aortic valve and then the pigtail catheter was exchanged for other coronary catheters. The AL-1 was used for left-sided coronary angiography but it was felt to not entirely select the LAD for which ultimately a 6 [place] AL 0.75 catheter was used for diagnostic angiography. Aortic angiography was obtained with a pigtail catheter as it was difficult to find the right coronary artery as well as in locating grafts. At the conclusion of the procedure, a TR band was placed over the puncture site and hemostasis was obtained. There were no immediate complications. I supervised sedation with versed 2 mg/fentanyl 50 Mcg during the procedure. 295 cc contrast was utilized. <20cc EBL.       FINDINGS      HEMODYNAMICS     CHAMBER PRESSURE SATURATION   RA  10  50 %   RV  27/10     PA  30/20  48 %   PW  25 with V waves up to 30     AORTA  87  102/53 %      RADHA CARDIAC OUTPUT  2.7 L/min   SVR  10 Wood units   PVR  1 Wood unit               LVGRAM     LVEDP  28   GRADIENT ACROSS AORTIC VALVE  53 mm mean gradient across bioprosthetic aortic valve with a valve area of 0.6 cm² which is consistent with moderate to severe bioprosthetic valve aortic stenosis      AORTAGRAM     CALIBER Moderate to severely dilated, aorta is severely calcified   AORTIC INSUFFICIENCY None     On ZOEY there was a concern for a coronary cameral fistula but this was not identified on this study nor was significant aortic insufficiency identified. BYPASS GRAFTS  1 graft marker was visualized on the ascending aorta, there was no flow noted through this. Previous reports have suggested occlusion of bypass grafts. There appeared to be no grafts to the LAD or RCA.             CORONARY ARTERIES     LM Calcified, proximal less than 10% stenosis, 25% regurgitation. Moderate tricuspid regurgitation. I have reviewed labs and imaging/xray/diagnostic testing in this note. Assessment and Plan       Patient Active Problem List   Diagnosis    Hemothorax    Acute respiratory failure with hypoxemia (HCC)    Tobacco abuse    AS (aortic stenosis)    CAD (coronary artery disease) s/p stent in 2010    HTN (hypertension)    Hyperlipidemia    S/P AVR (aortic valve replacement)    Hypotension    Pulmonary infiltrate    Pulmonary venous congestion    Atelectasis    Leukocytosis    Hyperglycemia    Acute bronchospasm    Atrial fibrillation with RVR (HCC)    Cardiogenic shock (HCC)    Acute on chronic diastolic congestive heart failure (HCC)     Acute heart failure, diastolic,, in part due to moderate to severe bioprosthetic aortic valve stenosis, continue IV diuresis. Continue beta-blocker. 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 Discussed with Dr. Jonathan Del Rosario from CT surgery.     Atrial fibrillation, currently on amiodarone drip for rate control, no anticoagulation due to recent hemothorax.     History of CAD/ASHD, will treat with baby aspirin daily. Previously on Effient, will hold due to recent history of hemothorax. Thank you for allowing me to participate in the care of your patient. Please call me with any questions [phone].       Candy Patel MD, [facility]   Interventional Cardiologist  [facility]  [phone] [facility]  [phone] [address] [place] [address]  1/14/2020 11:06 AM

## 2020-01-14 NOTE — PROGRESS NOTES
Cardiac, Vascular and Thoracic Surgeons  Progress Note    1/14/2020 11:51 AM  Surgeon [address]    Chief complaint: Congestive heart failure  Patient is known to the service severe aortic stenosis, complex coronary artery disease  Bioprosthetic aortic valve replacement twice most recent transesophageal echo showed questionable aortic root or outflow track fistula to the left atrium  Vital Signs: BP (!) 93/53   Pulse 68   Temp 97.7 °F (36.5 °C) (Oral)   Resp 18   Ht 5' 10\" (1.778 m)   Wt 189 lb 9.6 oz (86 kg)   SpO2 98%   BMI 27.20 kg/m²  O2 Flow Rate (L/min): (pt found on RA)     I/O:      Intake/Output Summary (Last 24 hours) at 1/14/2020 1151  Last data filed at 1/14/2020 0534  Gross per 24 hour   Intake 440 ml   Output 3000 ml   Net -2560 ml       Exam:   Cardiovascular: S1 plus S2 +0 no additional sound  Pulmonary: Bilaterally clear no additional sound    Summary   Left ventricular systolic function is normal with an estimated ejection   fraction of 50-55%. A bioprosthetic aortic valve appears thickened with fusing of the right and   non coronary cusps. Moderate to severe AS. Eccentric color flow seen in the left atrium could possibly represent a   coronary/aortic/cameral fistula. There is no evidence of mass or thrombus in the left atrium or appendage. Mild mitral regurgitation. Moderate tricuspid regurgitation. CORONARY ARTERIES     LM Calcified, proximal less than 10% stenosis, 25% mid-distal stenosis.       LAD Calcified, [id number] 30% stenosis.       LCX  large vessel, codominant, proximal-mid 40 to 50% stenosis. There is a mid vessel stent which is widely patent with less than 20 to 30% in-stent restenosis.     OM1 is a small vessel with a proximal-mid 90% stenosis. There are 4 additional OM branches that are small to medium in size. OM 2 is the largest OM branch and this vessel has proximal less than 10% stenosis, mid 90% stenosis.   OM 3, 4 and 5 has diffuse Severe aortic stenosis, bioprosthetic valve, aorta left atrial fistula    1. We will proceed with CTA cardiac delineate the relationship of the valve to the coronary artery including ascending aorta and arch his aorta look significantly calcified and the angiogram  2.  Will plan for surgical intervention for root replacement possible fistula repair next week    David Hoyos MD FACS

## 2020-01-14 NOTE — PROGRESS NOTES
Physical Therapy  Facility/Department: [facility] C4 PCU  Daily Treatment Note  NAME: Nati Peoples  : 1950  MRN: [id number]    Date of Service: 2020    Discharge Recommendations:  Subacute/Skilled Nursing Facility   PT Equipment Recommendations  Other: Pt states he has a rw of his own but hasn't been using it. Assessment   Body structures, Functions, Activity limitations: Decreased functional mobility ; Decreased strength;Decreased endurance;Decreased coordination;Decreased safe awareness;Decreased balance  Assessment: Pt currently CGA/Min A for all OOB activity used a rw walker today for ambulation d/t LE weakness and instability. Pt will benefit from SNF rehab to address deficits above. Pt would prefer to go home, but is not safe d/t no 24hr assist, multiple falls, and increased need for assist with ambulation. Treatment Diagnosis: Decreased (I) with ambulation  Prognosis: Fair  Decision Making: Medium Complexity  REQUIRES PT FOLLOW UP: Yes  Activity Tolerance  Activity Tolerance: Patient Tolerated treatment well;Patient limited by endurance     Patient Diagnosis(es): There were no encounter diagnoses. has a past medical history of CAD (coronary artery disease), COPD (chronic obstructive pulmonary disease) ([facility] [address] Diabetes mellitus ([facility] [address] Gout, Hyperlipidemia, Hypertension, and Thyroid disease. has a past surgical history that includes thoracotomy (Left, 2020); bronchoscopy (N/A, 2020); Coronary artery bypass graft; and Coronary angioplasty with stent (2010). Restrictions  Restrictions/Precautions  Restrictions/Precautions: Up as Tolerated, General Precautions  Position Activity Restriction  Other position/activity restrictions: Up with assist  Subjective   General  Chart Reviewed: Yes  Response To Previous Treatment: Patient with no complaints from previous session.   Family / Caregiver Present: No  Referring Practitioner: Hanny Kirkpatrick MD  Subjective  Subjective: Pt is agreeable to Plan  Times per week: 3-5x  Times per day: Daily  Plan weeks: 5 days (until 1/15/20)  Current Treatment Recommendations: Strengthening, ROM, Balance Training, Transfer Training, Functional Mobility Training, Endurance Training, Stair training, Gait Training, Neuromuscular Re-education, Safety Education & Training, Home Exercise Program, Patient/Caregiver Education & Training, Equipment Evaluation, Education, & procurement  Safety Devices  Type of devices: Call light within reach, Gait belt, Left in bed, Bed alarm in place, All fall risk precautions in place  Restraints  Initially in place: No     Therapy Time   Individual Concurrent Group Co-treatment   Time In 1356         Time Out 1420         Minutes 24         Timed Code Treatment Minutes: [address]

## 2020-01-14 NOTE — FLOWSHEET NOTE
01/13/20 2050   Assessment   Charting Type Shift assessment   Shepherd Coma Scale   Eye Opening 4   Best Verbal Response 5   Best Motor Response 6   Bernadette Coma Scale Score 15   HEENT   HEENT (WDL) X   Right Eye Impaired vision   Left Eye Impaired vision   Teeth Dentures upper;Dentures lower   Respiratory   Respiratory (WDL) X   Respiratory Pattern Regular   Respiratory Depth Normal   Respiratory Quality/Effort Unlabored;Dyspnea with exertion   Chest Assessment Chest expansion symmetrical;Trachea midline   L Breath Sounds Diminished   R Breath Sounds Diminished   Breath Sounds   Right Upper Lobe Diminished   Right Middle Lobe Diminished   Right Lower Lobe Diminished   Left Upper Lobe Diminished   Left Lower Lobe Diminished   Rhythm Interpretation   Pulse 72   Cardiac Monitor   Telemetry Monitor On Yes   Telemetry Audible Yes   Telemetry Alarms Set Yes   Gastrointestinal   Abdominal (WDL) WDL   RUQ Bowel Sounds Active   LUQ Bowel Sounds Active   RLQ Bowel Sounds Active   LLQ Bowel Sounds Active   Abdomen Inspection Rounded; Soft   Peripheral Vascular   Peripheral Vascular (WDL) WDL   Edema Right lower extremity; Left lower extremity   Edema Generalized Non-pitting   RLE Edema +1   LLE Edema +1   RUE Neurovascular Assessment   Capillary Refill Less than/equal to 3 seconds   Color Appropriate for ethnicity; Ecchymosis   Temperature Warm   R Radial Pulse +1   LUE Neurovascular Assessment   Capillary Refill Less than/equal to 3 seconds   Color Appropriate for ethnicity; Ecchymosis   Temperature Warm   L Radial Pulse +1   RLE Neurovascular Assessment   Capillary Refill Less than/equal to 3 seconds   Color Appropriate for ethnicity   Temperature Warm   LLE Neurovascular Assessment   Capillary Refill Less than/equal to 3 seconds   Color Appropriate for ethnicity   Temperature Warm   Skin Color/Condition   Skin Color/Condition (WDL) WDL   Skin Color Appropriate for ethnicity   Skin Condition/Temp Dry;Swollen; Warm   Skin

## 2020-01-15 LAB
ANION GAP SERPL CALCULATED.3IONS-SCNC: 10 MMOL/L (ref 3–16)
BUN BLDV-MCNC: 20 MG/DL (ref 7–20)
CALCIUM SERPL-MCNC: 8.8 MG/DL (ref 8.3–10.6)
CHLORIDE BLD-SCNC: 89 MMOL/L (ref 99–110)
CO2: 35 MMOL/L (ref 21–32)
CREAT SERPL-MCNC: 0.6 MG/DL (ref 0.8–1.3)
GFR AFRICAN AMERICAN: >60
GFR NON-AFRICAN AMERICAN: >60
GLUCOSE BLD-MCNC: 107 MG/DL (ref 70–99)
GLUCOSE BLD-MCNC: 117 MG/DL (ref 70–99)
GLUCOSE BLD-MCNC: 263 MG/DL (ref 70–99)
GLUCOSE BLD-MCNC: 331 MG/DL (ref 70–99)
GLUCOSE BLD-MCNC: 98 MG/DL (ref 70–99)
MAGNESIUM: 2.1 MG/DL (ref 1.8–2.4)
PERFORMED ON: ABNORMAL
PERFORMED ON: NORMAL
POTASSIUM SERPL-SCNC: 3.2 MMOL/L (ref 3.5–5.1)
SODIUM BLD-SCNC: 134 MMOL/L (ref 136–145)

## 2020-01-15 PROCEDURE — 6370000000 HC RX 637 (ALT 250 FOR IP): Performed by: INTERNAL MEDICINE

## 2020-01-15 PROCEDURE — 2060000000 HC ICU INTERMEDIATE R&B

## 2020-01-15 PROCEDURE — 2580000003 HC RX 258: Performed by: INTERNAL MEDICINE

## 2020-01-15 PROCEDURE — 6360000002 HC RX W HCPCS: Performed by: INTERNAL MEDICINE

## 2020-01-15 PROCEDURE — 99232 SBSQ HOSP IP/OBS MODERATE 35: CPT | Performed by: NURSE PRACTITIONER

## 2020-01-15 PROCEDURE — 36415 COLL VENOUS BLD VENIPUNCTURE: CPT

## 2020-01-15 PROCEDURE — 99231 SBSQ HOSP IP/OBS SF/LOW 25: CPT | Performed by: THORACIC SURGERY (CARDIOTHORACIC VASCULAR SURGERY)

## 2020-01-15 PROCEDURE — 97535 SELF CARE MNGMENT TRAINING: CPT

## 2020-01-15 PROCEDURE — 6370000000 HC RX 637 (ALT 250 FOR IP): Performed by: NURSE PRACTITIONER

## 2020-01-15 PROCEDURE — 80048 BASIC METABOLIC PNL TOTAL CA: CPT

## 2020-01-15 PROCEDURE — 83735 ASSAY OF MAGNESIUM: CPT

## 2020-01-15 PROCEDURE — 94640 AIRWAY INHALATION TREATMENT: CPT

## 2020-01-15 RX ORDER — FUROSEMIDE 40 MG/1
40 TABLET ORAL 2 TIMES DAILY
Status: DISCONTINUED | OUTPATIENT
Start: 2020-01-15 | End: 2020-01-21

## 2020-01-15 RX ADMIN — IPRATROPIUM BROMIDE AND ALBUTEROL SULFATE 1 AMPULE: .5; 3 SOLUTION RESPIRATORY (INHALATION) at 19:24

## 2020-01-15 RX ADMIN — INSULIN LISPRO 2 UNITS: 100 INJECTION, SOLUTION INTRAVENOUS; SUBCUTANEOUS at 20:40

## 2020-01-15 RX ADMIN — FUROSEMIDE 40 MG: 10 INJECTION, SOLUTION INTRAMUSCULAR; INTRAVENOUS at 08:45

## 2020-01-15 RX ADMIN — PREDNISONE 20 MG: 20 TABLET ORAL at 08:44

## 2020-01-15 RX ADMIN — Medication 10 ML: at 20:40

## 2020-01-15 RX ADMIN — POTASSIUM CHLORIDE 40 MEQ: 20 TABLET, EXTENDED RELEASE ORAL at 08:44

## 2020-01-15 RX ADMIN — ATORVASTATIN CALCIUM 40 MG: 40 TABLET, FILM COATED ORAL at 08:44

## 2020-01-15 RX ADMIN — CARVEDILOL 3.12 MG: 3.12 TABLET, FILM COATED ORAL at 16:56

## 2020-01-15 RX ADMIN — CARVEDILOL 3.12 MG: 3.12 TABLET, FILM COATED ORAL at 08:44

## 2020-01-15 RX ADMIN — ALLOPURINOL 300 MG: 300 TABLET ORAL at 08:44

## 2020-01-15 RX ADMIN — INSULIN LISPRO 8 UNITS: 100 INJECTION, SOLUTION INTRAVENOUS; SUBCUTANEOUS at 11:58

## 2020-01-15 RX ADMIN — AMIODARONE HYDROCHLORIDE 200 MG: 200 TABLET ORAL at 08:44

## 2020-01-15 RX ADMIN — Medication 10 ML: at 08:46

## 2020-01-15 RX ADMIN — Medication 10 ML: at 08:45

## 2020-01-15 RX ADMIN — INSULIN LISPRO 3 UNITS: 100 INJECTION, SOLUTION INTRAVENOUS; SUBCUTANEOUS at 11:58

## 2020-01-15 RX ADMIN — FUROSEMIDE 40 MG: 40 TABLET ORAL at 16:56

## 2020-01-15 RX ADMIN — INSULIN GLARGINE 10 UNITS: 100 INJECTION, SOLUTION SUBCUTANEOUS at 20:40

## 2020-01-15 RX ADMIN — ASPIRIN 81 MG 81 MG: 81 TABLET ORAL at 08:45

## 2020-01-15 ASSESSMENT — PAIN SCALES - GENERAL
PAINLEVEL_OUTOF10: 0

## 2020-01-15 ASSESSMENT — ENCOUNTER SYMPTOMS: RESPIRATORY NEGATIVE: 1

## 2020-01-15 NOTE — PROGRESS NOTES
Mobility  Functional - Mobility Device: Rolling Walker  Activity: To/from bathroom; Retrieve items;Transport items  Assist Level: Contact guard assistance  Functional Mobility Comments: CGA using RW, Pt retrieving items from refrigerator and knee level drawers with CGA and moderate cues for RW placement. Toilet Transfers  Toilet - Technique: Ambulating  Equipment Used: Grab bars  Toilet Transfer: Contact guard assistance  Toilet Transfers Comments: cues for hand placement and use of RW    Bed mobility  Supine to Sit: Supervision  Transfers  Stand Step Transfers: Contact guard assistance  Sit to stand: Contact guard assistance  Stand to sit: Contact guard assistance  Transfer Comments: cues for hand placement with RW     Plan   Plan  Times per week: 3-5x/wk  Current Treatment Recommendations: Strengthening, Positioning, Safety Education & Training, Balance Training, Patient/Caregiver Education & Training, Self-Care / ADL, Functional Mobility Training, Equipment Evaluation, Education, & procurement, Endurance Training    AM-PAC Score        AM-PAC Inpatient Daily Activity Raw Score: 18 (01/15/20 0855)  AM-PAC Inpatient ADL T-Scale Score : 38.66 (01/15/20 0855)  ADL Inpatient CMS 0-100% Score: 46.65 (01/15/20 0855)  ADL Inpatient CMS G-Code Modifier : CK (01/15/20 0855)    Goals  Short term goals  Time Frame for Short term goals: 1 week (1/17/2020)  Short term goal 1: Pt will complete toilet transfer with S.   Short term goal 2: PT will complete bathroom mobility with S.   Short term goal 3: Pt will complete 15-20 reps of BUE exercises for increased strength. (1/14/20)  Patient Goals   Patient goals : \"to go home\"       Therapy Time   Individual Concurrent Group Co-treatment   Time In 0822         Time Out 0839         Minutes 17         Timed Code Treatment Minutes: 17 Minutes    If pt is discharged prior to next OT session, this note will serve as the discharge summary.       Angela Mcdonough, OT

## 2020-01-15 NOTE — PLAN OF CARE
Problem: Falls - Risk of:  Goal: Will remain free from falls  Description  Will remain free from falls  Outcome: Ongoing  Note:   Pt is free of falls at this time. Bed is in the lowest position, wheels locked, side rails up x 2, call light, and personal belongings within reach. Will continue to monitor. Problem: OXYGENATION/RESPIRATORY FUNCTION  Goal: Patient will maintain patent airway  Outcome: Ongoing  Note:     Patient's EF (Ejection Fraction) is greater than 40%    Heart Failure Medications:  Diuretics[de-identified] Furosemide  ACE[de-identified] None  ARB[de-identified] Valsartan, Losartan, Other, and None  ARNI[de-identified] None  Evidenced Based Beta Blocker[de-identified] Carvedilol- Coreg    Patient's weights and intake/output reviewed: Yes    Patient's Last Weight: 189 lbs obtained by standing scale. Difference of 1 lbs less than last documented weight. Intake/Output Summary (Last 24 hours) at 1/15/2020 0641  Last data filed at 1/15/2020 0539  Gross per 24 hour   Intake 250 ml   Output 1025 ml   Net -775 ml       Comorbidities Reviewed Yes    Patient has a past medical history of CAD (coronary artery disease), COPD (chronic obstructive pulmonary disease) ([facility] [address] Diabetes mellitus ([facility] [address] Gout, Hyperlipidemia, Hypertension, and Thyroid disease. >>For CHF and Comorbidity documentation on Education Time and Topics, please see Education Tab      Pt resting in bed at this time on 2 L O2. Pt denies shortness of breath. Pt with pitting lower extremity edema.      Patient and/or Family's stated Goal of Care this Admission: reduce shortness of breath, increase activity tolerance, better understand heart failure and disease management, be more comfortable, and reduce lower extremity edema prior to discharge            Problem: Serum Glucose Level - Abnormal:  Goal: Ability to maintain appropriate glucose levels will improve  Description  Ability to maintain appropriate glucose levels will improve  Outcome: Ongoing

## 2020-01-15 NOTE — PROGRESS NOTES
IV solution  12.5 g Intravenous PRN Di Blake MD        glucagon (rDNA) injection 1 mg  1 mg Intramuscular PRN Di Blake MD        dextrose 5 % solution  100 mL/hr Intravenous PRN Di Blake MD        predniSONE (DELTASONE) tablet 20 mg  20 mg Oral Daily Megan Winslow MD   20 mg at 01/15/20 0844    insulin glargine (LANTUS) injection vial 10 Units  10 Units Subcutaneous Nightly Megan Winslow MD   10 Units at 01/14/20 2127    insulin lispro (HUMALOG) injection vial 0-12 Units  0-12 Units Subcutaneous TID WC Di Blake MD   6 Units at 01/14/20 1747    insulin lispro (HUMALOG) injection vial 0-6 Units  0-6 Units Subcutaneous Nightly Di Blake MD   1 Units at 01/14/20 2127    traMADol (ULTRAM) tablet 50 mg  50 mg Oral Q6H PRN Lyric Wang MD   50 mg at 01/12/20 1058    ipratropium-albuterol (DUONEB) nebulizer solution 1 ampule  1 ampule Inhalation BID Megan Winslow MD   1 ampule at 01/14/20 1959    amiodarone (CORDARONE) tablet 200 mg  200 mg Oral Daily MIKO Santos CNP   200 mg at 01/15/20 0844    potassium chloride (KLOR-CON M) extended release tablet 40 mEq  40 mEq Oral PRN Lyric Wang MD   40 mEq at 01/15/20 0844    Or    potassium bicarb-citric acid (EFFER-K) effervescent tablet 40 mEq  40 mEq Oral PRN Lyric Wang MD        Or    potassium chloride 10 mEq/100 mL IVPB (Peripheral Line)  10 mEq Intravenous PRN Lyric Wang MD        magnesium sulfate 1 g in dextrose 5% 100 mL IVPB  1 g Intravenous PRN Lyric Wang MD        prochlorperazine (COMPAZINE) injection 10 mg  10 mg Intravenous Q6H PRN Di Blake MD        furosemide (LASIX) injection 40 mg  40 mg Intravenous BID Cheikh Ceja MD   40 mg at 01/15/20 0845    aspirin chewable tablet 81 mg  81 mg Oral Daily Cheikh Ceja MD   81 mg at 01/15/20 0845    perflutren lipid microspheres (DEFINITY) injection 1.65 mg  1.5 mL Intravenous ONCE PRN Cheikh Ceja MD       [facility] disease  We will refer for consideration of TAVR, patient has not been felt to be a candidate for redo cardiac surgery  No grafts visualized, can consider CTA coronary angiography for further assessment if needed. ZOEY did show color flow signal near aortic root which may need further characterization and CTA may be needed for this versus cardiac MRI. Add beta-blocker back to medicine regimen. Add back aspirin, will defer additional anticoagulation given due to recent hemothorax    Echo 1/10/2020: Bioprosthetic aortic valve is well seated. A peak velocity of 436 cm/sec,and   a mean pressure gradient of 41 mmhg. Doppler parameters are increased for valve, most consistent with moderate   bioprosthetic AVR stenosis. Trivial aortic regurgitation. Echo 1/2/2020:  -- Left ventricular systolic function is normal with ejection fraction   estimated at 55%. No regional wall motion abnormalities. There is mild-moderate concentric left ventricular hypertrophy. Grade II   diastolic dysfunction with elevated left ventricular filling pressure. -- The left atrium is severely dilated. The right atrium is mildly dilated. -- A bioprosthetic aortic valve appears well seated with elevated velocities   consistent with severe aortic stenosis. Suggest ZOEY to further evaluate. Pt on mechanical ventilation.     Lab Reviewed:     Renal Profile:  Lab Results   Component Value Date    CREATININE 0.6 01/15/2020    BUN 20 01/15/2020     01/15/2020    K 3.2 01/15/2020    K 3.2 01/14/2020    CL 89 01/15/2020    CO2 35 01/15/2020     CBC:    Lab Results   Component Value Date    WBC 13.3 01/14/2020    RBC 3.24 01/14/2020    HGB 10.2 01/14/2020    HCT 30.4 01/14/2020    MCV 93.9 01/14/2020    RDW 17.8 01/14/2020     01/14/2020     BNP:    Lab Results   Component Value Date    PROBNP 1,491 01/14/2020    PROBNP 2,039 01/11/2020    PROBNP 2,940 01/08/2020    PROBNP 780 01/01/2020     Fasting Lipid Panel:    Lab Results

## 2020-01-15 NOTE — FLOWSHEET NOTE
seconds   Color Appropriate for ethnicity   Temperature Warm   R Pedal Pulse +1   LLE Neurovascular Assessment   Capillary Refill Less than/equal to 3 seconds   Color Appropriate for ethnicity   Temperature Warm   L Pedal Pulse +1   Skin Color/Condition   Skin Color/Condition (WDL) WDL   Skin Integrity   Skin Integrity (WDL) X   Skin Integrity Other (Comment)  (Surgical)   Location L flank   Preventative Dressing No   Musculoskeletal   Musculoskeletal (WDL) WDL   RL Extremity Full movement   LL Extremity Full movement   Genitourinary   Genitourinary (WDL) WDL   Anus/Rectum   Anus/Rectum (WDL) WDL   Incision 01/08/20 Back Lateral;Left   Date First Assessed/Time First Assessed: 01/08/20 2000   Present on Hospital Admission: Yes  Primary Wound Type: Incision  Location: Back  Wound Location Orientation: Lateral;Left   Wound Assessment Clean;Dry; Intact   Luz-wound Assessment Clean;Dry; Intact   Incision 01/02/20 Chest Lateral;Left;Lower   Date First Assessed: 01/02/20   Present on Hospital Admission: No  Primary Wound Type: Incision  Location: Chest  Wound Location Orientation: Lateral;Left;Lower   Wound Assessment Other (Comment)  ([place])   Drainage Amount Small   Drainage Description Serosanguinous; Yellow;Serous   Dressing/Treatment Foam   Dressing Status Clean;Dry; Intact   Psychosocial   Psychosocial (WDL) WDL

## 2020-01-15 NOTE — PROGRESS NOTES
Hospitalist Progress Note      PCP: No primary care provider on file. Date of Admission: 1/8/2020    Chief Complaint:  SOB     History Of Present Illness:    \"69 y.o. male who presented to [facility] with SOB. Patient was discharged from [facility] for pneumonia and a large L hemothorax. He was discharged home much improved and felt well yesterday. However, overnight, patient became increasingly SOB. This worsened this AM and presented to OSH. Patient has known severe aortic stenosis and is being evaluated by CT surgery as an outpatient for repair of his bioprosthetic valve. At OSH, he was found to be having acute heart failure and mild cardiogenic shock. He was started on low dose dobutamine with good response and transferred to [facility]. \"      Subjective:  He feels well and looks well. He has lots of questions about the logistics of surgery. I think we could probably switch to PO diuretic today if OK with cardiology.       Medications:  Reviewed    Infusion Medications    dextrose       Scheduled Medications    insulin lispro  3 Units Subcutaneous Daily before lunch    carvedilol  3.125 mg Oral BID WC    sodium chloride flush  10 mL Intravenous 2 times per day    predniSONE  20 mg Oral Daily    insulin glargine  10 Units Subcutaneous Nightly    insulin lispro  0-12 Units Subcutaneous TID WC    insulin lispro  0-6 Units Subcutaneous Nightly    ipratropium-albuterol  1 ampule Inhalation BID    amiodarone  200 mg Oral Daily    furosemide  40 mg Intravenous BID    aspirin  81 mg Oral Daily    allopurinol  300 mg Oral Daily    atorvastatin  40 mg Oral Daily    nicotine  1 patch Transdermal Daily    sodium chloride flush  10 mL Intravenous 2 times per day     PRN Meds: sodium chloride flush, acetaminophen, magnesium hydroxide, glucose, dextrose, glucagon (rDNA), dextrose, traMADol, potassium chloride **OR** potassium alternative oral replacement **OR** potassium chloride, magnesium sulfate, prochlorperazine, perflutren lipid microspheres, sodium chloride flush, magnesium hydroxide      Intake/Output Summary (Last 24 hours) at 1/15/2020 1043  Last data filed at 1/15/2020 0834  Gross per 24 hour   Intake 610 ml   Output 1225 ml   Net -615 ml       Physical Exam Performed:    BP (!) 95/47   Pulse 63   Temp 97.8 °F (36.6 °C) (Oral)   Resp 20   Ht 5' 10\" (1.778 m)   Wt 188 lb 1.6 oz (85.3 kg)   SpO2 98%   BMI 26.99 kg/m²     General appearance: No apparent distress, appears stated age and cooperative. HEENT: Pupils equal, round, and reactive to light. Conjunctivae/corneas clear. Neck: Supple, with full range of motion. No jugular venous distention. Trachea midline. Respiratory:  Normal respiratory effort. Prominent bilateral rhonchi and wheezing have resolved. Bibasilar rales are present. Mucus no longer heard gurgling in large airways. Cardiovascular: tachycardic rate and rhythm with without rubs or gallops. 3/6 systolic murmur at the RUSB. Abdomen: Soft, non-tender, non-distended with normal bowel sounds. Musculoskeletal: No clubbing, cyanosis or edema bilaterally. Full range of motion without deformity. Skin: Skin color, texture, turgor normal.  No rashes or lesions. Neurologic:  Neurovascularly intact without any focal sensory/motor deficits. Cranial nerves: II-XII intact, grossly non-focal.  Psychiatric: Alert and oriented, thought content appropriate, normal insight  Capillary Refill: Brisk,< 3 seconds   Peripheral Pulses: +2 palpable, equal bilaterally       Labs:   Recent Labs     01/13/20  0518 01/14/20  0502   WBC 16.1* 13.3*   HGB 9.2* 10.2*   HCT 27.6* 30.4*    329     Recent Labs     01/13/20  0518 01/14/20  0502 01/15/20  0522    133* 134*   K 3.7 3.2*  3.2* 3.2*   CL 92* 89* 89*   CO2 36* 36* 35*   BUN 20 21* 20   CREATININE 0.6* 0.7* 0.6*   CALCIUM 8.6 8.2* 8.8     No results for input(s): AST, ALT, BILIDIR, BILITOT, ALKPHOS in the last 72 hours.   No results for input(s): INR in the last 72 hours. No results for input(s): Monda Saupe in the last 72 hours. Urinalysis:    No results found for: Geri Hernandezr, BACTERIA, RBCUA, BLOODU, SPECGRAV, [address]    Radiology:  CT CHEST W CONTRAST   Final Result   Addendum 1 of 1   ADDENDUM:   Cystic nodule in the pancreas. Please see follow-up below. ACR    management   recommendations for incidental cystic pancreatic masses in asymptomatic*   patients on CT, MR, or US      < 2 cm:  Single follow-up in 1 yr (MR preferred)      *Stable:  Benign, no further follow-up   *Growth: As below based upon size   Geena ARAGON, et al.  Managing incidental findings on abdominal CT: white    paper   of the ACR Incidental Findings Committee. J Am Tyrel Radiol 2010;    9:341-214. Final      CT HEAD WO CONTRAST   Final Result   No evidence of acute intracranial abnormality on a study mildly limited as   described above. VL DUP CAROTID BILATERAL   Final Result      XR CHEST PORTABLE   Final Result   Vascular congestion without overt edema. Persistent nodule like density of the right mid lung. Follow-up to ensure   resolution and exclude malignancy is needed.       Unchanged left-sided atelectasis versus pneumonia over the past 24 hours         XR CHEST PORTABLE   Final Result   Right midlung zone airspace opacities could represent atelectasis or pneumonia                 Assessment/Plan:    Active Hospital Problems    Diagnosis    Cardiogenic shock ([address] [R57.0]    Acute on chronic diastolic congestive heart failure (HCC) [I50.33]    Pulmonary infiltrate [R91.8]    Pulmonary venous congestion [R09.89]    Atelectasis [J98.11]    Leukocytosis [D72.829]    Hyperglycemia [R73.9]    Acute bronchospasm [J98.01]    Atrial fibrillation with RVR (HCC) [I48.91]    Hypotension [I95.9]    Acute respiratory failure with hypoxemia (HCC) [J96.01]    Tobacco abuse [Z72.0]    S/P AVR (aortic valve replacement) [Z95.2]    AS (aortic stenosis) [I35.0]       \"69 y.o. male who presented to [facility] with SOB. Patient was discharged from [facility] for pneumonia and a large L hemothorax. He was discharged home much improved and felt well yesterday. However, overnight, patient became increasingly SOB. This worsened this AM and presented to OSH. Patient has known severe aortic stenosis and is being evaluated by CT surgery as an outpatient for repair of his bioprosthetic valve. At OSH, he was found to be having acute heart failure and mild cardiogenic shock. He was started on low dose dobutamine with good response and transferred to [facility]. \"      Acute on chronic diastolic CHF, related to moderate to severe bioprosthetic AS, with cardiogenic shock diagnosed on admission  - furosemide started IV on admission.    - dobutamine gtt started at OSH. Cardiology added nitroprusside gtt. Both weaned off 1/13.  - CT surgery consulted regarding the aortic valve. He says he had a porcine valve put in 20 years ago, then exchanged for a bovine valve 8 years ago. It was severely stenosed per 1/2 TTE, then repeat TTE on this admission showed that it was moderately stenosed. - ZOEY and [facility] 1/13    Cameral fistula involving the L atrium  - seen on ZOEY, confirmed on CTA. CT surgery planning aortic root replacement and fistula repair during this hospital stay. Paroxysmal Afib with RVR  - held carvedilol initially, then restarted at low dose   - amiodarone per cardiology. - not on anticoagulation due to recent hemothorax. Continue aspirin. AECOPD  - steroids, inhaled bronchodilators. - completed a course of vanc and cefepime last admission. S/p bronchoscopy 1/10 - the BAL grew pseudomonas fluorescens, but the patient recently completed a course of cefepime and clinically appeared to be doing well without further abx.    - he quit smoking during the last admission, had been smoking 2 PPD.     Acute hypoxic respiratory failure  - due to above

## 2020-01-15 NOTE — FLOWSHEET NOTE
01/14/20 1600   Assessment   Charting Type Reassessment   Neurological   Neuro (WDL) WDL   Level of Consciousness 0   Orientation Level Oriented X4   Cognition Appropriate judgement; Appropriate safety awareness; Appropriate attention/concentration; Appropriate for developmental age; Follows commands   Language Clear   [place] Coma Scale   Eye Opening 4   Best Verbal Response 5   Best Motor Response 6   [place] Coma Scale Score 15   HEENT   HEENT (WDL) X   Right Eye Impaired vision   Left Eye Impaired vision   Teeth Dentures upper;Dentures lower   Respiratory   Respiratory (WDL) WDL   Respiratory Pattern Regular   Respiratory Depth Normal   Respiratory Quality/Effort Unlabored;Dyspnea with exertion   Chest Assessment Chest expansion symmetrical;Trachea midline   L Breath Sounds Clear   R Breath Sounds Clear   Breath Sounds   Right Upper Lobe Clear   Right Middle Lobe Clear   Right Lower Lobe Clear   Left Upper Lobe Clear   Left Lower Lobe Clear   Cardiac   Cardiac (WDL) X   Cardiac Regularity Irregular   Heart Sounds S1, S2   Cardiac Rhythm Atrial fibrillation   Cardiac Monitor   Telemetry Monitor On Yes   Telemetry Audible Yes   Telemetry Alarms Set Yes   Gastrointestinal   Abdominal (WDL) WDL   RUQ Bowel Sounds Active   LUQ Bowel Sounds Active   RLQ Bowel Sounds Active   LLQ Bowel Sounds Active   Abdomen Inspection Rounded; Soft   Peripheral Vascular   Peripheral Vascular (WDL) WDL   Edema Right lower extremity; Left lower extremity   Edema Generalized Non-pitting   RLE Edema +1   LLE Edema +1   RUE Neurovascular Assessment   Capillary Refill Less than/equal to 3 seconds   Color Appropriate for ethnicity; Ecchymosis   Temperature Warm   R Radial Pulse +1   LUE Neurovascular Assessment   Capillary Refill Less than/equal to 3 seconds   Color Appropriate for ethnicity; Ecchymosis   Temperature Warm   L Radial Pulse +1   RLE Neurovascular Assessment   Capillary Refill Less than/equal to 3 seconds   Color Appropriate for ethnicity   Temperature Warm   R Pedal Pulse +1   LLE Neurovascular Assessment   Capillary Refill Less than/equal to 3 seconds   Color Appropriate for ethnicity   Temperature Warm   L Pedal Pulse +1   Puncture Site Assessment 1   Location Jugular - right   Site Assessment No redness, drainage, swelling or hematoma   Dressing Applied Transparent occlusive dressing    Dressing Applied 2 Transparent occlusive   Skin Color/Condition   Skin Color/Condition (WDL) WDL   Skin Integrity   Skin Integrity (WDL) X  (Surgical)   Skin Integrity Other (Comment)  (Surgical)   Location L flank   Preventative Dressing No   Musculoskeletal   Musculoskeletal (WDL) WDL   RL Extremity Full movement   LL Extremity Full movement   Genitourinary   Genitourinary (WDL) WDL   Anus/Rectum   Anus/Rectum (WDL) WDL   Incision 01/08/20 Back Lateral;Left   Date First Assessed/Time First Assessed: 01/08/20 2000   Present on Hospital Admission: Yes  Primary Wound Type: Incision  Location: Back  Wound Location Orientation: Lateral;Left   Wound Assessment Clean;Dry; Intact   Luz-wound Assessment Clean;Dry; Intact   Closure Approximated   Drainage Amount None   Dressing/Treatment   (MON[place]NA)   Incision 01/02/20 Chest Lateral;Left;Lower   Date First Assessed: 01/02/20   Present on Hospital Admission: No  Primary Wound Type: Incision  Location: Chest  Wound Location Orientation: Lateral;Left;Lower   Wound Assessment Other (Comment)  (AUSTIN)   Drainage Amount Small   Dressing/Treatment Foam   Dressing Status Clean;Dry; Intact   Psychosocial   Psychosocial (WDL) WDL      01/14/20 1600   Assessment   Charting Type Reassessment   Neurological   Neuro (WDL) WDL   Level of Consciousness 0   Orientation Level Oriented X4   Cognition Appropriate judgement; Appropriate safety awareness; Appropriate attention/concentration; Appropriate for developmental age; Follows commands   Language Clear   [place] Coma Scale   Eye Opening 4   Best Verbal Response 5   Best Motor Response 6 [place] Coma Scale Score 15   HEENT   HEENT (WDL) X   Right Eye Impaired vision   Left Eye Impaired vision   Teeth Dentures upper;Dentures lower   Respiratory   Respiratory (WDL) WDL   Respiratory Pattern Regular   Respiratory Depth Normal   Respiratory Quality/Effort Unlabored;Dyspnea with exertion   Chest Assessment Chest expansion symmetrical;Trachea midline   L Breath Sounds Clear   R Breath Sounds Clear   Breath Sounds   Right Upper Lobe Clear   Right Middle Lobe Clear   Right Lower Lobe Clear   Left Upper Lobe Clear   Left Lower Lobe Clear   Cardiac   Cardiac (WDL) X   Cardiac Regularity Irregular   Heart Sounds S1, S2   Cardiac Rhythm Atrial fibrillation   Cardiac Monitor   Telemetry Monitor On Yes   Telemetry Audible Yes   Telemetry Alarms Set Yes   Gastrointestinal   Abdominal (WDL) WDL   RUQ Bowel Sounds Active   LUQ Bowel Sounds Active   RLQ Bowel Sounds Active   LLQ Bowel Sounds Active   Abdomen Inspection Rounded; Soft   Peripheral Vascular   Peripheral Vascular (WDL) WDL   Edema Right lower extremity; Left lower extremity   Edema Generalized Non-pitting   RLE Edema +1   LLE Edema +1   RUE Neurovascular Assessment   Capillary Refill Less than/equal to 3 seconds   Color Appropriate for ethnicity; Ecchymosis   Temperature Warm   R Radial Pulse +1   LUE Neurovascular Assessment   Capillary Refill Less than/equal to 3 seconds   Color Appropriate for ethnicity; Ecchymosis   Temperature Warm   L Radial Pulse +1   RLE Neurovascular Assessment   Capillary Refill Less than/equal to 3 seconds   Color Appropriate for ethnicity   Temperature Warm   R Pedal Pulse +1   LLE Neurovascular Assessment   Capillary Refill Less than/equal to 3 seconds   Color Appropriate for ethnicity   Temperature Warm   L Pedal Pulse +1   Puncture Site Assessment 1   Location Jugular - right   Site Assessment No redness, drainage, swelling or hematoma   Dressing Applied Transparent occlusive dressing    Dressing Applied 2 Transparent occlusive   Skin Color/Condition   Skin Color/Condition (WDL) WDL   Skin Integrity   Skin Integrity (WDL) X  (Surgical)   Skin Integrity Other (Comment)  (Surgical)   Location L flank   Preventative Dressing No   Musculoskeletal   Musculoskeletal (WDL) WDL   RL Extremity Full movement   LL Extremity Full movement   Genitourinary   Genitourinary (WDL) WDL   Anus/Rectum   Anus/Rectum (WDL) WDL   Incision 01/08/20 Back Lateral;Left   Date First Assessed/Time First Assessed: 01/08/20 2000   Present on Hospital Admission: Yes  Primary Wound Type: Incision  Location: Back  Wound Location Orientation: Lateral;Left   Wound Assessment Clean;Dry; Intact   Luz-wound Assessment Clean;Dry; Intact   Closure Approximated   Drainage Amount None   Dressing/Treatment   (MON[place]NA)   Incision 01/02/20 Chest Lateral;Left;Lower   Date First Assessed: 01/02/20   Present on Hospital Admission: No  Primary Wound Type: Incision  Location: Chest  Wound Location Orientation: Lateral;Left;Lower   Wound Assessment Other (Comment)  ([place])   Drainage Amount Small   Dressing/Treatment Foam   Dressing Status Clean;Dry; Intact   Psychosocial   Psychosocial (WDL) WDL

## 2020-01-15 NOTE — PROGRESS NOTES
[facility]  Progress Note    1/15/2020 10:55 AM  Surgeon 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 A bioprosthetic aortic valve appears thickened with fusing of the right and   non coronary cusps. Moderate to severe AS. Eccentric color flow seen in the left atrium could possibly represent a   coronary/aortic/cameral fistula. There is no evidence of mass or thrombus in the left atrium or appendage. Mild mitral regurgitation. Moderate tricuspid regurgitation. CORONARY ARTERIES     LM Calcified, proximal less than 10% stenosis, 25% mid-distal stenosis.       LAD Calcified, [id number] 30% stenosis.       LCX  large vessel, codominant, proximal-mid 40 to 50% stenosis. There is a mid vessel stent which is widely patent with less than 20 to 30% in-stent restenosis.     OM1 is a small vessel with a proximal-mid 90% stenosis. There are 4 additional OM branches that are small to medium in size. OM 2 is the largest OM branch and this vessel has proximal less than 10% stenosis, mid 90% stenosis.   OM 3, 4 and 5 has diffuse

## 2020-01-16 LAB
ANION GAP SERPL CALCULATED.3IONS-SCNC: 11 MMOL/L (ref 3–16)
BUN BLDV-MCNC: 17 MG/DL (ref 7–20)
CALCIUM SERPL-MCNC: 9 MG/DL (ref 8.3–10.6)
CHLORIDE BLD-SCNC: 91 MMOL/L (ref 99–110)
CO2: 35 MMOL/L (ref 21–32)
CREAT SERPL-MCNC: 0.7 MG/DL (ref 0.8–1.3)
GFR AFRICAN AMERICAN: >60
GFR NON-AFRICAN AMERICAN: >60
GLUCOSE BLD-MCNC: 100 MG/DL (ref 70–99)
GLUCOSE BLD-MCNC: 164 MG/DL (ref 70–99)
GLUCOSE BLD-MCNC: 186 MG/DL (ref 70–99)
GLUCOSE BLD-MCNC: 243 MG/DL (ref 70–99)
GLUCOSE BLD-MCNC: 93 MG/DL (ref 70–99)
MAGNESIUM: 2 MG/DL (ref 1.8–2.4)
PERFORMED ON: ABNORMAL
POTASSIUM SERPL-SCNC: 3.8 MMOL/L (ref 3.5–5.1)
SODIUM BLD-SCNC: 137 MMOL/L (ref 136–145)

## 2020-01-16 PROCEDURE — 97110 THERAPEUTIC EXERCISES: CPT

## 2020-01-16 PROCEDURE — 94760 N-INVAS EAR/PLS OXIMETRY 1: CPT

## 2020-01-16 PROCEDURE — 94060 EVALUATION OF WHEEZING: CPT

## 2020-01-16 PROCEDURE — 6370000000 HC RX 637 (ALT 250 FOR IP): Performed by: NURSE PRACTITIONER

## 2020-01-16 PROCEDURE — 2060000000 HC ICU INTERMEDIATE R&B

## 2020-01-16 PROCEDURE — 94640 AIRWAY INHALATION TREATMENT: CPT

## 2020-01-16 PROCEDURE — 83735 ASSAY OF MAGNESIUM: CPT

## 2020-01-16 PROCEDURE — 2580000003 HC RX 258: Performed by: INTERNAL MEDICINE

## 2020-01-16 PROCEDURE — 6370000000 HC RX 637 (ALT 250 FOR IP): Performed by: INTERNAL MEDICINE

## 2020-01-16 PROCEDURE — 99232 SBSQ HOSP IP/OBS MODERATE 35: CPT | Performed by: NURSE PRACTITIONER

## 2020-01-16 PROCEDURE — 6370000000 HC RX 637 (ALT 250 FOR IP): Performed by: THORACIC SURGERY (CARDIOTHORACIC VASCULAR SURGERY)

## 2020-01-16 PROCEDURE — 36415 COLL VENOUS BLD VENIPUNCTURE: CPT

## 2020-01-16 PROCEDURE — 94729 DIFFUSING CAPACITY: CPT

## 2020-01-16 PROCEDURE — 97535 SELF CARE MNGMENT TRAINING: CPT

## 2020-01-16 PROCEDURE — 97116 GAIT TRAINING THERAPY: CPT

## 2020-01-16 PROCEDURE — 94726 PLETHYSMOGRAPHY LUNG VOLUMES: CPT

## 2020-01-16 PROCEDURE — 80048 BASIC METABOLIC PNL TOTAL CA: CPT

## 2020-01-16 RX ORDER — ALBUTEROL SULFATE 90 UG/1
4 AEROSOL, METERED RESPIRATORY (INHALATION) ONCE
Status: COMPLETED | OUTPATIENT
Start: 2020-01-16 | End: 2020-01-16

## 2020-01-16 RX ORDER — FAMOTIDINE 20 MG/1
20 TABLET, FILM COATED ORAL 2 TIMES DAILY
Status: DISCONTINUED | OUTPATIENT
Start: 2020-01-16 | End: 2020-01-16 | Stop reason: CLARIF

## 2020-01-16 RX ADMIN — INSULIN LISPRO 1 UNITS: 100 INJECTION, SOLUTION INTRAVENOUS; SUBCUTANEOUS at 21:43

## 2020-01-16 RX ADMIN — INSULIN LISPRO 3 UNITS: 100 INJECTION, SOLUTION INTRAVENOUS; SUBCUTANEOUS at 11:09

## 2020-01-16 RX ADMIN — CARVEDILOL 3.12 MG: 3.12 TABLET, FILM COATED ORAL at 16:42

## 2020-01-16 RX ADMIN — FUROSEMIDE 40 MG: 40 TABLET ORAL at 08:05

## 2020-01-16 RX ADMIN — ASPIRIN 81 MG 81 MG: 81 TABLET ORAL at 08:05

## 2020-01-16 RX ADMIN — Medication 10 ML: at 21:52

## 2020-01-16 RX ADMIN — TRAMADOL HYDROCHLORIDE 50 MG: 50 TABLET, FILM COATED ORAL at 22:34

## 2020-01-16 RX ADMIN — INSULIN GLARGINE 10 UNITS: 100 INJECTION, SOLUTION SUBCUTANEOUS at 21:43

## 2020-01-16 RX ADMIN — AMIODARONE HYDROCHLORIDE 200 MG: 200 TABLET ORAL at 08:05

## 2020-01-16 RX ADMIN — Medication 4 PUFF: at 08:45

## 2020-01-16 RX ADMIN — ATORVASTATIN CALCIUM 40 MG: 40 TABLET, FILM COATED ORAL at 08:05

## 2020-01-16 RX ADMIN — TRAMADOL HYDROCHLORIDE 50 MG: 50 TABLET, FILM COATED ORAL at 06:15

## 2020-01-16 RX ADMIN — FUROSEMIDE 40 MG: 40 TABLET ORAL at 21:43

## 2020-01-16 RX ADMIN — ALLOPURINOL 300 MG: 300 TABLET ORAL at 08:05

## 2020-01-16 RX ADMIN — IPRATROPIUM BROMIDE AND ALBUTEROL SULFATE 1 AMPULE: .5; 3 SOLUTION RESPIRATORY (INHALATION) at 20:54

## 2020-01-16 RX ADMIN — CARVEDILOL 3.12 MG: 3.12 TABLET, FILM COATED ORAL at 08:05

## 2020-01-16 RX ADMIN — Medication 10 ML: at 08:05

## 2020-01-16 RX ADMIN — INSULIN LISPRO 2 UNITS: 100 INJECTION, SOLUTION INTRAVENOUS; SUBCUTANEOUS at 11:10

## 2020-01-16 RX ADMIN — INSULIN LISPRO 4 UNITS: 100 INJECTION, SOLUTION INTRAVENOUS; SUBCUTANEOUS at 16:42

## 2020-01-16 ASSESSMENT — PAIN SCALES - GENERAL
PAINLEVEL_OUTOF10: 8
PAINLEVEL_OUTOF10: 0
PAINLEVEL_OUTOF10: 7

## 2020-01-16 ASSESSMENT — ENCOUNTER SYMPTOMS: RESPIRATORY NEGATIVE: 1

## 2020-01-16 NOTE — PROGRESS NOTES
Practitioner: Cornelius Todd MD  Subjective  Subjective: Pt is agreeable to therapy. General Comment  Comments: Pt resting in bed upon entry, RN cleared pt for therapy  Pain Screening  Patient Currently in Pain: Denies  Vital Signs  Patient Currently in Pain: Denies       Orientation  Orientation  Overall Orientation Status: Within Normal Limits        Objective   Bed mobility  Supine to Sit: Supervision  Sit to Supine: Supervision  Scooting: Supervision(to EOB)  Transfers  Sit to Stand: Stand by assistance  Stand to sit: Stand by assistance  Ambulation  Ambulation?: Yes  Ambulation 1  Surface: level tile  Device: Rolling Walker  Assistance: Contact guard assistance  Quality of Gait: improved dynamic balance and improved awareness of environment this date, mildly unsteady with cueing needed to keep VANIA within walker frame, forward flexed at trunk  Gait Deviations: Slow [place];Decreased step length;Decreased step height;Decreased arm swing  Distance: 160 ft  Stairs/Curb  Stairs?: No     Balance  Posture: Fair  Sitting - Static: Good  Sitting - Dynamic: Good  Standing - Static: Fair;+  Standing - Dynamic: Fair;+  Exercises  Straight Leg Raise: x 10 B  Quad Sets: x 10 B  Heelslides: x 10 B  Gluteal Sets: 10  Hip Flexion: 15 B  Hip Abduction: 15 B  Knee Long Arc Quad: 15 B  Ankle Pumps: 15 B                          AM-PAC Score  AM-PAC Inpatient Mobility Raw Score : 19 (01/16/20 [facility])  AM-PAC Inpatient T-Scale Score : 45.44 (01/16/20 [facility])  Mobility Inpatient CMS 0-100% Score: 41.77 (01/16/20 [facility])  Mobility Inpatient CMS G-Code Modifier : CK (01/16/20 [facility])          Goals  Short term goals  Time Frame for Short term goals: 4-5 days (unless otherwise stated) by 1/15/20  Short term goal 1: Pt will perform bed mobility with independence. Short term goal 2: Pt will perform sit<>stand with SBA. Short term goal 3: Pt will ambulate 100 feet with no AD, no LOB, and O2 stats remaining above 92% and CGA.   Short term goal 4: Pt will perform 12-15 reps of BLE ther ex for strengthening and balance by 1/13/20. Patient Goals   Patient goals : To go home. Plan    Plan  Times per week: 3-5x  Times per day: Daily  Plan weeks: 5 days (until 1/15/20); revise plan to until 1/19/20  Current Treatment Recommendations: Strengthening, ROM, Balance Training, Transfer Training, Functional Mobility Training, Endurance Training, Stair training, Gait Training, Neuromuscular Re-education, Safety Education & Training, Home Exercise Program, Patient/Caregiver Education & Training, Equipment Evaluation, Education, & procurement  Safety Devices  Type of devices: Call light within reach, Gait belt, Left in bed, Bed alarm in place, All fall risk precautions in place, Nurse notified  Restraints  Initially in place: No     Therapy Time   Individual Concurrent Group Co-treatment   Time In 0945         Time Out 1010         Minutes 25              If pt is discharged prior to next therapy session, this note will serve as discharge summary.     Heidi Jimenez, PT

## 2020-01-16 NOTE — FLOWSHEET NOTE
01/15/20 2033   Assessment   Charting Type Shift assessment   Neurological   Neuro (WDL) WDL   Level of Consciousness 0   Orientation Level Oriented X4;Oriented to place;Oriented to time;Oriented to situation;Oriented to person   Cognition Appropriate judgement; Appropriate attention/concentration; Appropriate safety awareness; Appropriate for developmental age; Follows commands   Language Appropriate for developmental age;Clear   Bernadette Coma Scale   Eye Opening 4   Best Verbal Response 5   Best Motor Response 6   [place] Coma Scale Score 15   HEENT   HEENT (WDL) X   Right Eye Impaired vision   Left Eye Impaired vision   Teeth Dentures upper;Dentures lower   Respiratory   Respiratory (WDL) WDL   Respiratory Pattern Regular   Respiratory Depth Normal   Respiratory Quality/Effort Unlabored;Dyspnea with exertion   Chest Assessment Chest expansion symmetrical;Trachea midline   L Breath Sounds Clear   R Breath Sounds Clear   Breath Sounds   Right Upper Lobe Clear   Right Middle Lobe Clear   Right Lower Lobe Clear   Left Upper Lobe Clear   Left Lower Lobe Clear   Cardiac   Cardiac (WDL) X   Cardiac Regularity Irregular   Heart Sounds S1, S2   Rhythm Interpretation   Pulse 85   Cardiac Monitor   Telemetry Monitor On Yes   Telemetry Audible Yes   Telemetry Alarms Set Yes   Gastrointestinal   Abdominal (WDL) WDL   RUQ Bowel Sounds Active   LUQ Bowel Sounds Active   RLQ Bowel Sounds Active   LLQ Bowel Sounds Active   Peripheral Vascular   Peripheral Vascular (WDL) WDL   Skin Color/Condition   Skin Color/Condition (WDL) WDL   Skin Integrity   Skin Integrity (WDL) X   Skin Integrity Other (Comment)  (surgical)   Location L flank   Musculoskeletal   Musculoskeletal (WDL) WDL   Genitourinary   Genitourinary (WDL) WDL   Incision 01/02/20 Chest Lateral;Left;Lower   Date First Assessed: 01/02/20   Present on Hospital Admission: No  Primary Wound Type:  Incision  Location: Chest  Wound Location Orientation: Lateral;Left;Lower

## 2020-01-16 NOTE — PROGRESS NOTES
[address]  Cardiology  Progress Note    Admission date:  2020    Reason for follow up visit: Aortic stenosis, CHF, CAD    HPI/CC: Kamlesh Sanchez is a 77 y.o. male who presented to the OSH for shortness of breath 2020. [place] to have CHF, treated with dobutamine and nipride along with IV diuretics and transferred to [facility]. LHC 2020 showed moderate-severe CAD. 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 Plan for aortic root reconstruction with AVR 2020. Subjective: Denies chest pain, shortness of breath, palpitations and dizziness. On room air. Vitals:  Blood pressure 121/64, pulse 69, temperature 97.4 °F (36.3 °C), temperature source Oral, resp. rate 18, height 5' 10\" (1.778 m), weight 186 lb 9.6 oz (84.6 kg), SpO2 99 %.   Temp  Av °F (36.7 °C)  Min: 97.4 °F (36.3 °C)  Max: 98.4 °F (36.9 °C)  Pulse  Av.9  Min: 62  Max: 96  BP  Min: 99/55  Max: 143/95  SpO2  Av.6 %  Min: 93 %  Max: 99 %    24 hour I/O    Intake/Output Summary (Last 24 hours) at 2020 0839  Last data filed at 2020 0801  Gross per 24 hour   Intake 1420 ml   Output 1700 ml   Net -280 ml     Current Facility-Administered Medications   Medication Dose Route Frequency Provider Last Rate Last Dose    albuterol sulfate  (90 Base) MCG/ACT inhaler 4 puff  4 puff Inhalation Once Ngozi Scherer MD        insulin lispro (HUMALOG) injection vial 3 Units  3 Units Subcutaneous Daily before lunch Di Blake MD   3 Units at 01/15/20 1158    furosemide (LASIX) tablet 40 mg  40 mg Oral BID MIKO Ventura - CNP   40 mg at 20 0805    carvedilol (COREG) tablet 3.125 mg  3.125 mg Oral BID  Di Blake MD   3.125 mg at 01/16/20 0805    sodium chloride flush 0.9 % injection 10 mL  10 mL Intravenous 2 times per day Cheikh Ceja MD   10 mL at 20 0805    sodium chloride flush 0.9 % injection 10 mL  10 mL Intravenous PRN Cheikh Ceja MD       [facility] Apt microspheres (DEFINITY) injection 1.65 mg  1.5 mL Intravenous ONCE PRN Ruchi Lofton MD        allopurinol (ZYLOPRIM) tablet 300 mg  300 mg Oral Daily Robert Correa MD   300 mg at 01/16/20 0805    atorvastatin (LIPITOR) tablet 40 mg  40 mg Oral Daily Robert Correa MD   40 mg at 01/16/20 0805    nicotine (NICODERM CQ) 14 MG/24HR 1 patch  1 patch Transdermal Daily Robert Correa MD   1 patch at 01/16/20 0805    sodium chloride flush 0.9 % injection 10 mL  10 mL Intravenous 2 times per day Robert Correa MD   10 mL at 01/15/20 0846    sodium chloride flush 0.9 % injection 10 mL  10 mL Intravenous PRN Robert Correa MD   10 mL at 01/12/20 0611    magnesium hydroxide (MILK OF MAGNESIA) 400 MG/5ML suspension 30 mL  30 mL Oral Daily PRN Robert Correa MD         Review of Systems   Constitutional: Negative. Respiratory: Negative. Cardiovascular: Negative. Neurological: Negative. Objective:     Telemetry monitor: PAF 80s with periods of NSR     Physical Exam:  Constitutional:  Comfortable and alert, NAD, appears older than stated age  Eyes: PERRL, sclera nonicteric  Neck:  Supple, no masses, no thyroidmegaly, no JVD  Skin:  Warm and dry; no rash or lesions  Heart: Irregular, normal apex, S1 and S2 normal, harsh murmur  Lungs:  Normal respiratory effort; clear  Abdomen: soft, non tender, + bowel sounds  Extremities:  No BLE edema  Neuro: alert and oriented, moves legs and arms equally, normal mood and affect    Data Reviewed:    ZOEY 1/13/2020:  Left ventricular systolic function is normal with an estimated ejection   fraction of 50-55%. A bioprosthetic aortic valve appears thickened with fusing of the right and   non coronary cusps. Moderate to severe AS. Eccentric color flow seen in the left atrium could possibly represent a   coronary/aortic/cameral fistula. There is no evidence of mass or thrombus in the left atrium or appendage. Mild mitral regurgitation.    Moderate tricuspid regurgitation. Coronary angiogram 1/13/2020: Bioprosthetic valve aortic stenosis  Non-STEMI  PROCEDURES PERFORMED    Right heart cath  Left heart catheterization  Aortogram  Coronary angiogam  Coronary cath  PROCEDURE DESCRIPTION   Risks/benefits/alternatives/outcomes were discussed with patient and/or family and informed consent was obtained. Using the [facility] scale, the patient's right radial artery was found to be acceptable for cannulation. Patient was prepped draped in the usual sterile fashion. Local anaesthetic was applied over puncture sites. Using ultrasound guidance with a micropuncture kit a 5 [place]cu[place]orean sheath was inserted into the right internal jugular vein and 5 [place] PA catheter was used for hemodynamic and right heart catheterization assessment. The catheter was removed and the sheath was secured at the end of the case for later removal.  Using a back wall technique, a 6 [place] Terumo sheath was inserted into right radial artery. Verapamil, nitroglycerin were administered through the sheath. Heparin was administered. Initially a 5 [place] AL-1 catheter was used with a straight wire in an attempt to cross the aortic valve but this was unsuccessful. Ultimately the aortic valve was able to be crossed with a multipurpose and straight wire and this was exchanged for a dual-lumen pigtail catheter for aortic valve hemodynamic assessment. Pullback was performed across aortic valve and then the pigtail catheter was exchanged for other coronary catheters. The AL-1 was used for left-sided coronary angiography but it was felt to not entirely select the LAD for which ultimately a 6 [place] AL 0.75 catheter was used for diagnostic angiography. Aortic angiography was obtained with a pigtail catheter as it was difficult to find the right coronary artery as well as in locating grafts. At the conclusion of the procedure, a TR band was placed over the puncture site and hemostasis was obtained.   There were no immediate complications. I supervised sedation with versed 2 mg/fentanyl 50 Mcg during the procedure. 295 cc contrast was utilized. <20cc EBL. FINDINGS     CHAMBER PRESSURE SATURATION   RA  10  50 %   RV  27/10     PA  30/20  48 %   PW  25 with V waves up to 30     AORTA  87  102/53 %      RADHA CARDIAC OUTPUT  2.7 L/min   SVR  10 Wood units   PVR  1 Wood unit     LVEDP  28   GRADIENT ACROSS AORTIC VALVE  53 mm mean gradient across bioprosthetic aortic valve with a valve area of 0.6 cm² which is consistent with moderate to severe bioprosthetic valve aortic stenosis     CALIBER Moderate to severely dilated, aorta is severely calcified   AORTIC INSUFFICIENCY None     On OZEY there was a concern for a coronary cameral fistula but this was not identified on this study nor was significant aortic insufficiency identified. BYPASS GRAFTS  1 graft marker was visualized on the ascending aorta, there was no flow noted through this. Previous reports have suggested occlusion of bypass grafts. There appeared to be no grafts to the LAD or RCA. LM Calcified, proximal less than 10% stenosis, 25% mid-distal stenosis.       LAD Calcified, [id number] 30% stenosis.       LCX  large vessel, codominant, proximal-mid 40 to 50% stenosis. There is a mid vessel stent which is widely patent with less than 20 to 30% in-stent restenosis.     OM1 is a small vessel with a proximal-mid 90% stenosis. There are 4 additional OM branches that are small to medium in size. OM 2 is the largest OM branch and this vessel has proximal less than 10% stenosis, mid 90% stenosis. OM 3, 4 and 5 has diffuse proximal-mid 60 to 70% stenosis.       RCA  Small vessel, appears to be 100% chronic total occlusion at the ostium. There are left to right collaterals noted. Vessel is only seen in retrograde filling. BYPASS GRAFTS  No bypass grafts were visualized.   CONCLUSIONS:    Elevated filling pressures, continue diuresis, have DC'ed PROBNP 2,039 01/11/2020    PROBNP 2,940 01/08/2020    PROBNP 780 01/01/2020     Fasting Lipid Panel:    Lab Results   Component Value Date    CHOL 93 01/09/2020    HDL 31 01/09/2020    TRIG 87 01/09/2020     Cardiac Enzymes:  CK/MbTroponin  Lab Results   Component Value Date    CKTOTAL 97 01/02/2020    TROPONINI 0.07 01/08/2020     PT/ INR   Lab Results   Component Value Date    INR 2.04 01/02/2020    INR 4.54 01/02/2020    INR 1.56 01/02/2020    PROTIME 23.8 01/02/2020    PROTIME 53.5 01/02/2020    PROTIME 18.2 01/02/2020     PTT No results found for: PTT   Lab Results   Component Value Date    MG 2.00 01/16/2020    No results found for: TSH    All labs and imaging reviewed today    Assessment:  Acute on chronic diastolic CHF: improved, -7.8S, -18 lbs  CAD: no current angina; moderate-severe on angiogram 1/13/2020; s/p reported history of PCI  Aortic stenosis: moderate-severe; s/p bioprosthetic AVR 2011 and 2003  Paroxysmal atrial fibrillation: rate controlled   - [id number] score >2   -  not on AC due to recent anemia and hemothorax  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 Continue po lasix  2. Discussed discontinuing amiodarone with CT surgery given that he is not anticoagulated. Continue for now per CT surgery  3. Continue aspirin, statin, carvedilol  4. No anticoagulation due to recent anemia and hemothorax  5. CT surgery planning aortic root reconstruction with AVR next week  6.  Cardiology will follow peripherally, please call with questions    Cherisse Goldberg, MIKO-MIKE  [address]  [phone]

## 2020-01-16 NOTE — PLAN OF CARE
Patient's EF (Ejection Fraction) is greater than 40%    Heart Failure Medications:  Diuretics[de-identified] Furosemide, Torsemide, Spironolactone, Metalozone, Other, and None  ACE[de-identified] Lisinopril, Enalapril,  Ramipril, Other, and None  ARB[de-identified] Valsartan, Losartan, Other, and None  ARNI[de-identified] None  Evidenced Based Beta Blocker[de-identified] Metoprolol SUCCinate- Toprol XL, Carvedilol- Coreg, Bisoprolol, and None    Patient's weights and intake/output reviewed: Yes    Patient's Last Weight: 186.9 lbs obtained by standing scale. Difference of 1.5 lbs less than last documented weight. Intake/Output Summary (Last 24 hours) at 1/16/2020 6152  Last data filed at 1/16/2020 0801  Gross per 24 hour   Intake 1420 ml   Output 1700 ml   Net -280 ml       Comorbidities Reviewed No    Patient has a past medical history of CAD (coronary artery disease), COPD (chronic obstructive pulmonary disease) ([facility] [address] Diabetes mellitus ([facility] [address] [address] Gout, Hyperlipidemia, Hypertension, and Thyroid disease. >>For CHF and Comorbidity documentation on Education Time and Topics, please see Education Tab    Patient stated Daily Functional Goal: (Note:help the patient identify a challenging but achievable goal per shift that can aid in progression towards increased functional capacity at discharge ie sit in the chair for x amount of time, Decrease walk time by x seconds, stand or walk with minimal assistance, decrease pain to a level of x/10, etc)   Pt sitting in bed at this time on 3.5 L O2. Pt with complaints of shortness of breath. Pt with pitting lower extremity edema.      Patient and/or Family's stated Goal of Care this Admission: reduce shortness of breath, increase activity tolerance, better understand heart failure and disease management, be more comfortable, and reduce lower extremity edema prior to discharge

## 2020-01-16 NOTE — PROGRESS NOTES
Cardiac, V[facility] and Thoracic Surgeons  Progress Note    1/16/2020 12:04 PM  Surgeon [address]    Chief complaint: Congestive heart failure  Patient is known to the service severe aortic stenosis, complex coronary artery disease  Bioprosthetic aortic valve replacement twice most recent transesophageal echo showed questionable aortic root or outflow track fistula to the left atrium  Vital Signs: BP (!) 102/59   Pulse 66   Temp 97.8 °F (36.6 °C) (Oral)   Resp 17   Ht 5' 10\" (1.778 m)   Wt 186 lb 9.6 oz (84.6 kg)   SpO2 98%   BMI 26.77 kg/m²  O2 Flow Rate (L/min): (pt found on RA)     I/O:      Intake/Output Summary (Last 24 hours) at 1/16/2020 1204  Last data filed at 1/16/2020 1108  Gross per 24 hour   Intake 1660 ml   Output 1800 ml   Net -140 ml       Exam:   Cardiovascular: S1 plus S2 +0 no additional sound  Pulmonary: Bilaterally clear no additional sound    Summary   Left ventricular systolic function is normal with an estimated ejection   fraction of 50-55%. A bioprosthetic aortic valve appears thickened with fusing of the right and   non coronary cusps. Moderate to severe AS. Eccentric color flow seen in the left atrium could possibly represent a   coronary/aortic/cameral fistula. There is no evidence of mass or thrombus in the left atrium or appendage. Mild mitral regurgitation. Moderate tricuspid regurgitation. CORONARY ARTERIES     LM Calcified, proximal less than 10% stenosis, 25% mid-distal stenosis.       LAD Calcified, [id number] 30% stenosis.       LCX  large vessel, codominant, proximal-mid 40 to 50% stenosis. There is a mid vessel stent which is widely patent with less than 20 to 30% in-stent restenosis.     OM1 is a small vessel with a proximal-mid 90% stenosis. There are 4 additional OM branches that are small to medium in size. OM 2 is the largest OM branch and this vessel has proximal less than 10% stenosis, mid 90% stenosis.   OM 3, 4 and 5 has diffuse proximal-mid 60 to 70% stenosis.       RCA  Small vessel, appears to be 100% chronic total occlusion at the ostium. There are left to right collaterals noted. Vessel is only seen in retrograde filling.          Labs:   CBC:   Recent Labs     01/14/20  0502   WBC 13.3*   HGB 10.2*   HCT 30.4*   MCV 93.9        BMP:   Recent Labs     01/14/20  0502 01/15/20  0522 01/16/20  0451   * 134* 137   K 3.2*  3.2* 3.2* 3.8   CL 89* 89* 91*   CO2 36* 35* 35*   BUN 21* 20 17   CREATININE 0.7* 0.6* 0.7*     Scheduled Meds:    insulin lispro  3 Units Subcutaneous Daily before lunch    furosemide  40 mg Oral BID    carvedilol  3.125 mg Oral BID WC    sodium chloride flush  10 mL Intravenous 2 times per day    insulin glargine  10 Units Subcutaneous Nightly    insulin lispro  0-12 Units Subcutaneous TID WC    insulin lispro  0-6 Units Subcutaneous Nightly    ipratropium-albuterol  1 ampule Inhalation BID    amiodarone  200 mg Oral Daily    aspirin  81 mg Oral Daily    allopurinol  300 mg Oral Daily    atorvastatin  40 mg Oral Daily    nicotine  1 patch Transdermal Daily    sodium chloride flush  10 mL Intravenous 2 times per day     Continuous Infusions:    dextrose           Patient Active Problem List   Diagnosis    Hemothorax    [facility] with [facility]emia [facility]    Tobacco abuse    AS (aortic stenosis)    CAD (coronary artery disease) s/p stent in 2010    HTN (hypertension)    Hyperlipidemia    S/P AVR (aortic valve replacement)    Hypotension    Pulmonary infiltrate    Pulmonary venous congestion    Atelectasis    Leukocytosis    Hyperglycemia    Acute bronchospasm    Atrial fibrillation with RVR ([facility]    Cardiogenic shock ([facility])    Acute on chronic diastolic congestive heart failure ([facility])     Echo: 1/13/20   Conclusions   Summary   Left ventricular systolic function is normal with an estimated ejection   fraction of 50-55%.    A bioprosthetic aortic valve appears patients include individuals with a history or smoking or known   risk factors.       Radiology 2017 http://pubs. rsna.org/doi/full/10.1148/radiol. 1588671174     Pre-operative testing:  PFT - pending 1/16     Assessment/Plan:   Severe aortic stenosis, 2nd bioprosthetic valve, aorta with left atrial fistula:    Continue with pre-op testing and risk stratification  Will plan for surgical intervention next week (aortic root and AVR) 1/21/20. Have discussed surgical risks with patient and his daughter. They are planning on talking more at length and letting us know for sure if they want to proceed. Humphrey Styles, APRN-CNP      __________________________________________________________________________________________  77year-old Severe aortic stenosis, s/p sternotomy/AVR x 2, s/p recent emergent left thoracotomy  Family discussion with daughter Jatinder Fan took place today; Mr Lois Handy becomes confused at times, and his daughter is making medical decisions. After explaining the intended procedure in detail, the daughter has expressed understanding and would like to proceed with surgery. Tentative surgery date 1/21 with Dr. Lennox Azul. All questions answered.     Karthikeyan Harris MD  Cardiothoracic Surgery

## 2020-01-16 NOTE — PROGRESS NOTES
Hospitalist Progress Note      PCP: No primary care provider on file. Date of Admission: 1/8/2020    Chief Complaint:  SOB     History Of Present Illness:    \"69 y.o. male who presented to [facility] with SOB. Patient was discharged from [facility] for pneumonia and a large L hemothorax. He was discharged home much improved and felt well yesterday. However, overnight, patient became increasingly SOB. This worsened this AM and presented to OSH. Patient has known severe aortic stenosis and is being evaluated by CT surgery as an outpatient for repair of his bioprosthetic valve. At OSH, he was found to be having acute heart failure and mild cardiogenic shock. He was started on low dose dobutamine with good response and transferred to [facility]. \"      Subjective:  He feels well and looks well. No chest pain. Down 19 lbs since admission.        Medications:  Reviewed    Infusion Medications    dextrose       Scheduled Medications    insulin lispro  3 Units Subcutaneous Daily before lunch    furosemide  40 mg Oral BID    carvedilol  3.125 mg Oral BID WC    sodium chloride flush  10 mL Intravenous 2 times per day    insulin glargine  10 Units Subcutaneous Nightly    insulin lispro  0-12 Units Subcutaneous TID WC    insulin lispro  0-6 Units Subcutaneous Nightly    ipratropium-albuterol  1 ampule Inhalation BID    amiodarone  200 mg Oral Daily    aspirin  81 mg Oral Daily    allopurinol  300 mg Oral Daily    atorvastatin  40 mg Oral Daily    nicotine  1 patch Transdermal Daily    sodium chloride flush  10 mL Intravenous 2 times per day     PRN Meds: sodium chloride flush, acetaminophen, magnesium hydroxide, glucose, dextrose, glucagon (rDNA), dextrose, traMADol, potassium chloride **OR** potassium alternative oral replacement **OR** potassium chloride, magnesium sulfate, prochlorperazine, perflutren lipid microspheres, sodium chloride flush, magnesium hydroxide      Intake/Output Summary (Last 24 hours) at 1/16/2020 0919  Last data filed at 1/16/2020 0801  Gross per 24 hour   Intake 1420 ml   Output 1700 ml   Net -280 ml       Physical Exam Performed:    /64   Pulse 69   Temp 97.4 °F (36.3 °C) (Oral)   Resp 18   Ht 5' 10\" (1.778 m)   Wt 186 lb 9.6 oz (84.6 kg)   SpO2 99%   BMI 26.77 kg/m²     General appearance: No apparent distress, appears stated age and cooperative. HEENT: Pupils equal, round, and reactive to light. Conjunctivae/corneas clear. Neck: Supple, with full range of motion. No jugular venous distention. Trachea midline. Respiratory:  Normal respiratory effort. Prominent bilateral rhonchi and wheezing have resolved. Bibasilar rales are present. Mucus no longer heard gurgling in large airways. Cardiovascular: tachycardic rate and rhythm with without rubs or gallops. 3/6 systolic murmur at the RUSB. Abdomen: Soft, non-tender, non-distended with normal bowel sounds. Musculoskeletal: No clubbing, cyanosis or edema bilaterally. Full range of motion without deformity. Skin: Skin color, texture, turgor normal.  No rashes or lesions. Neurologic:  Neurovascularly intact without any focal sensory/motor deficits. Cranial nerves: II-XII intact, grossly non-focal.  Psychiatric: Alert and oriented, thought content appropriate, normal insight  Capillary Refill: Brisk,< 3 seconds   Peripheral Pulses: +2 palpable, equal bilaterally       Labs:   Recent Labs     01/14/20  0502   WBC 13.3*   HGB 10.2*   HCT 30.4*        Recent Labs     01/14/20  0502 01/15/20  0522 01/16/20  0451   * 134* 137   K 3.2*  3.2* 3.2* 3.8   CL 89* 89* 91*   CO2 36* 35* 35*   BUN 21* 20 17   CREATININE 0.7* 0.6* 0.7*   CALCIUM 8.2* 8.8 9.0     No results for input(s): AST, ALT, BILIDIR, BILITOT, ALKPHOS in the last 72 hours. No results for input(s): INR in the last 72 hours. No results for input(s): Juanito Win in the last 72 hours.     Urinalysis:    No results found for: NITRU, [address], BACTERIA, discharged home much improved and felt well yesterday. However, overnight, patient became increasingly SOB. This worsened this AM and presented to OSH. Patient has known severe aortic stenosis and is being evaluated by CT surgery as an outpatient for repair of his bioprosthetic valve. At OSH, he was found to be having acute heart failure and mild cardiogenic shock. He was started on low dose dobutamine with good response and transferred to [facility]. \"      Acute on chronic diastolic CHF, related to moderate to severe bioprosthetic AS, with cardiogenic shock diagnosed on admission  - furosemide started IV on admission.    - dobutamine gtt started at OSH. Cardiology added nitroprusside gtt. Both weaned off 1/13.  - CT surgery consulted regarding the aortic valve. He says he had a porcine valve put in 20 years ago, then exchanged for a bovine valve 8 years ago. It was severely stenosed per 1/2 TTE, then repeat TTE on this admission showed that it was moderately stenosed. - had ZOEY and LHC 1/13. Ultimately it was decided that the AV would be replaced when they reconstruct his aortic root. Cameral fistula involving the L atrium  - seen on ZOEY, confirmed on CTA. CT surgery planning aortic root replacement and fistula repair during this hospital stay on 1/21. Paroxysmal Afib with RVR  - held carvedilol initially, then restarted at low dose   - amiodarone per cardiology. - not on anticoagulation due to recent hemothorax. Continue aspirin. AECOPD  - completed course of steroids, continue inhaled bronchodilators. - completed a course of vanc and cefepime last admission. S/p bronchoscopy 1/10 - the BAL grew pseudomonas fluorescens, but the patient recently completed a course of cefepime and clinically appeared to be doing well without further abx.    - he quit smoking during the last admission, had been smoking 2 PPD.     Acute hypoxic respiratory failure  - due to above issues, treated

## 2020-01-17 LAB
GLUCOSE BLD-MCNC: 140 MG/DL (ref 70–99)
GLUCOSE BLD-MCNC: 164 MG/DL (ref 70–99)
GLUCOSE BLD-MCNC: 270 MG/DL (ref 70–99)
GLUCOSE BLD-MCNC: 93 MG/DL (ref 70–99)
PERFORMED ON: ABNORMAL
PERFORMED ON: NORMAL
PRO-BNP: 524 PG/ML (ref 0–124)

## 2020-01-17 PROCEDURE — 94640 AIRWAY INHALATION TREATMENT: CPT

## 2020-01-17 PROCEDURE — 36415 COLL VENOUS BLD VENIPUNCTURE: CPT

## 2020-01-17 PROCEDURE — 97535 SELF CARE MNGMENT TRAINING: CPT

## 2020-01-17 PROCEDURE — 6370000000 HC RX 637 (ALT 250 FOR IP): Performed by: INTERNAL MEDICINE

## 2020-01-17 PROCEDURE — 97530 THERAPEUTIC ACTIVITIES: CPT

## 2020-01-17 PROCEDURE — 83880 ASSAY OF NATRIURETIC PEPTIDE: CPT

## 2020-01-17 PROCEDURE — 2580000003 HC RX 258: Performed by: INTERNAL MEDICINE

## 2020-01-17 PROCEDURE — 97110 THERAPEUTIC EXERCISES: CPT

## 2020-01-17 PROCEDURE — 6370000000 HC RX 637 (ALT 250 FOR IP): Performed by: NURSE PRACTITIONER

## 2020-01-17 PROCEDURE — 2060000000 HC ICU INTERMEDIATE R&B

## 2020-01-17 PROCEDURE — 99024 POSTOP FOLLOW-UP VISIT: CPT | Performed by: THORACIC SURGERY (CARDIOTHORACIC VASCULAR SURGERY)

## 2020-01-17 RX ORDER — IPRATROPIUM BROMIDE AND ALBUTEROL SULFATE 2.5; .5 MG/3ML; MG/3ML
1 SOLUTION RESPIRATORY (INHALATION) EVERY 4 HOURS PRN
Status: DISCONTINUED | OUTPATIENT
Start: 2020-01-17 | End: 2020-01-21

## 2020-01-17 RX ADMIN — Medication 10 ML: at 22:09

## 2020-01-17 RX ADMIN — CARVEDILOL 3.12 MG: 3.12 TABLET, FILM COATED ORAL at 17:06

## 2020-01-17 RX ADMIN — ASPIRIN 81 MG 81 MG: 81 TABLET ORAL at 09:20

## 2020-01-17 RX ADMIN — IPRATROPIUM BROMIDE AND ALBUTEROL SULFATE 1 AMPULE: .5; 3 SOLUTION RESPIRATORY (INHALATION) at 08:48

## 2020-01-17 RX ADMIN — FUROSEMIDE 40 MG: 40 TABLET ORAL at 09:20

## 2020-01-17 RX ADMIN — Medication 10 ML: at 09:20

## 2020-01-17 RX ADMIN — Medication 10 ML: at 09:21

## 2020-01-17 RX ADMIN — ALLOPURINOL 300 MG: 300 TABLET ORAL at 09:20

## 2020-01-17 RX ADMIN — INSULIN LISPRO 3 UNITS: 100 INJECTION, SOLUTION INTRAVENOUS; SUBCUTANEOUS at 12:52

## 2020-01-17 RX ADMIN — FUROSEMIDE 40 MG: 40 TABLET ORAL at 22:08

## 2020-01-17 RX ADMIN — INSULIN LISPRO 1 UNITS: 100 INJECTION, SOLUTION INTRAVENOUS; SUBCUTANEOUS at 22:08

## 2020-01-17 RX ADMIN — AMIODARONE HYDROCHLORIDE 200 MG: 200 TABLET ORAL at 09:20

## 2020-01-17 RX ADMIN — INSULIN LISPRO 6 UNITS: 100 INJECTION, SOLUTION INTRAVENOUS; SUBCUTANEOUS at 12:52

## 2020-01-17 RX ADMIN — TRAMADOL HYDROCHLORIDE 50 MG: 50 TABLET, FILM COATED ORAL at 11:58

## 2020-01-17 RX ADMIN — INSULIN GLARGINE 10 UNITS: 100 INJECTION, SOLUTION SUBCUTANEOUS at 22:08

## 2020-01-17 RX ADMIN — ATORVASTATIN CALCIUM 40 MG: 40 TABLET, FILM COATED ORAL at 09:20

## 2020-01-17 ASSESSMENT — PAIN SCALES - GENERAL
PAINLEVEL_OUTOF10: 0
PAINLEVEL_OUTOF10: 0
PAINLEVEL_OUTOF10: 8
PAINLEVEL_OUTOF10: 0
PAINLEVEL_OUTOF10: 0
PAINLEVEL_OUTOF10: 5

## 2020-01-17 ASSESSMENT — PAIN DESCRIPTION - LOCATION
LOCATION: BACK
LOCATION: BACK

## 2020-01-17 ASSESSMENT — PAIN DESCRIPTION - ORIENTATION: ORIENTATION: LEFT

## 2020-01-17 ASSESSMENT — PAIN DESCRIPTION - DESCRIPTORS: DESCRIPTORS: BURNING

## 2020-01-17 NOTE — PLAN OF CARE
diabetes and insulin administration.         Goal: Patient will achieve/maintain normal respiratory rate/effort  Description  Respiratory rate and effort will be within normal limits for the patient  Outcome: Ongoing     Problem: HEMODYNAMIC STATUS  Goal: Patient has stable vital signs and fluid balance  Outcome: Ongoing     Problem: FLUID AND ELECTROLYTE IMBALANCE  Goal: Fluid and electrolyte balance are achieved/maintained  Outcome: Ongoing     Problem: Discharge Planning:  Goal: Discharged to appropriate level of care  Description  Discharged to appropriate level of care  Outcome: Ongoing     Problem: Serum Glucose Level - Abnormal:  Goal: Ability to maintain appropriate glucose levels will improve  Description  Ability to maintain appropriate glucose levels will improve  Outcome: Ongoing     Problem: Sensory Perception - Impaired:  Goal: Ability to maintain a stable neurologic state will improve  Description  Ability to maintain a stable neurologic state will improve  Outcome: Ongoing

## 2020-01-17 NOTE — PROGRESS NOTES
Hospitalist Progress Note      PCP: No primary care provider on file. Date of Admission: 1/8/2020    Chief Complaint:  SOB     History Of Present Illness:    \"69 y.o. male who presented to [facility] with SOB. Patient was discharged from [facility] for pneumonia and a large L hemothorax. He was discharged home much improved and felt well yesterday. However, overnight, patient became increasingly SOB. This worsened this AM and presented to OSH. Patient has known severe aortic stenosis and is being evaluated by CT surgery as an outpatient for repair of his bioprosthetic valve. At OSH, he was found to be having acute heart failure and mild cardiogenic shock. He was started on low dose dobutamine with good response and transferred to [facility]. \"      Subjective:  He feels well and looks well. No chest pain. No dyspnea. Surgery next week.        Medications:  Reviewed    Infusion Medications    dextrose       Scheduled Medications    insulin lispro  3 Units Subcutaneous Daily before lunch    furosemide  40 mg Oral BID    carvedilol  3.125 mg Oral BID WC    sodium chloride flush  10 mL Intravenous 2 times per day    insulin glargine  10 Units Subcutaneous Nightly    insulin lispro  0-12 Units Subcutaneous TID WC    insulin lispro  0-6 Units Subcutaneous Nightly    ipratropium-albuterol  1 ampule Inhalation BID    amiodarone  200 mg Oral Daily    aspirin  81 mg Oral Daily    allopurinol  300 mg Oral Daily    atorvastatin  40 mg Oral Daily    nicotine  1 patch Transdermal Daily    sodium chloride flush  10 mL Intravenous 2 times per day     PRN Meds: sodium chloride flush, acetaminophen, magnesium hydroxide, glucose, dextrose, glucagon (rDNA), dextrose, traMADol, potassium chloride **OR** potassium alternative oral replacement **OR** potassium chloride, magnesium sulfate, prochlorperazine, perflutren lipid microspheres, sodium chloride flush, magnesium hydroxide      Intake/Output Summary (Last 24 hours) at 1/17/2020 1017  Last data filed at 1/17/2020 0239  Gross per 24 hour   Intake 1320 ml   Output 1600 ml   Net -280 ml       Physical Exam Performed:    BP (!) 91/45   Pulse 54   Temp 97.5 °F (36.4 °C) (Oral)   Resp 18   Ht 5' 10\" (1.778 m)   Wt 187 lb 9.6 oz (85.1 kg)   SpO2 100%   BMI 26.92 kg/m²     General appearance: No apparent distress, appears stated age and cooperative. HEENT: Pupils equal, round, and reactive to light. Conjunctivae/corneas clear. Neck: Supple, with full range of motion. No jugular venous distention. Trachea midline. Respiratory:  Normal respiratory effort. Prominent bilateral rhonchi and wheezing have resolved. Bibasilar rales are present. Mucus no longer heard gurgling in large airways. Cardiovascular: tachycardic rate and rhythm with without rubs or gallops. 3/6 systolic murmur at the RUSB. Abdomen: Soft, non-tender, non-distended with normal bowel sounds. Musculoskeletal: No clubbing, cyanosis or edema bilaterally. Full range of motion without deformity. Skin: Skin color, texture, turgor normal.  No rashes or lesions. Neurologic:  Neurovascularly intact without any focal sensory/motor deficits. Cranial nerves: II-XII intact, grossly non-focal.  Psychiatric: Alert and oriented, thought content appropriate, normal insight  Capillary Refill: Brisk,< 3 seconds   Peripheral Pulses: +2 palpable, equal bilaterally       Labs:   No results for input(s): WBC, HGB, HCT, PLT in the last 72 hours. Recent Labs     01/15/20  0522 01/16/20  0451   * 137   K 3.2* 3.8   CL 89* 91*   CO2 35* 35*   BUN 20 17   CREATININE 0.6* 0.7*   CALCIUM 8.8 9.0     No results for input(s): AST, ALT, BILIDIR, BILITOT, ALKPHOS in the last 72 hours. No results for input(s): INR in the last 72 hours. No results for input(s): Sy Abad in the last 72 hours.     Urinalysis:    No results found for: Gualberto Francois [facility] [address], [address] [facility] [address] [facility], [facility]    Radiology:  [facility] overnight, patient became increasingly SOB. This worsened this AM and presented to OSH. Patient has known severe aortic stenosis and is being evaluated by CT surgery as an outpatient for repair of his bioprosthetic valve. At OSH, he was found to be having acute heart failure and mild cardiogenic shock. He was started on low dose dobutamine with good response and transferred to [facility]. \"      Acute on chronic diastolic CHF, related to moderate to severe bioprosthetic AS, with cardiogenic shock diagnosed on admission  - furosemide started IV on admission.    - dobutamine gtt started at OSH. Cardiology added nitroprusside gtt. Both weaned off 1/13.  - CT surgery consulted regarding the aortic valve. He says he had a porcine valve put in 20 years ago, then exchanged for a bovine valve 8 years ago. It was severely stenosed per 1/2 TTE, then repeat TTE on this admission showed that it was moderately stenosed. - had ZOEY and LHC 1/13. Ultimately it was decided that the AV would be replaced when they reconstruct his aortic root. Cameral fistula involving the L atrium  - seen on ZOEY, confirmed on CTA. CT surgery planning aortic root replacement and fistula repair during this hospital stay on 1/21. Paroxysmal Afib with RVR  - held carvedilol initially, then restarted at low dose   - amiodarone per cardiology. - not on anticoagulation due to recent hemothorax. Continue aspirin. AECOPD  - completed course of steroids, continue inhaled bronchodilators. - completed a course of vanc and cefepime last admission. S/p bronchoscopy 1/10 - the BAL grew pseudomonas fluorescens, but the patient recently completed a course of cefepime and clinically appeared to be doing well without further abx.    - he quit smoking during the last admission, had been smoking 2 PPD. Acute hypoxic respiratory failure  - due to above issues, treated accordingly. Elevated troponin  - likely demand ischemia due to above issues. Cardiology consulted. Aspirin as above. DM2  - resume glipizide upon discharge. SSI while here on steroids. A1c only 5, in the setting of recent major blood loss. Muscular deconditioning, mechanical falls  - PT/OT. - he says he had syncope a couple months ago while having a BM. This was presumably vasovagal.  Discussed with CT surgery, normal B12, head CT normal, TSH wnl. DVT Prophylaxis: SCDs  Diet: DIET CARDIAC;  Code Status: Full Code    PT/OT Eval Status: rec'd SNF    Dispo - perhaps 1/26, pending post-op course. He went home with [place] [id number] last time.        Hari Valdes MD

## 2020-01-17 NOTE — PROCEDURES
[facility]                               PULMONARY FUNCTION    PATIENT NAME: Tobias Desir                          :        1950  MED REC NO:   [id number]                          ROOM:       0422  ACCOUNT NO:   [de-identified]                           ADMIT DATE: 2020  PROVIDER:     Jose Daniel Candelario MD    DATE OF PROCEDURE:  2020    PFT INTERPRETATION    The patient is a 20-year-old male who underwent a PFT for preop CABG. Spirometry shows FVC to be 70%, FEV1 to be 72%, FEV1 to FVC ratio was  103%, FEF 25-75% was 74%. The patient did not have any significant  postbronchodilator improvement. Lung volume shows the total lung  capacity was markedly reduced at 35%. The patient has air trapping. The patient also has decrease in ERV, which may be because of body  habitus. The patient's diffusion capacity when adjusted for volume was  normal.  The patient's flow volume loop was suggestive of restrictive  pattern. On the basis of this PFT, the patient has severe restrictive  lung disease. Along with that, the patient has mild obstructive airway  disease. Please correlate clinically.         Yahaira Colorado MD    D: 2020 17:09:55       T: 2020 20:14:19     [id number]  Job#: [id number]     Doc#: [id number]    CC:

## 2020-01-17 NOTE — PROGRESS NOTES
RESPIRATORY THERAPY ASSESSMENT    Name:  Kelvin Lee  Medical Record Number:  [id number]  Age: 77 y.o. Gender: male  : 1950  Today's Date:  2020  Room:  0422/0422-01    Assessment     Is the patient being admitted for a COPD or Asthma exacerbation? No   (If yes the patient will be seen every 4 hours for the first 24 hours and then reassessed)    Patient Admission Diagnosis      Allergies  Allergies   Allergen Reactions    Penicillins Hives       Minimum Predicted Vital Capacity:     1035          Actual Vital Capacity:      500              Pulmonary History:COPD  Home Oxygen Therapy:  room air  Home Respiratory Therapy:None   Current Respiratory Therapy:  Duoneb txs BID  Treatment Type: HHN  Medications: Albuterol/Ipratropium    Respiratory Severity Index(RSI)   Patients with orders for inhalation medications, oxygen, or any therapeutic treatment modality will be placed on Respiratory Protocol. They will be assessed with the first treatment and at least every 72 hours thereafter. The following severity scale will be used to determine frequency of treatment intervention. Smoking History: Pulmonary Disease or Smoking History, Greater than 15 pack year = 2    Social History  Social History     Tobacco Use    Smoking status: Current Every Day Smoker     Packs/day: 2.00     Types: Cigarettes    Smokeless tobacco: Current User   Substance Use Topics    Alcohol use:  Yes     Alcohol/week: 6.0 standard drinks     Types: 6 Cans of beer per week     Frequency: 4 or more times a week     Drinks per session: 5 or 6     Binge frequency: Daily or almost daily    Drug use: Not Currently       Recent Surgical History: None = 0  Past Surgical History  Past Surgical History:   Procedure Laterality Date    BRONCHOSCOPY N/A 2020    BRONCHOSCOPY ALVEOLAR LAVAGE performed by Roma Taveras MD at [facility] CABG [address] [place] [address] 12/07/2010    NURIS- 4.0 x 28 to Cx    THORACOTOMY Left 1/2/2020    EMERGENT THORACOTOMY, EVACUATION OF HEMOTOMA CHEST WALL HEMOSTASIS, PLEUREAL BIOPSY, LEFT UPPER LOBE RESECTION performed by Liz Mary MD at [address]       Level of Consciousness: Alert, Oriented, and Cooperative = 0    Level of Activity: Mostly sedentary, minimal walking = 2    Respiratory Pattern: Regular Pattern; RR 8-20 = 0    Breath Sounds: Diminshed bilaterally and/or crackles = 2    Sputum  Sputum Color: Unable to assess, Tenacity: Thin, Sputum How Obtained: Spontaneous cough  Cough: Strong, spontaneous, non-productive = 0    Vital Signs   BP 98/62   Pulse 67   Temp 97.5 °F (36.4 °C) (Oral)   Resp 18   Ht 5' 10\" (1.778 m)   Wt 187 lb 9.6 oz (85.1 kg)   SpO2 94%   BMI 26.92 kg/m²   SPO2 (COPD values may differ): Greater than or equal to 92% on room air = 0    Peak Flow (asthma only): not applicable = 0    RSI: 5-6 = Q4hr PRN (every four hours as needed) for dyspnea        Plan       Goals: medication delivery and improve oxygenation    Patient/caregiver was educated on the proper method of use for Respiratory Care Devices:  Yes      Level of patient/caregiver understanding able to:   ? Verbalize understanding   ? Demonstrate understanding       ? Teach back        ? Needs reinforcement       ? No available caregiver               ? Other:     Response to education:  Good     Is patient being placed on Home Treatment Regimen? No     Does the patient have everything they need prior to discharge? NA     Comments: chart reviewed and pt assessed    Plan of Care: change Duoneb txs to prn    Electronically signed by Mariah Flannery RCP on 1/17/2020 at 4:55 PM    Respiratory Protocol Guidelines     1. Assessment and treatment by Respiratory Therapy will be initiated for medication and therapeutic interventions upon initiation of aerosolized medication.   2. Physician will be contacted for respiratory rate (RR) greater than 35 breaths per minute. Therapy will be held for heart rate (HR) greater than 140 beats per minute, pending direction from physician. 3. Bronchodilators will be administered via Metered Dose Inhaler (MDI) with spacer when the following criteria are met:  a. Alert and cooperative     b. HR < 140 bpm  c. RR < 30 bpm                d. Can demonstrate a 2-3 second inspiratory hold  4. Bronchodilators will be administered via Hand Held Nebulizer [facility]) to patients when ANY of the following criteria are met  a. Incognizant or uncooperative          b. Patients treated with HHN at Home        c. Unable to demonstrate proper use of MDI with spacer     d. RR > 30 bpm   5. Bronchodilators will be delivered via Metered Dose Inhaler (MDI), HHN, Aerogen to intubated patients on mechanical ventilation. 6. Inhalation medication orders will be delivered and/or substituted as outlined below. Aerosolized Medications Ordering and Administration Guidelines:    1. All Medications will be ordered by a physician, and their frequency and/or modality will be adjusted as defined by the patients Respiratory Severity Index (RSI) score. 2. If the patient does not have documented COPD, consider discontinuing anticholinergics when RSI is less than 9.  3. If the bronchospasm worsens (increased RSI), then the bronchodilator frequency can be increased to a maximum of every 4 hours. If greater than every 4 hours is required, the physician will be contacted. 4. If the bronchospasm improves, the frequency of the bronchodilator can be decreased, based on the patient's RSI, but not less than home treatment regimen frequency. 5. Bronchodilator(s) will be discontinued if patient has a RSI less than 9 and has received no scheduled or as needed treatment for 72  Hrs. Patients Ordered on a Mucolytic Agent:    1. Must always be administered with a bronchodilator.     2. Discontinue if patient experiences worsened bronchospasm, or secretions have lessened to the point that the patient is able to clear them with a cough. Anti-inflammatory and Combination Medications:    1. If the patient lacks prior history of lung disease, is not using inhaled anti-inflammatory medication at home, and lacks wheezing by examination or by history for at least 24 hours, contact physician for possible discontinuation.

## 2020-01-17 NOTE — PROGRESS NOTES
Perfect Serve to Gemini Sahni NP:  \"patient more alert- pointing at his stomach and requesting zantac.  Thanks\"

## 2020-01-17 NOTE — FLOWSHEET NOTE
01/17/20 1600   Assessment   Charting Type Reassessment   Neurological   Neuro (WDL) WDL   Orientation Level Oriented X4;Oriented to place;Oriented to time;Oriented to situation;Oriented to person   Cognition Appropriate judgement; Appropriate attention/concentration; Appropriate safety awareness; Appropriate for developmental age; Follows commands   Language Appropriate for developmental age;Clear   Size R Pupil (mm) 3   R Pupil Shape Round   R Pupil Reaction Brisk   Size L Pupil (mm) 3   L Pupil Shape Round   L Pupil Reaction Brisk   R Hand  Strong   L Hand  Strong   R Foot Dorsiflexion Strong   L Foot Dorsiflexion Strong   R Foot Plantar Flexion Strong   L Foot Plantar Flexion Strong   RUE Motor Response Responds to command;Normal extension;Normal flexion   LUE Motor Response Responds to command;Normal extension;Normal flexion   RLE Motor Response Responds to command;Normal extension;Normal flexion   LLE Motor Response Responds to command;Normal extension;Normal flexion   Gag Present   Bernadette Coma Scale   Eye Opening 4   Best Verbal Response 5   Best Motor Response 6   Bernadette Coma Scale Score 15   HEENT   HEENT (WDL) X   Right Eye Impaired vision; Intact   Left Eye Impaired vision; Intact   Teeth Dentures upper;Dentures lower   Respiratory   Respiratory (WDL) WDL   Respiratory Pattern Regular   Respiratory Depth Normal   Respiratory Quality/Effort Unlabored;Dyspnea with exertion   Chest Assessment Chest expansion symmetrical;Trachea midline   L Breath Sounds Clear   R Breath Sounds Clear   Breath Sounds   Right Upper Lobe Clear   Right Middle Lobe Clear   Right Lower Lobe Clear   Left Upper Lobe Clear   Left Lower Lobe Clear   Cough/Sputum   Cough Non-productive   Cardiac   Cardiac (WDL) X   Cardiac Regularity Regular   Heart Sounds S1, S2   Cardiac Rhythm NSR   Ectopy PVC   Ectopy Frequency Occasional   Cardiac Monitor   Telemetry Monitor On Yes   Telemetry Audible Yes   Telemetry Alarms Set Yes Telemetry Box Number 52   Telemetry Monitor Alarm Parameters    Gastrointestinal   Abdominal (WDL) WDL   RUQ Bowel Sounds Active   LUQ Bowel Sounds Active   RLQ Bowel Sounds Active   LLQ Bowel Sounds Active   Abdomen Inspection Rounded; Soft   Tenderness Soft; No guarding;Nontender; No rebound   Peripheral Vascular   Peripheral Vascular (WDL) WDL   RLE Edema None   LLE Edema None   Sensation RUE Full sensation   Sensation LUE Full sensation   Sensation RLE Full sensation   Sensation LLE Full sensation   RUE Neurovascular Assessment   Capillary Refill Less than/equal to 3 seconds   Color Appropriate for ethnicity; Ecchymosis   Temperature Warm   R Radial Pulse +1   LUE Neurovascular Assessment   Capillary Refill Less than/equal to 3 seconds   Color Appropriate for ethnicity; Ecchymosis   Temperature Warm   L Radial Pulse +1   RLE Neurovascular Assessment   Capillary Refill Less than/equal to 3 seconds   Color Appropriate for ethnicity   Temperature Warm   R Post Tibial Pulse +1   R Pedal Pulse +1   LLE Neurovascular Assessment   Capillary Refill Less than/equal to 3 seconds   Color Appropriate for ethnicity   Temperature Warm   L Post Tibial Pulse +1   L Pedal Pulse +1   Skin Color/Condition   Skin Color/Condition (WDL) WDL   Skin Integrity   Skin Integrity (WDL) X   Skin Integrity Other (Comment)  (Surgical incision)   Location L flank   Preventative Dressing No   Musculoskeletal   Musculoskeletal (WDL) WDL   RL Extremity Full movement   LL Extremity Full movement   Genitourinary   Genitourinary (WDL) WDL   Flank Tenderness No   Suprapubic Tenderness No   Dysuria No   Urine Assessment   Incontinence No   Urine Color Yellow/straw   Urine Appearance Clear   Urine Odor No odor   Anus/Rectum   Anus/Rectum (WDL) WDL   Incision 01/08/20 Back Lateral;Left   Date First Assessed/Time First Assessed: 01/08/20 2000   Present on Hospital Admission: Yes  Primary Wound Type:  Incision  Location: Back  Wound Location Orientation: Lateral;Left   Wound Assessment Clean;Dry; Intact   Luz-wound Assessment Clean;Dry; Intact   Closure Approximated   Drainage Amount None   Odor None   Dressing/Treatment   ([place])   Incision 01/02/20 Chest Lateral;Left;Lower   Date First Assessed: 01/02/20   Present on Hospital Admission: No  Primary Wound Type: Incision  Location: Chest  Wound Location Orientation: Lateral;Left;Lower   Wound Assessment Other (Comment)  ([place])   Drainage Amount Scant   Drainage Description Yellow;Serous   Odor None   Dressing/Treatment Foam   Dressing Status Clean;Dry; Intact   Psychosocial   Psychosocial (WDL) WDL   Patient Behaviors Calm; Cooperative

## 2020-01-17 NOTE — PROGRESS NOTES
Positioning, Safety Education & Training, Balance Training, Patient/Caregiver Education & Training, Self-Care / ADL, Functional Mobility Training, Equipment Evaluation, Education, & procurement, Endurance Training    AM-PAC Score   AM-PAC Inpatient Daily Activity Raw Score: 24 (01/17/20 1043)  AM-PAC Inpatient ADL T-Scale Score : 57.54 (01/17/20 1043)  ADL Inpatient CMS 0-100% Score: 0 (01/17/20 1043)  ADL Inpatient CMS G-Code Modifier : [facility] (01/17/20 1043)    Goals  Short term goals  Time Frame for Short term goals: 1 week (1/17/2020)  Short term goal 1: Pt will complete toilet transfer with S. *1/16 goal met*  Short term goal 2: PT will complete bathroom mobility with S. *1/16 goal met*  Short term goal 3: Pt will complete 15-20 reps of BUE exercises for increased strength.  (1/14/20) *1/17 goal met*  Patient Goals   Patient goals : \"to go home\"       Therapy Time   Individual Concurrent Group Co-treatment   Time In 1011         Time Out 1050         Minutes 39         Timed Code Treatment Minutes: [address], [place]

## 2020-01-17 NOTE — FLOWSHEET NOTE
01/17/20 0830   Assessment   Charting Type Shift assessment   Neurological   Neuro (WDL) WDL   Orientation Level Oriented X4;Oriented to place;Oriented to time;Oriented to situation;Oriented to person   Cognition Appropriate judgement; Appropriate attention/concentration; Appropriate safety awareness; Appropriate for developmental age; Follows commands   Language Appropriate for developmental age;Clear   Size R Pupil (mm) 3   R Pupil Shape Round   R Pupil Reaction Brisk   Size L Pupil (mm) 3   L Pupil Shape Round   L Pupil Reaction Brisk   R Hand  Strong   L Hand  Strong   R Foot Dorsiflexion Strong   L Foot Dorsiflexion Strong   R Foot Plantar Flexion Strong   L Foot Plantar Flexion Strong   RUE Motor Response Responds to command;Normal extension;Normal flexion   LUE Motor Response Responds to command;Normal extension;Normal flexion   RLE Motor Response Responds to command;Normal extension;Normal flexion   LLE Motor Response Responds to command;Normal extension;Normal flexion   Gag Present   [place] Coma Scale   Eye Opening 4   Best Verbal Response 5   Best Motor Response 6   Bernadette Coma Scale Score 15   HEENT   HEENT (WDL) X   Right Eye Impaired vision; Intact   Left Eye Impaired vision; Intact   Teeth Dentures upper;Dentures lower   Respiratory   Respiratory (WDL) WDL   Respiratory Pattern Regular   Respiratory Depth Normal   Respiratory Quality/Effort Unlabored;Dyspnea with exertion   Chest Assessment Chest expansion symmetrical;Trachea midline   L Breath Sounds Clear   R Breath Sounds Clear   Breath Sounds   Right Upper Lobe Clear   Right Middle Lobe Clear   Right Lower Lobe Clear   Left Upper Lobe Clear   Left Lower Lobe Clear   Cough/Sputum   Cough Non-productive   Cardiac   Cardiac (WDL) X   Cardiac Regularity Regular   Heart Sounds S1, S2   Cardiac Rhythm NSR   Ectopy PVC   Ectopy Frequency Occasional   Cardiac Monitor   Telemetry Monitor On Yes   Telemetry Audible Yes   Telemetry Alarms Set Yes Telemetry Box Number 52   Telemetry Monitor Alarm Parameters    Gastrointestinal   Abdominal (WDL) WDL   RUQ Bowel Sounds Active   LUQ Bowel Sounds Active   RLQ Bowel Sounds Active   LLQ Bowel Sounds Active   Abdomen Inspection Rounded; Soft   Tenderness Soft; No guarding;Nontender; No rebound   Peripheral Vascular   Peripheral Vascular (WDL) WDL   RLE Edema None   LLE Edema None   Sensation RUE Full sensation   Sensation LUE Full sensation   Sensation RLE Full sensation   Sensation LLE Full sensation   RUE Neurovascular Assessment   Capillary Refill Less than/equal to 3 seconds   Color Appropriate for ethnicity; Ecchymosis   Temperature Warm   R Radial Pulse +1   LUE Neurovascular Assessment   Capillary Refill Less than/equal to 3 seconds   Color Appropriate for ethnicity; Ecchymosis   Temperature Warm   L Radial Pulse +1   RLE Neurovascular Assessment   Capillary Refill Less than/equal to 3 seconds   Color Appropriate for ethnicity   Temperature Warm   R Post Tibial Pulse +1   R Pedal Pulse +1   LLE Neurovascular Assessment   Capillary Refill Less than/equal to 3 seconds   Color Appropriate for ethnicity   Temperature Warm   L Post Tibial Pulse +1   L Pedal Pulse +1   Skin Color/Condition   Skin Color/Condition (WDL) WDL   Skin Integrity   Skin Integrity (WDL) X   Skin Integrity Other (Comment)  (Surgical)   Location L flank   Preventative Dressing No   Musculoskeletal   Musculoskeletal (WDL) WDL   RL Extremity Full movement   LL Extremity Full movement   Genitourinary   Genitourinary (WDL) WDL   Flank Tenderness No   Suprapubic Tenderness No   Dysuria No   Urine Assessment   Incontinence No   Urine Color Yellow/straw   Urine Appearance Clear   Urine Odor No odor   Anus/Rectum   Anus/Rectum (WDL) WDL   Incision 01/08/20 Back Lateral;Left   Date First Assessed/Time First Assessed: 01/08/20 2000   Present on Hospital Admission: Yes  Primary Wound Type:  Incision  Location: Back  Wound Location Orientation: Lateral;Left   Wound Assessment Clean;Dry; Intact   Luz-wound Assessment Clean;Dry; Intact   Closure Approximated   Drainage Amount None   Odor None   Dressing/Treatment   ([place])   Incision 01/02/20 Chest Lateral;Left;Lower   Date First Assessed: 01/02/20   Present on Hospital Admission: No  Primary Wound Type: Incision  Location: Chest  Wound Location Orientation: Lateral;Left;Lower   Wound Assessment Other (Comment)  ([place])   Drainage Amount Scant   Drainage Description Yellow;Serous   Odor None   Dressing/Treatment Foam   Dressing Status Clean;Dry; Intact   Psychosocial   Psychosocial (WDL) WDL   Patient Behaviors Calm; Cooperative

## 2020-01-17 NOTE — PROGRESS NOTES
Progress Note    S/P emergent thoracotomy / evacuation of hematoma    CC: no new complaints    Hospital Course:    Vital Signs:                                                 /70   Pulse 54   Temp 97.3 °F (36.3 °C) (Oral)   Resp 20   Ht 5' 10\" (1.778 m)   Wt 187 lb 9.6 oz (85.1 kg)   SpO2 100%   BMI 26.92 kg/m²  O2 Flow Rate (L/min): 0 L/min        Admission Weight: 205 lb 12.8 oz (93.4 kg)      Vent Settings:        Drips:  n/a    I/O:      Intake/Output Summary (Last 24 hours) at 1/17/2020 1207  Last data filed at 1/17/2020 0748  Gross per 24 hour   Intake 1080 ml   Output 1300 ml   Net -220 ml     Chest Tube:     O/E  GEN: unremarkable  HEENT: unremarkable  CV: reg, wound c/d/i  Pulm: good air entry b/l  Abd: soft, nt, nd, no peritoneal signs  Ext: warm, edema, wound c/d/i    Data Review:  CBC: No results for input(s): WBC, HGB, HCT, MCV, PLT in the last 72 hours. BMP:   Recent Labs     01/15/20  0522 01/16/20  0451   * 137   K 3.2* 3.8   CL 89* 91*   CO2 35* 35*   BUN 20 17   CREATININE 0.6* 0.7*   CALCIUM 8.8 9.0   MG 2.10 2.00     Cardiac Enzymes: No results for input(s): CKTOTAL, CKMB, CKMBINDEX, TROPONINI in the last 72 hours. PT/INR: No results for input(s): PROTIME, INR in the last 72 hours. APTT: No results for input(s): APTT in the last 72 hours. Assessment/Plan:  Severe Aortic Stenosis / complex fistulous tract into LA  Daughter and niece seem to be in agreement for 3rd time re-do cardiac surgery, however patient appears reluctant. I have personally re-explained risks, benefits, and alternatives to surgery.  Tentatively planned for Tuesday 1/21/2020.   -     Salvador Ivory MD  1/17/2020  12:07 PM

## 2020-01-18 LAB
ANION GAP SERPL CALCULATED.3IONS-SCNC: 8 MMOL/L (ref 3–16)
BUN BLDV-MCNC: 13 MG/DL (ref 7–20)
CALCIUM SERPL-MCNC: 8.4 MG/DL (ref 8.3–10.6)
CHLORIDE BLD-SCNC: 92 MMOL/L (ref 99–110)
CO2: 33 MMOL/L (ref 21–32)
CREAT SERPL-MCNC: 0.6 MG/DL (ref 0.8–1.3)
GFR AFRICAN AMERICAN: >60
GFR NON-AFRICAN AMERICAN: >60
GLUCOSE BLD-MCNC: 260 MG/DL (ref 70–99)
GLUCOSE BLD-MCNC: 287 MG/DL (ref 70–99)
GLUCOSE BLD-MCNC: 92 MG/DL (ref 70–99)
GLUCOSE BLD-MCNC: 97 MG/DL (ref 70–99)
GLUCOSE BLD-MCNC: 98 MG/DL (ref 70–99)
HCT VFR BLD CALC: 30 % (ref 40.5–52.5)
HEMOGLOBIN: 10.2 G/DL (ref 13.5–17.5)
MAGNESIUM: 1.9 MG/DL (ref 1.8–2.4)
MCH RBC QN AUTO: 31.6 PG (ref 26–34)
MCHC RBC AUTO-ENTMCNC: 34.1 G/DL (ref 31–36)
MCV RBC AUTO: 92.7 FL (ref 80–100)
PDW BLD-RTO: 16.8 % (ref 12.4–15.4)
PERFORMED ON: ABNORMAL
PERFORMED ON: ABNORMAL
PERFORMED ON: NORMAL
PERFORMED ON: NORMAL
PLATELET # BLD: 288 K/UL (ref 135–450)
PMV BLD AUTO: 7.7 FL (ref 5–10.5)
POTASSIUM SERPL-SCNC: 3.6 MMOL/L (ref 3.5–5.1)
RBC # BLD: 3.23 M/UL (ref 4.2–5.9)
SODIUM BLD-SCNC: 133 MMOL/L (ref 136–145)
WBC # BLD: 8.3 K/UL (ref 4–11)

## 2020-01-18 PROCEDURE — 83735 ASSAY OF MAGNESIUM: CPT

## 2020-01-18 PROCEDURE — 85027 COMPLETE CBC AUTOMATED: CPT

## 2020-01-18 PROCEDURE — 6370000000 HC RX 637 (ALT 250 FOR IP): Performed by: INTERNAL MEDICINE

## 2020-01-18 PROCEDURE — 2580000003 HC RX 258: Performed by: INTERNAL MEDICINE

## 2020-01-18 PROCEDURE — 6370000000 HC RX 637 (ALT 250 FOR IP): Performed by: NURSE PRACTITIONER

## 2020-01-18 PROCEDURE — 80048 BASIC METABOLIC PNL TOTAL CA: CPT

## 2020-01-18 PROCEDURE — 2060000000 HC ICU INTERMEDIATE R&B

## 2020-01-18 RX ADMIN — AMIODARONE HYDROCHLORIDE 200 MG: 200 TABLET ORAL at 08:34

## 2020-01-18 RX ADMIN — FUROSEMIDE 40 MG: 40 TABLET ORAL at 08:34

## 2020-01-18 RX ADMIN — CARVEDILOL 3.12 MG: 3.12 TABLET, FILM COATED ORAL at 18:55

## 2020-01-18 RX ADMIN — TRAMADOL HYDROCHLORIDE 50 MG: 50 TABLET, FILM COATED ORAL at 07:21

## 2020-01-18 RX ADMIN — FUROSEMIDE 40 MG: 40 TABLET ORAL at 20:41

## 2020-01-18 RX ADMIN — INSULIN LISPRO 6 UNITS: 100 INJECTION, SOLUTION INTRAVENOUS; SUBCUTANEOUS at 12:02

## 2020-01-18 RX ADMIN — Medication 10 ML: at 08:34

## 2020-01-18 RX ADMIN — INSULIN GLARGINE 10 UNITS: 100 INJECTION, SOLUTION SUBCUTANEOUS at 20:42

## 2020-01-18 RX ADMIN — INSULIN LISPRO 3 UNITS: 100 INJECTION, SOLUTION INTRAVENOUS; SUBCUTANEOUS at 20:41

## 2020-01-18 RX ADMIN — Medication 10 ML: at 20:44

## 2020-01-18 RX ADMIN — CARVEDILOL 3.12 MG: 3.12 TABLET, FILM COATED ORAL at 08:34

## 2020-01-18 RX ADMIN — ATORVASTATIN CALCIUM 40 MG: 40 TABLET, FILM COATED ORAL at 08:34

## 2020-01-18 RX ADMIN — ASPIRIN 81 MG 81 MG: 81 TABLET ORAL at 08:34

## 2020-01-18 RX ADMIN — INSULIN LISPRO 3 UNITS: 100 INJECTION, SOLUTION INTRAVENOUS; SUBCUTANEOUS at 12:01

## 2020-01-18 RX ADMIN — ALLOPURINOL 300 MG: 300 TABLET ORAL at 08:34

## 2020-01-18 ASSESSMENT — PAIN SCALES - GENERAL
PAINLEVEL_OUTOF10: 0
PAINLEVEL_OUTOF10: 8

## 2020-01-18 NOTE — PROGRESS NOTES
hour   Intake 1080 ml   Output 2950 ml   Net -1870 ml       Physical Exam Performed:    /65   Pulse 74   Temp 97.9 °F (36.6 °C) (Oral)   Resp 18   Ht 5' 10\" (1.778 m)   Wt 188 lb 14.4 oz (85.7 kg)   SpO2 97%   BMI 27.10 kg/m²     General appearance: No apparent distress, appears stated age and cooperative. HEENT: Pupils equal, round, and reactive to light. Conjunctivae/corneas clear. Neck: Supple, with full range of motion. No jugular venous distention. Trachea midline. Respiratory:  Normal respiratory effort. diminished bilaterally    Cardiovascular: tachycardic rate and rhythm with without rubs or gallops. 3/6 systolic murmur at the RUSB. Abdomen: Soft, non-tender, non-distended with normal bowel sounds. Musculoskeletal: No clubbing, cyanosis or edema bilaterally. Full range of motion without deformity. Skin: Skin color, texture, turgor normal.  No rashes or lesions. Neurologic:  Neurovascularly intact without any focal sensory/motor deficits. Cranial nerves: II-XII intact, grossly non-focal.  Psychiatric: Alert and oriented, thought content appropriate, normal insight  Capillary Refill: Brisk,< 3 seconds   Peripheral Pulses: +2 palpable, equal bilaterally       Labs:   Recent Labs     01/18/20  0436   WBC 8.3   HGB 10.2*   HCT 30.0*        Recent Labs     01/16/20  0451 01/18/20  0436    133*   K 3.8 3.6   CL 91* 92*   CO2 35* 33*   BUN 17 13   CREATININE 0.7* 0.6*   CALCIUM 9.0 8.4     No results for input(s): AST, ALT, BILIDIR, BILITOT, ALKPHOS in the last 72 hours. No results for input(s): INR in the last 72 hours. No results for input(s): Trevon Rabia in the last 72 hours. Urinalysis:    No results found for: Mily Pitch, BACTERIA, RBCUA, BLOODU, SPECGRAV, [address]    Radiology:  CT CHEST W CONTRAST   Final Result   Addendum 1 of 1   ADDENDUM:   Cystic nodule in the pancreas. Please see follow-up below.   ACR    management   recommendations for incidental cystic

## 2020-01-18 NOTE — PLAN OF CARE
Patient's EF (Ejection Fraction) is greater than 40%    Heart Failure Medications:  Diuretics[de-identified] Furosemide, Torsemide, Spironolactone, Metalozone, Other, and None  ACE[de-identified] Lisinopril, Enalapril,  Ramipril, Other, and None  ARB[de-identified] Valsartan, Losartan, Other, and None  ARNI[de-identified] None  Evidenced Based Beta Blocker[de-identified] Metoprolol SUCCinate- Toprol XL, Carvedilol- Coreg, Bisoprolol, and None    Patient's weights and intake/output reviewed: Yes    Patient's Last Weight: 188.14 lbs obtained by standing scale. Difference of 1 lbs more than last documented weight. Intake/Output Summary (Last 24 hours) at 1/18/2020 1743  Last data filed at 1/18/2020 1734  Gross per 24 hour   Intake 2040 ml   Output 2225 ml   Net -185 ml       Comorbidities Reviewed No    Patient has a past medical history of CAD (coronary artery disease), COPD (chronic obstructive pulmonary disease) ([facility] Diabetes mellitus ([facility] Gout, Hyperlipidemia, Hypertension, and Thyroid disease. >>For CHF and Comorbidity documentation on Education Time and Topics, please see Education Tab    Patient stated Daily Functional Goal: (Note:help the patient identify a challenging but achievable goal per shift that can aid in progression towards increased functional capacity at discharge ie sit in the chair for x amount of time, Decrease walk time by x seconds, stand or walk with minimal assistance, decrease pain to a level of x/10, etc)   Pt sitting in bed at this time on room air. Pt denies shortness of breath. Pt without lower extremity edema.      Patient and/or Family's stated Goal of Care this Admission: reduce shortness of breath, increase activity tolerance, better understand heart failure and disease management, be more comfortable, and reduce lower extremity edema prior to discharge

## 2020-01-18 NOTE — PLAN OF CARE
Problem: Falls - Risk of:  Goal: Will remain free from falls  Description  Will remain free from falls  Outcome: Ongoing  Goal: Absence of physical injury  Description  Absence of physical injury  Outcome: Ongoing     Problem: OXYGENATION/RESPIRATORY FUNCTION  Goal: Patient will maintain patent airway  Outcome: Ongoing  Goal: Patient will achieve/maintain normal respiratory rate/effort  Description  Respiratory rate and effort will be within normal limits for the patient  Outcome: Ongoing     Problem: HEMODYNAMIC STATUS  Goal: Patient has stable vital signs and fluid balance  Outcome: Ongoing     Problem: FLUID AND ELECTROLYTE IMBALANCE  Goal: Fluid and electrolyte balance are achieved/maintained  Outcome: Ongoing     Problem: Serum Glucose Level - Abnormal:  Goal: Ability to maintain appropriate glucose levels will improve  Description  Ability to maintain appropriate glucose levels will improve  1/18/2020 0508 by Andrew Snyder RN  Outcome: Ongoing  1/18/2020 0507 by Andrew Snyder RN  Outcome: Ongoing       Patient's EF (Ejection Fraction) is greater than 40%    Heart Failure Medications:  Diuretics[de-identified] Furosemide  ACE[de-identified] None  ARB[de-identified] None  ARNI[de-identified] None  Evidenced Based Beta Blocker[de-identified] Carvedilol- Coreg    Patient's weights and intake/output reviewed: Yes    Patient's Last Weight: 188 lbs obtained by bed scale. Difference of 1 lbs more than last documented weight. Intake/Output Summary (Last 24 hours) at 1/18/2020 0509  Last data filed at 1/18/2020 0446  Gross per 24 hour   Intake 720 ml   Output 2950 ml   Net -2230 ml       Comorbidities Reviewed Yes    Patient has a past medical history of CAD (coronary artery disease), COPD (chronic obstructive pulmonary disease) ([facility] [address] Diabetes mellitus ([facility] [address] Gout, Hyperlipidemia, Hypertension, and Thyroid disease.      >>For CHF and Comorbidity documentation on Education Time and Topics, please see Education Tab    Patient stated Daily Functional Goal: (Note:help the patient identify a challenging but achievable goal per shift that can aid in progression towards increased functional capacity at discharge ie sit in the chair for x amount of time, Decrease walk time by x seconds, stand or walk with minimal assistance, decrease pain to a level of x/10, etc) Maintain a proper fluid balance. Pt sitting in bed at this time on room air. Pt denies shortness of breath. Pt without lower extremity edema. Patient and/or Family's stated Goal of Care this Admission: reduce shortness of breath and be more comfortable prior to discharge    Diabetes education provided today:    Nutrition as a mainstream of diabetes therapy. Cheriton about label reading. Different diabetic medications. Managing high and low sugar readings. Effects of smoking and diabetes. Rotation of sites for subcutaneous medication injection.

## 2020-01-19 LAB
GLUCOSE BLD-MCNC: 100 MG/DL (ref 70–99)
GLUCOSE BLD-MCNC: 125 MG/DL (ref 70–99)
GLUCOSE BLD-MCNC: 258 MG/DL (ref 70–99)
GLUCOSE BLD-MCNC: 305 MG/DL (ref 70–99)
PERFORMED ON: ABNORMAL

## 2020-01-19 PROCEDURE — 6370000000 HC RX 637 (ALT 250 FOR IP): Performed by: NURSE PRACTITIONER

## 2020-01-19 PROCEDURE — 6370000000 HC RX 637 (ALT 250 FOR IP): Performed by: INTERNAL MEDICINE

## 2020-01-19 PROCEDURE — 2060000000 HC ICU INTERMEDIATE R&B

## 2020-01-19 PROCEDURE — 99024 POSTOP FOLLOW-UP VISIT: CPT | Performed by: THORACIC SURGERY (CARDIOTHORACIC VASCULAR SURGERY)

## 2020-01-19 PROCEDURE — 2580000003 HC RX 258: Performed by: INTERNAL MEDICINE

## 2020-01-19 RX ADMIN — INSULIN LISPRO 4 UNITS: 100 INJECTION, SOLUTION INTRAVENOUS; SUBCUTANEOUS at 20:26

## 2020-01-19 RX ADMIN — ASPIRIN 81 MG 81 MG: 81 TABLET ORAL at 07:51

## 2020-01-19 RX ADMIN — MAGNESIUM HYDROXIDE 30 ML: 2400 SUSPENSION ORAL at 10:36

## 2020-01-19 RX ADMIN — INSULIN LISPRO 3 UNITS: 100 INJECTION, SOLUTION INTRAVENOUS; SUBCUTANEOUS at 12:05

## 2020-01-19 RX ADMIN — FUROSEMIDE 40 MG: 40 TABLET ORAL at 07:51

## 2020-01-19 RX ADMIN — Medication 10 ML: at 20:29

## 2020-01-19 RX ADMIN — Medication 10 ML: at 07:52

## 2020-01-19 RX ADMIN — FUROSEMIDE 40 MG: 40 TABLET ORAL at 20:25

## 2020-01-19 RX ADMIN — AMIODARONE HYDROCHLORIDE 200 MG: 200 TABLET ORAL at 07:51

## 2020-01-19 RX ADMIN — ATORVASTATIN CALCIUM 40 MG: 40 TABLET, FILM COATED ORAL at 07:51

## 2020-01-19 RX ADMIN — CARVEDILOL 3.12 MG: 3.12 TABLET, FILM COATED ORAL at 18:06

## 2020-01-19 RX ADMIN — INSULIN LISPRO 6 UNITS: 100 INJECTION, SOLUTION INTRAVENOUS; SUBCUTANEOUS at 12:03

## 2020-01-19 RX ADMIN — ALLOPURINOL 300 MG: 300 TABLET ORAL at 07:51

## 2020-01-19 RX ADMIN — INSULIN GLARGINE 10 UNITS: 100 INJECTION, SOLUTION SUBCUTANEOUS at 20:25

## 2020-01-19 RX ADMIN — CARVEDILOL 3.12 MG: 3.12 TABLET, FILM COATED ORAL at 07:51

## 2020-01-19 NOTE — PROGRESS NOTES
Progress Note    S/P emergent thoracotomy / evacuation of hematoma    CC:  [address] Course:  Sitting up eating breakfast.  No complaints. Vital Signs:                                                 /67   Pulse 95   Temp 98.1 °F (36.7 °C) (Oral)   Resp 16   Ht 5' 10\" (1.778 m)   Wt 186 lb (84.4 kg)   SpO2 94%   BMI 26.69 kg/m²  O2 Flow Rate (L/min): 0 L/min     Admission Weight: 205 lb 12.8 oz (93.4 kg)      Drips:  n/a    I/O:      Intake/Output Summary (Last 24 hours) at 1/19/2020 0833  Last data filed at 1/19/2020 0748  Gross per 24 hour   Intake 2180 ml   Output 2575 ml   Net -395 ml     Chest Tube: NA    O/E  GEN: unremarkable  HEENT: unremarkable  CV: reg, wound c/d/i  Pulm: good air entry b/l  Abd: soft, nt, nd, no peritoneal signs  Ext: warm, edema, wound c/d/i    Data Review:  CBC:   Recent Labs     01/18/20  0436   WBC 8.3   HGB 10.2*   HCT 30.0*   MCV 92.7        BMP:   Recent Labs     01/18/20  0436   *   K 3.6   CL 92*   CO2 33*   BUN 13   CREATININE 0.6*   CALCIUM 8.4   MG 1.90     Cardiac Enzymes: No results for input(s): CKTOTAL, CKMB, CKMBINDEX, TROPONINI in the last 72 hours. PT/INR: No results for input(s): PROTIME, INR in the last 72 hours. APTT: No results for input(s): APTT in the last 72 hours. Assessment/Plan:  Severe Aortic Stenosis / complex fistulous tract into LA  Tentatively planned for Tuesday 1/21/2020.      Valerie Charlton MD  1/19/2020  8:33 AM

## 2020-01-19 NOTE — PROGRESS NOTES
Pt resting quietly in bed. No c/o pain/discomfort. Safety/fall prevention maintained. Personal belongings and call light within reach.  LORRIN

## 2020-01-19 NOTE — FLOWSHEET NOTE
Cardiac Rhythm NSR  (on tele monitor)   Ectopy PVC  (on tele monitor)   Rhythm Interpretation   Pulse 73   Cardiac Monitor   Telemetry Monitor On Yes   Telemetry Audible Yes   Telemetry Alarms Set Yes   Telemetry Box Number [id number]   Gastrointestinal   RUQ Bowel Sounds Active   LUQ Bowel Sounds Active   RLQ Bowel Sounds Active   LLQ Bowel Sounds Active   Abdomen Inspection Rounded; Soft   Tenderness Soft   Peripheral Vascular   Peripheral Vascular (WDL) WDL   Edema None   Sensation RUE Full sensation   Sensation LUE Full sensation   Sensation RLE Full sensation   Sensation LLE Full sensation   RUE Neurovascular Assessment   Capillary Refill Less than/equal to 3 seconds   Color Appropriate for ethnicity   Temperature Warm   R Radial Pulse +2   LUE Neurovascular Assessment   Capillary Refill Less than/equal to 3 seconds   Color Appropriate for ethnicity   Temperature Warm   L Radial Pulse +2   RLE Neurovascular Assessment   Capillary Refill Less than/equal to 3 seconds   Color Appropriate for ethnicity   Temperature Warm   R Pedal Pulse +1   LLE Neurovascular Assessment   Capillary Refill Less than/equal to 3 seconds   Color Appropriate for ethnicity   Temperature Warm   L Pedal Pulse +1   Skin Color/Condition   Skin Color Appropriate for ethnicity; Ecchymosis   Skin Condition/Temp Dry; Warm   Skin Integrity   Skin Integrity Other (Comment)  (incision sites  [place], Lower area with mepilex dressing CDI)   Location Left lateral    Preventative Dressing No   Dressing Site Sacrum   Assessed this shift? Yes   Skin Integrity Site 2   Skin Integrity Location 2 Bruising   Location 2 scattered BUE   Musculoskeletal   RUE Full movement   LUE Full movement   RL Extremity Full movement   LL Extremity Full movement   Incision 01/08/20 Back Lateral;Left   Date First Assessed/Time First Assessed: 01/08/20 2000   Present on Hospital Admission: Yes  Primary Wound Type:  Incision  Location: Back  Wound Location Orientation: Lateral;Left   Wound Assessment Clean;Dry; Intact;Edges well approximated   Luz-wound Assessment Clean;Dry; Intact   Dressing Status Dry; Intact   Incision 01/02/20 Chest Lateral;Left;Lower   Date First Assessed: 01/02/20   Present on Hospital Admission: No  Primary Wound Type: Incision  Location: Chest  Wound Location Orientation: Lateral;Left;Lower   Luz-wound Assessment Clean;Dry   Drainage Amount Scant  (Per RN report)   Drainage Description Serosanguinous  (Per RN report)   Dressing/Treatment Foam   Psychosocial   Patient Behaviors Calm; Cooperative   Pt alert and oriented, sitting @ side of bed. Pleasant. No c.o pain/discomfort. Pt updated with POC, OR on Tuesday, CHF s/s to watch for. Pt was given reading material ( [facility] 1/10) verbalized understanding. No c/o voiced. Denies any needs at this time. Safety/fall prevention maintained. Personal belongings and call light within reach.  MEET

## 2020-01-19 NOTE — PROGRESS NOTES
hour   Intake 2860 ml   Output 2600 ml   Net 260 ml       Physical Exam Performed:    /67   Pulse 87   Temp 97.8 °F (36.6 °C) (Oral)   Resp 16   Ht 5' 10\" (1.778 m)   Wt 186 lb (84.4 kg)   SpO2 94%   BMI 26.69 kg/m²     General appearance: No apparent distress, appears stated age and cooperative. HEENT: Pupils equal, round, and reactive to light. Conjunctivae/corneas clear. Neck: Supple, with full range of motion. No jugular venous distention. Trachea midline. Respiratory:  Normal respiratory effort. diminished bilaterally    Cardiovascular: tachycardic rate and rhythm with without rubs or gallops. 3/6 systolic murmur at the RUSB. Abdomen: Soft, non-tender, non-distended with normal bowel sounds. Musculoskeletal: No clubbing, cyanosis or edema bilaterally. Full range of motion without deformity. Skin: Skin color, texture, turgor normal.  No rashes or lesions. Neurologic:  Neurovascularly intact without any focal sensory/motor deficits. Cranial nerves: II-XII intact, grossly non-focal.  Psychiatric: Alert and oriented, thought content appropriate, normal insight  Capillary Refill: Brisk,< 3 seconds   Peripheral Pulses: +2 palpable, equal bilaterally       Labs:   Recent Labs     01/18/20  0436   WBC 8.3   HGB 10.2*   HCT 30.0*        Recent Labs     01/18/20  0436   *   K 3.6   CL 92*   CO2 33*   BUN 13   CREATININE 0.6*   CALCIUM 8.4     No results for input(s): AST, ALT, BILIDIR, BILITOT, ALKPHOS in the last 72 hours. No results for input(s): INR in the last 72 hours. No results for input(s): Claudetet Dapper in the last 72 hours. Urinalysis:    No results found for: Liddie Amen, BACTERIA, RBCUA, BLOODU, SPECGRAV, [address]    Radiology:  CT CHEST W CONTRAST   Final Result   Addendum 1 of 1   ADDENDUM:   Cystic nodule in the pancreas. Please see follow-up below.   ACR    management   recommendations for incidental cystic pancreatic masses in asymptomatic*   patients on CT, - he says he had syncope a couple months ago while having a BM. This was presumably vasovagal.  Discussed with CT surgery, normal B12, head CT normal, TSH wnl. DVT Prophylaxis: SCDs  Diet: DIET CARDIAC;  Code Status: Full Code    PT/OT Eval Status: rec'd SNF    Dispo - perhaps 1/26, pending post-op course. He went [facility] with [facility] last time.        Petr Crow, MIKO - CNP

## 2020-01-19 NOTE — PLAN OF CARE
Problem: Pain:  Goal: Pain level will decrease  Description  Pain level will decrease  Outcome: Met This Shift  Goal: Control of acute pain  Description  Control of acute pain  Outcome: Met This Shift     Problem: Falls - Risk of:  Goal: Will remain free from falls  Description  Will remain free from falls  Outcome: Ongoing  Goal: Absence of physical injury  Description  Absence of physical injury  Outcome: Ongoing     Problem: OXYGENATION/RESPIRATORY FUNCTION  Goal: Patient will maintain patent airway  Outcome: Ongoing  Goal: Patient will achieve/maintain normal respiratory rate/effort  Description  Respiratory rate and effort will be within normal limits for the patient  Outcome: Ongoing     Problem: HEMODYNAMIC STATUS  Goal: Patient has stable vital signs and fluid balance  Outcome: Ongoing  Note:     Patient's EF (Ejection Fraction) is greater than 40%    Heart Failure Medications:  Diuretics[de-identified] Furosemide  ACE[de-identified] Other  ARB::   ARNI:: None  Evidenced Based Beta Blocker[de-identified] Metoprolol SUCCinate- Toprol XL and Carvedilol- Coreg    Patient's weights and intake/output reviewed: Yes    Patient's Last Weight: 188 lbs obtained by standing scale. Difference of 2 lbs less than last documented weight. Intake/Output Summary (Last 24 hours) at 1/19/2020 0522  Last data filed at 1/19/2020 0518  Gross per 24 hour   Intake 2220 ml   Output 2475 ml   Net -255 ml       Comorbidities Reviewed No    Patient has a past medical history of CAD (coronary artery disease), COPD (chronic obstructive pulmonary disease) ([facility] [address] Diabetes mellitus ([facility] [address] Gout, Hyperlipidemia, Hypertension, and Thyroid disease. >>For CHF and Comorbidity documentation on Education Time and Topics, please see Education Tab    Patient stated Daily Functional Goal: Be More comfortable    Pt resting in bed at this time on room air. Pt denies shortness of breath. Pt without lower extremity edema.      Patient and/or Family's stated Goal of Care this Admission: reduce shortness of breath, increase activity tolerance, better understand heart failure and disease management, and be more comfortable prior to discharge            Problem: FLUID AND ELECTROLYTE IMBALANCE  Goal: Fluid and electrolyte balance are achieved/maintained  Outcome: Ongoing     Problem: Discharge Planning:  Goal: Discharged to appropriate level of care  Description  Discharged to appropriate level of care  Outcome: Ongoing     Problem: Serum Glucose Level - Abnormal:  Goal: Ability to maintain appropriate glucose levels will improve  Description  Ability to maintain appropriate glucose levels will improve  Outcome: Ongoing     Problem: Sensory Perception - Impaired:  Goal: Ability to maintain a stable neurologic state will improve  Description  Ability to maintain a stable neurologic state will improve  Outcome: Ongoing

## 2020-01-19 NOTE — PROGRESS NOTES
Pt. Sitting on side of bed. A&Ox4. Denies pain. Lungs diminished in bases. SR on the monitor. No distress noted.

## 2020-01-20 ENCOUNTER — ANESTHESIA EVENT (OUTPATIENT)
Dept: OPERATING ROOM | Age: 70
DRG: 216 | End: 2020-01-20
Payer: MEDICARE

## 2020-01-20 LAB
ABO/RH: NORMAL
ANION GAP SERPL CALCULATED.3IONS-SCNC: 10 MMOL/L (ref 3–16)
ANTIBODY SCREEN: NORMAL
BASOPHILS ABSOLUTE: 0.1 K/UL (ref 0–0.2)
BASOPHILS RELATIVE PERCENT: 1 %
BILIRUBIN URINE: NEGATIVE
BLOOD, URINE: NEGATIVE
BUN BLDV-MCNC: 12 MG/DL (ref 7–20)
CALCIUM SERPL-MCNC: 8.6 MG/DL (ref 8.3–10.6)
CHLORIDE BLD-SCNC: 89 MMOL/L (ref 99–110)
CLARITY: CLEAR
CO2: 28 MMOL/L (ref 21–32)
COLOR: YELLOW
CREAT SERPL-MCNC: 0.8 MG/DL (ref 0.8–1.3)
DLCO %PRED: 53 %
DLCO PRED: NORMAL
DLCO/VA %PRED: NORMAL
DLCO/VA PRED: NORMAL
DLCO/VA: NORMAL
DLCO: NORMAL
EOSINOPHILS ABSOLUTE: 0.1 K/UL (ref 0–0.6)
EOSINOPHILS RELATIVE PERCENT: 1.7 %
EXPIRATORY TIME-POST: NORMAL
EXPIRATORY TIME: NORMAL
FEF 25-75% %CHNG: NORMAL
FEF 25-75% %PRED-POST: NORMAL
FEF 25-75% %PRED-PRE: NORMAL
FEF 25-75% PRED: NORMAL
FEF 25-75%-POST: NORMAL
FEF 25-75%-PRE: NORMAL
FEV1 %PRED-POST: 74 %
FEV1 %PRED-PRE: 72 %
FEV1 PRED: NORMAL
FEV1-POST: NORMAL
FEV1-PRE: NORMAL
FEV1/FVC %PRED-POST: NORMAL
FEV1/FVC %PRED-PRE: NORMAL
FEV1/FVC PRED: NORMAL
FEV1/FVC-POST: 74 %
FEV1/FVC-PRE: 76 %
FVC %PRED-POST: NORMAL
FVC %PRED-PRE: NORMAL
FVC PRED: NORMAL
FVC-POST: NORMAL
FVC-PRE: NORMAL
GAW %PRED: NORMAL
GAW PRED: NORMAL
GAW: NORMAL
GFR AFRICAN AMERICAN: >60
GFR NON-AFRICAN AMERICAN: >60
GLUCOSE BLD-MCNC: 136 MG/DL (ref 70–99)
GLUCOSE BLD-MCNC: 168 MG/DL (ref 70–99)
GLUCOSE BLD-MCNC: 248 MG/DL (ref 70–99)
GLUCOSE BLD-MCNC: 250 MG/DL (ref 70–99)
GLUCOSE BLD-MCNC: 319 MG/DL (ref 70–99)
GLUCOSE URINE: NEGATIVE MG/DL
HCT VFR BLD CALC: 29.3 % (ref 40.5–52.5)
HEMOGLOBIN: 9.8 G/DL (ref 13.5–17.5)
IC %PRED: NORMAL
IC PRED: NORMAL
IC: NORMAL
KETONES, URINE: NEGATIVE MG/DL
LEUKOCYTE ESTERASE, URINE: NEGATIVE
LYMPHOCYTES ABSOLUTE: 0.8 K/UL (ref 1–5.1)
LYMPHOCYTES RELATIVE PERCENT: 10.3 %
MCH RBC QN AUTO: 31.1 PG (ref 26–34)
MCHC RBC AUTO-ENTMCNC: 33.6 G/DL (ref 31–36)
MCV RBC AUTO: 92.8 FL (ref 80–100)
MEP: NORMAL
MICROSCOPIC EXAMINATION: NORMAL
MIP: NORMAL
MONOCYTES ABSOLUTE: 1.2 K/UL (ref 0–1.3)
MONOCYTES RELATIVE PERCENT: 16.6 %
MVV %PRED-PRE: NORMAL
MVV PRED: NORMAL
MVV-PRE: NORMAL
NEUTROPHILS ABSOLUTE: 5.2 K/UL (ref 1.7–7.7)
NEUTROPHILS RELATIVE PERCENT: 70.4 %
NITRITE, URINE: NEGATIVE
PDW BLD-RTO: 16.2 % (ref 12.4–15.4)
PEF %PRED-POST: NORMAL
PEF %PRED-PRE: NORMAL
PEF PRED: NORMAL
PEF%CHNG: NORMAL
PEF-POST: NORMAL
PEF-PRE: NORMAL
PERFORMED ON: ABNORMAL
PH UA: 7 (ref 5–8)
PLATELET # BLD: 264 K/UL (ref 135–450)
PMV BLD AUTO: 7.8 FL (ref 5–10.5)
POTASSIUM REFLEX MAGNESIUM: 4.7 MMOL/L (ref 3.5–5.1)
PROTEIN UA: NEGATIVE MG/DL
RAW %PRED: NORMAL
RAW PRED: NORMAL
RAW: NORMAL
RBC # BLD: 3.16 M/UL (ref 4.2–5.9)
RV %PRED: NORMAL
RV PRED: NORMAL
RV: NORMAL
SODIUM BLD-SCNC: 127 MMOL/L (ref 136–145)
SPECIFIC GRAVITY UA: 1.01 (ref 1–1.03)
SVC %PRED: NORMAL
SVC PRED: NORMAL
SVC: NORMAL
TLC %PRED: 35 %
TLC PRED: NORMAL
TLC: NORMAL
URINE REFLEX TO CULTURE: NORMAL
URINE TYPE: NORMAL
UROBILINOGEN, URINE: 1 E.U./DL
VA %PRED: NORMAL
VA PRED: NORMAL
VA: NORMAL
VTG %PRED: NORMAL
VTG PRED: NORMAL
VTG: NORMAL
WBC # BLD: 7.4 K/UL (ref 4–11)

## 2020-01-20 PROCEDURE — 97110 THERAPEUTIC EXERCISES: CPT

## 2020-01-20 PROCEDURE — 6370000000 HC RX 637 (ALT 250 FOR IP): Performed by: INTERNAL MEDICINE

## 2020-01-20 PROCEDURE — 97116 GAIT TRAINING THERAPY: CPT

## 2020-01-20 PROCEDURE — 36415 COLL VENOUS BLD VENIPUNCTURE: CPT

## 2020-01-20 PROCEDURE — 6370000000 HC RX 637 (ALT 250 FOR IP): Performed by: NURSE PRACTITIONER

## 2020-01-20 PROCEDURE — P9017 PLASMA 1 DONOR FRZ W/IN 8 HR: HCPCS

## 2020-01-20 PROCEDURE — 86901 BLOOD TYPING SEROLOGIC RH(D): CPT

## 2020-01-20 PROCEDURE — 2060000000 HC ICU INTERMEDIATE R&B

## 2020-01-20 PROCEDURE — P9012 CRYOPRECIPITATE EACH UNIT: HCPCS

## 2020-01-20 PROCEDURE — 81003 URINALYSIS AUTO W/O SCOPE: CPT

## 2020-01-20 PROCEDURE — P9035 PLATELET PHERES LEUKOREDUCED: HCPCS

## 2020-01-20 PROCEDURE — 2580000003 HC RX 258: Performed by: INTERNAL MEDICINE

## 2020-01-20 PROCEDURE — P9016 RBC LEUKOCYTES REDUCED: HCPCS

## 2020-01-20 PROCEDURE — 85025 COMPLETE CBC W/AUTO DIFF WBC: CPT

## 2020-01-20 PROCEDURE — 80048 BASIC METABOLIC PNL TOTAL CA: CPT

## 2020-01-20 PROCEDURE — 86923 COMPATIBILITY TEST ELECTRIC: CPT

## 2020-01-20 PROCEDURE — 86850 RBC ANTIBODY SCREEN: CPT

## 2020-01-20 PROCEDURE — 86900 BLOOD TYPING SEROLOGIC ABO: CPT

## 2020-01-20 RX ORDER — BISACODYL 10 MG
10 SUPPOSITORY, RECTAL RECTAL ONCE
Status: DISCONTINUED | OUTPATIENT
Start: 2020-01-20 | End: 2020-01-21

## 2020-01-20 RX ORDER — SODIUM CHLORIDE 9 MG/ML
INJECTION, SOLUTION INTRAVENOUS CONTINUOUS
Status: DISCONTINUED | OUTPATIENT
Start: 2020-01-21 | End: 2020-01-21

## 2020-01-20 RX ORDER — ASPIRIN 81 MG/1
81 TABLET ORAL ONCE
Status: COMPLETED | OUTPATIENT
Start: 2020-01-21 | End: 2020-01-21

## 2020-01-20 RX ORDER — CHLORHEXIDINE GLUCONATE 4 G/100ML
SOLUTION TOPICAL SEE ADMIN INSTRUCTIONS
Status: DISCONTINUED | OUTPATIENT
Start: 2020-01-20 | End: 2020-01-21

## 2020-01-20 RX ORDER — CARVEDILOL 3.12 MG/1
3.12 TABLET ORAL ONCE
Status: COMPLETED | OUTPATIENT
Start: 2020-01-21 | End: 2020-01-21

## 2020-01-20 RX ORDER — MIDAZOLAM HYDROCHLORIDE 5 MG/ML
2 INJECTION INTRAMUSCULAR; INTRAVENOUS ONCE
Status: COMPLETED | OUTPATIENT
Start: 2020-01-21 | End: 2020-01-21

## 2020-01-20 RX ORDER — CHLORHEXIDINE GLUCONATE 0.12 MG/ML
15 RINSE ORAL ONCE
Status: COMPLETED | OUTPATIENT
Start: 2020-01-20 | End: 2020-01-21

## 2020-01-20 RX ADMIN — MUPIROCIN: 20 OINTMENT TOPICAL at 20:37

## 2020-01-20 RX ADMIN — AMIODARONE HYDROCHLORIDE 200 MG: 200 TABLET ORAL at 09:16

## 2020-01-20 RX ADMIN — ANTISEPTIC SURGICAL SCRUB 237 ML: 0.04 SOLUTION TOPICAL at 20:40

## 2020-01-20 RX ADMIN — INSULIN GLARGINE 10 UNITS: 100 INJECTION, SOLUTION SUBCUTANEOUS at 20:37

## 2020-01-20 RX ADMIN — INSULIN LISPRO 8 UNITS: 100 INJECTION, SOLUTION INTRAVENOUS; SUBCUTANEOUS at 11:43

## 2020-01-20 RX ADMIN — FUROSEMIDE 40 MG: 40 TABLET ORAL at 09:16

## 2020-01-20 RX ADMIN — ALLOPURINOL 300 MG: 300 TABLET ORAL at 09:16

## 2020-01-20 RX ADMIN — INSULIN LISPRO 2 UNITS: 100 INJECTION, SOLUTION INTRAVENOUS; SUBCUTANEOUS at 20:37

## 2020-01-20 RX ADMIN — INSULIN LISPRO 2 UNITS: 100 INJECTION, SOLUTION INTRAVENOUS; SUBCUTANEOUS at 17:46

## 2020-01-20 RX ADMIN — TRAMADOL HYDROCHLORIDE 50 MG: 50 TABLET, FILM COATED ORAL at 09:23

## 2020-01-20 RX ADMIN — INSULIN LISPRO 3 UNITS: 100 INJECTION, SOLUTION INTRAVENOUS; SUBCUTANEOUS at 11:43

## 2020-01-20 RX ADMIN — CARVEDILOL 3.12 MG: 3.12 TABLET, FILM COATED ORAL at 16:20

## 2020-01-20 RX ADMIN — Medication 10 ML: at 20:38

## 2020-01-20 RX ADMIN — ATORVASTATIN CALCIUM 40 MG: 40 TABLET, FILM COATED ORAL at 09:16

## 2020-01-20 RX ADMIN — Medication 10 ML: at 09:15

## 2020-01-20 RX ADMIN — ASPIRIN 81 MG 81 MG: 81 TABLET ORAL at 09:16

## 2020-01-20 RX ADMIN — FUROSEMIDE 40 MG: 40 TABLET ORAL at 20:37

## 2020-01-20 ASSESSMENT — PULMONARY FUNCTION TESTS
FEV1/FVC_POST: 74
FEV1/FVC_PRE: 76
FEV1_PERCENT_PREDICTED_PRE: 72
FEV1_PERCENT_PREDICTED_POST: 74

## 2020-01-20 ASSESSMENT — PAIN DESCRIPTION - LOCATION: LOCATION: BUTTOCKS

## 2020-01-20 ASSESSMENT — PAIN SCALES - GENERAL
PAINLEVEL_OUTOF10: 5
PAINLEVEL_OUTOF10: 0
PAINLEVEL_OUTOF10: 5

## 2020-01-20 ASSESSMENT — PAIN DESCRIPTION - PROGRESSION: CLINICAL_PROGRESSION: GRADUALLY IMPROVING

## 2020-01-20 ASSESSMENT — PAIN DESCRIPTION - PAIN TYPE: TYPE: ACUTE PAIN

## 2020-01-20 ASSESSMENT — LIFESTYLE VARIABLES: SMOKING_STATUS: 1

## 2020-01-20 ASSESSMENT — PAIN DESCRIPTION - ORIENTATION: ORIENTATION: RIGHT;LEFT

## 2020-01-20 NOTE — FLOWSHEET NOTE
01/19/20 2023   Assessment   Charting Type Shift assessment   Neurological   Level of Consciousness 0   Orientation Level Oriented X4   Cognition Appropriate judgement; Appropriate safety awareness; Appropriate attention/concentration; Appropriate for developmental age   Language Clear; Appropriate for developmental age   Size R Pupil (mm) 3   R Pupil Shape Round   R Pupil Reaction Brisk   Size L Pupil (mm) 3   L Pupil Shape Round   L Pupil Reaction Brisk   R Hand  Strong   L Hand  Strong   R Foot Dorsiflexion Strong   L Foot Dorsiflexion Strong   R Foot Plantar Flexion Strong   L Foot Plantar Flexion Strong   RUE Motor Response Responds to command;Normal extension;Normal flexion   LUE Motor Response Responds to command;Normal extension;Normal flexion   RLE Motor Response Responds to command;Normal extension;Normal flexion   LLE Motor Response Responds to command;Normal extension;Normal flexion   Gag Present   Tongue Deviation None   Swallow Screening   Is the patient able to remain alert for testing? Yes   Was the Patient Eating a Modified Diet Prior to being Admitted? No   Is there an Existing PEG or Abdominal Feeding Tube? No   Does the patient have a Head of Bed Banner restriction <30°? No   Is there a Tracheostomy Tube Present?  No   3 oz Water Swallow Screen Pass   [place] Coma Scale   Eye Opening 4   Best Verbal Response 5   Best Motor Response 6   [place] Coma Scale Score 15   HEENT   HEENT (WDL)   (wears eyeglasses)   Right Eye Impaired vision   Left Eye Impaired vision   Teeth Dentures upper;Dentures lower   Respiratory   Respiratory Pattern Regular   Respiratory Depth Normal   Respiratory Quality/Effort Unlabored;Dyspnea with exertion   Chest Assessment Chest expansion symmetrical   Breath Sounds   Right Upper Lobe Clear   Right Middle Lobe Clear   Right Lower Lobe Diminished   Left Upper Lobe Diminished   Left Lower Lobe Diminished   Cough/Sputum   Cough None   Cardiac   Cardiac Regularity Regular Yes  Primary Wound Type: Incision  Location: Back  Wound Location Orientation: Lateral;Left   Wound Assessment Dry; Intact;Edges well approximated   Luz-wound Assessment Clean;Dry; Intact   Closure Approximated;Open to air   Incision 01/02/20 Chest Lateral;Left;Lower   Date First Assessed: 01/02/20   Present on Hospital Admission: No  Primary Wound Type: Incision  Location: Chest  Wound Location Orientation: Lateral;Left;Lower   Luz-wound Assessment Clean;Dry   Dressing/Treatment Foam   Psychosocial   Patient Behaviors Calm; Cooperative   Pt in bed, alert and oriented, pleasant. Updated with POC, Pt was provided with education binder- Open heart surgery)  about upcoming surgery. Pt stated read prior, no concerns voiced at this time. Safety/fall prevention maintained. Personal belongings and call light  Placed within reach. Will continue to monitor.  MEET

## 2020-01-20 NOTE — ANESTHESIA PRE PROCEDURE
Encounter Date: 1/10/2018       History     Chief Complaint   Patient presents with    Insect Bite     pt thinks a spider bit her to left side of face, under left eye- pt c/o itching and mild discomfort. area is raised, red. pt states this happened to her last week to forehead but went away. this time began yesterday morning when she woke up and spreading. put warm water and alcohol on area.     71-year-old female presents with chief complaint of insect bite.  Patient reports left eye area swollen since yesterday.  Patient reports excessive itching to the area.  Believes was bitten by a spider.  Denies any fever or chills.  Has been told has hypertension by her primary care physician.  Denies any nausea or vomiting.          Review of patient's allergies indicates:  No Known Allergies  Past Medical History:   Diagnosis Date    Hypertension      Past Surgical History:   Procedure Laterality Date     SECTION      3    HYSTERECTOMY       History reviewed. No pertinent family history.  Social History   Substance Use Topics    Smoking status: Former Smoker     Types: Cigarettes    Smokeless tobacco: Not on file    Alcohol use No     Review of Systems   Constitutional: Negative for chills and fever.   All other systems reviewed and are negative.      Physical Exam     Initial Vitals [01/10/18 0742]   BP Pulse Resp Temp SpO2   (!) 182/93 81 18 98.6 °F (37 °C) 98 %      MAP       122.67         Physical Exam    Nursing note and vitals reviewed.  Constitutional: She appears well-developed and well-nourished.   HENT:   Head: Normocephalic and atraumatic.   Eyes: Pupils are equal, round, and reactive to light.   Neck: Normal range of motion. Neck supple.   Cardiovascular: Normal rate, regular rhythm and normal heart sounds.   Pulmonary/Chest: Breath sounds normal.   Abdominal: Soft.   Skin: Skin is warm. There is erythema (Left swelling noted inferior to the left eye with erythema).         ED Course    Procedures  Labs Reviewed - No data to display                            ED Course      Clinical Impression:   The primary encounter diagnosis was Insect bite, initial encounter. A diagnosis of Hypertension, unspecified type was also pertinent to this visit.                           Parish Underwood MD  01/10/18 0912     solution 1 ampule  1 ampule Inhalation Q4H PRN Anna Mary MD        insulin lispro (HUMALOG) injection vial 3 Units  3 Units Subcutaneous Daily before lunch Anna Mary MD   3 Units at 01/19/20 1205    furosemide (LASIX) tablet 40 mg  40 mg Oral BID Joaquin Prescott, APRN - CNP   40 mg at 01/20/20 0916    carvedilol (COREG) tablet 3.125 mg  3.125 mg Oral BID  Anna Mary MD   3.125 mg at 01/19/20 1806    sodium chloride flush 0.9 % injection 10 mL  10 mL Intravenous 2 times per day Daniel Lin MD   10 mL at 01/20/20 0915    sodium chloride flush 0.9 % injection 10 mL  10 mL Intravenous PRN Daniel Lin MD        acetaminophen (TYLENOL) tablet 650 mg  650 mg Oral Q4H PRN Daniel Lin MD        magnesium hydroxide (MILK OF MAGNESIA) 400 MG/5ML suspension 30 mL  30 mL Oral Daily PRN Daniel Lin MD   30 mL at 01/19/20 1036    glucose (GLUTOSE) 40 % oral gel 15 g  15 g Oral PRN Anna Mary MD        dextrose 50 % IV solution  12.5 g Intravenous PRN Anna Mary MD        glucagon (rDNA) injection 1 mg  1 mg Intramuscular PRN Anna Mary MD        dextrose 5 % solution  100 mL/hr Intravenous PRN Anna Mary MD        insulin glargine (LANTUS) injection vial 10 Units  10 Units Subcutaneous Nightly Angelica Gutiérrez MD   10 Units at 01/19/20 2025    insulin lispro (HUMALOG) injection vial 0-12 Units  0-12 Units Subcutaneous TID SHAKIRA Mary MD   6 Units at 01/19/20 1203    insulin lispro (HUMALOG) injection vial 0-6 Units  0-6 Units Subcutaneous Nightly Anna Mary MD   4 Units at 01/19/20 2026    traMADol (ULTRAM) tablet 50 mg  50 mg Oral Q6H PRN Bijal Raymundo MD   50 mg at 01/20/20 4241    amiodarone (CORDARONE) tablet 200 mg  200 mg Oral Daily MIKO Vogel - CNP   200 mg at 01/20/20 8330    potassium chloride (KLOR-CON M) extended release tablet 40 mEq  40 mEq Oral PRN Bijal Raymundo MD   40 mEq at 01/15/20 0844    Or    potassium bicarb-citric acid (EFFER-K) effervescent tablet 40 mEq  40 mEq Oral PRN Sarai Staley MD        Or    potassium chloride 10 mEq/100 mL IVPB (Peripheral Line)  10 mEq Intravenous PRN Sarai Staley MD        magnesium sulfate 1 g in dextrose 5% 100 mL IVPB  1 g Intravenous PRN Sarai Staley MD        prochlorperazine (COMPAZINE) injection 10 mg  10 mg Intravenous Q6H PRN Nereida Garcia MD        aspirin chewable tablet 81 mg  81 mg Oral Daily Dustin Peterson MD   81 mg at 01/20/20 0214    perflutren lipid microspheres (DEFINITY) injection 1.65 mg  1.5 mL Intravenous ONCE PRN Dustin Peterson MD        allopurinol (ZYLOPRIM) tablet 300 mg  300 mg Oral Daily Isaac Wells MD   300 mg at 01/20/20 5513    atorvastatin (LIPITOR) tablet 40 mg  40 mg Oral Daily Isaac Wells MD   40 mg at 01/20/20 0916    nicotine (NICODERM CQ) 14 MG/24HR 1 patch  1 patch Transdermal Daily Isaac Wells MD   1 patch at 01/20/20 0915    sodium chloride flush 0.9 % injection 10 mL  10 mL Intravenous 2 times per day Isaac Wells MD   10 mL at 01/17/20 2209    sodium chloride flush 0.9 % injection 10 mL  10 mL Intravenous PRN Isaac Wells MD   10 mL at 01/12/20 0611    magnesium hydroxide (MILK OF MAGNESIA) 400 MG/5ML suspension 30 mL  30 mL Oral Daily PRN Isaac Wells MD           Allergies:     Allergies   Allergen Reactions    Penicillins Hives       Problem List:    Patient Active Problem List   Diagnosis Code    Hemothorax J94.2    Acute respiratory failure with hypoxemia (HCC) J96.01    Tobacco abuse Z72.0    AS (aortic stenosis) I35.0    CAD (coronary artery disease) s/p stent in 2010 I25.10    HTN (hypertension) I10    Hyperlipidemia E78.5    S/P AVR (aortic valve replacement) Z95.2    Hypotension I95.9    Pulmonary infiltrate R91.8    Pulmonary venous congestion R09.89    Atelectasis J98.11    Leukocytosis D72.829    Hyperglycemia R73.9    Acute bronchospasm J98.01    Atrial fibrillation last liquid consumption: 01/08/20                        Date of last solid food consumption: 01/08/20    BMI:   Wt Readings from Last 3 Encounters:   01/20/20 185 lb 14.4 oz (84.3 kg)   01/06/20 209 lb 7 oz (95 kg)     Body mass index is 26.67 kg/m².     CBC:   Lab Results   Component Value Date    WBC 7.4 01/20/2020    RBC 3.16 01/20/2020    HGB 9.8 01/20/2020    HCT 29.3 01/20/2020    MCV 92.8 01/20/2020    RDW 16.2 01/20/2020     01/20/2020       CMP:   Lab Results   Component Value Date     01/18/2020    K 3.6 01/18/2020    K 3.2 01/14/2020    CL 92 01/18/2020    CO2 33 01/18/2020    BUN 13 01/18/2020    CREATININE 0.6 01/18/2020    GFRAA >60 01/18/2020    AGRATIO 1.8 01/02/2020    LABGLOM >60 01/18/2020    GLUCOSE 97 01/18/2020    PROT 3.3 01/02/2020    CALCIUM 8.4 01/18/2020    BILITOT 0.7 01/02/2020    ALKPHOS 29 01/02/2020     01/02/2020     01/02/2020       POC Tests:   Recent Labs     01/20/20  0737   POCGLU 136*       Coags:   Lab Results   Component Value Date    PROTIME 23.8 01/02/2020    INR 2.04 01/02/2020    APTT 27.8 01/02/2020       HCG (If Applicable): No results found for: PREGTESTUR, PREGSERUM, HCG, HCGQUANT     ABGs:   Lab Results   Component Value Date    PHART 7.477 01/13/2020    PO2ART 50.5 01/13/2020    HUT8XWV 47.2 01/13/2020    WEC9TXV 34.9 01/13/2020    BEART 11 01/13/2020    P2VPPSIB 87 01/13/2020        Type & Screen (If Applicable):  No results found for: LABABO, LABRH    Anesthesia Evaluation    Airway: Mallampati: II  TM distance: >3 FB   Neck ROM: full  Mouth opening: > = 3 FB Dental:    (+) edentulous      Pulmonary:   (+) COPD:  current smoker                           Cardiovascular:    (+) hypertension:, CAD:, CHF: diastolic, hyperlipidemia                  Neuro/Psych:               GI/Hepatic/Renal:             Endo/Other:    (+) DiabetesType II DM, , .                 Abdominal:           Vascular:                                      Anesthesia Plan      general     ASA 4     Milly Mcmanus  PRE-ANESTHESIA EVALUATION FORM       Name:  Delmar Gross                                         Age:  77 y.o. MRN:  [id number]           I discussed intravenous with inhalational endotracheal anesthesia with pulmonary artery catheter, radial ( or other artery if required) catheter, possible transesophageal echocardiography and post-operative ventilation including risks and alternatives with the patient . The patient has no further questions. Zohra Saul MD  January 20, 2020  12:18 PM)  Induction: inhalational and intravenous. arterial line, BIS, central line, PA catheter and ZOEY    Anesthetic plan and risks discussed with patient.                       Zohra Saul MD   1/20/2020

## 2020-01-20 NOTE — PROGRESS NOTES
RESPIRATORY THERAPY ASSESSMENT    Name:  Haley Yu  Medical Record Number:  [id number]  Age: 77 y.o. Gender: male  : 1950  Today's Date:  2020  Room:  0422/0422-01    Assessment     Is the patient being admitted for a COPD or Asthma exacerbation? No   (If yes the patient will be seen every 4 hours for the first 24 hours and then reassessed)    Patient Admission Diagnosis      Allergies  Allergies   Allergen Reactions    Penicillins Hives       Minimum Predicted Vital Capacity:     1035          Actual Vital Capacity:      N/A              Pulmonary History:COPD  Home Oxygen Therapy:  room air  Home Respiratory Therapy:None   Current Respiratory Therapy:  Duoneb Q4 prn HHN  Treatment Type: HHN  Medications: Albuterol/Ipratropium    Respiratory Severity Index(RSI)   Patients with orders for inhalation medications, oxygen, or any therapeutic treatment modality will be placed on Respiratory Protocol. They will be assessed with the first treatment and at least every 72 hours thereafter. The following severity scale will be used to determine frequency of treatment intervention. Smoking History: Pulmonary Disease or Smoking History, Greater than 15 pack year = 2    Social History  Social History     Tobacco Use    Smoking status: Current Every Day Smoker     Packs/day: 2.00     Types: Cigarettes    Smokeless tobacco: Current User   Substance Use Topics    Alcohol use:  Yes     Alcohol/week: 6.0 standard drinks     Types: 6 Cans of beer per week     Frequency: 4 or more times a week     Drinks per session: 5 or 6     Binge frequency: Daily or almost daily    Drug use: Not Currently       Recent Surgical History: None = 0  Past Surgical History  Past Surgical History:   Procedure Laterality Date    BRONCHOSCOPY N/A 2020    BRONCHOSCOPY ALVEOLAR LAVAGE performed by Baron Tristan MD at [address] 12/07/2010    NURIS- 4.0 x 28 to Cx    THORACOTOMY Left 1/2/2020    EMERGENT THORACOTOMY, EVACUATION OF HEMOTOMA CHEST WALL HEMOSTASIS, PLEUREAL BIOPSY, LEFT UPPER LOBE RESECTION performed by Jonathon Bermudez MD at [address]       Level of Consciousness: Alert, Oriented, and Cooperative = 0    Level of Activity: Walking unassisted = 0    Respiratory Pattern: Regular Pattern; RR 8-20 = 0    Breath Sounds: Diminshed bilaterally and/or crackles = 2    Sputum  Sputum Color: Unable to assess, Tenacity: Thin, Sputum How Obtained: Spontaneous cough  Cough: Strong, spontaneous, non-productive = 0    Vital Signs   /71   Pulse 81   Temp 97.5 °F (36.4 °C)   Resp 18   Ht 5' 10\" (1.778 m)   Wt 185 lb 14.4 oz (84.3 kg)   SpO2 98%   BMI 26.67 kg/m²   SPO2 (COPD values may differ): Greater than or equal to 92% on room air = 0    Peak Flow (asthma only): not applicable = 0    RSI: 0-4 = See once and convert to home regimen or discontinue        Plan       Goals: medication delivery    Patient/caregiver was educated on the proper method of use for Respiratory Care Devices:  Yes      Level of patient/caregiver understanding able to:   ? Verbalize understanding   ? Demonstrate understanding       ? Teach back        ? Needs reinforcement       ? No available caregiver               ? Other:     Response to education:  Good     Is patient being placed on Home Treatment Regimen? Yes     Does the patient have everything they need prior to discharge? NA     Comments: Patient and chart assessed    Plan of Care: Home regimen    Electronically signed by Sharon Perez RCP on 1/20/2020 at 4:34 PM    Respiratory Protocol Guidelines     1. Assessment and treatment by Respiratory Therapy will be initiated for medication and therapeutic interventions upon initiation of aerosolized medication. 2. Physician will be contacted for respiratory rate (RR) greater than 35 breaths per minute.  Therapy will be held for heart rate (HR)

## 2020-01-20 NOTE — PROGRESS NOTES
Pt resting quietly in bed. No c/o voiced. Safety/fall prevention , personal belongings and call light within reach.  BOBBYRN

## 2020-01-20 NOTE — PROGRESS NOTES
Hospitalist Progress Note      PCP: No primary care provider on file. Date of Admission: 1/8/2020    Chief Complaint:  SOB     History Of Present Illness:    \"69 y.o. male who presented to [facility] with SOB. Patient was discharged from [facility] for pneumonia and a large L hemothorax. He was discharged home much improved and felt well yesterday. However, overnight, patient became increasingly SOB. This worsened this AM and presented to OSH. Patient has known severe aortic stenosis and is being evaluated by CT surgery as an outpatient for repair of his bioprosthetic valve. At OSH, he was found to be having acute heart failure and mild cardiogenic shock. He was started on low dose dobutamine with good response and transferred to [facility]. \"      Subjective:  No acute issues pt w/o current complaints.      Medications:  Reviewed    Infusion Medications    dextrose       Scheduled Medications    insulin lispro  3 Units Subcutaneous Daily before lunch    furosemide  40 mg Oral BID    carvedilol  3.125 mg Oral BID WC    sodium chloride flush  10 mL Intravenous 2 times per day    insulin glargine  10 Units Subcutaneous Nightly    insulin lispro  0-12 Units Subcutaneous TID WC    insulin lispro  0-6 Units Subcutaneous Nightly    amiodarone  200 mg Oral Daily    aspirin  81 mg Oral Daily    allopurinol  300 mg Oral Daily    atorvastatin  40 mg Oral Daily    nicotine  1 patch Transdermal Daily    sodium chloride flush  10 mL Intravenous 2 times per day     PRN Meds: ipratropium-albuterol, sodium chloride flush, acetaminophen, magnesium hydroxide, glucose, dextrose, glucagon (rDNA), dextrose, traMADol, potassium chloride **OR** potassium alternative oral replacement **OR** potassium chloride, magnesium sulfate, prochlorperazine, perflutren lipid microspheres, sodium chloride flush, magnesium hydroxide      Intake/Output Summary (Last 24 hours) at 1/20/2020 1112  Last data filed at 1/20/2020 0915  Gross per 24 recommendations for incidental cystic pancreatic masses in asymptomatic*   patients on CT, MR, or US      < 2 cm:  Single follow-up in 1 yr (MR preferred)      *Stable:  Benign, no further follow-up   *Growth: As below based upon size   Geena ARAGON, et al.  Managing incidental findings on abdominal CT: white    paper   of the ACR Incidental Findings Committee. J Am Tyrel Radiol 2010;    8:913-884. Final      CT HEAD WO CONTRAST   Final Result   No evidence of acute intracranial abnormality on a study mildly limited as   described above. VL DUP CAROTID BILATERAL   Final Result      XR CHEST PORTABLE   Final Result   Vascular congestion without overt edema. Persistent nodule like density of the right mid lung. Follow-up to ensure   resolution and exclude malignancy is needed. Unchanged left-sided atelectasis versus pneumonia over the past 24 hours         XR CHEST PORTABLE   Final Result   Right midlung zone airspace opacities could represent atelectasis or pneumonia                 Assessment/Plan:    Active Hospital Problems    Diagnosis    Cardiogenic shock ([address] [R57.0]    Acute on chronic diastolic congestive heart failure (HCC) [I50.33]    Pulmonary infiltrate [R91.8]    Pulmonary venous congestion [R09.89]    Atelectasis [J98.11]    Leukocytosis [D72.829]    Hyperglycemia [R73.9]    Acute bronchospasm [J98.01]    Atrial fibrillation with RVR (HCC) [I48.91]    Hypotension [I95.9]    Acute respiratory failure with hypoxemia (HCC) [J96.01]    Tobacco abuse [Z72.0]    S/P AVR (aortic valve replacement) [Z95.2]    AS (aortic stenosis) [I35.0]       \"69 y.o. male who presented to [facility] with SOB. Patient was discharged from [facility] for pneumonia and a large L hemothorax. He was discharged home much improved and felt well yesterday. However, overnight, patient became increasingly SOB. This worsened this AM and presented to OSH.  Patient has known severe aortic stenosis and setting of recent major blood loss. Muscular deconditioning, mechanical falls  - PT/OT. - he says he had syncope a couple months ago while having a BM. This was presumably vasovagal.  Discussed with CT surgery, normal B12, head CT normal, TSH wnl. DVT Prophylaxis: SCDs  Diet: DIET CARDIAC;  Code Status: Full Code    PT/OT Eval Status: rec'd SNF    Dispo - perhaps 1/26, pending post-op course. [place] [id number] last time.        Steph Lopez, APRN - CNP

## 2020-01-20 NOTE — PROGRESS NOTES
(12/07/2010). Restrictions  Restrictions/Precautions  Restrictions/Precautions: Up as Tolerated, General Precautions  Position Activity Restriction  Other position/activity restrictions: Up with assist  Subjective   General  Chart Reviewed: Yes  Response To Previous Treatment: Patient with no complaints from previous session. Family / Caregiver Present: No  Subjective  Subjective: Patient is agreeable to therapy. General Comment  Comments: Patient resting in bed upon entry. RN cleared patient for therapy. Pain Screening  Patient Currently in Pain: No  Vital Signs  Patient Currently in Pain: No       Orientation  Orientation  Overall Orientation Status: Within Normal Limits  Cognition   Cognition  Overall Cognitive Status: WFL  Objective   Bed mobility  Rolling to Left: Independent  Rolling to Right: Independent  Supine to Sit: Independent  Sit to Supine: Independent  Scooting: Independent  Transfers  Sit to Stand: Independent  Stand to sit: Independent  Bed to Chair: Independent  Ambulation  Ambulation?: Yes  More Ambulation?: No  Ambulation 1  Surface: level tile  Device: [place]; No Device  Assistance: Modified Independent; Independent  Quality of Gait: gait within normal limits except for decreased gait velocity, mildly narrow base of support, forward flexed posture. Gait Deviations: Slow Beena  Distance: 200 feet (modified independent with rolling walker). 150 feet independent with no assistive device. (patient initially said that he needed the walker and then later agreed that his balance has improved and did not need RW). Patient displayed good balance both with and without walker   Comments: No complaints of shortness of breath, chest pain or dizziness. No loss of balance. Stairs/Curb  Stairs?: Yes  Stairs  # Steps : 4  Stairs Height: 6\"  Rails: Right ascending  Device: No Device  Assistance: Independent  Comment: No complaints of shortness of breath, chest pain or dizziness.  No loss of balance. Safe non-reciprocal step pattern. Balance  Posture: Fair  Sitting - Static: Good  Sitting - Dynamic: Good  Standing - Static: Good  Standing - Dynamic: Good  Exercises  Heelslides: 1 x 10  Hip Flexion: 1 x 15  Hip Abduction: 1 x 15, followed by 1 x 15 seated step outs. Knee Long Arc Quad: 1 x 15  Ankle Pumps: 1 x 15  Other exercises  Other exercises?: No                       AM-PAC Score  AM-PAC Inpatient Mobility Raw Score : 24 (01/20/20 0937)  AM-PAC Inpatient T-Scale Score : 61.14 (01/20/20 0937)  Mobility Inpatient CMS 0-100% Score: 0 (01/20/20 0937)  Mobility Inpatient CMS G-Code Modifier : [facility] (01/20/20 3345)          Goals  Short term goals  Time Frame for Short term goals: N/A PT signing off. Short term goal 1: Pt will perform bed mobility with independence. Goal met 1/20/20  Short term goal 2: Pt will perform sit<>stand with SBA. Goal met 1/20/20  Short term goal 3: Pt will ambulate 100 feet with no AD, no LOB, and O2 stats remaining above 92% and CGA. Goal met 1/20/20  Short term goal 4: Pt will perform 12-15 reps of BLE ther ex for strengthening and balance by 1/13/20. Goal met 1/20/20  Patient Goals   Patient goals : To feel better. To have his surgery. Plan    Plan  Times per week: N/A PT signing off. Times per day: Daily  Plan weeks: N/A PT signing off. Specific instructions for Next Treatment: N/A PT signing off.    Current Treatment Recommendations: Strengthening, ROM, Balance Training, Transfer Training, Functional Mobility Training, Endurance Training, Stair training, Gait Training, Neuromuscular Re-education, Safety Education & Training, Home Exercise Program, Patient/Caregiver Education & Training, Equipment Evaluation, Education, & procurement  Safety Devices  Type of devices: Call light within reach, Gait belt, Left in bed, All fall risk precautions in place, Nurse notified(patient's RN in room at end of session)  Restraints  Initially in place: No     Therapy Time Individual Concurrent Group Co-treatment   Time In 0903         Time Out 0927         Minutes 24         Timed Code Treatment Minutes: 24 Minutes       Rajani Meyers PT

## 2020-01-20 NOTE — PLAN OF CARE
Problem: Serum Glucose Level - Abnormal:  Goal: Ability to maintain appropriate glucose levels will improve  Description  Ability to maintain appropriate glucose levels will improve  Outcome: Ongoing     Problem: Sensory Perception - Impaired:  Goal: Ability to maintain a stable neurologic state will improve  Description  Ability to maintain a stable neurologic state will improve  Outcome: Ongoing

## 2020-01-21 ENCOUNTER — APPOINTMENT (OUTPATIENT)
Dept: GENERAL RADIOLOGY | Age: 70
DRG: 216 | End: 2020-01-21
Attending: INTERNAL MEDICINE
Payer: MEDICARE

## 2020-01-21 ENCOUNTER — ANESTHESIA (OUTPATIENT)
Dept: OPERATING ROOM | Age: 70
DRG: 216 | End: 2020-01-21
Payer: MEDICARE

## 2020-01-21 VITALS
TEMPERATURE: 99.3 F | SYSTOLIC BLOOD PRESSURE: 65 MMHG | RESPIRATION RATE: 12 BRPM | DIASTOLIC BLOOD PRESSURE: 41 MMHG | OXYGEN SATURATION: 100 %

## 2020-01-21 LAB
ALBUMIN SERPL-MCNC: 3.3 G/DL (ref 3.4–5)
ALP BLD-CCNC: 109 U/L (ref 40–129)
ALT SERPL-CCNC: 29 U/L (ref 10–40)
ANION GAP SERPL CALCULATED.3IONS-SCNC: 11 MMOL/L (ref 3–16)
ANION GAP SERPL CALCULATED.3IONS-SCNC: 15 MMOL/L (ref 3–16)
APTT: 36.7 SEC (ref 24.2–36.2)
AST SERPL-CCNC: 18 U/L (ref 15–37)
BASE EXCESS ARTERIAL: -1 (ref -3–3)
BASE EXCESS ARTERIAL: -1 (ref -3–3)
BASE EXCESS ARTERIAL: -2 (ref -3–3)
BASE EXCESS ARTERIAL: 0 (ref -3–3)
BASE EXCESS ARTERIAL: 0 (ref -3–3)
BASE EXCESS ARTERIAL: 1 (ref -3–3)
BASE EXCESS ARTERIAL: 1 (ref -3–3)
BASE EXCESS ARTERIAL: 2 (ref -3–3)
BASE EXCESS ARTERIAL: 2 (ref -3–3)
BASE EXCESS ARTERIAL: 3 (ref -3–3)
BASE EXCESS ARTERIAL: 4 (ref -3–3)
BASE EXCESS ARTERIAL: 5 (ref -3–3)
BASE EXCESS ARTERIAL: 7 (ref -3–3)
BASE EXCESS ARTERIAL: 8 (ref -3–3)
BASOPHILS ABSOLUTE: 0.1 K/UL (ref 0–0.2)
BASOPHILS RELATIVE PERCENT: 0.8 %
BILIRUB SERPL-MCNC: 0.6 MG/DL (ref 0–1)
BILIRUBIN DIRECT: <0.2 MG/DL (ref 0–0.3)
BILIRUBIN, INDIRECT: ABNORMAL MG/DL (ref 0–1)
BLOOD BANK DISPENSE STATUS: NORMAL
BLOOD BANK PRODUCT CODE: NORMAL
BPU ID: NORMAL
BUN BLDV-MCNC: 10 MG/DL (ref 7–20)
BUN BLDV-MCNC: 11 MG/DL (ref 7–20)
CALCIUM IONIZED: 0.83 MMOL/L (ref 1.12–1.32)
CALCIUM IONIZED: 0.84 MMOL/L (ref 1.12–1.32)
CALCIUM IONIZED: 0.86 MMOL/L (ref 1.12–1.32)
CALCIUM IONIZED: 0.88 MMOL/L (ref 1.12–1.32)
CALCIUM IONIZED: 0.9 MMOL/L (ref 1.12–1.32)
CALCIUM IONIZED: 0.91 MMOL/L (ref 1.12–1.32)
CALCIUM IONIZED: 0.93 MMOL/L (ref 1.12–1.32)
CALCIUM IONIZED: 0.99 MMOL/L (ref 1.12–1.32)
CALCIUM IONIZED: 1.08 MMOL/L (ref 1.12–1.32)
CALCIUM IONIZED: 1.2 MMOL/L (ref 1.12–1.32)
CALCIUM IONIZED: 1.23 MMOL/L (ref 1.12–1.32)
CALCIUM IONIZED: 1.26 MMOL/L (ref 1.12–1.32)
CALCIUM IONIZED: 1.27 MMOL/L (ref 1.12–1.32)
CALCIUM IONIZED: 1.28 MMOL/L (ref 1.12–1.32)
CALCIUM IONIZED: 1.49 MMOL/L (ref 1.12–1.32)
CALCIUM IONIZED: 1.58 MMOL/L (ref 1.12–1.32)
CALCIUM SERPL-MCNC: 8.5 MG/DL (ref 8.3–10.6)
CALCIUM SERPL-MCNC: 9.3 MG/DL (ref 8.3–10.6)
CHLORIDE BLD-SCNC: 91 MMOL/L (ref 99–110)
CHLORIDE BLD-SCNC: 99 MMOL/L (ref 99–110)
CHOLESTEROL, TOTAL: 109 MG/DL (ref 0–199)
CO2: 24 MMOL/L (ref 21–32)
CO2: 30 MMOL/L (ref 21–32)
CREAT SERPL-MCNC: 0.6 MG/DL (ref 0.8–1.3)
CREAT SERPL-MCNC: 0.8 MG/DL (ref 0.8–1.3)
DESCRIPTION BLOOD BANK: NORMAL
EOSINOPHILS ABSOLUTE: 0.2 K/UL (ref 0–0.6)
EOSINOPHILS RELATIVE PERCENT: 2.5 %
ESTIMATED AVERAGE GLUCOSE: 88.2 MG/DL
GFR AFRICAN AMERICAN: >60
GFR AFRICAN AMERICAN: >60
GFR NON-AFRICAN AMERICAN: >60
GFR NON-AFRICAN AMERICAN: >60
GLUCOSE BLD-MCNC: 108 MG/DL (ref 70–99)
GLUCOSE BLD-MCNC: 135 MG/DL (ref 70–99)
GLUCOSE BLD-MCNC: 139 MG/DL (ref 70–99)
GLUCOSE BLD-MCNC: 143 MG/DL (ref 70–99)
GLUCOSE BLD-MCNC: 146 MG/DL (ref 70–99)
GLUCOSE BLD-MCNC: 155 MG/DL (ref 70–99)
GLUCOSE BLD-MCNC: 155 MG/DL (ref 70–99)
GLUCOSE BLD-MCNC: 160 MG/DL (ref 70–99)
GLUCOSE BLD-MCNC: 173 MG/DL (ref 70–99)
GLUCOSE BLD-MCNC: 176 MG/DL (ref 70–99)
GLUCOSE BLD-MCNC: 176 MG/DL (ref 70–99)
GLUCOSE BLD-MCNC: 186 MG/DL (ref 70–99)
GLUCOSE BLD-MCNC: 198 MG/DL (ref 70–99)
GLUCOSE BLD-MCNC: 201 MG/DL (ref 70–99)
GLUCOSE BLD-MCNC: 203 MG/DL (ref 70–99)
GLUCOSE BLD-MCNC: 203 MG/DL (ref 70–99)
GLUCOSE BLD-MCNC: 231 MG/DL (ref 70–99)
GLUCOSE BLD-MCNC: 312 MG/DL (ref 70–99)
GLUCOSE BLD-MCNC: 313 MG/DL (ref 70–99)
GLUCOSE BLD-MCNC: 98 MG/DL (ref 70–99)
HBA1C MFR BLD: 4.7 %
HCO3 ARTERIAL: 22.9 MMOL/L (ref 21–29)
HCO3 ARTERIAL: 23.9 MMOL/L (ref 21–29)
HCO3 ARTERIAL: 24.3 MMOL/L (ref 21–29)
HCO3 ARTERIAL: 24.7 MMOL/L (ref 21–29)
HCO3 ARTERIAL: 26.1 MMOL/L (ref 21–29)
HCO3 ARTERIAL: 26.7 MMOL/L (ref 21–29)
HCO3 ARTERIAL: 27.2 MMOL/L (ref 21–29)
HCO3 ARTERIAL: 27.3 MMOL/L (ref 21–29)
HCO3 ARTERIAL: 27.4 MMOL/L (ref 21–29)
HCO3 ARTERIAL: 27.9 MMOL/L (ref 21–29)
HCO3 ARTERIAL: 28.2 MMOL/L (ref 21–29)
HCO3 ARTERIAL: 28.4 MMOL/L (ref 21–29)
HCO3 ARTERIAL: 29.2 MMOL/L (ref 21–29)
HCO3 ARTERIAL: 29.2 MMOL/L (ref 21–29)
HCO3 ARTERIAL: 30.4 MMOL/L (ref 21–29)
HCO3 ARTERIAL: 32.4 MMOL/L (ref 21–29)
HCT VFR BLD CALC: 28.7 % (ref 40.5–52.5)
HCT VFR BLD CALC: 28.9 % (ref 40.5–52.5)
HDLC SERPL-MCNC: 47 MG/DL (ref 40–60)
HEMOGLOBIN: 10 GM/DL (ref 13.5–17.5)
HEMOGLOBIN: 10.7 GM/DL (ref 13.5–17.5)
HEMOGLOBIN: 6.1 GM/DL (ref 13.5–17.5)
HEMOGLOBIN: 6.3 GM/DL (ref 13.5–17.5)
HEMOGLOBIN: 6.5 GM/DL (ref 13.5–17.5)
HEMOGLOBIN: 6.6 GM/DL (ref 13.5–17.5)
HEMOGLOBIN: 6.9 GM/DL (ref 13.5–17.5)
HEMOGLOBIN: 7 GM/DL (ref 13.5–17.5)
HEMOGLOBIN: 7 GM/DL (ref 13.5–17.5)
HEMOGLOBIN: 7.3 GM/DL (ref 13.5–17.5)
HEMOGLOBIN: 8.6 GM/DL (ref 13.5–17.5)
HEMOGLOBIN: 9.4 G/DL (ref 13.5–17.5)
HEMOGLOBIN: 9.4 GM/DL (ref 13.5–17.5)
HEMOGLOBIN: 9.5 GM/DL (ref 13.5–17.5)
HEMOGLOBIN: 9.7 G/DL (ref 13.5–17.5)
INR BLD: 1.24 (ref 0.86–1.14)
INR BLD: 1.57 (ref 0.86–1.14)
LACTATE: 5.72 MMOL/L (ref 0.4–2)
LACTATE: 6.55 MMOL/L (ref 0.4–2)
LACTATE: 6.58 MMOL/L (ref 0.4–2)
LACTATE: 6.81 MMOL/L (ref 0.4–2)
LACTATE: 7.48 MMOL/L (ref 0.4–2)
LDL CHOLESTEROL CALCULATED: 49 MG/DL
LYMPHOCYTES ABSOLUTE: 1.4 K/UL (ref 1–5.1)
LYMPHOCYTES RELATIVE PERCENT: 19.2 %
MAGNESIUM: 1.9 MG/DL (ref 1.8–2.4)
MAGNESIUM: 2.8 MG/DL (ref 1.8–2.4)
MCH RBC QN AUTO: 30.3 PG (ref 26–34)
MCH RBC QN AUTO: 30.5 PG (ref 26–34)
MCHC RBC AUTO-ENTMCNC: 32.9 G/DL (ref 31–36)
MCHC RBC AUTO-ENTMCNC: 33.5 G/DL (ref 31–36)
MCV RBC AUTO: 90.4 FL (ref 80–100)
MCV RBC AUTO: 92.9 FL (ref 80–100)
MONOCYTES ABSOLUTE: 1.2 K/UL (ref 0–1.3)
MONOCYTES RELATIVE PERCENT: 16.2 %
NEUTROPHILS ABSOLUTE: 4.4 K/UL (ref 1.7–7.7)
NEUTROPHILS RELATIVE PERCENT: 61.3 %
O2 SAT, ARTERIAL: 100 % (ref 93–100)
PCO2 ARTERIAL: 24.8 MM HG (ref 35–45)
PCO2 ARTERIAL: 25.3 MM HG (ref 35–45)
PCO2 ARTERIAL: 30.6 MM HG (ref 35–45)
PCO2 ARTERIAL: 33.4 MM HG (ref 35–45)
PCO2 ARTERIAL: 35.7 MM HG (ref 35–45)
PCO2 ARTERIAL: 37.4 MM HG (ref 35–45)
PCO2 ARTERIAL: 38.7 MM HG (ref 35–45)
PCO2 ARTERIAL: 38.9 MM HG (ref 35–45)
PCO2 ARTERIAL: 40.4 MM HG (ref 35–45)
PCO2 ARTERIAL: 41.1 MM HG (ref 35–45)
PCO2 ARTERIAL: 43.1 MM HG (ref 35–45)
PCO2 ARTERIAL: 45 MM HG (ref 35–45)
PCO2 ARTERIAL: 45.7 MM HG (ref 35–45)
PCO2 ARTERIAL: 46.4 MM HG (ref 35–45)
PCO2 ARTERIAL: 48.5 MM HG (ref 35–45)
PCO2 ARTERIAL: 48.8 MM HG (ref 35–45)
PDW BLD-RTO: 16.5 % (ref 12.4–15.4)
PDW BLD-RTO: 16.7 % (ref 12.4–15.4)
PERFORMED ON: ABNORMAL
PH ARTERIAL: 7.34 (ref 7.35–7.45)
PH ARTERIAL: 7.37 (ref 7.35–7.45)
PH ARTERIAL: 7.37 (ref 7.35–7.45)
PH ARTERIAL: 7.39 (ref 7.35–7.45)
PH ARTERIAL: 7.39 (ref 7.35–7.45)
PH ARTERIAL: 7.4 (ref 7.35–7.45)
PH ARTERIAL: 7.41 (ref 7.35–7.45)
PH ARTERIAL: 7.41 (ref 7.35–7.45)
PH ARTERIAL: 7.42 (ref 7.35–7.45)
PH ARTERIAL: 7.45 (ref 7.35–7.45)
PH ARTERIAL: 7.46 (ref 7.35–7.45)
PH ARTERIAL: 7.47 (ref 7.35–7.45)
PH ARTERIAL: 7.48 (ref 7.35–7.45)
PH ARTERIAL: 7.53 (ref 7.35–7.45)
PH ARTERIAL: 7.56 (ref 7.35–7.45)
PH ARTERIAL: 7.59 (ref 7.35–7.45)
PLATELET # BLD: 112 K/UL (ref 135–450)
PLATELET # BLD: 253 K/UL (ref 135–450)
PMV BLD AUTO: 7.1 FL (ref 5–10.5)
PMV BLD AUTO: 7.2 FL (ref 5–10.5)
PO2 ARTERIAL: 255.8 MM HG (ref 75–108)
PO2 ARTERIAL: 289.1 MM HG (ref 75–108)
PO2 ARTERIAL: 306.4 MM HG (ref 75–108)
PO2 ARTERIAL: 327.3 MM HG (ref 75–108)
PO2 ARTERIAL: 329.6 MM HG (ref 75–108)
PO2 ARTERIAL: 341.8 MM HG (ref 75–108)
PO2 ARTERIAL: 345 MM HG (ref 75–108)
PO2 ARTERIAL: 377.9 MM HG (ref 75–108)
PO2 ARTERIAL: 398.6 MM HG (ref 75–108)
PO2 ARTERIAL: 415.3 MM HG (ref 75–108)
PO2 ARTERIAL: 417.2 MM HG (ref 75–108)
PO2 ARTERIAL: 417.6 MM HG (ref 75–108)
PO2 ARTERIAL: 417.9 MM HG (ref 75–108)
PO2 ARTERIAL: 445.7 MM HG (ref 75–108)
PO2 ARTERIAL: 529.4 MM HG (ref 75–108)
PO2 ARTERIAL: 568.1 MM HG (ref 75–108)
POC HEMATOCRIT: 18 % (ref 40.5–52.5)
POC HEMATOCRIT: 19 % (ref 40.5–52.5)
POC HEMATOCRIT: 20 % (ref 40.5–52.5)
POC HEMATOCRIT: 21 % (ref 40.5–52.5)
POC HEMATOCRIT: 25 % (ref 40.5–52.5)
POC HEMATOCRIT: 28 % (ref 40.5–52.5)
POC HEMATOCRIT: 28 % (ref 40.5–52.5)
POC HEMATOCRIT: 29 % (ref 40.5–52.5)
POC HEMATOCRIT: 31 % (ref 40.5–52.5)
POC PATIENT TEMP: 18
POC PATIENT TEMP: 19
POC PATIENT TEMP: 25
POC PATIENT TEMP: 25
POC POTASSIUM: 3.8 MMOL/L (ref 3.5–5.1)
POC POTASSIUM: 3.8 MMOL/L (ref 3.5–5.1)
POC POTASSIUM: 3.9 MMOL/L (ref 3.5–5.1)
POC POTASSIUM: 3.9 MMOL/L (ref 3.5–5.1)
POC POTASSIUM: 4 MMOL/L (ref 3.5–5.1)
POC POTASSIUM: 4.1 MMOL/L (ref 3.5–5.1)
POC POTASSIUM: 4.2 MMOL/L (ref 3.5–5.1)
POC POTASSIUM: 4.2 MMOL/L (ref 3.5–5.1)
POC POTASSIUM: 4.3 MMOL/L (ref 3.5–5.1)
POC POTASSIUM: 4.5 MMOL/L (ref 3.5–5.1)
POC POTASSIUM: 4.7 MMOL/L (ref 3.5–5.1)
POC POTASSIUM: 4.8 MMOL/L (ref 3.5–5.1)
POC POTASSIUM: 5 MMOL/L (ref 3.5–5.1)
POC POTASSIUM: 5.2 MMOL/L (ref 3.5–5.1)
POC POTASSIUM: 5.3 MMOL/L (ref 3.5–5.1)
POC POTASSIUM: 5.5 MMOL/L (ref 3.5–5.1)
POC SAMPLE TYPE: ABNORMAL
POC SODIUM: 130 MMOL/L (ref 136–145)
POC SODIUM: 131 MMOL/L (ref 136–145)
POC SODIUM: 134 MMOL/L (ref 136–145)
POC SODIUM: 135 MMOL/L (ref 136–145)
POC SODIUM: 136 MMOL/L (ref 136–145)
POC SODIUM: 136 MMOL/L (ref 136–145)
POC SODIUM: 137 MMOL/L (ref 136–145)
POC SODIUM: 137 MMOL/L (ref 136–145)
POC SODIUM: 138 MMOL/L (ref 136–145)
POC SODIUM: 140 MMOL/L (ref 136–145)
POC SODIUM: 142 MMOL/L (ref 136–145)
POC SODIUM: 142 MMOL/L (ref 136–145)
POC SODIUM: 143 MMOL/L (ref 136–145)
POC SODIUM: 144 MMOL/L (ref 136–145)
POTASSIUM REFLEX MAGNESIUM: 3.1 MMOL/L (ref 3.5–5.1)
POTASSIUM SERPL-SCNC: 4 MMOL/L (ref 3.5–5.1)
PROTHROMBIN TIME: 14.4 SEC (ref 10–13.2)
PROTHROMBIN TIME: 18.3 SEC (ref 10–13.2)
RBC # BLD: 3.09 M/UL (ref 4.2–5.9)
RBC # BLD: 3.2 M/UL (ref 4.2–5.9)
SODIUM BLD-SCNC: 132 MMOL/L (ref 136–145)
SODIUM BLD-SCNC: 138 MMOL/L (ref 136–145)
TCO2 ARTERIAL: 24 MMOL/L
TCO2 ARTERIAL: 25 MMOL/L
TCO2 ARTERIAL: 26 MMOL/L
TCO2 ARTERIAL: 26 MMOL/L
TCO2 ARTERIAL: 28 MMOL/L
TCO2 ARTERIAL: 28 MMOL/L
TCO2 ARTERIAL: 29 MMOL/L
TCO2 ARTERIAL: 30 MMOL/L
TCO2 ARTERIAL: 31 MMOL/L
TCO2 ARTERIAL: 31 MMOL/L
TCO2 ARTERIAL: 32 MMOL/L
TCO2 ARTERIAL: 34 MMOL/L
TOTAL PROTEIN: 6.2 G/DL (ref 6.4–8.2)
TRIGL SERPL-MCNC: 67 MG/DL (ref 0–150)
VLDLC SERPL CALC-MCNC: 13 MG/DL
WBC # BLD: 14.9 K/UL (ref 4–11)
WBC # BLD: 7.3 K/UL (ref 4–11)

## 2020-01-21 PROCEDURE — C1894 INTRO/SHEATH, NON-LASER: HCPCS | Performed by: THORACIC SURGERY (CARDIOTHORACIC VASCULAR SURGERY)

## 2020-01-21 PROCEDURE — 82947 ASSAY GLUCOSE BLOOD QUANT: CPT

## 2020-01-21 PROCEDURE — 80061 LIPID PANEL: CPT

## 2020-01-21 PROCEDURE — 88300 SURGICAL PATH GROSS: CPT

## 2020-01-21 PROCEDURE — 85347 COAGULATION TIME ACTIVATED: CPT

## 2020-01-21 PROCEDURE — 80048 BASIC METABOLIC PNL TOTAL CA: CPT

## 2020-01-21 PROCEDURE — 6360000002 HC RX W HCPCS: Performed by: NURSE PRACTITIONER

## 2020-01-21 PROCEDURE — 85025 COMPLETE CBC W/AUTO DIFF WBC: CPT

## 2020-01-21 PROCEDURE — 88304 TISSUE EXAM BY PATHOLOGIST: CPT

## 2020-01-21 PROCEDURE — 33530 CORONARY ARTERY BYPASS/REOP: CPT | Performed by: THORACIC SURGERY (CARDIOTHORACIC VASCULAR SURGERY)

## 2020-01-21 PROCEDURE — 2580000003 HC RX 258: Performed by: NURSE PRACTITIONER

## 2020-01-21 PROCEDURE — 02HV33Z INSERTION OF INFUSION DEVICE INTO SUPERIOR VENA CAVA, PERCUTANEOUS APPROACH: ICD-10-PCS | Performed by: INTERNAL MEDICINE

## 2020-01-21 PROCEDURE — 82803 BLOOD GASES ANY COMBINATION: CPT

## 2020-01-21 PROCEDURE — 3600000008 HC SURGERY OHS BASE: Performed by: THORACIC SURGERY (CARDIOTHORACIC VASCULAR SURGERY)

## 2020-01-21 PROCEDURE — 3600000018 HC SURGERY OHS ADDTL 15MIN: Performed by: THORACIC SURGERY (CARDIOTHORACIC VASCULAR SURGERY)

## 2020-01-21 PROCEDURE — C1729 CATH, DRAINAGE: HCPCS | Performed by: THORACIC SURGERY (CARDIOTHORACIC VASCULAR SURGERY)

## 2020-01-21 PROCEDURE — 33859 AS-AORT GRF F/DS OTH/THN DSJ: CPT | Performed by: THORACIC SURGERY (CARDIOTHORACIC VASCULAR SURGERY)

## 2020-01-21 PROCEDURE — 2780000006 HC MISC HEART VALVE: Performed by: THORACIC SURGERY (CARDIOTHORACIC VASCULAR SURGERY)

## 2020-01-21 PROCEDURE — 94002 VENT MGMT INPAT INIT DAY: CPT

## 2020-01-21 PROCEDURE — 33999 UNLISTED PX CARDIAC SURGERY: CPT | Performed by: THORACIC SURGERY (CARDIOTHORACIC VASCULAR SURGERY)

## 2020-01-21 PROCEDURE — 6360000002 HC RX W HCPCS: Performed by: THORACIC SURGERY (CARDIOTHORACIC VASCULAR SURGERY)

## 2020-01-21 PROCEDURE — 2100000000 HC CCU R&B

## 2020-01-21 PROCEDURE — 2580000003 HC RX 258: Performed by: THORACIC SURGERY (CARDIOTHORACIC VASCULAR SURGERY)

## 2020-01-21 PROCEDURE — 83036 HEMOGLOBIN GLYCOSYLATED A1C: CPT

## 2020-01-21 PROCEDURE — 94640 AIRWAY INHALATION TREATMENT: CPT

## 2020-01-21 PROCEDURE — 82330 ASSAY OF CALCIUM: CPT

## 2020-01-21 PROCEDURE — 80076 HEPATIC FUNCTION PANEL: CPT

## 2020-01-21 PROCEDURE — 3700000000 HC ANESTHESIA ATTENDED CARE: Performed by: THORACIC SURGERY (CARDIOTHORACIC VASCULAR SURGERY)

## 2020-01-21 PROCEDURE — 2720000010 HC SURG SUPPLY STERILE: Performed by: THORACIC SURGERY (CARDIOTHORACIC VASCULAR SURGERY)

## 2020-01-21 PROCEDURE — 6370000000 HC RX 637 (ALT 250 FOR IP): Performed by: THORACIC SURGERY (CARDIOTHORACIC VASCULAR SURGERY)

## 2020-01-21 PROCEDURE — 85014 HEMATOCRIT: CPT

## 2020-01-21 PROCEDURE — C1760 CLOSURE DEV, VASC: HCPCS | Performed by: THORACIC SURGERY (CARDIOTHORACIC VASCULAR SURGERY)

## 2020-01-21 PROCEDURE — C1768 GRAFT, VASCULAR: HCPCS | Performed by: THORACIC SURGERY (CARDIOTHORACIC VASCULAR SURGERY)

## 2020-01-21 PROCEDURE — 85730 THROMBOPLASTIN TIME PARTIAL: CPT

## 2020-01-21 PROCEDURE — C1786 PMKR, SINGLE, RATE-RESP: HCPCS | Performed by: THORACIC SURGERY (CARDIOTHORACIC VASCULAR SURGERY)

## 2020-01-21 PROCEDURE — C9290 INJ, BUPIVACAINE LIPOSOME: HCPCS | Performed by: THORACIC SURGERY (CARDIOTHORACIC VASCULAR SURGERY)

## 2020-01-21 PROCEDURE — 94770 HC ETCO2 MONITOR DAILY: CPT

## 2020-01-21 PROCEDURE — 6360000002 HC RX W HCPCS: Performed by: ANESTHESIOLOGY

## 2020-01-21 PROCEDURE — 94761 N-INVAS EAR/PLS OXIMETRY MLT: CPT

## 2020-01-21 PROCEDURE — B24BZZ4 ULTRASONOGRAPHY OF HEART WITH AORTA, TRANSESOPHAGEAL: ICD-10-PCS | Performed by: THORACIC SURGERY (CARDIOTHORACIC VASCULAR SURGERY)

## 2020-01-21 PROCEDURE — 36430 TRANSFUSION BLD/BLD COMPNT: CPT

## 2020-01-21 PROCEDURE — 83735 ASSAY OF MAGNESIUM: CPT

## 2020-01-21 PROCEDURE — 88305 TISSUE EXAM BY PATHOLOGIST: CPT

## 2020-01-21 PROCEDURE — 3700000001 HC ADD 15 MINUTES (ANESTHESIA): Performed by: THORACIC SURGERY (CARDIOTHORACIC VASCULAR SURGERY)

## 2020-01-21 PROCEDURE — 2500000003 HC RX 250 WO HCPCS: Performed by: THORACIC SURGERY (CARDIOTHORACIC VASCULAR SURGERY)

## 2020-01-21 PROCEDURE — 02RF08Z REPLACEMENT OF AORTIC VALVE WITH ZOOPLASTIC TISSUE, OPEN APPROACH: ICD-10-PCS | Performed by: THORACIC SURGERY (CARDIOTHORACIC VASCULAR SURGERY)

## 2020-01-21 PROCEDURE — 2500000003 HC RX 250 WO HCPCS: Performed by: ANESTHESIOLOGY

## 2020-01-21 PROCEDURE — 6370000000 HC RX 637 (ALT 250 FOR IP): Performed by: NURSE PRACTITIONER

## 2020-01-21 PROCEDURE — 5A1221Z PERFORMANCE OF CARDIAC OUTPUT, CONTINUOUS: ICD-10-PCS | Performed by: THORACIC SURGERY (CARDIOTHORACIC VASCULAR SURGERY)

## 2020-01-21 PROCEDURE — 33405 REPLACEMENT AORTIC VALVE OPN: CPT | Performed by: THORACIC SURGERY (CARDIOTHORACIC VASCULAR SURGERY)

## 2020-01-21 PROCEDURE — 84295 ASSAY OF SERUM SODIUM: CPT

## 2020-01-21 PROCEDURE — C1769 GUIDE WIRE: HCPCS | Performed by: THORACIC SURGERY (CARDIOTHORACIC VASCULAR SURGERY)

## 2020-01-21 PROCEDURE — 2700000000 HC OXYGEN THERAPY PER DAY

## 2020-01-21 PROCEDURE — 2580000003 HC RX 258: Performed by: ANESTHESIOLOGY

## 2020-01-21 PROCEDURE — 94750 HC PULMONARY COMPLIANCE STUDY: CPT

## 2020-01-21 PROCEDURE — 2709999900 HC NON-CHARGEABLE SUPPLY: Performed by: THORACIC SURGERY (CARDIOTHORACIC VASCULAR SURGERY)

## 2020-01-21 PROCEDURE — 71045 X-RAY EXAM CHEST 1 VIEW: CPT

## 2020-01-21 PROCEDURE — 85610 PROTHROMBIN TIME: CPT

## 2020-01-21 PROCEDURE — 6370000000 HC RX 637 (ALT 250 FOR IP): Performed by: INTERNAL MEDICINE

## 2020-01-21 PROCEDURE — 83605 ASSAY OF LACTIC ACID: CPT

## 2020-01-21 PROCEDURE — 84132 ASSAY OF SERUM POTASSIUM: CPT

## 2020-01-21 PROCEDURE — 85027 COMPLETE CBC AUTOMATED: CPT

## 2020-01-21 DEVICE — PERCLOSE PROGLIDE™ SUTURE-MEDIATED CLOSURE SYSTEM
Type: IMPLANTABLE DEVICE | Site: GROIN | Status: FUNCTIONAL
Brand: PERCLOSE PROGLIDE™

## 2020-01-21 DEVICE — FELT SURG W6XL6IN THK1.65MM PTFE FOR THE REP OF SEPT DEFCT: Type: IMPLANTABLE DEVICE | Site: CHEST | Status: FUNCTIONAL

## 2020-01-21 DEVICE — TIM-SHEET PERIPATCH 8CMX14CM: Type: IMPLANTABLE DEVICE | Site: CHEST | Status: FUNCTIONAL

## 2020-01-21 DEVICE — GRAFT VASC WOVN DBL VELR STR 34MMX30CM: Type: IMPLANTABLE DEVICE | Site: CHEST | Status: FUNCTIONAL

## 2020-01-21 DEVICE — IMPLANTABLE DEVICE: Type: IMPLANTABLE DEVICE | Site: HEART | Status: FUNCTIONAL

## 2020-01-21 RX ORDER — ACETAMINOPHEN 10 MG/ML
INJECTION, SOLUTION INTRAVENOUS EVERY 8 HOURS
Status: COMPLETED | OUTPATIENT
Start: 2020-01-21 | End: 2020-01-22

## 2020-01-21 RX ORDER — HEPARIN SODIUM 1000 [USP'U]/ML
INJECTION, SOLUTION INTRAVENOUS; SUBCUTANEOUS PRN
Status: DISCONTINUED | OUTPATIENT
Start: 2020-01-21 | End: 2020-01-21 | Stop reason: SDUPTHER

## 2020-01-21 RX ORDER — DOBUTAMINE HYDROCHLORIDE 200 MG/100ML
2 INJECTION INTRAVENOUS CONTINUOUS PRN
Status: DISCONTINUED | OUTPATIENT
Start: 2020-01-21 | End: 2020-01-23

## 2020-01-21 RX ORDER — POTASSIUM CHLORIDE 750 MG/1
10 TABLET, EXTENDED RELEASE ORAL
Status: DISCONTINUED | OUTPATIENT
Start: 2020-01-22 | End: 2020-01-23

## 2020-01-21 RX ORDER — ACETAMINOPHEN 325 MG/1
650 TABLET ORAL EVERY 4 HOURS PRN
Status: DISCONTINUED | OUTPATIENT
Start: 2020-01-21 | End: 2020-02-05 | Stop reason: HOSPADM

## 2020-01-21 RX ORDER — OXYCODONE HYDROCHLORIDE 5 MG/1
10 TABLET ORAL EVERY 4 HOURS PRN
Status: DISCONTINUED | OUTPATIENT
Start: 2020-01-21 | End: 2020-01-23

## 2020-01-21 RX ORDER — NOREPINEPHRINE BITARTRATE 1 MG/ML
INJECTION, SOLUTION INTRAVENOUS PRN
Status: DISCONTINUED | OUTPATIENT
Start: 2020-01-21 | End: 2020-01-21 | Stop reason: SDUPTHER

## 2020-01-21 RX ORDER — ATORVASTATIN CALCIUM 10 MG/1
20 TABLET, FILM COATED ORAL NIGHTLY
Status: DISCONTINUED | OUTPATIENT
Start: 2020-01-22 | End: 2020-02-05 | Stop reason: HOSPADM

## 2020-01-21 RX ORDER — POTASSIUM CHLORIDE 29.8 MG/ML
20 INJECTION INTRAVENOUS PRN
Status: DISCONTINUED | OUTPATIENT
Start: 2020-01-21 | End: 2020-02-05 | Stop reason: HOSPADM

## 2020-01-21 RX ORDER — CHLORHEXIDINE GLUCONATE 0.12 MG/ML
15 RINSE ORAL 2 TIMES DAILY
Status: DISCONTINUED | OUTPATIENT
Start: 2020-01-21 | End: 2020-02-05 | Stop reason: HOSPADM

## 2020-01-21 RX ORDER — DOBUTAMINE HYDROCHLORIDE 200 MG/100ML
INJECTION INTRAVENOUS CONTINUOUS PRN
Status: DISCONTINUED | OUTPATIENT
Start: 2020-01-21 | End: 2020-01-21 | Stop reason: SDUPTHER

## 2020-01-21 RX ORDER — FAMOTIDINE 20 MG/1
20 TABLET, FILM COATED ORAL 2 TIMES DAILY
Status: DISCONTINUED | OUTPATIENT
Start: 2020-01-22 | End: 2020-01-23

## 2020-01-21 RX ORDER — NICOTINE POLACRILEX 4 MG
15 LOZENGE BUCCAL PRN
Status: DISCONTINUED | OUTPATIENT
Start: 2020-01-21 | End: 2020-02-05 | Stop reason: HOSPADM

## 2020-01-21 RX ORDER — OXYCODONE HYDROCHLORIDE 5 MG/1
5 TABLET ORAL EVERY 4 HOURS PRN
Status: DISCONTINUED | OUTPATIENT
Start: 2020-01-21 | End: 2020-01-23

## 2020-01-21 RX ORDER — PROTAMINE SULFATE 10 MG/ML
INJECTION, SOLUTION INTRAVENOUS PRN
Status: DISCONTINUED | OUTPATIENT
Start: 2020-01-21 | End: 2020-01-21 | Stop reason: SDUPTHER

## 2020-01-21 RX ORDER — ACETAMINOPHEN 650 MG/1
650 SUPPOSITORY RECTAL EVERY 4 HOURS PRN
Status: DISCONTINUED | OUTPATIENT
Start: 2020-01-21 | End: 2020-02-05 | Stop reason: HOSPADM

## 2020-01-21 RX ORDER — DEXTROSE MONOHYDRATE 25 G/50ML
12.5 INJECTION, SOLUTION INTRAVENOUS PRN
Status: DISCONTINUED | OUTPATIENT
Start: 2020-01-21 | End: 2020-02-05 | Stop reason: HOSPADM

## 2020-01-21 RX ORDER — INSULIN GLARGINE 100 [IU]/ML
0.25 INJECTION, SOLUTION SUBCUTANEOUS NIGHTLY
Status: COMPLETED | OUTPATIENT
Start: 2020-01-22 | End: 2020-01-22

## 2020-01-21 RX ORDER — ACETAMINOPHEN 500 MG
1000 TABLET ORAL EVERY 8 HOURS
Status: DISCONTINUED | OUTPATIENT
Start: 2020-01-21 | End: 2020-01-21

## 2020-01-21 RX ORDER — MAGNESIUM SULFATE IN WATER 40 MG/ML
2 INJECTION, SOLUTION INTRAVENOUS PRN
Status: DISCONTINUED | OUTPATIENT
Start: 2020-01-21 | End: 2020-02-05 | Stop reason: HOSPADM

## 2020-01-21 RX ORDER — PROTAMINE SULFATE 10 MG/ML
50 INJECTION, SOLUTION INTRAVENOUS
Status: ACTIVE | OUTPATIENT
Start: 2020-01-21 | End: 2020-01-21

## 2020-01-21 RX ORDER — ASPIRIN 81 MG/1
81 TABLET ORAL DAILY
Status: DISCONTINUED | OUTPATIENT
Start: 2020-01-22 | End: 2020-01-30

## 2020-01-21 RX ORDER — SODIUM CHLORIDE 0.9 % (FLUSH) 0.9 %
10 SYRINGE (ML) INJECTION PRN
Status: DISCONTINUED | OUTPATIENT
Start: 2020-01-21 | End: 2020-02-05 | Stop reason: HOSPADM

## 2020-01-21 RX ORDER — 0.9 % SODIUM CHLORIDE 0.9 %
20 INTRAVENOUS SOLUTION INTRAVENOUS ONCE
Status: DISCONTINUED | OUTPATIENT
Start: 2020-01-21 | End: 2020-01-22

## 2020-01-21 RX ORDER — FONDAPARINUX SODIUM 2.5 MG/.5ML
2.5 INJECTION SUBCUTANEOUS DAILY
Status: DISCONTINUED | OUTPATIENT
Start: 2020-01-22 | End: 2020-01-23

## 2020-01-21 RX ORDER — ONDANSETRON 2 MG/ML
4 INJECTION INTRAMUSCULAR; INTRAVENOUS EVERY 8 HOURS PRN
Status: DISCONTINUED | OUTPATIENT
Start: 2020-01-21 | End: 2020-02-05 | Stop reason: HOSPADM

## 2020-01-21 RX ORDER — 0.9 % SODIUM CHLORIDE 0.9 %
20 INTRAVENOUS SOLUTION INTRAVENOUS ONCE
Status: DISCONTINUED | OUTPATIENT
Start: 2020-01-21 | End: 2020-01-29

## 2020-01-21 RX ORDER — DEXTROSE MONOHYDRATE 50 MG/ML
100 INJECTION, SOLUTION INTRAVENOUS PRN
Status: DISCONTINUED | OUTPATIENT
Start: 2020-01-21 | End: 2020-02-05 | Stop reason: HOSPADM

## 2020-01-21 RX ORDER — SODIUM CHLORIDE 9 MG/ML
INJECTION, SOLUTION INTRAVENOUS CONTINUOUS PRN
Status: DISCONTINUED | OUTPATIENT
Start: 2020-01-21 | End: 2020-01-21 | Stop reason: SDUPTHER

## 2020-01-21 RX ORDER — DEXTROSE AND SODIUM CHLORIDE 5; .45 G/100ML; G/100ML
INJECTION, SOLUTION INTRAVENOUS CONTINUOUS
Status: DISCONTINUED | OUTPATIENT
Start: 2020-01-21 | End: 2020-01-22

## 2020-01-21 RX ORDER — FUROSEMIDE 10 MG/ML
40 INJECTION INTRAMUSCULAR; INTRAVENOUS 2 TIMES DAILY
Status: DISCONTINUED | OUTPATIENT
Start: 2020-01-22 | End: 2020-01-23

## 2020-01-21 RX ORDER — KETAMINE HYDROCHLORIDE 100 MG/ML
INJECTION, SOLUTION INTRAMUSCULAR; INTRAVENOUS PRN
Status: DISCONTINUED | OUTPATIENT
Start: 2020-01-21 | End: 2020-01-21 | Stop reason: SDUPTHER

## 2020-01-21 RX ORDER — SODIUM CHLORIDE, SODIUM LACTATE, POTASSIUM CHLORIDE, CALCIUM CHLORIDE 600; 310; 30; 20 MG/100ML; MG/100ML; MG/100ML; MG/100ML
INJECTION, SOLUTION INTRAVENOUS CONTINUOUS PRN
Status: DISCONTINUED | OUTPATIENT
Start: 2020-01-21 | End: 2020-01-21 | Stop reason: SDUPTHER

## 2020-01-21 RX ORDER — 0.9 % SODIUM CHLORIDE 0.9 %
20 INTRAVENOUS SOLUTION INTRAVENOUS ONCE
Status: COMPLETED | OUTPATIENT
Start: 2020-01-21 | End: 2020-01-22

## 2020-01-21 RX ORDER — ALBUMIN, HUMAN INJ 5% 5 %
25 SOLUTION INTRAVENOUS PRN
Status: DISCONTINUED | OUTPATIENT
Start: 2020-01-21 | End: 2020-01-29

## 2020-01-21 RX ORDER — MIDAZOLAM HYDROCHLORIDE 1 MG/ML
1 INJECTION INTRAMUSCULAR; INTRAVENOUS
Status: DISCONTINUED | OUTPATIENT
Start: 2020-01-21 | End: 2020-01-23

## 2020-01-21 RX ORDER — CLOPIDOGREL BISULFATE 75 MG/1
75 TABLET ORAL DAILY
Status: DISCONTINUED | OUTPATIENT
Start: 2020-01-22 | End: 2020-01-30

## 2020-01-21 RX ORDER — SODIUM CHLORIDE 0.9 % (FLUSH) 0.9 %
10 SYRINGE (ML) INJECTION EVERY 12 HOURS SCHEDULED
Status: DISCONTINUED | OUTPATIENT
Start: 2020-01-21 | End: 2020-02-05 | Stop reason: HOSPADM

## 2020-01-21 RX ORDER — ALBUTEROL SULFATE 2.5 MG/3ML
2.5 SOLUTION RESPIRATORY (INHALATION)
Status: DISCONTINUED | OUTPATIENT
Start: 2020-01-21 | End: 2020-02-05 | Stop reason: HOSPADM

## 2020-01-21 RX ORDER — MEPERIDINE HYDROCHLORIDE 50 MG/ML
25 INJECTION INTRAMUSCULAR; INTRAVENOUS; SUBCUTANEOUS
Status: ACTIVE | OUTPATIENT
Start: 2020-01-21 | End: 2020-01-21

## 2020-01-21 RX ORDER — MORPHINE SULFATE 2 MG/ML
2 INJECTION, SOLUTION INTRAMUSCULAR; INTRAVENOUS
Status: DISCONTINUED | OUTPATIENT
Start: 2020-01-21 | End: 2020-01-23

## 2020-01-21 RX ORDER — MORPHINE SULFATE 4 MG/ML
4 INJECTION, SOLUTION INTRAMUSCULAR; INTRAVENOUS
Status: DISCONTINUED | OUTPATIENT
Start: 2020-01-21 | End: 2020-01-23

## 2020-01-21 RX ORDER — MILRINONE LACTATE 0.2 MG/ML
0.12 INJECTION, SOLUTION INTRAVENOUS CONTINUOUS
Status: DISCONTINUED | OUTPATIENT
Start: 2020-01-21 | End: 2020-01-23

## 2020-01-21 RX ORDER — MILRINONE LACTATE 0.2 MG/ML
INJECTION, SOLUTION INTRAVENOUS CONTINUOUS PRN
Status: DISCONTINUED | OUTPATIENT
Start: 2020-01-21 | End: 2020-01-21 | Stop reason: SDUPTHER

## 2020-01-21 RX ORDER — DOCUSATE SODIUM 100 MG/1
100 CAPSULE, LIQUID FILLED ORAL 2 TIMES DAILY
Status: DISCONTINUED | OUTPATIENT
Start: 2020-01-22 | End: 2020-02-05 | Stop reason: HOSPADM

## 2020-01-21 RX ORDER — ACETAMINOPHEN 10 MG/ML
INJECTION, SOLUTION INTRAVENOUS PRN
Status: DISCONTINUED | OUTPATIENT
Start: 2020-01-21 | End: 2020-01-21 | Stop reason: SDUPTHER

## 2020-01-21 RX ORDER — AMINOCAPROIC ACID 250 MG/ML
INJECTION, SOLUTION INTRAVENOUS PRN
Status: DISCONTINUED | OUTPATIENT
Start: 2020-01-21 | End: 2020-01-21 | Stop reason: SDUPTHER

## 2020-01-21 RX ORDER — CALCIUM CHLORIDE 100 MG/ML
INJECTION INTRAVENOUS; INTRAVENTRICULAR PRN
Status: DISCONTINUED | OUTPATIENT
Start: 2020-01-21 | End: 2020-01-21 | Stop reason: SDUPTHER

## 2020-01-21 RX ORDER — MILRINONE LACTATE 0.2 MG/ML
0.38 INJECTION, SOLUTION INTRAVENOUS CONTINUOUS
Status: DISCONTINUED | OUTPATIENT
Start: 2020-01-21 | End: 2020-01-22

## 2020-01-21 RX ORDER — FENTANYL CITRATE 50 UG/ML
INJECTION, SOLUTION INTRAMUSCULAR; INTRAVENOUS PRN
Status: DISCONTINUED | OUTPATIENT
Start: 2020-01-21 | End: 2020-01-21 | Stop reason: SDUPTHER

## 2020-01-21 RX ORDER — ACETAMINOPHEN 500 MG
1000 TABLET ORAL EVERY 8 HOURS
Status: DISPENSED | OUTPATIENT
Start: 2020-01-22 | End: 2020-01-23

## 2020-01-21 RX ORDER — CISATRACURIUM BESYLATE 2 MG/ML
INJECTION, SOLUTION INTRAVENOUS PRN
Status: DISCONTINUED | OUTPATIENT
Start: 2020-01-21 | End: 2020-01-21 | Stop reason: SDUPTHER

## 2020-01-21 RX ORDER — HYDRALAZINE HYDROCHLORIDE 20 MG/ML
5 INJECTION INTRAMUSCULAR; INTRAVENOUS EVERY 5 MIN PRN
Status: DISCONTINUED | OUTPATIENT
Start: 2020-01-21 | End: 2020-01-23

## 2020-01-21 RX ORDER — 0.9 % SODIUM CHLORIDE 0.9 %
250 INTRAVENOUS SOLUTION INTRAVENOUS ONCE
Status: COMPLETED | OUTPATIENT
Start: 2020-01-21 | End: 2020-01-23

## 2020-01-21 RX ORDER — MIDAZOLAM HYDROCHLORIDE 1 MG/ML
INJECTION INTRAMUSCULAR; INTRAVENOUS PRN
Status: DISCONTINUED | OUTPATIENT
Start: 2020-01-21 | End: 2020-01-21 | Stop reason: SDUPTHER

## 2020-01-21 RX ORDER — EPHEDRINE SULFATE 50 MG/ML
INJECTION INTRAVENOUS PRN
Status: DISCONTINUED | OUTPATIENT
Start: 2020-01-21 | End: 2020-01-21 | Stop reason: SDUPTHER

## 2020-01-21 RX ADMIN — MUPIROCIN: 20 OINTMENT TOPICAL at 20:54

## 2020-01-21 RX ADMIN — HEPARIN SODIUM 30000 UNITS: 1000 INJECTION, SOLUTION INTRAVENOUS; SUBCUTANEOUS at 11:20

## 2020-01-21 RX ADMIN — CEFAZOLIN SODIUM 2 G: 10 INJECTION, POWDER, FOR SOLUTION INTRAVENOUS at 10:00

## 2020-01-21 RX ADMIN — FENTANYL CITRATE 500 MCG: 50 INJECTION, SOLUTION INTRAMUSCULAR; INTRAVENOUS at 09:30

## 2020-01-21 RX ADMIN — EPHEDRINE SULFATE 10 MG: 50 INJECTION INTRAVENOUS at 10:12

## 2020-01-21 RX ADMIN — SODIUM CHLORIDE, POTASSIUM CHLORIDE, SODIUM LACTATE AND CALCIUM CHLORIDE: 600; 310; 30; 20 INJECTION, SOLUTION INTRAVENOUS at 09:28

## 2020-01-21 RX ADMIN — KETAMINE HYDROCHLORIDE 75 MG: 100 INJECTION, SOLUTION INTRAMUSCULAR; INTRAVENOUS at 09:30

## 2020-01-21 RX ADMIN — METHOHEXITAL SODIUM 500 MG: 500 INJECTION, POWDER, LYOPHILIZED, FOR SOLUTION INTRAMUSCULAR; INTRAVENOUS; RECTAL at 12:51

## 2020-01-21 RX ADMIN — CISATRACURIUM BESYLATE 10 MG: 2 INJECTION INTRAVENOUS at 14:30

## 2020-01-21 RX ADMIN — MIDAZOLAM HYDROCHLORIDE 2 MG: 5 INJECTION, SOLUTION INTRAMUSCULAR; INTRAVENOUS at 06:31

## 2020-01-21 RX ADMIN — ACETAMINOPHEN 1000 MG: 10 INJECTION, SOLUTION INTRAVENOUS at 10:00

## 2020-01-21 RX ADMIN — CISATRACURIUM BESYLATE 10 MG: 2 INJECTION INTRAVENOUS at 10:30

## 2020-01-21 RX ADMIN — EPINEPHRINE 0.05 MCG/KG/MIN: 1 INJECTION, SOLUTION, CONCENTRATE INTRAVENOUS at 16:38

## 2020-01-21 RX ADMIN — PROTAMINE SULFATE 50 MG: 10 INJECTION, SOLUTION INTRAVENOUS at 17:25

## 2020-01-21 RX ADMIN — PHENYTOIN SODIUM 840 MG: 50 INJECTION INTRAMUSCULAR; INTRAVENOUS at 11:16

## 2020-01-21 RX ADMIN — Medication 10 ML: at 20:46

## 2020-01-21 RX ADMIN — MILRINONE LACTATE IN DEXTROSE 0.5 MCG/KG/MIN: 200 INJECTION, SOLUTION INTRAVENOUS at 22:54

## 2020-01-21 RX ADMIN — SODIUM CHLORIDE 2518.75 MG: 9 INJECTION, SOLUTION INTRAVENOUS at 10:15

## 2020-01-21 RX ADMIN — SODIUM CHLORIDE 20.24 UNITS/HR: 9 INJECTION, SOLUTION INTRAVENOUS at 23:11

## 2020-01-21 RX ADMIN — CISATRACURIUM BESYLATE 10 MG: 2 INJECTION INTRAVENOUS at 12:30

## 2020-01-21 RX ADMIN — CISATRACURIUM BESYLATE 10 MG: 2 INJECTION INTRAVENOUS at 15:30

## 2020-01-21 RX ADMIN — Medication 15 ML: at 05:00

## 2020-01-21 RX ADMIN — CALCIUM CHLORIDE 0.5 G: 100 INJECTION, SOLUTION INTRAVENOUS at 16:13

## 2020-01-21 RX ADMIN — CHLORHEXIDINE GLUCONATE 15 ML: 1.2 RINSE ORAL at 20:45

## 2020-01-21 RX ADMIN — SODIUM CHLORIDE: 9 INJECTION, SOLUTION INTRAVENOUS at 05:00

## 2020-01-21 RX ADMIN — ACETAMINOPHEN: 10 INJECTION, SOLUTION INTRAVENOUS at 20:43

## 2020-01-21 RX ADMIN — Medication 1.5 G: at 10:00

## 2020-01-21 RX ADMIN — CALCIUM CHLORIDE 0.5 G: 100 INJECTION, SOLUTION INTRAVENOUS at 16:35

## 2020-01-21 RX ADMIN — EPHEDRINE SULFATE 5 MG: 50 INJECTION INTRAVENOUS at 09:36

## 2020-01-21 RX ADMIN — AMINOCAPROIC ACID 5000 MG: 250 INJECTION, SOLUTION INTRAVENOUS at 11:30

## 2020-01-21 RX ADMIN — CISATRACURIUM BESYLATE 10 MG: 2 INJECTION INTRAVENOUS at 11:30

## 2020-01-21 RX ADMIN — CISATRACURIUM BESYLATE 20 MG: 2 INJECTION INTRAVENOUS at 09:32

## 2020-01-21 RX ADMIN — DEXTROSE AND SODIUM CHLORIDE: 5; 450 INJECTION, SOLUTION INTRAVENOUS at 21:05

## 2020-01-21 RX ADMIN — DEXMEDETOMIDINE 0.2 MCG/KG/HR: 100 INJECTION, SOLUTION, CONCENTRATE INTRAVENOUS at 20:36

## 2020-01-21 RX ADMIN — PROTAMINE SULFATE 50 MG: 10 INJECTION, SOLUTION INTRAVENOUS at 17:10

## 2020-01-21 RX ADMIN — NOREPINEPHRINE BITARTRATE 8 MCG: 1 INJECTION INTRAVENOUS at 11:17

## 2020-01-21 RX ADMIN — MILRINONE LACTATE 0.5 MCG/KG/MIN: 0.2 INJECTION, SOLUTION INTRAVENOUS at 16:04

## 2020-01-21 RX ADMIN — VASOPRESSIN 0.02 UNITS/MIN: 20 INJECTION INTRAVENOUS at 17:07

## 2020-01-21 RX ADMIN — ALBUTEROL SULFATE 2.5 MG: 2.5 SOLUTION RESPIRATORY (INHALATION) at 20:16

## 2020-01-21 RX ADMIN — VANCOMYCIN HYDROCHLORIDE 1.5 G: 10 INJECTION, POWDER, LYOPHILIZED, FOR SOLUTION INTRAVENOUS at 20:43

## 2020-01-21 RX ADMIN — PROTAMINE SULFATE 350 MG: 10 INJECTION, SOLUTION INTRAVENOUS at 16:35

## 2020-01-21 RX ADMIN — MIDAZOLAM HYDROCHLORIDE 5 MG: 2 INJECTION, SOLUTION INTRAMUSCULAR; INTRAVENOUS at 09:30

## 2020-01-21 RX ADMIN — SODIUM CHLORIDE: 9 INJECTION, SOLUTION INTRAVENOUS at 11:50

## 2020-01-21 RX ADMIN — SODIUM CHLORIDE 20 ML: 9 INJECTION, SOLUTION INTRAVENOUS at 20:44

## 2020-01-21 RX ADMIN — SODIUM CHLORIDE 3 UNITS/HR: 9 INJECTION, SOLUTION INTRAVENOUS at 11:50

## 2020-01-21 RX ADMIN — CARVEDILOL 3.12 MG: 3.12 TABLET, FILM COATED ORAL at 05:00

## 2020-01-21 RX ADMIN — CALCIUM CHLORIDE 0.5 G: 100 INJECTION, SOLUTION INTRAVENOUS at 17:26

## 2020-01-21 RX ADMIN — MORPHINE SULFATE 4 MG: 4 INJECTION, SOLUTION INTRAMUSCULAR; INTRAVENOUS at 20:57

## 2020-01-21 RX ADMIN — DOBUTAMINE HYDROCHLORIDE 2 MCG/KG/MIN: 200 INJECTION INTRAVENOUS at 15:30

## 2020-01-21 RX ADMIN — CEFAZOLIN SODIUM 1 G: 10 INJECTION, POWDER, FOR SOLUTION INTRAVENOUS at 13:00

## 2020-01-21 RX ADMIN — POTASSIUM CHLORIDE 40 MEQ: 20 TABLET, EXTENDED RELEASE ORAL at 05:26

## 2020-01-21 RX ADMIN — CEFAZOLIN SODIUM 2 G: 10 INJECTION, POWDER, FOR SOLUTION INTRAVENOUS at 20:45

## 2020-01-21 RX ADMIN — FAMOTIDINE 20 MG: 10 INJECTION, SOLUTION INTRAVENOUS at 20:45

## 2020-01-21 RX ADMIN — ASPIRIN 81 MG: 81 TABLET, COATED ORAL at 05:00

## 2020-01-21 RX ADMIN — EPHEDRINE SULFATE 5 MG: 50 INJECTION INTRAVENOUS at 11:17

## 2020-01-21 RX ADMIN — EPHEDRINE SULFATE 15 MG: 50 INJECTION INTRAVENOUS at 17:05

## 2020-01-21 ASSESSMENT — PULMONARY FUNCTION TESTS
PIF_VALUE: 20
PIF_VALUE: 22
PIF_VALUE: 22
PIF_VALUE: 23
PIF_VALUE: 28
PIF_VALUE: 23
PIF_VALUE: 1
PIF_VALUE: 22
PIF_VALUE: 25
PIF_VALUE: 22
PIF_VALUE: 19
PIF_VALUE: 22
PIF_VALUE: 22
PIF_VALUE: 23
PIF_VALUE: 25
PIF_VALUE: 23
PIF_VALUE: 24
PIF_VALUE: 27
PIF_VALUE: 22
PIF_VALUE: 23
PIF_VALUE: 25
PIF_VALUE: 1
PIF_VALUE: 31
PIF_VALUE: 23
PIF_VALUE: 25
PIF_VALUE: 24
PIF_VALUE: 23
PIF_VALUE: 2
PIF_VALUE: 22
PIF_VALUE: 0
PIF_VALUE: 18
PIF_VALUE: 21
PIF_VALUE: 22
PIF_VALUE: 27
PIF_VALUE: 27
PIF_VALUE: 31
PIF_VALUE: 23
PIF_VALUE: 22
PIF_VALUE: 27
PIF_VALUE: 16
PIF_VALUE: 1
PIF_VALUE: 0
PIF_VALUE: 23
PIF_VALUE: 1
PIF_VALUE: 0
PIF_VALUE: 24
PIF_VALUE: 27
PIF_VALUE: 25
PIF_VALUE: 22
PIF_VALUE: 22
PIF_VALUE: 23
PIF_VALUE: 27
PIF_VALUE: 30
PIF_VALUE: 0
PIF_VALUE: 0
PIF_VALUE: 28
PIF_VALUE: 22
PIF_VALUE: 0
PIF_VALUE: 23
PIF_VALUE: 26
PIF_VALUE: 24
PIF_VALUE: 25
PIF_VALUE: 0
PIF_VALUE: 23
PIF_VALUE: 25
PIF_VALUE: 21
PIF_VALUE: 24
PIF_VALUE: 1
PIF_VALUE: 20
PIF_VALUE: 23
PIF_VALUE: 0
PIF_VALUE: 22
PIF_VALUE: 23
PIF_VALUE: 0
PIF_VALUE: 23
PIF_VALUE: 18
PIF_VALUE: 1
PIF_VALUE: 0
PIF_VALUE: 25
PIF_VALUE: 0
PIF_VALUE: 25
PIF_VALUE: 24
PIF_VALUE: 27
PIF_VALUE: 26
PIF_VALUE: 0
PIF_VALUE: 32
PIF_VALUE: 2
PIF_VALUE: 16
PIF_VALUE: 1
PIF_VALUE: 1
PIF_VALUE: 18
PIF_VALUE: 23
PIF_VALUE: 1
PIF_VALUE: 23
PIF_VALUE: 25
PIF_VALUE: 24
PIF_VALUE: 1
PIF_VALUE: 23
PIF_VALUE: 22
PIF_VALUE: 24
PIF_VALUE: 28
PIF_VALUE: 22
PIF_VALUE: 23
PIF_VALUE: 24
PIF_VALUE: 1
PIF_VALUE: 25
PIF_VALUE: 21
PIF_VALUE: 1
PIF_VALUE: 2
PIF_VALUE: 23
PIF_VALUE: 24
PIF_VALUE: 28
PIF_VALUE: 25
PIF_VALUE: 22
PIF_VALUE: 24
PIF_VALUE: 25
PIF_VALUE: 0
PIF_VALUE: 27
PIF_VALUE: 25
PIF_VALUE: 23
PIF_VALUE: 18
PIF_VALUE: 24
PIF_VALUE: 27
PIF_VALUE: 23
PIF_VALUE: 21
PIF_VALUE: 0
PIF_VALUE: 26
PIF_VALUE: 2
PIF_VALUE: 23
PIF_VALUE: 31
PIF_VALUE: 21
PIF_VALUE: 23
PIF_VALUE: 0
PIF_VALUE: 24
PIF_VALUE: 21
PIF_VALUE: 23
PIF_VALUE: 16
PIF_VALUE: 23
PIF_VALUE: 31
PIF_VALUE: 22
PIF_VALUE: 28
PIF_VALUE: 23
PIF_VALUE: 29
PIF_VALUE: 25
PIF_VALUE: 0
PIF_VALUE: 18
PIF_VALUE: 22
PIF_VALUE: 21
PIF_VALUE: 0
PIF_VALUE: 2
PIF_VALUE: 1
PIF_VALUE: 24
PIF_VALUE: 1
PIF_VALUE: 22
PIF_VALUE: 25
PIF_VALUE: 23
PIF_VALUE: 24
PIF_VALUE: 0
PIF_VALUE: 24
PIF_VALUE: 23
PIF_VALUE: 23
PIF_VALUE: 2
PIF_VALUE: 2
PIF_VALUE: 0
PIF_VALUE: 25
PIF_VALUE: 12
PIF_VALUE: 26
PIF_VALUE: 22
PIF_VALUE: 2
PIF_VALUE: 25
PIF_VALUE: 22
PIF_VALUE: 27
PIF_VALUE: 21
PIF_VALUE: 23
PIF_VALUE: 23
PIF_VALUE: 24
PIF_VALUE: 24
PIF_VALUE: 3
PIF_VALUE: 29
PIF_VALUE: 24
PIF_VALUE: 24
PIF_VALUE: 23
PIF_VALUE: 24
PIF_VALUE: 0
PIF_VALUE: 24
PIF_VALUE: 23
PIF_VALUE: 23
PIF_VALUE: 25
PIF_VALUE: 23
PIF_VALUE: 25
PIF_VALUE: 22
PIF_VALUE: 0
PIF_VALUE: 26
PIF_VALUE: 28
PIF_VALUE: 25
PIF_VALUE: 22
PIF_VALUE: 22
PIF_VALUE: 0
PIF_VALUE: 1
PIF_VALUE: 22
PIF_VALUE: 27
PIF_VALUE: 22
PIF_VALUE: 25
PIF_VALUE: 0
PIF_VALUE: 25
PIF_VALUE: 26
PIF_VALUE: 22
PIF_VALUE: 25
PIF_VALUE: 28
PIF_VALUE: 21
PIF_VALUE: 1
PIF_VALUE: 24
PIF_VALUE: 22
PIF_VALUE: 0
PIF_VALUE: 21
PIF_VALUE: 23
PIF_VALUE: 23
PIF_VALUE: 22
PIF_VALUE: 25
PIF_VALUE: 29
PIF_VALUE: 24
PIF_VALUE: 27
PIF_VALUE: 23
PIF_VALUE: 24
PIF_VALUE: 22
PIF_VALUE: 1
PIF_VALUE: 22
PIF_VALUE: 1
PIF_VALUE: 16
PIF_VALUE: 26
PIF_VALUE: 3
PIF_VALUE: 2
PIF_VALUE: 23
PIF_VALUE: 1
PIF_VALUE: 22
PIF_VALUE: 22
PIF_VALUE: 23
PIF_VALUE: 4
PIF_VALUE: 27
PIF_VALUE: 23
PIF_VALUE: 22
PIF_VALUE: 16
PIF_VALUE: 30
PIF_VALUE: 0
PIF_VALUE: 23
PIF_VALUE: 24
PIF_VALUE: 29
PIF_VALUE: 23
PIF_VALUE: 25
PIF_VALUE: 26
PIF_VALUE: 1
PIF_VALUE: 2
PIF_VALUE: 30
PIF_VALUE: 27
PIF_VALUE: 23
PIF_VALUE: 24
PIF_VALUE: 23
PIF_VALUE: 0
PIF_VALUE: 23
PIF_VALUE: 22
PIF_VALUE: 24
PIF_VALUE: 25
PIF_VALUE: 1
PIF_VALUE: 22
PIF_VALUE: 22
PIF_VALUE: 2
PIF_VALUE: 22
PIF_VALUE: 23
PIF_VALUE: 2
PIF_VALUE: 18
PIF_VALUE: 28
PIF_VALUE: 21
PIF_VALUE: 22
PIF_VALUE: 27
PIF_VALUE: 0
PIF_VALUE: 22
PIF_VALUE: 25
PIF_VALUE: 20
PIF_VALUE: 32
PIF_VALUE: 26
PIF_VALUE: 1
PIF_VALUE: 28
PIF_VALUE: 21
PIF_VALUE: 24
PIF_VALUE: 21
PIF_VALUE: 21
PIF_VALUE: 24
PIF_VALUE: 1
PIF_VALUE: 25
PIF_VALUE: 22
PIF_VALUE: 26
PIF_VALUE: 22
PIF_VALUE: 25
PIF_VALUE: 22
PIF_VALUE: 23
PIF_VALUE: 0
PIF_VALUE: 22
PIF_VALUE: 24
PIF_VALUE: 27
PIF_VALUE: 30
PIF_VALUE: 24
PIF_VALUE: 22
PIF_VALUE: 26
PIF_VALUE: 23
PIF_VALUE: 2
PIF_VALUE: 1
PIF_VALUE: 18
PIF_VALUE: 22
PIF_VALUE: 18
PIF_VALUE: 1
PIF_VALUE: 28
PIF_VALUE: 18
PIF_VALUE: 16
PIF_VALUE: 0
PIF_VALUE: 0
PIF_VALUE: 26
PIF_VALUE: 28
PIF_VALUE: 25
PIF_VALUE: 23
PIF_VALUE: 23
PIF_VALUE: 0
PIF_VALUE: 21
PIF_VALUE: 18
PIF_VALUE: 22
PIF_VALUE: 18
PIF_VALUE: 28
PIF_VALUE: 24
PIF_VALUE: 28
PIF_VALUE: 22
PIF_VALUE: 23
PIF_VALUE: 21
PIF_VALUE: 1
PIF_VALUE: 2
PIF_VALUE: 1
PIF_VALUE: 23
PIF_VALUE: 18
PIF_VALUE: 2
PIF_VALUE: 20
PIF_VALUE: 24
PIF_VALUE: 23
PIF_VALUE: 24
PIF_VALUE: 22
PIF_VALUE: 2
PIF_VALUE: 25
PIF_VALUE: 22
PIF_VALUE: 22
PIF_VALUE: 1
PIF_VALUE: 24
PIF_VALUE: 0
PIF_VALUE: 2
PIF_VALUE: 22
PIF_VALUE: 22
PIF_VALUE: 16
PIF_VALUE: 25
PIF_VALUE: 23
PIF_VALUE: 23
PIF_VALUE: 28
PIF_VALUE: 25
PIF_VALUE: 25
PIF_VALUE: 22
PIF_VALUE: 0
PIF_VALUE: 2
PIF_VALUE: 21
PIF_VALUE: 22
PIF_VALUE: 1
PIF_VALUE: 23
PIF_VALUE: 0
PIF_VALUE: 24
PIF_VALUE: 23
PIF_VALUE: 18
PIF_VALUE: 22
PIF_VALUE: 23
PIF_VALUE: 24
PIF_VALUE: 23
PIF_VALUE: 2
PIF_VALUE: 22
PIF_VALUE: 25

## 2020-01-21 ASSESSMENT — PAIN SCALES - GENERAL: PAINLEVEL_OUTOF10: 0

## 2020-01-21 NOTE — ANESTHESIA POSTPROCEDURE EVALUATION
Department of Anesthesiology  Postprocedure Note    Patient: Shaun Santos  MRN: [id number]  YOB: 1950  Date of evaluation: 1/21/2020  Time:  6:31 PM     Procedure Summary     Date:  01/21/20 Room / Location:  [address]    Anesthesia Start:  9437 Anesthesia Stop:  6993    Procedure:  RE-DO STERNOTOMY X3, REPLACEMENT OF ASCENDING AORTA, RE-DO AORTIC VALVE REPLACEMENT WITH 19MM BLOCK INTUITY VALVE, CLOSURE OF OUTFLOW TRACK ATRIAL FISTULA, HYPOTHERMIC ARREST,  WITH TRANSESOPHAGEAL ECHOCARDIOGRAM, CIRCULATORY ARREST, 5 LEVEL BILATERAL INTERCOSTAL NERVE BLOCK, (N/A Chest) Diagnosis:  (-)    Surgeon:  Homar Rod MD Responsible Provider:  Sandy Simpson MD    Anesthesia Type:  general ASA Status:  4          Anesthesia Type: general    Patricia Phase I:      Patricia Phase II:      Last vitals: Reviewed and per EMR flowsheets. Anesthesia Post Evaluation    Patient location during evaluation: ICU  Patient participation: waiting for patient participation  Level of consciousness: sedated and ventilated  Pain scale: see nsg notes. Airway patency: patent  Nausea: see nsg notes.   Respiratory status: intubated and ventilator  Hydration status: hypovolemic  Comments: ZOEY : mild MVR ; functioning tissue AV prosthesis ; no PVL ; no MANUEL ; ef~ 50% ; RVFW - mild hk ; probe out intact

## 2020-01-21 NOTE — PROGRESS NOTES
01/21/20 1817   Vent Information   $Ventilation $Initial Day   Ventilator Started Yes   Vent Type 840   Vent Mode SIMV/VC   Vt Ordered 650 mL   Rate Set 12 bmp   Peak Flow 50 L/min   Pressure Support 10 cmH20   FiO2  100 %   Sensitivity 3   PEEP/CPAP 7   Cuff Pressure (cm H2O) 30 cm H2O   Humidification Source HME   Vent Patient Data   Peak Inspiratory Pressure 23 cmH2O   Mean Airway Pressure 12 cmH20   Rate Measured 16 br/min   Vt Exhaled 592 mL   Minute Volume 8.42 Liters   I:E Ratio 1:1.90   Cough/Sputum   Sputum How Obtained Endotracheal;Suctioned   Cough Non-productive   Sputum Amount None   Spontaneous Breathing Trial (SBT) RT Doc   Pulse 74   SpO2 100 %   Breath Sounds   Right Upper Lobe Diminished   Right Middle Lobe Diminished   Right Lower Lobe Diminished   Left Upper Lobe Diminished   Left Lower Lobe Diminished   Additional Respiratory  Assessments   Resp 12   End Tidal CO2 35 (%)   Position Semi-Jones's   Alarm Settings   High Pressure Alarm 40 cmH2O   Low Minute Volume Alarm 3 L/min   High Respiratory Rate 40 br/min   Low Exhaled Vt  200 mL   Patient Observation   Observations 7.5 ETT, ambu bag @ bedside   ETT (adult)   Placement Date/Time: 01/21/20 1000   Type: Cuffed  Tube Size: 7.5 mm  Secured at: 21 cm  Measured From: Lips   Secured at 22 cm   Measured From [address] By Commercial tube romero

## 2020-01-21 NOTE — PROGRESS NOTES
Verified with lab that blood and platelets are ready. Consents for blood and surgery signed by patient and placed in chart. New 20 gauge placed in left AC.

## 2020-01-21 NOTE — PROGRESS NOTES
Patient admitted to CVU from [place] [address] and attached to ventilator and monitors. Report received from anesthesiologist.  Chest x-ray ordered. Labs drawn and sent. Assessment complete. Hemodymanics stable and will continue to monitor. Family let back to see patient. Visiting hours reviewed and all questions answered. Family aware of discharge class. Primary RN Seven Mcdonough.     RECOVERY PERIOD BEGINS  1815

## 2020-01-21 NOTE — PROGRESS NOTES
Second shower complete with hibiclens. BP in both arms taken, weight taken, and sacral mepilex in place.

## 2020-01-21 NOTE — BRIEF OP NOTE
Brief Postoperative Note  ______________________________________________________________    Patient: Hany Smith  YOB: 1950  MRN: [id number]  Date of Procedure: 1/21/2020    Pre-Op Diagnosis: -Aortic valve stenosis, outflow track left atrium for still, porCeline ascending aorta      Post-Op Diagnosis: Same       Procedure(s):  RE-DO STERNOTOMY X3, REPLACEMENT OF ASCENDING AORTA, RE-DO AORTIC VALVE REPLACEMENT WITH 19MM BLOCK INTUITY VALVE, CLOSURE OF OUTFLOW TRACK ATRIAL FISTULA, HYPOTHERMIC ARREST,  WITH TRANSESOPHAGEAL ECHOCARDIOGRAM, CIRCULATORY ARREST, 5 LEVEL BILATERAL INTERCOSTAL NERVE BLOCK,    Anesthesia: General    Surgeon(s):  MD Zoe Wong MD    Assistant: Andrea Trejo    Estimated Blood Loss (mL): 048    Complications: None    Specimens:   ID Type Source Tests Collected by Time Destination   A : STERNAL WIRES X 5 - [address] [facility] [address], MD 1/21/2020 1207    B : AORTA Tissue Heart SURGICAL PATHOLOGY Kelsey Strauss MD 1/21/2020 1330    C :  AORTIC VALVE Tissue Heart SURGICAL PATHOLOGY Kelsey Strauss MD 1/21/2020 1330        Implants:  Implant Name Type Inv. Item Serial No.  Lot No. LRB No. Used   SYSTEM PERCLOSE VESSEL CLOSURE Vascular/Graft/Patch/Filter SYSTEM PERCLOSE VESSEL CLOSURE  ID AMERICA  N/A 2   GRAFT CV FELT PTFE 6X6IN [id number] Bone/Graft/Tissue/Human/Synth GRAFT CV FELT PTFE 6X6IN [id number]  CR [employer] [id number] N/A 1   GRAFT VASC WOVN DBL VELR STR [id number] - [id number] Vascular/Graft/Patch/Filter GRAFT VASC WOVN DBL VELR STR [id number] GETINGE: [id number]  N/A 1   WEI-SHEET PERIPATCH [id number] Vascular/Graft/Patch/Filter WEI-SHEET PERIPATCH [id number]  [employer] [id number] N/A 1   WEI-VALVE AORTIC INTUITY ELITE 19MM Valve: Aortic/Mechanical/Tissue WEI-VALVE AORTIC INTUITY ELITE 19MM  [employer] MICHAEL  N/A 1         Drains:   Chest Tube 1 Mediastinal  (Active)       Chest Tube 2

## 2020-01-22 ENCOUNTER — APPOINTMENT (OUTPATIENT)
Dept: GENERAL RADIOLOGY | Age: 70
DRG: 216 | End: 2020-01-22
Attending: INTERNAL MEDICINE
Payer: MEDICARE

## 2020-01-22 LAB
ACTIVATED CLOTTING TIME: 110 SEC (ref 99–130)
ACTIVATED CLOTTING TIME: 128 SEC (ref 99–130)
ACTIVATED CLOTTING TIME: 189 SEC (ref 99–130)
ACTIVATED CLOTTING TIME: 422 SEC (ref 99–130)
ACTIVATED CLOTTING TIME: 478 SEC (ref 99–130)
ACTIVATED CLOTTING TIME: 482 SEC (ref 99–130)
ACTIVATED CLOTTING TIME: 505 SEC (ref 99–130)
ACTIVATED CLOTTING TIME: 506 SEC (ref 99–130)
ACTIVATED CLOTTING TIME: 518 SEC (ref 99–130)
ACTIVATED CLOTTING TIME: 576 SEC (ref 99–130)
ACTIVATED CLOTTING TIME: 700 SEC (ref 99–130)
ANION GAP SERPL CALCULATED.3IONS-SCNC: 12 MMOL/L (ref 3–16)
BASE EXCESS ARTERIAL: -2 (ref -3–3)
BASE EXCESS ARTERIAL: 0 (ref -3–3)
BASE EXCESS ARTERIAL: 0 (ref -3–3)
BASE EXCESS ARTERIAL: 5 (ref -3–3)
BUN BLDV-MCNC: 16 MG/DL (ref 7–20)
CALCIUM IONIZED: 1.11 MMOL/L (ref 1.12–1.32)
CALCIUM IONIZED: 1.21 MMOL/L (ref 1.12–1.32)
CALCIUM SERPL-MCNC: 8.8 MG/DL (ref 8.3–10.6)
CHLORIDE BLD-SCNC: 103 MMOL/L (ref 99–110)
CO2: 26 MMOL/L (ref 21–32)
CREAT SERPL-MCNC: 1.1 MG/DL (ref 0.8–1.3)
GFR AFRICAN AMERICAN: >60
GFR NON-AFRICAN AMERICAN: >60
GLUCOSE BLD-MCNC: 113 MG/DL (ref 70–99)
GLUCOSE BLD-MCNC: 120 MG/DL (ref 70–99)
GLUCOSE BLD-MCNC: 149 MG/DL (ref 70–99)
GLUCOSE BLD-MCNC: 184 MG/DL (ref 70–99)
GLUCOSE BLD-MCNC: 187 MG/DL (ref 70–99)
GLUCOSE BLD-MCNC: 199 MG/DL (ref 70–99)
GLUCOSE BLD-MCNC: 208 MG/DL (ref 70–99)
GLUCOSE BLD-MCNC: 214 MG/DL (ref 70–99)
GLUCOSE BLD-MCNC: 216 MG/DL (ref 70–99)
GLUCOSE BLD-MCNC: 241 MG/DL (ref 70–99)
GLUCOSE BLD-MCNC: 269 MG/DL (ref 70–99)
GLUCOSE BLD-MCNC: 273 MG/DL (ref 70–99)
GLUCOSE BLD-MCNC: 339 MG/DL (ref 70–99)
GLUCOSE BLD-MCNC: 67 MG/DL (ref 70–99)
HCO3 ARTERIAL: 22.3 MMOL/L (ref 21–29)
HCO3 ARTERIAL: 24.1 MMOL/L (ref 21–29)
HCO3 ARTERIAL: 24.7 MMOL/L (ref 21–29)
HCO3 ARTERIAL: 29.1 MMOL/L (ref 21–29)
HCT VFR BLD CALC: 25.1 % (ref 40.5–52.5)
HEMOGLOBIN: 7.4 GM/DL (ref 13.5–17.5)
HEMOGLOBIN: 8.5 GM/DL (ref 13.5–17.5)
HEMOGLOBIN: 8.6 G/DL (ref 13.5–17.5)
HEMOGLOBIN: 8.9 GM/DL (ref 13.5–17.5)
LACTATE: 2.39 MMOL/L (ref 0.4–2)
LACTATE: 5.01 MMOL/L (ref 0.4–2)
MAGNESIUM: 2.5 MG/DL (ref 1.8–2.4)
MCH RBC QN AUTO: 30.6 PG (ref 26–34)
MCHC RBC AUTO-ENTMCNC: 34.1 G/DL (ref 31–36)
MCV RBC AUTO: 89.8 FL (ref 80–100)
O2 SAT, ARTERIAL: 100 % (ref 93–100)
O2 SAT, ARTERIAL: 100 % (ref 93–100)
O2 SAT, ARTERIAL: 96 % (ref 93–100)
O2 SAT, ARTERIAL: 99 % (ref 93–100)
PCO2 ARTERIAL: 32.5 MM HG (ref 35–45)
PCO2 ARTERIAL: 34.6 MM HG (ref 35–45)
PCO2 ARTERIAL: 39.7 MM HG (ref 35–45)
PCO2 ARTERIAL: 42.9 MM HG (ref 35–45)
PDW BLD-RTO: 15.8 % (ref 12.4–15.4)
PERFORMED ON: ABNORMAL
PH ARTERIAL: 7.4 (ref 7.35–7.45)
PH ARTERIAL: 7.44 (ref 7.35–7.45)
PH ARTERIAL: 7.45 (ref 7.35–7.45)
PH ARTERIAL: 7.45 (ref 7.35–7.45)
PLATELET # BLD: 66 K/UL (ref 135–450)
PMV BLD AUTO: 7.7 FL (ref 5–10.5)
PO2 ARTERIAL: 135.2 MM HG (ref 75–108)
PO2 ARTERIAL: 302.3 MM HG (ref 75–108)
PO2 ARTERIAL: 306.8 MM HG (ref 75–108)
PO2 ARTERIAL: 75.4 MM HG (ref 75–108)
POC HEMATOCRIT: 22 % (ref 40.5–52.5)
POC HEMATOCRIT: 25 % (ref 40.5–52.5)
POC HEMATOCRIT: 26 % (ref 40.5–52.5)
POC POTASSIUM: 3.1 MMOL/L (ref 3.5–5.1)
POC POTASSIUM: 3.6 MMOL/L (ref 3.5–5.1)
POC POTASSIUM: 4 MMOL/L (ref 3.5–5.1)
POC POTASSIUM: 4.4 MMOL/L (ref 3.5–5.1)
POC SAMPLE TYPE: ABNORMAL
POC SODIUM: 142 MMOL/L (ref 136–145)
POC SODIUM: 142 MMOL/L (ref 136–145)
POTASSIUM SERPL-SCNC: 4.1 MMOL/L (ref 3.5–5.1)
RBC # BLD: 2.8 M/UL (ref 4.2–5.9)
SODIUM BLD-SCNC: 141 MMOL/L (ref 136–145)
TCO2 ARTERIAL: 23 MMOL/L
TCO2 ARTERIAL: 25 MMOL/L
TCO2 ARTERIAL: 26 MMOL/L
TCO2 ARTERIAL: 30 MMOL/L
WBC # BLD: 9.6 K/UL (ref 4–11)

## 2020-01-22 PROCEDURE — 7100000011 HC PHASE II RECOVERY - ADDTL 15 MIN

## 2020-01-22 PROCEDURE — 82330 ASSAY OF CALCIUM: CPT

## 2020-01-22 PROCEDURE — 94003 VENT MGMT INPAT SUBQ DAY: CPT

## 2020-01-22 PROCEDURE — 2700000000 HC OXYGEN THERAPY PER DAY

## 2020-01-22 PROCEDURE — 6370000000 HC RX 637 (ALT 250 FOR IP): Performed by: THORACIC SURGERY (CARDIOTHORACIC VASCULAR SURGERY)

## 2020-01-22 PROCEDURE — 2580000003 HC RX 258: Performed by: THORACIC SURGERY (CARDIOTHORACIC VASCULAR SURGERY)

## 2020-01-22 PROCEDURE — 6360000002 HC RX W HCPCS: Performed by: THORACIC SURGERY (CARDIOTHORACIC VASCULAR SURGERY)

## 2020-01-22 PROCEDURE — 82803 BLOOD GASES ANY COMBINATION: CPT

## 2020-01-22 PROCEDURE — 85027 COMPLETE CBC AUTOMATED: CPT

## 2020-01-22 PROCEDURE — 2140000000 HC CCU INTERMEDIATE R&B

## 2020-01-22 PROCEDURE — 82947 ASSAY GLUCOSE BLOOD QUANT: CPT

## 2020-01-22 PROCEDURE — 80048 BASIC METABOLIC PNL TOTAL CA: CPT

## 2020-01-22 PROCEDURE — 83735 ASSAY OF MAGNESIUM: CPT

## 2020-01-22 PROCEDURE — 85014 HEMATOCRIT: CPT

## 2020-01-22 PROCEDURE — 94761 N-INVAS EAR/PLS OXIMETRY MLT: CPT

## 2020-01-22 PROCEDURE — 71045 X-RAY EXAM CHEST 1 VIEW: CPT

## 2020-01-22 PROCEDURE — 83605 ASSAY OF LACTIC ACID: CPT

## 2020-01-22 PROCEDURE — 94770 HC ETCO2 MONITOR DAILY: CPT

## 2020-01-22 PROCEDURE — 6360000002 HC RX W HCPCS: Performed by: ANESTHESIOLOGY

## 2020-01-22 PROCEDURE — 84295 ASSAY OF SERUM SODIUM: CPT

## 2020-01-22 PROCEDURE — 94640 AIRWAY INHALATION TREATMENT: CPT

## 2020-01-22 PROCEDURE — 94750 HC PULMONARY COMPLIANCE STUDY: CPT

## 2020-01-22 PROCEDURE — 6370000000 HC RX 637 (ALT 250 FOR IP): Performed by: NURSE PRACTITIONER

## 2020-01-22 PROCEDURE — 99024 POSTOP FOLLOW-UP VISIT: CPT | Performed by: THORACIC SURGERY (CARDIOTHORACIC VASCULAR SURGERY)

## 2020-01-22 PROCEDURE — 2500000003 HC RX 250 WO HCPCS: Performed by: THORACIC SURGERY (CARDIOTHORACIC VASCULAR SURGERY)

## 2020-01-22 PROCEDURE — 2100000000 HC CCU R&B

## 2020-01-22 PROCEDURE — 94669 MECHANICAL CHEST WALL OSCILL: CPT

## 2020-01-22 PROCEDURE — 7100000010 HC PHASE II RECOVERY - FIRST 15 MIN

## 2020-01-22 PROCEDURE — 84132 ASSAY OF SERUM POTASSIUM: CPT

## 2020-01-22 RX ORDER — SODIUM CHLORIDE 9 MG/ML
INJECTION, SOLUTION INTRAVENOUS
Status: DISPENSED
Start: 2020-01-22 | End: 2020-01-23

## 2020-01-22 RX ADMIN — ALBUTEROL SULFATE 2.5 MG: 2.5 SOLUTION RESPIRATORY (INHALATION) at 11:40

## 2020-01-22 RX ADMIN — DEXTROSE MONOHYDRATE 1 MG/HR: 50 INJECTION, SOLUTION INTRAVENOUS at 00:49

## 2020-01-22 RX ADMIN — ATORVASTATIN CALCIUM 20 MG: 10 TABLET, FILM COATED ORAL at 20:52

## 2020-01-22 RX ADMIN — Medication 10 ML: at 11:29

## 2020-01-22 RX ADMIN — DEXTROSE MONOHYDRATE 5 MG/HR: 50 INJECTION, SOLUTION INTRAVENOUS at 16:51

## 2020-01-22 RX ADMIN — MUPIROCIN: 20 OINTMENT TOPICAL at 21:01

## 2020-01-22 RX ADMIN — POTASSIUM CHLORIDE 20 MEQ: 29.8 INJECTION, SOLUTION INTRAVENOUS at 11:26

## 2020-01-22 RX ADMIN — POTASSIUM CHLORIDE 20 MEQ: 29.8 INJECTION, SOLUTION INTRAVENOUS at 15:37

## 2020-01-22 RX ADMIN — VANCOMYCIN HYDROCHLORIDE 1.5 G: 10 INJECTION, POWDER, LYOPHILIZED, FOR SOLUTION INTRAVENOUS at 09:15

## 2020-01-22 RX ADMIN — ACETAMINOPHEN 650 MG: 325 TABLET ORAL at 19:27

## 2020-01-22 RX ADMIN — ALBUTEROL SULFATE 2.5 MG: 2.5 SOLUTION RESPIRATORY (INHALATION) at 15:47

## 2020-01-22 RX ADMIN — ACETAMINOPHEN: 10 INJECTION, SOLUTION INTRAVENOUS at 05:00

## 2020-01-22 RX ADMIN — MORPHINE SULFATE 2 MG: 2 INJECTION, SOLUTION INTRAMUSCULAR; INTRAVENOUS at 14:33

## 2020-01-22 RX ADMIN — MUPIROCIN: 20 OINTMENT TOPICAL at 11:29

## 2020-01-22 RX ADMIN — FUROSEMIDE 40 MG: 10 INJECTION, SOLUTION INTRAMUSCULAR; INTRAVENOUS at 11:50

## 2020-01-22 RX ADMIN — DOCUSATE SODIUM 100 MG: 100 CAPSULE, LIQUID FILLED ORAL at 20:53

## 2020-01-22 RX ADMIN — VANCOMYCIN HYDROCHLORIDE 1.5 G: 10 INJECTION, POWDER, LYOPHILIZED, FOR SOLUTION INTRAVENOUS at 19:27

## 2020-01-22 RX ADMIN — POTASSIUM CHLORIDE 20 MEQ: 29.8 INJECTION, SOLUTION INTRAVENOUS at 10:26

## 2020-01-22 RX ADMIN — FAMOTIDINE 20 MG: 20 TABLET, FILM COATED ORAL at 20:53

## 2020-01-22 RX ADMIN — ALBUTEROL SULFATE 2.5 MG: 2.5 SOLUTION RESPIRATORY (INHALATION) at 07:58

## 2020-01-22 RX ADMIN — HYDRALAZINE HYDROCHLORIDE 5 MG: 20 INJECTION INTRAMUSCULAR; INTRAVENOUS at 15:37

## 2020-01-22 RX ADMIN — FUROSEMIDE 40 MG: 10 INJECTION, SOLUTION INTRAMUSCULAR; INTRAVENOUS at 17:41

## 2020-01-22 RX ADMIN — MAGNESIUM GLUCONATE 500 MG ORAL TABLET 400 MG: 500 TABLET ORAL at 20:53

## 2020-01-22 RX ADMIN — MILRINONE LACTATE IN DEXTROSE 0.5 MCG/KG/MIN: 200 INJECTION, SOLUTION INTRAVENOUS at 06:46

## 2020-01-22 RX ADMIN — CEFAZOLIN SODIUM 2 G: 10 INJECTION, POWDER, FOR SOLUTION INTRAVENOUS at 21:02

## 2020-01-22 RX ADMIN — INSULIN LISPRO 4 UNITS: 100 INJECTION, SOLUTION INTRAVENOUS; SUBCUTANEOUS at 17:41

## 2020-01-22 RX ADMIN — POTASSIUM CHLORIDE 20 MEQ: 29.8 INJECTION, SOLUTION INTRAVENOUS at 05:50

## 2020-01-22 RX ADMIN — ACETAMINOPHEN: 10 INJECTION, SOLUTION INTRAVENOUS at 12:32

## 2020-01-22 RX ADMIN — METOPROLOL TARTRATE 12.5 MG: 25 TABLET ORAL at 20:53

## 2020-01-22 RX ADMIN — ALBUTEROL SULFATE 2.5 MG: 2.5 SOLUTION RESPIRATORY (INHALATION) at 20:10

## 2020-01-22 RX ADMIN — SODIUM CHLORIDE 18.7 UNITS/HR: 9 INJECTION, SOLUTION INTRAVENOUS at 04:04

## 2020-01-22 RX ADMIN — POTASSIUM CHLORIDE 20 MEQ: 29.8 INJECTION, SOLUTION INTRAVENOUS at 06:50

## 2020-01-22 RX ADMIN — CHLORHEXIDINE GLUCONATE 15 ML: 1.2 RINSE ORAL at 21:02

## 2020-01-22 RX ADMIN — INSULIN GLARGINE 21 UNITS: 100 INJECTION, SOLUTION SUBCUTANEOUS at 21:07

## 2020-01-22 RX ADMIN — DEXMEDETOMIDINE 0.5 MCG/KG/HR: 100 INJECTION, SOLUTION, CONCENTRATE INTRAVENOUS at 03:56

## 2020-01-22 RX ADMIN — VASOPRESSIN 0.02 UNITS/MIN: 20 INJECTION INTRAVENOUS at 00:24

## 2020-01-22 RX ADMIN — POTASSIUM CHLORIDE 20 MEQ: 29.8 INJECTION, SOLUTION INTRAVENOUS at 02:30

## 2020-01-22 RX ADMIN — CEFAZOLIN SODIUM 2 G: 10 INJECTION, POWDER, FOR SOLUTION INTRAVENOUS at 14:39

## 2020-01-22 RX ADMIN — CEFAZOLIN SODIUM 2 G: 10 INJECTION, POWDER, FOR SOLUTION INTRAVENOUS at 05:17

## 2020-01-22 RX ADMIN — INSULIN LISPRO 5 UNITS: 100 INJECTION, SOLUTION INTRAVENOUS; SUBCUTANEOUS at 21:07

## 2020-01-22 ASSESSMENT — PULMONARY FUNCTION TESTS
PIF_VALUE: 18
PIF_VALUE: 21
PIF_VALUE: 22
PIF_VALUE: 22
PIF_VALUE: 14
PIF_VALUE: 22
PIF_VALUE: 14
PIF_VALUE: 17
PIF_VALUE: 14
PIF_VALUE: 21
PIF_VALUE: 22
PIF_VALUE: 21

## 2020-01-22 ASSESSMENT — PAIN SCALES - GENERAL
PAINLEVEL_OUTOF10: 0
PAINLEVEL_OUTOF10: 4
PAINLEVEL_OUTOF10: 0
PAINLEVEL_OUTOF10: 0

## 2020-01-22 NOTE — PROGRESS NOTES
01/22/20 0801   Vent Information   Vt Ordered 650 mL   Rate Set 12 bmp   Peak Flow 50 L/min   Pressure Support 8 cmH20   FiO2  50 %   Sensitivity 3   PEEP/CPAP 5   Vent Patient Data   Peak Inspiratory Pressure 14 cmH2O   Mean Airway Pressure 9.6 cmH20   Rate Measured 21 br/min   Vt Exhaled 484 mL   Minute Volume 12.2 Liters   I:E Ratio 1:1.20   Plateau Pressure 14 NMD44   Static Compliance 54 mL/cmH2O   Dynamic Compliance 54 mL/cmH2O   Spontaneous Breathing Trial (SBT) RT Doc   Pulse 80   SpO2 100 %   Breath Sounds   Right Upper Lobe Diminished   Right Middle Lobe Diminished   Right Lower Lobe Diminished   Left Upper Lobe Diminished   Left Lower Lobe Diminished   Additional Respiratory  Assessments   Resp 18   End Tidal CO2 31 (%)   Position Semi-Jones's   Alarm Settings   High Pressure Alarm 40 cmH2O   Low Minute Volume Alarm 3 L/min   Apnea (secs) 20 secs   High Respiratory Rate 40 br/min   Low Exhaled Vt  300 mL   ETT (adult)   Placement Date/Time: 01/21/20 1000   Type: Cuffed  Tube Size: 7.5 mm  Secured at: 21 cm  Measured From: Lips   Secured at 22 cm   Measured From [address] [place] [address],[address] By Commercial tube romero   Site Condition Dry   Cuff Pressure 30 cm H2O

## 2020-01-22 NOTE — PROGRESS NOTES
Patient opened eyes moves arms able to wiggle toes and squeeze hands when commanded.    Patient opens eyes to command or voice   Weak pedal pulses and posterior pulses heard with doppler

## 2020-01-22 NOTE — PROGRESS NOTES
01/21/20 2342   Vent Information   Vent Type 840   Vent Mode SIMV/VC   Vt Ordered 650 mL   Rate Set 12 bmp   Peak Flow 50 L/min   Pressure Support 10 cmH20   FiO2  80 %   Sensitivity 3   PEEP/CPAP 7   Cuff Pressure (cm H2O) 30 cm H2O   Humidification Source HME   Vent Patient Data   Peak Inspiratory Pressure 22 cmH2O   Mean Airway Pressure 11 cmH20   Rate Measured 16 br/min   Vt Exhaled 674 mL   Minute Volume 10.9 Liters   I:E Ratio 1:1.70   Cough/Sputum   Sputum How Obtained None   Spontaneous Breathing Trial (SBT) RT Doc   Pulse 63   SpO2 100 %   Breath Sounds   Right Upper Lobe Diminished   Right Middle Lobe Diminished   Right Lower Lobe Diminished   Left Upper Lobe Diminished   Left Lower Lobe Diminished   Additional Respiratory  Assessments   Resp 14   End Tidal CO2 31 (%)   Position Semi-Jones's   Alarm Settings   High Pressure Alarm 40 cmH2O   Low Minute Volume Alarm 3 L/min   High Respiratory Rate 40 br/min   Low Exhaled Vt  200 mL   ETT (adult)   Placement Date/Time: 01/21/20 1000   Type: Cuffed  Tube Size: 7.5 mm  Secured at: 21 cm  Measured From: Lips   Secured at 22 cm   Measured From [address] [place] [address],[address] By Commercial tube romero   Site Condition Dry

## 2020-01-22 NOTE — PROGRESS NOTES
Per Romayne Lauber RN , call received from Dr. [place] Martinez at this time. Per Dr. Elizbaeth Martinez verbal order to increase pt Dobutamine to 3mcg/kg/min and to slowly wean Milrinone as able. Dobutamine increased to 3mcg/kg/min at this time.

## 2020-01-22 NOTE — PROGRESS NOTES
Dr. Ashly Torres called. Reviewed ggts. He stated he is ok with SBP of 120 or less. He stated continue to wean off milrinone and dobutamine, pull swan once milrinone is off. Continue with cardene until morning.

## 2020-01-22 NOTE — PROGRESS NOTES
01/21/20 2016   Vent Information   Vent Type 840   Vent Mode SIMV/VC   Vt Ordered 650 mL   Rate Set 12 bmp   Peak Flow 50 L/min   Pressure Support 10 cmH20   FiO2  100 %   Sensitivity 3   PEEP/CPAP 5   Cuff Pressure (cm H2O) 30 cm H2O   Humidification Source HME   Vent Patient Data   Peak Inspiratory Pressure 21 cmH2O   Mean Airway Pressure 9.8 cmH20   Rate Measured 22 br/min   Vt Exhaled 603 mL   Minute Volume 12.3 Liters   I:E Ratio 1:1.10   Plateau Pressure 19 RFY96   Static Compliance 43 mL/cmH2O   Dynamic Compliance 37.6 mL/cmH2O   Cough/Sputum   Sputum How Obtained Endotracheal   Cough None   Spontaneous Breathing Trial (SBT) RT Doc   Pulse 79   SpO2 100 %   Breath Sounds   Right Upper Lobe Diminished   Right Middle Lobe Diminished   Right Lower Lobe Diminished   Left Upper Lobe Diminished   Left Lower Lobe Diminished   Additional Respiratory  Assessments   Resp 21   End Tidal CO2 31 (%)   Alarm Settings   High Pressure Alarm 40 cmH2O   Low Minute Volume Alarm 3 L/min   High Respiratory Rate 40 br/min   ETT (adult)   Placement Date/Time: 01/21/20 1000   Type: Cuffed  Tube Size: 7.5 mm  Secured at: 21 cm  Measured From: Lips   Secured at 22 cm   Measured From Lips   ET Placement Right   Secured By Commercial tube romero   Site Condition Dry

## 2020-01-22 NOTE — PROGRESS NOTES
Dr. Ziegler Lunch in room, made him aware that SBP was just in the 140's gave the prn dose of Apresoline, he stated to go ahead and start him on Cardene gtt, SBP goal of 115 and map 65 or below.

## 2020-01-22 NOTE — PROGRESS NOTES
CVTS Cardiothoracic Progress Note:                                CC:  Post op follow up     Surgery: 1/21/20 RE-DO STERNOTOMY X3, REPLACEMENT OF ASCENDING AORTA, RE-DO AORTIC VALVE REPLACEMENT WITH 19MM BLOCK INTUITY VALVE, CLOSURE OF OUTFLOW TRACK ATRIAL FISTULA, HYPOTHERMIC ARREST,  WITH TRANSESOPHAGEAL ECHOCARDIOGRAM, CIRCULATORY ARREST, 5 LEVEL BILATERAL INTERCOSTAL NERVE BLOCK with Dr.'s Myrtle Patel and Spencer Sandoval.     Hospital course:   1/22 resting in bed, awake, alert, responding appropriately on SBT    Past Medical History:   Diagnosis Date    CAD (coronary artery disease)     [facility] [address] [facility]     Diabetes mellitus [facility] [facility]     Gout     Hyperlipidemia     Hypertension     Thyroid disease         Past Surgical History:   Procedure Laterality Date    BRONCHOSCOPY N/A 1/9/2020    BRONCHOSCOPY ALVEOLAR LAVAGE performed by Marlene Wadsworth MD at [facility] CABG WITH AORTIC VALVE REPLACEMENT      CORONARY ANGIOPLASTY WITH STENT PLACEMENT  12/07/2010    NURIS- 4.0 x 28 to Cx    THORACOTOMY Left 1/2/2020    EMERGENT THORACOTOMY, EVACUATION OF HEMOTOMA CHEST WALL HEMOSTASIS, PLEUREAL BIOPSY, LEFT UPPER LOBE RESECTION performed by Leidy Marie MD at [address] [place] [address] as of 01/08/2020 - Review Complete 01/08/2020   Allergen Reaction Noted    Penicillins Hives 05/28/2010        Patient Active Problem List   Diagnosis    Hemothorax    Acute respiratory failure with hypoxemia (HCC)    Tobacco abuse    AS (aortic stenosis)    CAD (coronary artery disease) s/p stent in 2010    HTN (hypertension)    Hyperlipidemia    S/P AVR (aortic valve replacement)    Hypotension    Pulmonary infiltrate    Pulmonary venous congestion    Atelectasis    Leukocytosis    Hyperglycemia    Acute bronchospasm    Atrial fibrillation with RVR (HCC)    Cardiogenic shock (HCC)    Acute on chronic diastolic congestive heart failure (HCC)        Vital Signs: BP (!) 98/40 monitor  Chest: symmetrical expansion with inspiration and expirations; no rocking of sternum noted   Abdomen: round, soft, non-tender   Bowel sounds: hypoactive  Kidneys: /903/435= 1688 ml in 24 hrs; Cr 1.1  Wound/Incisions: Midsternal incision with dressing CDI; LLE incisions with dressings CDI; Pacing wires taped, secured, and attached  Extremities: BLE pulses by doppler; 2+ swelling noted in BLE  Neurological: intact, responds appropriately (still intubated on SBT)  Chest tubes/Drains: Chest tube # 1 with 226/60= 286 ml serosanguinous drainage in 16 hours overnight; Chest tube # 2 with 196/90= 286 ml serosanguinous drainage in 16 hours overnight; no airleaks noted in either tube   ________________________________________________________________________    SCIP Measures:     · Noriega catheter for:  ? IV Diuresis  ? Accurate I/O  ? D/C today  ·   · Antibiotics:  ? Have been stopped  ? Prophylaxis (POD #1 only)  ? Continued for:   ________________________________________________________________________    Assessment:   Post-op: 1 days. Condition: In stable condition. Plan:   1. Cardiovascular: s/p Aorta repair and AV Replace redo-ASA, Plavix,Statin  Remains atrial paced at 80. Will hold BB. Plan to wean milrinone to 0.25 then park it. Will wean dobutamine next. 2. Pulmonary: needs pulmonary expansion maneuvers- IS, acapella, PT/OT, ambulation once able. Currently on SBT, hoping to get him extubated later this morning. 3. Neurology: analgesia as needed. 4. Nephrology: fluid management- diurese as tolerated; continue IV lasix BID.    5. Endocrinology: insulin drip    6. Hematology: acute blood loss anemia; expected, stable, monitor. Thrombocytopenia- plt 66 today, will hold Arixtra, ASA. 7. Microbiology: nothing at this time    7. Nutrition: NPO    8. Labs: monitor    9. Post-op Drains/Wires:  Keeping all for now    10. D/C Goals: too soon to tell, DCP following.     11. Continue post-op

## 2020-01-22 NOTE — PLAN OF CARE
[address] REHAB PHASE I  CABG x3    Ping Salvage   1950 01/22/20    Previous cardiac rehab notes: none previously documented      Activity limitations:  shortness of breath  fatigue   limited d/t not transitioned    Patient and family's stated goal of care prior to discharge:  Patient goal(s): reduce shortness of breath  increase activity tolerance  be more comfortable  complete cardiac rehabilitation program  ambulate independently without any assist device      History:  Past Medical History:   Diagnosis Date    CAD (coronary artery disease)     [facility] [facility]     Diabetes mellitus [facility] [facility]     Gout     Hyperlipidemia     Hypertension     Thyroid disease          ACTIVITY TOLERANCE    Patient's baseline reported from RN prior to activity:  RPE: 6/20   RPD: 0.5/10   O2:    HR:  Pulse Readings from Last 1 Encounters:   01/22/20 60      BP:  BP Readings from Last 1 Encounters:   01/21/20 (!) 98/40          Patient's tolerance during activity:  Stage 1  Dangling:          RPE:    RPD:    Reps ** reps   Rest breaks ** breaks   BP    HR    O2      Time: *** Minutes    Discussed with RN to see patient. Per RN-okay to work with pt. Assisted pt from _____ to _______ position. Pt ambulated in hallway with _______ assist and _____ assistive device. Assisted pt back to _______ with call light within reach. Pt has no needs at this time. Touched base with RN after pt's session, discussed pt toleration. Will continue to follow up during the duration of the CR order.      ELI Crooks 01/22/20 1:44 PM    Exercise Physiologist    Phone: [phone]

## 2020-01-22 NOTE — CONSULTS
[address] REHAB PHASE I  CABG x3/ AVR      Hany Duty   1950 01/22/20    Previous cardiac rehab notes: none previously documented      Pt has not transitioned yet. Will follow up with activity and/or ambulation when appropriate. Will continue to follow up during the duration of the CR order.      ELI Crooks 01/22/20 8:00 AM    Exercise Physiologist    Phone: [phone]

## 2020-01-22 NOTE — PLAN OF CARE
Volume - Imbalance:  Goal: Ability to achieve a balanced intake and output will improve  Description  Ability to achieve a balanced intake and output will improve  Outcome: Ongoing  Goal: Chest tube drainage is within specified parameters  Description  Chest tube drainage is within specified parameters  Outcome: Ongoing     Problem: Gas Exchange - Impaired:  Goal: Levels of oxygenation will improve  Description  Levels of oxygenation will improve  Outcome: Ongoing  Goal: Ability to maintain adequate ventilation will improve  Description  Ability to maintain adequate ventilation will improve  Outcome: Ongoing     Problem: Tissue Perfusion - Cardiopulmonary, Altered:  Goal: Absence of angina  Description  Absence of angina  Outcome: Ongoing  Goal: Hemodynamic stability will improve  Description  Hemodynamic stability will improve  Outcome: Ongoing  Goal: Will show no evidence of cardiac arrhythmias  Description  Will show no evidence of cardiac arrhythmias  Outcome: Ongoing

## 2020-01-23 ENCOUNTER — ANESTHESIA (OUTPATIENT)
Dept: ICU | Age: 70
DRG: 216 | End: 2020-01-23
Payer: MEDICARE

## 2020-01-23 ENCOUNTER — APPOINTMENT (OUTPATIENT)
Dept: GENERAL RADIOLOGY | Age: 70
DRG: 216 | End: 2020-01-23
Attending: INTERNAL MEDICINE
Payer: MEDICARE

## 2020-01-23 ENCOUNTER — ANESTHESIA EVENT (OUTPATIENT)
Dept: ICU | Age: 70
DRG: 216 | End: 2020-01-23
Payer: MEDICARE

## 2020-01-23 LAB
ACTIVATED CLOTTING TIME: >1005 SEC (ref 99–130)
ACTIVATED CLOTTING TIME: >1005 SEC (ref 99–130)
ALBUMIN SERPL-MCNC: 3.6 G/DL (ref 3.4–5)
ALP BLD-CCNC: 81 U/L (ref 40–129)
ALT SERPL-CCNC: 277 U/L (ref 10–40)
ANION GAP SERPL CALCULATED.3IONS-SCNC: 13 MMOL/L (ref 3–16)
ANION GAP SERPL CALCULATED.3IONS-SCNC: 14 MMOL/L (ref 3–16)
ANION GAP SERPL CALCULATED.3IONS-SCNC: 19 MMOL/L (ref 3–16)
APTT: 28.4 SEC (ref 24.2–36.2)
APTT: 72 SEC (ref 24.2–36.2)
AST SERPL-CCNC: 673 U/L (ref 15–37)
BASE EXCESS ARTERIAL: -1.6 MMOL/L (ref -3–3)
BASE EXCESS ARTERIAL: -7 (ref -3–3)
BASE EXCESS ARTERIAL: 0.2 MMOL/L (ref -3–3)
BASE EXCESS ARTERIAL: 0.7 MMOL/L (ref -3–3)
BASE EXCESS ARTERIAL: 2 (ref -3–3)
BASE EXCESS ARTERIAL: 2 MMOL/L (ref -3–3)
BASE EXCESS ARTERIAL: 3.2 MMOL/L (ref -3–3)
BASE EXCESS VENOUS: 0 (ref -3–3)
BASOPHILS ABSOLUTE: 0 K/UL (ref 0–0.2)
BASOPHILS RELATIVE PERCENT: 0.1 %
BILIRUB SERPL-MCNC: 1.2 MG/DL (ref 0–1)
BILIRUBIN DIRECT: 0.6 MG/DL (ref 0–0.3)
BILIRUBIN, INDIRECT: 0.6 MG/DL (ref 0–1)
BUN BLDV-MCNC: 29 MG/DL (ref 7–20)
BUN BLDV-MCNC: 34 MG/DL (ref 7–20)
BUN BLDV-MCNC: 37 MG/DL (ref 7–20)
CALCIUM IONIZED: 1.14 MMOL/L (ref 1.12–1.32)
CALCIUM SERPL-MCNC: 8.3 MG/DL (ref 8.3–10.6)
CALCIUM SERPL-MCNC: 8.6 MG/DL (ref 8.3–10.6)
CALCIUM SERPL-MCNC: 8.7 MG/DL (ref 8.3–10.6)
CARBOXYHEMOGLOBIN ARTERIAL: 0 % (ref 0–1.5)
CARBOXYHEMOGLOBIN ARTERIAL: 0.3 % (ref 0–1.5)
CARBOXYHEMOGLOBIN ARTERIAL: 0.6 % (ref 0–1.5)
CARBOXYHEMOGLOBIN ARTERIAL: 0.8 % (ref 0–1.5)
CARBOXYHEMOGLOBIN ARTERIAL: 1.2 % (ref 0–1.5)
CHLORIDE BLD-SCNC: 97 MMOL/L (ref 99–110)
CO2: 18 MMOL/L (ref 21–32)
CO2: 21 MMOL/L (ref 21–32)
CO2: 25 MMOL/L (ref 21–32)
CREAT SERPL-MCNC: 1.3 MG/DL (ref 0.8–1.3)
CREAT SERPL-MCNC: 1.6 MG/DL (ref 0.8–1.3)
CREAT SERPL-MCNC: 1.9 MG/DL (ref 0.8–1.3)
EOSINOPHILS ABSOLUTE: 0 K/UL (ref 0–0.6)
EOSINOPHILS RELATIVE PERCENT: 0 %
GFR AFRICAN AMERICAN: 43
GFR AFRICAN AMERICAN: 52
GFR AFRICAN AMERICAN: >60
GFR NON-AFRICAN AMERICAN: 35
GFR NON-AFRICAN AMERICAN: 43
GFR NON-AFRICAN AMERICAN: 55
GLUCOSE BLD-MCNC: 157 MG/DL (ref 70–99)
GLUCOSE BLD-MCNC: 162 MG/DL (ref 70–99)
GLUCOSE BLD-MCNC: 190 MG/DL (ref 70–99)
GLUCOSE BLD-MCNC: 190 MG/DL (ref 70–99)
GLUCOSE BLD-MCNC: 192 MG/DL (ref 70–99)
HCO3 ARTERIAL: 18.6 MMOL/L (ref 21–29)
HCO3 ARTERIAL: 22.6 MMOL/L (ref 21–29)
HCO3 ARTERIAL: 23.5 MMOL/L (ref 21–29)
HCO3 ARTERIAL: 25.9 MMOL/L (ref 21–29)
HCO3 ARTERIAL: 26.1 MMOL/L (ref 21–29)
HCO3 ARTERIAL: 26.3 MMOL/L (ref 21–29)
HCO3 ARTERIAL: 27.6 MMOL/L (ref 21–29)
HCO3 VENOUS: 24.8 MMOL/L (ref 23–29)
HCT VFR BLD CALC: 24.3 % (ref 40.5–52.5)
HCT VFR BLD CALC: 25 % (ref 40.5–52.5)
HCT VFR BLD CALC: 25.6 % (ref 40.5–52.5)
HEMOGLOBIN, ART, EXTENDED: 8.3 G/DL (ref 13.5–17.5)
HEMOGLOBIN, ART, EXTENDED: 8.4 G/DL (ref 13.5–17.5)
HEMOGLOBIN, ART, EXTENDED: 8.5 G/DL (ref 13.5–17.5)
HEMOGLOBIN, ART, EXTENDED: 8.6 G/DL (ref 13.5–17.5)
HEMOGLOBIN, ART, EXTENDED: 8.7 G/DL (ref 13.5–17.5)
HEMOGLOBIN: 7.8 GM/DL (ref 13.5–17.5)
HEMOGLOBIN: 8 G/DL (ref 13.5–17.5)
HEMOGLOBIN: 8.3 G/DL (ref 13.5–17.5)
HEMOGLOBIN: 8.4 G/DL (ref 13.5–17.5)
HEPARIN INDUCED PLATELET ANTIBODY: POSITIVE
LACTATE: 3.79 MMOL/L (ref 0.4–2)
LACTATE: 9.71 MMOL/L (ref 0.4–2)
LACTIC ACID: 3.1 MMOL/L (ref 0.4–2)
LACTIC ACID: 7 MMOL/L (ref 0.4–2)
LYMPHOCYTES ABSOLUTE: 0.3 K/UL (ref 1–5.1)
LYMPHOCYTES RELATIVE PERCENT: 1.9 %
MAGNESIUM: 2.5 MG/DL (ref 1.8–2.4)
MCH RBC QN AUTO: 29.6 PG (ref 26–34)
MCH RBC QN AUTO: 29.7 PG (ref 26–34)
MCH RBC QN AUTO: 30.2 PG (ref 26–34)
MCHC RBC AUTO-ENTMCNC: 32.4 G/DL (ref 31–36)
MCHC RBC AUTO-ENTMCNC: 32.8 G/DL (ref 31–36)
MCHC RBC AUTO-ENTMCNC: 33.4 G/DL (ref 31–36)
MCV RBC AUTO: 90.3 FL (ref 80–100)
MCV RBC AUTO: 90.4 FL (ref 80–100)
MCV RBC AUTO: 91.7 FL (ref 80–100)
METHEMOGLOBIN ARTERIAL: 0.6 %
METHEMOGLOBIN ARTERIAL: 0.8 %
METHEMOGLOBIN ARTERIAL: 1 %
METHEMOGLOBIN ARTERIAL: 1.1 %
METHEMOGLOBIN ARTERIAL: 1.3 %
MONOCYTES ABSOLUTE: 0.6 K/UL (ref 0–1.3)
MONOCYTES RELATIVE PERCENT: 3.5 %
NEUTROPHILS ABSOLUTE: 15.5 K/UL (ref 1.7–7.7)
NEUTROPHILS RELATIVE PERCENT: 94.5 %
O2 CONTENT ARTERIAL: 11 ML/DL
O2 CONTENT ARTERIAL: 11 ML/DL
O2 CONTENT ARTERIAL: 12 ML/DL
O2 SAT, ARTERIAL: 88.8 %
O2 SAT, ARTERIAL: 92 % (ref 93–100)
O2 SAT, ARTERIAL: 93.6 %
O2 SAT, ARTERIAL: 94 % (ref 93–100)
O2 SAT, ARTERIAL: 97.3 %
O2 SAT, ARTERIAL: 98.3 %
O2 SAT, ARTERIAL: 98.8 %
O2 SAT, VEN: 53 %
O2 THERAPY: ABNORMAL
PCO2 ARTERIAL: 32.5 MMHG (ref 35–45)
PCO2 ARTERIAL: 34 MM HG (ref 35–45)
PCO2 ARTERIAL: 34.8 MM HG (ref 35–45)
PCO2 ARTERIAL: 35.9 MMHG (ref 35–45)
PCO2 ARTERIAL: 39.7 MMHG (ref 35–45)
PCO2 ARTERIAL: 41.3 MMHG (ref 35–45)
PCO2 ARTERIAL: 46.1 MMHG (ref 35–45)
PCO2, VEN: 40.5 MM HG (ref 40–50)
PDW BLD-RTO: 16.4 % (ref 12.4–15.4)
PDW BLD-RTO: 16.4 % (ref 12.4–15.4)
PDW BLD-RTO: 16.6 % (ref 12.4–15.4)
PERFORMED ON: ABNORMAL
PERFORMED ON: NORMAL
PH ARTERIAL: 7.35 (ref 7.35–7.45)
PH ARTERIAL: 7.37 (ref 7.35–7.45)
PH ARTERIAL: 7.42 (ref 7.35–7.45)
PH ARTERIAL: 7.44 (ref 7.35–7.45)
PH ARTERIAL: 7.44 (ref 7.35–7.45)
PH ARTERIAL: 7.48 (ref 7.35–7.45)
PH ARTERIAL: 7.48 (ref 7.35–7.45)
PH VENOUS: 7.39 (ref 7.35–7.45)
PLATELET # BLD: 47 K/UL (ref 135–450)
PLATELET # BLD: 49 K/UL (ref 135–450)
PLATELET # BLD: 51 K/UL (ref 135–450)
PMV BLD AUTO: 7.5 FL (ref 5–10.5)
PMV BLD AUTO: 8.6 FL (ref 5–10.5)
PMV BLD AUTO: 9.1 FL (ref 5–10.5)
PO2 ARTERIAL: 109.7 MMHG (ref 75–108)
PO2 ARTERIAL: 154.7 MMHG (ref 75–108)
PO2 ARTERIAL: 176 MMHG (ref 75–108)
PO2 ARTERIAL: 60.8 MMHG (ref 75–108)
PO2 ARTERIAL: 65.2 MM HG (ref 75–108)
PO2 ARTERIAL: 65.6 MM HG (ref 75–108)
PO2 ARTERIAL: 71.2 MMHG (ref 75–108)
PO2, VEN: 28 MM HG
POC HEMATOCRIT: 23 % (ref 40.5–52.5)
POC POTASSIUM: 5.6 MMOL/L (ref 3.5–5.1)
POC SAMPLE TYPE: ABNORMAL
POC SAMPLE TYPE: ABNORMAL
POC SAMPLE TYPE: NORMAL
POC SODIUM: 133 MMOL/L (ref 136–145)
POTASSIUM SERPL-SCNC: 4.6 MMOL/L (ref 3.5–5.1)
POTASSIUM SERPL-SCNC: 5.1 MMOL/L (ref 3.5–5.1)
POTASSIUM SERPL-SCNC: 5.9 MMOL/L (ref 3.5–5.1)
RBC # BLD: 2.7 M/UL (ref 4.2–5.9)
RBC # BLD: 2.77 M/UL (ref 4.2–5.9)
RBC # BLD: 2.78 M/UL (ref 4.2–5.9)
SODIUM BLD-SCNC: 132 MMOL/L (ref 136–145)
SODIUM BLD-SCNC: 134 MMOL/L (ref 136–145)
SODIUM BLD-SCNC: 135 MMOL/L (ref 136–145)
TCO2 ARTERIAL: 20 MMOL/L
TCO2 ARTERIAL: 23.7 MMOL/L
TCO2 ARTERIAL: 24.5 MMOL/L
TCO2 ARTERIAL: 27 MMOL/L
TCO2 ARTERIAL: 27.5 MMOL/L
TCO2 ARTERIAL: 27.5 MMOL/L
TCO2 ARTERIAL: 28.9 MMOL/L
TCO2 CALC VENOUS: 26 MMOL/L
TOTAL PROTEIN: 5.5 G/DL (ref 6.4–8.2)
WBC # BLD: 14.5 K/UL (ref 4–11)
WBC # BLD: 16.4 K/UL (ref 4–11)
WBC # BLD: 18.6 K/UL (ref 4–11)

## 2020-01-23 PROCEDURE — 85025 COMPLETE CBC W/AUTO DIFF WBC: CPT

## 2020-01-23 PROCEDURE — 31500 INSERT EMERGENCY AIRWAY: CPT

## 2020-01-23 PROCEDURE — 6360000002 HC RX W HCPCS: Performed by: INTERNAL MEDICINE

## 2020-01-23 PROCEDURE — 84132 ASSAY OF SERUM POTASSIUM: CPT

## 2020-01-23 PROCEDURE — 6360000002 HC RX W HCPCS: Performed by: THORACIC SURGERY (CARDIOTHORACIC VASCULAR SURGERY)

## 2020-01-23 PROCEDURE — 86022 PLATELET ANTIBODIES: CPT

## 2020-01-23 PROCEDURE — 2500000003 HC RX 250 WO HCPCS: Performed by: NURSE PRACTITIONER

## 2020-01-23 PROCEDURE — 94640 AIRWAY INHALATION TREATMENT: CPT

## 2020-01-23 PROCEDURE — 2580000003 HC RX 258: Performed by: INTERNAL MEDICINE

## 2020-01-23 PROCEDURE — 80048 BASIC METABOLIC PNL TOTAL CA: CPT

## 2020-01-23 PROCEDURE — 74018 RADEX ABDOMEN 1 VIEW: CPT

## 2020-01-23 PROCEDURE — 71045 X-RAY EXAM CHEST 1 VIEW: CPT

## 2020-01-23 PROCEDURE — 87086 URINE CULTURE/COLONY COUNT: CPT

## 2020-01-23 PROCEDURE — 6360000002 HC RX W HCPCS

## 2020-01-23 PROCEDURE — 2500000003 HC RX 250 WO HCPCS: Performed by: INTERNAL MEDICINE

## 2020-01-23 PROCEDURE — 6360000002 HC RX W HCPCS: Performed by: ANESTHESIOLOGY

## 2020-01-23 PROCEDURE — 93308 TTE F-UP OR LMTD: CPT

## 2020-01-23 PROCEDURE — 31500 INSERT EMERGENCY AIRWAY: CPT | Performed by: ANESTHESIOLOGY

## 2020-01-23 PROCEDURE — 2700000000 HC OXYGEN THERAPY PER DAY

## 2020-01-23 PROCEDURE — 94761 N-INVAS EAR/PLS OXIMETRY MLT: CPT

## 2020-01-23 PROCEDURE — 2140000000 HC CCU INTERMEDIATE R&B

## 2020-01-23 PROCEDURE — 94770 HC ETCO2 MONITOR DAILY: CPT

## 2020-01-23 PROCEDURE — 85730 THROMBOPLASTIN TIME PARTIAL: CPT

## 2020-01-23 PROCEDURE — 85014 HEMATOCRIT: CPT

## 2020-01-23 PROCEDURE — 2580000003 HC RX 258: Performed by: THORACIC SURGERY (CARDIOTHORACIC VASCULAR SURGERY)

## 2020-01-23 PROCEDURE — 82947 ASSAY GLUCOSE BLOOD QUANT: CPT

## 2020-01-23 PROCEDURE — 99291 CRITICAL CARE FIRST HOUR: CPT | Performed by: INTERNAL MEDICINE

## 2020-01-23 PROCEDURE — 80076 HEPATIC FUNCTION PANEL: CPT

## 2020-01-23 PROCEDURE — 2500000003 HC RX 250 WO HCPCS: Performed by: THORACIC SURGERY (CARDIOTHORACIC VASCULAR SURGERY)

## 2020-01-23 PROCEDURE — 82803 BLOOD GASES ANY COMBINATION: CPT

## 2020-01-23 PROCEDURE — 84295 ASSAY OF SERUM SODIUM: CPT

## 2020-01-23 PROCEDURE — 94660 CPAP INITIATION&MGMT: CPT

## 2020-01-23 PROCEDURE — 36620 INSERTION CATHETER ARTERY: CPT

## 2020-01-23 PROCEDURE — 2580000003 HC RX 258: Performed by: NURSE PRACTITIONER

## 2020-01-23 PROCEDURE — 6370000000 HC RX 637 (ALT 250 FOR IP): Performed by: NURSE PRACTITIONER

## 2020-01-23 PROCEDURE — 6370000000 HC RX 637 (ALT 250 FOR IP): Performed by: THORACIC SURGERY (CARDIOTHORACIC VASCULAR SURGERY)

## 2020-01-23 PROCEDURE — 83605 ASSAY OF LACTIC ACID: CPT

## 2020-01-23 PROCEDURE — 83735 ASSAY OF MAGNESIUM: CPT

## 2020-01-23 PROCEDURE — 85027 COMPLETE CBC AUTOMATED: CPT

## 2020-01-23 PROCEDURE — 82330 ASSAY OF CALCIUM: CPT

## 2020-01-23 PROCEDURE — 6360000002 HC RX W HCPCS: Performed by: NURSE PRACTITIONER

## 2020-01-23 PROCEDURE — 87040 BLOOD CULTURE FOR BACTERIA: CPT

## 2020-01-23 PROCEDURE — 94003 VENT MGMT INPAT SUBQ DAY: CPT

## 2020-01-23 PROCEDURE — 6370000000 HC RX 637 (ALT 250 FOR IP): Performed by: INTERNAL MEDICINE

## 2020-01-23 RX ORDER — FENTANYL CITRATE 50 UG/ML
INJECTION, SOLUTION INTRAMUSCULAR; INTRAVENOUS PRN
Status: DISCONTINUED | OUTPATIENT
Start: 2020-01-23 | End: 2020-01-24 | Stop reason: HOSPADM

## 2020-01-23 RX ORDER — FUROSEMIDE 10 MG/ML
20 INJECTION INTRAMUSCULAR; INTRAVENOUS ONCE
Status: COMPLETED | OUTPATIENT
Start: 2020-01-23 | End: 2020-01-23

## 2020-01-23 RX ORDER — DOBUTAMINE HYDROCHLORIDE 200 MG/100ML
2.5 INJECTION INTRAVENOUS CONTINUOUS
Status: DISCONTINUED | OUTPATIENT
Start: 2020-01-23 | End: 2020-01-31

## 2020-01-23 RX ORDER — FENTANYL CITRATE 50 UG/ML
INJECTION, SOLUTION INTRAMUSCULAR; INTRAVENOUS
Status: COMPLETED
Start: 2020-01-23 | End: 2020-01-23

## 2020-01-23 RX ORDER — PROPOFOL 10 MG/ML
10 INJECTION, EMULSION INTRAVENOUS
Status: DISCONTINUED | OUTPATIENT
Start: 2020-01-23 | End: 2020-01-24

## 2020-01-23 RX ORDER — HEPARIN SODIUM 5000 [USP'U]/ML
5000 INJECTION, SOLUTION INTRAVENOUS; SUBCUTANEOUS EVERY 8 HOURS SCHEDULED
Status: DISCONTINUED | OUTPATIENT
Start: 2020-01-23 | End: 2020-01-23

## 2020-01-23 RX ORDER — FENTANYL CITRATE 50 UG/ML
100 INJECTION, SOLUTION INTRAMUSCULAR; INTRAVENOUS ONCE
Status: COMPLETED | OUTPATIENT
Start: 2020-01-23 | End: 2020-01-23

## 2020-01-23 RX ORDER — FENTANYL CITRATE 50 UG/ML
25 INJECTION, SOLUTION INTRAMUSCULAR; INTRAVENOUS
Status: DISCONTINUED | OUTPATIENT
Start: 2020-01-23 | End: 2020-01-29

## 2020-01-23 RX ORDER — SODIUM CHLORIDE 9 MG/ML
INJECTION, SOLUTION INTRAVENOUS
Status: COMPLETED
Start: 2020-01-23 | End: 2020-01-23

## 2020-01-23 RX ORDER — PROPOFOL 10 MG/ML
INJECTION, EMULSION INTRAVENOUS
Status: COMPLETED
Start: 2020-01-23 | End: 2020-01-23

## 2020-01-23 RX ADMIN — FENTANYL CITRATE 100 MCG: 50 INJECTION, SOLUTION INTRAMUSCULAR; INTRAVENOUS at 16:46

## 2020-01-23 RX ADMIN — ALBUTEROL SULFATE 2.5 MG: 2.5 SOLUTION RESPIRATORY (INHALATION) at 15:57

## 2020-01-23 RX ADMIN — Medication 10 ML: at 08:21

## 2020-01-23 RX ADMIN — Medication 10 ML: at 11:10

## 2020-01-23 RX ADMIN — Medication 10 ML: at 08:20

## 2020-01-23 RX ADMIN — Medication 10 ML: at 21:32

## 2020-01-23 RX ADMIN — ALBUTEROL SULFATE 2.5 MG: 2.5 SOLUTION RESPIRATORY (INHALATION) at 20:16

## 2020-01-23 RX ADMIN — Medication 10 ML: at 08:19

## 2020-01-23 RX ADMIN — PROPOFOL 10 MCG/KG/MIN: 10 INJECTION, EMULSION INTRAVENOUS at 16:53

## 2020-01-23 RX ADMIN — SODIUM ZIRCONIUM CYCLOSILICATE 10 G: 5 POWDER, FOR SUSPENSION ORAL at 14:23

## 2020-01-23 RX ADMIN — FUROSEMIDE 20 MG: 10 INJECTION, SOLUTION INTRAMUSCULAR; INTRAVENOUS at 23:36

## 2020-01-23 RX ADMIN — SODIUM CHLORIDE 5 MG/HR: 9 INJECTION, SOLUTION INTRAVENOUS at 10:23

## 2020-01-23 RX ADMIN — FUROSEMIDE 10 MG/HR: 10 INJECTION, SOLUTION INTRAMUSCULAR; INTRAVENOUS at 17:43

## 2020-01-23 RX ADMIN — FENTANYL CITRATE 100 MCG: 50 INJECTION INTRAMUSCULAR; INTRAVENOUS at 16:40

## 2020-01-23 RX ADMIN — SODIUM ZIRCONIUM CYCLOSILICATE 10 G: 5 POWDER, FOR SUSPENSION ORAL at 11:45

## 2020-01-23 RX ADMIN — ALBUTEROL SULFATE 2.5 MG: 2.5 SOLUTION RESPIRATORY (INHALATION) at 12:45

## 2020-01-23 RX ADMIN — SODIUM CHLORIDE 250 ML: 9 INJECTION, SOLUTION INTRAVENOUS at 09:20

## 2020-01-23 RX ADMIN — FAMOTIDINE 20 MG: 10 INJECTION, SOLUTION INTRAVENOUS at 23:49

## 2020-01-23 RX ADMIN — INSULIN LISPRO 2 UNITS: 100 INJECTION, SOLUTION INTRAVENOUS; SUBCUTANEOUS at 13:40

## 2020-01-23 RX ADMIN — SODIUM CHLORIDE 5 MG/HR: 9 INJECTION, SOLUTION INTRAVENOUS at 22:50

## 2020-01-23 RX ADMIN — ACETAMINOPHEN 1000 MG: 500 TABLET ORAL at 03:21

## 2020-01-23 RX ADMIN — CEFAZOLIN SODIUM 2 G: 10 INJECTION, POWDER, FOR SOLUTION INTRAVENOUS at 05:49

## 2020-01-23 RX ADMIN — FUROSEMIDE 10 MG/HR: 10 INJECTION, SOLUTION INTRAMUSCULAR; INTRAVENOUS at 08:58

## 2020-01-23 RX ADMIN — INSULIN LISPRO 2 UNITS: 100 INJECTION, SOLUTION INTRAVENOUS; SUBCUTANEOUS at 08:36

## 2020-01-23 RX ADMIN — MIDAZOLAM 2 MG/HR: 5 INJECTION INTRAMUSCULAR; INTRAVENOUS at 21:51

## 2020-01-23 RX ADMIN — ALBUTEROL SULFATE 2.5 MG: 2.5 SOLUTION RESPIRATORY (INHALATION) at 08:21

## 2020-01-23 RX ADMIN — DEXTROSE MONOHYDRATE 2 MCG/KG/MIN: 50 INJECTION, SOLUTION INTRAVENOUS at 18:49

## 2020-01-23 RX ADMIN — CHLORHEXIDINE GLUCONATE 15 ML: 1.2 RINSE ORAL at 21:30

## 2020-01-23 RX ADMIN — FENTANYL CITRATE 25 MCG/HR: 50 INJECTION, SOLUTION INTRAMUSCULAR; INTRAVENOUS at 20:00

## 2020-01-23 RX ADMIN — ACETAMINOPHEN 1000 MG: 500 TABLET ORAL at 13:37

## 2020-01-23 RX ADMIN — MEROPENEM 1 G: 1 INJECTION, POWDER, FOR SOLUTION INTRAVENOUS at 09:18

## 2020-01-23 RX ADMIN — MUPIROCIN: 20 OINTMENT TOPICAL at 21:30

## 2020-01-23 RX ADMIN — METOPROLOL TARTRATE 12.5 MG: 25 TABLET ORAL at 23:55

## 2020-01-23 RX ADMIN — FUROSEMIDE 40 MG: 10 INJECTION, SOLUTION INTRAMUSCULAR; INTRAVENOUS at 08:11

## 2020-01-23 RX ADMIN — OXYCODONE 5 MG: 5 TABLET ORAL at 00:28

## 2020-01-23 RX ADMIN — NOREPINEPHRINE BITARTRATE 2 MCG/MIN: 1 INJECTION INTRAVENOUS at 08:58

## 2020-01-23 RX ADMIN — SODIUM ZIRCONIUM CYCLOSILICATE 10 G: 5 POWDER, FOR SUSPENSION ORAL at 23:49

## 2020-01-23 RX ADMIN — DOBUTAMINE HYDROCHLORIDE 5 MCG/KG/MIN: 200 INJECTION INTRAVENOUS at 09:45

## 2020-01-23 RX ADMIN — INSULIN LISPRO 2 UNITS: 100 INJECTION, SOLUTION INTRAVENOUS; SUBCUTANEOUS at 16:08

## 2020-01-23 RX ADMIN — MEROPENEM 1 G: 1 INJECTION, POWDER, FOR SOLUTION INTRAVENOUS at 19:03

## 2020-01-23 RX ADMIN — CHLORHEXIDINE GLUCONATE 15 ML: 1.2 RINSE ORAL at 09:27

## 2020-01-23 RX ADMIN — PROPOFOL 50 MG: 10 INJECTION, EMULSION INTRAVENOUS at 16:40

## 2020-01-23 RX ADMIN — MUPIROCIN 1 EACH: 20 OINTMENT TOPICAL at 09:27

## 2020-01-23 ASSESSMENT — PAIN SCALES - GENERAL
PAINLEVEL_OUTOF10: 4
PAINLEVEL_OUTOF10: 0
PAINLEVEL_OUTOF10: 5
PAINLEVEL_OUTOF10: 3
PAINLEVEL_OUTOF10: 0

## 2020-01-23 ASSESSMENT — PAIN DESCRIPTION - ONSET: ONSET: UNABLE TO TELL

## 2020-01-23 ASSESSMENT — PAIN DESCRIPTION - LOCATION: LOCATION: BACK

## 2020-01-23 ASSESSMENT — PULMONARY FUNCTION TESTS
PIF_VALUE: 21
PIF_VALUE: 34
PIF_VALUE: 33

## 2020-01-23 ASSESSMENT — PAIN DESCRIPTION - PAIN TYPE: TYPE: CHRONIC PAIN

## 2020-01-23 ASSESSMENT — PAIN DESCRIPTION - DESCRIPTORS: DESCRIPTORS: ACHING

## 2020-01-23 ASSESSMENT — PAIN DESCRIPTION - ORIENTATION: ORIENTATION: MID;LOWER

## 2020-01-23 NOTE — PROGRESS NOTES
Clinical update:   Paged this morning around 6am regarding acute pulmonary issues  ABG and CXR obtained  F/u - patient with acute pulmonary edema  Plan for lasix gtt, q4-6 bmp/lactic acid/cbc, placed on bipap with serial abgs    Geri Berumen MD  Cardiothoracic Surgery

## 2020-01-23 NOTE — PROGRESS NOTES
Increased FiO2 to 70%. RN notified and aware. Therejustin Smith, NP @ bedside and gave verbal order.

## 2020-01-23 NOTE — PROGRESS NOTES
Patient using arms, trying to get of bed. RN redirected patient, he just stated he was ready to get up. Asked patient orientation questions again, he is alert and oriented x 4, has no understanding of sternal precautions, or that he cant get up by himself or take showers.

## 2020-01-23 NOTE — PROGRESS NOTES
Patient requested shower, made him aware of precautions and he would be able to take a shower in a few days. He continued to ask repeatedly. Gave patient bed bath. Changed chest tube dressings, changed old chest tube dressing. Changed CVC dressing.

## 2020-01-23 NOTE — PROGRESS NOTES
Patients niece is visiting, he is unable to recognize her,and she stated that he is normally alert with mild confusion, not this confused. Patient able to follow commands, only oriented to self.

## 2020-01-23 NOTE — PROGRESS NOTES
Dr Charles Durand here  Started A pacing rate 90, MA 13. Dobutamine started 5 mcg/kg/min. MD's here viewing echo with tech.

## 2020-01-23 NOTE — PROGRESS NOTES
Dr Boogie Martinez here. Patient just lifted back to bed via nico lift. Remains on bipap.   Levo started at 2 mcg/min and lasix at 10 mg/hr

## 2020-01-23 NOTE — PROGRESS NOTES
Increasing respiratory distress on 100% FiO2 and BiPAP. Will electively intubate. 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 Will start argatroban. DC heparin meds. Heme/Onc consult. Dr. Janey Lau aware.     Tee Wade MD  FACS

## 2020-01-23 NOTE — PROGRESS NOTES
CVTS Cardiothoracic Progress Note:                                CC:  Post op follow up     Surgery: 1/21/20 RE-DO STERNOTOMY X3, REPLACEMENT OF ASCENDING AORTA, RE-DO AORTIC VALVE REPLACEMENT WITH 19MM BLOCK INTUITY VALVE, CLOSURE OF OUTFLOW TRACK ATRIAL FISTULA, HYPOTHERMIC ARREST,  WITH TRANSESOPHAGEAL ECHOCARDIOGRAM, CIRCULATORY ARREST, 5 LEVEL BILATERAL INTERCOSTAL NERVE BLOCK with Dr.'s Heather Kaur and Bhavik Small. Hospital course:   1/22 resting in bed, awake, alert, responding appropriately on SBT  1/23 Pt with increasing oxygen demand overnight, Cr up to 1.6, hyperkalemia 5.9, and lactic 9.7    Past Medical History:   Diagnosis Date    CAD (coronary artery disease)     [facility] [address]     Diabetes mellitus [facility] [facility]     Gout     Hyperlipidemia     Hypertension     Thyroid disease         Past Surgical History:   Procedure Laterality Date    BRONCHOSCOPY N/A 1/9/2020    BRONCHOSCOPY ALVEOLAR LAVAGE performed by Declan Oliveira MD at [facility] CABG WITH AORTIC VALVE REPLACEMENT      CORONARY ANGIOPLASTY WITH STENT PLACEMENT  12/07/2010    NURIS- 4.0 x 28 to Cx    OR ASCEND AORTA GRAFT W ROOT REPLACMENT. VALVE CONDUIT/CORON RECONSTR N/A 1/21/2020    RE-DO STERNOTOMY X3, REPLACEMENT OF ASCENDING AORTA, RE-DO AORTIC VALVE REPLACEMENT WITH 19MM BLOCK INTUITY VALVE, CLOSURE OF OUTFLOW TRACK ATRIAL FISTULA, HYPOTHERMIC ARREST,  WITH TRANSESOPHAGEAL ECHOCARDIOGRAM, CIRCULATORY ARREST, 5 LEVEL BILATERAL INTERCOSTAL NERVE BLOCK, performed by Sailaja Ferris MD at [place] [address] Left 1/2/2020    EMERGENT THORACOTOMY, EVACUATION OF HEMOTOMA CHEST WALL HEMOSTASIS, PLEUREAL BIOPSY, LEFT UPPER LOBE RESECTION performed by Samantha Gonzales MD at [address] [place] [address] as of 01/08/2020 - Review Complete 01/08/2020   Allergen Reaction Noted    Penicillins Hives 05/28/2010        Patient Active Problem List   Diagnosis    Hemothorax    Acute respiratory failure with hypoxemia (HCC)    Tobacco abuse    AS (aortic stenosis)    CAD (coronary artery disease) s/p stent in 2010    HTN (hypertension)    Hyperlipidemia    S/P AVR (aortic valve replacement)    Hypotension    Pulmonary infiltrate    Pulmonary venous congestion    Atelectasis    Leukocytosis    Hyperglycemia    Acute bronchospasm    Atrial fibrillation with RVR (HCC)    Cardiogenic shock (HCC)    Acute on chronic diastolic congestive heart failure (HCC)        Vital Signs: /60   Pulse 91   Temp 97.7 °F (36.5 °C) (Oral)   Resp 16   Ht 5' 9\" (1.753 m)   Wt 204 lb 5.9 oz (92.7 kg)   SpO2 96%   BMI 30.18 kg/m²  O2 Flow Rate (L/min): 2 L/min     Admission Weight: Weight: 205 lb 12.8 oz (93.4 kg)    Weight on 1/21 (83.9 kg) Pre-op   Weight on 1/22 (95.4 kg)  1/23  92.7 kg    Intake/Output:     Intake/Output Summary (Last 24 hours) at 1/23/2020 1210  Last data filed at 1/23/2020 1145  Gross per 24 hour   Intake 828.19 ml   Output 1620 ml   Net -791.81 ml      GTTS: all drips off this morning, was transitioned 1/22 in evening    Extubation Time: 1/22 @ 0900  Transition Time: 1/22 @ 22:00     LABORATORY DATA:     CBC:   Recent Labs     01/22/20  0430  01/23/20  0345 01/23/20  0635 01/23/20  1046   WBC 9.6  --  18.6*  --  16.4*   HGB 8.6*   < > 8.3* 7.8* 8.4*   HCT 25.1*  --  25.6*  --  25.0*   MCV 89.8  --  91.7  --  90.4   PLT 66*  --  47*  --  49*    < > = values in this interval not displayed. BMP:   Recent Labs     01/22/20  0430 01/23/20  0345 01/23/20  1046    134* 132*   K 4.1 5.9* 5.1    97* 97*   CO2 26 18* 21   BUN 16 29* 34*   CREATININE 1.1 1.6* 1.9*     MG:    Recent Labs     01/21/20  1845 01/22/20  0430 01/23/20  0345   MG 2.80* 2.50* 2.50*      PT/INR:   Recent Labs     01/21/20  0430 01/21/20  1909   PROTIME 14.4* 18.3*   INR 1.24* 1.57*     APTT:   Recent Labs     01/21/20 1909   APTT 36.7*     CXR: 1/22   Impression   Improving left basilar atelectasis. % FiO2. Want to keep oxygen saturation >95%. Will check ABG's Q2 hours; hoping to avoid need to re-intubate pt. 3. Neurology: analgesia as needed. 4. Nephrology: fluid management- diurese as tolerated. UOP was dropping off overnight to approximately 30 ml/hr. Lasix gtt started 1/23, will keep at 10 mg/hr. UOP is improving 132 ml/hr. Nephrology consulted. Hyperkalemia - giving Lokelma X3 doses. Monitor. BMP Q6 hours for next 2 days. 5. Endocrinology: SSI    6. Hematology: acute blood loss anemia; expected, stable, monitor. Thrombocytopenia- plt 47 today, will hold Arixtra, ASA. HIT panel sent. CBC Q6 hours for next 2 days. 7. Microbiology:   Leukocytosis: WBC 18.6 this morning; was 9.6 yesterday. BC X2 and urine culture sent. Empiric broad spectrum antibiotic started (Rolando Kelsey). Lactic this morning was 9.7 mmol/L at POC. Redrawn and sent to lab 2 hrs later is 7 mmol/L. Will recheck lactic's Q6 hours X24 hrs.    8. Nutrition: NPO    9. Labs: monitor    10. Post-op Drains/Wires:  Keeping all for now    11. D/C Goals: too soon to tell, DCP following. 12. Continue post-op care of patient in the ICU    Meds:    The patient is not on a beta-blocker- 2/2 being paced   The patient is not on an ace-i/ARB- not indicated   The patient is on a statin   The patient is on oral antiplatelet therapy   ________________________________________________________________________  *Only bolded items apply to this patient at this time:     ? Acidemia (pH < 7.35) ______  ? Alkalemia (pH > 7.45) ______  ? Acidosis (Bicarb <20) ______  ? Electrolyte abnormality (specify)  ? Acute post-op respiratory insufficiency (difficulty weaning from vent)  ? Acute post-op blood loss anemia (hct ? 30)        ? Transfused this admission post-op  ? Cardiac arrythmia:  ? Ventricular Tachycardia     ? Atrial Flutter     ? Atrial fibrillation  ? Acute pulmonary edema due to:      ? Noncardiogenic causes  ?  Acute heart failure

## 2020-01-23 NOTE — PROGRESS NOTES
Dr Jorge Luis Waggoner and Dr Nirali Castillo here. Right radial A line inserted per Dr Jorge Luis Waggoner.

## 2020-01-23 NOTE — PROGRESS NOTES
Patient has another visitor, he was more alert able to say this visitors name. He was able to say his name , that he is in a hospital, that he had heart surgery, unable to get the year. Will continue to monitor.

## 2020-01-23 NOTE — PROGRESS NOTES
Pt has been off dobutamine since 2000 and nicardipine  since 2100. Milrinone has been off since 1830. [place] removed at 2200.

## 2020-01-23 NOTE — PROGRESS NOTES
01/23/20 0827   NIV Type   Equipment Type v60   Mode Bilevel   Mask Type Full face mask   Mask Size Medium   Settings/Measurements   IPAP 12 cmH20   CPAP/EPAP 6 cmH2O   Rate Ordered 8   Resp 23   FiO2  50 %   Vt Exhaled 894 mL   Mask Leak (lpm) 108 lpm  (large beard)   Comfort Level Good   Using Accessory Muscles No   SpO2 98   Breath Sounds   Right Upper Lobe Diminished   Right Middle Lobe Diminished   Right Lower Lobe Diminished   Left Upper Lobe Diminished   Left Lower Lobe Diminished   Alarm Settings   Alarms On Y   Oxygen Therapy/Pulse Ox   SpO2 99 %

## 2020-01-23 NOTE — PROGRESS NOTES
Spoke to Dr Reuben Dumont, referral recieved. Updated MD.  OK to give am lasix then will start lasix infusion.

## 2020-01-23 NOTE — PROGRESS NOTES
Patient did better with orientation questions at 400 assessment. He knew all his family members and is starting to remember the sequence of events.

## 2020-01-23 NOTE — PLAN OF CARE
Problem: Falls - Risk of:  Goal: Will remain free from falls  Description  Will remain free from falls  Outcome: Ongoing     Problem: Pain:  Goal: Pain level will decrease  Description  Pain level will decrease  Outcome: Ongoing     Problem: HEMODYNAMIC STATUS  Goal: Patient has stable vital signs and fluid balance  Outcome: Ongoing     Problem: Sensory Perception - Impaired:  Goal: Ability to maintain a stable neurologic state will improve  Description  Ability to maintain a stable neurologic state will improve  Outcome: Ongoing     Problem: Anxiety:  Goal: Level of anxiety will decrease  Description  Level of anxiety will decrease  Outcome: Ongoing     Problem: Fluid Volume - Imbalance:  Goal: Chest tube drainage is within specified parameters  Description  Chest tube drainage is within specified parameters  Outcome: Ongoing

## 2020-01-23 NOTE — PROGRESS NOTES
Attempted to get patient up into chair, patient unable to stand with 3 Rns. Patient placed back in bed and sonny lift to chair. Once patient in chair, WOB increased. Patient breathing 40-50 times a minute. Paged CT surgery. Spoke with Dr. No Ball, New orders for stat Abg and place bipap on. [facility] settings 12/6. Abg Results  Results for Erika Henderson (MRN [id number]) as of 1/23/2020 06:48   Ref. Range 1/23/2020 06:35   Lactate, ser/plas Latest Ref Range: 0.40 - 2.00 mmol/L 9.71 (HH)   POC Glucose Latest Ref Range: 70 - 99 mg/dl 190 (H)   POC Sodium Latest Ref Range: 136 - 145 mmol/L 133 (L)   POC Potassium Latest Ref Range: 3.5 - 5.1 mmol/L 5.6 (H)   POC Hematocrit Latest Ref Range: 40.5 - 52.5 % 23.0 (L)   Hemoglobin Quant Latest Ref Range: 13.5 - 17.5 gm/dL 7.8 (L)   pH, Arterial Latest Ref Range: 7.350 - 7.450  7.346 (L)   pCO2, Arterial Latest Ref Range: 35.0 - 45.0 mm Hg 34.0 (L)   pO2, Arterial Latest Ref Range: 75.0 - 108.0 mm Hg 65.6 (L)   HCO3, Arterial Latest Ref Range: 21.0 - 29.0 mmol/L 18.6 (L)   TCO2 (calc), Art Latest Ref Range: Not Established mmol/L 20   Base Excess, Arterial Latest Ref Range: -3 - 3  -7 (L)   O2 Sat, Arterial Latest Ref Range: 93 - 100 % 92 (L)   Sample Type Unknown ART   Spoke with Aaron Flanagan NP about results.

## 2020-01-23 NOTE — PROGRESS NOTES
01/23/20 1240   NIV Type   NIV Started/Stopped On   Equipment Type v60   Mode Bilevel   Mask Type Full face mask   Mask Size Medium   Settings/Measurements   IPAP 12 cmH20   CPAP/EPAP 6 cmH2O   Rate Ordered 8   Resp 24   FiO2  70 %   Vt Exhaled 746 mL   Minute Volume 16.1 Liters   Mask Leak (lpm) 90 lpm   Comfort Level Good   Using Accessory Muscles No   SpO2 95   Oxygen Therapy/Pulse Ox   SpO2 92 %

## 2020-01-23 NOTE — PROGRESS NOTES
Patient taking off gown, and pealing off chest tube dressings. Another RN went to check on him and he cussed her out.

## 2020-01-23 NOTE — PROGRESS NOTES
01/23/20 0617   NIV Type   $NIV $Daily Charge   Skin Assessment Clean, dry, & intact   Skin Protection for O2 Device Yes   NIV Started/Stopped On   Equipment Type V60   Mode Bilevel   Mask Type Full face mask   Mask Size Medium   Settings/Measurements   IPAP 12 cmH20   CPAP/EPAP 6 cmH2O   Resp 22   FiO2  60 %   Vt Exhaled 784 mL   Minute Volume 16.2 Liters   Comfort Level Good   Using Accessory Muscles Yes   SpO2 97   Breath Sounds   Right Upper Lobe Diminished   Right Middle Lobe Diminished   Right Lower Lobe Diminished   Left Upper Lobe Diminished   Left Lower Lobe Diminished   Alarm Settings   Alarms On Y   Press Low Alarm 6 cmH2O   High Pressure Alarm 30 cmH2O   Delay Alarm 20 sec(s)   Resp Rate Low Alarm 6   High Respiratory Rate 45 br/min

## 2020-01-23 NOTE — CONSULTS
Pharmacy to Manage Argatroban Infusion per [facility]    Dx: HIT positive  Pt wt = 92.7 kg, will use 90 kg   Baseline aPTT = 28.4 sec at 1730     Will start Argatroban Drip at 2 mcg/kg/min (10.8 mL/hr)  Will obtain aPTT 2 hours after start of infusion  Colin Novoa PharmDANELLE 1/23/2020 4:59 PM    1/23/20  2230  Argatroban Infusion Protocol:  APTT at 2205 is 72 seconds. No changes. Continue infusion at 10.8mL/hr and repeat the aPTT at [facility]. Therapeutic aPTT range is 49-76 seconds. Giovanni Betancourt PharmD 1/23/2020 10:32 PM     1/24 0015  aptt = 82.3 sec  Rate = 8.1 ml/hr  Next aptt 0400  Sarahi Chaney Pharm D.1/24/2020 1:54 AM    HIT+ (ARTEMIO pending)  PLT 40  Delay in lab collect today. aPTT 83.9 at 1055. Reduce Argatroban to 1mg/kg/min. Recheck aPTT in 2 hours  [employer]. Cristiano Bridges, BCPS   1/24/2020 11:25 AM    aPTT = 103.8 seconds (ordered for 1330 resulted at 1438). Argatroban placed on hold. NP notified. Recheck aPTT now to confirm results. aPTT now. [employer]. Cristiano Bridges, BCPS   1/24/2020 2:55 PM     aPTT = 97.6 seconds   Restart Argatroban at 2.7 ml/hr   Next aPTT @ 2000     aPTT = 73.5 seconds @ 2038   Continue Argatroban at 2.7 ml/hr    Repeat aPTT @ 2230 1/24 2318  aptt = 66.1 sec  Continue current rate. Next aptt daily  Sarahi Chaney Pharm D.1/25/2020 12:40 AM    1/25 0449  aptt = 52.9 sec. Continue current rate. Next aptt daily 1/26  Sarahi Chaney Pharm D.1/25/2020 6:59 AM    1/26 0900  aPTT - 49.6 sec  Continue drip at 5.4 mL/hr  Next aPTT at [address] Chris Martínez PharmD 1/26/2020 9:43 AM    1/26 1100  aPTT - 48.4 sec  Increase drip rate to 8.1 mL/hr  Next aPTT at [address] Jules Telfair, PharmD 1/26/2020 12:02 PM      1/26 1425   aPTT - 56.5 sec  Continue drip rate of 8.1 mL/hr  Next aPTT Dillan Cool, PharmD 1/26/2020 2:46 PM    Aptt = 57.6 at 1615. Continue drip at 8.1ml/hr.    Daily aptt ordered  Ariella Smith [facility] 4:48 PM 1/26/2020    aPTT = 57.5 seconds at

## 2020-01-23 NOTE — CONSULTS
Pharmacy Consult    Sodium zirconium cyclosilicate (Lokelma) 86R PO TID x 3 doses today per Dr. Sue Melton. Ramiro Osman.  Cristiano Bridges, [facility]   1/23/2020 10:31 AM

## 2020-01-24 ENCOUNTER — APPOINTMENT (OUTPATIENT)
Dept: GENERAL RADIOLOGY | Age: 70
DRG: 216 | End: 2020-01-24
Attending: INTERNAL MEDICINE
Payer: MEDICARE

## 2020-01-24 PROBLEM — I21.9 MYOCARDIAL INFARCTION (HCC): Status: ACTIVE | Noted: 2020-01-24

## 2020-01-24 PROBLEM — J94.2 HEMOTHORAX, LEFT: Status: ACTIVE | Noted: 2020-01-01

## 2020-01-24 PROBLEM — E66.9 OBESITY, CLASS I, BMI 30.0-34.9 (SEE ACTUAL BMI): Status: ACTIVE | Noted: 2020-01-01

## 2020-01-24 PROBLEM — Z90.2 STATUS POST PARTIAL REMOVAL OF LUNG: Status: ACTIVE | Noted: 2020-01-01

## 2020-01-24 PROBLEM — Z98.890 S/P THORACOTOMY: Status: ACTIVE | Noted: 2020-01-01

## 2020-01-24 LAB
ABO/RH: NORMAL
ANION GAP SERPL CALCULATED.3IONS-SCNC: 10 MMOL/L (ref 3–16)
ANION GAP SERPL CALCULATED.3IONS-SCNC: 12 MMOL/L (ref 3–16)
ANION GAP SERPL CALCULATED.3IONS-SCNC: 13 MMOL/L (ref 3–16)
ANION GAP SERPL CALCULATED.3IONS-SCNC: 14 MMOL/L (ref 3–16)
ANION GAP SERPL CALCULATED.3IONS-SCNC: 9 MMOL/L (ref 3–16)
ANTIBODY SCREEN: NORMAL
APPEARANCE BAL (LAVAGE): CLEAR
APTT: 103.8 SEC (ref 24.2–36.2)
APTT: 66.1 SEC (ref 24.2–36.2)
APTT: 73.5 SEC (ref 24.2–36.2)
APTT: 82.3 SEC (ref 24.2–36.2)
APTT: 83.9 SEC (ref 24.2–36.2)
APTT: 97.6 SEC (ref 24.2–36.2)
BASE EXCESS ARTERIAL: 0.4 MMOL/L (ref -3–3)
BASE EXCESS ARTERIAL: 0.7 MMOL/L (ref -3–3)
BASE EXCESS ARTERIAL: 1.5 MMOL/L (ref -3–3)
BASE EXCESS ARTERIAL: 2.7 MMOL/L (ref -3–3)
BASE EXCESS ARTERIAL: 4.3 MMOL/L (ref -3–3)
BLOOD BANK DISPENSE STATUS: NORMAL
BLOOD BANK PRODUCT CODE: NORMAL
BPU ID: NORMAL
BUN BLDV-MCNC: 32 MG/DL (ref 7–20)
BUN BLDV-MCNC: 35 MG/DL (ref 7–20)
BUN BLDV-MCNC: 36 MG/DL (ref 7–20)
BUN BLDV-MCNC: 37 MG/DL (ref 7–20)
BUN BLDV-MCNC: 40 MG/DL (ref 7–20)
CALCIUM SERPL-MCNC: 8 MG/DL (ref 8.3–10.6)
CALCIUM SERPL-MCNC: 8.1 MG/DL (ref 8.3–10.6)
CALCIUM SERPL-MCNC: 8.2 MG/DL (ref 8.3–10.6)
CARBOXYHEMOGLOBIN ARTERIAL: 0.2 % (ref 0–1.5)
CARBOXYHEMOGLOBIN ARTERIAL: 0.3 % (ref 0–1.5)
CARBOXYHEMOGLOBIN ARTERIAL: 1.1 % (ref 0–1.5)
CHLORIDE BLD-SCNC: 100 MMOL/L (ref 99–110)
CHLORIDE BLD-SCNC: 94 MMOL/L (ref 99–110)
CHLORIDE BLD-SCNC: 97 MMOL/L (ref 99–110)
CHLORIDE BLD-SCNC: 98 MMOL/L (ref 99–110)
CHLORIDE BLD-SCNC: 99 MMOL/L (ref 99–110)
CLOT EVALUATION BAL: ABNORMAL
CO2: 25 MMOL/L (ref 21–32)
CO2: 25 MMOL/L (ref 21–32)
CO2: 27 MMOL/L (ref 21–32)
CO2: 29 MMOL/L (ref 21–32)
CO2: 29 MMOL/L (ref 21–32)
COLOR LAVAGE: COLORLESS
CREAT SERPL-MCNC: 1 MG/DL (ref 0.8–1.3)
CREAT SERPL-MCNC: 1.1 MG/DL (ref 0.8–1.3)
CREAT SERPL-MCNC: 1.2 MG/DL (ref 0.8–1.3)
CREAT SERPL-MCNC: 1.2 MG/DL (ref 0.8–1.3)
CREAT SERPL-MCNC: 1.3 MG/DL (ref 0.8–1.3)
D DIMER: 703 NG/ML DDU (ref 0–229)
DESCRIPTION BLOOD BANK: NORMAL
EPITHELIAL CELLS FLUID: 20 %
FIBRINOGEN: 231 MG/DL (ref 200–397)
GFR AFRICAN AMERICAN: >60
GFR NON-AFRICAN AMERICAN: 55
GFR NON-AFRICAN AMERICAN: >60
GLUCOSE BLD-MCNC: 255 MG/DL (ref 70–99)
GLUCOSE BLD-MCNC: 255 MG/DL (ref 70–99)
GLUCOSE BLD-MCNC: 262 MG/DL (ref 70–99)
GLUCOSE BLD-MCNC: 266 MG/DL (ref 70–99)
GLUCOSE BLD-MCNC: 283 MG/DL (ref 70–99)
GLUCOSE BLD-MCNC: 284 MG/DL (ref 70–99)
GLUCOSE BLD-MCNC: 289 MG/DL (ref 70–99)
GLUCOSE BLD-MCNC: 289 MG/DL (ref 70–99)
GLUCOSE BLD-MCNC: 307 MG/DL (ref 70–99)
HCO3 ARTERIAL: 26.5 MMOL/L (ref 21–29)
HCO3 ARTERIAL: 26.7 MMOL/L (ref 21–29)
HCO3 ARTERIAL: 27.3 MMOL/L (ref 21–29)
HCO3 ARTERIAL: 28.5 MMOL/L (ref 21–29)
HCO3 ARTERIAL: 30 MMOL/L (ref 21–29)
HCT VFR BLD CALC: 21.5 % (ref 40.5–52.5)
HCT VFR BLD CALC: 22.5 % (ref 40.5–52.5)
HCT VFR BLD CALC: 23.6 % (ref 40.5–52.5)
HCT VFR BLD CALC: 25.7 % (ref 40.5–52.5)
HCT VFR BLD CALC: 25.8 % (ref 40.5–52.5)
HCT VFR BLD CALC: 25.9 % (ref 40.5–52.5)
HEMOGLOBIN, ART, EXTENDED: 7.6 G/DL (ref 13.5–17.5)
HEMOGLOBIN, ART, EXTENDED: 8.1 G/DL (ref 13.5–17.5)
HEMOGLOBIN, ART, EXTENDED: 8.3 G/DL (ref 13.5–17.5)
HEMOGLOBIN, ART, EXTENDED: 9 G/DL (ref 13.5–17.5)
HEMOGLOBIN, ART, EXTENDED: 9.4 G/DL (ref 13.5–17.5)
HEMOGLOBIN: 7.1 G/DL (ref 13.5–17.5)
HEMOGLOBIN: 7.4 G/DL (ref 13.5–17.5)
HEMOGLOBIN: 7.9 G/DL (ref 13.5–17.5)
HEMOGLOBIN: 8.6 G/DL (ref 13.5–17.5)
HEMOGLOBIN: 8.6 G/DL (ref 13.5–17.5)
HEMOGLOBIN: 8.7 G/DL (ref 13.5–17.5)
LACTIC ACID: 1.4 MMOL/L (ref 0.4–2)
LACTIC ACID: 1.7 MMOL/L (ref 0.4–2)
LACTIC ACID: 2.2 MMOL/L (ref 0.4–2)
LACTIC ACID: 2.4 MMOL/L (ref 0.4–2)
LYMPHOCYTES, BAL: 8 % (ref 5–10)
MACROPHAGES, BAL: 35 % (ref 90–95)
MAGNESIUM: 2.2 MG/DL (ref 1.8–2.4)
MCH RBC QN AUTO: 29.5 PG (ref 26–34)
MCH RBC QN AUTO: 30.2 PG (ref 26–34)
MCH RBC QN AUTO: 30.3 PG (ref 26–34)
MCH RBC QN AUTO: 30.3 PG (ref 26–34)
MCH RBC QN AUTO: 30.5 PG (ref 26–34)
MCHC RBC AUTO-ENTMCNC: 32.8 G/DL (ref 31–36)
MCHC RBC AUTO-ENTMCNC: 33.3 G/DL (ref 31–36)
MCHC RBC AUTO-ENTMCNC: 33.4 G/DL (ref 31–36)
MCHC RBC AUTO-ENTMCNC: 33.5 G/DL (ref 31–36)
MCHC RBC AUTO-ENTMCNC: 33.7 G/DL (ref 31–36)
MCV RBC AUTO: 90.1 FL (ref 80–100)
MCV RBC AUTO: 90.5 FL (ref 80–100)
MCV RBC AUTO: 90.7 FL (ref 80–100)
METHEMOGLOBIN ARTERIAL: 0.7 %
METHEMOGLOBIN ARTERIAL: 0.8 %
METHEMOGLOBIN ARTERIAL: 0.9 %
NUMBER OF CELLS COUNTED BAL (LAVAGE): 100
O2 CONTENT ARTERIAL: 10 ML/DL
O2 CONTENT ARTERIAL: 11 ML/DL
O2 CONTENT ARTERIAL: 12 ML/DL
O2 SAT, ARTERIAL: 82.2 %
O2 SAT, ARTERIAL: 93.1 %
O2 SAT, ARTERIAL: 93.6 %
O2 SAT, ARTERIAL: 94.8 %
O2 SAT, ARTERIAL: 97.7 %
O2 THERAPY: ABNORMAL
PATH REVIEW BAL (LAVAGE): NO
PCO2 ARTERIAL: 45.5 MMHG (ref 35–45)
PCO2 ARTERIAL: 48.9 MMHG (ref 35–45)
PCO2 ARTERIAL: 51 MMHG (ref 35–45)
PCO2 ARTERIAL: 51.2 MMHG (ref 35–45)
PCO2 ARTERIAL: 57 MMHG (ref 35–45)
PDW BLD-RTO: 15.9 % (ref 12.4–15.4)
PDW BLD-RTO: 16.2 % (ref 12.4–15.4)
PDW BLD-RTO: 16.3 % (ref 12.4–15.4)
PDW BLD-RTO: 16.5 % (ref 12.4–15.4)
PDW BLD-RTO: 16.6 % (ref 12.4–15.4)
PERFORMED ON: ABNORMAL
PH ARTERIAL: 7.3 (ref 7.35–7.45)
PH ARTERIAL: 7.35 (ref 7.35–7.45)
PH ARTERIAL: 7.37 (ref 7.35–7.45)
PH ARTERIAL: 7.39 (ref 7.35–7.45)
PH ARTERIAL: 7.39 (ref 7.35–7.45)
PLATELET # BLD: 40 K/UL (ref 135–450)
PLATELET # BLD: 40 K/UL (ref 135–450)
PLATELET # BLD: 42 K/UL (ref 135–450)
PLATELET # BLD: 42 K/UL (ref 135–450)
PLATELET # BLD: 45 K/UL (ref 135–450)
PMV BLD AUTO: 10 FL (ref 5–10.5)
PMV BLD AUTO: 8.6 FL (ref 5–10.5)
PMV BLD AUTO: 8.7 FL (ref 5–10.5)
PMV BLD AUTO: 9.4 FL (ref 5–10.5)
PMV BLD AUTO: 9.8 FL (ref 5–10.5)
PO2 ARTERIAL: 123.4 MMHG (ref 75–108)
PO2 ARTERIAL: 49.6 MMHG (ref 75–108)
PO2 ARTERIAL: 75.7 MMHG (ref 75–108)
PO2 ARTERIAL: 87.1 MMHG (ref 75–108)
PO2 ARTERIAL: 89.9 MMHG (ref 75–108)
POTASSIUM SERPL-SCNC: 4.2 MMOL/L (ref 3.5–5.1)
POTASSIUM SERPL-SCNC: 4.3 MMOL/L (ref 3.5–5.1)
POTASSIUM SERPL-SCNC: 4.6 MMOL/L (ref 3.5–5.1)
POTASSIUM SERPL-SCNC: 4.8 MMOL/L (ref 3.5–5.1)
POTASSIUM SERPL-SCNC: 4.9 MMOL/L (ref 3.5–5.1)
RBC # BLD: 2.36 M/UL (ref 4.2–5.9)
RBC # BLD: 2.49 M/UL (ref 4.2–5.9)
RBC # BLD: 2.61 M/UL (ref 4.2–5.9)
RBC # BLD: 2.83 M/UL (ref 4.2–5.9)
RBC # BLD: 2.84 M/UL (ref 4.2–5.9)
RBC, BAL: 28 /CUMM
SEGMENTED NEUTROPHILS, BAL: 37 % (ref 5–10)
SODIUM BLD-SCNC: 133 MMOL/L (ref 136–145)
SODIUM BLD-SCNC: 135 MMOL/L (ref 136–145)
SODIUM BLD-SCNC: 136 MMOL/L (ref 136–145)
SODIUM BLD-SCNC: 138 MMOL/L (ref 136–145)
SODIUM BLD-SCNC: 139 MMOL/L (ref 136–145)
TCO2 ARTERIAL: 28 MMOL/L
TCO2 ARTERIAL: 28.1 MMOL/L
TCO2 ARTERIAL: 29 MMOL/L
TCO2 ARTERIAL: 30.1 MMOL/L
TCO2 ARTERIAL: 31.6 MMOL/L
URINE CULTURE, ROUTINE: NORMAL
WBC # BLD: 12 K/UL (ref 4–11)
WBC # BLD: 12.2 K/UL (ref 4–11)
WBC # BLD: 12.7 K/UL (ref 4–11)
WBC # BLD: 12.9 K/UL (ref 4–11)
WBC # BLD: 13.9 K/UL (ref 4–11)
WBC/EPI CELLS BAL: 28 /CUMM

## 2020-01-24 PROCEDURE — 2580000003 HC RX 258: Performed by: THORACIC SURGERY (CARDIOTHORACIC VASCULAR SURGERY)

## 2020-01-24 PROCEDURE — 80048 BASIC METABOLIC PNL TOTAL CA: CPT

## 2020-01-24 PROCEDURE — 99291 CRITICAL CARE FIRST HOUR: CPT | Performed by: INTERNAL MEDICINE

## 2020-01-24 PROCEDURE — 85018 HEMOGLOBIN: CPT

## 2020-01-24 PROCEDURE — 2140000000 HC CCU INTERMEDIATE R&B

## 2020-01-24 PROCEDURE — 31645 BRNCHSC W/THER ASPIR 1ST: CPT | Performed by: INTERNAL MEDICINE

## 2020-01-24 PROCEDURE — 6360000002 HC RX W HCPCS: Performed by: THORACIC SURGERY (CARDIOTHORACIC VASCULAR SURGERY)

## 2020-01-24 PROCEDURE — 85730 THROMBOPLASTIN TIME PARTIAL: CPT

## 2020-01-24 PROCEDURE — 71045 X-RAY EXAM CHEST 1 VIEW: CPT

## 2020-01-24 PROCEDURE — 85014 HEMATOCRIT: CPT

## 2020-01-24 PROCEDURE — 6360000002 HC RX W HCPCS: Performed by: INTERNAL MEDICINE

## 2020-01-24 PROCEDURE — 0BC18ZZ EXTIRPATION OF MATTER FROM TRACHEA, VIA NATURAL OR ARTIFICIAL OPENING ENDOSCOPIC: ICD-10-PCS | Performed by: INTERNAL MEDICINE

## 2020-01-24 PROCEDURE — 2700000000 HC OXYGEN THERAPY PER DAY

## 2020-01-24 PROCEDURE — 3609010900 HC BRONCHOSCOPY THERAPUTIC ASPIRATION INITIAL: Performed by: INTERNAL MEDICINE

## 2020-01-24 PROCEDURE — 6370000000 HC RX 637 (ALT 250 FOR IP): Performed by: THORACIC SURGERY (CARDIOTHORACIC VASCULAR SURGERY)

## 2020-01-24 PROCEDURE — 0B9F8ZX DRAINAGE OF RIGHT LOWER LUNG LOBE, VIA NATURAL OR ARTIFICIAL OPENING ENDOSCOPIC, DIAGNOSTIC: ICD-10-PCS | Performed by: INTERNAL MEDICINE

## 2020-01-24 PROCEDURE — 89051 BODY FLUID CELL COUNT: CPT

## 2020-01-24 PROCEDURE — 87116 MYCOBACTERIA CULTURE: CPT

## 2020-01-24 PROCEDURE — 86923 COMPATIBILITY TEST ELECTRIC: CPT

## 2020-01-24 PROCEDURE — 36415 COLL VENOUS BLD VENIPUNCTURE: CPT

## 2020-01-24 PROCEDURE — 87015 SPECIMEN INFECT AGNT CONCNTJ: CPT

## 2020-01-24 PROCEDURE — 2580000003 HC RX 258: Performed by: INTERNAL MEDICINE

## 2020-01-24 PROCEDURE — 31624 DX BRONCHOSCOPE/LAVAGE: CPT | Performed by: INTERNAL MEDICINE

## 2020-01-24 PROCEDURE — 87206 SMEAR FLUORESCENT/ACID STAI: CPT

## 2020-01-24 PROCEDURE — 36430 TRANSFUSION BLD/BLD COMPNT: CPT

## 2020-01-24 PROCEDURE — 94640 AIRWAY INHALATION TREATMENT: CPT

## 2020-01-24 PROCEDURE — 87070 CULTURE OTHR SPECIMN AEROBIC: CPT

## 2020-01-24 PROCEDURE — 83735 ASSAY OF MAGNESIUM: CPT

## 2020-01-24 PROCEDURE — 2580000003 HC RX 258: Performed by: NURSE PRACTITIONER

## 2020-01-24 PROCEDURE — 88305 TISSUE EXAM BY PATHOLOGIST: CPT

## 2020-01-24 PROCEDURE — 94750 HC PULMONARY COMPLIANCE STUDY: CPT

## 2020-01-24 PROCEDURE — P9016 RBC LEUKOCYTES REDUCED: HCPCS

## 2020-01-24 PROCEDURE — 6360000002 HC RX W HCPCS: Performed by: NURSE PRACTITIONER

## 2020-01-24 PROCEDURE — 85027 COMPLETE CBC AUTOMATED: CPT

## 2020-01-24 PROCEDURE — 2500000003 HC RX 250 WO HCPCS: Performed by: INTERNAL MEDICINE

## 2020-01-24 PROCEDURE — 88112 CYTOPATH CELL ENHANCE TECH: CPT

## 2020-01-24 PROCEDURE — 94770 HC ETCO2 MONITOR DAILY: CPT

## 2020-01-24 PROCEDURE — 86850 RBC ANTIBODY SCREEN: CPT

## 2020-01-24 PROCEDURE — 87102 FUNGUS ISOLATION CULTURE: CPT

## 2020-01-24 PROCEDURE — 88312 SPECIAL STAINS GROUP 1: CPT

## 2020-01-24 PROCEDURE — 85384 FIBRINOGEN ACTIVITY: CPT

## 2020-01-24 PROCEDURE — 85379 FIBRIN DEGRADATION QUANT: CPT

## 2020-01-24 PROCEDURE — 86900 BLOOD TYPING SEROLOGIC ABO: CPT

## 2020-01-24 PROCEDURE — 82803 BLOOD GASES ANY COMBINATION: CPT

## 2020-01-24 PROCEDURE — 87205 SMEAR GRAM STAIN: CPT

## 2020-01-24 PROCEDURE — 99292 CRITICAL CARE ADDL 30 MIN: CPT | Performed by: INTERNAL MEDICINE

## 2020-01-24 PROCEDURE — 86901 BLOOD TYPING SEROLOGIC RH(D): CPT

## 2020-01-24 PROCEDURE — 86023 IMMUNOGLOBULIN ASSAY: CPT

## 2020-01-24 PROCEDURE — 3609010800 HC BRONCHOSCOPY ALVEOLAR LAVAGE: Performed by: INTERNAL MEDICINE

## 2020-01-24 PROCEDURE — 83605 ASSAY OF LACTIC ACID: CPT

## 2020-01-24 PROCEDURE — 99024 POSTOP FOLLOW-UP VISIT: CPT | Performed by: THORACIC SURGERY (CARDIOTHORACIC VASCULAR SURGERY)

## 2020-01-24 PROCEDURE — 94761 N-INVAS EAR/PLS OXIMETRY MLT: CPT

## 2020-01-24 PROCEDURE — 6370000000 HC RX 637 (ALT 250 FOR IP): Performed by: NURSE PRACTITIONER

## 2020-01-24 PROCEDURE — 2709999900 HC NON-CHARGEABLE SUPPLY: Performed by: INTERNAL MEDICINE

## 2020-01-24 PROCEDURE — 94003 VENT MGMT INPAT SUBQ DAY: CPT

## 2020-01-24 RX ORDER — SODIUM CHLORIDE 9 MG/ML
INJECTION, SOLUTION INTRAVENOUS
Status: DISPENSED
Start: 2020-01-24 | End: 2020-01-24

## 2020-01-24 RX ORDER — 0.9 % SODIUM CHLORIDE 0.9 %
20 INTRAVENOUS SOLUTION INTRAVENOUS ONCE
Status: COMPLETED | OUTPATIENT
Start: 2020-01-24 | End: 2020-01-24

## 2020-01-24 RX ORDER — INSULIN GLARGINE 100 [IU]/ML
10 INJECTION, SOLUTION SUBCUTANEOUS DAILY
Status: DISCONTINUED | OUTPATIENT
Start: 2020-01-24 | End: 2020-01-25

## 2020-01-24 RX ORDER — MAGNESIUM HYDROXIDE 1200 MG/15ML
LIQUID ORAL CONTINUOUS PRN
Status: COMPLETED | OUTPATIENT
Start: 2020-01-24 | End: 2020-01-24

## 2020-01-24 RX ORDER — PROPOFOL 10 MG/ML
10 INJECTION, EMULSION INTRAVENOUS
Status: DISCONTINUED | OUTPATIENT
Start: 2020-01-24 | End: 2020-01-25

## 2020-01-24 RX ADMIN — DOBUTAMINE HYDROCHLORIDE 5 MCG/KG/MIN: 200 INJECTION INTRAVENOUS at 21:18

## 2020-01-24 RX ADMIN — DOBUTAMINE HYDROCHLORIDE 5 MCG/KG/MIN: 200 INJECTION INTRAVENOUS at 01:27

## 2020-01-24 RX ADMIN — INSULIN LISPRO 8 UNITS: 100 INJECTION, SOLUTION INTRAVENOUS; SUBCUTANEOUS at 12:46

## 2020-01-24 RX ADMIN — ALBUTEROL SULFATE 2.5 MG: 2.5 SOLUTION RESPIRATORY (INHALATION) at 08:51

## 2020-01-24 RX ADMIN — MEROPENEM 1 G: 1 INJECTION, POWDER, FOR SOLUTION INTRAVENOUS at 20:08

## 2020-01-24 RX ADMIN — DEXTROSE 1.5 MCG/KG/MIN: 5 SOLUTION INTRAVENOUS at 02:59

## 2020-01-24 RX ADMIN — FUROSEMIDE 10 MG/HR: 10 INJECTION, SOLUTION INTRAMUSCULAR; INTRAVENOUS at 12:51

## 2020-01-24 RX ADMIN — INSULIN GLARGINE 10 UNITS: 100 INJECTION, SOLUTION SUBCUTANEOUS at 09:59

## 2020-01-24 RX ADMIN — CHLORHEXIDINE GLUCONATE 15 ML: 1.2 RINSE ORAL at 20:08

## 2020-01-24 RX ADMIN — FENTANYL CITRATE 200 MCG/HR: 50 INJECTION, SOLUTION INTRAMUSCULAR; INTRAVENOUS at 06:47

## 2020-01-24 RX ADMIN — ALBUTEROL SULFATE 2.5 MG: 2.5 SOLUTION RESPIRATORY (INHALATION) at 16:19

## 2020-01-24 RX ADMIN — INSULIN LISPRO 6 UNITS: 100 INJECTION, SOLUTION INTRAVENOUS; SUBCUTANEOUS at 09:59

## 2020-01-24 RX ADMIN — MUPIROCIN: 20 OINTMENT TOPICAL at 20:08

## 2020-01-24 RX ADMIN — AMIODARONE HYDROCHLORIDE 1 MG/MIN: 50 INJECTION, SOLUTION INTRAVENOUS at 17:41

## 2020-01-24 RX ADMIN — FUROSEMIDE 10 MG/HR: 10 INJECTION, SOLUTION INTRAMUSCULAR; INTRAVENOUS at 03:56

## 2020-01-24 RX ADMIN — MUPIROCIN: 20 OINTMENT TOPICAL at 09:19

## 2020-01-24 RX ADMIN — FAMOTIDINE 20 MG: 10 INJECTION, SOLUTION INTRAVENOUS at 20:19

## 2020-01-24 RX ADMIN — INSULIN LISPRO 3 UNITS: 100 INJECTION, SOLUTION INTRAVENOUS; SUBCUTANEOUS at 01:54

## 2020-01-24 RX ADMIN — MEROPENEM 1 G: 1 INJECTION, POWDER, FOR SOLUTION INTRAVENOUS at 12:56

## 2020-01-24 RX ADMIN — AMIODARONE HYDROCHLORIDE 1 MG/MIN: 50 INJECTION, SOLUTION INTRAVENOUS at 09:18

## 2020-01-24 RX ADMIN — ALBUTEROL SULFATE 2.5 MG: 2.5 SOLUTION RESPIRATORY (INHALATION) at 12:11

## 2020-01-24 RX ADMIN — FUROSEMIDE 10 MG/HR: 10 INJECTION, SOLUTION INTRAMUSCULAR; INTRAVENOUS at 22:23

## 2020-01-24 RX ADMIN — FENTANYL CITRATE 150 MCG/HR: 50 INJECTION, SOLUTION INTRAMUSCULAR; INTRAVENOUS at 19:00

## 2020-01-24 RX ADMIN — INSULIN LISPRO 6 UNITS: 100 INJECTION, SOLUTION INTRAVENOUS; SUBCUTANEOUS at 20:39

## 2020-01-24 RX ADMIN — CHLORHEXIDINE GLUCONATE 15 ML: 1.2 RINSE ORAL at 09:19

## 2020-01-24 RX ADMIN — INSULIN LISPRO 6 UNITS: 100 INJECTION, SOLUTION INTRAVENOUS; SUBCUTANEOUS at 17:51

## 2020-01-24 RX ADMIN — INSULIN LISPRO 6 UNITS: 100 INJECTION, SOLUTION INTRAVENOUS; SUBCUTANEOUS at 23:53

## 2020-01-24 RX ADMIN — Medication 10 ML: at 20:18

## 2020-01-24 RX ADMIN — METOPROLOL TARTRATE 12.5 MG: 25 TABLET ORAL at 20:19

## 2020-01-24 RX ADMIN — SODIUM CHLORIDE 20 ML: 9 INJECTION, SOLUTION INTRAVENOUS at 09:18

## 2020-01-24 RX ADMIN — FAMOTIDINE 20 MG: 10 INJECTION, SOLUTION INTRAVENOUS at 09:15

## 2020-01-24 RX ADMIN — ALBUTEROL SULFATE 2.5 MG: 2.5 SOLUTION RESPIRATORY (INHALATION) at 19:55

## 2020-01-24 RX ADMIN — FENTANYL CITRATE 200 MCG/HR: 50 INJECTION, SOLUTION INTRAMUSCULAR; INTRAVENOUS at 12:39

## 2020-01-24 RX ADMIN — Medication 10 ML: at 09:19

## 2020-01-24 RX ADMIN — SODIUM CHLORIDE: 9 INJECTION, SOLUTION INTRAVENOUS at 07:18

## 2020-01-24 RX ADMIN — FENTANYL CITRATE 200 MCG/HR: 50 INJECTION, SOLUTION INTRAMUSCULAR; INTRAVENOUS at 00:43

## 2020-01-24 RX ADMIN — DEXTROSE MONOHYDRATE 0.5 MCG/KG/MIN: 50 INJECTION, SOLUTION INTRAVENOUS at 17:49

## 2020-01-24 RX ADMIN — PROPOFOL 10 MCG/KG/MIN: 10 INJECTION, EMULSION INTRAVENOUS at 09:15

## 2020-01-24 ASSESSMENT — PULMONARY FUNCTION TESTS
PIF_VALUE: 22
PIF_VALUE: 28
PIF_VALUE: 28
PIF_VALUE: 36
PIF_VALUE: 28
PIF_VALUE: 19
PIF_VALUE: 16
PIF_VALUE: 28
PIF_VALUE: 21
PIF_VALUE: 17
PIF_VALUE: 22
PIF_VALUE: 30
PIF_VALUE: 28
PIF_VALUE: 29
PIF_VALUE: 24
PIF_VALUE: 28
PIF_VALUE: 25
PIF_VALUE: 22
PIF_VALUE: 28
PIF_VALUE: 31
PIF_VALUE: 29
PIF_VALUE: 20
PIF_VALUE: 28
PIF_VALUE: 28

## 2020-01-24 ASSESSMENT — PAIN SCALES - GENERAL
PAINLEVEL_OUTOF10: 0

## 2020-01-24 NOTE — PLAN OF CARE
Patient placed in restraints due to not being able to follow commands pulling at tubes and lines.  Unable to redirect or reorient

## 2020-01-24 NOTE — PROGRESS NOTES
This note also relates to the following rows which could not be included:  Pulse - Cannot attach notes to unvalidated device data  SpO2 - Cannot attach notes to unvalidated device data  Resp - Cannot attach notes to unvalidated device data  End Tidal CO2 - Cannot attach notes to unvalidated device data  Low Minute Volume Alarm - Cannot attach notes to unvalidated device data       01/23/20 2335   Vent Information   Vent Type 840   Vent Mode SIMV/VC   Vt Ordered 400 mL   Rate Set 15 bmp   Peak Flow 50 L/min   Pressure Support 10 cmH20   FiO2  100 %   Sensitivity 3   PEEP/CPAP 10   Cuff Pressure (cm H2O) 30 cm H2O   Humidification Source HME   Vent Patient Data   Peak Inspiratory Pressure 21 cmH2O   Mean Airway Pressure 13 cmH20   Rate Measured 22 br/min   Vt Exhaled 526 mL   Minute Volume 9.9 Liters   I:E Ratio 1:2.3   Breath Sounds   Right Upper Lobe Clear   Right Middle Lobe Clear   Right Lower Lobe Diminished   Left Upper Lobe Clear   Left Lower Lobe Diminished   Alarm Settings   High Pressure Alarm 30 cmH2O   Press Low Alarm 6 cmH2O   High Respiratory Rate 45 br/min   Low Exhaled Vt  300 mL   ETT (adult)   Placement Date/Time: 01/23/20 3575   Timeout: Patient;Site/Side;Appropriate Equipment;Correct Position  Preoxygenation: Yes  Mask Ventilation: Ventilated by mask (1)  Technique: Video laryngoscopy  Type: Cuffed  Tube Size: 7.5 mm  Laryngoscope: Barbara. ..    Secured at 22 cm   Measured From Lips   ET Placement Left   Secured By Commercial tube romero

## 2020-01-24 NOTE — PROGRESS NOTES
Dr. Akanksha Shields called for update and abg results. Orders for a peep of 10 Fi02 of 100%. May go up on PEEP increments of 2 if need be.

## 2020-01-24 NOTE — PROGRESS NOTES
% 100 mL IVPB, 1 g, Intravenous, PRN  calcium chloride 2 g in sodium chloride 0.9 % 100 mL IVPB, 2 g, Intravenous, PRN  acetaminophen (TYLENOL) tablet 650 mg, 650 mg, Oral, Q4H PRN  acetaminophen (TYLENOL) suppository 650 mg, 650 mg, Rectal, Q4H PRN  docusate sodium (COLACE) capsule 100 mg, 100 mg, Oral, BID  ondansetron (ZOFRAN) injection 4 mg, 4 mg, Intravenous, Q8H PRN  metoprolol tartrate (LOPRESSOR) tablet 12.5 mg, 12.5 mg, Oral, BID  chlorhexidine (PERIDEX) 0.12 % solution 15 mL, 15 mL, Mouth/Throat, BID  mupirocin (BACTROBAN) 2 % ointment, , Nasal, BID  [Held by provider] atorvastatin (LIPITOR) tablet 20 mg, 20 mg, Oral, Nightly  [Held by provider] aspirin EC tablet 81 mg, 81 mg, Oral, Daily  [Held by provider] clopidogrel (PLAVIX) tablet 75 mg, 75 mg, Oral, Daily  albuterol (PROVENTIL) nebulizer solution 2.5 mg, 2.5 mg, Nebulization, Q4H WA  albumin human 5 % IV solution 25 g, 25 g, Intravenous, PRN  glucose (GLUTOSE) 40 % oral gel 15 g, 15 g, Oral, PRN  dextrose 50 % IV solution, 12.5 g, Intravenous, PRN  glucagon (rDNA) injection 1 mg, 1 mg, Intramuscular, PRN  dextrose 5 % solution, 100 mL/hr, Intravenous, PRN  0.9 % sodium chloride bolus, 20 mL, Intravenous, Once       Vitals :     Vitals:    01/24/20 0855   BP:    Pulse: 112   Resp: 14   Temp:    SpO2: 98%       I & O :       Intake/Output Summary (Last 24 hours) at 1/24/2020 0915  Last data filed at 1/24/2020 0631  Gross per 24 hour   Intake 1211 ml   Output 3620 ml   Net -2409 ml        Physical Examination :     General appearance: unresponsive, intubated   HEENT: Lips- normal, teeth- ok , oral mucosa- moist  Neck : Mass- no, appears symmetrical, JVD- not raised  Respiratory: Respiratory effort-  On ventilation- FiO2- 80%  wheeze- no, crackles - no  Cardiovascular:  Ausculation- No M/R/G, Edema none  Abdomen: visible mass- no, distention- no, scar- no, tenderness- unable to assess                           hepatosplenomegaly-  No--  Musculoskeletal: clubbing no,cyanosis- no , digital ischemia- no                           muscle strength- patient unable to co-operate     , tone - patient unable to co-operate   Skin: rashes- no , ulcers- no, induration- no, tightening - no  Psychiatric:   Intubated, unable to assess  Has chest tubes also       LABS:     Recent Labs     01/23/20 1730 01/24/20  0015 01/24/20  0435   WBC 14.5* 13.9* 12.9*   HGB 8.0* 7.4* 7.1*   HCT 24.3* 22.5* 21.5*   PLT 51* 45* 42*     Recent Labs     01/22/20  0430 01/23/20  0345  01/23/20 1730 01/24/20 0015 01/24/20  0435    134*   < > 135* 135* 138   K 4.1 5.9*   < > 4.6 4.8 4.9    97*   < > 97* 97* 99   CO2 26 18*   < > 25 25 25   BUN 16 29*   < > 37* 35* 40*   CREATININE 1.1 1.6*   < > 1.3 1.2 1.3   GLUCOSE 199* 192*   < > 192* 255* 262*   MG 2.50* 2.50*  --   --   --  2.20    < > = values in this interval not displayed.

## 2020-01-24 NOTE — PROGRESS NOTES
Nutrition Assessment (Enteral Nutrition)    Type and Reason for Visit: Reassess    Nutrition Recommendations:   1. NPO for now  2. When medically appropriate, Recommend TF initiation. Order: \"Diet: Tube feed continuous/ NPO\". Initiate Glucerna 1.5 (Diabetic 1.5 formula) at 10 mL/hr and as tolerated, increase by 10 mL/hr q 4 hours until goal of 40 mL/hr is met via N/G  3. Recommend 30 mL H20 flush q 4 hours. Monitor IVF infusion, Na labs and need for adjustments in water flush  4. Recommend 1 Bottle Proteinex P2Go BID via syringe. Flush with 30 mL H20 before and after  5. Ensure head of bed is at least 30 - 45 degrees during continuous feeding. 6. Monitor TF tolerance (abd distention, bowel habits, N/V, cramping)  7. Check TG q 3 days while on diprivan infusion  8. Monitor nutrition adequacy, pertinent labs, bowel habits, wt changes, and clinical progress      Nutrition Assessment: Pt is declining nutritionally AEB NPO status. Intubated 1/23. Sedated on propofol at 5.7 mLhr (150 kcals from lipids). NGT placed, but RN reported some bloody output, possibly d.t traumatic placement. Plans for bronch and EGD today. NPO for now. Will monitor ability to start TF when medically appropriate. Malnutrition Assessment:  · Malnutrition Status:  At risk for malnutrition    Nutrition Risk Level: High    Nutrition Needs:  · Estimated Daily Total Kcal: 8514-7637 kcals   · Estimated Daily Protein (g): 113-150 gm   · Estimated Daily Fluid (ml/day): 1 mL/kcal per MD    Nutrition Diagnosis:   · Problem: Inadequate energy intake  · Etiology: related to Impaired respiratory function-inability to consume food     Signs and symptoms:  as evidenced by Intubation, NPO status due to medical condition    Objective Information:  · Nutrition-Focused Physical Findings: Intubated/sedated  · Wound Type: None  · Current Nutrition Therapies:  · Oral Diet Orders: NPO   · Oral Diet intake: NPO  · Oral Nutrition Supplement (ONS) Orders:

## 2020-01-24 NOTE — PROGRESS NOTES
Diagnosis    Hemothorax    Acute respiratory failure with hypoxemia (HCC)    Tobacco abuse    AS (aortic stenosis)    CAD (coronary artery disease) s/p stent in 2010    HTN (hypertension)    Hyperlipidemia    S/P AVR (aortic valve replacement)    Hypotension    Pulmonary infiltrate    Pulmonary venous congestion    Atelectasis    Leukocytosis    Hyperglycemia    Acute bronchospasm    Atrial fibrillation with RVR (HCC)    Cardiogenic shock (HCC)    Acute on chronic diastolic congestive heart failure (HCC)        Vital Signs: BP (!) 137/52   Pulse 101   Temp 98.7 °F (37.1 °C) (Core)   Resp 14   Ht 5' 9\" (1.753 m)   Wt 207 lb 14.3 oz (94.3 kg)   SpO2 98%   BMI 30.70 kg/m²  O2 Flow Rate (L/min): 2 L/min     Admission Weight: Weight: 205 lb 12.8 oz (93.4 kg)    Weight on 1/21 (83.9 kg) Pre-op   Weight on 1/22 (95.4 kg)  1/23  92.7 kg  1/24  94.3 kg    Intake/Output:     Intake/Output Summary (Last 24 hours) at 1/24/2020 0945  Last data filed at 1/24/2020 0919  Gross per 24 hour   Intake 1221 ml   Output 3810 ml   Net -2589 ml      GTTS:   Dobutamine at 5 mcg/kg/hr  Furosemide at 10 mg/hr  Fentanyl at 200 mcg/hr  Versed at 3 mg/hr  Argatroban at 1.5 mcg/kg/min  Amiodarone started at 1 mg/min, no bolus    Extubation Time: 1/22 @ 0900  Transition Time: 1/22 @ 22:00     LABORATORY DATA:     CBC:   Recent Labs     01/23/20 1730 01/24/20 0015 01/24/20  0435   WBC 14.5* 13.9* 12.9*   HGB 8.0* 7.4* 7.1*   HCT 24.3* 22.5* 21.5*   MCV 90.3 90.1 90.7   PLT 51* 45* 42*     BMP:   Recent Labs     01/23/20 1730 01/24/20 0015 01/24/20  0435   * 135* 138   K 4.6 4.8 4.9   CL 97* 97* 99   CO2 25 25 25   BUN 37* 35* 40*   CREATININE 1.3 1.2 1.3     MG:    Recent Labs     01/22/20  0430 01/23/20  0345 01/24/20  0435   MG 2.50* 2.50* 2.20      PT/INR:   Recent Labs     01/21/20  1909   PROTIME 18.3*   INR 1.57*     APTT:   Recent Labs     01/23/20  1730 01/23/20  2205 01/24/20  0015   APTT 28.4 72.0*

## 2020-01-24 NOTE — CONSULTS
Left 1/2/2020    EMERGENT THORACOTOMY, EVACUATION OF HEMOTOMA CHEST WALL HEMOSTASIS, PLEUREAL BIOPSY, LEFT UPPER LOBE RESECTION performed by Maira Bill MD at [address]       Current Medications:     Current Facility-Administered Medications   Medication Dose Route Frequency Provider Last Rate Last Dose    argatroban 250 mg in dextrose 5 % 250 mL infusion  1.5 mcg/kg/min (Order-Specific) Intravenous Continuous Shanell Yang MD 8.1 mL/hr at 01/24/20 0259 1.5 mcg/kg/min at 01/24/20 0259    sodium chloride 0.9 % infusion             amiodarone (CORDARONE) 450 mg in dextrose 5 % 250 mL infusion  1 mg/min Intravenous Continuous Jeffrey Boyer MD 33.3 mL/hr at 01/24/20 0918 1 mg/min at 01/24/20 0918    insulin glargine (LANTUS) injection vial 10 Units  10 Units Subcutaneous Daily MIKO Villalta CNP        insulin lispro (HUMALOG) injection vial 0-12 Units  0-12 Units Subcutaneous Q4H MIKO Villalta CNP        propofol injection  10 mcg/kg/min Intravenous Titrated Maira Bill MD 5.7 mL/hr at 01/24/20 0915 10 mcg/kg/min at 01/24/20 0915    0.9 % sodium chloride bolus  20 mL Intravenous Once Maira Bill MD   Stopped at 01/24/20 1118    meropenem (MERREM) 1 g in sodium chloride 0.9 % 100 mL IVPB (mini-bag)  1 g Intravenous Q12H MIKO Villalta CNP   Stopped at 01/23/20 1933    furosemide (LASIX) 100 mg in dextrose 5 % 100 mL infusion  10 mg/hr Intravenous Continuous Marguerite Birch MD 10 mL/hr at 01/24/20 0356 10 mg/hr at 01/24/20 0356    norepinephrine (LEVOPHED) 16 mg in dextrose 5 % 250 mL infusion  2 mcg/min Intravenous Titrated MIKO Villalta CNP 1.9 mL/hr at 01/23/20 0858 2 mcg/min at 01/23/20 0858    DOBUTamine (DOBUTREX) 500 mg in dextrose 5 % 250 mL infusion  5 mcg/kg/min Intravenous Continuous Jeffrey Boyer MD 13.9 mL/hr at 01/24/20 0127 5 mcg/kg/min at 01/24/20 0127    niCARdipine (CARDENE) 50 mg in sodium chloride 0.9 % 250 mL infusion  5 mg/hr Intravenous Titrated Anuradha Paulson MD 25 mL/hr at 01/24/20 0934 5 mg/hr at 01/24/20 0934    fentaNYL (SUBLIMAZE) 1,000 mcg in sodium chloride 0.9 % 100 mL infusion  25 mcg/hr Intravenous Continuous Steph Rain MD 20 mL/hr at 01/24/20 0647 200 mcg/hr at 01/24/20 0647    fentaNYL (SUBLIMAZE) injection 25 mcg  25 mcg Intravenous Q1H PRN Steph Rain MD        famotidine (PEPCID) injection 20 mg  20 mg Intravenous BID Loco Novoa MD   20 mg at 01/24/20 0915    sodium chloride flush 0.9 % injection 10 mL  10 mL Intravenous 2 times per day Anuradha Paulson MD   10 mL at 01/24/20 0919    sodium chloride flush 0.9 % injection 10 mL  10 mL Intravenous PRN Jeffrey Boyer MD   10 mL at 01/23/20 1110    potassium chloride 20 mEq/50 mL IVPB (Central Line)  20 mEq Intravenous PRN Anuradha Paulson MD   Stopped at 01/22/20 1639    magnesium sulfate 2 g in 50 mL IVPB premix  2 g Intravenous PRN Jeffrey Mathur MD        calcium chloride 1 g in sodium chloride 0.9 % 100 mL IVPB  1 g Intravenous PRN Anuradha Paulson MD        calcium chloride 2 g in sodium chloride 0.9 % 100 mL IVPB  2 g Intravenous PRN Jeffrey Mathur MD        acetaminophen (TYLENOL) tablet 650 mg  650 mg Oral Q4H PRN Jeffrey Mathur MD   650 mg at 01/22/20 1927    acetaminophen (TYLENOL) suppository 650 mg  650 mg Rectal Q4H PRN Jeffrey Mathur MD        docusate sodium (COLACE) capsule 100 mg  100 mg Oral BID Jeffrey Mathur MD   Stopped at 01/23/20 0823    ondansetron (ZOFRAN) injection 4 mg  4 mg Intravenous Q8H PRN Jeffrey Boyer MD        metoprolol tartrate (LOPRESSOR) tablet 12.5 mg  12.5 mg Oral BID Jeffrey Boyer MD   12.5 mg at 01/24/20 0915    chlorhexidine (PERIDEX) 0.12 % solution 15 mL  15 mL Mouth/Throat BID Jeffrey Boyer MD   15 mL at 01/24/20 0919    mupirocin (BACTROBAN) 2 % ointment   Nasal BID Jeffrey Mathur MD        [Held by provider] atorvastatin (LIPITOR) tablet 20 mg  20 mg Oral Nightly Jeffrey Boyer MD   20 mg at 01/22/20 2052    [Held by provider] aspirin EC tablet 81 mg  81 mg Oral Daily Jeffrey Blue MD   Stopped at 01/22/20 0918    [Held by provider] clopidogrel (PLAVIX) tablet 75 mg  75 mg Oral Daily Jeffrey Blue MD   Stopped at 01/22/20 0919    albuterol (PROVENTIL) nebulizer solution 2.5 mg  2.5 mg Nebulization Q4H WA Jeffrey Boyer MD   2.5 mg at 01/24/20 0851    albumin human 5 % IV solution 25 g  25 g Intravenous PRN Jeffrey Boyer MD        glucose (GLUTOSE) 40 % oral gel 15 g  15 g Oral PRN Jeffrey Boyer MD        dextrose 50 % IV solution  12.5 g Intravenous PRN Jeffrey Boyer MD        glucagon (rDNA) injection 1 mg  1 mg Intramuscular PRN Jeffrey Boyer MD        dextrose 5 % solution  100 mL/hr Intravenous PRN Jeffrey Boyer MD        0.9 % sodium chloride bolus  20 mL Intravenous Once Anne Silveira MD           Allergies: Allergies   Allergen Reactions    Penicillins Hives       Social History:     Social History     Socioeconomic History    Marital status: Single     Spouse name: Not on file    Number of children: Not on file    Years of education: Not on file    Highest education level: Not on file   Occupational History    Not on file   Social Needs    Financial resource strain: Not on file    Food insecurity:     Worry: Not on file     Inability: Not on file    Transportation needs:     Medical: Not on file     Non-medical: Not on file   Tobacco Use    Smoking status: Current Every Day Smoker     Packs/day: 2.00     Types: Cigarettes    Smokeless tobacco: Current User   Substance and Sexual Activity    Alcohol use:  Yes     Alcohol/week: 6.0 standard drinks     Types: 6 Cans of beer per week     Frequency: 4 or more times a week     Drinks per session: 5 or 6     Binge frequency: Daily or almost daily    Drug use: Not Currently    Sexual activity: Not on file   Lifestyle    Physical activity:     Days per week: Not on file     Minutes per session: Not on file    Stress: Not on file Relationships    Social connections:     Talks on phone: Not on file     Gets together: Not on file     Attends Latter day service: Not on file     Active member of club or organization: Not on file     Attends meetings of clubs or organizations: Not on file     Relationship status: Not on file    Intimate partner violence:     Fear of current or ex partner: Not on file     Emotionally abused: Not on file     Physically abused: Not on file     Forced sexual activity: Not on file   Other Topics Concern    Not on file   Social History Narrative    Not on file     Social History     Substance and Sexual Activity   Drug Use Not Currently     Social History     Substance and Sexual Activity   Alcohol Use Yes    Alcohol/week: 6.0 standard drinks    Types: 6 Cans of beer per week    Frequency: 4 or more times a week    Drinks per session: 5 or 6    Binge frequency: Daily or almost daily     Social History     Substance and Sexual Activity   Sexual Activity Not on file     Social History     Tobacco Use   Smoking Status Current Every Day Smoker    Packs/day: 2.00    Types: Cigarettes   Smokeless Tobacco Current User       Family History:     History reviewed. No pertinent family history.     Review of Systems:     Unable to be obtained due to patient factors     Physical Exam:     BP (!) 137/52   Pulse 101   Temp 98.7 °F (37.1 °C) (Core)   Resp 14   Ht 5' 9\" (1.753 m)   Wt 207 lb 14.3 oz (94.3 kg)   SpO2 98%   BMI 30.70 kg/m²     CONSTITUTIONAL: intubated, sedated, appears acutely ill   ENT:  Intubated   NECK:  Supple, symmetrical, trachea midline, no adenopathy, thyroid symmetric, not enlarged and no tenderness, skin normal  HEMATOLOGIC/LYMPHATICS:  no cervical lymphadenopathy, no supraclavicular lymphadenopathy, no axillary lymphadenopathy and no inguinal lymphadenopathy non-tender on palpation, no costal vertebral tenderness  LUNGS:  Clear to auscultation bilaterally, no crackles or wheezing to 1/24/2020 8:24 am       COMPARISON:   Frontal view of the chest and abdomen 01/23/2020 at 4:46 p.m., frontal view   of the chest 01/23/2020 at 5:08 a.m. CT thorax 01/14/2020.       HISTORY:   ORDERING SYSTEM PROVIDED HISTORY: PNA   TECHNOLOGIST PROVIDED HISTORY:   Reason for exam:->PNA       FINDINGS:   Multiple leads overlie the mediastinum limiting evaluation.  Within these   limitations:       Support devices:       Endotracheal tube terminates approximately 4 cm above the quinten.       An enteric tube is noted, however, it is difficult to follow the entire   course of the enteric tube, would recommend dedicated radiographs of the   upper abdomen for better evaluation.  Contrast material does opacify the   stomach.       Right IJ sheath remains in place with tip terminating in the proximal SVC.       Redemonstration of a right basilar chest tube and suspected mediastinal   drains/2.       The cardiopericardial silhouette is again noted to be enlarged and partially   obscured.       No significant interval change in bilateral hazy airspace disease.  No   definite pneumothorax identified.           Impression   1. An enteric tube is noted, however, it is difficult to follow the entire   course of the enteric tube, would recommend dedicated radiographs of the   upper abdomen for better evaluation.  Contrast material does opacify the   stomach. 2. No significant interval change in bilateral hazy airspace disease. 3. No definite pneumothorax identified. 4. Additional support devices as described above.             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 If ARTEMIO positive, will need at least 3 months of Coumadin. Will plan to bridge to Coumadin once plt are near normal and at least over 100k.      TCP  - could be multifactorial given severe illness   - Treat for HIT and await ARTEMIO   - Would not transfuse plt unless less than 10 and signs of bleeding   - Differential includes shock liver and early sequestration of plt to the spleen and DIC secondary to PNA. INR 1.57 on 1-21 (prior to argatroban). Argatroban is known to increase the INR.   - Check coags - ddimer and fibrinogen added   - Check plt antibody     Anemia  - blood loss secondary to surgical losses, will follow     PNA- Merrem     AFIBB - Amiodarone drip             Aortic repair, AV valve redo per CT surgery team     Dr. Mir Lam to follow up in the AM.     Thank you very much for allowing me to participate in the care of this patient.     Nita Madison CNP   Medical Oncology/Hematology    [facility] - [address],  [place]   Phone: [phone]  Fax: [phone]

## 2020-01-24 NOTE — PROGRESS NOTES
Bronchoscopy complete at bedside at this time per Dr. Shellie Rivering . Pt tolerated well. Cultures sent per bronch team. Per Dr. Hensley Fearing current respiratory support needs appear to be cardiac/fluid related. Pt FiO2 decreased to 80% and PEEP 8 per Dr. Hensley Fearing . Kimani Grande RT aware.

## 2020-01-24 NOTE — PLAN OF CARE
Problem: Nutrition  Goal: Optimal nutrition therapy  Outcome: Ongoing  Note:   Nutrition Problem: Inadequate energy intake  Intervention: Food and/or Nutrient Delivery: Start Tube Feeding(when medically appropriate )  Nutritional Goals:  Tolerate nutrition support at goal this admission

## 2020-01-24 NOTE — PROGRESS NOTES
Updated Dr. Rashid Orosco on patient No orders at this time pulmonary to manage vent.  P02 on blood gas decreasing

## 2020-01-24 NOTE — PROGRESS NOTES
Malcolm Freitas  Nocturnist / Cross-Cover Note  Dr. Jim Florian MD    I was contacted by RN to manage ventilator settings. Vent settings were adjusted extensively at bedside. Despite adequate infusion rate of fentanyl patient continues to have a strong respiratory drive and is initiating most breaths. Per request of Dr. Marti Bedoya paralysis will be avoided. Pronation does not seem a realistic option either. Lung-protective vent settings are warranted. Oxygenation, airway pressure, and patient-vent synchrony goals were achieved using the following settings:  SIMV  15 / 400 / 18 + 10 / Titrate  Vmax set to 50 L/min. ABG trend was followed throughout the night.

## 2020-01-24 NOTE — PROGRESS NOTES
Update via phone provided to Dr. Yessica Valencia at this time. POC reviewed and updated. Order to leave Dobutamine parked @ 5mcg/kg/min. Per Dr. Yessica Valencia, Dobutamine to not be weaned without orders from Dr. Yessica Valencia or without discussion with Dr. Yessica Valencia.

## 2020-01-24 NOTE — PROGRESS NOTES
Dr. Hiral Obrien at bedside at this time. POC reviewed and updated. Per Dr. Hiral Obrien verbal order, pt pacemaker to be placed on VDI with a back up rate of 60. Order to obtain VBG for SvO2 also received. Orders verified and placed per writer.

## 2020-01-24 NOTE — PROGRESS NOTES
Labs drawn per central line and sent to lab at this time. 2 purple, 1 blue, 1 green, 1 pink, 1 grey drawn and sent.

## 2020-01-24 NOTE — PROGRESS NOTES
Pt pacemaker placed on VDI rate of 60 mA 13. Pt intrinsic rate 90-100s with rhythm Afib rate controled. Pacemaker sensing appropriately.

## 2020-01-24 NOTE — CONSULTS
Packs/day: 2.00     Types: Cigarettes    Smokeless tobacco: Current User   Substance Use Topics    Alcohol use: Yes     Alcohol/week: 6.0 standard drinks     Types: 6 Cans of beer per week     Frequency: 4 or more times a week     Drinks per session: 5 or 6     Binge frequency: Daily or almost daily        Scheduled Meds:   insulin glargine  10 Units Subcutaneous Daily    insulin lispro  0-12 Units Subcutaneous Q4H    meropenem  1 g Intravenous Q12H    famotidine (PEPCID) injection  20 mg Intravenous BID    sodium chloride flush  10 mL Intravenous 2 times per day    docusate sodium  100 mg Oral BID    metoprolol tartrate  12.5 mg Oral BID    chlorhexidine  15 mL Mouth/Throat BID    mupirocin   Nasal BID    [Held by provider] atorvastatin  20 mg Oral Nightly    [Held by provider] aspirin  81 mg Oral Daily    [Held by provider] clopidogrel  75 mg Oral Daily    albuterol  2.5 mg Nebulization Q4H WA    sodium chloride  20 mL Intravenous Once       Continuous Infusions:   argatroban infusion 1 mcg/kg/min (01/24/20 1145)    sodium chloride      amiodarone 450mg/250ml D5W infusion 1 mg/min (01/24/20 0918)    propofol 10 mcg/kg/min (01/24/20 0915)    furosemide (LASIX) 1mg/ml infusion 10 mg/hr (01/24/20 1251)    norepinephrine 2 mcg/min (01/23/20 0858)    DOBUTamine 5 mcg/kg/min (01/24/20 0127)    niCARdipene (CARDENE) 50 mg in 250 mL 0.9 % sodium chloride infusion 5 mg/hr (01/24/20 0934)    fentaNYL (SUBLIMAZE) infusion 200 mcg/hr (01/24/20 1239)    dextrose         PRN Meds:   fentanNYL, sodium chloride flush, potassium chloride, magnesium sulfate, calcium chloride IVPB, calcium chloride IVPB, acetaminophen, acetaminophen, ondansetron, albumin human, glucose, dextrose, glucagon (rDNA), dextrose   Inpatient consult to Critical Care  Consult performed by: Evelyn Mortimer, MD  Consult ordered by: Ramiro Patel MD        ALLERGIES:  Patient is allergic to penicillins.     REVIEW OF SYSTEMS:  Review of Systems   Unable to perform ROS: Intubated       Objective:   PHYSICAL EXAM:  BP (!) 114/59   Pulse 96   Temp 99.1 °F (37.3 °C) (Core)   Resp 15   Ht 5' 9\" (1.753 m)   Wt 207 lb 14.3 oz (94.3 kg)   SpO2 91%   BMI 30.70 kg/m²    Physical Exam  Constitutional:       General: He is not in acute distress. Appearance: He is well-developed. He is not diaphoretic. HENT:      Head: Normocephalic and atraumatic. Mouth/Throat:      Pharynx: No oropharyngeal exudate. Eyes:      Pupils: Pupils are equal, round, and reactive to light. Neck:      Musculoskeletal: Neck supple. Vascular: No JVD. Cardiovascular:      Heart sounds: Normal heart sounds. No murmur. No friction rub. No gallop. Pulmonary:      Effort: Respiratory distress present. Breath sounds: No wheezing or rales. Abdominal:      General: Bowel sounds are normal. There is no distension. Palpations: Abdomen is soft. Tenderness: There is no tenderness. Musculoskeletal: Normal range of motion. Lymphadenopathy:      Cervical: No cervical adenopathy. Skin:     General: Skin is warm and dry. Findings: No rash. Neurological:      Mental Status: He is alert. Cranial Nerves: Cranial nerve deficit present.       Comments: Intubated, sedated            Data Reviewed:   LABS:  CBC:   Recent Labs     01/24/20  0015 01/24/20  0435 01/24/20  1055   WBC 13.9* 12.9* 12.2*   HGB 7.4* 7.1* 7.9*   HCT 22.5* 21.5* 23.6*   MCV 90.1 90.7 90.5   PLT 45* 42* 40*     BMP:   Recent Labs     01/24/20  0015 01/24/20  0435 01/24/20  1055   * 138 133*   K 4.8 4.9 4.6   CL 97* 99 94*   CO2 25 25 27   BUN 35* 40* 36*   CREATININE 1.2 1.3 1.2     LIVER PROFILE:   Recent Labs     01/23/20  1410   *   *   BILIDIR 0.6*   BILITOT 1.2*   ALKPHOS 81     PT/INR:   Recent Labs     01/21/20  1909   PROTIME 18.3*   INR 1.57*     APTT:  Recent Labs     01/23/20  2205 01/24/20  0015 01/24/20  1055   APTT 72.0* 82.3* 83.9* UA:No results for input(s): NITRITE, COLORU, PHUR, LABCAST, WBCUA, RBCUA, MUCUS, TRICHOMONAS, YEAST, BACTERIA, CLARITYU, SPECGRAV, LEUKOCYTESUR, UROBILINOGEN, BILIRUBINUR, BLOODU, GLUCOSEU, AMORPHOUS in the last 72 hours. Invalid input(s): Bretttawana Starks  Recent Labs     01/24/20  0435 01/24/20  0809   PHART 7.351 7.298*   OZF8HTR 48.9* 57.0*   PO2ART 89.9 87.1       Vent Information  $Ventilation: $Subsequent Day  Ventilator Started: Yes  Skin Assessment: Clean, dry, & intact  Vent Type: 840  Vent Mode: AC/PC  Vt Ordered: 400 mL  Pressure Ordered: 20  Rate Set: 15 bmp  Peak Flow: 50 L/min  Pressure Support: 0 cmH20  FiO2 : 80 %  Sensitivity: 3  PEEP/CPAP: 8  I Time/ I Time %: 2 s  Cuff Pressure (cm H2O): 30 cm H2O  Humidification Source: E    CXR personally reviewed, pulmonary edema           Assessment:     1. Acute resp failure, mixed   -acute pulm edema   -ards  2. Cardiogenic shock  3. PAL  4. Acute blood loss anemia    Plan:      -Continue vent support, will check serial ABGs. Lung protective vent strategy.  Plan to bronch tomorrow  -Titrate sedation as needed  -Inotropic/vasopressor management per primary service  -Fluid removal will most definitely help his respiratory status, per nephro  -Empiric atb  -Monitor H/H, doubt alveolar hemorrhage  -Serial chest imaging    Due to life threatening resp failure this patient is critically ill, total critical care time involved in his care was 31 mins     Yonathan Barnes MD

## 2020-01-24 NOTE — PROGRESS NOTES
Vent settings changed by Dr. Den Piper at this time.  Patient is now on the following settings:    AC/PC  Rate: 15  Inspiratory Pressure: 20 cmH2O  Ti: 2.0 seconds  FiO2: 100%   PEEP: 12 cmH2O    Electronically signed by Zoë Valente RCP, RRT, RRT-ACCS on 1/24/2020 at 9:06 AM

## 2020-01-24 NOTE — PROGRESS NOTES
Writer contacted blood bank at this time to inquire about previously ordered 1 unit PRBCS. Spoke with Disha Alves. Per Emy Canchola, it's ready. I just assumed you knew that. \" Writer informed blood bank that writer was not aware unit was ready and was awaiting unit which is also dictating further POC decisions. Also made it aware that it is common practice that blood bank notifies nursing staff when unit is available and ready for transfusion.

## 2020-01-24 NOTE — PLAN OF CARE
patient identify a challenging but achievable goal per shift that can aid in progression towards increased functional capacity at discharge ie sit in the chair for x amount of time, Decrease walk time by x seconds, stand or walk with minimal assistance, decrease pain to a level of x/10, etc- ok to document [place] or use family POA goals if the patient is unable to communicate) [place] vented/sedated . No family present    Pt is currently intubated on a ventilator. Pt with nonpitting lower extremity edema. Patient's weights and intake/output reviewed:      Patient and/or Family's stated Goal of Care this Admission: [place] intubated sedated. No family present          Problem: Risk for Impaired Skin Integrity  Goal: Tissue integrity - skin and mucous membranes  Description  Structural intactness and normal physiological function of skin and  mucous membranes. Outcome: Ongoing  Note:   Encouraging q2 turns, sacral heart dressing in place. Legs and heels elevated off bed. Will continue to monitor.        Problem: Restraint Use - Nonviolent/Non-Self-Destructive Behavior:  Goal: Absence of restraint indications  Description  Absence of restraint indications  Outcome: Ongoing  Note:   Pt restraints removed d/t absence of bhaviors

## 2020-01-24 NOTE — PROGRESS NOTES
Dr. Justin Stover at bedside at this time. POC reviewed and updated. Per Dr. Justin Stover verbal order to prepare and transfuse 1 unit PRBCs, pending post transfusion labs possible need for EGD. Per Dr. Justin Stover verbal order pt current PPI to be BID. Writer verified pt currently on Pepcid 20mg BID. Per Dr. Justin Stover OK to continue current PPI order. Per Dr. Justin Stover verbal order [facility] ordered. No further orders at this time.

## 2020-01-24 NOTE — PROGRESS NOTES
01/24/20 1621   Vent Information   Vent Type 840   Vent Mode AC/PC   Pressure Ordered 20   Rate Set 15 bmp   Pressure Support 0 cmH20   FiO2  80 %   Sensitivity 3   PEEP/CPAP 8   I Time/ I Time % 2 s   Cuff Pressure (cm H2O) 30 cm H2O   Humidification Source HME   Vent Patient Data   High Peep/I Pressure 20   Peak Inspiratory Pressure 28 cmH2O   Mean Airway Pressure 19 cmH20   Rate Measured 16 br/min   Vt Exhaled 531 mL   Minute Volume 8.4 Liters   I:E Ratio 1.00:1   Plateau Pressure 28 [id number]   Cough/Sputum   Sputum How Obtained Endotracheal;Suctioned   Cough Strong;Non-productive   Sputum Amount None   Spontaneous Breathing Trial (SBT) RT Doc   Pulse 102   SpO2 90 %   Breath Sounds   Right Upper Lobe Clear   Right Middle Lobe Clear   Right Lower Lobe Diminished   Left Upper Lobe Clear   Left Lower Lobe Diminished   Additional Respiratory  Assessments   Resp 15   End Tidal CO2 34 (%)   Alarm Settings   High Pressure Alarm 45 cmH2O   Low Minute Volume Alarm 2 L/min   Apnea (secs) 20 secs   High Respiratory Rate 40 br/min   Low Exhaled Vt  300 mL   Patient Observation   Observations ambu @ bedside   ETT (adult)   Placement Date/Time: 01/23/20 1645   Timeout: Patient;Site/Side;Appropriate Equipment;Correct Position  Preoxygenation: Yes  Mask Ventilation: Ventilated by mask (1)  Technique: Video laryngoscopy  Type: Cuffed  Tube Size: 7.5 mm  Laryngoscope: GlideS. ..    Secured at 22 cm   Measured From Lips   ET Placement Right  (moved to the center at this time.)   Secured By Commercial tube romero   Site Condition Dry

## 2020-01-25 ENCOUNTER — APPOINTMENT (OUTPATIENT)
Dept: GENERAL RADIOLOGY | Age: 70
DRG: 216 | End: 2020-01-25
Attending: INTERNAL MEDICINE
Payer: MEDICARE

## 2020-01-25 LAB
ANION GAP SERPL CALCULATED.3IONS-SCNC: 10 MMOL/L (ref 3–16)
ANION GAP SERPL CALCULATED.3IONS-SCNC: 10 MMOL/L (ref 3–16)
APTT: 52.9 SEC (ref 24.2–36.2)
BASE EXCESS ARTERIAL: 11 (ref -3–3)
BASE EXCESS ARTERIAL: 13 (ref -3–3)
BASE EXCESS ARTERIAL: 5.2 MMOL/L (ref -3–3)
BASE EXCESS ARTERIAL: 9 (ref -3–3)
BASE EXCESS VENOUS: 9 (ref -3–3)
BUN BLDV-MCNC: 34 MG/DL (ref 7–20)
BUN BLDV-MCNC: 34 MG/DL (ref 7–20)
CALCIUM SERPL-MCNC: 8 MG/DL (ref 8.3–10.6)
CALCIUM SERPL-MCNC: 8 MG/DL (ref 8.3–10.6)
CARBOXYHEMOGLOBIN ARTERIAL: 0.2 % (ref 0–1.5)
CHLORIDE BLD-SCNC: 100 MMOL/L (ref 99–110)
CHLORIDE BLD-SCNC: 98 MMOL/L (ref 99–110)
CO2: 29 MMOL/L (ref 21–32)
CO2: 31 MMOL/L (ref 21–32)
CREAT SERPL-MCNC: 0.9 MG/DL (ref 0.8–1.3)
CREAT SERPL-MCNC: 1 MG/DL (ref 0.8–1.3)
GFR AFRICAN AMERICAN: >60
GFR AFRICAN AMERICAN: >60
GFR NON-AFRICAN AMERICAN: >60
GFR NON-AFRICAN AMERICAN: >60
GLUCOSE BLD-MCNC: 199 MG/DL (ref 70–99)
GLUCOSE BLD-MCNC: 210 MG/DL (ref 70–99)
GLUCOSE BLD-MCNC: 227 MG/DL (ref 70–99)
GLUCOSE BLD-MCNC: 247 MG/DL (ref 70–99)
GLUCOSE BLD-MCNC: 250 MG/DL (ref 70–99)
HCO3 ARTERIAL: 30.7 MMOL/L (ref 21–29)
HCO3 ARTERIAL: 33.2 MMOL/L (ref 21–29)
HCO3 ARTERIAL: 34.9 MMOL/L (ref 21–29)
HCO3 ARTERIAL: 37.3 MMOL/L (ref 21–29)
HCO3 VENOUS: 35.1 MMOL/L (ref 23–29)
HCT VFR BLD CALC: 26.3 % (ref 40.5–52.5)
HCT VFR BLD CALC: 26.8 % (ref 40.5–52.5)
HEMOGLOBIN, ART, EXTENDED: 10.3 G/DL (ref 13.5–17.5)
HEMOGLOBIN: 8.7 G/DL (ref 13.5–17.5)
HEMOGLOBIN: 8.9 G/DL (ref 13.5–17.5)
LACTIC ACID: 1.4 MMOL/L (ref 0.4–2)
MAGNESIUM: 2.1 MG/DL (ref 1.8–2.4)
MCH RBC QN AUTO: 30.1 PG (ref 26–34)
MCH RBC QN AUTO: 30.4 PG (ref 26–34)
MCHC RBC AUTO-ENTMCNC: 32.5 G/DL (ref 31–36)
MCHC RBC AUTO-ENTMCNC: 33.7 G/DL (ref 31–36)
MCV RBC AUTO: 90.3 FL (ref 80–100)
MCV RBC AUTO: 92.6 FL (ref 80–100)
METHEMOGLOBIN ARTERIAL: 0.6 %
O2 CONTENT ARTERIAL: 14 ML/DL
O2 SAT, ARTERIAL: 96 % (ref 93–100)
O2 SAT, ARTERIAL: 96.9 %
O2 SAT, ARTERIAL: 97 % (ref 93–100)
O2 SAT, ARTERIAL: 97 % (ref 93–100)
O2 SAT, VEN: 65 %
O2 THERAPY: ABNORMAL
PCO2 ARTERIAL: 49.7 MMHG (ref 35–45)
PCO2 ARTERIAL: 50.4 MM HG (ref 35–45)
PCO2 ARTERIAL: 50.9 MM HG (ref 35–45)
PCO2 ARTERIAL: 55.7 MM HG (ref 35–45)
PCO2, VEN: 64.5 MM HG (ref 40–50)
PDW BLD-RTO: 16 % (ref 12.4–15.4)
PDW BLD-RTO: 16.1 % (ref 12.4–15.4)
PERFORMED ON: ABNORMAL
PH ARTERIAL: 7.41 (ref 7.35–7.45)
PH ARTERIAL: 7.43 (ref 7.35–7.45)
PH ARTERIAL: 7.43 (ref 7.35–7.45)
PH ARTERIAL: 7.44 (ref 7.35–7.45)
PH VENOUS: 7.34 (ref 7.35–7.45)
PLATELET # BLD: 42 K/UL (ref 135–450)
PLATELET # BLD: 45 K/UL (ref 135–450)
PMV BLD AUTO: 10.1 FL (ref 5–10.5)
PMV BLD AUTO: 8.3 FL (ref 5–10.5)
PO2 ARTERIAL: 77.9 MM HG (ref 75–108)
PO2 ARTERIAL: 85.9 MM HG (ref 75–108)
PO2 ARTERIAL: 88.8 MM HG (ref 75–108)
PO2 ARTERIAL: 96.7 MMHG (ref 75–108)
PO2, VEN: 37 MM HG
POC FIO2: 65
POC POTASSIUM: 4.4 MMOL/L (ref 3.5–5.1)
POC SAMPLE TYPE: ABNORMAL
POTASSIUM SERPL-SCNC: 4 MMOL/L (ref 3.5–5.1)
POTASSIUM SERPL-SCNC: 4.2 MMOL/L (ref 3.5–5.1)
RBC # BLD: 2.9 M/UL (ref 4.2–5.9)
RBC # BLD: 2.91 M/UL (ref 4.2–5.9)
SODIUM BLD-SCNC: 139 MMOL/L (ref 136–145)
SODIUM BLD-SCNC: 139 MMOL/L (ref 136–145)
TCO2 ARTERIAL: 32.3 MMOL/L
TCO2 ARTERIAL: 35 MMOL/L
TCO2 ARTERIAL: 36 MMOL/L
TCO2 ARTERIAL: 39 MMOL/L
TCO2 CALC VENOUS: 37 MMOL/L
WBC # BLD: 12.3 K/UL (ref 4–11)
WBC # BLD: 13.4 K/UL (ref 4–11)

## 2020-01-25 PROCEDURE — 83735 ASSAY OF MAGNESIUM: CPT

## 2020-01-25 PROCEDURE — 6360000002 HC RX W HCPCS: Performed by: THORACIC SURGERY (CARDIOTHORACIC VASCULAR SURGERY)

## 2020-01-25 PROCEDURE — 80048 BASIC METABOLIC PNL TOTAL CA: CPT

## 2020-01-25 PROCEDURE — 82803 BLOOD GASES ANY COMBINATION: CPT

## 2020-01-25 PROCEDURE — 94640 AIRWAY INHALATION TREATMENT: CPT

## 2020-01-25 PROCEDURE — 83605 ASSAY OF LACTIC ACID: CPT

## 2020-01-25 PROCEDURE — 2500000003 HC RX 250 WO HCPCS: Performed by: THORACIC SURGERY (CARDIOTHORACIC VASCULAR SURGERY)

## 2020-01-25 PROCEDURE — 2700000000 HC OXYGEN THERAPY PER DAY

## 2020-01-25 PROCEDURE — 94761 N-INVAS EAR/PLS OXIMETRY MLT: CPT

## 2020-01-25 PROCEDURE — 2580000003 HC RX 258: Performed by: THORACIC SURGERY (CARDIOTHORACIC VASCULAR SURGERY)

## 2020-01-25 PROCEDURE — 99292 CRITICAL CARE ADDL 30 MIN: CPT | Performed by: INTERNAL MEDICINE

## 2020-01-25 PROCEDURE — 2500000003 HC RX 250 WO HCPCS: Performed by: INTERNAL MEDICINE

## 2020-01-25 PROCEDURE — 94003 VENT MGMT INPAT SUBQ DAY: CPT

## 2020-01-25 PROCEDURE — 6360000002 HC RX W HCPCS: Performed by: INTERNAL MEDICINE

## 2020-01-25 PROCEDURE — 6360000002 HC RX W HCPCS: Performed by: NURSE PRACTITIONER

## 2020-01-25 PROCEDURE — 2580000003 HC RX 258: Performed by: INTERNAL MEDICINE

## 2020-01-25 PROCEDURE — 99291 CRITICAL CARE FIRST HOUR: CPT | Performed by: INTERNAL MEDICINE

## 2020-01-25 PROCEDURE — 85730 THROMBOPLASTIN TIME PARTIAL: CPT

## 2020-01-25 PROCEDURE — 2580000003 HC RX 258

## 2020-01-25 PROCEDURE — 6370000000 HC RX 637 (ALT 250 FOR IP): Performed by: THORACIC SURGERY (CARDIOTHORACIC VASCULAR SURGERY)

## 2020-01-25 PROCEDURE — 94770 HC ETCO2 MONITOR DAILY: CPT

## 2020-01-25 PROCEDURE — 94750 HC PULMONARY COMPLIANCE STUDY: CPT

## 2020-01-25 PROCEDURE — 82947 ASSAY GLUCOSE BLOOD QUANT: CPT

## 2020-01-25 PROCEDURE — 6370000000 HC RX 637 (ALT 250 FOR IP): Performed by: NURSE PRACTITIONER

## 2020-01-25 PROCEDURE — 2140000000 HC CCU INTERMEDIATE R&B

## 2020-01-25 PROCEDURE — 85027 COMPLETE CBC AUTOMATED: CPT

## 2020-01-25 PROCEDURE — C9248 INJ, CLEVIDIPINE BUTYRATE: HCPCS | Performed by: THORACIC SURGERY (CARDIOTHORACIC VASCULAR SURGERY)

## 2020-01-25 PROCEDURE — 84132 ASSAY OF SERUM POTASSIUM: CPT

## 2020-01-25 PROCEDURE — 71045 X-RAY EXAM CHEST 1 VIEW: CPT

## 2020-01-25 PROCEDURE — 99024 POSTOP FOLLOW-UP VISIT: CPT | Performed by: THORACIC SURGERY (CARDIOTHORACIC VASCULAR SURGERY)

## 2020-01-25 PROCEDURE — 2580000003 HC RX 258: Performed by: NURSE PRACTITIONER

## 2020-01-25 RX ORDER — SODIUM CHLORIDE 9 MG/ML
INJECTION, SOLUTION INTRAVENOUS
Status: COMPLETED
Start: 2020-01-25 | End: 2020-01-25

## 2020-01-25 RX ORDER — FUROSEMIDE 10 MG/ML
15 INJECTION INTRAMUSCULAR; INTRAVENOUS ONCE
Status: DISCONTINUED | OUTPATIENT
Start: 2020-01-25 | End: 2020-01-28

## 2020-01-25 RX ORDER — INSULIN GLARGINE 100 [IU]/ML
12 INJECTION, SOLUTION SUBCUTANEOUS DAILY
Status: DISCONTINUED | OUTPATIENT
Start: 2020-01-26 | End: 2020-01-27

## 2020-01-25 RX ORDER — SODIUM CHLORIDE 9 MG/ML
INJECTION, SOLUTION INTRAVENOUS
Status: DISPENSED
Start: 2020-01-25 | End: 2020-01-26

## 2020-01-25 RX ORDER — KETAMINE HYDROCHLORIDE 100 MG/ML
50 INJECTION, SOLUTION INTRAMUSCULAR; INTRAVENOUS
Status: DISCONTINUED | OUTPATIENT
Start: 2020-01-25 | End: 2020-01-28

## 2020-01-25 RX ORDER — KETAMINE HYDROCHLORIDE 100 MG/ML
100 INJECTION, SOLUTION INTRAMUSCULAR; INTRAVENOUS
Status: DISCONTINUED | OUTPATIENT
Start: 2020-01-25 | End: 2020-01-28

## 2020-01-25 RX ADMIN — CHLORHEXIDINE GLUCONATE 15 ML: 1.2 RINSE ORAL at 09:11

## 2020-01-25 RX ADMIN — METOPROLOL TARTRATE 25 MG: 25 TABLET ORAL at 20:57

## 2020-01-25 RX ADMIN — Medication 10 ML: at 21:00

## 2020-01-25 RX ADMIN — MUPIROCIN: 20 OINTMENT TOPICAL at 20:58

## 2020-01-25 RX ADMIN — FAMOTIDINE 20 MG: 10 INJECTION, SOLUTION INTRAVENOUS at 20:57

## 2020-01-25 RX ADMIN — FUROSEMIDE 10 MG/HR: 10 INJECTION, SOLUTION INTRAMUSCULAR; INTRAVENOUS at 09:21

## 2020-01-25 RX ADMIN — INSULIN LISPRO 9 UNITS: 100 INJECTION, SOLUTION INTRAVENOUS; SUBCUTANEOUS at 12:03

## 2020-01-25 RX ADMIN — ALBUTEROL SULFATE 2.5 MG: 2.5 SOLUTION RESPIRATORY (INHALATION) at 15:51

## 2020-01-25 RX ADMIN — MUPIROCIN: 20 OINTMENT TOPICAL at 09:25

## 2020-01-25 RX ADMIN — SODIUM CHLORIDE 1000 ML: 9 INJECTION, SOLUTION INTRAVENOUS at 03:12

## 2020-01-25 RX ADMIN — AMIODARONE HYDROCHLORIDE 1 MG/MIN: 50 INJECTION, SOLUTION INTRAVENOUS at 17:39

## 2020-01-25 RX ADMIN — ALBUTEROL SULFATE 2.5 MG: 2.5 SOLUTION RESPIRATORY (INHALATION) at 08:55

## 2020-01-25 RX ADMIN — INSULIN LISPRO 4 UNITS: 100 INJECTION, SOLUTION INTRAVENOUS; SUBCUTANEOUS at 05:15

## 2020-01-25 RX ADMIN — ALBUTEROL SULFATE 2.5 MG: 2.5 SOLUTION RESPIRATORY (INHALATION) at 20:36

## 2020-01-25 RX ADMIN — MEROPENEM 1 G: 1 INJECTION, POWDER, FOR SOLUTION INTRAVENOUS at 20:57

## 2020-01-25 RX ADMIN — MEROPENEM 1 G: 1 INJECTION, POWDER, FOR SOLUTION INTRAVENOUS at 09:10

## 2020-01-25 RX ADMIN — SODIUM CHLORIDE 10 MG/HR: 9 INJECTION, SOLUTION INTRAVENOUS at 09:21

## 2020-01-25 RX ADMIN — FUROSEMIDE 20 MG/HR: 10 INJECTION, SOLUTION INTRAMUSCULAR; INTRAVENOUS at 18:12

## 2020-01-25 RX ADMIN — FENTANYL CITRATE 150 MCG/HR: 50 INJECTION, SOLUTION INTRAMUSCULAR; INTRAVENOUS at 01:00

## 2020-01-25 RX ADMIN — DOBUTAMINE HYDROCHLORIDE 5 MCG/KG/MIN: 200 INJECTION INTRAVENOUS at 17:15

## 2020-01-25 RX ADMIN — Medication 10 ML: at 09:19

## 2020-01-25 RX ADMIN — SODIUM CHLORIDE 5 MG/HR: 9 INJECTION, SOLUTION INTRAVENOUS at 02:58

## 2020-01-25 RX ADMIN — DOCUSATE SODIUM 100 MG: 100 CAPSULE, LIQUID FILLED ORAL at 20:57

## 2020-01-25 RX ADMIN — FAMOTIDINE 20 MG: 10 INJECTION, SOLUTION INTRAVENOUS at 09:09

## 2020-01-25 RX ADMIN — ALBUTEROL SULFATE 2.5 MG: 2.5 SOLUTION RESPIRATORY (INHALATION) at 12:02

## 2020-01-25 RX ADMIN — AMIODARONE HYDROCHLORIDE 1 MG/MIN: 50 INJECTION, SOLUTION INTRAVENOUS at 01:28

## 2020-01-25 RX ADMIN — METOPROLOL TARTRATE 25 MG: 25 TABLET ORAL at 09:11

## 2020-01-25 RX ADMIN — CLEVIPIDINE 1 MG/HR: 0.5 EMULSION INTRAVENOUS at 20:18

## 2020-01-25 RX ADMIN — INSULIN LISPRO 2 UNITS: 100 INJECTION, SOLUTION INTRAVENOUS; SUBCUTANEOUS at 16:56

## 2020-01-25 RX ADMIN — INSULIN LISPRO 4 UNITS: 100 INJECTION, SOLUTION INTRAVENOUS; SUBCUTANEOUS at 21:53

## 2020-01-25 RX ADMIN — SODIUM CHLORIDE 10 MG/HR: 9 INJECTION, SOLUTION INTRAVENOUS at 16:37

## 2020-01-25 RX ADMIN — SODIUM CHLORIDE 2 MCG/KG/MIN: 9 INJECTION, SOLUTION INTRAVENOUS at 14:22

## 2020-01-25 RX ADMIN — AMIODARONE HYDROCHLORIDE 1 MG/MIN: 50 INJECTION, SOLUTION INTRAVENOUS at 09:24

## 2020-01-25 RX ADMIN — INSULIN LISPRO 4 UNITS: 100 INJECTION, SOLUTION INTRAVENOUS; SUBCUTANEOUS at 09:04

## 2020-01-25 RX ADMIN — PROPOFOL 10 MCG/KG/MIN: 10 INJECTION, EMULSION INTRAVENOUS at 02:19

## 2020-01-25 RX ADMIN — CHLORHEXIDINE GLUCONATE 15 ML: 1.2 RINSE ORAL at 20:58

## 2020-01-25 RX ADMIN — INSULIN GLARGINE 10 UNITS: 100 INJECTION, SOLUTION SUBCUTANEOUS at 09:04

## 2020-01-25 RX ADMIN — POTASSIUM CHLORIDE 20 MEQ: 29.8 INJECTION, SOLUTION INTRAVENOUS at 18:56

## 2020-01-25 RX ADMIN — POTASSIUM CHLORIDE 20 MEQ: 29.8 INJECTION, SOLUTION INTRAVENOUS at 17:19

## 2020-01-25 RX ADMIN — MIDAZOLAM 6 MG/HR: 5 INJECTION INTRAMUSCULAR; INTRAVENOUS at 19:17

## 2020-01-25 ASSESSMENT — PULMONARY FUNCTION TESTS
PIF_VALUE: 31
PIF_VALUE: 23
PIF_VALUE: 31
PIF_VALUE: 30
PIF_VALUE: 31
PIF_VALUE: 31
PIF_VALUE: 24
PIF_VALUE: 28
PIF_VALUE: 31
PIF_VALUE: 28
PIF_VALUE: 31
PIF_VALUE: 29
PIF_VALUE: 23
PIF_VALUE: 24
PIF_VALUE: 28
PIF_VALUE: 30
PIF_VALUE: 28
PIF_VALUE: 28
PIF_VALUE: 31
PIF_VALUE: 29
PIF_VALUE: 31
PIF_VALUE: 30
PIF_VALUE: 31
PIF_VALUE: 28
PIF_VALUE: 23
PIF_VALUE: 23
PIF_VALUE: 28
PIF_VALUE: 29
PIF_VALUE: 28
PIF_VALUE: 29
PIF_VALUE: 31
PIF_VALUE: 23

## 2020-01-25 NOTE — PROGRESS NOTES
Spoke with Dr. Boogie Martinez via phone. MD order to leave PEEP at 10 - do  Not titrate. Titrate Fio2 only.  Leave Nimbex on tonight and wean so that it is turned off in am. Kelly Ruiz

## 2020-01-25 NOTE — PROGRESS NOTES
01/25/20 0405   Vent Information   Vent Type 840   Vent Mode AC/PC   Rate Set 15 bmp   Pressure Support 0 cmH20   FiO2  80 %   Sensitivity 3   PEEP/CPAP 8   I Time/ I Time % 2 s   Cuff Pressure (cm H2O) 28 cm H2O   Humidification Source HME  (changed)   Vent Patient Data   High Peep/I Pressure 20   Peak Inspiratory Pressure 28 cmH2O   Mean Airway Pressure 18 cmH20   Rate Measured 15 br/min   Vt Exhaled 501 mL   Minute Volume 8.42 Liters   I:E Ratio 1.00:1   Cough/Sputum   Sputum How Obtained Endotracheal   Cough Productive   Sputum Amount Small   Sputum Color Creamy   Tenacity Thick   Spontaneous Breathing Trial (SBT) RT Doc   Pulse 103   SpO2 93 %   Breath Sounds   Right Upper Lobe Clear   Right Middle Lobe Clear   Right Lower Lobe Crackles   Left Upper Lobe Clear   Left Lower Lobe Crackles   Additional Respiratory  Assessments   Resp 15   End Tidal CO2 36 (%)   Alarm Settings   High Pressure Alarm 45 cmH2O   Low Minute Volume Alarm 2 L/min   High Respiratory Rate 40 br/min   Low Exhaled Vt  200 mL   ETT (adult)   Placement Date/Time: 01/23/20 1645   Timeout: Patient;Site/Side;Appropriate Equipment;Correct Position  Preoxygenation: Yes  Mask Ventilation: Ventilated by mask (1)  Technique: Video laryngoscopy  Type: Cuffed  Tube Size: 7.5 mm  Laryngoscope: GlideS. ..    Secured at 23 cm   Measured From Lips   ET Placement Left   Secured By Commercial tube romero   Site Condition Dry   Cuff Pressure 28 cm H2O

## 2020-01-25 NOTE — PROGRESS NOTES
01/25/20 1204   Vent Information   Rate Set 15 bmp   Pressure Support 0 cmH20   FiO2  100 %   Sensitivity 3   PEEP/CPAP 8   I Time/ I Time % 0 s   Vent Patient Data   High Peep/I Pressure 15   Peak Inspiratory Pressure 23 cmH2O   Mean Airway Pressure 13 cmH20   Rate Measured 23 br/min   Vt Exhaled 462 mL   Minute Volume 9.17 Liters   I:E Ratio 1:4.70   Cough/Sputum   Sputum How Obtained Endotracheal   Cough Non-productive;None   Sputum Amount None   Sputum Color None   Tenacity None   Spontaneous Breathing Trial (SBT) RT Doc   Pulse 87   SpO2 93 %   Breath Sounds   Right Upper Lobe Diminished   Right Middle Lobe Diminished   Right Lower Lobe Diminished   Left Upper Lobe Diminished   Left Lower Lobe Diminished   Additional Respiratory  Assessments   Resp 20   End Tidal CO2 36 (%)   Alarm Settings   High Pressure Alarm 45 cmH2O   Low Minute Volume Alarm 2 L/min   High Respiratory Rate 40 br/min   Patient Observation   Observations   (ambu @ bedside)   ETT (adult)   Placement Date/Time: 01/23/20 1645   Timeout: Patient;Site/Side;Appropriate Equipment;Correct Position  Preoxygenation: Yes  Mask Ventilation: Ventilated by mask (1)  Technique: Video laryngoscopy  Type: Cuffed  Tube Size: 7.5 mm  Laryngoscope: GlideS. ..    Secured at 23 cm   Measured From Lips   ET Placement Left   Secured By Commercial tube romero   Site Condition Dry   Cuff Pressure 30 cm H2O

## 2020-01-25 NOTE — PLAN OF CARE
[address] REHAB PHASE I  CABG x3    Tia Cintron   1950 01/25/20    Previous cardiac rehab notes: none previously documented      Pt is currently on vent at this time. Will follow up with activity and/or ambulation when appropriate. Will continue to follow up during the duration of the CR order.      ELI Crooks 01/25/20 7:00 AM    Exercise Physiologist    Phone: [phone]

## 2020-01-25 NOTE — PROGRESS NOTES
01/25/20 0011   Vent Information   Vent Type 840   Vent Mode AC/PC   Rate Set 15 bmp   Pressure Support 0 cmH20   FiO2  80 %   Sensitivity 3   PEEP/CPAP 8   I Time/ I Time % 2 s   Humidification Source HME   Vent Patient Data   High Peep/I Pressure 20   Peak Inspiratory Pressure 28 cmH2O   Mean Airway Pressure 18 cmH20   Rate Measured 15 br/min   Vt Exhaled 542 mL   Minute Volume 8.44 Liters   I:E Ratio 1.00:1   Cough/Sputum   Sputum How Obtained Endotracheal   Cough Non-productive   Spontaneous Breathing Trial (SBT) RT Doc   Pulse 104   SpO2 93 %   Breath Sounds   Right Upper Lobe Clear   Right Middle Lobe Clear   Right Lower Lobe Diminished   Left Upper Lobe Clear   Left Lower Lobe Diminished   Additional Respiratory  Assessments   Resp 16   End Tidal CO2 36 (%)   Alarm Settings   High Pressure Alarm 45 cmH2O   Low Minute Volume Alarm 2 L/min   High Respiratory Rate 40 br/min   Low Exhaled Vt  200 mL   ETT (adult)   Placement Date/Time: 01/23/20 9595   Timeout: Patient;Site/Side;Appropriate Equipment;Correct Position  Preoxygenation: Yes  Mask Ventilation: Ventilated by mask (1)  Technique: Video laryngoscopy  Type: Cuffed  Tube Size: 7.5 mm  Laryngoscope: GlideS. ..    Secured at 23 cm   Measured From [address] [place] [address],[address] By Commercial tube romero   Site Condition Dry

## 2020-01-25 NOTE — FLOWSHEET NOTE
Monitor On Yes   Telemetry Audible Yes   Telemetry Alarms Set Yes   Telemetry Box Number [id number]   Pacemaker   Pacemaker Yes   Pacemaker Type Transthoracic   Pulse Generated No   Temporary Wires Epicardial   Atrial Wire Status Connected;Bipolar wire(s)   Ventricular Wire Status Bipolar wire(s); Connected   Pacing Wires   Placement Date: 01/21/20   Inserted by: Palak Valles MD  Type of Pacing Wires: Atrial and Ventricular  Interventions: Pacing wires isolated and secured   Pacing Wire Status Atrial and ventricular wires connected to pacer   Site Assessment Other (Comment)  (AUSTIN dressing in place)   Dressing Status Clean;Dry; Intact   Dressing Change Due 01/25/20   Gastrointestinal   Abdominal (WDL) X   RUQ Bowel Sounds Hypoactive   LUQ Bowel Sounds Hypoactive   RLQ Bowel Sounds Hypoactive   LLQ Bowel Sounds Hypoactive   Abdomen Inspection Soft;Rotund;Rounded   Tenderness Soft   Peripheral Vascular   Peripheral Vascular (WDL) X   Edema Generalized   Edema Generalized +1   Sensation RUE [place]USTIN   Sensation LUE AUSTIN   Sensation RLE AUSTIN   Sensation LLE [place]USTIN   RUE Neurovascular Assessment   Capillary Refill Less than/equal to 3 seconds   Color Appropriate for ethnicity   Temperature Warm   R Radial Pulse Other (Comment)  (sarah in place)   LUE Neurovascular Assessment   Capillary Refill Less than/equal to 3 seconds   Color Appropriate for ethnicity   Temperature Warm   L Radial Pulse +2   RLE Neurovascular Assessment   Capillary Refill Less than/equal to 3 seconds   Color Appropriate for ethnicity   Temperature Warm   LLE Neurovascular Assessment   Capillary Refill Less than/equal to 3 seconds   Color Appropriate for ethnicity   Temperature Warm   Skin Color/Condition   Skin Color/Condition (WDL) X   Skin Color Appropriate for ethnicity   Skin Condition/Temp Dry;Swollen; Warm   Skin Integrity   Skin Integrity (WDL) X   Skin Integrity Other (Comment)  (surgical incisions)   Location sternum   Preventative Dressing Yes   Dressing Site Sacrum   Musculoskeletal   Musculoskeletal (WDL) X  (intubated and sedated)   RUE [place]USTIN   LUE [place]USTIN   RL Extremity AUSTIN   LL Extremity AUSTIN   Genitourinary   Genitourinary (WDL) X  ([place])   Flank Tenderness AUSTIN   Suprapubic Tenderness AUSTIN   Dysuria AUSTIN   Urine Assessment   Urine Color Yellow/straw   Urine Appearance Clear   Urine Odor No odor   Anus/Rectum   Anus/Rectum (WDL) WDL   Urethral Catheter Straight-tip; Temperature probe; Latex 16 fr   Placement Date: 01/21/20   Inserted by: Makenna Avery RN  Catheter Type: Straight-tip; Temperature probe; Latex  Tube Size (fr): 16 fr  Catheter Balloon Size: 10 mL  Collection Container: Standard  Urine Returned: Yes   Catheter Indications Need for fluid management in critically ill patients in a critical care setting not able to be managed by other means such as BSC with hat, bedpan, urinal, condom catheter, or short term intermittent urethral catherization   Site Assessment Pink; No urethral drainage   Urine Color Yellow   Urine Appearance Clear   Incision 01/08/20 Back Lateral;Left   Date First Assessed/Time First Assessed: 01/08/20 2000   Present on Hospital Admission: Yes  Primary Wound Type: Incision  Location: Back  Wound Location Orientation: Lateral;Left   Wound Assessment Dry; Intact;Edges well approximated   Luz-wound Assessment Clean;Dry; Intact   Closure Approximated;Open to air   Drainage Amount None   Odor None   Incision 01/02/20 Chest Lateral;Left;Lower   Date First Assessed: 01/02/20   Present on Hospital Admission: No  Primary Wound Type: Incision  Location: Chest  Wound Location Orientation: Lateral;Left;Lower   Wound Assessment Clean;Dry; Intact   Luz-wound Assessment Clean;Dry; Intact   Closure Surgical glue   Drainage Amount None   Odor None   Dressing/Treatment Open to air   Incision 01/21/20 Sternum   Date First Assessed: 01/21/20   Present on Hospital Admission: No  Primary Wound Type: Incision  Location: Sternum   Wound Assessment Clean; Intact;Dry Luz-wound Assessment Clean;Dry; Intact   Closure Surgical glue   Drainage Amount None   Odor None   Dressing/Treatment Surgical glue; Open to air   Incision 01/21/20 Groin Left   Date First Assessed: 01/21/20   Present on Hospital Admission: No  Primary Wound Type: Surgical Type  Location: Groin  Wound Location Orientation: Left  Wound Description (Comments): GROIN CANNULATION ACCESS X2   Wound Assessment Clean;Dry; Intact;Edges well approximated   Luz-wound Assessment Dry;Clean; Intact   Drainage Amount None   Odor None   Dressing/Treatment Open to air   Chest Tube 1 Mediastinal    Placement Date/Time: 01/21/20 1700   Inserted by: Marco Grant MD  Tube Number: 1  Chest Tube Location: Mediastinal  Size (fr): (c)   Chest Tube Drainage System: Suction   Chest Tube Airleak No   Drainage Description Serosanguinous   Dressing Status Clean;Dry; Intact   Dressing Type Dry dressing   Dressing Change Due 01/25/20   Site Assessment Not assessed   Surrounding Skin Unable to view   Chest Tube 2 Left Pleural 28 Croatian   Placement Date/Time: 01/21/20 1701   Inserted by: Marco Grant MD  Tube Number: 2  Chest Tube Orientation: Left  Chest Tube Location: Pleural  Size (fr): 28 Croatian  Chest Tube Drainage System: Suction   Chest Tube Airleak No   Drainage Description Serosanguinous   Dressing Status Clean;Dry; Intact   Dressing Type Dry dressing   Dressing Change Due 01/25/20   Site Assessment Not assessed   Surrounding Skin Unable to view   Psychosocial   Psychosocial (WDL) X  (intubated and sedated)

## 2020-01-25 NOTE — PROGRESS NOTES
Pulmonary & Critical Care Inpatient Progress Note   Jordana Gale MD     REASON FOR TODAY'S VISIT:  Vent and respiratory management    SUBJECTIVE:   Remains on high PEEP and FIO2 requirements, Reduced vent settings were tried this morning and failed, resulting in desaturation and need for 100% FIO2.  Returned to prior PC vent support with inverse ratio and improved   Responding well still to lasix gtt  Assynchronous with the vent on sedation    Scheduled Meds:   metoprolol tartrate  25 mg Oral BID    [START ON 1/26/2020] insulin glargine  12 Units Subcutaneous Daily    insulin lispro  0-18 Units Subcutaneous TID WC    furosemide  15 mg Intravenous Once    insulin lispro  0-12 Units Subcutaneous Q4H    meropenem  1 g Intravenous Q12H    famotidine (PEPCID) injection  20 mg Intravenous BID    sodium chloride flush  10 mL Intravenous 2 times per day    docusate sodium  100 mg Oral BID    chlorhexidine  15 mL Mouth/Throat BID    mupirocin   Nasal BID    [Held by provider] atorvastatin  20 mg Oral Nightly    [Held by provider] aspirin  81 mg Oral Daily    [Held by provider] clopidogrel  75 mg Oral Daily    albuterol  2.5 mg Nebulization Q4H WA    sodium chloride  20 mL Intravenous Once       Continuous Infusions:   cisatracurium (NIMBEX) infusion      amiodarone 450mg/250ml D5W infusion 1 mg/min (01/25/20 0924)    propofol 10 mcg/kg/min (01/25/20 0219)    argatroban infusion 0.5 mcg/kg/min (01/25/20 1145)    furosemide (LASIX) 1mg/ml infusion 10 mg/hr (01/25/20 0921)    norepinephrine 2 mcg/min (01/23/20 0858)    DOBUTamine 4 mcg/kg/min (01/25/20 0850)    niCARdipene (CARDENE) 50 mg in 250 mL 0.9 % sodium chloride infusion 10 mg/hr (01/25/20 0921)    fentaNYL (SUBLIMAZE) infusion 50 mcg/hr (01/25/20 0748)    dextrose         PRN Meds:  fentanNYL, sodium chloride flush, potassium chloride, magnesium sulfate, calcium chloride IVPB, calcium chloride IVPB, acetaminophen, acetaminophen, ondansetron, albumin human, glucose, dextrose, glucagon (rDNA), dextrose    ALLERGIES:  Patient is allergic to penicillins. Objective:   PHYSICAL EXAM:  /60   Pulse 97   Temp 99.9 °F (37.7 °C) (Core)   Resp 15   Ht 5' 9\" (1.753 m)   Wt 207 lb 0.2 oz (93.9 kg)   SpO2 90%   BMI 30.57 kg/m²    Physical Exam  Constitutional:       General: He is not in acute distress. Appearance: He is well-developed. He is not diaphoretic. HENT:      Head: Normocephalic and atraumatic. Mouth/Throat:      Pharynx: No oropharyngeal exudate. Eyes:      Pupils: Pupils are equal, round, and reactive to light. Neck:      Musculoskeletal: Neck supple. Vascular: No JVD. Cardiovascular:      Heart sounds: Normal heart sounds. No murmur. No friction rub. No gallop. Pulmonary:      Effort: Respiratory distress present. Breath sounds: No wheezing or rales. Abdominal:      General: Bowel sounds are normal. There is no distension. Palpations: Abdomen is soft. Tenderness: There is no abdominal tenderness. Musculoskeletal: Normal range of motion. Lymphadenopathy:      Cervical: No cervical adenopathy. Skin:     General: Skin is warm and dry. Findings: No rash. Neurological:      Mental Status: He is alert. Cranial Nerves: Cranial nerve deficit present. Comments: Intubated, sedated            Data Reviewed:   LABS:  CBC:  Recent Labs     01/24/20 1820 01/24/20  2318 01/25/20  0449   WBC 12.0* 12.7* 13.4*   HGB 8.7* 8.6* 8.9*   HCT 25.7* 25.8* 26.3*   MCV 90.7 90.7 90.3   PLT 40* 42* 45*     BMP:  Recent Labs     01/24/20  1820 01/24/20  2318 01/25/20  0449    136 139   K 4.3 4.2 4.2    98* 100   CO2 29 29 29   BUN 37* 32* 34*   CREATININE 1.1 1.0 1.0     LIVER PROFILE:   Recent Labs     01/23/20  1410   *   *   BILIDIR 0.6*   BILITOT 1.2*   ALKPHOS 81     PT/INR:  No results for input(s): PROTIME, INR in the last 72 hours.   APTT:   Recent Labs 01/24/20 2038 01/24/20 2318 01/25/20 0449   APTT 73.5* 66.1* 52.9*     UA:No results for input(s): NITRITE, COLORU, PHUR, LABCAST, WBCUA, RBCUA, MUCUS, TRICHOMONAS, YEAST, BACTERIA, CLARITYU, SPECGRAV, LEUKOCYTESUR, UROBILINOGEN, BILIRUBINUR, BLOODU, GLUCOSEU, AMORPHOUS in the last 72 hours. Invalid input(s): [address]     01/25/20 0449 01/25/20  1126   PHART 7.409 7.426   IPD7XPI 49.7* 50.4*   PO2ART 96.7 77.9       Vent Information  $Ventilation: $Subsequent Day  Ventilator Started: Yes  Skin Assessment: Clean, dry, & intact  Vent Type: 840  Vent Mode: AC/PC  Vt Ordered: 400 mL  Pressure Ordered: 20  Rate Set: 15 bmp  Peak Flow: 50 L/min  Pressure Support: 0 cmH20  FiO2 : 80 %  Sensitivity: 3  PEEP/CPAP: 8  I Time/ I Time %: 2 s  Cuff Pressure (cm H2O): 30 cm H2O  Humidification Source: HME    CXR personally reviewed, bilateral airspace disease vs edema          Assessment:     1. Acute resp failure, mixed              -acute pulm edema              -ards  2. Cardiogenic shock  3. PAL  4. Acute anemia    Plan:      -Continue vent support, seems to favor long inspiratory time for recruitment. If this does not work will change to bilevel vent mode next. -Titrate sedation as needed, add paralytic for 24 hours   -Diuresis per nephro, this will help with respiratory status. Please note that despite 100cc/hr of urine output he is still net positive due to his obligate intake.  Recommend changing titration parameters of lasix gtt to account for this  -Follow up on bronch cultures, on empiric atb   -Serial ABGs and CXRs  -Will follow    Due to life threatening resp failure this patient is critically ill, total critical care time involved in his care was 90 mins    Christ Kaba MD

## 2020-01-25 NOTE — ONCOLOGY
98* 100   CO2 29 29 29   BUN 37* 32* 34*   CREATININE 1.1 1.0 1.0     Recent Labs     01/23/20  1410   *   *   BILIDIR 0.6*   BILITOT 1.2*   ALKPHOS 81       Magnesium:    Lab Results   Component Value Date    MG 2.10 01/25/2020    MG 2.20 01/24/2020    MG 2.50 01/23/2020         Problem List  Patient Active Problem List   Diagnosis    Hemothorax    Acute respiratory failure with hypoxemia (HCC)    Tobacco abuse    AS (aortic stenosis)    CAD (coronary artery disease) s/p stent in 2010    HTN (hypertension)    Hyperlipidemia    S/P AVR (aortic valve replacement)    Hypotension    Pulmonary infiltrate    Pulmonary venous congestion    Atelectasis    Leukocytosis    Hyperglycemia    Acute bronchospasm    Atrial fibrillation with RVR (HCC)    Cardiogenic shock (HCC)    Acute on chronic diastolic congestive heart failure (HCC)    Myocardial infarction (HCC)    Obesity, Class I, BMI 30.0-34.9 (see actual BMI)    S/P thoracotomy    Status post partial removal of lung    Hemothorax, left       ASSESSMENT AND PLAN    HIT +, 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 If ARTEMIO positive, will need at least 3 months of Coumadin. Will plan to bridge to Coumadin once plt are near normal and at least over 100k.      TCP  - could be multifactorial given severe illness   - Treat for HIT and await ARTEMIO   - Would not transfuse plt unless less than 10 and signs of bleeding   - Differential includes shock liver and early sequestration of plt to the spleen and DIC secondary to PNA. INR 1.57 on 1-21 (prior to argatroban). Argatroban is known to increase the INR.   - D-dimer will not have much significance in a postop patient.   Fibrinogen is normal.  Doubt DIC.  - Check plt antibody - pending.     Anemia  - blood loss secondary to surgical losses, will follow      PNA- Merrem      AFIBB - Amiodarone drip             Aortic repair, AV valve redo per CT surgery team ONCOLOGIC DISPOSITION:  TBD    Josy Perdomo MD  Please contact through [address]

## 2020-01-25 NOTE — PROGRESS NOTES
[facility]    [facility]              [phone]                       Plan :     Cr stable  BP went high and now on nicardipine drip  Also on dobutamine drip  Has pulmonary edema- continue lasix, lung still white    Urine output is good    Hypoxemia- not entirely from pulmonary edema        Assessment :     Acute Kidney Injury  Creatinine peaked to 1.6- but getting better now  Likely due to cardiac surgery, hypotension, Cardiorenal syndrome  Getting inotropes-will hopefully help blood pressure and also kidney function  Agree with Lasix drip      hyperkalemia  Got better  Needed lokelma  Now better      Acidosis  Lactic acidosis likely due to hypotension poor tissue perfusion  Improving    Hypotension- now Hypertension  BP: (120-137)/(50-70) Pulse:  []   Low-dose of Levophed-being switched to dobutamine  Now needing nicardipine drip         [facility] would like to thank Wiliam Haddad MD   for opportunity to serve this patient      Please call with questions at-   24 Hrs Answering service [phone] or  7 am- 5 pm via Perfect serve or cell phone  Dr.Sudhir Rossi Davidson          CC/reason for consult :   Acute kidney injury   HPI :     From consult note-   Renan Harding is a 77 y.o. male presented to   the hospital on 1/8/2020 with shortness of breath. Not able to give history because of being on BiPAP. He is known to have severe aortic stenosis and he had redo sternotomy and replacement of ascending aorta done, and also replacement with Carrasquillo Intuity valve done. Following that he is in ICU and is requiring BiPAP and has a poor urine output and he is on a low-dose of vasopressor. He was here earlier this month with shortness of breath pneumonia and large left hemothorax and pneumothorax. Also had PAL and hyperkalemia.   Did not require dialysis during that hospitalization and his kidney function improved to normal.    We are consulted for PAL and related issues Interval History:       Made 3.5 L urine  Intubated  On vent      ROS:     Seen with- RN, I     Unable to obtain ROS due to intubatation          Medication:     Current Facility-Administered Medications: metoprolol tartrate (LOPRESSOR) tablet 25 mg, 25 mg, Oral, BID  [START ON 1/26/2020] insulin glargine (LANTUS) injection vial 12 Units, 12 Units, Subcutaneous, Daily  insulin lispro (HUMALOG) injection vial 0-18 Units, 0-18 Units, Subcutaneous, TID WC  amiodarone (CORDARONE) 450 mg in dextrose 5 % 250 mL infusion, 1 mg/min, Intravenous, Continuous  insulin lispro (HUMALOG) injection vial 0-12 Units, 0-12 Units, Subcutaneous, Q4H  propofol injection, 10 mcg/kg/min, Intravenous, Titrated  argatroban 250 mg in dextrose 5 % 250 mL infusion, 0.5 mcg/kg/min (Order-Specific), Intravenous, Continuous  meropenem (MERREM) 1 g in sodium chloride 0.9 % 100 mL IVPB (mini-bag), 1 g, Intravenous, Q12H  furosemide (LASIX) 100 mg in dextrose 5 % 100 mL infusion, 10 mg/hr, Intravenous, Continuous  norepinephrine (LEVOPHED) 16 mg in dextrose 5 % 250 mL infusion, 2 mcg/min, Intravenous, Titrated  DOBUTamine (DOBUTREX) 500 mg in dextrose 5 % 250 mL infusion, 4 mcg/kg/min, Intravenous, Continuous  niCARdipine (CARDENE) 50 mg in sodium chloride 0.9 % 250 mL infusion, 5 mg/hr, Intravenous, Titrated  fentaNYL (SUBLIMAZE) 1,000 mcg in sodium chloride 0.9 % 100 mL infusion, 25 mcg/hr, Intravenous, Continuous  fentaNYL (SUBLIMAZE) injection 25 mcg, 25 mcg, Intravenous, Q1H PRN  famotidine (PEPCID) injection 20 mg, 20 mg, Intravenous, BID  sodium chloride flush 0.9 % injection 10 mL, 10 mL, Intravenous, 2 times per day  sodium chloride flush 0.9 % injection 10 mL, 10 mL, Intravenous, PRN  potassium chloride 20 mEq/50 mL IVPB (Central Line), 20 mEq, Intravenous, PRN  magnesium sulfate 2 g in 50 mL IVPB premix, 2 g, Intravenous, PRN  calcium chloride 1 g in sodium chloride 0.9 % 100 mL IVPB, 1 g, Intravenous, PRN  calcium chloride 2 g in sodium chloride 0.9 % 100 mL IVPB, 2 g, Intravenous, PRN  acetaminophen (TYLENOL) tablet 650 mg, 650 mg, Oral, Q4H PRN  acetaminophen (TYLENOL) suppository 650 mg, 650 mg, Rectal, Q4H PRN  docusate sodium (COLACE) capsule 100 mg, 100 mg, Oral, BID  ondansetron (ZOFRAN) injection 4 mg, 4 mg, Intravenous, Q8H PRN  chlorhexidine (PERIDEX) 0.12 % solution 15 mL, 15 mL, Mouth/Throat, BID  mupirocin (BACTROBAN) 2 % ointment, , Nasal, BID  [Held by provider] atorvastatin (LIPITOR) tablet 20 mg, 20 mg, Oral, Nightly  [Held by provider] aspirin EC tablet 81 mg, 81 mg, Oral, Daily  [Held by provider] clopidogrel (PLAVIX) tablet 75 mg, 75 mg, Oral, Daily  albuterol (PROVENTIL) nebulizer solution 2.5 mg, 2.5 mg, Nebulization, Q4H WA  albumin human 5 % IV solution 25 g, 25 g, Intravenous, PRN  glucose (GLUTOSE) 40 % oral gel 15 g, 15 g, Oral, PRN  dextrose 50 % IV solution, 12.5 g, Intravenous, PRN  glucagon (rDNA) injection 1 mg, 1 mg, Intramuscular, PRN  dextrose 5 % solution, 100 mL/hr, Intravenous, PRN  0.9 % sodium chloride bolus, 20 mL, Intravenous, Once       Vitals :     Vitals:    01/25/20 0911   BP: (!) 123/50   Pulse: 104   Resp:    Temp:    SpO2:        I & O :       Intake/Output Summary (Last 24 hours) at 1/25/2020 1117  Last data filed at 1/25/2020 7722  Gross per 24 hour   Intake 4183.08 ml   Output 2220 ml   Net 1963.08 ml        Physical Examination :     General appearance: unresponsive, intubated   HEENT: Lips- normal, teeth- ok , oral mucosa- moist  Neck : Mass- no, appears symmetrical, JVD- not raised  Respiratory: Respiratory effort-  On ventilation- FiO2- 80%  wheeze- no, crackles - no  Cardiovascular:  Ausculation- No M/R/G, Edema none  Abdomen: visible mass- no, distention- no, scar- no, tenderness- unable to assess                           hepatosplenomegaly-  No--  Musculoskeletal:  clubbing no,cyanosis- no , digital ischemia- no                           muscle strength- patient unable to co-operate     ,

## 2020-01-25 NOTE — PROGRESS NOTES
Progress Note    S/P   RE-DO STERNOTOMY X3, REPLACEMENT OF ASCENDING AORTA, RE-DO AORTIC VALVE REPLACEMENT WITH 19MM BLOCK INTUITY VALVE, CLOSURE OF OUTFLOW TRACK ATRIAL FISTULA, HYPOTHERMIC ARREST,  WITH TRANSESOPHAGEAL ECHOCARDIOGRAM, CIRCULATORY ARREST, 5 LEVEL BILATERAL INTERCOSTAL NERVE BLOCK,         Vital Signs:                                                 BP (!) 123/50   Pulse 104   Temp 99.2 °F (37.3 °C) (Core)   Resp 19   Ht 5' 9\" (1.753 m)   Wt 207 lb 0.2 oz (93.9 kg)   SpO2 90%   BMI 30.57 kg/m²  O2 Flow Rate (L/min): 2 L/min     CVP (Mean): 24 mmHg  PAP: 45/22  PAP (Mean): 31 mmHg  SVR (Using ABP Mean): 762.79 dyne*sec/cm5  CCI: 2.1 L/min  Admission Weight: 205 lb 12.8 oz (93.4 kg)      Vent Settings:  Vent Information  $Ventilation: $Subsequent Day  Ventilator Started: Yes  Skin Assessment: Clean, dry, & intact  Vent Type: 840  Vent Mode: AC/PC  Vt Ordered: 400 mL  Pressure Ordered: 20  Rate Set: 15 bmp  Peak Flow: 50 L/min  Pressure Support: 0 cmH20  FiO2 : 80 %  Sensitivity: 3  PEEP/CPAP: 8  I Time/ I Time %: 0 s  Cuff Pressure (cm H2O): 30 cm H2O  Humidification Source: [facility]     Drips:    cardene  amio  Lasix  Dobutamine  argatraban    I/O:      Intake/Output Summary (Last 24 hours) at 1/25/2020 1039  Last data filed at 1/25/2020 0748  Gross per 24 hour   Intake 4183.08 ml   Output 2355 ml   Net 1828.08 ml     Chest Tube:     O/E  GEN:   HEENT:  CV: reg, wound c/d/i  Pulm: decreased  Abd: soft, nt, nd, no peritoneal signs  Ext: warm, edema, wound c/d/i    Data Review:  CBC:   Recent Labs     01/24/20  1820 01/24/20  2318 01/25/20  0449   WBC 12.0* 12.7* 13.4*   HGB 8.7* 8.6* 8.9*   HCT 25.7* 25.8* 26.3*   MCV 90.7 90.7 90.3   PLT 40* 42* 45*     BMP:   Recent Labs     01/23/20  0345  01/24/20  0435  01/24/20  1820 01/24/20  2318 01/25/20  0449   *   < > 138   < > 139 136 139   K 5.9*   < > 4.9   < > 4.3 4.2 4.2   CL 97*   < > 99   < > 100 98* 100   CO2 18*   < > 25   < > 29 29 29   BUN 29*   < > 40*   < > 37* 32* 34*   CREATININE 1.6*   < > 1.3   < > 1.1 1.0 1.0   CALCIUM 8.7   < > 8.1*   < > 8.0* 8.0* 8.0*   MG 2.50*  --  2.20  --   --   --  2.10    < > = values in this interval not displayed. Cardiac Enzymes: No results for input(s): CKTOTAL, CKMB, CKMBINDEX, TROPONINI in the last 72 hours. PT/INR: No results for input(s): PROTIME, INR in the last 72 hours.   APTT:   Recent Labs     01/24/20 2038 01/24/20 2318 01/25/20  0449   APTT 73.5* 66.1* 52.9*       Assessment/Plan:  Critical, intubated  Sedated +/- paralysis  Optimizing vent mx, keep SpO2 > 92%; pulm on board  HIT positive / argatroban / heme-onc on board  Tight BP control with cardene gtt; continue Amio gtt   D/c mediastinal CTs, keep pleural CTs  Titrate lasix gtt to keep urine output 100-150cc/hr  Nephrology on board; monitor I/Os   SCDs  Mx d/w Dr. Ralph Tobin MD  1/25/2020  10:39 AM

## 2020-01-25 NOTE — PROGRESS NOTES
Called and spoke with Dr. Boogie Martinez and informed that since the vent changes, Dobutamine change that pt has started desaturating and consistently hanging at 87-91% on 80% fio2 P+ 8. MD came to bedside and order to return Dobutamine to 5mcg/kg/min place fio2 to 100% and draw an ABG and VBG in 1/2 hour. Continue to monitor.  Kelly Ruiz

## 2020-01-25 NOTE — PROGRESS NOTES
01/25/20 0858   Vent Information   Vent Type 840   Vent Mode AC/PC   Rate Set 15 bmp   Pressure Support 0 cmH20   FiO2  70 %   Sensitivity 3   PEEP/CPAP 8   I Time/ I Time % 0 s   Cuff Pressure (cm H2O) 30 cm H2O   Humidification Source HME   Vent Patient Data   High Peep/I Pressure 15   Peak Inspiratory Pressure 23 cmH2O   Mean Airway Pressure 11 cmH20   Rate Measured 16 br/min   Vt Exhaled 290 mL   Minute Volume 8.01 Liters   I:E Ratio 1:5.50   Plateau Pressure 20 [id number]   Cough/Sputum   Sputum How Obtained Endotracheal   Cough Non-productive;None   Sputum Amount None   Sputum Color None   Tenacity None   Spontaneous Breathing Trial (SBT) RT Doc   Pulse 103   SpO2 92 %   Breath Sounds   Right Upper Lobe Diminished   Right Middle Lobe Diminished   Right Lower Lobe Diminished   Left Upper Lobe Diminished   Left Lower Lobe Diminished   Additional Respiratory  Assessments   Resp 17   End Tidal CO2 40 (%)   Alarm Settings   High Pressure Alarm 45 cmH2O   Low Minute Volume Alarm 2 L/min   High Respiratory Rate 40 br/min   Patient Observation   Observations   (ambu @ bedside)   ETT (adult)   Placement Date/Time: 01/23/20 1645   Timeout: Patient;Site/Side;Appropriate Equipment;Correct Position  Preoxygenation: Yes  Mask Ventilation: Ventilated by mask (1)  Technique: Video laryngoscopy  Type: Cuffed  Tube Size: 7.5 mm  Laryngoscope: GlideS. ..    Secured at 23 cm   Measured From Lips   Secured By Commercial tube romero   Site Condition Dry   Cuff Pressure 30 cm H2O

## 2020-01-25 NOTE — PROGRESS NOTES
Spoke with Dr. Lety Fontenot via phone and plan to wean Nimbex by 0.5mcg/kg/min until turned off. Also wean propofol so that in am Ketamine and Versed gtts can be started.  Susie Echeverria

## 2020-01-26 LAB
ALBUMIN SERPL-MCNC: 3.3 G/DL (ref 3.4–5)
ALP BLD-CCNC: 86 U/L (ref 40–129)
ALT SERPL-CCNC: 142 U/L (ref 10–40)
ANION GAP SERPL CALCULATED.3IONS-SCNC: 12 MMOL/L (ref 3–16)
APTT: 47.5 SEC (ref 24.2–36.2)
APTT: 48.4 SEC (ref 24.2–36.2)
APTT: 49.6 SEC (ref 24.2–36.2)
APTT: 56.5 SEC (ref 24.2–36.2)
APTT: 57.6 SEC (ref 24.2–36.2)
AST SERPL-CCNC: 115 U/L (ref 15–37)
BASE EXCESS ARTERIAL: 13 (ref -3–3)
BASE EXCESS ARTERIAL: 14 (ref -3–3)
BASE EXCESS ARTERIAL: 14.4 MMOL/L (ref -3–3)
BASE EXCESS ARTERIAL: 15 (ref -3–3)
BASE EXCESS ARTERIAL: 16 (ref -3–3)
BILIRUB SERPL-MCNC: 1 MG/DL (ref 0–1)
BILIRUBIN DIRECT: 0.5 MG/DL (ref 0–0.3)
BILIRUBIN, INDIRECT: 0.5 MG/DL (ref 0–1)
BUN BLDV-MCNC: 30 MG/DL (ref 7–20)
CALCIUM SERPL-MCNC: 8 MG/DL (ref 8.3–10.6)
CARBOXYHEMOGLOBIN ARTERIAL: 0.3 % (ref 0–1.5)
CHLORIDE BLD-SCNC: 99 MMOL/L (ref 99–110)
CO2: 32 MMOL/L (ref 21–32)
CREAT SERPL-MCNC: 0.7 MG/DL (ref 0.8–1.3)
CULTURE, RESPIRATORY: NORMAL
EKG ATRIAL RATE: 111 BPM
EKG DIAGNOSIS: NORMAL
EKG P AXIS: 25 DEGREES
EKG P-R INTERVAL: 246 MS
EKG Q-T INTERVAL: 414 MS
EKG QRS DURATION: 142 MS
EKG QTC CALCULATION (BAZETT): 563 MS
EKG R AXIS: 48 DEGREES
EKG T AXIS: -81 DEGREES
EKG VENTRICULAR RATE: 111 BPM
GFR AFRICAN AMERICAN: >60
GFR NON-AFRICAN AMERICAN: >60
GLUCOSE BLD-MCNC: 221 MG/DL (ref 70–99)
GLUCOSE BLD-MCNC: 228 MG/DL (ref 70–99)
GLUCOSE BLD-MCNC: 238 MG/DL (ref 70–99)
GLUCOSE BLD-MCNC: 239 MG/DL (ref 70–99)
GLUCOSE BLD-MCNC: 239 MG/DL (ref 70–99)
GLUCOSE BLD-MCNC: 241 MG/DL (ref 70–99)
GLUCOSE BLD-MCNC: 248 MG/DL (ref 70–99)
GRAM STAIN RESULT: NORMAL
HCO3 ARTERIAL: 35.7 MMOL/L (ref 21–29)
HCO3 ARTERIAL: 36.4 MMOL/L (ref 21–29)
HCO3 ARTERIAL: 36.4 MMOL/L (ref 21–29)
HCO3 ARTERIAL: 36.6 MMOL/L (ref 21–29)
HCO3 ARTERIAL: 37.4 MMOL/L (ref 21–29)
HCO3 ARTERIAL: 39 MMOL/L (ref 21–29)
HCO3 ARTERIAL: 39 MMOL/L (ref 21–29)
HCT VFR BLD CALC: 27.5 % (ref 40.5–52.5)
HEMOGLOBIN, ART, EXTENDED: 9.8 G/DL (ref 13.5–17.5)
HEMOGLOBIN: 9.4 G/DL (ref 13.5–17.5)
INR BLD: 1.66 (ref 0.86–1.14)
MAGNESIUM: 1.8 MG/DL (ref 1.8–2.4)
MCH RBC QN AUTO: 31.3 PG (ref 26–34)
MCHC RBC AUTO-ENTMCNC: 34.2 G/DL (ref 31–36)
MCV RBC AUTO: 91.6 FL (ref 80–100)
METHEMOGLOBIN ARTERIAL: 0.5 %
O2 CONTENT ARTERIAL: 14 ML/DL
O2 SAT, ARTERIAL: 100 % (ref 93–100)
O2 SAT, ARTERIAL: 100 % (ref 93–100)
O2 SAT, ARTERIAL: 94 % (ref 93–100)
O2 SAT, ARTERIAL: 96 % (ref 93–100)
O2 SAT, ARTERIAL: 98.3 %
O2 SAT, ARTERIAL: 99 % (ref 93–100)
O2 SAT, ARTERIAL: 99 % (ref 93–100)
O2 THERAPY: ABNORMAL
PCO2 ARTERIAL: 42.5 MM HG (ref 35–45)
PCO2 ARTERIAL: 42.6 MM HG (ref 35–45)
PCO2 ARTERIAL: 44.2 MM HG (ref 35–45)
PCO2 ARTERIAL: 47.1 MM HG (ref 35–45)
PCO2 ARTERIAL: 47.6 MM HG (ref 35–45)
PCO2 ARTERIAL: 49.5 MMHG (ref 35–45)
PCO2 ARTERIAL: 51.8 MM HG (ref 35–45)
PDW BLD-RTO: 16.3 % (ref 12.4–15.4)
PERFORMED ON: ABNORMAL
PH ARTERIAL: 7.46 (ref 7.35–7.45)
PH ARTERIAL: 7.49 (ref 7.35–7.45)
PH ARTERIAL: 7.51 (ref 7.35–7.45)
PH ARTERIAL: 7.51 (ref 7.35–7.45)
PH ARTERIAL: 7.53 (ref 7.35–7.45)
PH ARTERIAL: 7.54 (ref 7.35–7.45)
PH ARTERIAL: 7.55 (ref 7.35–7.45)
PLATELET # BLD: 54 K/UL (ref 135–450)
PLATELET ANTIBODY, IGG: NEGATIVE
PLATELET ANTIBODY, IGM: NEGATIVE
PMV BLD AUTO: 10 FL (ref 5–10.5)
PO2 ARTERIAL: 118.3 MM HG (ref 75–108)
PO2 ARTERIAL: 124.1 MM HG (ref 75–108)
PO2 ARTERIAL: 146.9 MMHG (ref 75–108)
PO2 ARTERIAL: 166.4 MM HG (ref 75–108)
PO2 ARTERIAL: 207.2 MM HG (ref 75–108)
PO2 ARTERIAL: 64.5 MM HG (ref 75–108)
PO2 ARTERIAL: 75.4 MM HG (ref 75–108)
POC FIO2: 30
POC FIO2: 30
POC FIO2: 65
POC POTASSIUM: 3.4 MMOL/L (ref 3.5–5.1)
POC POTASSIUM: 3.5 MMOL/L (ref 3.5–5.1)
POC POTASSIUM: 3.5 MMOL/L (ref 3.5–5.1)
POC SAMPLE TYPE: ABNORMAL
POTASSIUM SERPL-SCNC: 3.6 MMOL/L (ref 3.5–5.1)
PROTHROMBIN TIME: 19.4 SEC (ref 10–13.2)
RBC # BLD: 3 M/UL (ref 4.2–5.9)
SODIUM BLD-SCNC: 143 MMOL/L (ref 136–145)
TCO2 ARTERIAL: 37 MMOL/L
TCO2 ARTERIAL: 38 MMOL/L
TCO2 ARTERIAL: 39 MMOL/L
TCO2 ARTERIAL: 40.5 MMOL/L
TCO2 ARTERIAL: 41 MMOL/L
TOTAL PROTEIN: 5.6 G/DL (ref 6.4–8.2)
WBC # BLD: 9.6 K/UL (ref 4–11)

## 2020-01-26 PROCEDURE — 94770 HC ETCO2 MONITOR DAILY: CPT

## 2020-01-26 PROCEDURE — C9248 INJ, CLEVIDIPINE BUTYRATE: HCPCS | Performed by: THORACIC SURGERY (CARDIOTHORACIC VASCULAR SURGERY)

## 2020-01-26 PROCEDURE — 6360000002 HC RX W HCPCS: Performed by: THORACIC SURGERY (CARDIOTHORACIC VASCULAR SURGERY)

## 2020-01-26 PROCEDURE — 2140000000 HC CCU INTERMEDIATE R&B

## 2020-01-26 PROCEDURE — 80076 HEPATIC FUNCTION PANEL: CPT

## 2020-01-26 PROCEDURE — 99291 CRITICAL CARE FIRST HOUR: CPT | Performed by: INTERNAL MEDICINE

## 2020-01-26 PROCEDURE — 2500000003 HC RX 250 WO HCPCS: Performed by: INTERNAL MEDICINE

## 2020-01-26 PROCEDURE — 83735 ASSAY OF MAGNESIUM: CPT

## 2020-01-26 PROCEDURE — 2580000003 HC RX 258: Performed by: NURSE PRACTITIONER

## 2020-01-26 PROCEDURE — 93010 ELECTROCARDIOGRAM REPORT: CPT | Performed by: INTERNAL MEDICINE

## 2020-01-26 PROCEDURE — 6370000000 HC RX 637 (ALT 250 FOR IP): Performed by: NURSE PRACTITIONER

## 2020-01-26 PROCEDURE — 6360000002 HC RX W HCPCS: Performed by: INTERNAL MEDICINE

## 2020-01-26 PROCEDURE — 6360000002 HC RX W HCPCS: Performed by: NURSE PRACTITIONER

## 2020-01-26 PROCEDURE — 99024 POSTOP FOLLOW-UP VISIT: CPT | Performed by: THORACIC SURGERY (CARDIOTHORACIC VASCULAR SURGERY)

## 2020-01-26 PROCEDURE — 85027 COMPLETE CBC AUTOMATED: CPT

## 2020-01-26 PROCEDURE — 85730 THROMBOPLASTIN TIME PARTIAL: CPT

## 2020-01-26 PROCEDURE — 2580000003 HC RX 258: Performed by: INTERNAL MEDICINE

## 2020-01-26 PROCEDURE — 82803 BLOOD GASES ANY COMBINATION: CPT

## 2020-01-26 PROCEDURE — 80048 BASIC METABOLIC PNL TOTAL CA: CPT

## 2020-01-26 PROCEDURE — 94640 AIRWAY INHALATION TREATMENT: CPT

## 2020-01-26 PROCEDURE — 94761 N-INVAS EAR/PLS OXIMETRY MLT: CPT

## 2020-01-26 PROCEDURE — 84132 ASSAY OF SERUM POTASSIUM: CPT

## 2020-01-26 PROCEDURE — 82947 ASSAY GLUCOSE BLOOD QUANT: CPT

## 2020-01-26 PROCEDURE — 6370000000 HC RX 637 (ALT 250 FOR IP): Performed by: THORACIC SURGERY (CARDIOTHORACIC VASCULAR SURGERY)

## 2020-01-26 PROCEDURE — 93005 ELECTROCARDIOGRAM TRACING: CPT | Performed by: THORACIC SURGERY (CARDIOTHORACIC VASCULAR SURGERY)

## 2020-01-26 PROCEDURE — 2580000003 HC RX 258: Performed by: THORACIC SURGERY (CARDIOTHORACIC VASCULAR SURGERY)

## 2020-01-26 PROCEDURE — 85610 PROTHROMBIN TIME: CPT

## 2020-01-26 PROCEDURE — 2700000000 HC OXYGEN THERAPY PER DAY

## 2020-01-26 PROCEDURE — 94003 VENT MGMT INPAT SUBQ DAY: CPT

## 2020-01-26 PROCEDURE — 94750 HC PULMONARY COMPLIANCE STUDY: CPT

## 2020-01-26 RX ORDER — SODIUM CHLORIDE 9 MG/ML
INJECTION, SOLUTION INTRAVENOUS
Status: DISPENSED
Start: 2020-01-26 | End: 2020-01-27

## 2020-01-26 RX ADMIN — POTASSIUM CHLORIDE 20 MEQ: 29.8 INJECTION, SOLUTION INTRAVENOUS at 17:58

## 2020-01-26 RX ADMIN — POTASSIUM CHLORIDE 20 MEQ: 29.8 INJECTION, SOLUTION INTRAVENOUS at 08:40

## 2020-01-26 RX ADMIN — POTASSIUM CHLORIDE 20 MEQ: 29.8 INJECTION, SOLUTION INTRAVENOUS at 16:25

## 2020-01-26 RX ADMIN — AMIODARONE HYDROCHLORIDE 1 MG/MIN: 50 INJECTION, SOLUTION INTRAVENOUS at 18:03

## 2020-01-26 RX ADMIN — FAMOTIDINE 20 MG: 10 INJECTION, SOLUTION INTRAVENOUS at 11:06

## 2020-01-26 RX ADMIN — INSULIN LISPRO 6 UNITS: 100 INJECTION, SOLUTION INTRAVENOUS; SUBCUTANEOUS at 21:02

## 2020-01-26 RX ADMIN — ALBUTEROL SULFATE 2.5 MG: 2.5 SOLUTION RESPIRATORY (INHALATION) at 16:15

## 2020-01-26 RX ADMIN — CLEVIPIDINE 2 MG/HR: 0.5 EMULSION INTRAVENOUS at 00:03

## 2020-01-26 RX ADMIN — MEROPENEM 1 G: 1 INJECTION, POWDER, FOR SOLUTION INTRAVENOUS at 21:34

## 2020-01-26 RX ADMIN — FENTANYL CITRATE 100 MCG/HR: 50 INJECTION, SOLUTION INTRAMUSCULAR; INTRAVENOUS at 00:03

## 2020-01-26 RX ADMIN — ALBUTEROL SULFATE 2.5 MG: 2.5 SOLUTION RESPIRATORY (INHALATION) at 12:26

## 2020-01-26 RX ADMIN — DOCUSATE SODIUM 100 MG: 100 CAPSULE, LIQUID FILLED ORAL at 21:02

## 2020-01-26 RX ADMIN — POTASSIUM CHLORIDE 20 MEQ: 29.8 INJECTION, SOLUTION INTRAVENOUS at 09:44

## 2020-01-26 RX ADMIN — CLEVIPIDINE 14 MG/HR: 0.5 EMULSION INTRAVENOUS at 07:38

## 2020-01-26 RX ADMIN — CLEVIPIDINE 16 MG/HR: 0.5 EMULSION INTRAVENOUS at 09:11

## 2020-01-26 RX ADMIN — CLEVIPIDINE 16 MG/HR: 0.5 EMULSION INTRAVENOUS at 22:30

## 2020-01-26 RX ADMIN — DEXTROSE MONOHYDRATE 1 MCG/KG/MIN: 50 INJECTION, SOLUTION INTRAVENOUS at 08:45

## 2020-01-26 RX ADMIN — CLEVIPIDINE 14 MG/HR: 0.5 EMULSION INTRAVENOUS at 21:02

## 2020-01-26 RX ADMIN — DOBUTAMINE HYDROCHLORIDE 5 MCG/KG/MIN: 200 INJECTION INTRAVENOUS at 13:22

## 2020-01-26 RX ADMIN — INSULIN LISPRO 4 UNITS: 100 INJECTION, SOLUTION INTRAVENOUS; SUBCUTANEOUS at 02:15

## 2020-01-26 RX ADMIN — Medication 10 ML: at 08:41

## 2020-01-26 RX ADMIN — POTASSIUM CHLORIDE 20 MEQ: 29.8 INJECTION, SOLUTION INTRAVENOUS at 18:59

## 2020-01-26 RX ADMIN — INSULIN LISPRO 6 UNITS: 100 INJECTION, SOLUTION INTRAVENOUS; SUBCUTANEOUS at 23:51

## 2020-01-26 RX ADMIN — MEROPENEM 1 G: 1 INJECTION, POWDER, FOR SOLUTION INTRAVENOUS at 08:06

## 2020-01-26 RX ADMIN — INSULIN LISPRO 6 UNITS: 100 INJECTION, SOLUTION INTRAVENOUS; SUBCUTANEOUS at 16:27

## 2020-01-26 RX ADMIN — INSULIN GLARGINE 12 UNITS: 100 INJECTION, SOLUTION SUBCUTANEOUS at 08:04

## 2020-01-26 RX ADMIN — ALBUTEROL SULFATE 2.5 MG: 2.5 SOLUTION RESPIRATORY (INHALATION) at 08:32

## 2020-01-26 RX ADMIN — INSULIN LISPRO 4 UNITS: 100 INJECTION, SOLUTION INTRAVENOUS; SUBCUTANEOUS at 08:04

## 2020-01-26 RX ADMIN — ACETAZOLAMIDE 500 MG: 500 INJECTION, POWDER, LYOPHILIZED, FOR SOLUTION INTRAVENOUS at 14:45

## 2020-01-26 RX ADMIN — CHLORHEXIDINE GLUCONATE 15 ML: 1.2 RINSE ORAL at 21:03

## 2020-01-26 RX ADMIN — POTASSIUM CHLORIDE 20 MEQ: 29.8 INJECTION, SOLUTION INTRAVENOUS at 12:21

## 2020-01-26 RX ADMIN — ALBUTEROL SULFATE 2.5 MG: 2.5 SOLUTION RESPIRATORY (INHALATION) at 20:19

## 2020-01-26 RX ADMIN — FUROSEMIDE 20 MG/HR: 10 INJECTION, SOLUTION INTRAMUSCULAR; INTRAVENOUS at 14:19

## 2020-01-26 RX ADMIN — MIDAZOLAM 6 MG/HR: 5 INJECTION INTRAMUSCULAR; INTRAVENOUS at 10:53

## 2020-01-26 RX ADMIN — CHLORHEXIDINE GLUCONATE 15 ML: 1.2 RINSE ORAL at 08:41

## 2020-01-26 RX ADMIN — CLEVIPIDINE 14 MG/HR: 0.5 EMULSION INTRAVENOUS at 17:18

## 2020-01-26 RX ADMIN — AMIODARONE HYDROCHLORIDE 1 MG/MIN: 50 INJECTION, SOLUTION INTRAVENOUS at 08:55

## 2020-01-26 RX ADMIN — MUPIROCIN: 20 OINTMENT TOPICAL at 08:29

## 2020-01-26 RX ADMIN — CLEVIPIDINE 8 MG/HR: 0.5 EMULSION INTRAVENOUS at 03:52

## 2020-01-26 RX ADMIN — AMIODARONE HYDROCHLORIDE 1 MG/MIN: 50 INJECTION, SOLUTION INTRAVENOUS at 02:06

## 2020-01-26 RX ADMIN — FAMOTIDINE 20 MG: 10 INJECTION, SOLUTION INTRAVENOUS at 21:31

## 2020-01-26 RX ADMIN — CLEVIPIDINE 12 MG/HR: 0.5 EMULSION INTRAVENOUS at 13:10

## 2020-01-26 RX ADMIN — INSULIN LISPRO 4 UNITS: 100 INJECTION, SOLUTION INTRAVENOUS; SUBCUTANEOUS at 06:17

## 2020-01-26 RX ADMIN — CLEVIPIDINE 14 MG/HR: 0.5 EMULSION INTRAVENOUS at 10:38

## 2020-01-26 RX ADMIN — INSULIN LISPRO 6 UNITS: 100 INJECTION, SOLUTION INTRAVENOUS; SUBCUTANEOUS at 12:12

## 2020-01-26 RX ADMIN — METOPROLOL TARTRATE 25 MG: 25 TABLET ORAL at 08:31

## 2020-01-26 RX ADMIN — FENTANYL CITRATE 100 MCG/HR: 50 INJECTION, SOLUTION INTRAMUSCULAR; INTRAVENOUS at 11:33

## 2020-01-26 RX ADMIN — FENTANYL CITRATE 100 MCG/HR: 50 INJECTION, SOLUTION INTRAMUSCULAR; INTRAVENOUS at 22:29

## 2020-01-26 RX ADMIN — Medication 10 ML: at 21:03

## 2020-01-26 RX ADMIN — METOPROLOL TARTRATE 25 MG: 25 TABLET ORAL at 21:31

## 2020-01-26 RX ADMIN — CLEVIPIDINE 10 MG/HR: 0.5 EMULSION INTRAVENOUS at 14:48

## 2020-01-26 ASSESSMENT — PULMONARY FUNCTION TESTS
PIF_VALUE: 30
PIF_VALUE: 31
PIF_VALUE: 30
PIF_VALUE: 31
PIF_VALUE: 30
PIF_VALUE: 31
PIF_VALUE: 30
PIF_VALUE: 30
PIF_VALUE: 31
PIF_VALUE: 31
PIF_VALUE: 30
PIF_VALUE: 30
PIF_VALUE: 31
PIF_VALUE: 30
PIF_VALUE: 31
PIF_VALUE: 30
PIF_VALUE: 31
PIF_VALUE: 30
PIF_VALUE: 31
PIF_VALUE: 30

## 2020-01-26 NOTE — PROGRESS NOTES
[facility]    [facility]              [phone]                       Plan :     Cr stable  Fluids overload  On higher dose of lasix  PH is higher  Will give diamox  Also adjusting vent settings might help    Hypoxemia- not entirely from pulmonary edema        Assessment :     Acute Kidney Injury  Creatinine peaked to 1.6- now 0.7  Likely due to cardiac surgery, hypotension, Cardiorenal syndrome  Getting inotropes-will hopefully help blood pressure and also kidney function  Agree with Lasix drip- now 20 mg hr      hyperkalemia  Now on low side      Acidosis  Lactic acidosis likely due to hypotension poor tissue perfusion  Improving    Hypotension- now Hypertension  BP: (122-126)/(62-65) Pulse:  [109-120]   Low-dose of Levophed-being switched to dobutamine  Now needing nicardipine drip         [facility] would like to thank Lennox Burciaga MD   for opportunity to serve this patient      Please call with questions at-   24 Hrs Answering service [phone] or  7 am- 5 pm via Perfect serve or cell phone  Dr.Sudhir Kyler Barrow          CC/reason for consult :   Acute kidney injury   HPI :     From consult note-   Haley Yu is a 77 y.o. male presented to   the hospital on 1/8/2020 with shortness of breath. Not able to give history because of being on BiPAP. He is known to have severe aortic stenosis and he had redo sternotomy and replacement of ascending aorta done, and also replacement with [place] Intuity valve done. Following that he is in ICU and is requiring BiPAP and has a poor urine output and he is on a low-dose of vasopressor. He was here earlier this month with shortness of breath pneumonia and large left hemothorax and pneumothorax. Also had PAL and hyperkalemia.   Did not require dialysis during that hospitalization and his kidney function improved to normal.    We are consulted for PAL and related issues     Interval History:       Making urine  Still on vent    ROS:

## 2020-01-26 NOTE — PLAN OF CARE
Ongoing     Problem:  Activity Intolerance:  Goal: Able to perform prescribed physical activity  Description  Able to perform prescribed physical activity  Outcome: Ongoing  Goal: Ability to tolerate increased activity will improve  Description  Ability to tolerate increased activity will improve  Outcome: Ongoing     Problem: Anxiety:  Goal: Level of anxiety will decrease  Description  Level of anxiety will decrease  Outcome: Ongoing     Problem: Cardiac Output - Decreased:  Goal: Cardiac output within specified parameters  Description  Cardiac output within specified parameters  Outcome: Ongoing  Goal: Hemodynamic stability will improve  Description  Hemodynamic stability will improve  Outcome: Ongoing     Problem: Fluid Volume - Imbalance:  Goal: Ability to achieve a balanced intake and output will improve  Description  Ability to achieve a balanced intake and output will improve  Outcome: Ongoing  Goal: Chest tube drainage is within specified parameters  Description  Chest tube drainage is within specified parameters  Outcome: Ongoing     Problem: Gas Exchange - Impaired:  Goal: Levels of oxygenation will improve  Description  Levels of oxygenation will improve  Outcome: Ongoing  Goal: Ability to maintain adequate ventilation will improve  Description  Ability to maintain adequate ventilation will improve  Outcome: Ongoing     Problem: Tissue Perfusion - Cardiopulmonary, Altered:  Goal: Absence of angina  Description  Absence of angina  Outcome: Ongoing  Goal: Hemodynamic stability will improve  Description  Hemodynamic stability will improve  Outcome: Ongoing  Goal: Will show no evidence of cardiac arrhythmias  Description  Will show no evidence of cardiac arrhythmias  Outcome: Ongoing     Problem: Restraint Use - Nonviolent/Non-Self-Destructive Behavior:  Goal: Absence of restraint indications  Description  Absence of restraint indications  Outcome: Ongoing  Goal: Absence of restraint-related injury  Description  Absence of restraint-related injury  Outcome: Ongoing

## 2020-01-26 NOTE — PROGRESS NOTES
Left message for Dr. Myrtle Patel that pt residuals for TF = 140mls after 6 hour of continuous feeding commenced.  Valentin Foster

## 2020-01-26 NOTE — PROGRESS NOTES
01/26/20 0834   Vent Information   Vent Type 840   Vent Mode AC/PC   Vt Ordered 400 mL   Pressure Ordered 20   Rate Set 15 bmp   Pressure Support 0 cmH20   FiO2  55 %   Sensitivity 3   PEEP/CPAP 10   I Time/ I Time % 1 s   Cuff Pressure (cm H2O) 30 cm H2O   Vent Patient Data   High Peep/I Pressure 20   Peak Inspiratory Pressure 31 cmH2O   Mean Airway Pressure 18 cmH20   Rate Measured 15 br/min   Vt Exhaled 593 mL   Minute Volume 8.85 Liters   I:E Ratio 1:1.70   Plateau Pressure 29 [id number]   Static Compliance 21 mL/cmH2O   Dynamic Compliance 40 mL/cmH2O   Cough/Sputum   Cough Non-productive   Spontaneous Breathing Trial (SBT) RT Doc   Pulse 120   SpO2 98 %   Breath Sounds   Right Upper Lobe Clear   Right Middle Lobe Diminished   Right Lower Lobe Clear   Left Upper Lobe Clear   Left Lower Lobe Diminished   Additional Respiratory  Assessments   Resp 15   End Tidal CO2 36 (%)   Position Jones's   Alarm Settings   High Pressure Alarm 45 cmH2O   Low Minute Volume Alarm 2 L/min   High Respiratory Rate 40 br/min   Patient Observation   Observations [email or web address]   ETT (adult)   Placement Date/Time: 01/23/20 0865   Timeout: Patient;Site/Side;Appropriate Equipment;Correct Position  Preoxygenation: Yes  Mask Ventilation: Ventilated by mask (1)  Technique: Video laryngoscopy  Type: Cuffed  Tube Size: 7.5 mm  Laryngoscope: GlideS. ..    Secured at 23 cm   Measured From Lips   ET Placement Left   Secured By Commercial tube romero   Site Condition Dry

## 2020-01-26 NOTE — PROGRESS NOTES
01/25/20 2036   Vent Information   Vent Mode AC/PC   Pressure Ordered 20   Rate Set 15 bmp   Pressure Support 0 cmH20   FiO2  65 %   Sensitivity 3   PEEP/CPAP 10   I Time/ I Time % 2 s   Humidification Source HME   Vent Patient Data   High Peep/I Pressure 20   Peak Inspiratory Pressure 31 cmH2O   Mean Airway Pressure 21 cmH20   Rate Measured 15 br/min   Vt Exhaled 537 mL   Minute Volume 8.05 Liters   I:E Ratio 1.20:1   Plateau Pressure 30 [id number]   Spontaneous Breathing Trial (SBT) RT Doc   Pulse 106   SpO2 98 %   Breath Sounds   Right Upper Lobe Clear   Right Middle Lobe Clear   Right Lower Lobe Diminished   Left Upper Lobe Clear   Left Lower Lobe Diminished   Additional Respiratory  Assessments   Resp 15   End Tidal CO2 38 (%)   Alarm Settings   High Pressure Alarm 45 cmH2O   Low Minute Volume Alarm 2 L/min   High Respiratory Rate 40 br/min   Low Exhaled Vt  200 mL   ETT (adult)   Placement Date/Time: 01/23/20 1645   Timeout: Patient;Site/Side;Appropriate Equipment;Correct Position  Preoxygenation: Yes  Mask Ventilation: Ventilated by mask (1)  Technique: Video laryngoscopy  Type: Cuffed  Tube Size: 7.5 mm  Laryngoscope: GlideS. ..    Secured at 23 cm   Measured From Lips   ET Placement Left   Secured By Commercial tube romero   Cuff Pressure 25 cm H2O   ambu bag at bedside with peep valve

## 2020-01-26 NOTE — PROGRESS NOTES
Pulmonary & Critical Care Inpatient Progress Note   Brent Holt MD     REASON FOR TODAY'S VISIT:  Vent and respiratory management    SUBJECTIVE:   Decreased oxygen requirements, remains on vent support  Diuresis actually escalated yesterday and now he is with a net negative balance which has helped his resp status  Had required paralytic now off, but on high dose versed infusion     Scheduled Meds:   insulin lispro  0-18 Units Subcutaneous Q4H    acetaZOLAMIDE (DIAMOX) IVPB  500 mg Intravenous Once    metoprolol tartrate  25 mg Oral BID    insulin glargine  12 Units Subcutaneous Daily    furosemide  15 mg Intravenous Once    meropenem  1 g Intravenous Q12H    famotidine (PEPCID) injection  20 mg Intravenous BID    sodium chloride flush  10 mL Intravenous 2 times per day    docusate sodium  100 mg Oral BID    chlorhexidine  15 mL Mouth/Throat BID    [Held by provider] atorvastatin  20 mg Oral Nightly    [Held by provider] aspirin  81 mg Oral Daily    [Held by provider] clopidogrel  75 mg Oral Daily    albuterol  2.5 mg Nebulization Q4H WA    sodium chloride  20 mL Intravenous Once       Continuous Infusions:   sodium chloride      furosemide (LASIX) 5mg/ml infusion 20 mg/hr (01/25/20 1812)    midazolam 6 mg/hr (01/26/20 1053)    clevidipine 10 mg/hr (01/26/20 1400)    amiodarone 450mg/250ml D5W infusion 1 mg/min (01/26/20 0855)    argatroban infusion 1.5 mcg/kg/min (01/26/20 1209)    norepinephrine 2 mcg/min (01/23/20 0858)    DOBUTamine 5 mcg/kg/min (01/26/20 1322)    niCARdipene (CARDENE) 50 mg in 250 mL 0.9 % sodium chloride infusion Stopped (01/25/20 2042)    fentaNYL (SUBLIMAZE) infusion 100 mcg/hr (01/26/20 1133)    dextrose         PRN Meds:  ketamine **OR** ketamine, fentanNYL, sodium chloride flush, potassium chloride, magnesium sulfate, calcium chloride IVPB, calcium chloride IVPB, acetaminophen, acetaminophen, ondansetron, albumin human, glucose, dextrose, glucagon (rDNA), dextrose    ALLERGIES:  Patient is allergic to penicillins. Objective:   PHYSICAL EXAM:  /62   Pulse 111   Temp 99.1 °F (37.3 °C) (Core)   Resp 15   Ht 5' 9\" (1.753 m)   Wt 205 lb 0.4 oz (93 kg)   SpO2 99%   BMI 30.28 kg/m²    Physical Exam  Constitutional:       General: He is not in acute distress. Appearance: He is well-developed. He is not diaphoretic. HENT:      Head: Normocephalic and atraumatic. Mouth/Throat:      Pharynx: No oropharyngeal exudate. Eyes:      Pupils: Pupils are equal, round, and reactive to light. Neck:      Musculoskeletal: Neck supple. Vascular: No JVD. Cardiovascular:      Heart sounds: Normal heart sounds. No murmur. No friction rub. No gallop. Pulmonary:      Effort: Respiratory distress present. Breath sounds: No wheezing or rales. Abdominal:      General: Bowel sounds are normal. There is no distension. Palpations: Abdomen is soft. Tenderness: There is no abdominal tenderness. Musculoskeletal: Normal range of motion. Lymphadenopathy:      Cervical: No cervical adenopathy. Skin:     General: Skin is warm and dry. Findings: No rash. Neurological:      Mental Status: He is alert. Cranial Nerves: Cranial nerve deficit present.       Comments: Intubated, sedated            Data Reviewed:   LABS:  CBC:  Recent Labs     01/25/20  0449 01/25/20  1615 01/26/20  0800   WBC 13.4* 12.3* 9.6   HGB 8.9* 8.7* 9.4*   HCT 26.3* 26.8* 27.5*   MCV 90.3 92.6 91.6   PLT 45* 42* 54*     BMP:  Recent Labs     01/25/20 0449 01/25/20  1615 01/26/20  0452    139 143   K 4.2 4.0 3.6    98* 99   CO2 29 31 32   BUN 34* 34* 30*   CREATININE 1.0 0.9 0.7*     LIVER PROFILE:   Recent Labs     01/26/20 0452   *   *   BILIDIR 0.5*   BILITOT 1.0   ALKPHOS 86     PT/INR:  Recent Labs     01/26/20 0452   PROTIME 19.4*   INR 1.66*     APTT:   Recent Labs     01/26/20 0452 01/26/20  0900 01/26/20  1100   APTT 47.5* 49.6* 48.4*     UA:No results for input(s): NITRITE, COLORU, PHUR, LABCAST, WBCUA, RBCUA, MUCUS, TRICHOMONAS, YEAST, BACTERIA, CLARITYU, SPECGRAV, LEUKOCYTESUR, UROBILINOGEN, BILIRUBINUR, BLOODU, GLUCOSEU, AMORPHOUS in the last 72 hours. Invalid input(s): KETONESU  Recent Labs     01/26/20  0801 01/26/20  1205   PHART 7.526* 7.553*   GCF5DSA 47.1* 42.5   PO2ART 207.2* 166.4*       Vent Information  $Ventilation: $Subsequent Day  Ventilator Started: Yes  Skin Assessment: Clean, dry, & intact  Vent Type: 840  Vent Mode: AC/PC  Vt Ordered: 400 mL  Pressure Ordered: 20  Rate Set: 15 bmp  Peak Flow: 50 L/min  Pressure Support: 0 cmH20  FiO2 : 45 %  Sensitivity: 3  PEEP/CPAP: 10  I Time/ I Time %: 1 s  Cuff Pressure (cm H2O): 30 cm H2O  Humidification Source: HME    CXR personally reviewed, bilateral airspace disease vs edema          Assessment:     1. Acute resp failure, mixed              -acute pulm edema              -ards  2. Cardiogenic shock  3. PAL  4. Acute anemia    Plan:      -Doubt ARDS, more consistent with pulm edema as he is clearly responding with efficacious diuresis. Continue with nephrology assisting  -Vent support, SBT when on less PEEP/FIO2.  Can change back to conventional VC ventilation  -Titrate sedation as needed  -Serial chest imaging    Due to life threatening resp failure this patient is critically ill, total critical care time involved in his care was 34 mins    Efrain Cespedes MD

## 2020-01-26 NOTE — PROGRESS NOTES
01/26/20 1616   Vent Information   Equipment Changed HME   Vent Type 840   Vent Mode AC/PC   Vt Ordered 400 mL   Pressure Ordered 20   Rate Set 15 bmp   Pressure Support 0 cmH20   FiO2  30 %   Sensitivity 3   PEEP/CPAP 10   I Time/ I Time % 1.5 s   Humidification Source HME   Vent Patient Data   High Peep/I Pressure 20   Peak Inspiratory Pressure 30 cmH2O   Mean Airway Pressure 18 cmH20   Rate Measured 15 br/min   Vt Exhaled 621 mL   Minute Volume 9.36 Liters   I:E Ratio 1:1.70   Plateau Pressure 28 [id number]   Static Compliance 33 mL/cmH2O   Dynamic Compliance 60 mL/cmH2O   Cough/Sputum   Sputum How Obtained Oral tracheal;Endotracheal   Cough Non-productive   Spontaneous Breathing Trial (SBT) RT Doc   Pulse 113   SpO2 100 %   Breath Sounds   Right Upper Lobe Rhonchi   Right Middle Lobe Diminished   Right Lower Lobe Rhonchi   Left Upper Lobe Diminished   Left Lower Lobe Diminished   Additional Respiratory  Assessments   Resp 15   End Tidal CO2 33 (%)   Alarm Settings   High Pressure Alarm 45 cmH2O   Low Minute Volume Alarm 2 L/min   High Respiratory Rate 40 br/min   ETT (adult)   Placement Date/Time: 01/23/20 1645   Timeout: Patient;Site/Side;Appropriate Equipment;Correct Position  Preoxygenation: Yes  Mask Ventilation: Ventilated by mask (1)  Technique: Video laryngoscopy  Type: Cuffed  Tube Size: 7.5 mm  Laryngoscope: Barbara. ..    Secured at 23 cm   Measured From [address] [place] [address],[address] By Commercial tube romero   Site Condition Dry

## 2020-01-26 NOTE — ONCOLOGY
CO2 29 31 32   BUN 34* 34* 30*   CREATININE 1.0 0.9 0.7*     Recent Labs     01/23/20  1410 01/26/20  0452   * 115*   * 142*   BILIDIR 0.6* 0.5*   BILITOT 1.2* 1.0   ALKPHOS 81 86       Magnesium:    Lab Results   Component Value Date    MG 1.80 01/26/2020    MG 2.10 01/25/2020    MG 2.20 01/24/2020         Problem List  Patient Active Problem List   Diagnosis    Hemothorax    Acute respiratory failure with hypoxemia (HCC)    Tobacco abuse    AS (aortic stenosis)    CAD (coronary artery disease) s/p stent in 2010    HTN (hypertension)    Hyperlipidemia    S/P AVR (aortic valve replacement)    Hypotension    Pulmonary infiltrate    Pulmonary venous congestion    Atelectasis    Leukocytosis    Hyperglycemia    Acute bronchospasm    Atrial fibrillation with RVR (HCC)    Cardiogenic shock (HCC)    Acute on chronic diastolic congestive heart failure (HCC)    Myocardial infarction (HCC)    Obesity, Class I, BMI 30.0-34.9 (see actual BMI)    S/P thoracotomy    Status post partial removal of lung    Hemothorax, left       ASSESSMENT AND PLAN    HIT +, 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 If ARTEMIO positive, will need at least 3 months of Coumadin. Will plan to bridge to Coumadin once plt are near normal and at least over 100k.      TCP  - Could be multifactorial given severe illness   - Treat for HIT and await ARTEMIO   - Would not transfuse plt unless less than 10 and signs of bleeding   - Differential includes shock liver and early sequestration of plt to the spleen and DIC secondary to PNA. INR 1.57 on 1-21 (prior to argatroban). Argatroban is known to increase the INR.   - D-dimer will not have much significance in a postop patient. Fibrinogen is normal.  Doubt DIC.  - Check plt antibody - still pending.     Anemia  - blood loss secondary to surgical losses, will follow   - HGB 9.4 g/dL.        PNA- Merrem      AFIBB - Amiodarone drip             Aortic repair, AV valve redo per CT surgery team       ONCOLOGIC DISPOSITION:  LOYDA Daugherty MD  Please contact through [address]

## 2020-01-26 NOTE — PROGRESS NOTES
Progress Note        S/p:  RE-DO STERNOTOMY X3, REPLACEMENT OF ASCENDING AORTA, RE-DO AORTIC VALVE REPLACEMENT WITH 19MM BLOCK INTUITY VALVE, CLOSURE OF OUTFLOW TRACK ATRIAL FISTULA, HYPOTHERMIC ARREST,  WITH TRANSESOPHAGEAL ECHOCARDIOGRAM, CIRCULATORY ARREST, 5 LEVEL BILATERAL INTERCOSTAL NERVE BLOCK,    Vital Signs:                                                 /65   Pulse 117   Temp 98.7 °F (37.1 °C) (Core)   Resp 15   Ht 5' 9\" (1.753 m)   Wt 205 lb 0.4 oz (93 kg)   SpO2 100%   BMI 30.28 kg/m²  O2 Flow Rate (L/min): 2 L/min     CVP (Mean): 24 mmHg  PAP: 45/22  PAP (Mean): 31 mmHg  SVR (Using ABP Mean): 762.79 dyne*sec/cm5  CCI: 2.1 L/min  Admission Weight: 205 lb 12.8 oz (93.4 kg)      Vent Settings:  Vent Information  $Ventilation: $Subsequent Day  Ventilator Started: Yes  Skin Assessment: Clean, dry, & intact  Vent Type: 840  Vent Mode: AC/PC  Vt Ordered: 400 mL  Pressure Ordered: 20  Rate Set: 15 bmp  Peak Flow: 50 L/min  Pressure Support: 0 cmH20  FiO2 : 55 %  Sensitivity: 3  PEEP/CPAP: 10  I Time/ I Time %: 1 s  Cuff Pressure (cm H2O): 30 cm H2O  Humidification Source: [facility]     I/O:      Intake/Output Summary (Last 24 hours) at 1/26/2020 1107  Last data filed at 1/26/2020 1000  Gross per 24 hour   Intake 3316 ml   Output 4025 ml   Net -709 ml     Chest Tube:     O/E  GEN: intubated  HEENT: central venous access  CV: reg, wound c/d/i  Pulm: improved air entry  Abd: soft, nt, nd, no peritoneal signs  Ext: warm, edema, wound c/d/i    Data Review:  CBC:   Recent Labs     01/25/20  0449 01/25/20  1615 01/26/20  0800   WBC 13.4* 12.3* 9.6   HGB 8.9* 8.7* 9.4*   HCT 26.3* 26.8* 27.5*   MCV 90.3 92.6 91.6   PLT 45* 42* 54*     BMP:   Recent Labs     01/24/20  0435  01/25/20  0449 01/25/20  1615 01/26/20  0452      < > 139 139 143   K 4.9   < > 4.2 4.0 3.6   CL 99   < > 100 98* 99   CO2 25   < > 29 31 32   BUN 40*   < > 34* 34* 30*   CREATININE 1.3   < > 1.0 0.9 0.7*   CALCIUM 8.1*

## 2020-01-27 ENCOUNTER — APPOINTMENT (OUTPATIENT)
Dept: GENERAL RADIOLOGY | Age: 70
DRG: 216 | End: 2020-01-27
Attending: INTERNAL MEDICINE
Payer: MEDICARE

## 2020-01-27 PROBLEM — J98.4 RESTRICTIVE LUNG DISEASE: Status: ACTIVE | Noted: 2020-01-27

## 2020-01-27 LAB
ALBUMIN SERPL-MCNC: 3.5 G/DL (ref 3.4–5)
ALP BLD-CCNC: 87 U/L (ref 40–129)
ALT SERPL-CCNC: 106 U/L (ref 10–40)
ANION GAP SERPL CALCULATED.3IONS-SCNC: 11 MMOL/L (ref 3–16)
APTT: 57.5 SEC (ref 24.2–36.2)
AST SERPL-CCNC: 68 U/L (ref 15–37)
BASE EXCESS ARTERIAL: 10.1 MMOL/L (ref -3–3)
BASE EXCESS ARTERIAL: 10.9 MMOL/L (ref -3–3)
BASE EXCESS ARTERIAL: 15 (ref -3–3)
BASE EXCESS ARTERIAL: 8.4 MMOL/L (ref -3–3)
BASE EXCESS ARTERIAL: 8.7 MMOL/L (ref -3–3)
BASOPHILS ABSOLUTE: 0 K/UL (ref 0–0.2)
BASOPHILS RELATIVE PERCENT: 0.1 %
BILIRUB SERPL-MCNC: 1.1 MG/DL (ref 0–1)
BILIRUBIN DIRECT: 0.5 MG/DL (ref 0–0.3)
BILIRUBIN, INDIRECT: 0.6 MG/DL (ref 0–1)
BLOOD CULTURE, ROUTINE: NORMAL
BUN BLDV-MCNC: 31 MG/DL (ref 7–20)
CALCIUM SERPL-MCNC: 8.6 MG/DL (ref 8.3–10.6)
CARBOXYHEMOGLOBIN ARTERIAL: 0.2 % (ref 0–1.5)
CARBOXYHEMOGLOBIN ARTERIAL: 0.3 % (ref 0–1.5)
CARBOXYHEMOGLOBIN ARTERIAL: 0.7 % (ref 0–1.5)
CARBOXYHEMOGLOBIN ARTERIAL: 1 % (ref 0–1.5)
CHLORIDE BLD-SCNC: 98 MMOL/L (ref 99–110)
CO2: 32 MMOL/L (ref 21–32)
CREAT SERPL-MCNC: 0.7 MG/DL (ref 0.8–1.3)
CULTURE, BLOOD 2: NORMAL
EOSINOPHILS ABSOLUTE: 0 K/UL (ref 0–0.6)
EOSINOPHILS RELATIVE PERCENT: 0.3 %
GFR AFRICAN AMERICAN: >60
GFR NON-AFRICAN AMERICAN: >60
GLUCOSE BLD-MCNC: 167 MG/DL (ref 70–99)
GLUCOSE BLD-MCNC: 209 MG/DL (ref 70–99)
GLUCOSE BLD-MCNC: 211 MG/DL (ref 70–99)
GLUCOSE BLD-MCNC: 221 MG/DL (ref 70–99)
GLUCOSE BLD-MCNC: 226 MG/DL (ref 70–99)
HCO3 ARTERIAL: 31.8 MMOL/L (ref 21–29)
HCO3 ARTERIAL: 33.7 MMOL/L (ref 21–29)
HCO3 ARTERIAL: 33.8 MMOL/L (ref 21–29)
HCO3 ARTERIAL: 36.2 MMOL/L (ref 21–29)
HCO3 ARTERIAL: 38.4 MMOL/L (ref 21–29)
HCT VFR BLD CALC: 28.9 % (ref 40.5–52.5)
HEMOGLOBIN, ART, EXTENDED: 10.7 G/DL (ref 13.5–17.5)
HEMOGLOBIN, ART, EXTENDED: 10.8 G/DL (ref 13.5–17.5)
HEMOGLOBIN, ART, EXTENDED: 11 G/DL (ref 13.5–17.5)
HEMOGLOBIN, ART, EXTENDED: 9.9 G/DL (ref 13.5–17.5)
HEMOGLOBIN: 9.8 G/DL (ref 13.5–17.5)
LYMPHOCYTES ABSOLUTE: 0.5 K/UL (ref 1–5.1)
LYMPHOCYTES RELATIVE PERCENT: 4.5 %
Lab: NORMAL
MAGNESIUM: 1.9 MG/DL (ref 1.8–2.4)
MCH RBC QN AUTO: 31.3 PG (ref 26–34)
MCHC RBC AUTO-ENTMCNC: 33.8 G/DL (ref 31–36)
MCV RBC AUTO: 92.6 FL (ref 80–100)
METHEMOGLOBIN ARTERIAL: 0.4 %
METHEMOGLOBIN ARTERIAL: 0.6 %
METHEMOGLOBIN ARTERIAL: 0.6 %
METHEMOGLOBIN ARTERIAL: 0.7 %
MONOCYTES ABSOLUTE: 0.7 K/UL (ref 0–1.3)
MONOCYTES RELATIVE PERCENT: 6.8 %
NEUTROPHILS ABSOLUTE: 9.2 K/UL (ref 1.7–7.7)
NEUTROPHILS RELATIVE PERCENT: 88.3 %
O2 CONTENT ARTERIAL: 14 ML/DL
O2 CONTENT ARTERIAL: 15 ML/DL
O2 SAT, ARTERIAL: 95 %
O2 SAT, ARTERIAL: 95 % (ref 93–100)
O2 SAT, ARTERIAL: 96.7 %
O2 SAT, ARTERIAL: 96.8 %
O2 SAT, ARTERIAL: 97.1 %
O2 THERAPY: ABNORMAL
PCO2 ARTERIAL: 39.5 MMHG (ref 35–45)
PCO2 ARTERIAL: 41.9 MMHG (ref 35–45)
PCO2 ARTERIAL: 48.7 MMHG (ref 35–45)
PCO2 ARTERIAL: 52.1 MMHG (ref 35–45)
PCO2 ARTERIAL: 52.9 MM HG (ref 35–45)
PDW BLD-RTO: 16.5 % (ref 12.4–15.4)
PERFORMED ON: ABNORMAL
PH ARTERIAL: 7.46 (ref 7.35–7.45)
PH ARTERIAL: 7.46 (ref 7.35–7.45)
PH ARTERIAL: 7.47 (ref 7.35–7.45)
PH ARTERIAL: 7.52 (ref 7.35–7.45)
PH ARTERIAL: 7.53 (ref 7.35–7.45)
PLATELET # BLD: 72 K/UL (ref 135–450)
PMV BLD AUTO: 9.9 FL (ref 5–10.5)
PO2 ARTERIAL: 73.3 MM HG (ref 75–108)
PO2 ARTERIAL: 78 MMHG (ref 75–108)
PO2 ARTERIAL: 90.9 MMHG (ref 75–108)
PO2 ARTERIAL: 91.4 MMHG (ref 75–108)
PO2 ARTERIAL: 97 MMHG (ref 75–108)
POC FIO2: 30
POC SAMPLE TYPE: ABNORMAL
POTASSIUM REFLEX MAGNESIUM: 4 MMOL/L (ref 3.5–5.1)
POTASSIUM SERPL-SCNC: 3.5 MMOL/L (ref 3.5–5.1)
POTASSIUM SERPL-SCNC: 3.9 MMOL/L (ref 3.5–5.1)
POTASSIUM SERPL-SCNC: 4.2 MMOL/L (ref 3.5–5.1)
POTASSIUM SERPL-SCNC: 4.5 MMOL/L (ref 3.5–5.1)
RBC # BLD: 3.13 M/UL (ref 4.2–5.9)
REPORT: NORMAL
SODIUM BLD-SCNC: 141 MMOL/L (ref 136–145)
TCO2 ARTERIAL: 33 MMOL/L
TCO2 ARTERIAL: 35.1 MMOL/L
TCO2 ARTERIAL: 35.2 MMOL/L
TCO2 ARTERIAL: 37.8 MMOL/L
TCO2 ARTERIAL: 40 MMOL/L
THIS TEST SENT TO: NORMAL
TOTAL PROTEIN: 5.9 G/DL (ref 6.4–8.2)
TRIGL SERPL-MCNC: 216 MG/DL (ref 0–150)
WBC # BLD: 10.4 K/UL (ref 4–11)

## 2020-01-27 PROCEDURE — 99024 POSTOP FOLLOW-UP VISIT: CPT | Performed by: THORACIC SURGERY (CARDIOTHORACIC VASCULAR SURGERY)

## 2020-01-27 PROCEDURE — 6360000002 HC RX W HCPCS: Performed by: INTERNAL MEDICINE

## 2020-01-27 PROCEDURE — 94640 AIRWAY INHALATION TREATMENT: CPT

## 2020-01-27 PROCEDURE — 99291 CRITICAL CARE FIRST HOUR: CPT | Performed by: INTERNAL MEDICINE

## 2020-01-27 PROCEDURE — 84132 ASSAY OF SERUM POTASSIUM: CPT

## 2020-01-27 PROCEDURE — 6360000002 HC RX W HCPCS: Performed by: THORACIC SURGERY (CARDIOTHORACIC VASCULAR SURGERY)

## 2020-01-27 PROCEDURE — 6370000000 HC RX 637 (ALT 250 FOR IP): Performed by: THORACIC SURGERY (CARDIOTHORACIC VASCULAR SURGERY)

## 2020-01-27 PROCEDURE — 84478 ASSAY OF TRIGLYCERIDES: CPT

## 2020-01-27 PROCEDURE — 2580000003 HC RX 258: Performed by: INTERNAL MEDICINE

## 2020-01-27 PROCEDURE — 2140000000 HC CCU INTERMEDIATE R&B

## 2020-01-27 PROCEDURE — 71045 X-RAY EXAM CHEST 1 VIEW: CPT

## 2020-01-27 PROCEDURE — 2500000003 HC RX 250 WO HCPCS: Performed by: THORACIC SURGERY (CARDIOTHORACIC VASCULAR SURGERY)

## 2020-01-27 PROCEDURE — 94770 HC ETCO2 MONITOR DAILY: CPT

## 2020-01-27 PROCEDURE — 94750 HC PULMONARY COMPLIANCE STUDY: CPT

## 2020-01-27 PROCEDURE — 2500000003 HC RX 250 WO HCPCS: Performed by: INTERNAL MEDICINE

## 2020-01-27 PROCEDURE — 83735 ASSAY OF MAGNESIUM: CPT

## 2020-01-27 PROCEDURE — 85730 THROMBOPLASTIN TIME PARTIAL: CPT

## 2020-01-27 PROCEDURE — 94761 N-INVAS EAR/PLS OXIMETRY MLT: CPT

## 2020-01-27 PROCEDURE — 94003 VENT MGMT INPAT SUBQ DAY: CPT

## 2020-01-27 PROCEDURE — 80048 BASIC METABOLIC PNL TOTAL CA: CPT

## 2020-01-27 PROCEDURE — C9248 INJ, CLEVIDIPINE BUTYRATE: HCPCS | Performed by: THORACIC SURGERY (CARDIOTHORACIC VASCULAR SURGERY)

## 2020-01-27 PROCEDURE — 2580000003 HC RX 258: Performed by: NURSE PRACTITIONER

## 2020-01-27 PROCEDURE — 80076 HEPATIC FUNCTION PANEL: CPT

## 2020-01-27 PROCEDURE — 82947 ASSAY GLUCOSE BLOOD QUANT: CPT

## 2020-01-27 PROCEDURE — 6370000000 HC RX 637 (ALT 250 FOR IP): Performed by: NURSE PRACTITIONER

## 2020-01-27 PROCEDURE — 2580000003 HC RX 258: Performed by: THORACIC SURGERY (CARDIOTHORACIC VASCULAR SURGERY)

## 2020-01-27 PROCEDURE — 6360000002 HC RX W HCPCS: Performed by: NURSE PRACTITIONER

## 2020-01-27 PROCEDURE — 82803 BLOOD GASES ANY COMBINATION: CPT

## 2020-01-27 PROCEDURE — 85025 COMPLETE CBC W/AUTO DIFF WBC: CPT

## 2020-01-27 PROCEDURE — 2700000000 HC OXYGEN THERAPY PER DAY

## 2020-01-27 RX ORDER — METOPROLOL TARTRATE 50 MG/1
50 TABLET, FILM COATED ORAL 2 TIMES DAILY
Status: DISCONTINUED | OUTPATIENT
Start: 2020-01-27 | End: 2020-01-31

## 2020-01-27 RX ORDER — MAGNESIUM SULFATE 1 G/100ML
1 INJECTION INTRAVENOUS ONCE
Status: COMPLETED | OUTPATIENT
Start: 2020-01-27 | End: 2020-01-27

## 2020-01-27 RX ORDER — INSULIN GLARGINE 100 [IU]/ML
12 INJECTION, SOLUTION SUBCUTANEOUS 2 TIMES DAILY
Status: DISCONTINUED | OUTPATIENT
Start: 2020-01-27 | End: 2020-01-30

## 2020-01-27 RX ORDER — CASTOR OIL AND BALSAM, PERU 788; 87 MG/G; MG/G
OINTMENT TOPICAL 2 TIMES DAILY
Status: DISCONTINUED | OUTPATIENT
Start: 2020-01-27 | End: 2020-02-05 | Stop reason: HOSPADM

## 2020-01-27 RX ADMIN — CLEVIPIDINE 11 MG/HR: 0.5 EMULSION INTRAVENOUS at 11:28

## 2020-01-27 RX ADMIN — INSULIN GLARGINE 12 UNITS: 100 INJECTION, SOLUTION SUBCUTANEOUS at 09:03

## 2020-01-27 RX ADMIN — MEROPENEM 1 G: 1 INJECTION, POWDER, FOR SOLUTION INTRAVENOUS at 07:46

## 2020-01-27 RX ADMIN — FAMOTIDINE 20 MG: 10 INJECTION, SOLUTION INTRAVENOUS at 22:21

## 2020-01-27 RX ADMIN — ALBUTEROL SULFATE 2.5 MG: 2.5 SOLUTION RESPIRATORY (INHALATION) at 13:02

## 2020-01-27 RX ADMIN — DEXTROSE MONOHYDRATE 1.5 MCG/KG/MIN: 50 INJECTION, SOLUTION INTRAVENOUS at 04:24

## 2020-01-27 RX ADMIN — INSULIN LISPRO 6 UNITS: 100 INJECTION, SOLUTION INTRAVENOUS; SUBCUTANEOUS at 12:06

## 2020-01-27 RX ADMIN — MIDAZOLAM 6 MG/HR: 5 INJECTION INTRAMUSCULAR; INTRAVENOUS at 04:23

## 2020-01-27 RX ADMIN — MIDAZOLAM 6 MG/HR: 5 INJECTION INTRAMUSCULAR; INTRAVENOUS at 16:12

## 2020-01-27 RX ADMIN — MAGNESIUM SULFATE HEPTAHYDRATE 1 G: 1 INJECTION, SOLUTION INTRAVENOUS at 11:31

## 2020-01-27 RX ADMIN — FENTANYL CITRATE 50 MCG/HR: 50 INJECTION, SOLUTION INTRAMUSCULAR; INTRAVENOUS at 21:04

## 2020-01-27 RX ADMIN — POTASSIUM CHLORIDE 20 MEQ: 29.8 INJECTION, SOLUTION INTRAVENOUS at 00:33

## 2020-01-27 RX ADMIN — ALBUTEROL SULFATE 2.5 MG: 2.5 SOLUTION RESPIRATORY (INHALATION) at 08:30

## 2020-01-27 RX ADMIN — FAMOTIDINE 20 MG: 10 INJECTION, SOLUTION INTRAVENOUS at 09:08

## 2020-01-27 RX ADMIN — CLEVIPIDINE 12 MG/HR: 0.5 EMULSION INTRAVENOUS at 06:15

## 2020-01-27 RX ADMIN — AMIODARONE HYDROCHLORIDE 1 MG/MIN: 50 INJECTION, SOLUTION INTRAVENOUS at 02:53

## 2020-01-27 RX ADMIN — DOBUTAMINE HYDROCHLORIDE 5 MCG/KG/MIN: 200 INJECTION INTRAVENOUS at 11:28

## 2020-01-27 RX ADMIN — POTASSIUM CHLORIDE 20 MEQ: 29.8 INJECTION, SOLUTION INTRAVENOUS at 01:57

## 2020-01-27 RX ADMIN — ALBUTEROL SULFATE 2.5 MG: 2.5 SOLUTION RESPIRATORY (INHALATION) at 20:20

## 2020-01-27 RX ADMIN — MEROPENEM 1 G: 1 INJECTION, POWDER, FOR SOLUTION INTRAVENOUS at 22:20

## 2020-01-27 RX ADMIN — INSULIN LISPRO 6 UNITS: 100 INJECTION, SOLUTION INTRAVENOUS; SUBCUTANEOUS at 16:12

## 2020-01-27 RX ADMIN — INSULIN GLARGINE 12 UNITS: 100 INJECTION, SOLUTION SUBCUTANEOUS at 22:20

## 2020-01-27 RX ADMIN — CLEVIPIDINE 14 MG/HR: 0.5 EMULSION INTRAVENOUS at 02:29

## 2020-01-27 RX ADMIN — CLEVIPIDINE 16 MG/HR: 0.5 EMULSION INTRAVENOUS at 00:21

## 2020-01-27 RX ADMIN — AMIODARONE HYDROCHLORIDE 1 MG/MIN: 50 INJECTION, SOLUTION INTRAVENOUS at 18:56

## 2020-01-27 RX ADMIN — SODIUM CHLORIDE 10 MG/HR: 9 INJECTION, SOLUTION INTRAVENOUS at 22:34

## 2020-01-27 RX ADMIN — CLEVIPIDINE 11 MG/HR: 0.5 EMULSION INTRAVENOUS at 09:39

## 2020-01-27 RX ADMIN — POTASSIUM CHLORIDE 20 MEQ: 29.8 INJECTION, SOLUTION INTRAVENOUS at 03:37

## 2020-01-27 RX ADMIN — INSULIN LISPRO 6 UNITS: 100 INJECTION, SOLUTION INTRAVENOUS; SUBCUTANEOUS at 08:05

## 2020-01-27 RX ADMIN — CHLORHEXIDINE GLUCONATE 15 ML: 1.2 RINSE ORAL at 22:04

## 2020-01-27 RX ADMIN — POTASSIUM CHLORIDE 20 MEQ: 29.8 INJECTION, SOLUTION INTRAVENOUS at 17:02

## 2020-01-27 RX ADMIN — AMIODARONE HYDROCHLORIDE 1 MG/MIN: 50 INJECTION, SOLUTION INTRAVENOUS at 10:07

## 2020-01-27 RX ADMIN — DOCUSATE SODIUM 100 MG: 100 CAPSULE, LIQUID FILLED ORAL at 22:21

## 2020-01-27 RX ADMIN — POTASSIUM CHLORIDE 20 MEQ: 29.8 INJECTION, SOLUTION INTRAVENOUS at 08:22

## 2020-01-27 RX ADMIN — POTASSIUM CHLORIDE 20 MEQ: 29.8 INJECTION, SOLUTION INTRAVENOUS at 13:28

## 2020-01-27 RX ADMIN — CHLORHEXIDINE GLUCONATE 15 ML: 1.2 RINSE ORAL at 12:08

## 2020-01-27 RX ADMIN — METOPROLOL TARTRATE 25 MG: 25 TABLET ORAL at 08:19

## 2020-01-27 RX ADMIN — POTASSIUM CHLORIDE 20 MEQ: 29.8 INJECTION, SOLUTION INTRAVENOUS at 10:07

## 2020-01-27 RX ADMIN — CLEVIPIDINE 14 MG/HR: 0.5 EMULSION INTRAVENOUS at 04:08

## 2020-01-27 RX ADMIN — METOPROLOL TARTRATE 50 MG: 50 TABLET, FILM COATED ORAL at 22:21

## 2020-01-27 RX ADMIN — CLEVIPIDINE 11 MG/HR: 0.5 EMULSION INTRAVENOUS at 14:00

## 2020-01-27 RX ADMIN — FENTANYL CITRATE 100 MCG/HR: 50 INJECTION, SOLUTION INTRAMUSCULAR; INTRAVENOUS at 09:39

## 2020-01-27 RX ADMIN — Medication 10 ML: at 22:03

## 2020-01-27 RX ADMIN — INSULIN LISPRO 3 UNITS: 100 INJECTION, SOLUTION INTRAVENOUS; SUBCUTANEOUS at 22:22

## 2020-01-27 RX ADMIN — Medication 10 ML: at 09:08

## 2020-01-27 RX ADMIN — ALBUTEROL SULFATE 2.5 MG: 2.5 SOLUTION RESPIRATORY (INHALATION) at 16:38

## 2020-01-27 RX ADMIN — SODIUM CHLORIDE 5 MG/HR: 9 INJECTION, SOLUTION INTRAVENOUS at 17:12

## 2020-01-27 RX ADMIN — CLEVIPIDINE 14 MG/HR: 0.5 EMULSION INTRAVENOUS at 01:20

## 2020-01-27 RX ADMIN — FUROSEMIDE 20 MG/HR: 10 INJECTION, SOLUTION INTRAMUSCULAR; INTRAVENOUS at 06:32

## 2020-01-27 RX ADMIN — DOBUTAMINE HYDROCHLORIDE 5 MCG/KG/MIN: 200 INJECTION INTRAVENOUS at 09:50

## 2020-01-27 RX ADMIN — CASTOR OIL AND BALSAM, PERU: 788; 87 OINTMENT TOPICAL at 22:04

## 2020-01-27 RX ADMIN — DEXMEDETOMIDINE HYDROCHLORIDE 0.2 MCG/KG/HR: 100 INJECTION, SOLUTION INTRAVENOUS at 23:30

## 2020-01-27 ASSESSMENT — PULMONARY FUNCTION TESTS
PIF_VALUE: 30
PIF_VALUE: 22
PIF_VALUE: 25
PIF_VALUE: 24
PIF_VALUE: 21
PIF_VALUE: 22
PIF_VALUE: 30
PIF_VALUE: 30
PIF_VALUE: 23
PIF_VALUE: 20
PIF_VALUE: 23
PIF_VALUE: 30
PIF_VALUE: 30
PIF_VALUE: 24
PIF_VALUE: 21
PIF_VALUE: 30
PIF_VALUE: 21
PIF_VALUE: 30
PIF_VALUE: 20
PIF_VALUE: 30
PIF_VALUE: 24
PIF_VALUE: 22
PIF_VALUE: 22
PIF_VALUE: 30
PIF_VALUE: 30
PIF_VALUE: 22
PIF_VALUE: 25
PIF_VALUE: 23
PIF_VALUE: 24
PIF_VALUE: 30
PIF_VALUE: 22
PIF_VALUE: 23

## 2020-01-27 ASSESSMENT — PAIN SCALES - GENERAL
PAINLEVEL_OUTOF10: 0
PAINLEVEL_OUTOF10: 0

## 2020-01-27 NOTE — PROGRESS NOTES
[facility]    [facility]              [phone]                       Plan :     Renal function is stable  Alkalosis-recommend vent setting changes-Dr. Carolin Patel kindly change the settings  CO2 is high -if it goes to 40 or above we will give another dose of Diamox  On Lasix drip and negatives 7 L so far  Oxygen requirement coming down to 30% now    Diuretics managed by cardiologist-recommend decreased diuretic dose gradually due to resolution of pulmonary edema     Assessment :     Acute Kidney Injury  Creatinine peaked to 1.6- now 0.7 and remains there  Likely due to cardiac surgery, hypotension, Cardiorenal syndrome  Getting inotropes-helped his kidney function  On Lasix drip- now 20 mg hr      hyperkalemia  Now on low side  Getting supplement  Also getting magnesium    Acidosis  Lactic acidosis likely due to hypotension poor tissue perfusion  Improved    Hypotension- now Hypertension  BP: (112-132)/(46-78) Pulse:  [102-114]   Low-dose of Levophed- switched to dobutamine  Now needing nicardipine drip  Switched to Clevidipine drip now         [facility] would like to thank Man Watson MD   for opportunity to serve this patient      Please call with questions at-   24 Hrs Answering service [phone] or  7 am- 5 pm via Perfect serve or cell phone  Dr.Sudhir Ze Shin          CC/reason for consult :   Acute kidney injury   HPI :     From consult note-   Pj Maldonado is a 77 y.o. male presented to   the hospital on 1/8/2020 with shortness of breath. Not able to give history because of being on BiPAP. He is known to have severe aortic stenosis and he had redo sternotomy and replacement of ascending aorta done, and also replacement with Carrasquillo Intuity valve done. Following that he is in ICU and is requiring BiPAP and has a poor urine output and he is on a low-dose of vasopressor.     He was here earlier this month with shortness of breath pneumonia and large left hemothorax and pneumothorax. Also had PAL and hyperkalemia.   Did not require dialysis during that hospitalization and his kidney function improved to normal.    We are consulted for PAL and related issues     Interval History:       Making urine  Still on vent    ROS:     Seen with- RN,   NP and Dr Anayeli Velez to obtain ROS due to intubatation          Medication:     Current Facility-Administered Medications: magnesium sulfate 1 g in dextrose 5% 100 mL IVPB, 1 g, Intravenous, Once  insulin lispro (HUMALOG) injection vial 0-18 Units, 0-18 Units, Subcutaneous, Q4H  metoprolol tartrate (LOPRESSOR) tablet 25 mg, 25 mg, Oral, BID  insulin glargine (LANTUS) injection vial 12 Units, 12 Units, Subcutaneous, Daily  furosemide (LASIX) injection 15 mg, 15 mg, Intravenous, Once  furosemide (LASIX) 500 mg in dextrose 5 % 100 mL infusion, 20 mg/hr, Intravenous, Continuous  midazolam (VERSED) 100 mg in dextrose 5 % 100 mL infusion, 6 mg/hr, Intravenous, Continuous  ketamine (KETALAR) injection 50 mg, 50 mg, Intravenous, Q2H PRN **OR** ketamine (KETALAR) injection 100 mg, 100 mg, Intravenous, Q2H PRN  clevidipine (CLEVIPREX) infusion, 2 mg/hr, Intravenous, Continuous  amiodarone (CORDARONE) 450 mg in dextrose 5 % 250 mL infusion, 1 mg/min, Intravenous, Continuous  argatroban 250 mg in dextrose 5 % 250 mL infusion, 1.5 mcg/kg/min (Order-Specific), Intravenous, Continuous  meropenem (MERREM) 1 g in sodium chloride 0.9 % 100 mL IVPB (mini-bag), 1 g, Intravenous, Q12H  norepinephrine (LEVOPHED) 16 mg in dextrose 5 % 250 mL infusion, 2 mcg/min, Intravenous, Titrated  DOBUTamine (DOBUTREX) 500 mg in dextrose 5 % 250 mL infusion, 4 mcg/kg/min, Intravenous, Continuous  niCARdipine (CARDENE) 50 mg in sodium chloride 0.9 % 250 mL infusion, 5 mg/hr, Intravenous, Titrated  fentaNYL (SUBLIMAZE) 1,000 mcg in sodium chloride 0.9 % 100 mL infusion, 100 mcg/hr, Intravenous, Continuous  fentaNYL (SUBLIMAZE) injection 25 mcg, 25 mcg, Intravenous, Q1H

## 2020-01-27 NOTE — PROGRESS NOTES
01/27/20 0357   Vent Information   Vent Type 840   Vent Mode AC/PC   Rate Set 15 bmp   Pressure Support 0 cmH20   FiO2  30 %   Sensitivity 3   PEEP/CPAP 10   I Time/ I Time % 1.3 s   Humidification Source HME  (changed)   Vent Patient Data   High Peep/I Pressure 20   Peak Inspiratory Pressure 30 cmH2O   Mean Airway Pressure 17 cmH20   Rate Measured 15 br/min   Vt Exhaled 644 mL   Minute Volume 9.67 Liters   I:E Ratio 1:2.10   Cough/Sputum   Sputum How Obtained Endotracheal   Cough Non-productive   Spontaneous Breathing Trial (SBT) RT Doc   Pulse 106   SpO2 97 %   Breath Sounds   Right Upper Lobe Clear   Right Middle Lobe Diminished   Right Lower Lobe Diminished   Left Upper Lobe Clear   Left Lower Lobe Diminished   Additional Respiratory  Assessments   Resp 15   End Tidal CO2 32 (%)   Alarm Settings   High Pressure Alarm 45 cmH2O   Low Minute Volume Alarm 2 L/min   High Respiratory Rate 40 br/min   Low Exhaled Vt  200 mL   ETT (adult)   Placement Date/Time: 01/23/20 1445   Timeout: Patient;Site/Side;Appropriate Equipment;Correct Position  Preoxygenation: Yes  Mask Ventilation: Ventilated by mask (1)  Technique: Video laryngoscopy  Type: Cuffed  Tube Size: 7.5 mm  Laryngoscope: GlideS. ..    Secured at 22 cm   Measured From Lips   ET Placement Left   Secured By Commercial tube romero   Site Condition Dry

## 2020-01-27 NOTE — PROGRESS NOTES
01/27/20 0830   Vent Information   Equipment Changed HME   Vent Type 840   Vent Mode AC/VC+  (CHANGED PER DR DUMONT)   Vt Ordered 500 mL   Rate Set 15 bmp   Pressure Support 0 cmH20   FiO2  30 %   Sensitivity 3   PEEP/CPAP 8  (CHANGED PER DR DUMONT)   I Time/ I Time % 1 s   Cuff Pressure (cm H2O) 30 cm H2O   Humidification Source HME   Vent Patient Data   Peak Inspiratory Pressure 23 cmH2O   Mean Airway Pressure 13 cmH20   Rate Measured 16 br/min   Vt Exhaled 536 mL   Minute Volume 7.61 Liters   I:E Ratio 1:1.10   Plateau Pressure 22 MTE29   Static Compliance 38.25 mL/cmH2O   Dynamic Compliance 35.73 mL/cmH2O   Cough/Sputum   Sputum How Obtained Endotracheal;Suctioned   Cough Productive   Sputum Amount Small   Sputum Color Cloudy   Tenacity Thick   Spontaneous Breathing Trial (SBT) RT Doc   Pulse 112   SpO2 94 %   Breath Sounds   Right Upper Lobe Rhonchi   Right Middle Lobe Diminished   Right Lower Lobe Rhonchi   Left Upper Lobe Diminished   Left Lower Lobe Diminished   Additional Respiratory  Assessments   Resp 15   End Tidal CO2 39 (%)   Alarm Settings   High Pressure Alarm 45 cmH2O   Low Minute Volume Alarm 2 L/min   High Respiratory Rate 40 br/min   Low Exhaled Vt  200 mL   Patient Observation   Observations HHN WITH AEROGEN, AMBU BAG AND MASK AT BEDSIDE, ALARMS ON AND AUDIBLE, APNEA PARAMETERS ON   ETT (adult)   Placement Date/Time: 01/23/20 1645   Timeout: Patient;Site/Side;Appropriate Equipment;Correct Position  Preoxygenation: Yes  Mask Ventilation: Ventilated by mask (1)  Technique: Video laryngoscopy  Type: Cuffed  Tube Size: 7.5 mm  Laryngoscope: GlideS. ..    Secured at 23 cm   Measured From Lips   ET Placement Left  (FOUND ON CENTER)   Secured By Commercial tube romero   Site Condition Dry

## 2020-01-27 NOTE — PROGRESS NOTES
ONCOLOGY HEMATOLOGY CARE PROGRESS NOTE      SUBJECTIVE:     Patient trying to wean today. ARTEMIO still not back. Call placed to the lab at [facility] 96:   The remaining 10 point review of symptoms is unremarkable. OBJECTIVE        Physical    VITALS:  BP (!) 132/46   Pulse 109   Temp 98 °F (36.7 °C) (Core)   Resp 15   Ht 5' 9\" (1.753 m)   Wt 199 lb 4.7 oz (90.4 kg)   SpO2 94%   BMI 29.43 kg/m²   TEMPERATURE:  Current - Temp: 98 °F (36.7 °C); Max - Temp  Av.5 °F (36.9 °C)  Min: 98 °F (36.7 °C)  Max: 99.1 °F (37.3 °C)  PULSE OXIMETRY RANGE: SpO2  Av.3 %  Min: 94 %  Max: 100 %  24HR INTAKE/OUTPUT:      Intake/Output Summary (Last 24 hours) at 2020 1122  Last data filed at 2020 1049  Gross per 24 hour   Intake 3672 ml   Output 4586 ml   Net -914 ml       CONSTITUTIONAL:  Intubated and sedated, appears acutely ill   HEMATOLOGIC/LYMPHATICS:  no cervical lymphadenopathy, no supraclavicular lymphadenopathy, no axillary lymphadenopathy and no inguinal lymphadenopathy  LUNGS:  resp even non labored   CARDIOVASCULAR:  , regular rate and rhythm, normal S1 and S2, no S3 or S4, and no murmur noted  ABDOMEN:  Non distended   MUSCULOSKELETAL:  There is no redness, warmth, or swelling of the joints.   EXTREMETIES:no swelling, wearing MOISES hose   NEUROLOGIC:  Unable to assess   SKIN:  Scattered ecchymoses       Data      Recent Labs     20  1615 20  0800 20  0605   WBC 12.3* 9.6 10.4   HGB 8.7* 9.4* 9.8*   HCT 26.8* 27.5* 28.9*   PLT 42* 54* 72*   MCV 92.6 91.6 92.6        Recent Labs     20  1615 20  0452 20  2350 20  0605    143  --  141   K 4.0 3.6 3.5 4.0   CL 98* 99  --  98*   CO2 31 32  --  32   BUN 34* 30*  --  31*   CREATININE 0.9 0.7*  --  0.7*     Recent Labs     20  0452 20  0804   * 68*   * 106*   BILIDIR 0.5* 0.5*   BILITOT 1.0 1.1*   ALKPHOS 86 87       Magnesium:    Lab Results Perfect Serve

## 2020-01-27 NOTE — PROGRESS NOTES
01/27/20 1644   Vent Information   Equipment Changed HME   Vent Type 840   Vent Mode AC/VC+   Vt Ordered 500 mL   Rate Set 15 bmp   FiO2  30 %   Sensitivity 3   PEEP/CPAP 6   I Time/ I Time % 1.23 s   Cuff Pressure (cm H2O) 32 cm H2O   Humidification Source HME   Vent Patient Data   Peak Inspiratory Pressure 24 cmH2O   Mean Airway Pressure 13 cmH20   Vt Exhaled 577 mL   Minute Volume 8.54 Liters   I:E Ratio 1:2.3   Plateau Pressure 18 LPT59   Static Compliance 48. 08 mL/cmH2O   Dynamic Compliance 38.46 mL/cmH2O   Cough/Sputum   Sputum How Obtained Suctioned;Oral;Endotracheal   Cough Non-productive   Spontaneous Breathing Trial (SBT) RT Doc   SpO2 96 %   Breath Sounds   Right Upper Lobe Clear;Diminished   Right Middle Lobe Diminished   Right Lower Lobe Clear;Diminished   Left Upper Lobe Diminished   Left Lower Lobe Diminished   Alarm Settings   High Pressure Alarm 45 cmH2O   Low Minute Volume Alarm 2 L/min   High Respiratory Rate 40 br/min   Low Exhaled Vt  200 mL   Patient Observation   Observations hhn with aerogen, ambu bag and mask at bedside, alarms on and audible, apnea parameters on   ETT (adult)   Placement Date/Time: 01/23/20 7365   Timeout: Patient;Site/Side;Appropriate Equipment;Correct Position  Preoxygenation: Yes  Mask Ventilation: Ventilated by mask (1)  Technique: Video laryngoscopy  Type: Cuffed  Tube Size: 7.5 mm  Laryngoscope: GliLilly. ..    Secured at 23 cm   Measured From Lips   ET Placement Left   Secured By Commercial tube romero   Site Condition Dry   Cuff Pressure 32 cm H2O

## 2020-01-27 NOTE — PROGRESS NOTES
Dr. Terrance Fortune at bedside:    PEEP to be further weaned. Precedex to start at 0200, midazolam and fentanyl to be halved at this time, then downtitrated per protocol to be off at 0600. Metoprolol dosage to be increased to 50mg BID. Dobutamine to 4mcg/kg/min parked. Nicardipine to be re-started in place of cleviprex. Notified pharmacy to double concentrate.

## 2020-01-27 NOTE — PROGRESS NOTES
01/27/20 0013   Vent Information   Vent Type 840   Vent Mode AC/PC   Rate Set 15 bmp   Pressure Support 0 cmH20   FiO2  30 %   Sensitivity 3   PEEP/CPAP 10   I Time/ I Time % 1.3 s   Humidification Source HME   Vent Patient Data   High Peep/I Pressure 20   Peak Inspiratory Pressure 30 cmH2O   Mean Airway Pressure 17 cmH20   Rate Measured 15 br/min   Vt Exhaled 584 mL   Minute Volume 8.76 Liters   I:E Ratio 1:2.10   Cough/Sputum   Sputum How Obtained Endotracheal   Cough Productive   Sputum Amount Small   Sputum Color Creamy   Tenacity Thick   Spontaneous Breathing Trial (SBT) RT Doc   Pulse 108   SpO2 97 %   Additional Respiratory  Assessments   Resp 15   End Tidal CO2 36 (%)   Alarm Settings   High Pressure Alarm 45 cmH2O   Low Minute Volume Alarm 2 L/min   High Respiratory Rate 40 br/min   Low Exhaled Vt  200 mL   ETT (adult)   Placement Date/Time: 01/23/20 9535   Timeout: Patient;Site/Side;Appropriate Equipment;Correct Position  Preoxygenation: Yes  Mask Ventilation: Ventilated by mask (1)  Technique: Video laryngoscopy  Type: Cuffed  Tube Size: 7.5 mm  Laryngoscope: GlideS. ..    Secured at 22 cm   Measured From Lips   ET Placement Right   Secured By Commercial tube romero   Site Condition Dry

## 2020-01-27 NOTE — PLAN OF CARE
Patient's EF (Ejection Fraction) is greater than 40%      Patient's weights and intake/output reviewed: Yes    Patient's Last Weight: 93 kg obtained by bed scale. Difference of 2 lbs less than last documented weight. Intake/Output Summary (Last 24 hours) at 1/27/2020 0219  Last data filed at 1/27/2020 0046  Gross per 24 hour   Intake 3217 ml   Output 4256 ml   Net -1039 ml       Comorbidities Reviewed Yes    Patient has a past medical history of CAD (coronary artery disease), COPD (chronic obstructive pulmonary disease) ([facility] [address] Diabetes mellitus ([facility] [address] Gout, Hemothorax, left, Hyperlipidemia, Hypertension, Obesity, Class I, BMI 30.0-34.9 (see actual BMI), S/P thoracotomy, Status post partial removal of lung, and Thyroid disease. >>For CHF and Comorbidity documentation on Education Time and Topics, please see Education Tab    Patient stated Daily Functional Goal: Decrease ventilator oxygen demand. Pt resting in bed at this time on Vent . Pt with nonpitting lower extremity edema.      Patient and/or Family's stated Goal of Care this Admission: reduce shortness of breath, increase activity tolerance, better understand heart failure and disease management, be more comfortable, and reduce lower extremity edema prior to discharge

## 2020-01-27 NOTE — PROGRESS NOTES
CVTS Cardiothoracic Progress Note:                                CC:  Post op follow up     Surgery: 1/21/20 RE-DO STERNOTOMY X3, REPLACEMENT OF ASCENDING AORTA, RE-DO AORTIC VALVE REPLACEMENT WITH 19MM BLOCK INTUITY VALVE, CLOSURE OF OUTFLOW TRACK ATRIAL FISTULA, HYPOTHERMIC ARREST,  WITH TRANSESOPHAGEAL ECHOCARDIOGRAM, CIRCULATORY ARREST, 5 LEVEL BILATERAL INTERCOSTAL NERVE BLOCK with Dr.'s Rashid Orosco and Lottie Guan. Hospital course:   1/22 resting in bed, awake, alert, responding appropriately on SBT  1/23 Pt with increasing oxygen demand overnight, Cr up to 1.6, hyperkalemia 5.9, and lactic 9.7  1/24 pt resting in bed, intubated and sedated. 1/25 & 1/26 remains intubated and sedated, FiO2 requirement improving. 1/27 FiO2 on vent is 30%, Peep of 10.  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 Perez Or - redo w/23mm Magna Ease bioprosthetic valve    BRONCHOSCOPY N/A 1/9/2020    w/BAL, performed by Angelica Gutiérrez MD at [address] [place] [address] N/A 1/24/2020    BRONCHOSCOPY ALVEOLAR LAVAGE performed by Jung Hoffman MD at [address] [place] [address]  1/24/2020    BRONCHOSCOPY THERAPUTIC ASPIRATION INITIAL performed by Jung Hoffman MD at [facility] CABG WITH AORTIC VALVE REPLACEMENT  2001    date approximate, x1 to OM, AVR w/porcine valve    CARDIAC CATHETERIZATION  01/13/2020    Dr. Mario Dalal Left 01/02/2020    Dr. Linda Tobar - 3200 ml blood (Last 24 hours) at 1/27/2020 0954  Last data filed at 1/27/2020 0851  Gross per 24 hour   Intake 3672 ml   Output 4411 ml   Net -739 ml      GTTS:   Dobutamine at 5 mcg/kg/hr  Furosemide at 20 mg/hr  Fentanyl at 100 mcg/hr  Versed at 6 mg/hr  Argatroban at 1.5 mcg/kg/min  Amiodarone at 1 mg/min  Clevidipine at 6 mg/hr    Extubation Time: 1/22 @ 0900  Transition Time: 1/22 @ 22:00     LABORATORY DATA:     CBC:   Recent Labs     01/25/20  1615 01/26/20  0800 01/27/20  0605   WBC 12.3* 9.6 10.4   HGB 8.7* 9.4* 9.8*   HCT 26.8* 27.5* 28.9*   MCV 92.6 91.6 92.6   PLT 42* 54* 72*     BMP:   Recent Labs     01/25/20  1615 01/26/20  0452 01/26/20  2350 01/27/20  0605    143  --  141   K 4.0 3.6 3.5 4.0   CL 98* 99  --  98*   CO2 31 32  --  32   BUN 34* 30*  --  31*   CREATININE 0.9 0.7*  --  0.7*     MG:    Recent Labs     01/25/20  0449 01/26/20  0452 01/27/20  0605   MG 2.10 1.80 1.90      PT/INR:   Recent Labs     01/26/20  0452   PROTIME 19.4*   INR 1.66*     APTT:   Recent Labs     01/26/20  1425 01/26/20  1615 01/27/20  0605   APTT 56.5* 57.6* 57.5*     CXR: 1/22   Impression   Improving left basilar atelectasis. CXR: 1/24   Impression   1. An enteric tube is noted, however, it is difficult to follow the entire   course of the enteric tube, would recommend dedicated radiographs of the   upper abdomen for better evaluation.  Contrast material does opacify the   stomach. 2. No significant interval change in bilateral hazy airspace disease. 3. No definite pneumothorax identified.    4. Additional support devices as described above.     ________________________________________________________________________    Subjective:   Dietary Intake: TF via NG  no Nausea   Pain Control: controlled, prn meds  Complaints: none  Bowels: have not moved    Objective:   General appearance: intubated, sedated, in NAD  Lungs: diminished  Heart: S1S2 normal; ST low 100's on monitor  Chest: symmetrical expansion with inspiration and expirations; no rocking of sternum noted   Abdomen: round, soft, non-tender   Bowel sounds: hypoactive  Kidneys: UOP 1145/1626/1350= 4121 ml in 24 hrs; Cr 0.7  Wound/Incisions: Midsternal incision CDI; LLE incisions with dressings CDI; Pacing wires taped, secured, and attached  Extremities: BLE pulses by doppler; 2+ swelling noted in BLE  Neurological: currently sedated  Chest tubes/Drains: Chest tube # 2 with 20/30/20= 70 ml serosanguinous drainage in 24 hours overnight; no airleaks noted    ________________________________________________________________________    SCIP Measures:     · Noriega catheter for:  ? IV Diuresis  ? Accurate I/O  ? D/C today  ·   · Antibiotics:  ? Have been stopped  ? Prophylaxis (POD #1 only)  ? Continued for:   ________________________________________________________________________    Assessment:   Post-op: 6 days. Condition: In stable condition. Plan:   1. Cardiovascular: s/p Aorta repair and AVR- ASA, Plavix,Statin, amio, BB  Dobutamine remains at 5 mcg/kg/hr. Pt alternating between ST and PAF. 2. Pulmonary: needs pulmonary expansion maneuvers  CXR 1/23 and 1/24 showed pulmonary edema. Pt with respiratory distress - elective intubation was performed at bedside 1/23       Pulmonology managing vent. Peep down to 8, pt appears to be tolerating it well. 3. Neurology: analgesia as needed. 4. Nephrology: fluid management- diurese as tolerated. Nephro following. UOP was roughly 30 ml/hr on 1/22. Lasix gtt increased 1/25 to 20 mg/hr. UOP improved, currently 246 ml/hr. 5. Endocrinology: Needs tighter BG control. Continue SSI, will increase Lantus    6. Hematology: acute blood loss anemia; expected, monitor. Thrombocytopenia- improving, plt 72 today, will hold Arixtra, ASA. HIT Positive+   Argatroban drip started 1/23.     7. Microbiology:   Leukocytosis: much improved, WBC 10.4 today.     BC X2 and urine culture - NGTD  Empiric broad spectrum antibiotic started (Garcia Rosado). Will complete today, day 5 of therapy. 8. Nutrition: TF's per NG tube. 9. Labs: monitor. Fungal cx remains pending. 10. Post-op Drains/Wires:  MD will cut TPW's this afternoon. 11. D/C Goals: too soon to tell, DCP following. Likely will need rehab, possibly ARU. 12. Continue post-op care of patient in the ICU    Meds:    The patient is on a beta-blocker   The patient is not on an ace-i/ARB- not indicated   The patient is on a statin   The patient is on oral antiplatelet therapy   ________________________________________________________________________  *Only bolded items apply to this patient at this time:     ? Acidemia (pH < 7.35) ______  ? Alkalemia (pH > 7.45) ______  ? Acidosis (Bicarb <20) ______  ? Electrolyte abnormality (specify)  ? Acute post-op respiratory insufficiency (difficulty weaning from vent)  ? Acute post-op blood loss anemia (hct ? 30)        ? Transfused this admission post-op  ? Cardiac arrythmia:  ? Ventricular Tachycardia     ? Atrial Flutter     ? Atrial fibrillation  ? Acute pulmonary edema due to:      ? Noncardiogenic causes  ? Acute heart failure (Syst, Diast)   ? Encephalopathy (confusion, delirium, altered mental status), (toxic, metabolic)  ? Restraints in use  ? TIA/Stroke (acute, post-op)   ? Acute kidney injury (Cr > 0.5 over baseline, oliguria, ? 5ml/kg/hr x 6 hrs)  ? Acute renal failure (Cr > 2x baseline, Dialysis started)  ? Malnutrition: ? Severe (prealbumin <10, albumin ? 2.5)      ? Moderate (prealbumin <15, albumin <3)  ? SIRS due to non-infectious process (temp > 100.5, wbc > 12, HR > 90, RR>20)  ? Coagulopathy (consumptive, HIT, fibrinolysis) ? FFP or platelets given post-op  ________________________________________________________________________    Randy Sifuentes, CNP  1/27/2020  9:54 AM  Note reviewed, events of last 24 hours reviewed along with vital signs and I/Os and patient examined.   Assessment and plans discussed and are as outlined above.     Azalia Faria MD, FACS, [facility], [address], [facility]

## 2020-01-27 NOTE — PROGRESS NOTES
Dr. Loco Mcgill at bedside for ventilator management.     Settings changed to:   VC+ rate 15 volume 500 Peep 8 cmH2O 30% FiO2

## 2020-01-27 NOTE — PROGRESS NOTES
Provides:If cleviprex d/c'd, Recommend Goal of 40 mL/hr x 20 hrs to provide 800 mL TV, 1200 kcals, 66 gm protein, 607 mL free fluid. +2 proteinex modulars to provide an additioanl 208 kcals, and 52 gm protein. · Anthropometric Measures:  · Ht: 5' 9\" (175.3 cm)   · Current Body Wt: 199 lb (90.3 kg)  · Admission Body Wt: 204 lb (92.5 kg)  · Weight Change: -7 since admission per I&O   · Ideal Body Wt: 166 lb (75.3 kg)   · BMI Classification: BMI 30.0 - 34.9 Obese Class I    Nutrition Interventions:   Modify current Tube Feeding  Continued Inpatient Monitoring    Nutrition Evaluation:   · Evaluation: Progressing toward goals   · Goals: Tolerate nutrition support at goal this admission    · Monitoring: Nutrition Progression, TF Intake, TF Tolerance, Weight, Pertinent Labs      Electronically signed by Mona Burr.  Jennifer Thornton RD, LD on 1/27/20 at 10:12 AM    Contact Number: Office: [phone]; [address]: [address]

## 2020-01-28 LAB
ANION GAP SERPL CALCULATED.3IONS-SCNC: 7 MMOL/L (ref 3–16)
APTT: 63.1 SEC (ref 24.2–36.2)
BASE EXCESS ARTERIAL: 14 (ref -3–3)
BASE EXCESS ARTERIAL: 16 (ref -3–3)
BASE EXCESS ARTERIAL: 18 (ref -3–3)
BASOPHILS ABSOLUTE: 0 K/UL (ref 0–0.2)
BASOPHILS RELATIVE PERCENT: 0.1 %
BUN BLDV-MCNC: 34 MG/DL (ref 7–20)
CALCIUM SERPL-MCNC: 8.5 MG/DL (ref 8.3–10.6)
CHLORIDE BLD-SCNC: 97 MMOL/L (ref 99–110)
CO2: 36 MMOL/L (ref 21–32)
CREAT SERPL-MCNC: 0.7 MG/DL (ref 0.8–1.3)
EOSINOPHILS ABSOLUTE: 0.1 K/UL (ref 0–0.6)
EOSINOPHILS RELATIVE PERCENT: 1.3 %
GFR AFRICAN AMERICAN: >60
GFR NON-AFRICAN AMERICAN: >60
GLUCOSE BLD-MCNC: 131 MG/DL (ref 70–99)
GLUCOSE BLD-MCNC: 143 MG/DL (ref 70–99)
GLUCOSE BLD-MCNC: 143 MG/DL (ref 70–99)
GLUCOSE BLD-MCNC: 147 MG/DL (ref 70–99)
GLUCOSE BLD-MCNC: 153 MG/DL (ref 70–99)
GLUCOSE BLD-MCNC: 175 MG/DL (ref 70–99)
GLUCOSE BLD-MCNC: 186 MG/DL (ref 70–99)
HCO3 ARTERIAL: 37 MMOL/L (ref 21–29)
HCO3 ARTERIAL: 39.3 MMOL/L (ref 21–29)
HCO3 ARTERIAL: 40.4 MMOL/L (ref 21–29)
HCO3 ARTERIAL: 40.5 MMOL/L (ref 21–29)
HCO3 ARTERIAL: 41.2 MMOL/L (ref 21–29)
HCT VFR BLD CALC: 29.8 % (ref 40.5–52.5)
HEMOGLOBIN: 9.9 G/DL (ref 13.5–17.5)
LYMPHOCYTES ABSOLUTE: 0.5 K/UL (ref 1–5.1)
LYMPHOCYTES RELATIVE PERCENT: 5.7 %
MAGNESIUM: 2.2 MG/DL (ref 1.8–2.4)
MCH RBC QN AUTO: 30.8 PG (ref 26–34)
MCHC RBC AUTO-ENTMCNC: 33.2 G/DL (ref 31–36)
MCV RBC AUTO: 92.8 FL (ref 80–100)
MONOCYTES ABSOLUTE: 0.6 K/UL (ref 0–1.3)
MONOCYTES RELATIVE PERCENT: 6.7 %
NEUTROPHILS ABSOLUTE: 8.2 K/UL (ref 1.7–7.7)
NEUTROPHILS RELATIVE PERCENT: 86.2 %
O2 SAT, ARTERIAL: 92 % (ref 93–100)
O2 SAT, ARTERIAL: 93 % (ref 93–100)
O2 SAT, ARTERIAL: 93 % (ref 93–100)
O2 SAT, ARTERIAL: 95 % (ref 93–100)
O2 SAT, ARTERIAL: 96 % (ref 93–100)
PCO2 ARTERIAL: 44 MM HG (ref 35–45)
PCO2 ARTERIAL: 49.5 MM HG (ref 35–45)
PCO2 ARTERIAL: 49.7 MM HG (ref 35–45)
PCO2 ARTERIAL: 53.8 MM HG (ref 35–45)
PCO2 ARTERIAL: 54.1 MM HG (ref 35–45)
PDW BLD-RTO: 17.3 % (ref 12.4–15.4)
PERFORMED ON: ABNORMAL
PH ARTERIAL: 7.47 (ref 7.35–7.45)
PH ARTERIAL: 7.49 (ref 7.35–7.45)
PH ARTERIAL: 7.52 (ref 7.35–7.45)
PH ARTERIAL: 7.52 (ref 7.35–7.45)
PH ARTERIAL: 7.53 (ref 7.35–7.45)
PLATELET # BLD: 71 K/UL (ref 135–450)
PLATELET SLIDE REVIEW: ABNORMAL
PMV BLD AUTO: 10 FL (ref 5–10.5)
PO2 ARTERIAL: 58.2 MM HG (ref 75–108)
PO2 ARTERIAL: 62.9 MM HG (ref 75–108)
PO2 ARTERIAL: 63.6 MM HG (ref 75–108)
PO2 ARTERIAL: 71 MM HG (ref 75–108)
PO2 ARTERIAL: 72.8 MM HG (ref 75–108)
POC FIO2: 30
POC FIO2: 30
POC SAMPLE TYPE: ABNORMAL
POTASSIUM REFLEX MAGNESIUM: 4.1 MMOL/L (ref 3.5–5.1)
RBC # BLD: 3.21 M/UL (ref 4.2–5.9)
SLIDE REVIEW: ABNORMAL
SODIUM BLD-SCNC: 140 MMOL/L (ref 136–145)
TCO2 ARTERIAL: 38 MMOL/L
TCO2 ARTERIAL: 41 MMOL/L
TCO2 ARTERIAL: 42 MMOL/L
TCO2 ARTERIAL: 42 MMOL/L
TCO2 ARTERIAL: 43 MMOL/L
WBC # BLD: 9.5 K/UL (ref 4–11)

## 2020-01-28 PROCEDURE — 83735 ASSAY OF MAGNESIUM: CPT

## 2020-01-28 PROCEDURE — 6360000002 HC RX W HCPCS: Performed by: THORACIC SURGERY (CARDIOTHORACIC VASCULAR SURGERY)

## 2020-01-28 PROCEDURE — 6370000000 HC RX 637 (ALT 250 FOR IP): Performed by: THORACIC SURGERY (CARDIOTHORACIC VASCULAR SURGERY)

## 2020-01-28 PROCEDURE — 2500000003 HC RX 250 WO HCPCS: Performed by: INTERNAL MEDICINE

## 2020-01-28 PROCEDURE — 85730 THROMBOPLASTIN TIME PARTIAL: CPT

## 2020-01-28 PROCEDURE — 2580000003 HC RX 258: Performed by: THORACIC SURGERY (CARDIOTHORACIC VASCULAR SURGERY)

## 2020-01-28 PROCEDURE — 2580000003 HC RX 258: Performed by: INTERNAL MEDICINE

## 2020-01-28 PROCEDURE — 94660 CPAP INITIATION&MGMT: CPT

## 2020-01-28 PROCEDURE — 2700000000 HC OXYGEN THERAPY PER DAY

## 2020-01-28 PROCEDURE — 99291 CRITICAL CARE FIRST HOUR: CPT | Performed by: INTERNAL MEDICINE

## 2020-01-28 PROCEDURE — 2500000003 HC RX 250 WO HCPCS: Performed by: THORACIC SURGERY (CARDIOTHORACIC VASCULAR SURGERY)

## 2020-01-28 PROCEDURE — 94003 VENT MGMT INPAT SUBQ DAY: CPT

## 2020-01-28 PROCEDURE — 6360000002 HC RX W HCPCS: Performed by: INTERNAL MEDICINE

## 2020-01-28 PROCEDURE — 2140000000 HC CCU INTERMEDIATE R&B

## 2020-01-28 PROCEDURE — 31622 DX BRONCHOSCOPE/WASH: CPT

## 2020-01-28 PROCEDURE — 94669 MECHANICAL CHEST WALL OSCILL: CPT

## 2020-01-28 PROCEDURE — 99024 POSTOP FOLLOW-UP VISIT: CPT | Performed by: THORACIC SURGERY (CARDIOTHORACIC VASCULAR SURGERY)

## 2020-01-28 PROCEDURE — 6370000000 HC RX 637 (ALT 250 FOR IP): Performed by: NURSE PRACTITIONER

## 2020-01-28 PROCEDURE — 31646 BRNCHSC W/THER ASPIR SBSQ: CPT | Performed by: INTERNAL MEDICINE

## 2020-01-28 PROCEDURE — 80048 BASIC METABOLIC PNL TOTAL CA: CPT

## 2020-01-28 PROCEDURE — 82803 BLOOD GASES ANY COMBINATION: CPT

## 2020-01-28 PROCEDURE — 82947 ASSAY GLUCOSE BLOOD QUANT: CPT

## 2020-01-28 PROCEDURE — 94770 HC ETCO2 MONITOR DAILY: CPT

## 2020-01-28 PROCEDURE — 0BC38ZZ EXTIRPATION OF MATTER FROM RIGHT MAIN BRONCHUS, VIA NATURAL OR ARTIFICIAL OPENING ENDOSCOPIC: ICD-10-PCS | Performed by: INTERNAL MEDICINE

## 2020-01-28 PROCEDURE — 85025 COMPLETE CBC W/AUTO DIFF WBC: CPT

## 2020-01-28 PROCEDURE — 94761 N-INVAS EAR/PLS OXIMETRY MLT: CPT

## 2020-01-28 PROCEDURE — 0BC78ZZ EXTIRPATION OF MATTER FROM LEFT MAIN BRONCHUS, VIA NATURAL OR ARTIFICIAL OPENING ENDOSCOPIC: ICD-10-PCS | Performed by: INTERNAL MEDICINE

## 2020-01-28 PROCEDURE — 94640 AIRWAY INHALATION TREATMENT: CPT

## 2020-01-28 RX ORDER — ACETYLCYSTEINE 200 MG/ML
600 SOLUTION ORAL; RESPIRATORY (INHALATION) 2 TIMES DAILY
Status: DISCONTINUED | OUTPATIENT
Start: 2020-01-28 | End: 2020-01-28

## 2020-01-28 RX ORDER — ACETYLCYSTEINE 200 MG/ML
600 SOLUTION ORAL; RESPIRATORY (INHALATION)
Status: ACTIVE | OUTPATIENT
Start: 2020-01-28 | End: 2020-01-28

## 2020-01-28 RX ADMIN — ACETYLCYSTEINE 600 MG: 200 SOLUTION ORAL; RESPIRATORY (INHALATION) at 11:00

## 2020-01-28 RX ADMIN — ALBUTEROL SULFATE 2.5 MG: 2.5 SOLUTION RESPIRATORY (INHALATION) at 11:46

## 2020-01-28 RX ADMIN — INSULIN LISPRO 3 UNITS: 100 INJECTION, SOLUTION INTRAVENOUS; SUBCUTANEOUS at 02:19

## 2020-01-28 RX ADMIN — AMIODARONE HYDROCHLORIDE 1 MG/MIN: 50 INJECTION, SOLUTION INTRAVENOUS at 03:22

## 2020-01-28 RX ADMIN — FAMOTIDINE 20 MG: 10 INJECTION, SOLUTION INTRAVENOUS at 08:04

## 2020-01-28 RX ADMIN — ALBUTEROL SULFATE 2.5 MG: 2.5 SOLUTION RESPIRATORY (INHALATION) at 20:16

## 2020-01-28 RX ADMIN — Medication 10 ML: at 09:45

## 2020-01-28 RX ADMIN — POTASSIUM CHLORIDE 20 MEQ: 29.8 INJECTION, SOLUTION INTRAVENOUS at 01:03

## 2020-01-28 RX ADMIN — DOBUTAMINE HYDROCHLORIDE 4 MCG/KG/MIN: 200 INJECTION INTRAVENOUS at 09:18

## 2020-01-28 RX ADMIN — CASTOR OIL AND BALSAM, PERU: 788; 87 OINTMENT TOPICAL at 09:45

## 2020-01-28 RX ADMIN — INSULIN LISPRO 3 UNITS: 100 INJECTION, SOLUTION INTRAVENOUS; SUBCUTANEOUS at 22:03

## 2020-01-28 RX ADMIN — SODIUM CHLORIDE 15 MG/HR: 9 INJECTION, SOLUTION INTRAVENOUS at 17:18

## 2020-01-28 RX ADMIN — SODIUM CHLORIDE 10 MG/HR: 9 INJECTION, SOLUTION INTRAVENOUS at 03:53

## 2020-01-28 RX ADMIN — AMIODARONE HYDROCHLORIDE 1 MG/MIN: 50 INJECTION, SOLUTION INTRAVENOUS at 11:35

## 2020-01-28 RX ADMIN — INSULIN LISPRO 3 UNITS: 100 INJECTION, SOLUTION INTRAVENOUS; SUBCUTANEOUS at 06:29

## 2020-01-28 RX ADMIN — SODIUM CHLORIDE 10 MG/HR: 9 INJECTION, SOLUTION INTRAVENOUS at 13:24

## 2020-01-28 RX ADMIN — METOPROLOL TARTRATE 50 MG: 50 TABLET, FILM COATED ORAL at 22:05

## 2020-01-28 RX ADMIN — POTASSIUM CHLORIDE 20 MEQ: 29.8 INJECTION, SOLUTION INTRAVENOUS at 06:27

## 2020-01-28 RX ADMIN — AMIODARONE HYDROCHLORIDE 1 MG/MIN: 50 INJECTION, SOLUTION INTRAVENOUS at 20:56

## 2020-01-28 RX ADMIN — INSULIN GLARGINE 12 UNITS: 100 INJECTION, SOLUTION SUBCUTANEOUS at 09:50

## 2020-01-28 RX ADMIN — FAMOTIDINE 20 MG: 10 INJECTION, SOLUTION INTRAVENOUS at 22:05

## 2020-01-28 RX ADMIN — DOCUSATE SODIUM 100 MG: 100 CAPSULE, LIQUID FILLED ORAL at 22:05

## 2020-01-28 RX ADMIN — DEXTROSE MONOHYDRATE 1.5 MCG/KG/MIN: 50 INJECTION, SOLUTION INTRAVENOUS at 06:25

## 2020-01-28 RX ADMIN — METOPROLOL TARTRATE 50 MG: 50 TABLET, FILM COATED ORAL at 08:04

## 2020-01-28 RX ADMIN — FUROSEMIDE 15 MG/HR: 10 INJECTION, SOLUTION INTRAMUSCULAR; INTRAVENOUS at 03:54

## 2020-01-28 RX ADMIN — ALBUTEROL SULFATE 2.5 MG: 2.5 SOLUTION RESPIRATORY (INHALATION) at 07:33

## 2020-01-28 RX ADMIN — Medication 10 ML: at 22:06

## 2020-01-28 RX ADMIN — ALBUTEROL SULFATE 2.5 MG: 2.5 SOLUTION RESPIRATORY (INHALATION) at 15:36

## 2020-01-28 RX ADMIN — SODIUM CHLORIDE 15 MG/HR: 9 INJECTION, SOLUTION INTRAVENOUS at 20:57

## 2020-01-28 RX ADMIN — POTASSIUM CHLORIDE 20 MEQ: 29.8 INJECTION, SOLUTION INTRAVENOUS at 02:10

## 2020-01-28 RX ADMIN — INSULIN GLARGINE 12 UNITS: 100 INJECTION, SOLUTION SUBCUTANEOUS at 22:03

## 2020-01-28 RX ADMIN — CHLORHEXIDINE GLUCONATE 15 ML: 1.2 RINSE ORAL at 20:57

## 2020-01-28 RX ADMIN — SODIUM CHLORIDE 12.5 MG/HR: 9 INJECTION, SOLUTION INTRAVENOUS at 08:06

## 2020-01-28 RX ADMIN — CHLORHEXIDINE GLUCONATE 15 ML: 1.2 RINSE ORAL at 08:04

## 2020-01-28 RX ADMIN — CASTOR OIL AND BALSAM, PERU: 788; 87 OINTMENT TOPICAL at 20:57

## 2020-01-28 ASSESSMENT — PULMONARY FUNCTION TESTS
PIF_VALUE: 24
PIF_VALUE: 21
PIF_VALUE: 16
PIF_VALUE: 18
PIF_VALUE: 16
PIF_VALUE: 10
PIF_VALUE: 15
PIF_VALUE: 27
PIF_VALUE: 22
PIF_VALUE: 16
PIF_VALUE: 46
PIF_VALUE: 16
PIF_VALUE: 18
PIF_VALUE: 16
PIF_VALUE: 16
PIF_VALUE: 20
PIF_VALUE: 18
PIF_VALUE: 17
PIF_VALUE: 15
PIF_VALUE: 16
PIF_VALUE: 19
PIF_VALUE: 13
PIF_VALUE: 16
PIF_VALUE: 18
PIF_VALUE: 17
PIF_VALUE: 18
PIF_VALUE: 12
PIF_VALUE: 18

## 2020-01-28 NOTE — PROGRESS NOTES
Respiratory Therapy Bronchoscopy Assist      Name:  Amita Riojas  Medical Record Number:  [id number]  Age: 77 y.o. Gender: male  : 1950  Today's Date:  2020  Room:  [id number]       bronchoscopy assist with patient on 100%. Sterile field maintained throughout procedure. Patient was pre-sedated. 35 mL of saline instilled. 22 mL of tan/cloudy fluid obtained. Pt tolerated procedure well. Patient SpO2 within acceptable range throughout bronchscopy procedure. Physician assisted with bronch from 1055 AM to 4197 AM without complications.  Bronchoscope ID: [id number]    Patient/caregiver was educated on the proper method of use:  Yes      Level of patient/caregiver understanding able to:   [] Verbalize understanding   [] Demonstrate understanding       [] Teach back        [] Needs reinforcement       []  No available caregiver               []  Other: pt ventilated     Response to education:  Very Good     Electronically signed by Romy Tang RCP on 2020 at 11:18 AM

## 2020-01-28 NOTE — PROGRESS NOTES
INPATIENT PULMONARY CRITICAL CARE PROGRESS NOTE      Reason for visit    critical care management    SUBJECTIVE: Patient continues to be critically ill on mechanical vent support, patient has been tolerating VC plus mode on the ventilator, patient has moderate amount of respite secretions which are quite thick as per the nursing, patient's PEEP was decreased overnight to 6,, patient continues to be on IV sedation to maintain patient ventilator synchrony and the versed infusion was changed to Precedex infusion which the patient is tolerating, patient continues to be on IV Lasix drip but the rate of drip has been decreased because patient's urine output has increased a lot, patient was afebrile, patient had sinus rhythm on the monitor, patient's blood sugars were on the higher side but improving, patient had good urine output overnight with cumulative fluid balance of -2.5 L, patient has skin breakdown in the coccygeal region and the buttocks no other pertinent review of system could be obtained because of patient's clinical status        Physical Exam:  Blood pressure (!) 104/57, pulse 79, temperature 99.1 °F (37.3 °C), temperature source Core, resp. rate 24, height 5' 9\" (1.753 m), weight 192 lb 0.3 oz (87.1 kg), SpO2 91 %.'   Constitutional:  No acute distress. On mechanical vent to support   HENT: Endotracheal tube present no thyromegaly. Eyes:  Conjunctivae are normal. Pupils equal, round, and reactive to light. No scleral icterus. Neck: . No tracheal deviation present. No obvious thyroid mass. Cardiovascular: Normal rate, regular rhythm, normal heart sounds. No right ventricular heave. Plus lower extremity edema. Pulmonary/Chest: No wheezes. Bilatera decreased l rales. Chest wall is not dull to percussion. No accessory muscle usage or stridor. Decreased breath sound intensity, chest tube present  Abdominal: Soft. Bowel sounds present. No distension or hernia. No tenderness.     Musculoskeletal: No cyanosis. No clubbing. No obvious joint deformity. Lymphadenopathy: No cervical or supraclavicular adenopathy. Skin: Skin is warm and dry. No rash or nodules on the exposed extremities. Patient has skin breakdown in the coccygeal and buttock area  Neurologic: Intubated and sedated        Results:  CBC:   Recent Labs     01/26/20  0800 01/27/20  0605 01/28/20  0555   WBC 9.6 10.4 9.5   HGB 9.4* 9.8* 9.9*   HCT 27.5* 28.9* 29.8*   MCV 91.6 92.6 92.8   PLT 54* 72* 71*     BMP:   Recent Labs     01/26/20  0452  01/27/20  0605 01/27/20  1616 01/27/20  2235 01/28/20  0555     --  141  --   --   --  140   K 3.6   < > 4.0   < > 4.2 3.9 4.1   CL 99  --  98*  --   --   --  97*   CO2 32  --  32  --   --   --  36*   BUN 30*  --  31*  --   --   --  34*   CREATININE 0.7*  --  0.7*  --   --   --  0.7*    < > = values in this interval not displayed. LIVER PROFILE:   Recent Labs     01/26/20  0452 01/27/20  0804   * 68*   * 106*   BILIDIR 0.5* 0.5*   BILITOT 1.0 1.1*   ALKPHOS 86 87     PT/INR:   Recent Labs     01/26/20  0452   PROTIME 19.4*   INR 1.66*     APTT:   Recent Labs     01/26/20  1615 01/27/20  0605 01/28/20  0555   APTT 57.6* 57.5* 63.1*       Imaging:  I have reviewed radiology images personally. XR CHEST PORTABLE   Final Result   Moderate improvement with near complete resolution of the bilateral airspace   disease, minimal residual left lower lobe. XR CHEST PORTABLE   Final Result   Severe diffuse airspace disease remains thought to be due predominantly to   moderate pulmonary vascular congestion and atelectasis. Pneumonia possible. Minimal clearing since yesterday. No sizable pleural effusion. No   pneumothorax. XR CHEST PORTABLE   Final Result   1. An enteric tube is noted, however, it is difficult to follow the entire   course of the enteric tube, would recommend dedicated radiographs of the   upper abdomen for better evaluation.   Contrast material does opacify the   stomach. 2. No significant interval change in bilateral hazy airspace disease. 3. No definite pneumothorax identified. 4. Additional support devices as described above. XR ABDOMEN (KUB) (SINGLE AP VIEW)   Final Result   Tip of the nasogastric tube stomach fundus. Side hole at the GE junction. If the tube is for feeding purposes would recommend advancing 10-15 cm and   reconfirm in placement with a radiograph centered on the upper abdomen. XR CHEST PORTABLE   Final Result   Underlying pulmonary edema with decreased right upper but increased right   lower lobe opacity      Lines and tubes as above      Pneumothorax seen previously is not well demonstrated on this study         XR CHEST PORTABLE   Final Result   1. Pulmonary edema has developed. 2. Tiny right apical pneumothorax. XR CHEST PORTABLE   Final Result   Improving left basilar atelectasis. XR CHEST PORTABLE   Final Result   Mild left basilar atelectasis. Appropriate positioning of the endotracheal   tube. No other significant abnormality. CT CHEST W CONTRAST   Final Result   Addendum 1 of 1   ADDENDUM:   Cystic nodule in the pancreas. Please see follow-up below. ACR    management   recommendations for incidental cystic pancreatic masses in asymptomatic*   patients on CT, MR, or US      < 2 cm:  Single follow-up in 1 yr (MR preferred)      *Stable:  Benign, no further follow-up   *Growth: As below based upon size   Joseland LL, et al.  Managing incidental findings on abdominal CT: white    paper   of the ACR Incidental Findings Committee. J Am Tyrel Radiol 2010;    8:525-871. Final      CT HEAD WO CONTRAST   Final Result   No evidence of acute intracranial abnormality on a study mildly limited as   described above. VL DUP CAROTID BILATERAL   Final Result      XR CHEST PORTABLE   Final Result   Vascular congestion without overt edema.       Persistent nodule like density of the right 27.5 (L) 28.9 (L) 29.8 (L)   MCV Latest Ref Range: 80.0 - 100.0 fL 90.3 92.6 91.6 92.6 92.8   MCH Latest Ref Range: 26.0 - 34.0 pg 30.4 30.1 31.3 31.3 30.8   MCHC Latest Ref Range: 31.0 - 36.0 g/dL 33.7 32.5 34.2 33.8 33.2   MPV Latest Ref Range: 5.0 - 10.5 fL 10.1 8.3 10.0 9.9 10.0   RDW Latest Ref Range: 12.4 - 15.4 % 16.1 (H) 16.0 (H) 16.3 (H) 16.5 (H) 17.3 (H)   Platelet Count Latest Ref Range: 135 - 450 K/uL 45 (L) 42 (L) 54 (L) 72 (L) 71 (L)     Results for Erika Henderson (MRN [id number]) as of 1/28/2020 10:03   Ref. Range 1/27/2020 12:06 1/27/2020 16:12 1/27/2020 22:28 1/28/2020 02:19 1/28/2020 05:58   Hemoglobin, Art, Extended Latest Ref Range: 13.5 - 17.5 g/dL 10.8 (L) 11.0 (L)      pH, Arterial Latest Ref Range: 7.350 - 7.450  7.458 (H) 7.460 (H) 7.468 (H) 7.519 (H) 7.520 (H)   pCO2, Arterial Latest Ref Range: 35.0 - 45.0 mm Hg 48.7 (H) 52.1 (H) 52.9 (H) 49.7 (H) 49.5 (H)   pO2, Arterial Latest Ref Range: 75.0 - 108.0 mm Hg 90.9 78.0 73.3 (L) 72.8 (L) 62.9 (L)   HCO3, Arterial Latest Ref Range: 21.0 - 29.0 mmol/L 33.7 (H) 36.2 (H) 38.4 (H) 40.5 (H) 40.4 (H)   TCO2 (calc), Art Latest Ref Range: Not Established mmol/L 35.2 37.8 40 42 42   Base Excess, Arterial Latest Ref Range: -3 - 3  8.7 (H) 10.9 (H) 15 (H) 18 (H) 18 (H)   O2 Sat, Arterial Latest Ref Range: 93 - 100 % 96.7 95.0 95 96 93   O2 Content, Arterial Latest Ref Range: Not Established mL/dL 15 15      Methemoglobin, Arterial Latest Ref Range: <1.5 % 0.6 0.7      Carboxyhgb, Arterial Latest Ref Range: 0.0 - 1.5 % 0.7 1.0      Sample Type Unknown   ART ART ART   FIO2 Unknown   30.00 30.00 30.00     PFT -PFT INTERPRETATION     The patient is a 25-year-old male who underwent a PFT for preop CABG. Spirometry shows FVC to be 70%, FEV1 to be 72%, FEV1 to FVC ratio was  103%, FEF 25-75% was 74%. The patient did not have any significant  postbronchodilator improvement. Lung volume shows the total lung  capacity was markedly reduced at 35%.   The patient has air trapping. The patient also has decrease in ERV, which may be because of body  habitus. The patient's diffusion capacity when adjusted for volume was  normal.  The patient's flow volume loop was suggestive of restrictive  pattern. On the basis of this PFT, the patient has severe restrictive  lung disease. Along with that, the patient has mild obstructive airway  disease. Assessment:  Active Problems:    Acute respiratory failure with hypoxemia ([facility])    Tobacco abuse    AS (aortic stenosis)    S/P AVR (aortic valve replacement)    Hypotension    Pulmonary infiltrate    Pulmonary venous congestion    Atelectasis    Leukocytosis    Hyperglycemia    Acute bronchospasm    Atrial fibrillation with RVR ([facility])    Cardiogenic shock ([facility])    Acute on chronic diastolic congestive heart failure ([facility])    Obesity, Class I, BMI 30.0-34.9 (see actual BMI)    S/P thoracotomy    Status post partial removal of lung    Hemothorax, left    Restrictive lung disease  Resolved Problems:    * No resolved hospital problems.  *          Plan:   -Ventilatory support to keep saturation between 90 and 94% only  -Patient's ventilator settings and waveforms reviewed  -Continue to changes made  -Patient's PEEP was decreased from 10 to 6-to be reassessed as per clinical status and ABG  -Pulmonary toilet-patient continues to have increased respiratory  secretions which are somewhat thick as per nursing and in order to improve the chances of patient's liberation from the ventilator will do a bronchoscopy for therapeutic purposes  -Cardiac infusions as per cardiology and CT surgery  -Patient continues to be on Lasix drip but the dose of Lasix has been decreased  -Keep negative fluid balance  -Monitor input output and BMP  -Correct electrolytes and whenever necessary basis  -IV sedation to maintain patient ventilator synchrony  -Monitor hemodynamics closely  -No need for any antibiotics from pulmonary standpoint of view  -Insulins as per CT surgery as per postop protocol  -Wound care as per wound care team and protocol  -PUD prophylaxis    Case discussed with CT surgery and ICU team    Critical care time spent in the patient was 35 minutes exclusive of any procedures        Electronically signed by:  George Blevins MD    1/28/2020    10:02 AM.

## 2020-01-28 NOTE — PROGRESS NOTES
DTI noted during shift change skin check. Images obtained. Dr. Nakul Wayne notified at bedside and orders received. It was noted at this time that the air mattress is not functioning properly.

## 2020-01-28 NOTE — PROGRESS NOTES
01/27/20 2020   Vent Information   Vent Type 840   Vent Mode AC/VC+   Vt Ordered 500 mL   Rate Set 15 bmp   Pressure Support 0 cmH20   FiO2  30 %   Sensitivity 3   PEEP/CPAP 5   I Time/ I Time % 1 s   Cuff Pressure (cm H2O) 24 cm H2O   Humidification Source HME   Vent Patient Data   Peak Inspiratory Pressure 24 cmH2O   Mean Airway Pressure 12 cmH20   Rate Measured 16 br/min   Vt Exhaled 715 mL   Minute Volume 8.11 Liters   I:E Ratio 1:2.30   Plateau Pressure 17 BNS15   Static Compliance 59.6 mL/cmH2O   Cough/Sputum   Sputum How Obtained Endotracheal   Cough Productive;Strong   Sputum Amount Small   Sputum Color White;Yellow   Tenacity Thick; Thin   Spontaneous Breathing Trial (SBT) RT Doc   Pulse 94   SpO2 98 %   Additional Respiratory  Assessments   Resp 16   End Tidal CO2 41 (%)   Alarm Settings   High Pressure Alarm 45 cmH2O   Low Minute Volume Alarm 2 L/min   High Respiratory Rate 40 br/min   ambu bag at bedside

## 2020-01-28 NOTE — CONSULTS
atorvastatin (LIPITOR) 40 MG tablet Take 40 mg by mouth daily      carvedilol (COREG) 25 MG tablet Take 25 mg by mouth 2 times daily      choline fenofibrate (TRILIPIX) 135 MG CPDR delayed release capsule Take 135 mg by mouth daily      febuxostat (ULORIC) 40 MG TABS tablet Take 40 mg by mouth daily      glipiZIDE (GLUCOTROL) 5 MG tablet Take 1 tablet by mouth daily      meclizine (ANTIVERT) 25 MG tablet Take 1 tablet by mouth daily      allopurinol (ZYLOPRIM) 300 MG tablet Take 300 mg by mouth daily      nicotine (NICODERM CQ) 14 MG/24HR Place 1 patch onto the skin daily 30 patch 0       Objective: Lying in bed; intubated; F/C; Cordarone; Argatroban; Dexmedetomidine; Dobutamine; TF    /65   Pulse 101   Temp 97.5 °F (36.4 °C)   Resp 19   Ht 5' 9\" (1.753 m)   Wt 192 lb 0.3 oz (87.1 kg)   SpO2 90%   BMI 28.36 kg/m²     LABS:  WBC:    Lab Results   Component Value Date    WBC 9.5 01/28/2020     H/H:    Lab Results   Component Value Date    HGB 9.9 01/28/2020    HCT 29.8 01/28/2020     PTT:    Lab Results   Component Value Date    APTT 63.1 01/28/2020   [APTT}  PT/INR:    Lab Results   Component Value Date    PROTIME 19.4 01/26/2020    INR 1.66 01/26/2020     HgBA1c:    Lab Results   Component Value Date    LABA1C 4.7 01/21/2020       Assessment: Bilateral Heels - wound bed dry and intact; purple  Buttocks - purple/maroon; blood filled bulla; small denuded area   Wes Risk Score: Wes Scale Score: 9    Patient Active Problem List   Diagnosis Code    Hemothorax J94.2    Acute respiratory failure with hypoxemia (HCC) J96.01    Tobacco abuse Z72.0    AS (aortic stenosis) I35.0    CAD (coronary artery disease) s/p stent in 2010 I25.10    HTN (hypertension) I10    Hyperlipidemia E78.5    S/P AVR (aortic valve replacement) Z95.2    Hypotension I95.9    Pulmonary infiltrate R91.8    Pulmonary venous congestion R09.89    Atelectasis J98.11    Leukocytosis D72.829    Hyperglycemia R73.9    Acute bronchospasm J98.01    Atrial fibrillation with RVR ([facility]) I48.91    Cardiogenic shock ([facility]) R57.0    Acute on chronic diastolic congestive heart failure ([facility]) I50.33    Myocardial infarction ([facility]) I21.9    Obesity, Class I, BMI 30.0-34.9 (see actual BMI) E66.9    S/P thoracotomy Z98.890    Status post partial removal of lung Z90.2    Hemothorax, left J94.2    Restrictive lung disease J98.4       Measurements:  Wound 01/27/20 Perineum Posterior (Active)   Wound Image   1/28/2020  9:19 AM   Wound Deep tissue/Injury 1/28/2020  9:19 AM   Dressing Status New drainage; Changed 1/28/2020  9:19 AM   Dressing Changed Changed/New 1/28/2020  9:19 AM   Dressing/Treatment Pharmaceutical agent (see MAR) 1/28/2020  9:19 AM   Wound Cleansed Soap and water 1/27/2020  8:20 PM   Dressing Change Due 01/28/20 1/28/2020  9:19 AM   Wound Length (cm) 14.5 cm 1/28/2020  9:19 AM   Wound Width (cm) 12 cm 1/28/2020  9:19 AM   Wound Depth (cm) 0 cm 1/28/2020  9:19 AM   Wound Surface Area (cm^2) 174 cm^2 1/28/2020  9:19 AM   Wound Volume (cm^3) 0 cm^3 1/28/2020  9:19 AM   Post-Procedure Length (cm) 0 cm 1/28/2020  9:19 AM   Post-Procedure Width (cm) 0 cm 1/28/2020  9:19 AM   Post-Procedure Depth (cm) 0 cm 1/28/2020  9:19 AM   Post-Procedure Surface Area (cm^2) 0 cm^2 1/28/2020  9:19 AM   Post-Procedure Volume (cm^3) 0 cm^3 1/28/2020  9:19 AM   Distance Tunneling (cm) 0 cm 1/28/2020  9:19 AM   Tunneling Position ___ O'Clock 0 1/28/2020  9:19 AM   Undermining Starts ___ O'Clock 0 1/28/2020  9:19 AM   Undermining Ends___ O'Clock 0 1/28/2020  9:19 AM   Undermining Maxium Distance (cm) 0 1/28/2020  9:19 AM   Wound Assessment Blood filled blister;Denuded;Fragile;Maroon; Purple 1/28/2020  9:19 AM   Drainage Amount Scant 1/28/2020  9:19 AM   Drainage Description Serosanguinous 1/28/2020  9:19 AM   Odor None 1/28/2020  9:19 AM   Margins Defined edges 1/28/2020  9:19 AM   Luz-wound Assessment Dry; Intact 1/28/2020  9:19 AM   Purple%Wound Bed 100 1/28/2020  9:19 AM   Culture Taken No 1/28/2020  9:19 AM   Number of days: 0    Buttocks:         Wound 01/28/20 Heel Right (Active)   Wound Image   1/28/2020  9:19 AM   Wound Deep tissue/Injury 1/28/2020  9:19 AM   Offloading for Diabetic Foot Ulcers Offloading boot 1/28/2020  9:19 AM   Dressing Status Dry; Intact 1/28/2020  9:19 AM   Dressing Changed Changed/New 1/28/2020  9:19 AM   Dressing/Treatment Pharmaceutical agent (see MAR);4x4 1/28/2020  9:19 AM   Dressing Change Due 01/28/20 1/28/2020  9:19 AM   Wound Length (cm) 4 cm 1/28/2020  9:19 AM   Wound Width (cm) 6 cm 1/28/2020  9:19 AM   Wound Depth (cm) 0 cm 1/28/2020  9:19 AM   Wound Surface Area (cm^2) 24 cm^2 1/28/2020  9:19 AM   Wound Volume (cm^3) 0 cm^3 1/28/2020  9:19 AM   Post-Procedure Length (cm) 0 cm 1/28/2020  9:19 AM   Post-Procedure Width (cm) 0 cm 1/28/2020  9:19 AM   Post-Procedure Depth (cm) 0 cm 1/28/2020  9:19 AM   Post-Procedure Surface Area (cm^2) 0 cm^2 1/28/2020  9:19 AM   Post-Procedure Volume (cm^3) 0 cm^3 1/28/2020  9:19 AM   Distance Tunneling (cm) 0 cm 1/28/2020  9:19 AM   Tunneling Position ___ O'Clock 0 1/28/2020  9:19 AM   Undermining Starts ___ O'Clock 0 1/28/2020  9:19 AM   Undermining Ends___ O'Clock 0 1/28/2020  9:19 AM   Undermining Maxium Distance (cm) 0 1/28/2020  9:19 AM   Wound Assessment Dry; Intact; Purple 1/28/2020  9:19 AM   Drainage Amount None 1/28/2020  9:19 AM   Odor None 1/28/2020  9:19 AM   Margins Attached edges; Defined edges 1/28/2020  9:19 AM   Luz-wound Assessment Dry; Intact 1/28/2020  9:19 AM   Purple%Wound Bed 100 1/28/2020  9:19 AM   Culture Taken No 1/28/2020  9:19 AM   Number of days: 0    R Heel:         Wound 01/28/20 Heel Left (Active)   Wound Image   1/28/2020  9:19 AM   Wound Deep tissue/Injury 1/28/2020  9:19 AM   Offloading for Diabetic Foot Ulcers Offloading boot 1/28/2020  9:19 AM   Dressing Status Clean;Dry; Intact 1/28/2020  9:19 AM   Dressing Changed Changed/New 1/28/2020  9:19 AM 4:00 AM   Drainage Description Other (Comment) 1/18/2020  3:21 PM   Odor None 1/28/2020  4:00 AM   Dressing/Treatment Foam 1/21/2020  4:35 AM   Dressing Changed Changed/New 1/19/2020  8:07 AM   Dressing Status Dry; Intact 1/19/2020  4:00 PM   Number of days: 19       Incision 01/21/20 Sternum (Active)   Wound Assessment Clean; Intact;Dry 1/28/2020  4:00 AM   Luz-wound Assessment Clean;Dry; Intact 1/28/2020  4:00 AM   Closure Surgical glue 1/28/2020  4:00 AM   Drainage Amount None 1/28/2020  4:00 AM   Odor None 1/28/2020  4:00 AM   Dressing/Treatment Dry dressing;Surgical glue 1/28/2020  4:00 AM   Dressing Status Clean;Dry; Intact 1/28/2020  4:00 AM   Dressing Change Due 01/27/20 1/28/2020  4:00 AM   Number of days: 7       Incision 01/21/20 Groin Left (Active)   Wound Assessment Clean;Dry; Intact;Edges well approximated 1/28/2020  4:00 AM   Luz-wound Assessment Dry;Clean; Intact 1/28/2020  4:00 AM   Closure Sutures 1/28/2020  4:00 AM   Drainage Amount None 1/28/2020  4:00 AM   Odor None 1/28/2020  4:00 AM   Dressing/Treatment Open to air 1/28/2020  4:00 AM   Dressing Status Clean;Dry; Intact 1/24/2020  4:00 AM   Number of days: 7         Response to treatment:  Well tolerated by patient. Plan   Plan of Care:  Incision 01/21/20 Sternum-Dressing/Treatment: Dry dressing, Surgical glue  Incision 01/21/20 Groin Left-Dressing/Treatment: Open to air  Wound 01/27/20 Perineum Posterior-Dressing/Treatment: Pharmaceutical agent (see MAR)(Venelex)  Wound 01/28/20 Heel Right-Dressing/Treatment: Pharmaceutical agent (see MAR), 4x4(Venelex)  Wound 01/28/20 Heel Left-Dressing/Treatment: Pharmaceutical agent (see MAR), 4x4(Venelex)  Incision 01/08/20 Back Lateral;Left-Dressing/Treatment: Foam  Incision 01/02/20 Chest Lateral;Left;Lower-Dressing/Treatment: Open to air   Recommendation: Clean wounds with NSS; pat dry; apply Venelex BID  Off Loading boots while in bed  Pressure redistribution cushion while in chair  Call Wound Care for deterioration [phone]; pager [phone]    Specialty Bed Required : Yes   [x] Low Air Loss   [x] Pressure Redistribution  [] Fluid Immersion  [] Bariatric  [] Total Pressure Relief  [] Other:     Current Diet: Diet NPO Effective Now  Dietician consult:  Yes    Discharge Plan:  Placement for patient upon discharge: intermediate care facility    Patient appropriate for Outpatient [address]: No    Referrals:  [x]  following  [] [facility]  [] Supplies  [] Other    Patient/Caregiver Teaching:  Level of patient/caregiver understanding able to:   [] Indicates understanding       [] Needs reinforcement  [] Unsuccessful      [] Verbal Understanding  [] Demonstrated understanding       [] No evidence of learning  [] Refused teaching         [] N/A       Electronically signed by John Parish RN, Areta Dials on 1/28/2020 at 9:40 AM

## 2020-01-28 NOTE — PROGRESS NOTES
INPATIENT PULMONARY CRITICAL CARE PROGRESS NOTE      Reason for visit    critical care management    SUBJECTIVE: Patient continues to be critically ill on mechanical vent support, patient was on pressure control ventilation when seen this morning, patient also was on PEEP of 10 to maintain oxygenation, patient continues to be on IV sedation to maintain patient ventilator synchrony, patient was continued to be on amiodarone and dobutamine drips when seen this morning along with that patient was also getting a nicardipine and Lasix drips as per CT surgery, patient was afebrile, patient had sinus tachycardia on the monitor, patient's blood sugars were on the higher side, patient had good urine output overnight with cumulative fluid balance of -2.5 L, patient has modest to moderate  respiratory secretions in the endotracheal tube, no other pertinent review of system could be obtained because of patient's clinical status        Physical Exam:  Blood pressure 118/75, pulse 94, temperature 97.5 °F (36.4 °C), temperature source Core, resp. rate 16, height 5' 9\" (1.753 m), weight 199 lb 4.7 oz (90.4 kg), SpO2 98 %.'   Constitutional:  No acute distress. On mechanical vent to support   HENT: Endotracheal tube present no thyromegaly. Eyes:  Conjunctivae are normal. Pupils equal, round, and reactive to light. No scleral icterus. Neck: . No tracheal deviation present. No obvious thyroid mass. Cardiovascular: Normal rate, regular rhythm, normal heart sounds. No right ventricular heave. Plus lower extremity edema. Pulmonary/Chest: No wheezes. Bilateral rales. Chest wall is not dull to percussion. No accessory muscle usage or stridor. Decreased breath sound intensity, chest tube present  Abdominal: Soft. Bowel sounds present. No distension or hernia. No tenderness. Musculoskeletal: No cyanosis. No clubbing. No obvious joint deformity. Lymphadenopathy: No cervical or supraclavicular adenopathy.    Skin: Skin is warm MCHC Latest Ref Range: 31.0 - 36.0 g/dL 33.4 33.7 32.5 34.2 33.8   MPV Latest Ref Range: 5.0 - 10.5 fL 9.8 10.1 8.3 10.0 9.9   RDW Latest Ref Range: 12.4 - 15.4 % 16.2 (H) 16.1 (H) 16.0 (H) 16.3 (H) 16.5 (H)   Platelet Count Latest Ref Range: 135 - 450 K/uL 42 (L) 45 (L) 42 (L) 54 (L) 72 (L)   Neutrophils % Latest Units: %     88.3     1,000-10,000 CFU/mL Normal respiratory fede    Gram Stain Result 01/24/2020 10:38 AM - [facility]   No organisms seen   1+ WBC's (Polymorphonuclear)    Testing Performed By     Lab - Abbreviation Name Director Address Valid Date Range   19- - Jama Bradley M.D., Ph.D. [address] [place] [id number] 08/30/17 0817-Present   25-- Ruel Clemens [address]  [facility] Tiff Haji M.D. [place] [id number] 08/30/17 0807-Present   Narrative   Performed by: [facility]   ORDER#: [id number]                          ORDERED BY: Jeremi Shields  SOURCE: Bronchial Alveolar Lavage RLL       Results for Santino Reyes (MRN [id number]) as of 1/27/2020 20:26   Ref.  Range 1/26/2020 20:29 1/26/2020 23:49 1/27/2020 06:05 1/27/2020 08:04   O2 Therapy Unknown   Unknown Unknown   Hemoglobin, Art, Extended Latest Ref Range: 13.5 - 17.5 g/dL   9.9 (L) 10.7 (L)   pH, Arterial Latest Ref Range: 7.350 - 7.450  7.540 (H) 7.492 (H) 7.524 (H) 7.525 (H)   pCO2, Arterial Latest Ref Range: 35.0 - 45.0 mmHg 42.6 47.6 (H) 39.5 41.9   pO2, Arterial Latest Ref Range: 75.0 - 108.0 mmHg 64.5 (L) 75.4 97.0 91.4   HCO3, Arterial Latest Ref Range: 21.0 - 29.0 mmol/L 36.4 (H) 36.4 (H) 31.8 (H) 33.8 (H)   TCO2 (calc), Art Latest Ref Range: Not Established mmol/L 38 38 33.0 35.1   Base Excess, Arterial Latest Ref Range: -3.0 - 3.0 mmol/L 14 (H) 13 (H) 8.4 (H) 10.1 (H)   O2 Sat, Arterial Latest Ref Range: >92 % 94 96 97.1 96.8   O2 Content, Arterial Latest Ref Range: Not Established mL/dL   14 15

## 2020-01-28 NOTE — PROCEDURES
Bronchoscopy note    ASA 4, intubated patient    Patient with acute respiratory failure, pulmonary infiltrates, status post open heart surgery, patient continues to have increased respite secretions which are quite thick and tenacious and patient was not able to wean off of the respirator for that reason after request from CT surgery, it was decided to do a bronchoscopy for therapeutic reasons, after informed consent, the endoscopy team was requested to come to the bedside, patient was on IV sedation to maintain patient ventilator synchrony, patient was not given any extra sedation for the procedure, the bronchoscope was introduced through the endotracheal tube, patient was found to have lots of thick mucous plugs which were quite tenacious and viscous which were therapeutically aspirated out using Mucomyst and saline, patient did not have any endobronchial lesion, patient tolerated the procedure well  Estimated blood loss was 0  Further management depending on patient's clinical status    Osiel Flores MD

## 2020-01-28 NOTE — PROGRESS NOTES
01/27/20 2311   Vent Information   Vent Type 840   Vent Mode AC/VC+   Vt Ordered 500 mL   Rate Set 15 bmp   Pressure Support 0 cmH20   FiO2  30 %   Sensitivity 3   PEEP/CPAP 5   I Time/ I Time % 1 s   Cuff Pressure (cm H2O) 26 cm H2O   Humidification Source HME   Vent Patient Data   Peak Inspiratory Pressure 25 cmH2O   Mean Airway Pressure 10 cmH20   Rate Measured 16 br/min   Vt Exhaled 642 mL   Minute Volume 7.84 Liters   I:E Ratio 1:3.00   Spontaneous Breathing Trial (SBT) RT Doc   Pulse 110   SpO2 96 %   Breath Sounds   Right Upper Lobe Clear   Right Middle Lobe Diminished   Right Lower Lobe Diminished   Left Upper Lobe Clear   Left Lower Lobe Diminished   Additional Respiratory  Assessments   Resp 16   End Tidal CO2 40 (%)   Alarm Settings   High Pressure Alarm 45 cmH2O   Low Minute Volume Alarm 2 L/min   Apnea (secs) 20 secs   High Respiratory Rate 40 br/min   Low Exhaled Vt  200 mL   ETT (adult)   Placement Date/Time: 01/23/20 1645   Timeout: Patient;Site/Side;Appropriate Equipment;Correct Position  Preoxygenation: Yes  Mask Ventilation: Ventilated by mask (1)  Technique: Video laryngoscopy  Type: Cuffed  Tube Size: 7.5 mm  Laryngoscope: GlideS. ..    Secured at 23 cm   Measured From [address] [place] [address],[address] By Commercial tube romero   Site Condition Dry

## 2020-01-28 NOTE — PROGRESS NOTES
Pt's bronch has been completed and Fentanyl decreased to 50 mcg/hr per Dr. Isabel Lagunas. Perry refer to the [facility] for medication administration.

## 2020-01-28 NOTE — PROGRESS NOTES
Patient awake and following commands. Patient suctioned and extubated to 3 liters nasal cannula by ICU NP and MD.  Patient tolerated well. Oxygen saturation 97 on 3 liters nasal cannula.

## 2020-01-28 NOTE — PROGRESS NOTES
[facility]    [facility]              [phone]                       Plan :     Renal function is a stable  Chest tube has been removed  Tolerating spontaneous breathing-possible bronchoscopy and extubation today    Urine output is excellent  Made 6 L yesterday-decrease to 10 mg/h of Lasix and gradually taper after extubation  Spoke to nurse in detail       Assessment :     Acute Kidney Injury  Creatinine peaked to 1.6- now 0.7 and remains there  Likely due to cardiac surgery, hypotension, Cardiorenal syndrome  Getting inotropes-helped his kidney function  On Lasix drip- now 10 mg hr      hyperkalemia  Stable now    Acidosis  Lactic acidosis likely due to hypotension poor tissue perfusion  Improved  Now has metabolic alkalosis and also respiratory alkalosis    Hypotension- now Hypertension  BP: (129)/(65) Pulse:  []   Low-dose of Levophed- switched to dobutamine  Now needing nicardipine drip  Switched to Clevidipine drip now         [facility] would like to thank Zuhair Del Cid MD   for opportunity to serve this patient      Please call with questions at-   24 Hrs Answering service [phone] or  7 am- 5 pm via Perfect serve or cell phone  Dr.Sudhir Erwin Pantoja          CC/reason for consult :   Acute kidney injury   HPI :     From consult note-   Delmar Gross is a 77 y.o. male presented to   the hospital on 1/8/2020 with shortness of breath. Not able to give history because of being on BiPAP. He is known to have severe aortic stenosis and he had redo sternotomy and replacement of ascending aorta done, and also replacement with Carrasquillo Intuity valve done. Following that he is in ICU and is requiring BiPAP and has a poor urine output and he is on a low-dose of vasopressor. He was here earlier this month with shortness of breath pneumonia and large left hemothorax and pneumothorax. Also had PAL and hyperkalemia.   Did not require dialysis during that hospitalization and chloride 2 g in sodium chloride 0.9 % 100 mL IVPB, 2 g, Intravenous, PRN  acetaminophen (TYLENOL) tablet 650 mg, 650 mg, Oral, Q4H PRN  acetaminophen (TYLENOL) suppository 650 mg, 650 mg, Rectal, Q4H PRN  docusate sodium (COLACE) capsule 100 mg, 100 mg, Oral, BID  ondansetron (ZOFRAN) injection 4 mg, 4 mg, Intravenous, Q8H PRN  chlorhexidine (PERIDEX) 0.12 % solution 15 mL, 15 mL, Mouth/Throat, BID  [Held by provider] atorvastatin (LIPITOR) tablet 20 mg, 20 mg, Oral, Nightly  [Held by provider] aspirin EC tablet 81 mg, 81 mg, Oral, Daily  [Held by provider] clopidogrel (PLAVIX) tablet 75 mg, 75 mg, Oral, Daily  albuterol (PROVENTIL) nebulizer solution 2.5 mg, 2.5 mg, Nebulization, Q4H WA  albumin human 5 % IV solution 25 g, 25 g, Intravenous, PRN  glucose (GLUTOSE) 40 % oral gel 15 g, 15 g, Oral, PRN  dextrose 50 % IV solution, 12.5 g, Intravenous, PRN  glucagon (rDNA) injection 1 mg, 1 mg, Intramuscular, PRN  dextrose 5 % solution, 100 mL/hr, Intravenous, PRN  0.9 % sodium chloride bolus, 20 mL, Intravenous, Once       Vitals :     Vitals:    01/28/20 0736   BP:    Pulse: 101   Resp: 19   Temp:    SpO2: 90%       I & O :       Intake/Output Summary (Last 24 hours) at 1/28/2020 0930  Last data filed at 1/28/2020 5278  Gross per 24 hour   Intake 4057 ml   Output 6085 ml   Net -2028 ml        Physical Examination :     General appearance: unresponsive, intubated   HEENT: Lips- normal, teeth- ok , oral mucosa- moist  Neck : Mass- no, appears symmetrical, JVD- not raised  Respiratory: Respiratory effort-  On ventilation- FiO2- 80%  wheeze- no, crackles - no  Cardiovascular:  Ausculation- No M/R/G, Edema none  Abdomen: visible mass- no, distention- no, scar- no, tenderness- unable to assess                           hepatosplenomegaly-  No--  Musculoskeletal:  clubbing no,cyanosis- no , digital ischemia- no                           muscle strength- patient unable to co-operate     , tone - patient unable to co-operate

## 2020-01-28 NOTE — PROGRESS NOTES
01/28/20 0736   Vent Information   Vent Type 840   Vent Mode CPAP   Vt Ordered 500 mL   Peak Flow 55 L/min   Pressure Support 10 cmH20   FiO2  30 %   Sensitivity 3   PEEP/CPAP 5   Cuff Pressure (cm H2O) 30 cm H2O   Humidification Source HME   Vent Patient Data   Peak Inspiratory Pressure 16 cmH2O   Mean Airway Pressure 8 cmH20   Rate Measured 20 br/min   Vt Exhaled 538 mL   Minute Volume 9.48 Liters   I:E Ratio 1:3.10   Cough/Sputum   Sputum How Obtained Endotracheal;Suctioned   Cough Productive   Sputum Amount Moderate   Sputum Color Clear   Tenacity Thick   Spontaneous Breathing Trial (SBT) RT Doc   Contraindications to SBT?   (not waking up)   Pulse 101   SpO2 90 %   Additional Respiratory  Assessments   Resp 19   End Tidal CO2 39 (%)   Alarm Settings   High Pressure Alarm 45 cmH2O   Low Minute Volume Alarm 2 L/min   High Respiratory Rate 40 br/min   Low Exhaled Vt  200 mL   ETT (adult)   Placement Date/Time: 01/23/20 0154   Timeout: Patient;Site/Side;Appropriate Equipment;Correct Position  Preoxygenation: Yes  Mask Ventilation: Ventilated by mask (1)  Technique: Video laryngoscopy  Type: Cuffed  Tube Size: 7.5 mm  Laryngoscope: Barbara. ..    Secured at 23 cm   Measured From [address] [place] [address],[address] By Commercial tube romero

## 2020-01-28 NOTE — PROGRESS NOTES
01/28/20 0358   Vent Information   Vent Type 840   Vent Mode AC/VC+   Vt Ordered 500 mL   Rate Set 15 bmp   Pressure Support 0 cmH20   FiO2  30 %   Sensitivity 3   PEEP/CPAP 5   I Time/ I Time % 1 s   Cuff Pressure (cm H2O) 30 cm H2O   Humidification Source HME  (HME CHANGED)   Vent Patient Data   Peak Inspiratory Pressure 19 cmH2O   Mean Airway Pressure 8.5 cmH20   Rate Measured 19 br/min   Vt Exhaled 617 mL   Minute Volume 8.85 Liters   I:E Ratio 1:2.80   Cough/Sputum   Sputum How Obtained Endotracheal;Suctioned   Cough Productive   Sputum Amount Moderate   Sputum Color Creamy   Tenacity Thick   Spontaneous Breathing Trial (SBT) RT Doc   Pulse 81   Breath Sounds   Right Upper Lobe Rhonchi   Right Middle Lobe Diminished   Right Lower Lobe Diminished   Left Upper Lobe Rhonchi   Left Lower Lobe Diminished   Additional Respiratory  Assessments   Resp 15   End Tidal CO2 39 (%)   Alarm Settings   High Pressure Alarm 45 cmH2O   Low Minute Volume Alarm 2 L/min   High Respiratory Rate 40 br/min   Low Exhaled Vt  200 mL   ETT (adult)   Placement Date/Time: 01/23/20 0185   Timeout: Patient;Site/Side;Appropriate Equipment;Correct Position  Preoxygenation: Yes  Mask Ventilation: Ventilated by mask (1)  Technique: Video laryngoscopy  Type: Cuffed  Tube Size: 7.5 mm  Laryngoscope: GlideS. ..    Secured at 23 cm   Measured From Lips   ET Placement Right   Secured By Commercial tube romero   Site Condition Dry

## 2020-01-28 NOTE — PROGRESS NOTES
Weight: 205 lb 12.8 oz (93.4 kg)    Weight on 1/21 (83.9 kg) Pre-op   Weight on 1/22 (95.4 kg)  1/23  92.7 kg  1/24  94.3 kg  1/25  93.9 kg  1/26  93 kg  1/27  90.4 kg  1/28  87.1 kg    Intake/Output:     Intake/Output Summary (Last 24 hours) at 1/28/2020 0827  Last data filed at 1/28/2020 0806  Gross per 24 hour   Intake 4057 ml   Output 6235 ml   Net -2178 ml      GTTS:   Dobutamine at 5 mcg/kg/hr  Furosemide at 15 mg/hr  Argatroban at 1.5 mcg/kg/min  Amiodarone at 1 mg/min  Cardene at 15 mg/hr  Precedex 0.2 mcg/kg/hr    Extubation Time: 1/22 @ 0900  Transition Time: 1/22 @ 22:00     LABORATORY DATA:     CBC:   Recent Labs     01/26/20  0800 01/27/20  0605 01/28/20  0555   WBC 9.6 10.4 9.5   HGB 9.4* 9.8* 9.9*   HCT 27.5* 28.9* 29.8*   MCV 91.6 92.6 92.8   PLT 54* 72* 71*     BMP:   Recent Labs     01/26/20 0452 01/27/20 0605 01/27/20 1616 01/27/20 2235 01/28/20  0555     --  141  --   --   --  140   K 3.6   < > 4.0   < > 4.2 3.9 4.1   CL 99  --  98*  --   --   --  97*   CO2 32  --  32  --   --   --  36*   BUN 30*  --  31*  --   --   --  34*   CREATININE 0.7*  --  0.7*  --   --   --  0.7*    < > = values in this interval not displayed. MG:    Recent Labs     01/26/20 0452 01/27/20  0605 01/28/20  0555   MG 1.80 1.90 2.20      PT/INR:   Recent Labs     01/26/20 0452   PROTIME 19.4*   INR 1.66*     APTT:   Recent Labs     01/26/20  1615 01/27/20  0605 01/28/20  0555   APTT 57.6* 57.5* 63.1*     CXR: 1/22   Impression   Improving left basilar atelectasis. CXR: 1/24   Impression   1. An enteric tube is noted, however, it is difficult to follow the entire   course of the enteric tube, would recommend dedicated radiographs of the   upper abdomen for better evaluation.  Contrast material does opacify the   stomach. 2. No significant interval change in bilateral hazy airspace disease. 3. No definite pneumothorax identified. 4. Additional support devices as described above. ________________________________________________________________________    Subjective:   Dietary Intake: TF via NG- on h old this morning  no Nausea   Pain Control: controlled, prn meds  Complaints: none  Bowels: have not moved    Objective:   General appearance: intubated, sedated, in NAD  Lungs: diminished  Heart: S1S2 normal; ST low 100's on monitor  Chest: symmetrical expansion with inspiration and expirations; no rocking of sternum noted   Abdomen: round, soft, non-tender   Bowel sounds: hypoactive  Kidneys: UOP 1780/2275/1950= 6005 ml in 24 hrs; Cr 0.7  Wound/Incisions: Midsternal incision CDI; LLE incisions with dressings CDI; Pacing wires cut by MD 1/28 in AM  Extremities: BLE pulses by doppler; 2+ swelling noted in BLE  Neurological: currently sedated  Chest tubes/Drains: Chest tube # 2 with 20 ml serosanguinous drainage in 24 hours overnight; no airleak noted    ________________________________________________________________________    SCIP Measures:     · Noriega catheter for:  ? IV Diuresis  ? Accurate I/O  ? D/C today  ·   · Antibiotics:  ? Have been stopped  ? Prophylaxis (POD #1 only)  ? Continued for:   ________________________________________________________________________    Assessment:   Post-op: 7 days. Condition: In stable condition. Plan:   1. Cardiovascular: s/p Aorta repair and AVR- ASA, Plavix,Statin, amio, BB  Dobutamine remains at 5 mcg/kg/hr. Pt alternating between ST and PAF. 2. Pulmonary: needs pulmonary expansion maneuvers  CXR 1/23 and 1/24 showed pulmonary edema. Pt with respiratory distress - elective intubation was performed at bedside 1/23       Pulmonology managing vent. Peep down to 6, pt appears to be tolerating it well. Anticipate SBT today, hopefully he will tolerate and we can get him extubated. 3. Neurology: analgesia as needed. 4. Nephrology: fluid management- diurese as tolerated. Nephro following.   Lasix gtt decreased 1/28 to 15 mg/hr; had over 6L UOP in 24 hrs    5. Endocrinology: BG control much improved with additional Lantus started 1/27. Continue SSI and Lantus    6. Hematology: acute blood loss anemia; expected, monitor. Thrombocytopenia- plt 71 today, will hold Arixtra, ASA. HIT Positive+   Argatroban drip started 1/23; continue. 7. Microbiology:   Leukocytosis: much improved, WBC 9.5 today. BC X2 and urine culture - NGTD  Abx course completed 1/27 (meropenem)     8. Nutrition: TF's per NG tube. Currently NPO for anticipated extubation. 9. Labs: monitor. Fungal cx negative. 10. Post-op Drains/Wires:  MD cut TPW's 1/28 in early morning. Chest tube to be removed today as well. 11. D/C Goals: too soon to tell, DCP following. Likely will need rehab, possibly ARU. 12. Continue post-op care of patient in the ICU    Meds:    The patient is on a beta-blocker   The patient is not on an ace-i/ARB- not indicated   The patient is on a statin   The patient is on oral antiplatelet therapy   ________________________________________________________________________  *Only bolded items apply to this patient at this time:     ? Acidemia (pH < 7.35) ______  ? Alkalemia (pH > 7.45) ______  ? Acidosis (Bicarb <20) ______  ? Electrolyte abnormality (specify)  ? Acute post-op respiratory insufficiency (difficulty weaning from vent)  ? Acute post-op blood loss anemia (hct ? 30)        ? Transfused this admission post-op  ? Cardiac arrythmia:  ? Ventricular Tachycardia     ? Atrial Flutter     ? Atrial fibrillation  ? Acute pulmonary edema due to:      ? Noncardiogenic causes  ? Acute heart failure (Syst, Diast)   ? Encephalopathy (confusion, delirium, altered mental status), (toxic, metabolic)  ? Restraints in use  ? TIA/Stroke (acute, post-op)   ? Acute kidney injury (Cr > 0.5 over baseline, oliguria, ? 5ml/kg/hr x 6 hrs)  ? Acute renal failure (Cr > 2x baseline, Dialysis started)  ? Malnutrition: ? Severe (prealbumin <10, albumin ? 2.5)      ?  Moderate (prealbumin <15, albumin <3)  ? SIRS due to non-infectious process (temp > 100.5, wbc > 12, HR > 90, RR>20)  ? Coagulopathy (consumptive, HIT, fibrinolysis) ? FFP or platelets given post-op  ________________________________________________________________________    George De La Fuente, CNP  1/28/2020  8:27 AM  Note reviewed, events of last 24 hours reviewed along with vital signs and I/Os and patient examined. Assessment and plans discussed and are as outlined above.      Marcel Olszewski, MD, FACS, [facility], FACCP, [place]

## 2020-01-29 ENCOUNTER — APPOINTMENT (OUTPATIENT)
Dept: GENERAL RADIOLOGY | Age: 70
DRG: 216 | End: 2020-01-29
Attending: INTERNAL MEDICINE
Payer: MEDICARE

## 2020-01-29 PROBLEM — E87.0 HYPERNATREMIA: Status: ACTIVE | Noted: 2020-01-29

## 2020-01-29 LAB
ANION GAP SERPL CALCULATED.3IONS-SCNC: 10 MMOL/L (ref 3–16)
APTT: 71.8 SEC (ref 24.2–36.2)
BASE EXCESS ARTERIAL: 14 MMOL/L (ref -3–3)
BASE EXCESS ARTERIAL: 18 (ref -3–3)
BASOPHILS ABSOLUTE: 0 K/UL (ref 0–0.2)
BASOPHILS RELATIVE PERCENT: 0.1 %
BUN BLDV-MCNC: 38 MG/DL (ref 7–20)
CALCIUM SERPL-MCNC: 8.8 MG/DL (ref 8.3–10.6)
CARBOXYHEMOGLOBIN ARTERIAL: 0.3 % (ref 0–1.5)
CHLORIDE BLD-SCNC: 100 MMOL/L (ref 99–110)
CO2: 37 MMOL/L (ref 21–32)
CREAT SERPL-MCNC: 0.7 MG/DL (ref 0.8–1.3)
EOSINOPHILS ABSOLUTE: 0 K/UL (ref 0–0.6)
EOSINOPHILS RELATIVE PERCENT: 0.1 %
GFR AFRICAN AMERICAN: >60
GFR NON-AFRICAN AMERICAN: >60
GLUCOSE BLD-MCNC: 108 MG/DL (ref 70–99)
GLUCOSE BLD-MCNC: 111 MG/DL (ref 70–99)
GLUCOSE BLD-MCNC: 115 MG/DL (ref 70–99)
GLUCOSE BLD-MCNC: 123 MG/DL (ref 70–99)
GLUCOSE BLD-MCNC: 154 MG/DL (ref 70–99)
GLUCOSE BLD-MCNC: 167 MG/DL (ref 70–99)
GLUCOSE BLD-MCNC: 82 MG/DL (ref 70–99)
GLUCOSE BLD-MCNC: 95 MG/DL (ref 70–99)
HCO3 ARTERIAL: 38 MMOL/L (ref 21–29)
HCO3 ARTERIAL: 40.1 MMOL/L (ref 21–29)
HCT VFR BLD CALC: 28.2 % (ref 40.5–52.5)
HEMOGLOBIN, ART, EXTENDED: 10.1 G/DL (ref 13.5–17.5)
HEMOGLOBIN: 9.4 G/DL (ref 13.5–17.5)
HEPARIN UNFRACTIONATED: NEGATIVE
LYMPHOCYTES ABSOLUTE: 0.5 K/UL (ref 1–5.1)
LYMPHOCYTES RELATIVE PERCENT: 5.6 %
MAGNESIUM: 2.2 MG/DL (ref 1.8–2.4)
MCH RBC QN AUTO: 31 PG (ref 26–34)
MCHC RBC AUTO-ENTMCNC: 33.6 G/DL (ref 31–36)
MCV RBC AUTO: 92.5 FL (ref 80–100)
METHEMOGLOBIN ARTERIAL: 0.8 %
MONOCYTES ABSOLUTE: 0.9 K/UL (ref 0–1.3)
MONOCYTES RELATIVE PERCENT: 9.1 %
NEUTROPHILS ABSOLUTE: 8 K/UL (ref 1.7–7.7)
NEUTROPHILS RELATIVE PERCENT: 85.1 %
O2 CONTENT ARTERIAL: 14 ML/DL
O2 SAT, ARTERIAL: 96.1 %
O2 SAT, ARTERIAL: 97 % (ref 93–100)
O2 THERAPY: ABNORMAL
PCO2 ARTERIAL: 45.1 MMHG (ref 35–45)
PCO2 ARTERIAL: 47.8 MM HG (ref 35–45)
PDW BLD-RTO: 16.7 % (ref 12.4–15.4)
PERFORMED ON: ABNORMAL
PERFORMED ON: NORMAL
PH ARTERIAL: 7.53 (ref 7.35–7.45)
PH ARTERIAL: 7.54 (ref 7.35–7.45)
PLATELET # BLD: 79 K/UL (ref 135–450)
PMV BLD AUTO: 9.5 FL (ref 5–10.5)
PO2 ARTERIAL: 79.9 MM HG (ref 75–108)
PO2 ARTERIAL: 88.4 MMHG (ref 75–108)
POC SAMPLE TYPE: ABNORMAL
POTASSIUM REFLEX MAGNESIUM: 3.3 MMOL/L (ref 3.5–5.1)
POTASSIUM SERPL-SCNC: 3.7 MMOL/L (ref 3.5–5.1)
RBC # BLD: 3.04 M/UL (ref 4.2–5.9)
SODIUM BLD-SCNC: 147 MMOL/L (ref 136–145)
SRA, UNFRACTIONATED HEPARIN INTERPRETATION: NORMAL
SRA, UNFRACTIONATED HEPARIN, HIGH DOSE: 0 %
SRA, UNFRACTIONATED HEPARIN, LOW DOSE: 0 %
TCO2 ARTERIAL: 39.3 MMOL/L
TCO2 ARTERIAL: 42 MMOL/L
WBC # BLD: 9.4 K/UL (ref 4–11)

## 2020-01-29 PROCEDURE — 6370000000 HC RX 637 (ALT 250 FOR IP): Performed by: THORACIC SURGERY (CARDIOTHORACIC VASCULAR SURGERY)

## 2020-01-29 PROCEDURE — 82947 ASSAY GLUCOSE BLOOD QUANT: CPT

## 2020-01-29 PROCEDURE — 6370000000 HC RX 637 (ALT 250 FOR IP): Performed by: NURSE PRACTITIONER

## 2020-01-29 PROCEDURE — 2580000003 HC RX 258: Performed by: THORACIC SURGERY (CARDIOTHORACIC VASCULAR SURGERY)

## 2020-01-29 PROCEDURE — 2580000003 HC RX 258: Performed by: INTERNAL MEDICINE

## 2020-01-29 PROCEDURE — 6360000002 HC RX W HCPCS: Performed by: THORACIC SURGERY (CARDIOTHORACIC VASCULAR SURGERY)

## 2020-01-29 PROCEDURE — 85025 COMPLETE CBC W/AUTO DIFF WBC: CPT

## 2020-01-29 PROCEDURE — 99291 CRITICAL CARE FIRST HOUR: CPT | Performed by: INTERNAL MEDICINE

## 2020-01-29 PROCEDURE — 94669 MECHANICAL CHEST WALL OSCILL: CPT

## 2020-01-29 PROCEDURE — 94761 N-INVAS EAR/PLS OXIMETRY MLT: CPT

## 2020-01-29 PROCEDURE — 94640 AIRWAY INHALATION TREATMENT: CPT

## 2020-01-29 PROCEDURE — 84132 ASSAY OF SERUM POTASSIUM: CPT

## 2020-01-29 PROCEDURE — 6360000002 HC RX W HCPCS: Performed by: INTERNAL MEDICINE

## 2020-01-29 PROCEDURE — 85730 THROMBOPLASTIN TIME PARTIAL: CPT

## 2020-01-29 PROCEDURE — 82803 BLOOD GASES ANY COMBINATION: CPT

## 2020-01-29 PROCEDURE — 74018 RADEX ABDOMEN 1 VIEW: CPT

## 2020-01-29 PROCEDURE — 80048 BASIC METABOLIC PNL TOTAL CA: CPT

## 2020-01-29 PROCEDURE — 94660 CPAP INITIATION&MGMT: CPT

## 2020-01-29 PROCEDURE — 2500000003 HC RX 250 WO HCPCS: Performed by: INTERNAL MEDICINE

## 2020-01-29 PROCEDURE — 2700000000 HC OXYGEN THERAPY PER DAY

## 2020-01-29 PROCEDURE — 2500000003 HC RX 250 WO HCPCS: Performed by: THORACIC SURGERY (CARDIOTHORACIC VASCULAR SURGERY)

## 2020-01-29 PROCEDURE — 99024 POSTOP FOLLOW-UP VISIT: CPT | Performed by: THORACIC SURGERY (CARDIOTHORACIC VASCULAR SURGERY)

## 2020-01-29 PROCEDURE — 2140000000 HC CCU INTERMEDIATE R&B

## 2020-01-29 PROCEDURE — 83735 ASSAY OF MAGNESIUM: CPT

## 2020-01-29 RX ADMIN — POTASSIUM CHLORIDE 20 MEQ: 29.8 INJECTION, SOLUTION INTRAVENOUS at 06:09

## 2020-01-29 RX ADMIN — SODIUM CHLORIDE 15 MG/HR: 9 INJECTION, SOLUTION INTRAVENOUS at 04:16

## 2020-01-29 RX ADMIN — AMIODARONE HYDROCHLORIDE 1 MG/MIN: 50 INJECTION, SOLUTION INTRAVENOUS at 09:33

## 2020-01-29 RX ADMIN — ALBUTEROL SULFATE 2.5 MG: 2.5 SOLUTION RESPIRATORY (INHALATION) at 12:24

## 2020-01-29 RX ADMIN — POTASSIUM CHLORIDE 20 MEQ: 29.8 INJECTION, SOLUTION INTRAVENOUS at 16:37

## 2020-01-29 RX ADMIN — CHLORHEXIDINE GLUCONATE 15 ML: 1.2 RINSE ORAL at 20:23

## 2020-01-29 RX ADMIN — DEXTROSE MONOHYDRATE 1.5 MCG/KG/MIN: 50 INJECTION, SOLUTION INTRAVENOUS at 06:58

## 2020-01-29 RX ADMIN — INSULIN LISPRO 3 UNITS: 100 INJECTION, SOLUTION INTRAVENOUS; SUBCUTANEOUS at 10:34

## 2020-01-29 RX ADMIN — SODIUM CHLORIDE 15 MG/HR: 9 INJECTION, SOLUTION INTRAVENOUS at 00:21

## 2020-01-29 RX ADMIN — METOPROLOL TARTRATE 50 MG: 50 TABLET, FILM COATED ORAL at 20:23

## 2020-01-29 RX ADMIN — AMIODARONE HYDROCHLORIDE 1 MG/MIN: 50 INJECTION, SOLUTION INTRAVENOUS at 22:15

## 2020-01-29 RX ADMIN — CASTOR OIL AND BALSAM, PERU: 788; 87 OINTMENT TOPICAL at 08:00

## 2020-01-29 RX ADMIN — FAMOTIDINE 20 MG: 10 INJECTION, SOLUTION INTRAVENOUS at 10:43

## 2020-01-29 RX ADMIN — INSULIN GLARGINE 12 UNITS: 100 INJECTION, SOLUTION SUBCUTANEOUS at 10:35

## 2020-01-29 RX ADMIN — POTASSIUM CHLORIDE 20 MEQ: 29.8 INJECTION, SOLUTION INTRAVENOUS at 15:03

## 2020-01-29 RX ADMIN — Medication 10 ML: at 09:00

## 2020-01-29 RX ADMIN — SODIUM CHLORIDE 15 MG/HR: 9 INJECTION, SOLUTION INTRAVENOUS at 15:16

## 2020-01-29 RX ADMIN — SODIUM CHLORIDE 15 MG/HR: 9 INJECTION, SOLUTION INTRAVENOUS at 19:16

## 2020-01-29 RX ADMIN — POTASSIUM CHLORIDE 20 MEQ: 29.8 INJECTION, SOLUTION INTRAVENOUS at 23:25

## 2020-01-29 RX ADMIN — ALBUTEROL SULFATE 2.5 MG: 2.5 SOLUTION RESPIRATORY (INHALATION) at 09:12

## 2020-01-29 RX ADMIN — FAMOTIDINE 20 MG: 10 INJECTION, SOLUTION INTRAVENOUS at 20:23

## 2020-01-29 RX ADMIN — SODIUM CHLORIDE 15 MG/HR: 9 INJECTION, SOLUTION INTRAVENOUS at 11:35

## 2020-01-29 RX ADMIN — ALBUTEROL SULFATE 2.5 MG: 2.5 SOLUTION RESPIRATORY (INHALATION) at 20:05

## 2020-01-29 RX ADMIN — AMIODARONE HYDROCHLORIDE 1 MG/MIN: 50 INJECTION, SOLUTION INTRAVENOUS at 06:09

## 2020-01-29 RX ADMIN — FUROSEMIDE 10 MG/HR: 10 INJECTION, SOLUTION INTRAMUSCULAR; INTRAVENOUS at 03:05

## 2020-01-29 RX ADMIN — SODIUM CHLORIDE 5 MG/HR: 9 INJECTION, SOLUTION INTRAVENOUS at 08:13

## 2020-01-29 RX ADMIN — Medication 10 ML: at 20:24

## 2020-01-29 RX ADMIN — ACETAMINOPHEN 650 MG: 325 TABLET ORAL at 17:12

## 2020-01-29 RX ADMIN — INSULIN LISPRO 3 UNITS: 100 INJECTION, SOLUTION INTRAVENOUS; SUBCUTANEOUS at 00:30

## 2020-01-29 RX ADMIN — INSULIN GLARGINE 12 UNITS: 100 INJECTION, SOLUTION SUBCUTANEOUS at 20:23

## 2020-01-29 RX ADMIN — SODIUM CHLORIDE 10 MG/HR: 9 INJECTION, SOLUTION INTRAVENOUS at 23:19

## 2020-01-29 RX ADMIN — AMIODARONE HYDROCHLORIDE 75 MG: 50 INJECTION, SOLUTION INTRAVENOUS at 09:04

## 2020-01-29 RX ADMIN — AMIODARONE HYDROCHLORIDE 1 MG/MIN: 50 INJECTION, SOLUTION INTRAVENOUS at 15:00

## 2020-01-29 RX ADMIN — CASTOR OIL AND BALSAM, PERU: 788; 87 OINTMENT TOPICAL at 20:23

## 2020-01-29 RX ADMIN — DOBUTAMINE HYDROCHLORIDE 4 MCG/KG/MIN: 200 INJECTION INTRAVENOUS at 08:51

## 2020-01-29 RX ADMIN — METOPROLOL TARTRATE 50 MG: 50 TABLET, FILM COATED ORAL at 13:40

## 2020-01-29 ASSESSMENT — PAIN SCALES - GENERAL: PAINLEVEL_OUTOF10: 8

## 2020-01-29 NOTE — PROGRESS NOTES
ONCOLOGY HEMATOLOGY CARE PROGRESS NOTE      SUBJECTIVE:     Off vent. Plt 79k. ARTEMIO still in process. He feels okay, no complaints. No family present. ROS:   The remaining 10 point review of symptoms is unremarkable. OBJECTIVE        Physical    VITALS:  /66   Pulse 82   Temp 99 °F (37.2 °C) (Oral)   Resp 19   Ht 5' 9\" (1.753 m)   Wt 190 lb 4.1 oz (86.3 kg)   SpO2 97%   BMI 28.10 kg/m²   TEMPERATURE:  Current - Temp: 99 °F (37.2 °C); Max - Temp  Av.9 °F (37.2 °C)  Min: 98.1 °F (36.7 °C)  Max: 99.5 °F (37.5 °C)  PULSE OXIMETRY RANGE: SpO2  Av.6 %  Min: 92 %  Max: 100 %  24HR INTAKE/OUTPUT:      Intake/Output Summary (Last 24 hours) at 2020 1129  Last data filed at 2020 1032  Gross per 24 hour   Intake 3212 ml   Output 5015 ml   Net -1803 ml       CONSTITUTIONAL:  Off vent, appears improved   HEMATOLOGIC/LYMPHATICS:  no cervical lymphadenopathy, no supraclavicular lymphadenopathy, no axillary lymphadenopathy and no inguinal lymphadenopathy  LUNGS:  resp even non labored   CARDIOVASCULAR:  , regular rate and rhythm, normal S1 and S2, no S3 or S4, and no murmur noted  ABDOMEN:  Non distended   MUSCULOSKELETAL:  There is no redness, warmth, or swelling of the joints. EXTREMETIES:no swelling, wearing MOISES hose   NEUROLOGIC:  Unable to assess   SKIN:  Scattered ecchymoses       Data      Recent Labs     20  0605 20  0555 20  0505   WBC 10.4 9.5 9.4   HGB 9.8* 9.9* 9.4*   HCT 28.9* 29.8* 28.2*   PLT 72* 71* 79*   MCV 92.6 92.8 92.5        Recent Labs     20  0605  20  2235 20  0555 20  0505     --   --  140 147*   K 4.0   < > 3.9 4.1 3.3*   CL 98*  --   --  97* 100   CO2 32  --   --  36* 37*   BUN 31*  --   --  34* 38*   CREATININE 0.7*  --   --  0.7* 0.7*    < > = values in this interval not displayed.      Recent Labs     20  0804   AST 68*   *   BILIDIR 0.5*   BILITOT 1.1*   ALKPHOS 87 Magnesium:    Lab Results   Component Value Date    MG 2.20 01/29/2020    MG 2.20 01/28/2020    MG 1.90 01/27/2020         Problem List  Patient Active Problem List   Diagnosis    Hemothorax    Acute respiratory failure with hypoxemia (HCC)    Tobacco abuse    AS (aortic stenosis)    CAD (coronary artery disease) s/p stent in 2010    HTN (hypertension)    Hyperlipidemia    S/P AVR (aortic valve replacement)    Hypotension    Pulmonary infiltrate    Pulmonary venous congestion    Atelectasis    Leukocytosis    Hyperglycemia    Acute bronchospasm    Atrial fibrillation with RVR (HCC)    Cardiogenic shock (HCC)    Acute on chronic diastolic congestive heart failure (HCC)    Myocardial infarction (HCC)    Obesity, Class I, BMI 30.0-34.9 (see actual BMI)    S/P thoracotomy    Status post partial removal of lung    Hemothorax, left    Restrictive lung disease       ASSESSMENT AND PLAN:      HIT +, ARTEMIO confirmatory test pending  - call placed to lab at [facility] to determine where the result is 1/27/20   - Cont Argatroban until ARTEMIO returned. If ARTEMIO positive, will need at least 3 months of Coumadin. Will plan to bridge to Coumadin once plt are near normal and at least over 100k. Plt79 k. ARTEMIO pending. Creat 0.7.      TCP  - Could be multifactorial given severe illness   - Treat for HIT and await ARTEMIO - likely back later today vs. Latest Friday 1/31.   - Would not transfuse plt unless less than 10 and signs of bleeding - plt improved and now 79  - Differential includes shock liver and early sequestration of plt to the spleen and DIC secondary to PNA. INR 1.57 on 1-21 (prior to argatroban). Argatroban is known to increase the INR.   - D-dimer will not have much significance in a postop patient. Fibrinogen is normal.  Doubt DIC.  - Check plt antibody - NEGATIVE      Anemia  - blood loss secondary to surgical losses, will follow   - HGB 9.4 g/dL.        PNA- Merrem      AFIBB - Amiodarone drip    Aortic repair, AV valve redo per CT surgery team            ONCOLOGIC DISPOSITION: TBD       Shai Forbes, MIKE  [facility]   Please contact through [address]

## 2020-01-29 NOTE — PROGRESS NOTES
01/29/20 0318   NIV Type   Equipment Type V60   Mode Bilevel   Mask Type Full face mask   Mask Size Large   Settings/Measurements   IPAP 15 cmH20   CPAP/EPAP 6 cmH2O   Rate Ordered 10   Resp 16   FiO2  40 %   Vt Exhaled 793 mL   Minute Volume 18.3 Liters   Mask Leak (lpm) 43 lpm   Comfort Level Good   Using Accessory Muscles No   SpO2 96   Breath Sounds   Right Upper Lobe Diminished   Right Middle Lobe Diminished   Right Lower Lobe Diminished   Left Upper Lobe Diminished   Left Lower Lobe Diminished   Alarm Settings   Alarms On Y   Oxygen Therapy/Pulse Ox   SpO2 97 %

## 2020-01-29 NOTE — PROGRESS NOTES
01/29/20 0017   NIV Type   $NIV $Daily Charge   Equipment Changed HME   Equipment Type V60   Mode Bilevel   Mask Type Full face mask   Mask Size Large   Settings/Measurements   IPAP 15 cmH20   CPAP/EPAP 6 cmH2O   Rate Ordered 10   Resp 21   FiO2  40 %   Vt Exhaled 587 mL   Minute Volume 15.2 Liters   Mask Leak (lpm) 41 lpm   Comfort Level Good   Using Accessory Muscles No   SpO2 97   Breath Sounds   Right Upper Lobe Diminished   Right Middle Lobe Diminished   Right Lower Lobe Diminished   Left Upper Lobe Diminished   Left Lower Lobe Diminished   Alarm Settings   Alarms On Y   Oxygen Therapy/Pulse Ox   SpO2 97 %

## 2020-01-29 NOTE — PROGRESS NOTES
Shift handoff at the bedside. Vitals stable. 4 eye skin assessment complete. The Patient is alert and confused at this time. The Patient is up in the chair on the BiPAP.

## 2020-01-29 NOTE — PROGRESS NOTES
01/29/20 0915   Treatment   Treatment Type HHN;Vibratory mucous clearing therapy or intervention   $Bronchial Hygiene $Oscillatory therapy   $Treatment Type $Inhaled Therapy/Meds   Medications Albuterol   Pre-Tx Pulse 105   Pre-Tx Resps 14   Breath Sounds Pre-Tx XENA Rhonchi   Breath Sounds Pre-Tx LLL Diminished   Breath Sounds Pre-Tx RUL Rhonchi   Breath Sounds Pre-Tx RML Diminished   Breath Sounds Pre-Tx RLL Diminished   Breath Sounds Post-Tx XENA Rhonchi   Breath Sounds Post-Tx LLL Diminished   Breath Sounds Post-Tx RUL Rhonchi   Breath Sounds Post-Tx RML Diminished   Breath Sounds Post-Tx RLL Diminished   Post-Tx Pulse 100   Post-Tx Resps 14   Delivery Source Air   Position Semi-Jones's   Tx Tolerance Well   Is patient on O2?  Y   Oxygen Therapy/Pulse Ox   O2 Therapy Oxygen humidified   $Oxygen $Daily Charge   O2 Device Nasal cannula   O2 Flow Rate (L/min) 5 L/min  (found on 5 lpm nasal cannula)   Resp 19   SpO2 97 %   Pulse Oximeter Device Mode Continuous   Pulse Oximeter Device Location Finger   $Pulse Oximeter $Spot check (multiple/continuous)

## 2020-01-29 NOTE — PROGRESS NOTES
INITIAL performed by María Elena Vera MD at [facility] CABG WITH AORTIC VALVE REPLACEMENT  2001    date approximate, x1 to OM, AVR w/porcine valve    CARDIAC CATHETERIZATION  01/13/2020    Dr. Flora Hills Left 01/02/2020    Dr. Svetlana Haro - 3200 ml blood drained in 1000 ml increments    CORONARY ANGIOPLASTY WITH STENT PLACEMENT  12/07/2010    Dr. Bakari Weiner. Roger - NURIS- 4.0 x 28 to Cx    RI ASCEND AORTA GRAFT W ROOT REPLACMENT. VALVE CONDUIT/CORON RECONSTR N/A 1/21/2020    Dr. 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 Wili Marie - emergent w/evacuation of hematoma & chest wall hemostasis, pleural biopsy & XENA wedge resection    TRANSESOPHAGEAL ECHOCARDIOGRAM  01/21/2020    during ascending aorta replacement w/redo AVR    TRANSESOPHAGEAL ECHOCARDIOGRAM  12/07/2010        Allergies as of 01/08/2020 - Review Complete 01/08/2020   Allergen Reaction Noted    Penicillins Hives 05/28/2010        Patient Active Problem List   Diagnosis    Hemothorax    Acute respiratory failure with [facility]emia [facility] [address]    Tobacco abuse    AS (aortic stenosis)    CAD (coronary artery disease) s/p stent in 2010    HTN (hypertension)    Hyperlipidemia    S/P AVR (aortic valve replacement)    Hypotension    Pulmonary infiltrate    Pulmonary venous congestion    Atelectasis    Leukocytosis    Hyperglycemia    Acute bronchospasm    Atrial fibrillation with RVR (HCC)    Cardiogenic shock (HCC)    Acute on chronic diastolic congestive heart failure (HCC)    Myocardial infarction (HCC)    Obesity, Class I, BMI 30.0-34.9 (see actual BMI)    S/P thoracotomy    Status post partial removal of lung    Hemothorax, left    Restrictive lung disease        Vital Signs: /66   Pulse 111   Temp 98.5 °F (36.9 °C) (Bladder)   Resp 19   Ht 5' 9\" (1.753 m)   Wt 190 lb 4.1 oz ________________________________________________________________________    Subjective:   Dietary Intake: TF via NG   no Nausea   Pain Control: controlled, prn meds  Complaints: none  Bowels: have moved    Objective:   General appearance: awake, interactive and talkative, in NAD  Lungs: diminished  Heart: S1S2 normal; Atrial fibrillation in low 100's on monitor  Chest: symmetrical expansion with inspiration and expirations; no rocking of sternum noted   Abdomen: round, soft, non-tender   Bowel sounds: hypoactive  Kidneys: UOP 1460/1900/1600= 4960 ml in 24 hrs; Cr 0.7  Wound/Incisions: Midsternal incision CDI; LLE incisions with dressings CDI; Pacing wires cut by MD 1/28 in AM  Extremities: BLE pulses by doppler; 2+ swelling noted in BLE  Neurological: intact, non focal exam, speech slightly hoarse  Chest tubes/Drains: all out   ________________________________________________________________________    SCIP Measures:     · Noriega catheter for:  ? IV Diuresis  ? Accurate I/O  ? D/C today  ·   · Antibiotics:  ? Have been stopped  ? Prophylaxis (POD #1 only)  ? Continued for:   ________________________________________________________________________    Assessment:   Post-op: 8 days. Condition: In stable condition. Plan:   1. Cardiovascular: s/p Aorta repair and AVR- ASA, Plavix,Statin, amio, BB  Dobutamine remains at 4 mcg/kg/hr. Pt staying in atrial fibrillation despite amiodarone. Will give 75 mg bolus. 2. Pulmonary: needs pulmonary expansion maneuvers  CXR 1/23 and 1/24 showed pulmonary edema. Pt with respiratory distress - elective intubation was performed at bedside 1/23       Pulmonology weaned off vent 1/28 with extubation in afternoon. Currently saturating upper 90's on 5L per NC.    3. Neurology: analgesia as needed. 4. Nephrology: fluid management- diurese as tolerated. Nephro following. Lasix gtt decreased again , is now at 10 mg/hr    5.  Endocrinology: BG control much improved with additional Lantus started 1/27. Continue SSI and Lantus    6. Hematology: acute blood loss anemia; expected, monitor. Thrombocytopenia- plt 79 today, will hold Arixtra, ASA. HIT Positive+   Argatroban drip started 1/23; continue. ARTEMIO remains pending. 7. Microbiology:   Leukocytosis: much improved, WBC 9.4 today. BC X2 and urine culture - NGTD  Abx course completed 1/27 (meropenem)     8. Nutrition: TF's per NG tube. Pt to remain 60 degrees upright. If he needs to go back on BiPap d/c tube feeds. He is to remain NPO at this time. Constipation: has resolved w/ multiple BM's. Monitor. 9. Labs: monitor. Fungal cx negative. 10. Post-op Drains/Wires: All out. 11. D/C Goals: too soon to tell, DCP following. Likely will need rehab, possibly ARU. 12. Continue post-op care of patient in the ICU    Meds:    The patient is on a beta-blocker   The patient is not on an ace-i/ARB- not indicated   The patient is on a statin   The patient is on oral antiplatelet therapy   ________________________________________________________________________  *Only bolded items apply to this patient at this time:     ? Acidemia (pH < 7.35) ______  ? Alkalemia (pH > 7.45) ______  ? Acidosis (Bicarb <20) ______  ? Electrolyte abnormality (specify)  ? Acute post-op respiratory insufficiency (difficulty weaning from vent)  ? Acute post-op blood loss anemia (hct ? 30)        ? Transfused this admission post-op  ? Cardiac arrythmia:  ? Ventricular Tachycardia     ? Atrial Flutter     ? Atrial fibrillation  ? Acute pulmonary edema due to:      ? Noncardiogenic causes  ? Acute heart failure (Syst, Diast)   ? Encephalopathy (confusion, delirium, altered mental status), (toxic, metabolic)  ? Restraints in use  ? TIA/Stroke (acute, post-op)   ? Acute kidney injury (Cr > 0.5 over baseline, oliguria, ? 5ml/kg/hr x 6 hrs)  ? Acute renal failure (Cr > 2x baseline, Dialysis started)  ? Malnutrition: ? Severe (prealbumin <10, albumin ?  2.5)

## 2020-01-30 ENCOUNTER — APPOINTMENT (OUTPATIENT)
Dept: GENERAL RADIOLOGY | Age: 70
DRG: 216 | End: 2020-01-30
Attending: INTERNAL MEDICINE
Payer: MEDICARE

## 2020-01-30 LAB
ANION GAP SERPL CALCULATED.3IONS-SCNC: 9 MMOL/L (ref 3–16)
BASOPHILS ABSOLUTE: 0 K/UL (ref 0–0.2)
BASOPHILS RELATIVE PERCENT: 0.2 %
BUN BLDV-MCNC: 32 MG/DL (ref 7–20)
CALCIUM SERPL-MCNC: 8.8 MG/DL (ref 8.3–10.6)
CHLORIDE BLD-SCNC: 101 MMOL/L (ref 99–110)
CO2: 36 MMOL/L (ref 21–32)
CREAT SERPL-MCNC: 0.7 MG/DL (ref 0.8–1.3)
EOSINOPHILS ABSOLUTE: 0 K/UL (ref 0–0.6)
EOSINOPHILS RELATIVE PERCENT: 0.1 %
GFR AFRICAN AMERICAN: >60
GFR NON-AFRICAN AMERICAN: >60
GLUCOSE BLD-MCNC: 117 MG/DL (ref 70–99)
GLUCOSE BLD-MCNC: 125 MG/DL (ref 70–99)
GLUCOSE BLD-MCNC: 155 MG/DL (ref 70–99)
GLUCOSE BLD-MCNC: 81 MG/DL (ref 70–99)
GLUCOSE BLD-MCNC: 82 MG/DL (ref 70–99)
GLUCOSE BLD-MCNC: 90 MG/DL (ref 70–99)
HCT VFR BLD CALC: 29.5 % (ref 40.5–52.5)
HEMOGLOBIN: 9.7 G/DL (ref 13.5–17.5)
LYMPHOCYTES ABSOLUTE: 0.7 K/UL (ref 1–5.1)
LYMPHOCYTES RELATIVE PERCENT: 6.9 %
MAGNESIUM: 2.3 MG/DL (ref 1.8–2.4)
MCH RBC QN AUTO: 30.3 PG (ref 26–34)
MCHC RBC AUTO-ENTMCNC: 32.9 G/DL (ref 31–36)
MCV RBC AUTO: 92.2 FL (ref 80–100)
MONOCYTES ABSOLUTE: 0.9 K/UL (ref 0–1.3)
MONOCYTES RELATIVE PERCENT: 9 %
NEUTROPHILS ABSOLUTE: 8.1 K/UL (ref 1.7–7.7)
NEUTROPHILS RELATIVE PERCENT: 83.8 %
PDW BLD-RTO: 16.3 % (ref 12.4–15.4)
PERFORMED ON: ABNORMAL
PERFORMED ON: NORMAL
PERFORMED ON: NORMAL
PLATELET # BLD: 85 K/UL (ref 135–450)
PMV BLD AUTO: 9.4 FL (ref 5–10.5)
POTASSIUM REFLEX MAGNESIUM: 3.7 MMOL/L (ref 3.5–5.1)
RBC # BLD: 3.2 M/UL (ref 4.2–5.9)
SODIUM BLD-SCNC: 146 MMOL/L (ref 136–145)
WBC # BLD: 9.7 K/UL (ref 4–11)

## 2020-01-30 PROCEDURE — 71045 X-RAY EXAM CHEST 1 VIEW: CPT

## 2020-01-30 PROCEDURE — 6370000000 HC RX 637 (ALT 250 FOR IP): Performed by: NURSE PRACTITIONER

## 2020-01-30 PROCEDURE — 83735 ASSAY OF MAGNESIUM: CPT

## 2020-01-30 PROCEDURE — 6370000000 HC RX 637 (ALT 250 FOR IP)

## 2020-01-30 PROCEDURE — 99024 POSTOP FOLLOW-UP VISIT: CPT | Performed by: THORACIC SURGERY (CARDIOTHORACIC VASCULAR SURGERY)

## 2020-01-30 PROCEDURE — 94640 AIRWAY INHALATION TREATMENT: CPT

## 2020-01-30 PROCEDURE — 6370000000 HC RX 637 (ALT 250 FOR IP): Performed by: THORACIC SURGERY (CARDIOTHORACIC VASCULAR SURGERY)

## 2020-01-30 PROCEDURE — 94660 CPAP INITIATION&MGMT: CPT

## 2020-01-30 PROCEDURE — 2700000000 HC OXYGEN THERAPY PER DAY

## 2020-01-30 PROCEDURE — 2140000000 HC CCU INTERMEDIATE R&B

## 2020-01-30 PROCEDURE — 92526 ORAL FUNCTION THERAPY: CPT

## 2020-01-30 PROCEDURE — 6360000002 HC RX W HCPCS: Performed by: INTERNAL MEDICINE

## 2020-01-30 PROCEDURE — 80048 BASIC METABOLIC PNL TOTAL CA: CPT

## 2020-01-30 PROCEDURE — 6360000002 HC RX W HCPCS: Performed by: THORACIC SURGERY (CARDIOTHORACIC VASCULAR SURGERY)

## 2020-01-30 PROCEDURE — 94761 N-INVAS EAR/PLS OXIMETRY MLT: CPT

## 2020-01-30 PROCEDURE — 85025 COMPLETE CBC W/AUTO DIFF WBC: CPT

## 2020-01-30 PROCEDURE — 2500000003 HC RX 250 WO HCPCS: Performed by: THORACIC SURGERY (CARDIOTHORACIC VASCULAR SURGERY)

## 2020-01-30 PROCEDURE — 2580000003 HC RX 258: Performed by: THORACIC SURGERY (CARDIOTHORACIC VASCULAR SURGERY)

## 2020-01-30 PROCEDURE — 94669 MECHANICAL CHEST WALL OSCILL: CPT

## 2020-01-30 PROCEDURE — 2580000003 HC RX 258: Performed by: INTERNAL MEDICINE

## 2020-01-30 PROCEDURE — 99233 SBSQ HOSP IP/OBS HIGH 50: CPT | Performed by: INTERNAL MEDICINE

## 2020-01-30 PROCEDURE — 2500000003 HC RX 250 WO HCPCS: Performed by: INTERNAL MEDICINE

## 2020-01-30 RX ORDER — AMLODIPINE BESYLATE 5 MG/1
10 TABLET ORAL DAILY
Status: DISCONTINUED | OUTPATIENT
Start: 2020-01-30 | End: 2020-02-02

## 2020-01-30 RX ORDER — OXYCODONE HYDROCHLORIDE 5 MG/1
5 TABLET ORAL EVERY 6 HOURS PRN
Status: DISCONTINUED | OUTPATIENT
Start: 2020-01-30 | End: 2020-02-05 | Stop reason: HOSPADM

## 2020-01-30 RX ORDER — INSULIN GLARGINE 100 [IU]/ML
10 INJECTION, SOLUTION SUBCUTANEOUS 2 TIMES DAILY
Status: DISCONTINUED | OUTPATIENT
Start: 2020-01-30 | End: 2020-02-05 | Stop reason: HOSPADM

## 2020-01-30 RX ORDER — CLONIDINE 0.2 MG/24H
1 PATCH, EXTENDED RELEASE TRANSDERMAL WEEKLY
Status: DISCONTINUED | OUTPATIENT
Start: 2020-01-30 | End: 2020-01-31

## 2020-01-30 RX ORDER — OXYCODONE HYDROCHLORIDE 5 MG/1
TABLET ORAL
Status: COMPLETED
Start: 2020-01-30 | End: 2020-01-30

## 2020-01-30 RX ORDER — AMIODARONE HYDROCHLORIDE 200 MG/1
200 TABLET ORAL DAILY
Status: DISCONTINUED | OUTPATIENT
Start: 2020-01-30 | End: 2020-02-05 | Stop reason: HOSPADM

## 2020-01-30 RX ORDER — FONDAPARINUX SODIUM 2.5 MG/.5ML
2.5 INJECTION SUBCUTANEOUS DAILY
Status: DISCONTINUED | OUTPATIENT
Start: 2020-01-30 | End: 2020-01-30

## 2020-01-30 RX ADMIN — FUROSEMIDE 10 MG/HR: 10 INJECTION, SOLUTION INTRAMUSCULAR; INTRAVENOUS at 04:35

## 2020-01-30 RX ADMIN — METOPROLOL TARTRATE 50 MG: 50 TABLET, FILM COATED ORAL at 08:38

## 2020-01-30 RX ADMIN — CASTOR OIL AND BALSAM, PERU: 788; 87 OINTMENT TOPICAL at 22:00

## 2020-01-30 RX ADMIN — CASTOR OIL AND BALSAM, PERU: 788; 87 OINTMENT TOPICAL at 08:59

## 2020-01-30 RX ADMIN — AMLODIPINE BESYLATE 10 MG: 5 TABLET ORAL at 14:41

## 2020-01-30 RX ADMIN — SODIUM CHLORIDE 12.5 MG/HR: 9 INJECTION, SOLUTION INTRAVENOUS at 04:35

## 2020-01-30 RX ADMIN — INSULIN GLARGINE 10 UNITS: 100 INJECTION, SOLUTION SUBCUTANEOUS at 20:32

## 2020-01-30 RX ADMIN — SODIUM CHLORIDE 12.5 MG/HR: 9 INJECTION, SOLUTION INTRAVENOUS at 08:27

## 2020-01-30 RX ADMIN — ASPIRIN 325 MG: 325 TABLET, COATED ORAL at 14:41

## 2020-01-30 RX ADMIN — SODIUM CHLORIDE 9 MG/HR: 9 INJECTION, SOLUTION INTRAVENOUS at 17:49

## 2020-01-30 RX ADMIN — ALBUTEROL SULFATE 2.5 MG: 2.5 SOLUTION RESPIRATORY (INHALATION) at 09:13

## 2020-01-30 RX ADMIN — POTASSIUM CHLORIDE 20 MEQ: 29.8 INJECTION, SOLUTION INTRAVENOUS at 05:48

## 2020-01-30 RX ADMIN — ALBUTEROL SULFATE 2.5 MG: 2.5 SOLUTION RESPIRATORY (INHALATION) at 12:11

## 2020-01-30 RX ADMIN — ALBUTEROL SULFATE 2.5 MG: 2.5 SOLUTION RESPIRATORY (INHALATION) at 15:28

## 2020-01-30 RX ADMIN — ALBUTEROL SULFATE 2.5 MG: 2.5 SOLUTION RESPIRATORY (INHALATION) at 20:18

## 2020-01-30 RX ADMIN — METOPROLOL TARTRATE 50 MG: 50 TABLET, FILM COATED ORAL at 20:17

## 2020-01-30 RX ADMIN — CHLORHEXIDINE GLUCONATE 15 ML: 1.2 RINSE ORAL at 20:20

## 2020-01-30 RX ADMIN — AMIODARONE HYDROCHLORIDE 200 MG: 200 TABLET ORAL at 12:19

## 2020-01-30 RX ADMIN — DOBUTAMINE HYDROCHLORIDE 2.5 MCG/KG/MIN: 200 INJECTION INTRAVENOUS at 11:05

## 2020-01-30 RX ADMIN — RIVAROXABAN 20 MG: 20 TABLET, FILM COATED ORAL at 17:50

## 2020-01-30 RX ADMIN — POTASSIUM CHLORIDE 20 MEQ: 29.8 INJECTION, SOLUTION INTRAVENOUS at 00:23

## 2020-01-30 RX ADMIN — OXYCODONE 5 MG: 5 TABLET ORAL at 08:38

## 2020-01-30 RX ADMIN — INSULIN GLARGINE 12 UNITS: 100 INJECTION, SOLUTION SUBCUTANEOUS at 09:01

## 2020-01-30 RX ADMIN — AMIODARONE HYDROCHLORIDE 1 MG/MIN: 50 INJECTION, SOLUTION INTRAVENOUS at 05:35

## 2020-01-30 RX ADMIN — INSULIN LISPRO 3 UNITS: 100 INJECTION, SOLUTION INTRAVENOUS; SUBCUTANEOUS at 18:00

## 2020-01-30 RX ADMIN — CHLORHEXIDINE GLUCONATE 15 ML: 1.2 RINSE ORAL at 12:21

## 2020-01-30 RX ADMIN — FONDAPARINUX SODIUM 2.5 MG: 2.5 INJECTION, SOLUTION SUBCUTANEOUS at 08:54

## 2020-01-30 RX ADMIN — Medication 10 ML: at 08:59

## 2020-01-30 RX ADMIN — OXYCODONE 5 MG: 5 TABLET ORAL at 20:17

## 2020-01-30 RX ADMIN — FAMOTIDINE 20 MG: 10 INJECTION, SOLUTION INTRAVENOUS at 08:54

## 2020-01-30 RX ADMIN — FAMOTIDINE 20 MG: 10 INJECTION, SOLUTION INTRAVENOUS at 20:17

## 2020-01-30 RX ADMIN — SODIUM CHLORIDE 8.5 MG/HR: 9 INJECTION, SOLUTION INTRAVENOUS at 22:49

## 2020-01-30 ASSESSMENT — PAIN SCALES - GENERAL
PAINLEVEL_OUTOF10: 6
PAINLEVEL_OUTOF10: 10
PAINLEVEL_OUTOF10: 5
PAINLEVEL_OUTOF10: 5

## 2020-01-30 NOTE — PROGRESS NOTES
[facility]    [facility]              [phone]                       Plan :     Renal function is a stable  Sodium going up  Due to diuretics and NPO status  Decrease diuretics to 5- and when able to  Take by mouth to po       Assessment :     Acute Kidney Injury  Creatinine peaked to 1.6- now 0.7 and remains there  Likely due to cardiac surgery, hypotension, Cardiorenal syndrome  Getting inotropes-helped his kidney function  On Lasix drip- now 10 mg hr  Decrease to 5 mg hr on 1/30/20      hyperkalemia  Low due to diuretics and getting treated    Acidosis  Lactic acidosis likely due to hypotension poor tissue perfusion  Improved  Now has metabolic alkalosis and also respiratory alkalosis    Hypotension- now Hypertension  BP: (121)/(51) Pulse:  [69-79]   Low-dose of Levophed- switched to dobutamine  Now needing nicardipine drip  Switched to Clevidipine drip now         [facility] would like to thank Gina Garcia MD   for opportunity to serve this patient      Please call with questions at-   24 Hrs Answering service [phone] or  7 am- 5 pm via Perfect serve or cell phone  Dr.Sudhir Harpreet Mendez          CC/reason for consult :   Acute kidney injury   HPI :     From consult note-   Aliza Biswas is a 77 y.o. male presented to   the hospital on 1/8/2020 with shortness of breath. Not able to give history because of being on BiPAP. He is known to have severe aortic stenosis and he had redo sternotomy and replacement of ascending aorta done, and also replacement with Carrasquillo Intuity valve done. Following that he is in ICU and is requiring BiPAP and has a poor urine output and he is on a low-dose of vasopressor. He was here earlier this month with shortness of breath pneumonia and large left hemothorax and pneumothorax. Also had PAL and hyperkalemia.   Did not require dialysis during that hospitalization and his kidney function improved to normal.    We are consulted for PAL and related issues     Interval History:     Extubated  Making urine  On lasix drip    ROS:     Seen with- RNKAMINI-   none  Edema-   none  Nausea/vomiting/diarrhea-   none  Poor appetite/oral intake-  No  Confusion  No  Difficulty passing urine-  No  Drop in urine output-  No  Any other complaints-  No  All other ROS are reviewed and are Negative            Medication:     Current Facility-Administered Medications: fondaparinux (ARIXTRA) injection 2.5 mg, 2.5 mg, Subcutaneous, Daily  oxyCODONE (ROXICODONE) immediate release tablet 5 mg, 5 mg, Oral, Q6H PRN  amiodarone (CORDARONE) tablet 200 mg, 200 mg, Oral, Daily  insulin glargine (LANTUS) injection vial 12 Units, 12 Units, Subcutaneous, BID  metoprolol tartrate (LOPRESSOR) tablet 50 mg, 50 mg, Oral, BID  Venelex ointment, , Topical, BID  insulin lispro (HUMALOG) injection vial 0-18 Units, 0-18 Units, Subcutaneous, Q4H  furosemide (LASIX) 500 mg in dextrose 5 % 100 mL infusion, 10 mg/hr, Intravenous, Continuous  DOBUTamine (DOBUTREX) 500 mg in dextrose 5 % 250 mL infusion, 4 mcg/kg/min, Intravenous, Continuous  niCARdipine (CARDENE) 50 mg in sodium chloride 0.9 % 250 mL infusion, 5 mg/hr, Intravenous, Titrated  famotidine (PEPCID) injection 20 mg, 20 mg, Intravenous, BID  sodium chloride flush 0.9 % injection 10 mL, 10 mL, Intravenous, 2 times per day  sodium chloride flush 0.9 % injection 10 mL, 10 mL, Intravenous, PRN  potassium chloride 20 mEq/50 mL IVPB (Central Line), 20 mEq, Intravenous, PRN  magnesium sulfate 2 g in 50 mL IVPB premix, 2 g, Intravenous, PRN  acetaminophen (TYLENOL) tablet 650 mg, 650 mg, Oral, Q4H PRN  acetaminophen (TYLENOL) suppository 650 mg, 650 mg, Rectal, Q4H PRN  docusate sodium (COLACE) capsule 100 mg, 100 mg, Oral, BID  ondansetron (ZOFRAN) injection 4 mg, 4 mg, Intravenous, Q8H PRN  chlorhexidine (PERIDEX) 0.12 % solution 15 mL, 15 mL, Mouth/Throat, BID  [Held by provider] atorvastatin (LIPITOR) tablet 20 mg, 20 mg, Oral,

## 2020-01-30 NOTE — PROGRESS NOTES
CVTS Cardiothoracic Progress Note:                                CC:  Post op follow up     Surgery: 1/21/20 RE-DO STERNOTOMY X3, REPLACEMENT OF ASCENDING AORTA, RE-DO AORTIC VALVE REPLACEMENT WITH 19MM BLOCK INTUITY VALVE, CLOSURE OF OUTFLOW TRACK ATRIAL FISTULA, HYPOTHERMIC ARREST,  WITH TRANSESOPHAGEAL ECHOCARDIOGRAM, CIRCULATORY ARREST, 5 LEVEL BILATERAL INTERCOSTAL NERVE BLOCK     Hospital course:   1/22 resting in bed, awake, alert, responding appropriately on SBT  1/23 Pt with increasing oxygen demand overnight, Cr up to 1.6, hyperkalemia 5.9, and lactic 9.7  1/24 pt resting in bed, intubated and sedated. 1/25 & 1/26 remains intubated and sedated, FiO2 requirement improving. 1/27 FiO2 on vent is 30%, Peep of 10. Will work at weaning down Peep  1/28 DTI found overnight by RN, air mattress not functioning properly. In process of getting new bed for patient. Working on weaning sedation with end goal of extubation (hopefully) today. 1/29 sitting up in chair, weaned off of BiPap and tolerating 5L per NC well. Appears much more alert and interactive today. 1/30 up in chair, had multiple loose stools, now has rectal tube.  Awake, alert and talkative, only on 4L today (sat 96%)    Past Medical History:   Diagnosis Date    CAD (coronary artery disease)     COPD (chronic obstructive pulmonary disease) ([facility] [address]     Diabetes mellitus ([facility] [address]     Gout     Hemothorax, left 01/2020    Hyperlipidemia     Hypertension     Obesity, Class I, BMI 30.0-34.9 (see actual BMI) 01/2020    30.7    S/P thoracotomy 01/2020    for hemothorax    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 Delgado Gravely - redo w/23mm Magna Ease bioprosthetic valve    BRONCHOSCOPY N/A 1/9/2020    w/BAL, performed by Isadora Warren MD at [address] 1/24/2020    [address] [place] performed by Marliss Snellen, MD at [address] [place] [address]  1/24/2020    BRONCHOSCOPY THERAPUTIC ASPIRATION INITIAL performed by Marliss Snellen, MD at [facility] CABG WITH AORTIC VALVE REPLACEMENT  2001    date approximate, x1 to OM, AVR w/porcine valve    CARDIAC CATHETERIZATION  01/13/2020    Dr. Ilsa Flannery Left 01/02/2020    Dr. Mark Cline - 3200 ml blood drained in 1000 ml increments    CORONARY ANGIOPLASTY WITH STENT PLACEMENT  12/07/2010    Dr. Kojo Amador. Roger - NURIS- 4.0 x 28 to Cx    [place] ASCEND AORTA GRAFT W ROOT REPLACMENT. VALVE CONDUIT/CORON RECONSTR N/A 1/21/2020    Dr. 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 Rome Leggett - emergent w/evacuation of hematoma & chest wall hemostasis, pleural biopsy & XENA wedge resection    TRANSESOPHAGEAL ECHOCARDIOGRAM  01/21/2020    during ascending aorta replacement w/redo AVR    TRANSESOPHAGEAL ECHOCARDIOGRAM  12/07/2010        Allergies as of 01/08/2020 - Review Complete 01/08/2020   Allergen Reaction Noted    Penicillins Hives 05/28/2010        Patient Active Problem List   Diagnosis    Hemothorax    Acute respiratory failure with hypoxemia ([address]    Tobacco abuse    AS (aortic stenosis)    CAD (coronary artery disease) s/p stent in 2010    HTN (hypertension)    Hyperlipidemia    S/P AVR (aortic valve replacement)    Hypotension    Pulmonary infiltrate    Pulmonary venous congestion    Atelectasis    Leukocytosis    Hyperglycemia    Acute bronchospasm    Atrial fibrillation with RVR (HCC)    Cardiogenic shock (HCC)    Acute on chronic diastolic congestive heart failure (HCC)    Myocardial infarction ([address]    Obesity, Class I, BMI 30.0-34.9 (see actual BMI)    S/P thoracotomy    Status post partial removal of lung    Hemothorax, left    Restrictive lung disease    ________________________________________________________________________    Subjective:   Dietary Intake: TF via NG   no Nausea   Pain Control: controlled, prn meds  Complaints: none  Bowels: have moved    Objective:   General appearance: awake, interactive and talkative, in NAD  Lungs: diminished  Heart: S1S2 normal; Atrial fibrillation in low 70's on monitor  Chest: symmetrical expansion with inspiration and expirations; no rocking of sternum noted   Abdomen: round, soft, non-tender   Bowel sounds: hypoactive  Kidneys: /2160/2210= 5085 ml in 24 hrs; Cr 0.7  Wound/Incisions: Midsternal incision CDI; LLE incisions with dressings CDI; Pacing wires cut by MD 1/28   Extremities: BLE pulses by doppler; 1+ swelling noted in BLE  Neurological: intact, non focal exam, speech strong  Chest tubes/Drains: all out   ________________________________________________________________________    SCIP Measures:     · Noriega catheter for:  ? IV Diuresis  ? Accurate I/O  ? D/C today  ·   · Antibiotics:  ? Have been stopped  ? Prophylaxis (POD #1 only)  ? Continued for:   ________________________________________________________________________    Assessment:   Post-op: 9 days. Condition: In stable condition. Plan:   1. Cardiovascular: s/p Aorta repair and AVR- ASA, Plavix,Statin, amio, BB  Dobutamine remains at 4 mcg/kg/hr. Pt staying in atrial fibrillation despite amiodarone, rate controlled. 2. Pulmonary: needs pulmonary expansion maneuvers, IS, OOBTC, PT/OT  CXR 1/23 and 1/24 showed pulmonary edema. Pt with respiratory distress - elective intubation was performed at bedside 1/23       Pulmonology weaned off vent 1/28 with extubation in afternoon. Currently saturating upper 90's on 3L per NC. Will wean as tolerated. 3. Neurology: analgesia as needed. 4. Nephrology: fluid management- diurese as tolerated. Nephro following. Decrease Lasix gtt to 5 mg/hr    5.  Endocrinology: BG control much improved with additional Lantus started 1/27. Continue SSI and Lantus    6. Hematology: acute blood loss anemia; expected, monitor. Thrombocytopenia- improving, plt 85 today, will hold Arixtra, ASA. HIT Positive+   ARTEMIO negative. Argatroban drip d/c'd.     7. Microbiology:   Leukocytosis: improved, WBC 9.7 today. BC X2 and urine culture - NGTD  Abx course completed 1/27 (meropenem)     8. Nutrition: Will get swallow evaluation today. TF's per NG tube. Pt to remain 60 degrees upright. If he needs to go back on BiPap d/c tube feeds. He is to remain NPO at this time. Constipation: has resolved w/ multiple BM's. Monitor. 9. Labs: monitor. Fungal cx negative. 10. Post-op Drains/Wires: All out. 11. D/C Goals: too soon to tell, DCP following. Likely will need rehab, possibly ARU. 12. Continue post-op care of patient in the ICU    Meds:    The patient is on a beta-blocker   The patient is not on an ace-i/ARB- not indicated   The patient is on a statin   The patient is on oral antiplatelet therapy   ________________________________________________________________________  *Only bolded items apply to this patient at this time:     ? Acidemia (pH < 7.35) ______  ? Alkalemia (pH > 7.45) ______  ? Acidosis (Bicarb <20) ______  ? Electrolyte abnormality (specify)  ? Acute post-op respiratory insufficiency (difficulty weaning from vent)  ? Acute post-op blood loss anemia (hct ? 30)        ? Transfused this admission post-op  ? Cardiac arrythmia:  ? Ventricular Tachycardia     ? Atrial Flutter     ? Atrial fibrillation  ? Acute pulmonary edema due to:      ? Noncardiogenic causes  ? Acute heart failure (Syst, Diast)   ? Encephalopathy (confusion, delirium, altered mental status), (toxic, metabolic)  ? Restraints in use  ? TIA/Stroke (acute, post-op)   ? Acute kidney injury (Cr > 0.5 over baseline, oliguria, ? 5ml/kg/hr x 6 hrs)  ? Acute renal failure (Cr > 2x baseline, Dialysis started)  ?  Malnutrition: ? Severe (prealbumin

## 2020-01-30 NOTE — PROGRESS NOTES
Speech Language Pathology  Facility/Department: [facility] C2 CARD TELEMETRY  Swallow Re-Evaluation / Dysphagia Daily Treatment Note    NAME: Vasile Salazar  : 1950  MRN: [id number]    Patient Diagnosis(es):   Patient Active Problem List    Diagnosis Date Noted    Hypernatremia 2020    Restrictive lung disease 2020    Myocardial infarction ([facility] [address] 2020    Cardiogenic shock ([facility] [address]     Acute on chronic diastolic congestive heart failure ([facility] [address]     Pulmonary infiltrate 2020    Pulmonary venous congestion 2020    Atelectasis 2020    Leukocytosis 2020    Hyperglycemia 2020    Acute bronchospasm 2020    Atrial fibrillation with RVR ([facility] [address] 2020    Hypotension 2020    Acute respiratory failure with hypoxemia ([facility] [address] 2020    Tobacco abuse 2020    CAD (coronary artery disease) s/p stent in 2020    Hyperlipidemia 2020    Hemothorax 2020    Obesity, Class I, BMI 30.0-34.9 (see actual BMI) 2020    S/P thoracotomy 2020    Status post partial removal of lung 2020    Hemothorax, left 2020    S/P AVR (aortic valve replacement) 2011    AS (aortic stenosis) 2011    HTN (hypertension) 2010     Allergies: Allergies   Allergen Reactions    Penicillins Hives     Subjective: 77year old male, seen previously for bedside swallow eval on 01/10/20. Pt has undergone \"20 RE-DO STERNOTOMY X3, REPLACEMENT OF ASCENDING AORTA, RE-DO AORTIC VALVE REPLACEMENT WITH 19MM BLOCK INTUITY VALVE, CLOSURE OF OUTFLOW TRACK ATRIAL FISTULA, HYPOTHERMIC ARREST,  WITH TRANSESOPHAGEAL ECHOCARDIOGRAM, CIRCULATORY ARREST, 5 LEVEL BILATERAL INTERCOSTAL NERVE BLOCK\" per Dr. Baron Vega note. Pt intubated and extubated multiple times; has had prolonged intubation.     Pain: 10/10 pain in buttocks; reported to RN, who is in room throughout     Current Diet: DIET TUBE FEED CONTINUOUS/CYCLIC NPO; Diabetic

## 2020-01-30 NOTE — PROGRESS NOTES
Pt having frequent loose stools with open wounds on bottom, MD notified, orders to place rectal tube.

## 2020-01-30 NOTE — PROGRESS NOTES
ONCOLOGY HEMATOLOGY CARE PROGRESS NOTE      SUBJECTIVE:     Had swallow test this AM. He is hopeful to eat PO now. Plt 85k and trending up. ARTEMIO is back and NEGATIVE. ROS:   The remaining 10 point review of symptoms is unremarkable. OBJECTIVE        Physical    VITALS:  BP (!) 121/51   Pulse 72   Temp 97.9 °F (36.6 °C) (Core)   Resp 18   Ht 5' 9\" (1.753 m)   Wt 192 lb 10.9 oz (87.4 kg)   SpO2 97%   BMI 28.45 kg/m²   TEMPERATURE:  Current - Temp: 97.9 °F (36.6 °C); Max - Temp  Av.8 °F (36.6 °C)  Min: 97.4 °F (36.3 °C)  Max: 98.7 °F (37.1 °C)  PULSE OXIMETRY RANGE: SpO2  Av.3 %  Min: 91 %  Max: 100 %  24HR INTAKE/OUTPUT:      Intake/Output Summary (Last 24 hours) at 2020 1039  Last data filed at 2020 0853  Gross per 24 hour   Intake 3336.04 ml   Output 5321 ml   Net -1984.96 ml       CONSTITUTIONAL:  Off vent, appears improved   HEMATOLOGIC/LYMPHATICS:  no cervical lymphadenopathy, no supraclavicular lymphadenopathy, no axillary lymphadenopathy and no inguinal lymphadenopathy  LUNGS:  resp even non labored   CARDIOVASCULAR:  , regular rate and rhythm, normal S1 and S2, no S3 or S4, and no murmur noted  ABDOMEN:  Non distended   MUSCULOSKELETAL:  There is no redness, warmth, or swelling of the joints. EXTREMETIES:no swelling, wearing MOISES hose   NEUROLOGIC:  Unable to assess   SKIN:  Scattered ecchymoses       Data      Recent Labs     20  0555 204   WBC 9.5 9.4 9.7   HGB 9.9* 9.4* 9.7*   HCT 29.8* 28.2* 29.5*   PLT 71* 79* 85*   MCV 92.8 92.5 92.2        Recent Labs     20  0555 20  05020  0434    147*  --  146*   K 4.1 3.3* 3.7 3.7   CL 97* 100  --  101   CO2 36* 37*  --  36*   BUN 34* 38*  --  32*   CREATININE 0.7* 0.7*  --  0.7*     No results for input(s): AST, ALT, ALB, BILIDIR, BILITOT, ALKPHOS in the last 72 hours.     Magnesium:    Lab Results   Component Value Date    MG 2.30 01/30/2020    MG 2.20 01/29/2020    MG 2.20 01/28/2020         Problem List  Patient Active Problem List   Diagnosis    Hemothorax    Acute respiratory failure with hypoxemia (HCC)    Tobacco abuse    AS (aortic stenosis)    CAD (coronary artery disease) s/p stent in 2010    HTN (hypertension)    Hyperlipidemia    S/P AVR (aortic valve replacement)    Hypotension    Pulmonary infiltrate    Pulmonary venous congestion    Atelectasis    Leukocytosis    Hyperglycemia    Acute bronchospasm    Atrial fibrillation with RVR (HCC)    Cardiogenic shock (HCC)    Acute on chronic diastolic congestive heart failure (HCC)    Myocardial infarction (HCC)    Obesity, Class I, BMI 30.0-34.9 (see actual BMI)    S/P thoracotomy    Status post partial removal of lung    Hemothorax, left    Restrictive lung disease    Hypernatremia       ASSESSMENT AND PLAN:      ARTEMIO negative. Does not have HIT.      TCP  - Could be multifactorial given severe illness   - Treat for HIT and await ARTEMIO - likely back later today vs. Latest Friday 1/31.   - Would not transfuse plt unless less than 10 and signs of bleeding - plt improved and now 79  - Differential includes shock liver and early sequestration of plt to the spleen and DIC secondary to PNA. INR 1.57 on 1-21 (prior to argatroban). Argatroban is known to increase the INR.   - D-dimer will not have much significance in a postop patient. Fibrinogen is normal.  Doubt DIC.  - Check plt antibody - NEGATIVE   - HIT POSITIVE AND ARTEMIO NEGATIVE. FALSE POSITIVE. DOES NOT HAVE HIT. Discussed with Dr. Wild Esparza- he is aware.      Anemia  - blood loss secondary to surgical losses, will follow   - HGB 9.7 g/dL.    PNA- Merrem      AFIBB - Amiodarone drip      Aortic repair, AV valve redo per CT surgery team            ONCOLOGIC DISPOSITION: ARTEMIO negative, does not need AC from a heme onc stand point.  Defer further AC to the CT surgery team.       Cecil Ugarte, CNP  [facility]   Please contact through [address]

## 2020-01-30 NOTE — PROGRESS NOTES
No accessory muscle usage or stridor. Decreased breath sound intensity, chest tube present  Abdominal: Soft. Bowel sounds present. No distension or hernia. No tenderness. Musculoskeletal: No cyanosis. No clubbing. No obvious joint deformity. Lymphadenopathy: No cervical or supraclavicular adenopathy. Skin: Skin is warm and dry. No rash or nodules on the exposed extremities. Patient has skin breakdown in the coccygeal and buttock area  Neurologic: Was on BiPAP earlier, alert somewhat communicative but quite weak      Results:  CBC:   Recent Labs     01/27/20  0605 01/28/20  0555 01/29/20  0505   WBC 10.4 9.5 9.4   HGB 9.8* 9.9* 9.4*   HCT 28.9* 29.8* 28.2*   MCV 92.6 92.8 92.5   PLT 72* 71* 79*     BMP:   Recent Labs     01/27/20  0605 01/28/20  0555 01/29/20  0505 01/29/20 2010     --  140 147*  --    K 4.0   < > 4.1 3.3* 3.7   CL 98*  --  97* 100  --    CO2 32  --  36* 37*  --    BUN 31*  --  34* 38*  --    CREATININE 0.7*  --  0.7* 0.7*  --     < > = values in this interval not displayed. LIVER PROFILE:   Recent Labs     01/27/20  0804   AST 68*   *   BILIDIR 0.5*   BILITOT 1.1*   ALKPHOS 87     APTT:   Recent Labs     01/27/20  0605 01/28/20  0555 01/29/20  0505   APTT 57.5* 63.1* 71.8*       Imaging:  I have reviewed radiology images personally. XR ABDOMEN (KUB) (SINGLE AP VIEW)   Final Result   Tip of the NG tube in the antrum of the stomach. XR ABDOMEN (KUB) (SINGLE AP VIEW)   Final Result   The nasogastric tip is near the gastroesophageal junction. Advancement of 10   cm is suggested. XR CHEST PORTABLE   Final Result   Moderate improvement with near complete resolution of the bilateral airspace   disease, minimal residual left lower lobe. XR CHEST PORTABLE   Final Result   Severe diffuse airspace disease remains thought to be due predominantly to   moderate pulmonary vascular congestion and atelectasis. Pneumonia possible.    Minimal clearing since Sample Type Unknown ART ART ART ART    FIO2 Unknown 30.00           Assessment:  Active Problems:    Acute respiratory failure with hypoxemia (HCC)    Tobacco abuse    AS (aortic stenosis)    S/P AVR (aortic valve replacement)    Hypotension    Pulmonary infiltrate    Pulmonary venous congestion    Atelectasis    Leukocytosis    Hyperglycemia    Acute bronchospasm    Atrial fibrillation with RVR (HCC)    Cardiogenic shock (HCC)    Acute on chronic diastolic congestive heart failure (HCC)    Obesity, Class I, BMI 30.0-34.9 (see actual BMI)    S/P thoracotomy    Status post partial removal of lung    Hemothorax, left    Restrictive lung disease    Hypernatremia  Resolved Problems:    * No resolved hospital problems.  *          Plan:   -BIPAP with oxygen  to keep saturation between 90 and 94% only  -Pulmonary toilet-s/p bronchoscopy   -Cardiac infusions as per cardiology and CT surgery  -Patient continues to be on Lasix drip but the dose of Lasix has been decreased-patient's sodium level has increased and patient may require free water via NGT or  Consider giving one dose of Diuril,if deemed appropriate -CT surgery to decide   -Keep negative fluid balance  -Monitor input output and BMP  -Correct electrolytes on whenever necessary basis  -Off sedation   -CT surgery to decide upon Amiodarone and Argatroban infusions   -Monitor hemodynamics closely  -No need for any antibiotics from pulmonary standpoint of view  -Insulins as per CT surgery as per postop protocol  -Wound care as per wound care team and protocol  -PUD prophylaxis    Case discussed with CT surgery and ICU team    Critical care time spent in the patient was 33 minutes exclusive of any procedures    Patient's overall clinical status remains precarious and has potential for further decompensation and needs to be monitored closely         Electronically signed by:  Good Payton MD    1/29/2020    10:43 PM.

## 2020-01-30 NOTE — PROGRESS NOTES
01/30/20 0116   NIV Type   NIV Started/Stopped On   Equipment Type v60   Mode Bilevel   Mask Type Full face mask   Settings/Measurements   IPAP 15 cmH20   CPAP/EPAP 6 cmH2O   Rate Ordered 10   Resp 13   FiO2  35 %   Vt Exhaled 702 mL   Minute Volume 10.8 Liters   Mask Leak (lpm) 18 lpm   Comfort Level Good   Using Accessory Muscles No   SpO2 97   Alarm Settings   Alarms On Y   Press Low Alarm 6 cmH2O   High Pressure Alarm 30 cmH2O   Delay Alarm 20 sec(s)   Resp Rate Low Alarm 6   High Respiratory Rate 40 br/min   Oxygen Therapy/Pulse Ox   SpO2 99 %

## 2020-01-30 NOTE — PLAN OF CARE
Problem: HEMODYNAMIC STATUS  Goal: Patient has stable vital signs and fluid balance  Outcome: Ongoing  Note:     Patient's EF (Ejection Fraction) is less than 40%    Heart Failure Medications:  Diuretics[de-identified] Furosemide  ACE[de-identified] None  ARB[de-identified] Other and None  ARNI[de-identified] None  Evidenced Based Beta Blocker[de-identified] Metoprolol SUCCinate- Toprol XL    Patient's weights and intake/output reviewed: Yes    Patient's Last Weight: 190 lbs obtained by bed scale. Difference of 2 lbs less than last documented weight. Intake/Output Summary (Last 24 hours) at 1/29/2020 2359  Last data filed at 1/29/2020 2326  Gross per 24 hour   Intake 3778.69 ml   Output 4936 ml   Net -1157.31 ml       Comorbidities Reviewed Yes    Patient has a past medical history of CAD (coronary artery disease), COPD (chronic obstructive pulmonary disease) ([facility] [address] Diabetes mellitus ([facility] [address] Gout, Hemothorax, left, Hyperlipidemia, Hypertension, Obesity, Class I, BMI 30.0-34.9 (see actual BMI), S/P thoracotomy, Status post partial removal of lung, and Thyroid disease. >>For CHF and Comorbidity documentation on Education Time and Topics, please see Education Tab    Patient stated Daily Functional Goal: (Note:help the patient identify a challenging but achievable goal per shift that can aid in progression towards increased functional capacity at discharge ie sit in the chair for x amount of time, Decrease walk time by x seconds, stand or walk with minimal assistance, decrease pain to a level of x/10, etc)     Pt resting in bed at this time on 4 L O2. Pt denies shortness of breath. Pt with nonpitting lower extremity edema.      Patient and/or Family's stated Goal of Care this Admission: reduce shortness of breath, increase activity tolerance, better understand heart failure and disease management, be more comfortable, and reduce lower extremity edema prior to discharge            Problem: Serum Glucose Level - Abnormal:  Goal: Ability to maintain appropriate glucose levels will improve  Description  Ability to maintain appropriate glucose levels will improve  Outcome: Ongoing  Note:   MANNIE

## 2020-01-31 LAB
ANION GAP SERPL CALCULATED.3IONS-SCNC: 9 MMOL/L (ref 3–16)
BASOPHILS ABSOLUTE: 0 K/UL (ref 0–0.2)
BASOPHILS RELATIVE PERCENT: 0.1 %
BUN BLDV-MCNC: 35 MG/DL (ref 7–20)
CALCIUM SERPL-MCNC: 8.9 MG/DL (ref 8.3–10.6)
CHLORIDE BLD-SCNC: 102 MMOL/L (ref 99–110)
CO2: 35 MMOL/L (ref 21–32)
CREAT SERPL-MCNC: 0.8 MG/DL (ref 0.8–1.3)
EOSINOPHILS ABSOLUTE: 0 K/UL (ref 0–0.6)
EOSINOPHILS RELATIVE PERCENT: 0.1 %
GFR AFRICAN AMERICAN: >60
GFR NON-AFRICAN AMERICAN: >60
GLUCOSE BLD-MCNC: 117 MG/DL (ref 70–99)
GLUCOSE BLD-MCNC: 132 MG/DL (ref 70–99)
GLUCOSE BLD-MCNC: 137 MG/DL (ref 70–99)
GLUCOSE BLD-MCNC: 162 MG/DL (ref 70–99)
GLUCOSE BLD-MCNC: 223 MG/DL (ref 70–99)
HCT VFR BLD CALC: 29 % (ref 40.5–52.5)
HEMOGLOBIN: 9.9 G/DL (ref 13.5–17.5)
LYMPHOCYTES ABSOLUTE: 0.5 K/UL (ref 1–5.1)
LYMPHOCYTES RELATIVE PERCENT: 5 %
MAGNESIUM: 2.4 MG/DL (ref 1.8–2.4)
MCH RBC QN AUTO: 31.2 PG (ref 26–34)
MCHC RBC AUTO-ENTMCNC: 34.1 G/DL (ref 31–36)
MCV RBC AUTO: 91.6 FL (ref 80–100)
MONOCYTES ABSOLUTE: 1.1 K/UL (ref 0–1.3)
MONOCYTES RELATIVE PERCENT: 10.2 %
NEUTROPHILS ABSOLUTE: 9.2 K/UL (ref 1.7–7.7)
NEUTROPHILS RELATIVE PERCENT: 84.6 %
PDW BLD-RTO: 16.3 % (ref 12.4–15.4)
PERFORMED ON: ABNORMAL
PLATELET # BLD: 104 K/UL (ref 135–450)
PMV BLD AUTO: 9.4 FL (ref 5–10.5)
POTASSIUM REFLEX MAGNESIUM: 3.7 MMOL/L (ref 3.5–5.1)
RBC # BLD: 3.16 M/UL (ref 4.2–5.9)
SODIUM BLD-SCNC: 146 MMOL/L (ref 136–145)
WBC # BLD: 10.8 K/UL (ref 4–11)

## 2020-01-31 PROCEDURE — 97535 SELF CARE MNGMENT TRAINING: CPT

## 2020-01-31 PROCEDURE — 99232 SBSQ HOSP IP/OBS MODERATE 35: CPT | Performed by: INTERNAL MEDICINE

## 2020-01-31 PROCEDURE — 6360000002 HC RX W HCPCS: Performed by: NURSE PRACTITIONER

## 2020-01-31 PROCEDURE — 99024 POSTOP FOLLOW-UP VISIT: CPT | Performed by: THORACIC SURGERY (CARDIOTHORACIC VASCULAR SURGERY)

## 2020-01-31 PROCEDURE — 2140000000 HC CCU INTERMEDIATE R&B

## 2020-01-31 PROCEDURE — 94669 MECHANICAL CHEST WALL OSCILL: CPT

## 2020-01-31 PROCEDURE — 2580000003 HC RX 258: Performed by: INTERNAL MEDICINE

## 2020-01-31 PROCEDURE — 92526 ORAL FUNCTION THERAPY: CPT

## 2020-01-31 PROCEDURE — 6370000000 HC RX 637 (ALT 250 FOR IP): Performed by: THORACIC SURGERY (CARDIOTHORACIC VASCULAR SURGERY)

## 2020-01-31 PROCEDURE — 97167 OT EVAL HIGH COMPLEX 60 MIN: CPT

## 2020-01-31 PROCEDURE — 97164 PT RE-EVAL EST PLAN CARE: CPT

## 2020-01-31 PROCEDURE — 80048 BASIC METABOLIC PNL TOTAL CA: CPT

## 2020-01-31 PROCEDURE — 97110 THERAPEUTIC EXERCISES: CPT

## 2020-01-31 PROCEDURE — 85025 COMPLETE CBC W/AUTO DIFF WBC: CPT

## 2020-01-31 PROCEDURE — 2500000003 HC RX 250 WO HCPCS: Performed by: THORACIC SURGERY (CARDIOTHORACIC VASCULAR SURGERY)

## 2020-01-31 PROCEDURE — 6360000002 HC RX W HCPCS: Performed by: THORACIC SURGERY (CARDIOTHORACIC VASCULAR SURGERY)

## 2020-01-31 PROCEDURE — 94640 AIRWAY INHALATION TREATMENT: CPT

## 2020-01-31 PROCEDURE — 6370000000 HC RX 637 (ALT 250 FOR IP): Performed by: NURSE PRACTITIONER

## 2020-01-31 PROCEDURE — 2500000003 HC RX 250 WO HCPCS: Performed by: INTERNAL MEDICINE

## 2020-01-31 PROCEDURE — 2580000003 HC RX 258: Performed by: THORACIC SURGERY (CARDIOTHORACIC VASCULAR SURGERY)

## 2020-01-31 PROCEDURE — 97530 THERAPEUTIC ACTIVITIES: CPT

## 2020-01-31 PROCEDURE — 94761 N-INVAS EAR/PLS OXIMETRY MLT: CPT

## 2020-01-31 PROCEDURE — 83735 ASSAY OF MAGNESIUM: CPT

## 2020-01-31 PROCEDURE — 6360000002 HC RX W HCPCS: Performed by: INTERNAL MEDICINE

## 2020-01-31 PROCEDURE — 2700000000 HC OXYGEN THERAPY PER DAY

## 2020-01-31 RX ORDER — FUROSEMIDE 10 MG/ML
80 INJECTION INTRAMUSCULAR; INTRAVENOUS 2 TIMES DAILY
Status: DISCONTINUED | OUTPATIENT
Start: 2020-01-31 | End: 2020-01-31

## 2020-01-31 RX ORDER — CARVEDILOL 6.25 MG/1
12.5 TABLET ORAL 2 TIMES DAILY WITH MEALS
Status: DISCONTINUED | OUTPATIENT
Start: 2020-01-31 | End: 2020-02-05 | Stop reason: HOSPADM

## 2020-01-31 RX ORDER — LISINOPRIL 5 MG/1
5 TABLET ORAL DAILY
Status: DISCONTINUED | OUTPATIENT
Start: 2020-01-31 | End: 2020-02-02

## 2020-01-31 RX ORDER — FUROSEMIDE 10 MG/ML
40 INJECTION INTRAMUSCULAR; INTRAVENOUS EVERY 6 HOURS
Status: DISCONTINUED | OUTPATIENT
Start: 2020-01-31 | End: 2020-02-01

## 2020-01-31 RX ADMIN — FAMOTIDINE 20 MG: 10 INJECTION, SOLUTION INTRAVENOUS at 20:06

## 2020-01-31 RX ADMIN — Medication 10 ML: at 20:15

## 2020-01-31 RX ADMIN — POTASSIUM CHLORIDE 20 MEQ: 29.8 INJECTION, SOLUTION INTRAVENOUS at 06:35

## 2020-01-31 RX ADMIN — FUROSEMIDE 40 MG: 10 INJECTION, SOLUTION INTRAMUSCULAR; INTRAVENOUS at 20:06

## 2020-01-31 RX ADMIN — FAMOTIDINE 20 MG: 10 INJECTION, SOLUTION INTRAVENOUS at 09:13

## 2020-01-31 RX ADMIN — CARVEDILOL 12.5 MG: 6.25 TABLET, FILM COATED ORAL at 08:51

## 2020-01-31 RX ADMIN — ALBUTEROL SULFATE 2.5 MG: 2.5 SOLUTION RESPIRATORY (INHALATION) at 17:00

## 2020-01-31 RX ADMIN — INSULIN GLARGINE 10 UNITS: 100 INJECTION, SOLUTION SUBCUTANEOUS at 20:06

## 2020-01-31 RX ADMIN — CASTOR OIL AND BALSAM, PERU: 788; 87 OINTMENT TOPICAL at 09:13

## 2020-01-31 RX ADMIN — CARVEDILOL 12.5 MG: 6.25 TABLET, FILM COATED ORAL at 20:05

## 2020-01-31 RX ADMIN — INSULIN LISPRO 3 UNITS: 100 INJECTION, SOLUTION INTRAVENOUS; SUBCUTANEOUS at 08:01

## 2020-01-31 RX ADMIN — AMLODIPINE BESYLATE 10 MG: 5 TABLET ORAL at 08:52

## 2020-01-31 RX ADMIN — ASPIRIN 325 MG: 325 TABLET, COATED ORAL at 08:51

## 2020-01-31 RX ADMIN — Medication 10 ML: at 09:18

## 2020-01-31 RX ADMIN — CHLORHEXIDINE GLUCONATE 15 ML: 1.2 RINSE ORAL at 10:00

## 2020-01-31 RX ADMIN — CHLORHEXIDINE GLUCONATE 15 ML: 1.2 RINSE ORAL at 20:15

## 2020-01-31 RX ADMIN — ALBUTEROL SULFATE 2.5 MG: 2.5 SOLUTION RESPIRATORY (INHALATION) at 07:27

## 2020-01-31 RX ADMIN — POTASSIUM CHLORIDE 20 MEQ: 29.8 INJECTION, SOLUTION INTRAVENOUS at 05:37

## 2020-01-31 RX ADMIN — RIVAROXABAN 20 MG: 20 TABLET, FILM COATED ORAL at 20:06

## 2020-01-31 RX ADMIN — AMIODARONE HYDROCHLORIDE 200 MG: 200 TABLET ORAL at 08:51

## 2020-01-31 RX ADMIN — INSULIN LISPRO 6 UNITS: 100 INJECTION, SOLUTION INTRAVENOUS; SUBCUTANEOUS at 20:06

## 2020-01-31 RX ADMIN — INSULIN GLARGINE 10 UNITS: 100 INJECTION, SOLUTION SUBCUTANEOUS at 09:24

## 2020-01-31 RX ADMIN — ALBUTEROL SULFATE 2.5 MG: 2.5 SOLUTION RESPIRATORY (INHALATION) at 12:23

## 2020-01-31 RX ADMIN — OXYCODONE 5 MG: 5 TABLET ORAL at 02:27

## 2020-01-31 RX ADMIN — SODIUM CHLORIDE 9 MG/HR: 9 INJECTION, SOLUTION INTRAVENOUS at 05:39

## 2020-01-31 RX ADMIN — ALBUTEROL SULFATE 2.5 MG: 2.5 SOLUTION RESPIRATORY (INHALATION) at 20:37

## 2020-01-31 RX ADMIN — FUROSEMIDE 5 MG/HR: 10 INJECTION, SOLUTION INTRAMUSCULAR; INTRAVENOUS at 05:37

## 2020-01-31 RX ADMIN — CASTOR OIL AND BALSAM, PERU: 788; 87 OINTMENT TOPICAL at 20:06

## 2020-01-31 RX ADMIN — LISINOPRIL 5 MG: 5 TABLET ORAL at 08:51

## 2020-01-31 RX ADMIN — FUROSEMIDE 40 MG: 10 INJECTION, SOLUTION INTRAMUSCULAR; INTRAVENOUS at 09:13

## 2020-01-31 ASSESSMENT — PAIN SCALES - GENERAL
PAINLEVEL_OUTOF10: 7
PAINLEVEL_OUTOF10: 0
PAINLEVEL_OUTOF10: 0

## 2020-01-31 NOTE — PROGRESS NOTES
CVTS Cardiothoracic Progress Note:                                CC:  Post op follow up     Surgery: 1/21/20 RE-DO STERNOTOMY X3, REPLACEMENT OF ASCENDING AORTA, RE-DO AORTIC VALVE REPLACEMENT WITH 19MM BLOCK INTUITY VALVE, CLOSURE OF OUTFLOW TRACK ATRIAL FISTULA, HYPOTHERMIC ARREST,  WITH TRANSESOPHAGEAL ECHOCARDIOGRAM, CIRCULATORY ARREST, 5 LEVEL BILATERAL INTERCOSTAL NERVE BLOCK     Hospital course:   1/22 resting in bed, awake, alert, responding appropriately on SBT  1/23 Pt with increasing oxygen demand overnight, Cr up to 1.6, hyperkalemia 5.9, and lactic 9.7  1/24 pt resting in bed, intubated and sedated. 1/25 & 1/26 remains intubated and sedated, FiO2 requirement improving. 1/27 FiO2 on vent is 30%, Peep of 10. Will work at weaning down Peep  1/28 DTI found overnight by RN, air mattress not functioning properly. In process of getting new bed for patient. Working on weaning sedation with end goal of extubation (hopefully) today. 1/29 sitting up in chair, weaned off of BiPap and tolerating 5L per NC well. Appears much more alert and interactive today. 1/30 up in chair, had multiple loose stools, now has rectal tube. Awake, alert and talkative, only on 4L today (sat 96%)   1/31 sitting up in bed, on 3L per NC, talkative and in NAD. Currently SR.     Past Medical History:   Diagnosis Date    CAD (coronary artery disease)     COPD [facility]chronic obstructive pulmonary disease) ([facility] [address]     Diabetes mellitus ([facility] [address]     Gout     Hemothorax, left 01/2020    Hyperlipidemia     Hypertension     Obesity, Class I, BMI 30.0-34.9 (see actual BMI) 01/2020    30.7    S/P thoracotomy 01/2020    for hemothorax    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 Jakub gamino w/23mm Magna Ease bioprosthetic valve    BRONCHOSCOPY N/A 1/9/2020    w/BAL, performed by Jose Daniel Candelario MD at [facility] ENDOSCOPY    BRONCHOSCOPY N/A 1/24/2020    BRONCHOSCOPY ALVEOLAR LAVAGE performed by Farhana Westbrook MD at [address]  1/24/2020    BRONCHOSCOPY THERAPUTIC ASPIRATION INITIAL performed by Farhana Westbrook MD at [address] [facility] CABG WITH AORTIC VALVE REPLACEMENT  2001    date approximate, x1 to OM, AVR w/porcine valve    CARDIAC CATHETERIZATION  01/13/2020    Dr. Kaya Ty Left 01/02/2020    Dr. Armando Maxwell - 3200 ml blood drained in 1000 ml increments    CORONARY ANGIOPLASTY WITH STENT PLACEMENT  12/07/2010    Dr. Phylicia Lawler. Roger - NURIS- 4.0 x 28 to Cx    NH ASCEND AORTA GRAFT W ROOT REPLACMENT. VALVE CONDUIT/CORON RECONSTR N/A 1/21/2020    Dr. Yuliana Mcgregor - redo sternotomy x3, replacement of ascending aorta, redo AVR w/19mm Carrasquillo Intuity valve, closure of outflow tract of atrial fistula    THORACOTOMY Left 1/2/2020    Dr. Melody Lambert - emergent w/evacuation of hematoma & chest wall hemostasis, pleural biopsy & XENA wedge resection    TRANSESOPHAGEAL ECHOCARDIOGRAM  01/21/2020    during ascending aorta replacement w/redo AVR    TRANSESOPHAGEAL ECHOCARDIOGRAM  12/07/2010        Allergies as of 01/08/2020 - Review Complete 01/08/2020   Allergen Reaction Noted    Penicillins Hives 05/28/2010        Patient Active Problem List   Diagnosis    Hemothorax    Acute respiratory failure with hypoxemia ([address]    Tobacco abuse    AS (aortic stenosis)    CAD (coronary artery disease) s/p stent in 2010    HTN (hypertension)    Hyperlipidemia    S/P AVR (aortic valve replacement)    Hypotension    Pulmonary infiltrate    Pulmonary venous congestion    Atelectasis    Leukocytosis    Hyperglycemia    Acute bronchospasm    Atrial fibrillation with RVR (HCC)    Cardiogenic shock (HCC)    Acute on chronic diastolic congestive heart failure (HCC)    Myocardial infarction ([address]    Obesity, Class I, BMI 30.0-34.9 (see actual BMI)    S/P thoracotomy    Status post (confusion, delirium, altered mental status), (toxic, metabolic)  ? Restraints in use  ? TIA/Stroke (acute, post-op)   ? Acute kidney injury (Cr > 0.5 over baseline, oliguria, ? 5ml/kg/hr x 6 hrs)  ? Acute renal failure (Cr > 2x baseline, Dialysis started)  ? Malnutrition: ? Severe (prealbumin <10, albumin ? 2.5)      ? Moderate (prealbumin <15, albumin <3)  ? SIRS due to non-infectious process (temp > 100.5, wbc > 12, HR > 90, RR>20)  ? Coagulopathy (consumptive, HIT, fibrinolysis) ? FFP or platelets given post-op  ________________________________________________________________________    Stephanie Abreu CNP  1/31/2020  10:37 AM  Note reviewed, events of last 24 hours reviewed along with vital signs and I/Os and patient examined. Assessment and plans discussed and are as outlined above.      Jane Fraser MD, FACS, [facility] [employer], FACCP, [place]

## 2020-01-31 NOTE — PROGRESS NOTES
Nutrition Assessment (Enteral Nutrition)    Type and Reason for Visit: Reassess    Nutrition Recommendations:   1. Jevity 1.2 start at 30 ml/hr increase by 10 ml every 2 hours to goal rate of 80 ml/hr providing 1920 calories, 89 grams of protein, 1291 ml free water   2. Water flush 120 ml every 4 hours unless further adjustments needed in the near future if Na doesn't correct with higher volume and less caloric dense formula  3. Will monitor TF intake and tolerance, blood sugar trends, and fluid and electrolyte balances     Nutrition Assessment: Follow up:  Patient remians on vent. Tube feeding changed to increase free water due to hypernatemia and different formula blend due to diarrhea. No longer on Ceviprex, not on Propofol. Monitor need for increase in water flush if Na doesn't correct with increasing total volume of tube feeding and making less caloric dense. Malnutrition Assessment:  · Malnutrition Status: At risk for malnutrition  · Context: Acute illness or injury  · Findings of the 6 clinical characteristics of malnutrition (Minimum of 2 out of 6 clinical characteristics is required to make the diagnosis of moderate or severe Protein Calorie Malnutrition based on AND/ASPEN Guidelines):  1. Energy Intake-Less than or equal to 50% of estimated energy requirement, Greater than or equal to 5 days    2. Weight Loss-No significant weight loss,    3. Fat Loss-Unable to assess,    4. Muscle Loss-No significant muscle mass loss,    5. Fluid Accumulation-No significant fluid accumulation,    6.   Strength-Not measured    Nutrition Risk Level: High    Nutrition Needs:  · Estimated Daily Total Kcal: 3481-4015  · Estimated Daily Protein (g):   · Estimated Daily Fluid (ml/day): 1 mL/kcal per MD    Nutrition Diagnosis:   · Problem: Inadequate energy intake  · Etiology: related to Impaired respiratory function-inability to consume food     Signs and symptoms:  as evidenced by Intubation, NPO status due to medical condition    Objective Information:  · Nutrition-Focused Physical Findings: BM x 2 on 1/29  · Wound Type: (surgical incision,)  · Current Nutrition Therapies:  · Oral Diet Orders: NPO   · Oral Diet intake: NPO  · Oral Nutrition Supplement (ONS) Orders: None  · ONS intake: NPO  · Tube Feeding (TF) Orders:   · Feeding Route: Nasogastric  · Formula: Standard w/Fiber  · Rate (ml/hr): · Volume (ml/day):    · Duration: Continuous  · Goal TF & Flush Orders Provides: Jevity 1.2 start at 30 ml/hr increase by 10 ml every 2 hours to goal rate of 80 ml/hr providing 1920 calories, 89 grams of protein, 1291 ml free water + water flush 120 ml every 4 hours  · Additional Calories:    · Anthropometric Measures:  · Ht: 5' 9\" (175.3 cm)   · Current Body Wt: 190 lb (86.2 kg)  · Admission Body Wt: 204 lb (92.5 kg)  · Weight Change: -7 since admission per I&O   · Ideal Body Wt: 166 lb (75.3 kg), % Ideal Body    · BMI Classification: BMI 30.0 - 34.9 Obese Class I    Nutrition Interventions:   Modify current Tube Feeding  Continued Inpatient Monitoring    Nutrition Evaluation:   · Evaluation: Goals set   · Goals: Patient will tolerate tube feeding regimen improving electrolyte trends and diarrhea.      · Monitoring: Nutrition Progression, TF Intake, TF Tolerance, Weight, Pertinent Labs      Electronically signed by Gerhardt Holms, RD, LD on 1/31/20 at 3:58 PM    Contact Number: Office: [phone]; [address]: [id number]

## 2020-01-31 NOTE — PROGRESS NOTES
INPATIENT PULMONARY CRITICAL CARE PROGRESS NOTE      Reason for visit    critical care management    SUBJECTIVE: Patient remains extubated when seen this morning, patient was on nasal cannula oxygen in the morning and did not require any BiPAP last night, patient is on 2-1/2 L of nasal cannula oxygen with saturation of 97% when seen, en with saturation of 98%, patient was feeling somewhat better, , patient continued to be on Cardene and dobutamine drips when seen, patient's argatroban drip was discontinued, patient's amiodarone drip was discontinued, patient was afebrile, patient had sinus rhythm on the monitor, patient's blood sugars were better controlled, patient was having some diarrhea this morning,, patient had good urine output overnight with cumulative fluid balance of -5.1L, patient has skin breakdown in the coccygeal region and the buttocks ; patient failed swallowing assessment, patient is tolerating NG tube feeds, no other pertinent review of system of concern      Physical Exam:  Blood pressure (!) 128/32, pulse 68, temperature 98 °F (36.7 °C), temperature source Bladder, resp. rate 18, height 5' 9\" (1.753 m), weight 190 lb 0.6 oz (86.2 kg), SpO2 97 %.'     Constitutional: No respiratory distress when seen  HENT:  no thyromegaly. Eyes:  Conjunctivae are normal. Pupils equal, round, and reactive to light. No scleral icterus. Neck: . No tracheal deviation present. No obvious thyroid mass. Cardiovascular: Normal rate, regular rhythm, normal heart sounds. No right ventricular heave. (+)lower extremity edema. Pulmonary/Chest: No wheezes. Bilateral minimal rales. Chest wall is not dull to percussion. No accessory muscle usage or stridor. Decreased breath sound intensity, chest tube present  Abdominal: Soft. Bowel sounds present. No distension or hernia. No tenderness. Musculoskeletal: No cyanosis. No clubbing. No obvious joint deformity.    Lymphadenopathy: No cervical or supraclavicular adenopathy. Skin: Skin is warm and dry. No rash or nodules on the exposed extremities. Patient has skin breakdown in the coccygeal and buttock area  Neurologic: Alert and communicative with no focal cranial nerve deficits      Results:  CBC:   Recent Labs     01/29/20 0505 01/30/20 0434 01/31/20  0330   WBC 9.4 9.7 10.8   HGB 9.4* 9.7* 9.9*   HCT 28.2* 29.5* 29.0*   MCV 92.5 92.2 91.6   PLT 79* 85* 104*     BMP:   Recent Labs     01/29/20 0505 01/29/20 2010 01/30/20 0434 01/31/20  0330   *  --  146* 146*   K 3.3* 3.7 3.7 3.7     --  101 102   CO2 37*  --  36* 35*   BUN 38*  --  32* 35*   CREATININE 0.7*  --  0.7* 0.8     APTT:   Recent Labs     01/29/20 0505   APTT 71.8*       Imaging:  I have reviewed radiology images personally. XR CHEST PORTABLE   Final Result   Worsening perihilar airspace changes on the left from prior imaging with no   obvious pleural fluid. This may represent asymmetric edema. Developing   pneumonitis is considered less likely. XR ABDOMEN (KUB) (SINGLE AP VIEW)   Final Result   Tip of the NG tube in the antrum of the stomach. XR ABDOMEN (KUB) (SINGLE AP VIEW)   Final Result   The nasogastric tip is near the gastroesophageal junction. Advancement of 10   cm is suggested. XR CHEST PORTABLE   Final Result   Moderate improvement with near complete resolution of the bilateral airspace   disease, minimal residual left lower lobe. XR CHEST PORTABLE   Final Result   Severe diffuse airspace disease remains thought to be due predominantly to   moderate pulmonary vascular congestion and atelectasis. Pneumonia possible. Minimal clearing since yesterday. No sizable pleural effusion. No   pneumothorax. XR CHEST PORTABLE   Final Result   1. An enteric tube is noted, however, it is difficult to follow the entire   course of the enteric tube, would recommend dedicated radiographs of the   upper abdomen for better evaluation.   Contrast material does opacify the   stomach. 2. No significant interval change in bilateral hazy airspace disease. 3. No definite pneumothorax identified. 4. Additional support devices as described above. XR ABDOMEN (KUB) (SINGLE AP VIEW)   Final Result   Tip of the nasogastric tube stomach fundus. Side hole at the GE junction. If the tube is for feeding purposes would recommend advancing 10-15 cm and   reconfirm in placement with a radiograph centered on the upper abdomen. XR CHEST PORTABLE   Final Result   Underlying pulmonary edema with decreased right upper but increased right   lower lobe opacity      Lines and tubes as above      Pneumothorax seen previously is not well demonstrated on this study         XR CHEST PORTABLE   Final Result   1. Pulmonary edema has developed. 2. Tiny right apical pneumothorax. XR CHEST PORTABLE   Final Result   Improving left basilar atelectasis. XR CHEST PORTABLE   Final Result   Mild left basilar atelectasis. Appropriate positioning of the endotracheal   tube. No other significant abnormality. CT CHEST W CONTRAST   Final Result   Addendum 1 of 1   ADDENDUM:   Cystic nodule in the pancreas. Please see follow-up below. ACR    management   recommendations for incidental cystic pancreatic masses in asymptomatic*   patients on CT, MR, or US      < 2 cm:  Single follow-up in 1 yr (MR preferred)      *Stable:  Benign, no further follow-up   *Growth: As below based upon size   Berland LL, et al.  Managing incidental findings on abdominal CT: white    paper   of the ACR Incidental Findings Committee. J Am Tyrel Radiol 2010;    6:141-278. Final      CT HEAD WO CONTRAST   Final Result   No evidence of acute intracranial abnormality on a study mildly limited as   described above. VL DUP CAROTID BILATERAL   Final Result      XR CHEST PORTABLE   Final Result   Vascular congestion without overt edema.       Persistent nodule like density of the right mid lung. Follow-up to ensure   resolution and exclude malignancy is needed. Unchanged left-sided atelectasis versus pneumonia over the past 24 hours         XR CHEST PORTABLE   Final Result   Right midlung zone airspace opacities could represent atelectasis or pneumonia           Results for Ephriam Led (MRN [id number]) as of 1/31/2020 15:04   Ref. Range 1/30/2020 04:34 1/30/2020 12:24 1/30/2020 17:59 1/30/2020 21:36 1/31/2020 00:52 1/31/2020 03:30   Sodium Latest Ref Range: 136 - 145 mmol/L 146 (H)     146 (H)   Potassium Latest Ref Range: 3.5 - 5.1 mmol/L 3.7     3.7   Chloride Latest Ref Range: 99 - 110 mmol/L 101     102   CO2 Latest Ref Range: 21 - 32 mmol/L 36 (H)     35 (H)   BUN Latest Ref Range: 7 - 20 mg/dL 32 (H)     35 (H)   Creatinine Latest Ref Range: 0.8 - 1.3 mg/dL 0.7 (L)     0.8   Anion Gap Latest Ref Range: 3 - 16  9     9   GFR Non- Latest Ref Range: >60  >60     >60   GFR  Latest Ref Range: >60  >60     >60   Magnesium Latest Ref Range: 1.80 - 2.40 mg/dL 2.30     2.40   Glucose Latest Ref Range: 70 - 99 mg/dL 82     137 (H)   POC Glucose Latest Ref Range: 70 - 99 mg/dl  117 (H) 155 (H) 125 (H) 132 (H)    Calcium Latest Ref Range: 8.3 - 10.6 mg/dL 8.8     8.9     Results for Ephriam Led (MRN [id number]) as of 1/31/2020 15:04   Ref.  Range 1/27/2020 06:05 1/28/2020 05:55 1/29/2020 05:05 1/30/2020 04:34 1/31/2020 03:30   WBC Latest Ref Range: 4.0 - 11.0 K/uL 10.4 9.5 9.4 9.7 10.8   RBC Latest Ref Range: 4.20 - 5.90 M/uL 3.13 (L) 3.21 (L) 3.04 (L) 3.20 (L) 3.16 (L)   Hemoglobin Quant Latest Ref Range: 13.5 - 17.5 g/dL 9.8 (L) 9.9 (L) 9.4 (L) 9.7 (L) 9.9 (L)   Hematocrit Latest Ref Range: 40.5 - 52.5 % 28.9 (L) 29.8 (L) 28.2 (L) 29.5 (L) 29.0 (L)   MCV Latest Ref Range: 80.0 - 100.0 fL 92.6 92.8 92.5 92.2 91.6   MCH Latest Ref Range: 26.0 - 34.0 pg 31.3 30.8 31.0 30.3 31.2   MCHC Latest Ref Range: 31.0 - 36.0 g/dL 33.8 33.2 33.6 32.9 34.1   MPV

## 2020-01-31 NOTE — PROGRESS NOTES
Occupational Therapy   Occupational Therapy Re-Evaluation and Treatment Note  Date: 2020   Patient Name: Jimbo Kowalski  MRN: [id number]     : 1950    Date of Service: 2020    Discharge Recommendations:  [address], Continue to assess pending progress  OT Equipment Recommendations  Equipment Needed: No  Other: defer to next level     Assessment   Performance deficits / Impairments: Decreased functional mobility ; Decreased safe awareness;Decreased balance;Decreased ADL status; Decreased high-level IADLs;Decreased endurance;Decreased strength;Decreased coordination;Decreased fine motor control;Decreased cognition  Assessment: Pt a poor historian at time of eval, and with poor attention/command following, but per chart review and pt report, pt typically independent with ADL/IADL, functional mobility and transfers. Pt required assist of two and use of lift for all functional mobility/transfers this date, limited by fatigue and confusion. Continue OT tx.   OT Education: OT Role;Plan of Care;Transfer Training;Energy Conservation;IADL Safety; ADL Adaptive Strategies; Equipment  Patient Education: importance of mobility   REQUIRES OT FOLLOW UP: Yes  Activity Tolerance  Activity Tolerance: Treatment limited secondary to decreased cognition;Patient limited by fatigue  Activity Tolerance: Pt vitals stable throughout session  Safety Devices  Safety Devices in place: Yes  Type of devices: Call light within reach;Gait belt;Nurse notified; Left in bed;Bed alarm in place           Patient Diagnosis(es): There were no encounter diagnoses. has a past medical history of CAD (coronary artery disease), COPD (chronic obstructive pulmonary disease) ([facility] [address] Diabetes mellitus ([facility] [address] Gout, Hemothorax, left, Hyperlipidemia, Hypertension, Obesity, Class I, BMI 30.0-34.9 (see actual BMI), S/P thoracotomy, Status post partial removal of lung, and Thyroid disease.    has a past surgical history that includes thoracotomy (Left, 1/2/2020); bronchoscopy (N/A, 1/9/2020); Coronary angioplasty with stent (12/07/2010); pr ascend aorta graft w root replacment. valve conduit/coron reconstr (N/A, 1/21/2020); transesophageal echocardiogram (01/21/2020); Cardiac catheterization (01/13/2020); chest tube insertion (Left, 01/02/2020); Aortic valve replacement (12/27/2011); transesophageal echocardiogram (12/07/2010); Coronary artery bypass graft (2001); bronchoscopy (N/A, 1/24/2020); and bronchoscopy (1/24/2020).            Restrictions  Restrictions/Precautions  Restrictions/Precautions: Fall Risk, Cardiac, General Precautions  Position Activity Restriction  Sternal Precautions: No Pushing, No Pulling, 5# Lifting Restrictions  Other position/activity restrictions: ambulate pt; NG tube, hilton, RUE A-line, IJ, 2.5L O2    Subjective   General  Chart Reviewed: Yes  Patient assessed for rehabilitation services?: Yes  Additional Pertinent Hx: CAD, COPD, DM, severe aortic stenosis  Response to previous treatment: Patient with no complaints from previous session  Family / Caregiver Present: No  Referring Practitioner: MIKO Campos CNP  Diagnosis: s/p RE-DO STERNOTOMY X3, REPLACEMENT OF ASCENDING AORTA, RE-DO AORTIC VALVE REPLACEMENT 1/21/20; extubated 1/28/20  Subjective  Subjective: Pt up in chair on arrival, pleasantly confused, agreeable to eval and treat  General Comment  Comments: Per RN okay to treat  Vital Signs  Pulse: 68  Heart Rate Source: Monitor  Social/Functional History  Social/Functional History  Lives With: Alone  Type of Home: Trailer  Home Layout: One level  Home Access: Stairs to enter without rails  Entrance Stairs - Number of Steps: 2 THOMPSON  Bathroom Shower/Tub: Tub/Shower unit  Bathroom Toilet: Handicap height  Bathroom Equipment: (no bathroom DME)  Home Equipment: (no DME)  ADL Assistance: Independent  Homemaking Assistance: Independent  Homemaking Responsibilities: Yes  Ambulation Assistance: of recent events;Decreased recall of biographical Information;Decreased recall of precautions  Problem Solving: Assistance required to generate solutions;Assistance required to implement solutions  Insights: Decreased awareness of deficits  Initiation: Requires cues for all  Sequencing: Requires cues for all        Sensation  Overall Sensation Status: (difficult to accurately assess d/t pt cognition)        LUE AROM (degrees)  LUE General AROM: to 90* shoulder flexion  Left Hand AROM (degrees)  Left Hand AROM: WFL  RUE AROM (degrees)  RUE General AROM: to 90* shoulder flexion  Right Hand AROM (degrees)  Right Hand AROM: WFL         Plan   Plan  Times per week: 4-5x/wk  Current Treatment Recommendations: Strengthening, Positioning, Safety Education & Training, Balance Training, Patient/Caregiver Education & Training, Self-Care / ADL, Functional Mobility Training, Equipment Evaluation, Education, & procurement, Endurance Training, Pain Management, Cognitive Reorientation, Neuromuscular Re-education      AM-PAC Score        AM-[facility] Inpatient Daily Activity Raw Score: 8 (01/31/20 1540)  AM-PAC Inpatient ADL T-Scale Score : 22.86 (01/31/20 1540)  ADL Inpatient CMS 0-100% Score: 85.69 (01/31/20 1540)  ADL Inpatient CMS G-Code Modifier : CM (01/31/20 1540)    Goals  Short term goals  Time Frame for Short term goals: 1 week (2/7/2020)  Short term goal 1: Pt will complete transfers with Mod A x2  Short term goal 2: Pt will perform 2 grooming tasks seated with min A   Short term goal 3: Pt will perform 2-3 minutes standing activity with Max A x2  Short term goal 4: Pt will adhere to sternal precautions with mod cues during ADL by 2/4  Patient Goals   Patient goals : \"to be able to leave here\"       Therapy Time   Individual Concurrent Group Co-treatment   Time In 1005(10 minutes for eval)         Time Out 1113         Minutes 68         Timed Code Treatment Minutes: 58 Minutes     This note to serve as d/c summary should pt

## 2020-01-31 NOTE — PLAN OF CARE
state will improve  Description  Ability to maintain a stable neurologic state will improve  Outcome: Ongoing  Goal: Demonstrations of improved sensory functioning will increase  Description  Demonstrations of improved sensory functioning will increase  Outcome: Ongoing  Goal: Decrease in sensory misperception frequency  Description  Decrease in sensory misperception frequency  Outcome: Ongoing  Goal: Able to refrain from responding to false sensory perceptions  Description  Able to refrain from responding to false sensory perceptions  Outcome: Ongoing  Goal: Demonstrates accurate environmental perceptions  Description  Demonstrates accurate environmental perceptions  Outcome: Ongoing  Goal: Able to distinguish between reality-based and nonreality-based thinking  Description  Able to distinguish between reality-based and nonreality-based thinking  Outcome: Ongoing  Goal: Able to interrupt nonreality-based thinking  Description  Able to interrupt nonreality-based thinking  Outcome: Ongoing   Lights on during day with curtains open and lights off and curtains closed at night    Problem:  Activity Intolerance:  Goal: Able to perform prescribed physical activity  Description  Able to perform prescribed physical activity  Outcome: Ongoing  Goal: Ability to tolerate increased activity will improve  Description  Ability to tolerate increased activity will improve  Outcome: Ongoing   PT/OT     Problem: Gas Exchange - Impaired:  Goal: Levels of oxygenation will improve  Description  Levels of oxygenation will improve  Outcome: Ongoing  Goal: Ability to maintain adequate ventilation will improve  Description  Ability to maintain adequate ventilation will improve  Outcome: Ongoing     Problem: Tissue Perfusion - Cardiopulmonary, Altered:  Goal: Absence of angina  Description  Absence of angina  Outcome: Ongoing  Goal: Hemodynamic stability will improve  Description  Hemodynamic stability will improve  Outcome: Ongoing  Goal: Will show no evidence of cardiac arrhythmias  Description  Will show no evidence of cardiac arrhythmias  Outcome: Ongoing

## 2020-01-31 NOTE — PROGRESS NOTES
Speech Language Pathology  Facility/Department: [facility] C2 CARD TELEMETRY  Swallow Re-Evaluation / Dysphagia Daily Treatment Note    NAME: Renan Heart  : 1950  MRN: [id number]    Patient Diagnosis(es):   Patient Active Problem List    Diagnosis Date Noted    Hypernatremia 2020    Restrictive lung disease 2020    Myocardial infarction ([facility] [address] 2020    Cardiogenic shock ([address]     Acute on chronic diastolic congestive heart failure ([address]     Pulmonary infiltrate 2020    Pulmonary venous congestion 2020    Atelectasis 2020    Leukocytosis 2020    Hyperglycemia 2020    Acute bronchospasm 2020    Atrial fibrillation with RVR ([facility] [address] 2020    Hypotension 2020    Acute respiratory failure with hypoxemia ([facility] [address] 2020    Tobacco abuse 2020    CAD (coronary artery disease) s/p stent in 2020    Hyperlipidemia 2020    Hemothorax 2020    Obesity, Class I, BMI 30.0-34.9 (see actual BMI) 2020    S/P thoracotomy 2020    Status post partial removal of lung 2020    Hemothorax, left 2020    S/P AVR (aortic valve replacement) 2011    AS (aortic stenosis) 2011    HTN (hypertension) 2010     Allergies: Allergies   Allergen Reactions    Penicillins Hives     Subjective: The pt was seen with permission from RN to see. He was seen elevated to a 90 degree position in bed. Noted confusion but the pt was able to generally participate with the evaluation. Pain: pt w/o pain during the session. Current Diet: DIET TUBE FEED CONTINUOUS/CYCLIC NPO; STANDARD WITHOUT FIBER; Nasogastric; Continuous; 30; 80    Diet Tolerance:  Patient NPO prior to evaluation    P.O.  Trials:  Puree   x Multiple trials of pudding and applesauce   Soft x Trials of jello and eddie cracker   Thins x Trials via spoon, cup and straw   Nectar x Trials via spoon, cup and straw   Honey x Trials via spoon, determined    If pt discharges from hospital prior to Speech/Swallowing discharge, this note serves as tx and discharge summary. Total Treatment Time / Charges     Time in Time out Total Time / units   Cognitive Tx         Speech Tx      Dysphagia Tx  1522  1550  28 min / 1 unit     Signature:   Bud Polanco, [address] [facility], [id number]  Speech-Language Pathologist  Phone: [phone]  Speech Desk: [phone]

## 2020-01-31 NOTE — PROGRESS NOTES
BILATERAL   Final Result      XR CHEST PORTABLE   Final Result   Vascular congestion without overt edema. Persistent nodule like density of the right mid lung. Follow-up to ensure   resolution and exclude malignancy is needed. Unchanged left-sided atelectasis versus pneumonia over the past 24 hours         XR CHEST PORTABLE   Final Result   Right midlung zone airspace opacities could represent atelectasis or pneumonia           Results for Tobias Desir (MRN [id number]) as of 1/30/2020 19:46   Ref. Range 1/29/2020 05:05 1/29/2020 05:08 1/29/2020 10:31 1/29/2020 12:51 1/29/2020 17:22 1/29/2020 19:29 1/29/2020 20:10 1/29/2020 20:15 1/30/2020 00:15 1/30/2020 04:32 1/30/2020 04:34   Sodium Latest Ref Range: 136 - 145 mmol/L 147 (H)          146 (H)   Potassium Latest Ref Range: 3.5 - 5.1 mmol/L 3.3 (L)      3.7    3.7   Chloride Latest Ref Range: 99 - 110 mmol/L 100          101   CO2 Latest Ref Range: 21 - 32 mmol/L 37 (H)          36 (H)   BUN Latest Ref Range: 7 - 20 mg/dL 38 (H)          32 (H)   Creatinine Latest Ref Range: 0.8 - 1.3 mg/dL 0.7 (L)          0.7 (L)   Anion Gap Latest Ref Range: 3 - 16  10          9   GFR Non- Latest Ref Range: >60  >60          >60   GFR  Latest Ref Range: >60  >60          >60   Magnesium Latest Ref Range: 1.80 - 2.40 mg/dL 2.20          2.30   Glucose Latest Ref Range: 70 - 99 mg/dL 123 (H)          82   POC Glucose Latest Ref Range: 70 - 99 mg/dl  108 (H) 154 (H) 111 (H) 82 95  115 (H) 90 81    Calcium Latest Ref Range: 8.3 - 10.6 mg/dL 8.8          8.8     Results for Tobias Desir (MRN [id number]) as of 1/30/2020 19:46   Ref.  Range 1/27/2020 06:05 1/28/2020 05:55 1/29/2020 05:05 1/30/2020 04:34   WBC Latest Ref Range: 4.0 - 11.0 K/uL 10.4 9.5 9.4 9.7   RBC Latest Ref Range: 4.20 - 5.90 M/uL 3.13 (L) 3.21 (L) 3.04 (L) 3.20 (L)   Hemoglobin Quant Latest Ref Range: 13.5 - 17.5 g/dL 9.8 (L) 9.9 (L) 9.4 (L) 9.7 (L)   Hematocrit Latest Ref Range: perihilar airspace opacification has increased   centrally on the left.  No pleural fluid.  Right lung appears clear allowing   for decreased inspiratory effort.  No significant skeletal finding.           Impression   Worsening perihilar airspace changes on the left from prior imaging with no   obvious pleural fluid.  This may represent asymmetric edema.  Developing   pneumonitis is considered less likely. Assessment:  Active Problems:    Acute respiratory failure with hypoxemia ([facility])    Tobacco abuse    AS (aortic stenosis)    S/P AVR (aortic valve replacement)    Hypotension    Pulmonary infiltrate    Pulmonary venous congestion    Atelectasis    Leukocytosis    Hyperglycemia    Acute bronchospasm    Atrial fibrillation with RVR ([facility])    Cardiogenic shock ([facility])    Acute on chronic diastolic congestive heart failure ([facility])    Obesity, Class I, BMI 30.0-34.9 (see actual BMI)    S/P thoracotomy    Status post partial removal of lung    Hemothorax, left    Restrictive lung disease    Hypernatremia  Resolved Problems:    * No resolved hospital problems.  *          Plan:   -BIPAP with oxygen  to keep saturation between 90 and 94% only  -Pulmonary toilet-s/p bronchoscopy   -Cardiac infusions as per cardiology and CT surgery  -Patient continues to be on Lasix drip but the dose of Lasix has been decreased-patient's sodium level has increased and patient may require free water via NGT or  Consider giving one dose of Diuril,if deemed appropriate -CT surgery /nephrology to decide   -Keep negative fluid balance  -Monitor input output and BMP  -Correct electrolytes on whenever necessary basis  -CT surgery to decide upon Amiodarone and dobutamine infusions   -Monitor hemodynamics closely  -No need for any antibiotics from pulmonary standpoint of view  IS and acapella   -Speech follow up   -Insulins as per CT surgery as per postop protocol  -Wound care as per wound care team and protocol  -PUD prophylaxis      Case discussed with CT surgery and ICU team      Electronically signed by:  Jaimee Orta MD    1/30/2020    7:43 PM.

## 2020-01-31 NOTE — PROGRESS NOTES
Hyperlipidemia, Hypertension, Obesity, Class I, BMI 30.0-34.9 (see actual BMI), S/P thoracotomy, Status post partial removal of lung, and Thyroid disease. has a past surgical history that includes thoracotomy (Left, 1/2/2020); bronchoscopy (N/A, 1/9/2020); Coronary angioplasty with stent (12/07/2010); pr ascend aorta graft w root replacment. valve conduit/coron reconstr (N/A, 1/21/2020); transesophageal echocardiogram (01/21/2020); Cardiac catheterization (01/13/2020); chest tube insertion (Left, 01/02/2020); Aortic valve replacement (12/27/2011); transesophageal echocardiogram (12/07/2010); Coronary artery bypass graft (2001); bronchoscopy (N/A, 1/24/2020); and bronchoscopy (1/24/2020). Restrictions  Restrictions/Precautions  Restrictions/Precautions: Fall Risk, Cardiac, General Precautions  Position Activity Restriction  Sternal Precautions: No Pushing, No Pulling, 5# Lifting Restrictions  Other position/activity restrictions: ambulate pt; NG tube, hilton, RUE A-line, IJ, 2.5L O2  Vision/Hearing  Vision: Impaired  Vision Exceptions: Wears glasses at all times  Hearing: Within functional limits     Subjective  General  Chart Reviewed: Yes  Patient assessed for rehabilitation services?: Yes  Response To Previous Treatment: Patient with no complaints from previous session. Family / Caregiver Present: No  Referring Practitioner: Antonio Martinez MD  Referral Date : 01/30/20  Diagnosis: hypotension  Follows Commands: Within Functional Limits  General Comment  Comments: Patient up in chair upon entry. RN cleared patient for therapy. Subjective  Subjective: Patient is agreeable to therapy.   Pain Screening  Patient Currently in Pain: Denies  Vital Signs  Patient Currently in Pain: Denies  Pre Treatment Pain Screening  Intervention List: Patient able to continue with treatment    Orientation  Orientation  Overall Orientation Status: Impaired  Orientation Level: Disoriented to time;Oriented to situation;Oriented to person;Disoriented to place  Social/Functional History  Social/Functional History  Lives With: Alone  Type of Home: Trailer  Home Layout: One level  Home Access: Stairs to enter without rails  Entrance Stairs - Number of Steps: 2 THOMPSON  Bathroom Shower/Tub: Tub/Shower unit  Bathroom Toilet: Handicap height  Bathroom Equipment: (no bathroom DME)  Home Equipment: (no DME)  ADL Assistance: [address]: [address] [place] [address] Responsibilities: Yes  Ambulation Assistance: Independent  Transfer Assistance: Independent  Active : Yes  Occupation: Retired  Type of occupation: heavy   Leisure & Hobbies: plays [employer], [employer]         Objective          AROM RLE (degrees)  RLE AROM: WFL  AROM LLE (degrees)  LLE AROM : WFL  Strength RLE  Strength RLE: Exception  Comment: Grossly 3+/5  Strength LLE  Strength LLE: Exception  Comment: Grossly 3+/5           Transfers  Sit to Stand: 2 Person Assistance(max A x2 x 3 trials)  Stand to sit: 2 Person Assistance(max A x2)  Bed to Chair: Dependent/Total(nico lift )  Ambulation  Ambulation?: No(unsafe to attempt)     Balance  Posture: Fair  Sitting - Static: Poor  Sitting - Dynamic: Poor  Standing - Static: Poor  Comments: Pt sat in chair with R lateral lean difficulty maintaining upright posture without trunk support from chair back. Standing for pericare with mod A x 1-2 with ataxic movements, difficulty mainatining upright posture, heavy lean onto 4WW with faitgue, 3 stadning trials, 2 min each for first 2, 30 sec for last trial with uncontrolled descent into chair. Pt with signifiacnt fatigue, nico lifted pt back to bed  Exercises  Quad Sets: x 15 B  Gluteal Sets: 15  Hip Flexion: 1 x 15 with assist  Hip Abduction: 1 x 15 clamshells  Knee Long Arc Quad: 1 x 15 B  Ankle Pumps:  15 B     Plan   Plan  Times per week: 5-7 in ICU  Times per day: Daily  Plan weeks: N/A PT signing off. Specific instructions for Next Treatment: N/A PT signing off. Current Treatment Recommendations: Strengthening, ROM, Balance Training, Transfer Training, Functional Mobility Training, Endurance Training, Stair training, Gait Training, Neuromuscular Re-education, Safety Education & Training, Home Exercise Program, Patient/Caregiver Education & Training, Equipment Evaluation, Education, & procurement  Safety Devices  Type of devices: All fall risk precautions in place, Bed alarm in place, Call light within reach, Nurse notified, Gait belt, Patient at risk for falls, Left in bed  Restraints  Initially in place: No      AM-PAC Score  AM-PAC Inpatient Mobility Raw Score : 9 (01/31/20 1650)  AM-PAC Inpatient T-Scale Score : 30.55 (01/31/20 1650)  Mobility Inpatient CMS 0-100% Score: 81.38 (01/31/20 1650)  Mobility Inpatient CMS G-Code Modifier : CM (01/31/20 1650)          Goals  Short term goals  Time Frame for Short term goals: 2/10/20 unless noted  Short term goal 1: Pt will perform bed mobility with max A x1 by 2/9/20  Short term goal 2: Pt will perform sit<>stand with max A x1  Short term goal 3: Pt will ambulate 10 ft with 4WW and mod A x 1-2  Short term goal 4: Pt will perform 12-15 reps of BLE ther ex for strengthening and balance  Patient Goals   Patient goals : To feel better. To have his surgery. Therapy Time   Individual Concurrent Group Co-treatment   Time In 1005         Time Out 1113         Minutes 68         Timed Code Treatment Minutes: 62 Minutes    If pt is discharged prior to next therapy session, this note will serve as discharge summary.     Celina Acharya, PT

## 2020-02-01 LAB
ABO/RH: NORMAL
ANION GAP SERPL CALCULATED.3IONS-SCNC: 11 MMOL/L (ref 3–16)
ANTIBODY SCREEN: NORMAL
APTT: 34.8 SEC (ref 24.2–36.2)
BASOPHILS ABSOLUTE: 0 K/UL (ref 0–0.2)
BASOPHILS RELATIVE PERCENT: 0.1 %
BLOOD BANK DISPENSE STATUS: NORMAL
BLOOD BANK PRODUCT CODE: NORMAL
BPU ID: NORMAL
BUN BLDV-MCNC: 39 MG/DL (ref 7–20)
CALCIUM SERPL-MCNC: 8.9 MG/DL (ref 8.3–10.6)
CHLORIDE BLD-SCNC: 104 MMOL/L (ref 99–110)
CO2: 35 MMOL/L (ref 21–32)
CREAT SERPL-MCNC: 0.9 MG/DL (ref 0.8–1.3)
DESCRIPTION BLOOD BANK: NORMAL
EOSINOPHILS ABSOLUTE: 0 K/UL (ref 0–0.6)
EOSINOPHILS RELATIVE PERCENT: 0.2 %
FIBRINOGEN: 478 MG/DL (ref 200–397)
GFR AFRICAN AMERICAN: >60
GFR NON-AFRICAN AMERICAN: >60
GLUCOSE BLD-MCNC: 102 MG/DL (ref 70–99)
GLUCOSE BLD-MCNC: 121 MG/DL (ref 70–99)
GLUCOSE BLD-MCNC: 231 MG/DL (ref 70–99)
GLUCOSE BLD-MCNC: 89 MG/DL (ref 70–99)
GLUCOSE BLD-MCNC: 91 MG/DL (ref 70–99)
GLUCOSE BLD-MCNC: 92 MG/DL (ref 70–99)
HCT VFR BLD CALC: 28.8 % (ref 40.5–52.5)
HCT VFR BLD CALC: 30.1 % (ref 40.5–52.5)
HEMOGLOBIN: 9.6 G/DL (ref 13.5–17.5)
HEMOGLOBIN: 9.9 G/DL (ref 13.5–17.5)
INR BLD: 2.05 (ref 0.86–1.14)
LYMPHOCYTES ABSOLUTE: 0.7 K/UL (ref 1–5.1)
LYMPHOCYTES RELATIVE PERCENT: 7.4 %
MAGNESIUM: 2.4 MG/DL (ref 1.8–2.4)
MCH RBC QN AUTO: 30.8 PG (ref 26–34)
MCHC RBC AUTO-ENTMCNC: 32.9 G/DL (ref 31–36)
MCV RBC AUTO: 93.5 FL (ref 80–100)
MONOCYTES ABSOLUTE: 1.1 K/UL (ref 0–1.3)
MONOCYTES RELATIVE PERCENT: 11.3 %
NEUTROPHILS ABSOLUTE: 7.8 K/UL (ref 1.7–7.7)
NEUTROPHILS RELATIVE PERCENT: 81 %
PDW BLD-RTO: 16.9 % (ref 12.4–15.4)
PERFORMED ON: ABNORMAL
PERFORMED ON: ABNORMAL
PERFORMED ON: NORMAL
PLATELET # BLD: 131 K/UL (ref 135–450)
PMV BLD AUTO: 10.3 FL (ref 5–10.5)
POTASSIUM REFLEX MAGNESIUM: 3.6 MMOL/L (ref 3.5–5.1)
PROTHROMBIN TIME: 23.9 SEC (ref 10–13.2)
RBC # BLD: 3.22 M/UL (ref 4.2–5.9)
SODIUM BLD-SCNC: 150 MMOL/L (ref 136–145)
WBC # BLD: 9.6 K/UL (ref 4–11)

## 2020-02-01 PROCEDURE — 99024 POSTOP FOLLOW-UP VISIT: CPT | Performed by: THORACIC SURGERY (CARDIOTHORACIC VASCULAR SURGERY)

## 2020-02-01 PROCEDURE — 85018 HEMOGLOBIN: CPT

## 2020-02-01 PROCEDURE — 94640 AIRWAY INHALATION TREATMENT: CPT

## 2020-02-01 PROCEDURE — 2140000000 HC CCU INTERMEDIATE R&B

## 2020-02-01 PROCEDURE — 2500000003 HC RX 250 WO HCPCS: Performed by: THORACIC SURGERY (CARDIOTHORACIC VASCULAR SURGERY)

## 2020-02-01 PROCEDURE — 99232 SBSQ HOSP IP/OBS MODERATE 35: CPT | Performed by: INTERNAL MEDICINE

## 2020-02-01 PROCEDURE — 6370000000 HC RX 637 (ALT 250 FOR IP)

## 2020-02-01 PROCEDURE — 85014 HEMATOCRIT: CPT

## 2020-02-01 PROCEDURE — 36430 TRANSFUSION BLD/BLD COMPNT: CPT

## 2020-02-01 PROCEDURE — 85610 PROTHROMBIN TIME: CPT

## 2020-02-01 PROCEDURE — 94669 MECHANICAL CHEST WALL OSCILL: CPT

## 2020-02-01 PROCEDURE — 2580000003 HC RX 258: Performed by: INTERNAL MEDICINE

## 2020-02-01 PROCEDURE — 85025 COMPLETE CBC W/AUTO DIFF WBC: CPT

## 2020-02-01 PROCEDURE — 3609008400 HC SIGMOIDOSCOPY DIAGNOSTIC: Performed by: INTERNAL MEDICINE

## 2020-02-01 PROCEDURE — 2580000003 HC RX 258: Performed by: THORACIC SURGERY (CARDIOTHORACIC VASCULAR SURGERY)

## 2020-02-01 PROCEDURE — 80048 BASIC METABOLIC PNL TOTAL CA: CPT

## 2020-02-01 PROCEDURE — 6370000000 HC RX 637 (ALT 250 FOR IP): Performed by: THORACIC SURGERY (CARDIOTHORACIC VASCULAR SURGERY)

## 2020-02-01 PROCEDURE — 86901 BLOOD TYPING SEROLOGIC RH(D): CPT

## 2020-02-01 PROCEDURE — 6360000002 HC RX W HCPCS: Performed by: THORACIC SURGERY (CARDIOTHORACIC VASCULAR SURGERY)

## 2020-02-01 PROCEDURE — 6360000002 HC RX W HCPCS

## 2020-02-01 PROCEDURE — 36592 COLLECT BLOOD FROM PICC: CPT

## 2020-02-01 PROCEDURE — P9045 ALBUMIN (HUMAN), 5%, 250 ML: HCPCS

## 2020-02-01 PROCEDURE — 86923 COMPATIBILITY TEST ELECTRIC: CPT

## 2020-02-01 PROCEDURE — 0DJD8ZZ INSPECTION OF LOWER INTESTINAL TRACT, VIA NATURAL OR ARTIFICIAL OPENING ENDOSCOPIC: ICD-10-PCS | Performed by: INTERNAL MEDICINE

## 2020-02-01 PROCEDURE — P9016 RBC LEUKOCYTES REDUCED: HCPCS

## 2020-02-01 PROCEDURE — 85730 THROMBOPLASTIN TIME PARTIAL: CPT

## 2020-02-01 PROCEDURE — P9035 PLATELET PHERES LEUKOREDUCED: HCPCS

## 2020-02-01 PROCEDURE — 6370000000 HC RX 637 (ALT 250 FOR IP): Performed by: INTERNAL MEDICINE

## 2020-02-01 PROCEDURE — 6370000000 HC RX 637 (ALT 250 FOR IP): Performed by: NURSE PRACTITIONER

## 2020-02-01 PROCEDURE — 83735 ASSAY OF MAGNESIUM: CPT

## 2020-02-01 PROCEDURE — 86900 BLOOD TYPING SEROLOGIC ABO: CPT

## 2020-02-01 PROCEDURE — 86850 RBC ANTIBODY SCREEN: CPT

## 2020-02-01 PROCEDURE — 85384 FIBRINOGEN ACTIVITY: CPT

## 2020-02-01 PROCEDURE — 6360000002 HC RX W HCPCS: Performed by: NURSE PRACTITIONER

## 2020-02-01 RX ORDER — 0.9 % SODIUM CHLORIDE 0.9 %
20 INTRAVENOUS SOLUTION INTRAVENOUS ONCE
Status: COMPLETED | OUTPATIENT
Start: 2020-02-01 | End: 2020-02-01

## 2020-02-01 RX ORDER — ALBUMIN, HUMAN INJ 5% 5 %
SOLUTION INTRAVENOUS
Status: COMPLETED
Start: 2020-02-01 | End: 2020-02-01

## 2020-02-01 RX ORDER — FUROSEMIDE 10 MG/ML
40 INJECTION INTRAMUSCULAR; INTRAVENOUS 2 TIMES DAILY
Status: DISCONTINUED | OUTPATIENT
Start: 2020-02-01 | End: 2020-02-02

## 2020-02-01 RX ORDER — SODIUM CHLORIDE 9 MG/ML
INJECTION, SOLUTION INTRAVENOUS
Status: DISPENSED
Start: 2020-02-01 | End: 2020-02-01

## 2020-02-01 RX ORDER — 0.9 % SODIUM CHLORIDE 0.9 %
500 INTRAVENOUS SOLUTION INTRAVENOUS ONCE
Status: COMPLETED | OUTPATIENT
Start: 2020-02-01 | End: 2020-02-01

## 2020-02-01 RX ORDER — 0.9 % SODIUM CHLORIDE 0.9 %
20 INTRAVENOUS SOLUTION INTRAVENOUS ONCE
Status: DISCONTINUED | OUTPATIENT
Start: 2020-02-01 | End: 2020-02-05 | Stop reason: HOSPADM

## 2020-02-01 RX ORDER — ALBUMIN, HUMAN INJ 5% 5 %
12.5 SOLUTION INTRAVENOUS ONCE
Status: COMPLETED | OUTPATIENT
Start: 2020-02-01 | End: 2020-02-01

## 2020-02-01 RX ADMIN — ALBUTEROL SULFATE 2.5 MG: 2.5 SOLUTION RESPIRATORY (INHALATION) at 12:53

## 2020-02-01 RX ADMIN — Medication 10 ML: at 17:54

## 2020-02-01 RX ADMIN — AMIODARONE HYDROCHLORIDE 200 MG: 200 TABLET ORAL at 09:19

## 2020-02-01 RX ADMIN — INSULIN LISPRO 6 UNITS: 100 INJECTION, SOLUTION INTRAVENOUS; SUBCUTANEOUS at 05:11

## 2020-02-01 RX ADMIN — INSULIN GLARGINE 10 UNITS: 100 INJECTION, SOLUTION SUBCUTANEOUS at 09:21

## 2020-02-01 RX ADMIN — SODIUM CHLORIDE 20 ML: 9 INJECTION, SOLUTION INTRAVENOUS at 06:00

## 2020-02-01 RX ADMIN — Medication 10 ML: at 09:49

## 2020-02-01 RX ADMIN — ALBUTEROL SULFATE 2.5 MG: 2.5 SOLUTION RESPIRATORY (INHALATION) at 08:47

## 2020-02-01 RX ADMIN — SODIUM CHLORIDE 20 ML: 9 INJECTION, SOLUTION INTRAVENOUS at 06:40

## 2020-02-01 RX ADMIN — CHLORHEXIDINE GLUCONATE 15 ML: 1.2 RINSE ORAL at 21:09

## 2020-02-01 RX ADMIN — INSULIN GLARGINE 10 UNITS: 100 INJECTION, SOLUTION SUBCUTANEOUS at 21:11

## 2020-02-01 RX ADMIN — CASTOR OIL AND BALSAM, PERU 1 APPLICATOR: 788; 87 OINTMENT TOPICAL at 09:21

## 2020-02-01 RX ADMIN — FUROSEMIDE 40 MG: 10 INJECTION, SOLUTION INTRAMUSCULAR; INTRAVENOUS at 12:05

## 2020-02-01 RX ADMIN — FAMOTIDINE 20 MG: 10 INJECTION, SOLUTION INTRAVENOUS at 09:19

## 2020-02-01 RX ADMIN — SODIUM CHLORIDE 500 ML: 9 INJECTION, SOLUTION INTRAVENOUS at 05:00

## 2020-02-01 RX ADMIN — PHENYLEPHRINE HYDROCHLORIDE 100 MCG/MIN: 10 INJECTION INTRAVENOUS at 10:30

## 2020-02-01 RX ADMIN — FAMOTIDINE 20 MG: 10 INJECTION, SOLUTION INTRAVENOUS at 21:09

## 2020-02-01 RX ADMIN — ONDANSETRON 4 MG: 2 INJECTION INTRAMUSCULAR; INTRAVENOUS at 07:17

## 2020-02-01 RX ADMIN — COLLAGENASE SANTYL 1 G: 250 OINTMENT TOPICAL at 17:04

## 2020-02-01 RX ADMIN — PHENYLEPHRINE HYDROCHLORIDE 100 MCG/MIN: 10 INJECTION INTRAVENOUS at 05:06

## 2020-02-01 RX ADMIN — CHLORHEXIDINE GLUCONATE 15 ML: 1.2 RINSE ORAL at 09:48

## 2020-02-01 RX ADMIN — Medication 1 APPLICATOR: at 12:30

## 2020-02-01 RX ADMIN — ALBUTEROL SULFATE 2.5 MG: 2.5 SOLUTION RESPIRATORY (INHALATION) at 20:25

## 2020-02-01 RX ADMIN — ACETAMINOPHEN 650 MG: 325 TABLET ORAL at 10:03

## 2020-02-01 RX ADMIN — FUROSEMIDE 40 MG: 10 INJECTION, SOLUTION INTRAMUSCULAR; INTRAVENOUS at 02:48

## 2020-02-01 RX ADMIN — Medication 10 ML: at 12:11

## 2020-02-01 RX ADMIN — ALBUMIN (HUMAN) 12.5 G: 12.5 INJECTION, SOLUTION INTRAVENOUS at 17:20

## 2020-02-01 RX ADMIN — Medication 10 ML: at 09:48

## 2020-02-01 RX ADMIN — SODIUM CHLORIDE 250 ML: 9 INJECTION, SOLUTION INTRAVENOUS at 09:20

## 2020-02-01 RX ADMIN — ALBUMIN, HUMAN INJ 5% 12.5 G: 5 SOLUTION at 17:20

## 2020-02-01 RX ADMIN — Medication 10 ML: at 21:09

## 2020-02-01 RX ADMIN — CASTOR OIL AND BALSAM, PERU: 788; 87 OINTMENT TOPICAL at 21:09

## 2020-02-01 ASSESSMENT — PAIN SCALES - GENERAL
PAINLEVEL_OUTOF10: 0

## 2020-02-01 NOTE — CONSULTS
 Gout     Hemothorax, left 01/2020    Hyperlipidemia     Hypertension     Obesity, Class I, BMI 30.0-34.9 (see actual BMI) 01/2020    30.7    S/P thoracotomy 01/2020    for hemothorax    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 Melani Riggins - redo w/23mm Magna Ease bioprosthetic valve    BRONCHOSCOPY N/A 1/9/2020    w/BAL, performed by Mariana Bill MD at [address] [place] [address] N/A 1/24/2020    BRONCHOSCOPY ALVEOLAR LAVAGE performed by Avelina Galindo MD at [address] [place] [address]  1/24/2020    BRONCHOSCOPY THERAPUTIC ASPIRATION INITIAL performed by Avelina Galindo MD at [facility] CABG WITH AORTIC VALVE REPLACEMENT  2001    date approximate, x1 to OM, AVR w/porcine valve    CARDIAC CATHETERIZATION  01/13/2020    Dr. Damion Crowell Left 01/02/2020    Dr. Lseia Camilo - 3200 ml blood drained in 1000 ml increments    CORONARY ANGIOPLASTY WITH STENT PLACEMENT  12/07/2010    Dr. Manan Heredia. Roger - NURIS- 4.0 x 28 to Cx    TN ASCEND AORTA GRAFT W ROOT REPLACMENT. VALVE CONDUIT/CORON RECONSTR N/A 1/21/2020    Dr. 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 Bryce Castro - emergent w/evacuation of hematoma & chest wall hemostasis, pleural biopsy & XENA wedge resection    TRANSESOPHAGEAL ECHOCARDIOGRAM  01/21/2020    during ascending aorta replacement w/redo AVR    TRANSESOPHAGEAL ECHOCARDIOGRAM  12/07/2010       Nurses notes reviewed and agreed. Medications reviewed  Allergies:    Allergies   Allergen Reactions    Penicillins Hives           Physical Exam:  Vital Signs: /71   Pulse 78   Temp 99.2 °F (37.3 °C) (Core)   Resp 25   Ht 5' 9\" (1.753 m)   Wt 190 lb 0.6 oz (86.2 kg)   SpO2 96%   BMI 28.06 kg/m²  Body mass index is 28.06 kg/m². Airway:Normal  Cardiac:Normal  Pulmonary:Normal  Abdomen:Normal  Specific to procedure: none      Pre-Procedure Assessment/Plan:  ASA 3 - Patient with moderate systemic disease with functional limitations  Malampatti II  Level of Sedation Plan:No sedation    Post Procedure plan: Return to same level of care    I assessed the patient and find that the patient is in satisfactory condition to proceed with the planned procedure and sedation plan. I have explained the risk, benefits, and alternatives to the procedure. The patient understands and agrees to proceed.   Yes    Jessie Gamboa MD       (O) [phone]          Jessie Gamboa  8:26 AM 2/1/2020

## 2020-02-01 NOTE — PROGRESS NOTES
Dr Slava Rebolledo here.   Updated MD on BM's during the night and this am. Informed will perform colonoscopy when team arrives

## 2020-02-01 NOTE — PROGRESS NOTES
[facility]    [facility]              [phone]                       Plan :     Renal function is stable  2.8 L urine yesterday  Recommend decrease diuretics griselda with sodium high  Also on water flushes. Make Lasix 40 twice daily. Assessment :     Acute Kidney Injury  Creatinine peaked to 1.6- now 0.8  Likely due to cardiac surgery, hypotension, Cardiorenal syndrome  Getting inotropes-helped his kidney function  On Lasix drip- now 10 mg hr  Decrease to 5 mg hr on 1/30/20- switched to iv      hyperkalemia  Low due to diuretics and getting treated    Acidosis  Lactic acidosis likely due to hypotension poor tissue perfusion  Improved  Now has metabolic alkalosis and also respiratory alkalosis    Hypotension- now Hypertension  BP: ()/(29-77) Pulse:  [73-92]   Low-dose of Levophed- switched to dobutamine  Now needing nicardipine drip  Switched to Clevidipine drip now         [facility] would like to thank Liane Gomes MD   for opportunity to serve this patient      Please call with questions at-   24 Hrs Answering service [phone] or  7 am- 5 pm via Perfect serve or cell phone  [address] [address]          CC/reason for consult :   Acute kidney injury   HPI :     From consult note-   Patricia Tena is a 77 y.o. male presented to   the hospital on 1/8/2020 with shortness of breath. Not able to give history because of being on BiPAP. He is known to have severe aortic stenosis and he had redo sternotomy and replacement of ascending aorta done, and also replacement with Carrasquillo Intuity valve done. Following that he is in ICU and is requiring BiPAP and has a poor urine output and he is on a low-dose of vasopressor. He was here earlier this month with shortness of breath pneumonia and large left hemothorax and pneumothorax. Also had PAL and hyperkalemia.   Did not require dialysis during that hospitalization and his kidney function improved to normal.    We are BID  ondansetron (ZOFRAN) injection 4 mg, 4 mg, Intravenous, Q8H PRN  chlorhexidine (PERIDEX) 0.12 % solution 15 mL, 15 mL, Mouth/Throat, BID  [Held by provider] atorvastatin (LIPITOR) tablet 20 mg, 20 mg, Oral, Nightly  albuterol (PROVENTIL) nebulizer solution 2.5 mg, 2.5 mg, Nebulization, Q4H WA  glucose (GLUTOSE) 40 % oral gel 15 g, 15 g, Oral, PRN  dextrose 50 % IV solution, 12.5 g, Intravenous, PRN  glucagon (rDNA) injection 1 mg, 1 mg, Intramuscular, PRN  dextrose 5 % solution, 100 mL/hr, Intravenous, PRN       Vitals :     Vitals:    02/01/20 1253   BP:    Pulse:    Resp: 17   Temp:    SpO2: 94%       I & O :       Intake/Output Summary (Last 24 hours) at 2/1/2020 1408  Last data filed at 2/1/2020 1203  Gross per 24 hour   Intake 3139.75 ml   Output 2600 ml   Net 539.75 ml        Physical Examination :     General appearance: Anxious- no, distressed- no, in good spirits- yes,   Comfortable, communicative  AAO X 3  HEENT: Lips- normal, teeth- ok , oral mucosa- moist  Neck : Mass- no, appears symmetrical, JVD- not visible  Respiratory: Respiratory effort-  normal, wheeze- no, crackles - no, Oxygen- 4 L  Cardiovascular:  Ausculation- No M/R/G, Edema none  Abdomen: visible mass- no, distention- no, scar- no, tenderness- no                            hepatosplenomegaly-  no  Musculoskeletal:  clubbing no,cyanosis- no , digital ischemia- no                           muscle strength- grossly normal , tone - grossly normal  Skin: rashes- no , ulcers- no, induration- no, tightening - no  Psychiatric:  Judgement and insight- normal         LABS:     Recent Labs     01/30/20  0434 01/31/20  0330 02/01/20  0340   WBC 9.7 10.8 9.6   HGB 9.7* 9.9* 9.9*   HCT 29.5* 29.0* 30.1*   PLT 85* 104* 131*     Recent Labs     01/30/20  0434 01/31/20  0330 02/01/20  0340   * 146* 150*   K 3.7 3.7 3.6    102 104   CO2 36* 35* 35*   BUN 32* 35* 39*   CREATININE 0.7* 0.8 0.9   GLUCOSE 82 137* 231*   MG 2.30 2.40 2.40

## 2020-02-01 NOTE — PROGRESS NOTES
Speech Language Pathology  Attempted Follow-Up Note    SLP received order for repeat bedside swallow eval; comment on order stated: \"failed prior study, need to assess for PEG placement prior to discharge early next week. \" Spoke with RN and advised that SLP recommended NPO on 01/30/20. However, pt was re-evaluated by SLP on 01/31/20 and soft food textures (soft/bite-sized) with honey thick liquids were recommended. Of note, pt is NPO today per Dr. Andrew Gleason due to GI bleed overnight. Per MD, po diet of soft food textures with honey thick liquids will be initiate on 02/02/20. SLP will re-attempt as schedule allows. Mely Rowan, [id number]  Speech-Language Pathologist  (desk #): [phone]

## 2020-02-01 NOTE — PROGRESS NOTES
Patient bleeding from rectum, with BPs 80s/50s and HR 110s. CT surgery paged. Dr. Brennen Small returned call, telephone orders given for 500 Bolus started, Erick ordered, PLTs and PRBC ordered. Will continue to monitor.

## 2020-02-01 NOTE — PROGRESS NOTES
CC:  Post op follow up      Surgery: 1/21/20 RE-DO STERNOTOMY X3, REPLACEMENT OF ASCENDING AORTA, RE-DO AORTIC VALVE REPLACEMENT WITH 19MM BLOCK INTUITY VALVE, CLOSURE OF OUTFLOW TRACK ATRIAL FISTULA, HYPOTHERMIC ARREST,  WITH TRANSESOPHAGEAL ECHOCARDIOGRAM, CIRCULATORY ARREST, 5 LEVEL BILATERAL INTERCOSTAL NERVE BLOCK      Hospital course:   1/22 resting in bed, awake, alert, responding appropriately on SBT  1/23 Pt with increasing oxygen demand overnight, Cr up to 1.6, hyperkalemia 5.9, and lactic 9.7  1/24 pt resting in bed, intubated and sedated. 1/25 & 1/26 remains intubated and sedated, FiO2 requirement improving. 1/27 FiO2 on vent is 30%, Peep of 10. Will work at weaning down Peep  1/28 DTI found overnight by RN, air mattress not functioning properly. In process of getting new bed for patient. Working on weaning sedation with end goal of extubation (hopefully) today. 1/29 sitting up in chair, weaned off of BiPap and tolerating 5L per NC well. Appears much more alert and interactive today. 1/30 up in chair, had multiple loose stools, now has rectal tube. Awake, alert and talkative, only on 4L today (sat 96%)   1/31 sitting up in bed, on 3L per NC, talkative and in NAD.  Currently SR.  2/1 overnight had hypotension with hematochezia, urgent GI consultation made      Vital Signs:                                                 BP (!) 119/42   Pulse 73   Temp 100.7 °F (38.2 °C) (Core)   Resp 25   Ht 5' 9\" (1.753 m)   Wt 190 lb 0.6 oz (86.2 kg)   SpO2 93%   BMI 28.06 kg/m²  O2 Flow Rate (L/min): 0 L/min     CVP (Mean): 24 mmHg  PAP: 45/22  PAP (Mean): 31 mmHg  SVR (Using ABP Mean): 762.79 dyne*sec/cm5  CCI: 2.1 L/min  Admission Weight: 205 lb 12.8 oz (93.4 kg)      Vent Settings:  Vent Information  $Ventilation: $Subsequent Day  Ventilator Started: Yes  Skin Assessment: Clean, dry, & intact  Equipment Changed: HME  Vent Type: [id number]  Vent Mode: CPAP  Vt Ordered: 500 mL  Pressure Ordered: 20  Rate Set: 15 bmp  Peak Flow: 55 L/min  Pressure Support: 12 cmH20  FiO2 : 0 %  Sensitivity: 3  PEEP/CPAP: 5  I Time/ I Time %: 1 s  Cuff Pressure (cm H2O): 30 cm H2O  Humidification Source: HME     Drips:  Erick    I/O:      Intake/Output Summary (Last 24 hours) at 2/1/2020 1021  Last data filed at 2/1/2020 0831  Gross per 24 hour   Intake 3444.75 ml   Output 2825 ml   Net 619.75 ml     Chest Tube:     O/E  GEN:   HEENT:  CV: reg, wound c/d/i  Pulm: decreased  Abd: soft, nt, nd, no peritoneal signs  Ext: warm, edema, wound c/d/i    Data Review:  CBC:   Recent Labs     01/30/20 0434 01/31/20 0330 02/01/20 0340   WBC 9.7 10.8 9.6   HGB 9.7* 9.9* 9.9*   HCT 29.5* 29.0* 30.1*   MCV 92.2 91.6 93.5   PLT 85* 104* 131*     BMP:   Recent Labs     01/30/20 0434 01/31/20 0330 02/01/20 0340   * 146* 150*   K 3.7 3.7 3.6    102 104   CO2 36* 35* 35*   BUN 32* 35* 39*   CREATININE 0.7* 0.8 0.9   CALCIUM 8.8 8.9 8.9   MG 2.30 2.40 2.40     Cardiac Enzymes: No results for input(s): CKTOTAL, CKMB, CKMBINDEX, TROPONINI in the last 72 hours. PT/INR:   Recent Labs     02/01/20 0435   PROTIME 23.9*   INR 2.05*     APTT:   Recent Labs     02/01/20 0435   APTT 34.8       Assessment/Plan:  Assessment:   Post-op: 10 days. Condition: In stable condition.      Plan:   1. Cardiovascular: s/p Aorta repair and AVR- ASA, Plavix,Statin, amio, BB, CCB  Dobutamine decreased this morning to 1 mcg/kg/hr. Pt currently SR (had PAF 1/30)  Hypertension: d/c clonidine patch, adding lisinopril 5, as well as changing metoprolol to carvedilol. Continue amlodipine. Will wean off Cardene as tolerated. *2/1 - Will wean Erick as tolerated. Currently on 150mcg, needs to be halfed as tolerated by this afternoon. Getting 2 unit of PRBC, will recheck H/H post transfusion. Lower GI Bleed s/p flex sigmoidoscopy. WILL HOLD Rosanne Friendly     2. Pulmonary: needs pulmonary expansion maneuvers, IS, OOBTC, PT/OT  Currently saturating upper 90's on 2.5L per NC.  Will wean as tolerated.     3. Neurology: analgesia as needed.     4. Nephrology: fluid management- diurese as tolerated. Nephro following. D/C lasix gtt. Will start 40 mg IV Q6 hrs, monitor.     5. Endocrinology: BG control much improved with additional Lantus started 1/27. Continue SSI and Lantus     6. Hematology: acute blood loss anemia; expected, monitor. Thrombocytopenia- improving, plt 107 today. HIT+ believed to be false positive as ARTEMIO is negative. Hem/Onc following.     7. Microbiology:   Leukocytosis: resolved, WBC 10.8. BC X2 and urine culture - NGTD  Abx course completed 1/27 (meropenem)      8. Nutrition:   If pt fails swallow eval will hold ASA, Xarelto Sun and Mon for PEG placement on Mon.  TF's per NG tube. Pt to remain 60 degrees upright. If he needs to go back on BiPap d/c tube feeds. He is to remain NPO at this time. *2/1 spoke with speech therapy; theres no one on Sunday who will be present; will plan for speech re-eval on Monday. Given GI Bleed overnight, will keep NPO today. Per GI, needs full colonoscopy    Diarrhea: discussed with dietician. Will change formula and add bulk fiber as well as free water flushes (Na 146).      9. Labs: monitor. Fungal cx negative.     10. Post-op Drains/Wires: All out.     11. D/C Goals: DCP following. Will get another swallow eval Saturday. Pending the results of this he may need a PEG tube. If so, Dr. Lennox Azul will place it on Monday 2/3/20. He could potentially then go to SNF on Tuesday. *2/1 not ready for discharge given acute Lower GI Bleed, will continue to moniter, serial H/Hs . HOLD XARELTO.     12.  Continue post-op care of patient in the ICU     Meds:               The patient is on a beta-blocker              The patient is not on an ace-i/ARB- not indicated              The patient is on a statin              The patient is on oral antiplatelet therapy    -     Tiago Leigh MD  2/1/2020  10:21 AM

## 2020-02-01 NOTE — PROGRESS NOTES
Patient is confused, family unable to be reached procedure ruled emergent. Consent signed by MD and RN.

## 2020-02-01 NOTE — PROGRESS NOTES
Distance Tunneling (cm) 0 cm 2/1/2020  3:00 PM   Tunneling Position ___ O'Clock 0 2/1/2020  3:00 PM   Undermining Starts ___ O'Clock 0 2/1/2020  3:00 PM   Undermining Ends___ O'Clock 0 2/1/2020  3:00 PM   Undermining Maxium Distance (cm) 0 2/1/2020  3:00 PM   Wound Assessment Purple;Dry; Intact 2/1/2020  3:00 PM   Drainage Amount None 2/1/2020  3:00 PM   Odor None 2/1/2020  3:00 PM   Margins Attached edges; Defined edges 2/1/2020  3:00 PM   Luz-wound Assessment Calloused 2/1/2020  3:00 PM   Purple%Wound Bed 100 2/1/2020  3:00 PM   Culture Taken No 2/1/2020  3:00 PM   Number of days: 4     Left Heel DTI:          Incision 01/02/20 Chest Lateral;Left;Lower (Active)   Wound Assessment Clean;Dry; Intact 2/1/2020  3:00 PM   Luz-wound Assessment Clean;Dry; Intact 2/1/2020  3:00 PM   Closure Surgical glue 2/1/2020  3:00 PM   Drainage Amount None 2/1/2020  3:00 PM   Drainage Description Serosanguinous 1/31/2020  4:00 AM   Odor None 2/1/2020  3:00 PM   Dressing/Treatment Open to air 2/1/2020  3:00 PM   Dressing Status Clean;Dry; Intact 1/31/2020  4:00 AM   Dressing Change Due 01/26/20 1/31/2020  4:00 AM   Number of days: 30       Incision 01/08/20 Back Lateral;Left (Active)   Wound Assessment Clean;Dry; Intact;Edges well approximated 2/1/2020  3:00 PM   Luz-wound Assessment Clean;Dry; Intact 2/1/2020  3:00 PM   Closure Approximated;Surgical glue; Open to air 2/1/2020  3:00 PM   Drainage Amount None 2/1/2020  3:00 PM   Odor None 2/1/2020  3:00 PM   Dressing/Treatment Open to air 2/1/2020  3:00 PM   Dressing Changed Changed/New 1/31/2020  4:00 AM   Dressing Status Dry; Intact 2/1/2020 12:00 PM   Number of days: 23       Incision 01/21/20 Sternum (Active)   Wound Assessment Clean;Dry; Intact 2/1/2020  3:00 PM   Luz-wound Assessment Clean;Dry; Intact 2/1/2020  3:00 PM   Closure Surgical glue 2/1/2020  3:00 PM   Drainage Amount None 2/1/2020  3:00 PM   Odor None 2/1/2020  3:00 PM   Dressing/Treatment Dry dressing 2/1/2020  3:00 PM [phone]. Specialty Bed Required : Yes - isoflex mattress  [x] Low Air Loss   [x] Pressure Redistribution  [] Fluid Immersion  [] Bariatric  [] Total Pressure Relief  [] Other:     Current Diet: Diet NPO Effective Now  Dietician consult:  Yes, following  Discharge Plan:  Placement for patient upon discharge: intermediate care facility   Patient appropriate for Outpatient [address]: Yes - recommend follow up for bilateral buttocks wound.     Referrals:  []  following  [] [facility]  [] Supplies  [] Other    Patient/Caregiver Teaching:  Level of patient/caregiver understanding able to:   [] Indicates understanding       [] Needs reinforcement  [] Unsuccessful      [] Verbal Understanding  [] Demonstrated understanding       [] No evidence of learning  [] Refused teaching         [x] N/A       Electronically signed by Giselle Mckeon RN, MSN, Zackery Sparks on 2/1/2020 at 3:37 PM

## 2020-02-01 NOTE — H&P
Pre-sedation Assessment    History and Physical / Pre-Sedation Assessment  Patient:  Patricia Tena   :   1950     Intended Procedure: FFS      HPI: Hematochezia, severe, s/p valve replacement    Current Facility-Administered Medications   Medication Dose Route Frequency Provider Last Rate Last Dose    phenylephrine (SHAYY-SYNEPHRINE) 50 mg in dextrose 5 % 250 mL infusion  100 mcg/min Intravenous Continuous Vero Mitchell MD 45 mL/hr at 20 0628 150 mcg/min at 20 1303    sodium chloride 0.9 % infusion             0.9 % sodium chloride bolus  20 mL Intravenous Once Vero Mitchell MD 10 mL/hr at 20 0640 20 mL at 20 0640    0.9 % sodium chloride bolus  20 mL Intravenous Once Molina Camacho MD        carvedilol (COREG) tablet 12.5 mg  12.5 mg Oral BID WC Scotty Handy, APRN - CNP   12.5 mg at 20    lisinopril (PRINIVIL;ZESTRIL) tablet 5 mg  5 mg Oral Daily Scotty Handy, APRN - CNP   5 mg at 20 7726    furosemide (LASIX) injection 40 mg  40 mg Intravenous Q6H Scotty [place], APRN - CNP   40 mg at 20 0248    oxyCODONE (ROXICODONE) immediate release tablet 5 mg  5 mg Oral Q6H PRN Scotty Handy, APRN - CNP   5 mg at 20 7631    amiodarone (CORDARONE) tablet 200 mg  200 mg Oral Daily Scotty [place], APRN - CNP   200 mg at 20 6284    insulin glargine (LANTUS) injection vial 10 Units  10 Units Subcutaneous BID Scotty [place], APRN - CNP   10 Units at 20    aspirin EC tablet 325 mg  325 mg Oral Daily Jeffrey Lazo MD   325 mg at 20 0851    amLODIPine (NORVASC) tablet 10 mg  10 mg Oral Daily Scotty [place], APRN - CNP   10 mg at 20 4104    rivaroxaban (XARELTO) tablet 20 mg  20 mg Oral Daily Scotty [place], APRN - CNP   20 mg at 20    niCARdipine (CARDENE) 50 mg in sodium chloride 0.9 % 250 mL infusion  5 mg/hr Intravenous Titrated Víctor Neil MD   Stopped at 20 1155    Venelex ointment   Topical BID Jeffrey ORTIZ MD Merlin        insulin lispro (HUMALOG) injection vial 0-18 Units  0-18 Units Subcutaneous Q4H Thomas Flood MD   6 Units at 02/01/20 0511    famotidine (PEPCID) injection 20 mg  20 mg Intravenous BID Sarai Staley MD   20 mg at 01/31/20 2006    sodium chloride flush 0.9 % injection 10 mL  10 mL Intravenous 2 times per day Chet Licea MD   10 mL at 01/31/20 2015    sodium chloride flush 0.9 % injection 10 mL  10 mL Intravenous PRN Jeffrey Boyer MD   10 mL at 01/23/20 1110    potassium chloride 20 mEq/50 mL IVPB (Central Line)  20 mEq Intravenous PRN Chet Licea MD   Stopped at 01/31/20 0735    magnesium sulfate 2 g in 50 mL IVPB premix  2 g Intravenous PRN Jeffrey Ramirez MD        acetaminophen (TYLENOL) tablet 650 mg  650 mg Oral Q4H PRN Jeffrey Ramirez MD   650 mg at 01/29/20 1712    acetaminophen (TYLENOL) suppository 650 mg  650 mg Rectal Q4H PRN Jeffrey Ramirez MD        docusate sodium (COLACE) capsule 100 mg  100 mg Oral BID Jeffrey Ramriez MD   Stopped at 01/29/20 0900    ondansetron (ZOFRAN) injection 4 mg  4 mg Intravenous Q8H PRN Jeffrey Boyer MD   4 mg at 02/01/20 0717    chlorhexidine (PERIDEX) 0.12 % solution 15 mL  15 mL Mouth/Throat BID Jeffrey Boyer MD   15 mL at 01/31/20 2015    [Held by provider] atorvastatin (LIPITOR) tablet 20 mg  20 mg Oral Nightly Jeffrey Ramirez MD   Stopped at 01/24/20 2100    albuterol (PROVENTIL) nebulizer solution 2.5 mg  2.5 mg Nebulization Q4H WA Jeffrey Boyer MD   2.5 mg at 01/31/20 2037    glucose (GLUTOSE) 40 % oral gel 15 g  15 g Oral PRN Jeffrey Boyer MD        dextrose 50 % IV solution  12.5 g Intravenous PRN Jeffrey Boyer MD        glucagon (rDNA) injection 1 mg  1 mg Intramuscular PRN Chet Licea MD        dextrose 5 % solution  100 mL/hr Intravenous PRN Chet Licea MD         Past Medical History:   Diagnosis Date    CAD (coronary artery disease)     [facility]     Diabetes mellitus ([facility]     Gout     Hemothorax, left 01/2020    Hyperlipidemia     Hypertension     Obesity, Class I, BMI 30.0-34.9 (see actual BMI) 01/2020    30.7    S/P thoracotomy 01/2020    for hemothorax    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 Gracy Castelan - redo w/23mm Magna Ease bioprosthetic valve    BRONCHOSCOPY N/A 1/9/2020    w/BAL, performed by Pop Frazier MD at [address] [place] [address] N/A 1/24/2020    BRONCHOSCOPY ALVEOLAR LAVAGE performed by Jyoti Gallo MD at [address] [place] [address]  1/24/2020    BRONCHOSCOPY THERAPUTIC ASPIRATION INITIAL performed by Jyoti Gallo MD at [facility] CABG WITH AORTIC VALVE REPLACEMENT  2001    date approximate, x1 to OM, AVR w/porcine valve    CARDIAC CATHETERIZATION  01/13/2020    Dr. Vasile Reich Left 01/02/2020    Dr. Sacha Rosaser - 3200 ml blood drained in 1000 ml increments    CORONARY ANGIOPLASTY WITH STENT PLACEMENT  12/07/2010    Dr. Brenden Lim. [facility] - NURIS- 4.0 x 28 to Cx    [place] ASCEND AORTA GRAFT W ROOT REPLACMENT. VALVE CONDUIT/CORON RECONSTR N/A 1/21/2020    Dr. 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 Holly Nation - emergent w/evacuation of hematoma & chest wall hemostasis, pleural biopsy & XENA wedge resection    TRANSESOPHAGEAL ECHOCARDIOGRAM  01/21/2020    during ascending aorta replacement w/redo AVR    TRANSESOPHAGEAL ECHOCARDIOGRAM  12/07/2010       Nurses notes reviewed and agreed. Medications reviewed  Allergies:    Allergies   Allergen Reactions    Penicillins Hives           Physical Exam:  Vital Signs: /71   Pulse 78   Temp 99.2 °F (37.3 °C) (Core)   Resp 25   Ht 5' 9\" (1.753 m)   Wt 190 lb 0.6 oz (86.2 kg)   SpO2 96%   BMI 28.06 kg/m²  Body mass index is 28.06 kg/m². Airway:Normal  Cardiac:Normal  Pulmonary:Normal  Abdomen:Normal  Specific to procedure: none      Pre-Procedure Assessment/Plan:  ASA 3 - Patient with moderate systemic disease with functional limitations  Malampatti II  Level of Sedation Plan:No sedation    Post Procedure plan: Return to same level of care    I assessed the patient and find that the patient is in satisfactory condition to proceed with the planned procedure and sedation plan. I have explained the risk, benefits, and alternatives to the procedure. The patient understands and agrees to proceed.   Yes    Solomon Paez MD       [phone]

## 2020-02-01 NOTE — PROGRESS NOTES
Dr Sylwia Salon called regarding attempts to wean Neosynepherine x 2 today, U/O 650 ml post lasix after blood transfusion. Heart rate increased to 110's this afternoon with some PAC's. Order for one 5% albumin 250 ml, H& H every 6 hours. Hold PM lasix. Hold coreg, continue christine synephrine at 50 mcg/min for the night (Do not wean).

## 2020-02-01 NOTE — PROGRESS NOTES
GI consult called, Dr. Stefano Cleveland returned call, and ordered a second unit of PRBC to be transfused. He will follow up with patient.

## 2020-02-01 NOTE — PROGRESS NOTES
Physical Therapy  Will hold therapy this date due to lower GI bleed. Pt getting colonoscopy this AM. Will follow up as pt becomes medically appropriate.   Domitila Lucio,   [facility]

## 2020-02-02 LAB
ANION GAP SERPL CALCULATED.3IONS-SCNC: 11 MMOL/L (ref 3–16)
BASOPHILS ABSOLUTE: 0 K/UL (ref 0–0.2)
BASOPHILS RELATIVE PERCENT: 0.3 %
BUN BLDV-MCNC: 48 MG/DL (ref 7–20)
CALCIUM SERPL-MCNC: 8.6 MG/DL (ref 8.3–10.6)
CHLORIDE BLD-SCNC: 109 MMOL/L (ref 99–110)
CO2: 36 MMOL/L (ref 21–32)
CREAT SERPL-MCNC: 1.1 MG/DL (ref 0.8–1.3)
EOSINOPHILS ABSOLUTE: 0 K/UL (ref 0–0.6)
EOSINOPHILS RELATIVE PERCENT: 0.1 %
GFR AFRICAN AMERICAN: >60
GFR NON-AFRICAN AMERICAN: >60
GLUCOSE BLD-MCNC: 111 MG/DL (ref 70–99)
GLUCOSE BLD-MCNC: 188 MG/DL (ref 70–99)
GLUCOSE BLD-MCNC: 233 MG/DL (ref 70–99)
GLUCOSE BLD-MCNC: 81 MG/DL (ref 70–99)
GLUCOSE BLD-MCNC: 81 MG/DL (ref 70–99)
GLUCOSE BLD-MCNC: 87 MG/DL (ref 70–99)
HCT VFR BLD CALC: 28.6 % (ref 40.5–52.5)
HCT VFR BLD CALC: 28.9 % (ref 40.5–52.5)
HCT VFR BLD CALC: 29.1 % (ref 40.5–52.5)
HEMOGLOBIN: 9.4 G/DL (ref 13.5–17.5)
HEMOGLOBIN: 9.6 G/DL (ref 13.5–17.5)
HEMOGLOBIN: 9.7 G/DL (ref 13.5–17.5)
LYMPHOCYTES ABSOLUTE: 1.3 K/UL (ref 1–5.1)
LYMPHOCYTES RELATIVE PERCENT: 11.6 %
MAGNESIUM: 2.5 MG/DL (ref 1.8–2.4)
MCH RBC QN AUTO: 29.8 PG (ref 26–34)
MCHC RBC AUTO-ENTMCNC: 33.2 G/DL (ref 31–36)
MCV RBC AUTO: 89.6 FL (ref 80–100)
MONOCYTES ABSOLUTE: 1.4 K/UL (ref 0–1.3)
MONOCYTES RELATIVE PERCENT: 12.8 %
NEUTROPHILS ABSOLUTE: 8.3 K/UL (ref 1.7–7.7)
NEUTROPHILS RELATIVE PERCENT: 75.2 %
PDW BLD-RTO: 16.7 % (ref 12.4–15.4)
PERFORMED ON: ABNORMAL
PERFORMED ON: NORMAL
PERFORMED ON: NORMAL
PLATELET # BLD: 139 K/UL (ref 135–450)
PMV BLD AUTO: 9.5 FL (ref 5–10.5)
POTASSIUM REFLEX MAGNESIUM: 3.5 MMOL/L (ref 3.5–5.1)
RBC # BLD: 3.22 M/UL (ref 4.2–5.9)
SODIUM BLD-SCNC: 156 MMOL/L (ref 136–145)
WBC # BLD: 11 K/UL (ref 4–11)

## 2020-02-02 PROCEDURE — 6370000000 HC RX 637 (ALT 250 FOR IP): Performed by: NURSE PRACTITIONER

## 2020-02-02 PROCEDURE — 2580000003 HC RX 258: Performed by: THORACIC SURGERY (CARDIOTHORACIC VASCULAR SURGERY)

## 2020-02-02 PROCEDURE — 85025 COMPLETE CBC W/AUTO DIFF WBC: CPT

## 2020-02-02 PROCEDURE — 2140000000 HC CCU INTERMEDIATE R&B

## 2020-02-02 PROCEDURE — 85014 HEMATOCRIT: CPT

## 2020-02-02 PROCEDURE — 83735 ASSAY OF MAGNESIUM: CPT

## 2020-02-02 PROCEDURE — 97110 THERAPEUTIC EXERCISES: CPT

## 2020-02-02 PROCEDURE — 97530 THERAPEUTIC ACTIVITIES: CPT

## 2020-02-02 PROCEDURE — 6370000000 HC RX 637 (ALT 250 FOR IP): Performed by: THORACIC SURGERY (CARDIOTHORACIC VASCULAR SURGERY)

## 2020-02-02 PROCEDURE — 94640 AIRWAY INHALATION TREATMENT: CPT

## 2020-02-02 PROCEDURE — 80048 BASIC METABOLIC PNL TOTAL CA: CPT

## 2020-02-02 PROCEDURE — 6360000002 HC RX W HCPCS: Performed by: INTERNAL MEDICINE

## 2020-02-02 PROCEDURE — 6360000002 HC RX W HCPCS: Performed by: THORACIC SURGERY (CARDIOTHORACIC VASCULAR SURGERY)

## 2020-02-02 PROCEDURE — 36592 COLLECT BLOOD FROM PICC: CPT

## 2020-02-02 PROCEDURE — 94669 MECHANICAL CHEST WALL OSCILL: CPT

## 2020-02-02 PROCEDURE — C9113 INJ PANTOPRAZOLE SODIUM, VIA: HCPCS | Performed by: THORACIC SURGERY (CARDIOTHORACIC VASCULAR SURGERY)

## 2020-02-02 PROCEDURE — 85018 HEMOGLOBIN: CPT

## 2020-02-02 PROCEDURE — 2500000003 HC RX 250 WO HCPCS: Performed by: THORACIC SURGERY (CARDIOTHORACIC VASCULAR SURGERY)

## 2020-02-02 PROCEDURE — 99024 POSTOP FOLLOW-UP VISIT: CPT | Performed by: THORACIC SURGERY (CARDIOTHORACIC VASCULAR SURGERY)

## 2020-02-02 RX ORDER — PANTOPRAZOLE SODIUM 40 MG/10ML
40 INJECTION, POWDER, LYOPHILIZED, FOR SOLUTION INTRAVENOUS 2 TIMES DAILY
Status: DISCONTINUED | OUTPATIENT
Start: 2020-02-02 | End: 2020-02-05

## 2020-02-02 RX ORDER — AMLODIPINE BESYLATE 5 MG/1
5 TABLET ORAL DAILY
Status: DISCONTINUED | OUTPATIENT
Start: 2020-02-02 | End: 2020-02-02

## 2020-02-02 RX ORDER — QUETIAPINE FUMARATE 25 MG/1
12.5 TABLET, FILM COATED ORAL 2 TIMES DAILY
Status: DISCONTINUED | OUTPATIENT
Start: 2020-02-02 | End: 2020-02-02

## 2020-02-02 RX ORDER — DIPHENHYDRAMINE HCL 25 MG
25 TABLET ORAL
Status: DISCONTINUED | OUTPATIENT
Start: 2020-02-02 | End: 2020-02-02

## 2020-02-02 RX ORDER — LISINOPRIL 2.5 MG/1
2.5 TABLET ORAL DAILY
Status: DISCONTINUED | OUTPATIENT
Start: 2020-02-02 | End: 2020-02-02

## 2020-02-02 RX ADMIN — CHLORHEXIDINE GLUCONATE 15 ML: 1.2 RINSE ORAL at 21:03

## 2020-02-02 RX ADMIN — ALBUTEROL SULFATE 2.5 MG: 2.5 SOLUTION RESPIRATORY (INHALATION) at 12:05

## 2020-02-02 RX ADMIN — CASTOR OIL AND BALSAM, PERU: 788; 87 OINTMENT TOPICAL at 21:02

## 2020-02-02 RX ADMIN — AMIODARONE HYDROCHLORIDE 200 MG: 200 TABLET ORAL at 08:37

## 2020-02-02 RX ADMIN — CARVEDILOL 12.5 MG: 6.25 TABLET, FILM COATED ORAL at 08:38

## 2020-02-02 RX ADMIN — CARVEDILOL 12.5 MG: 6.25 TABLET, FILM COATED ORAL at 18:42

## 2020-02-02 RX ADMIN — DOCUSATE SODIUM 100 MG: 100 CAPSULE, LIQUID FILLED ORAL at 21:10

## 2020-02-02 RX ADMIN — INSULIN LISPRO 3 UNITS: 100 INJECTION, SOLUTION INTRAVENOUS; SUBCUTANEOUS at 13:05

## 2020-02-02 RX ADMIN — Medication 10 ML: at 08:38

## 2020-02-02 RX ADMIN — Medication 1 MG: at 21:10

## 2020-02-02 RX ADMIN — INSULIN LISPRO 6 UNITS: 100 INJECTION, SOLUTION INTRAVENOUS; SUBCUTANEOUS at 21:03

## 2020-02-02 RX ADMIN — CASTOR OIL AND BALSAM, PERU 1 APPLICATOR: 788; 87 OINTMENT TOPICAL at 08:40

## 2020-02-02 RX ADMIN — PANTOPRAZOLE SODIUM 40 MG: 40 INJECTION, POWDER, LYOPHILIZED, FOR SOLUTION INTRAVENOUS at 21:31

## 2020-02-02 RX ADMIN — LISINOPRIL 2.5 MG: 5 TABLET ORAL at 09:29

## 2020-02-02 RX ADMIN — ALBUTEROL SULFATE 2.5 MG: 2.5 SOLUTION RESPIRATORY (INHALATION) at 19:35

## 2020-02-02 RX ADMIN — FAMOTIDINE 20 MG: 10 INJECTION, SOLUTION INTRAVENOUS at 08:38

## 2020-02-02 RX ADMIN — INSULIN GLARGINE 10 UNITS: 100 INJECTION, SOLUTION SUBCUTANEOUS at 12:28

## 2020-02-02 RX ADMIN — Medication 10 ML: at 08:39

## 2020-02-02 RX ADMIN — AMLODIPINE BESYLATE 5 MG: 5 TABLET ORAL at 09:30

## 2020-02-02 RX ADMIN — COLLAGENASE SANTYL 1 G: 250 OINTMENT TOPICAL at 08:40

## 2020-02-02 RX ADMIN — ASPIRIN 325 MG: 325 TABLET, COATED ORAL at 08:38

## 2020-02-02 RX ADMIN — CHLORHEXIDINE GLUCONATE 15 ML: 1.2 RINSE ORAL at 08:40

## 2020-02-02 RX ADMIN — FUROSEMIDE 40 MG: 10 INJECTION, SOLUTION INTRAMUSCULAR; INTRAVENOUS at 09:00

## 2020-02-02 RX ADMIN — DOCUSATE SODIUM 100 MG: 100 CAPSULE, LIQUID FILLED ORAL at 11:37

## 2020-02-02 RX ADMIN — INSULIN GLARGINE 10 UNITS: 100 INJECTION, SOLUTION SUBCUTANEOUS at 21:10

## 2020-02-02 ASSESSMENT — PAIN SCALES - GENERAL
PAINLEVEL_OUTOF10: 0
PAINLEVEL_OUTOF10: 0

## 2020-02-02 NOTE — PROGRESS NOTES
Yael Spence MD   10 mL at 02/02/20 0838    sodium chloride flush 0.9 % injection 10 mL  10 mL Intravenous PRN Jeffrey Spence MD   10 mL at 02/02/20 0839    potassium chloride 20 mEq/50 mL IVPB (Central Line)  20 mEq Intravenous PRN Wiliam Haddad MD   Stopped at 01/31/20 0735    magnesium sulfate 2 g in 50 mL IVPB premix  2 g Intravenous PRN Jeffrey Spence MD        acetaminophen (TYLENOL) tablet 650 mg  650 mg Oral Q4H PRN Jeffrey Spence MD   650 mg at 02/01/20 1003    acetaminophen (TYLENOL) suppository 650 mg  650 mg Rectal Q4H PRN Jeffrey Spence MD        docusate sodium (COLACE) capsule 100 mg  100 mg Oral BID Jeffrey Boyer MD   100 mg at 02/02/20 1137    ondansetron (ZOFRAN) injection 4 mg  4 mg Intravenous Q8H PRN Jeffrey Boyer MD   4 mg at 02/01/20 0717    chlorhexidine (PERIDEX) 0.12 % solution 15 mL  15 mL Mouth/Throat BID Jeffrey Boyer MD   15 mL at 02/02/20 0840    [Held by provider] atorvastatin (LIPITOR) tablet 20 mg  20 mg Oral Nightly Wiliam Haddad MD   Stopped at 01/24/20 2100    albuterol (PROVENTIL) nebulizer solution 2.5 mg  2.5 mg Nebulization Q4H WA Jeffrey Boyer MD   2.5 mg at 02/02/20 1205    glucose (GLUTOSE) 40 % oral gel 15 g  15 g Oral PRN Jeffrey Boyer MD        dextrose 50 % IV solution  12.5 g Intravenous PRN Jeffrey Spence MD        glucagon (rDNA) injection 1 mg  1 mg Intramuscular PRN Jeffrey Boyer MD        dextrose 5 % solution  100 mL/hr Intravenous PRN Jeffrey Boyer MD         Allergies   Allergen Reactions    Penicillins Hives     Active Problems:    Acute respiratory failure with hypoxemia ([facility])    Tobacco abuse    AS (aortic stenosis)    S/P AVR (aortic valve replacement)    Hypotension    Pulmonary infiltrate    Pulmonary venous congestion    Atelectasis    Leukocytosis    Hyperglycemia    Acute bronchospasm    Atrial fibrillation with RVR ([facility])    Cardiogenic shock (HCC)    Acute on chronic diastolic congestive heart failure (HCC)    Obesity, Class I, BMI 30.0-34.9 (see actual BMI)    S/P thoracotomy    Status post partial removal of lung    Hemothorax, left    Restrictive lung disease    Hypernatremia  Resolved Problems:    * No resolved hospital problems. *    Blood pressure (!) 114/54, pulse 92, temperature 97.8 °F (36.6 °C), temperature source Oral, resp. rate 25, height 5' 9\" (1.753 m), weight 177 lb 7.5 oz (80.5 kg), SpO2 94 %. Subjective:  Symptoms:  Stable. Pain:  He reports no pain. Objective:  General Appearance: In no acute distress and not in pain. Vital signs: (most recent): Blood pressure (!) 114/54, pulse 92, temperature 97.8 °F (36.6 °C), temperature source Oral, resp. rate 25, height 5' 9\" (1.753 m), weight 177 lb 7.5 oz (80.5 kg), SpO2 94 %. Heart: Normal rate. Regular rhythm. S1 normal and S2 normal.    Abdomen: Abdomen is soft. Bowel sounds are normal.   There is no abdominal tenderness. Assessment & Plan  76 yo w HTN and CAD admitted w acute Hemothorax and cardiac arrest and underwent emergent AVR and aorta repair. Started having hematochezia that has since resolved. Flex sig on 2/1 revealed large amount of dark maroon stools.  Is having NGT feeding.  - F/U H/H and clinically for any recurrent bleed  - Will need full colonoscopy (probably on Tuesday) if OK w CT Surgery  - Will follow    Vicki Lewis MD       (O) [phone]        Vicki Lewis MD  2/2/2020

## 2020-02-02 NOTE — PROGRESS NOTES
INPATIENT PULMONARY CRITICAL CARE PROGRESS NOTE      Reason for visit    critical care management    SUBJECTIVE: Patient continues to remain extubated with no increased shortness of breath;patient had some rectal bleeding and is to have flexible sigmoidoscopy today;Ptainet's IV infusions are being tapered to d/c, patient was afebrile, patient had sinus rhythm on the monitor, patient's blood sugars were better controlled, patient was having some diarrhea this morning,, patient had good urine output overnight with cumulative fluid balance of -3.1L, patient has skin breakdown in the coccygeal region and the buttocks  patient is tolerating NG tube feeds, no other pertinent review of system of concern      Physical Exam:  Blood pressure 120/70, pulse 112, temperature 98.7 °F (37.1 °C), temperature source Oral, resp. rate 22, height 5' 9\" (1.753 m), weight 190 lb 0.6 oz (86.2 kg), SpO2 92 %.'     Constitutional: No respiratory distress when seen  HENT:  no thyromegaly. Eyes:  Conjunctivae are normal. Pupils equal, round, and reactive to light. No scleral icterus. Neck: . No tracheal deviation present. No obvious thyroid mass. Cardiovascular: ST , normal heart sounds. No right ventricular heave. (+)lower extremity edema. Pulmonary/Chest: No wheezes. Bilateral minimal rales. Chest wall is not dull to percussion. No accessory muscle usage or stridor. Decreased breath sound intensity, chest tube present  Abdominal: Soft. Bowel sounds present. No distension or hernia. No tenderness. Musculoskeletal: No cyanosis. No clubbing. No obvious joint deformity. Lymphadenopathy: No cervical or supraclavicular adenopathy. Skin: Skin is warm and dry. No rash or nodules on the exposed extremities.   Patient has skin breakdown in the coccygeal and buttock area  Neurologic: Alert and communicative with no focal cranial nerve deficits      Results:  CBC:   Recent Labs     01/30/20  0434 01/31/20  0330 02/01/20  0340

## 2020-02-02 NOTE — PROGRESS NOTES
Physical Therapy  Facility/Department: [facility] C2 CARD TELEMETRY  Daily Treatment Note  NAME: Manuel Moya  : 1950  MRN: [id number]    Date of Service: 2020    Discharge Recommendations:  Subacute/Skilled Nursing Facility   PT Equipment Recommendations  Equipment Needed: No(defer)    Assessment   Body structures, Functions, Activity limitations: Decreased posture;Decreased safe awareness;Decreased functional mobility ; Decreased balance;Decreased ADL status; Decreased endurance;Decreased strength  Assessment: Pt seen this AM for transfer training and LE strengthening. Pt cleared per RN, pt agreeable to therapy. Pt complete supine->sit with max A x2. Pt demonstrates posterior lean in sitting at EOB, requiring max A-min A intermittently for upright posture. Pt completed 4x sit<>stand with mod A x2 and completed stand pivot transfer to chair with mod Ax2, increased time to complete, and max cues for sequencing steps. Pt required mod cues for sternal precautions. Pt very fatigued. Pt completed exercises for LE strengthening in chair. PT will continue to follow throughout LOS. Continue to recommend d/c to SNF. Treatment Diagnosis: Decreased (I) with ambulation  Specific instructions for Next Treatment: progress transfer  Prognosis: Guarded; Fair  Decision Making: High Complexity  PT Education: Goals;Transfer Training;Equipment;PT Role;Functional Mobility Training;Plan of Care;Home Exercise Program;Precautions;Gait Training;General Safety  Patient Education: pt requires reinforcement for safety  Barriers to Learning: requires frequent cues for sternal precautions  REQUIRES PT FOLLOW UP: Yes  Activity Tolerance  Activity Tolerance: Patient limited by endurance; Patient limited by fatigue     Patient Diagnosis(es): There were no encounter diagnoses.      has a past medical history of CAD (coronary artery disease), COPD (chronic obstructive pulmonary disease) ([facility] [address] Diabetes mellitus ([facility] [address] Gout, Hemothorax, left, Hyperlipidemia, Hypertension, Obesity, Class I, BMI 30.0-34.9 (see actual BMI), S/P thoracotomy, Status post partial removal of lung, and Thyroid disease. has a past surgical history that includes thoracotomy (Left, 1/2/2020); bronchoscopy (N/A, 1/9/2020); Coronary angioplasty with stent (12/07/2010); pr ascend aorta graft w root replacment. valve conduit/coron reconstr (N/A, 1/21/2020); transesophageal echocardiogram (01/21/2020); Cardiac catheterization (01/13/2020); chest tube insertion (Left, 01/02/2020); Aortic valve replacement (12/27/2011); transesophageal echocardiogram (12/07/2010); Coronary artery bypass graft (2001); bronchoscopy (N/A, 1/24/2020); and bronchoscopy (1/24/2020). Restrictions  Restrictions/Precautions  Restrictions/Precautions: Fall Risk, Cardiac, General Precautions  Position Activity Restriction  Sternal Precautions: No Pushing, No Pulling, 5# Lifting Restrictions  Other position/activity restrictions: ambulate pt; NG tube, hilton, RUE A-line, IJ, 2.5L O2  Subjective   General  Chart Reviewed: Yes  Response To Previous Treatment: Patient with no complaints from previous session. Family / Caregiver Present: No  Referring Practitioner: El Cox MD  Subjective  Subjective: pt in bed with cardiac therapy and RN, agreeable to therapy  Pain Screening  Patient Currently in Pain: Denies  Vital Signs  Patient Currently in Pain: Denies       Orientation  Orientation  Overall Orientation Status: Impaired  Cognition   Cognition  Overall Cognitive Status: Exceptions  Arousal/Alertness: Delayed responses to stimuli  Following Commands: Follows one step commands with repetition; Follows one step commands with increased time  Memory: Decreased short term memory  Problem Solving: Assistance required to generate solutions;Assistance required to implement solutions  Insights: Decreased awareness of deficits  Initiation: Requires cues for all  Sequencing: Requires cues for all  Objective   Bed mobility  Supine to Sit: 2 Person assistance(max A x2 )  Sit to Supine: Unable to assess(up in chair at end of session)  Scooting: Maximal assistance(toward EOB)  Transfers  Sit to Stand: 2 Person Assistance(mod A x2)  Stand to sit: 2 Person Assistance(mod A x2)  Bed to Chair: 2 Person Assistance(mod A x2 with max cues for sequencing)  Ambulation  Ambulation?: No(unsafe to attempt)  Stairs/Curb  Stairs?: No        Exercises  Quad Sets: 1x 15 BLE with cues for technique  Gluteal Sets: 1x 15 ofelia  Ankle Pumps: 1x 30 BLE                     AM-PAC Score  AM-PAC Inpatient Mobility Raw Score : 10 (02/02/20 1211)  AM-PAC Inpatient T-Scale Score : 32.29 (02/02/20 1211)  Mobility Inpatient CMS 0-100% Score: 76.75 (02/02/20 1211)  Mobility Inpatient CMS G-Code Modifier : CL (02/02/20 1211)          Goals  Short term goals  Time Frame for Short term goals: 2/9/20  Short term goal 1: Pt will complete bed mobility with max A. Short term goal 2: Pt will complete sit<>stand with mod A x1. Short term goal 3: Pt will complete bed<>chair with mod A x1 and LRAD. Short term goal 4: Pt will ambulate 10 ft with LRAD and mod A. Short term goal 5: Pt will verbalize understanding of sternal precautions and maintain them throughout mobility. Patient Goals   Patient goals : get stronger    Plan    Plan  Times per week: 5-7 in ICU  Times per day: Daily  Plan weeks: N/A PT signing off. Specific instructions for Next Treatment: progress transfer  Current Treatment Recommendations: Strengthening, ROM, Balance Training, Transfer Training, Functional Mobility Training, Endurance Training, Stair training, Gait Training, Neuromuscular Re-education, Safety Education & Training, Home Exercise Program, Patient/Caregiver Education & Training, Equipment Evaluation, Education, & procurement  Safety Devices  Type of devices:  All fall risk precautions in place, Call light within reach, Nurse notified, Gait belt, Patient at risk for falls, Left in chair, Chair alarm in place  Restraints  Initially in place: No     Therapy Time   Individual Concurrent Group Co-treatment   Time In 1128         Time Out 1153         Minutes 25         Timed Code Treatment Minutes: 25 Minutes       Kiya Bush PT

## 2020-02-02 NOTE — PROGRESS NOTES
H/Hs, no need for PRBCs overnight; will need full colonoscopy per GI recs, likely outpatient     -     Otf Castillo MD  2/2/2020  4:43 PM

## 2020-02-03 LAB
ANION GAP SERPL CALCULATED.3IONS-SCNC: 9 MMOL/L (ref 3–16)
ANION GAP SERPL CALCULATED.3IONS-SCNC: 9 MMOL/L (ref 3–16)
BASOPHILS ABSOLUTE: 0 K/UL (ref 0–0.2)
BASOPHILS RELATIVE PERCENT: 0.3 %
BUN BLDV-MCNC: 50 MG/DL (ref 7–20)
BUN BLDV-MCNC: 54 MG/DL (ref 7–20)
CALCIUM SERPL-MCNC: 8.6 MG/DL (ref 8.3–10.6)
CALCIUM SERPL-MCNC: 8.8 MG/DL (ref 8.3–10.6)
CHLORIDE BLD-SCNC: 109 MMOL/L (ref 99–110)
CHLORIDE BLD-SCNC: 112 MMOL/L (ref 99–110)
CO2: 34 MMOL/L (ref 21–32)
CO2: 35 MMOL/L (ref 21–32)
CREAT SERPL-MCNC: 1.2 MG/DL (ref 0.8–1.3)
CREAT SERPL-MCNC: 1.2 MG/DL (ref 0.8–1.3)
EOSINOPHILS ABSOLUTE: 0 K/UL (ref 0–0.6)
EOSINOPHILS RELATIVE PERCENT: 0.1 %
GFR AFRICAN AMERICAN: >60
GFR AFRICAN AMERICAN: >60
GFR NON-AFRICAN AMERICAN: >60
GFR NON-AFRICAN AMERICAN: >60
GLUCOSE BLD-MCNC: 127 MG/DL (ref 70–99)
GLUCOSE BLD-MCNC: 153 MG/DL (ref 70–99)
GLUCOSE BLD-MCNC: 155 MG/DL (ref 70–99)
GLUCOSE BLD-MCNC: 158 MG/DL (ref 70–99)
GLUCOSE BLD-MCNC: 164 MG/DL (ref 70–99)
GLUCOSE BLD-MCNC: 187 MG/DL (ref 70–99)
GLUCOSE BLD-MCNC: 297 MG/DL (ref 70–99)
HCT VFR BLD CALC: 27.3 % (ref 40.5–52.5)
HEMOGLOBIN: 9 G/DL (ref 13.5–17.5)
LYMPHOCYTES ABSOLUTE: 1.1 K/UL (ref 1–5.1)
LYMPHOCYTES RELATIVE PERCENT: 9.4 %
MAGNESIUM: 2.5 MG/DL (ref 1.8–2.4)
MCH RBC QN AUTO: 30.2 PG (ref 26–34)
MCHC RBC AUTO-ENTMCNC: 33.2 G/DL (ref 31–36)
MCV RBC AUTO: 91.1 FL (ref 80–100)
MONOCYTES ABSOLUTE: 1.4 K/UL (ref 0–1.3)
MONOCYTES RELATIVE PERCENT: 11.9 %
NEUTROPHILS ABSOLUTE: 9.2 K/UL (ref 1.7–7.7)
NEUTROPHILS RELATIVE PERCENT: 78.3 %
PDW BLD-RTO: 16.7 % (ref 12.4–15.4)
PERFORMED ON: ABNORMAL
PLATELET # BLD: 143 K/UL (ref 135–450)
PMV BLD AUTO: 9.4 FL (ref 5–10.5)
POTASSIUM REFLEX MAGNESIUM: 3.2 MMOL/L (ref 3.5–5.1)
POTASSIUM SERPL-SCNC: 3.5 MMOL/L (ref 3.5–5.1)
RBC # BLD: 2.99 M/UL (ref 4.2–5.9)
SODIUM BLD-SCNC: 152 MMOL/L (ref 136–145)
SODIUM BLD-SCNC: 156 MMOL/L (ref 136–145)
WBC # BLD: 11.8 K/UL (ref 4–11)

## 2020-02-03 PROCEDURE — 80048 BASIC METABOLIC PNL TOTAL CA: CPT

## 2020-02-03 PROCEDURE — 6360000002 HC RX W HCPCS: Performed by: THORACIC SURGERY (CARDIOTHORACIC VASCULAR SURGERY)

## 2020-02-03 PROCEDURE — 97110 THERAPEUTIC EXERCISES: CPT

## 2020-02-03 PROCEDURE — 2140000000 HC CCU INTERMEDIATE R&B

## 2020-02-03 PROCEDURE — 6370000000 HC RX 637 (ALT 250 FOR IP): Performed by: THORACIC SURGERY (CARDIOTHORACIC VASCULAR SURGERY)

## 2020-02-03 PROCEDURE — 85025 COMPLETE CBC W/AUTO DIFF WBC: CPT

## 2020-02-03 PROCEDURE — 94640 AIRWAY INHALATION TREATMENT: CPT

## 2020-02-03 PROCEDURE — 97530 THERAPEUTIC ACTIVITIES: CPT

## 2020-02-03 PROCEDURE — 6370000000 HC RX 637 (ALT 250 FOR IP): Performed by: NURSE PRACTITIONER

## 2020-02-03 PROCEDURE — 94669 MECHANICAL CHEST WALL OSCILL: CPT

## 2020-02-03 PROCEDURE — 2580000003 HC RX 258: Performed by: INTERNAL MEDICINE

## 2020-02-03 PROCEDURE — 99024 POSTOP FOLLOW-UP VISIT: CPT | Performed by: THORACIC SURGERY (CARDIOTHORACIC VASCULAR SURGERY)

## 2020-02-03 PROCEDURE — 36592 COLLECT BLOOD FROM PICC: CPT

## 2020-02-03 PROCEDURE — 2580000003 HC RX 258: Performed by: THORACIC SURGERY (CARDIOTHORACIC VASCULAR SURGERY)

## 2020-02-03 PROCEDURE — 83735 ASSAY OF MAGNESIUM: CPT

## 2020-02-03 PROCEDURE — 6360000002 HC RX W HCPCS: Performed by: INTERNAL MEDICINE

## 2020-02-03 PROCEDURE — C9113 INJ PANTOPRAZOLE SODIUM, VIA: HCPCS | Performed by: THORACIC SURGERY (CARDIOTHORACIC VASCULAR SURGERY)

## 2020-02-03 PROCEDURE — 92526 ORAL FUNCTION THERAPY: CPT

## 2020-02-03 RX ADMIN — INSULIN GLARGINE 10 UNITS: 100 INJECTION, SOLUTION SUBCUTANEOUS at 21:34

## 2020-02-03 RX ADMIN — INSULIN LISPRO 9 UNITS: 100 INJECTION, SOLUTION INTRAVENOUS; SUBCUTANEOUS at 12:13

## 2020-02-03 RX ADMIN — INSULIN LISPRO 3 UNITS: 100 INJECTION, SOLUTION INTRAVENOUS; SUBCUTANEOUS at 08:09

## 2020-02-03 RX ADMIN — CARVEDILOL 12.5 MG: 6.25 TABLET, FILM COATED ORAL at 07:56

## 2020-02-03 RX ADMIN — RIVAROXABAN 15 MG: 15 TABLET, FILM COATED ORAL at 17:25

## 2020-02-03 RX ADMIN — Medication 10 ML: at 08:52

## 2020-02-03 RX ADMIN — Medication 10 ML: at 21:34

## 2020-02-03 RX ADMIN — AMIODARONE HYDROCHLORIDE 200 MG: 200 TABLET ORAL at 08:52

## 2020-02-03 RX ADMIN — INSULIN LISPRO 3 UNITS: 100 INJECTION, SOLUTION INTRAVENOUS; SUBCUTANEOUS at 02:34

## 2020-02-03 RX ADMIN — POTASSIUM CHLORIDE 20 MEQ: 29.8 INJECTION, SOLUTION INTRAVENOUS at 07:57

## 2020-02-03 RX ADMIN — ALBUTEROL SULFATE 2.5 MG: 2.5 SOLUTION RESPIRATORY (INHALATION) at 08:32

## 2020-02-03 RX ADMIN — POTASSIUM CHLORIDE 20 MEQ: 29.8 INJECTION, SOLUTION INTRAVENOUS at 08:55

## 2020-02-03 RX ADMIN — ALBUTEROL SULFATE 2.5 MG: 2.5 SOLUTION RESPIRATORY (INHALATION) at 15:29

## 2020-02-03 RX ADMIN — PANTOPRAZOLE SODIUM 40 MG: 40 INJECTION, POWDER, LYOPHILIZED, FOR SOLUTION INTRAVENOUS at 16:16

## 2020-02-03 RX ADMIN — INSULIN LISPRO 3 UNITS: 100 INJECTION, SOLUTION INTRAVENOUS; SUBCUTANEOUS at 16:21

## 2020-02-03 RX ADMIN — POTASSIUM CHLORIDE: 2 INJECTION, SOLUTION, CONCENTRATE INTRAVENOUS at 10:02

## 2020-02-03 RX ADMIN — CHLORHEXIDINE GLUCONATE 15 ML: 1.2 RINSE ORAL at 08:53

## 2020-02-03 RX ADMIN — INSULIN LISPRO 3 UNITS: 100 INJECTION, SOLUTION INTRAVENOUS; SUBCUTANEOUS at 05:22

## 2020-02-03 RX ADMIN — INSULIN GLARGINE 10 UNITS: 100 INJECTION, SOLUTION SUBCUTANEOUS at 09:30

## 2020-02-03 RX ADMIN — ALBUTEROL SULFATE 2.5 MG: 2.5 SOLUTION RESPIRATORY (INHALATION) at 20:57

## 2020-02-03 RX ADMIN — CHLORHEXIDINE GLUCONATE 15 ML: 1.2 RINSE ORAL at 21:34

## 2020-02-03 RX ADMIN — CASTOR OIL AND BALSAM, PERU: 788; 87 OINTMENT TOPICAL at 09:02

## 2020-02-03 RX ADMIN — ALBUTEROL SULFATE 2.5 MG: 2.5 SOLUTION RESPIRATORY (INHALATION) at 12:00

## 2020-02-03 RX ADMIN — PANTOPRAZOLE SODIUM 40 MG: 40 INJECTION, POWDER, LYOPHILIZED, FOR SOLUTION INTRAVENOUS at 08:52

## 2020-02-03 RX ADMIN — Medication 1 MG: at 21:34

## 2020-02-03 RX ADMIN — COLLAGENASE SANTYL 1 G: 250 OINTMENT TOPICAL at 08:02

## 2020-02-03 RX ADMIN — CARVEDILOL 12.5 MG: 6.25 TABLET, FILM COATED ORAL at 17:25

## 2020-02-03 RX ADMIN — CASTOR OIL AND BALSAM, PERU: 788; 87 OINTMENT TOPICAL at 21:34

## 2020-02-03 RX ADMIN — DOCUSATE SODIUM 100 MG: 100 CAPSULE, LIQUID FILLED ORAL at 21:34

## 2020-02-03 ASSESSMENT — PAIN SCALES - GENERAL
PAINLEVEL_OUTOF10: 0

## 2020-02-03 NOTE — PROGRESS NOTES
Occupational Therapy  Facility/Department: [facility] C2 CARD TELEMETRY  Daily Treatment Note  NAME: Renan Heart  : 1950  MRN: [id number]    Date of Service: 2/3/2020    Discharge Recommendations:  Subacute/Skilled Nursing Facility       Assessment   Performance deficits / Impairments: Decreased functional mobility ; Decreased safe awareness;Decreased balance;Decreased ADL status; Decreased high-level IADLs;Decreased endurance;Decreased strength;Decreased coordination;Decreased fine motor control;Decreased cognition  Assessment: pt with significant weakness, decreased orientation but cooperative, pt requiring max assist of 2 for bed mobility & transfers; pt to benefit from skilled OT services while in acute care setting  OT Education: OT Role;Plan of Care;Transfer Training;Precautions  Patient Education: cognitive orientation  Barriers to Learning: confusion,lethargy  REQUIRES OT FOLLOW UP: Yes  Activity Tolerance  Activity Tolerance: Patient limited by fatigue;Treatment limited secondary to decreased cognition  Safety Devices  Safety Devices in place: Yes  Type of devices: Call light within reach; Left in chair;Nurse notified         Patient Diagnosis(es): There were no encounter diagnoses. has a past medical history of CAD (coronary artery disease), COPD (chronic obstructive pulmonary disease) ([facility] [address] Diabetes mellitus ([address] Gout, Hemothorax, left, Hyperlipidemia, Hypertension, Obesity, Class I, BMI 30.0-34.9 (see actual BMI), S/P thoracotomy, Status post partial removal of lung, and Thyroid disease. has a past surgical history that includes thoracotomy (Left, 2020); bronchoscopy (N/A, 2020); Coronary angioplasty with stent (2010); pr ascend aorta graft w root replacment. valve conduit/coron reconstr (N/A, 2020); transesophageal echocardiogram (2020); Cardiac catheterization (2020); chest tube insertion (Left, 2020);  Aortic valve replacement (2011); transesophageal Figueroa Freire, OT

## 2020-02-03 NOTE — PROGRESS NOTES
Physical Therapy  Facility/Department: [facility] C2 CARD TELEMETRY  Daily Treatment Note  NAME: Kelvin Lee  : 1950  MRN: [id number]    Date of Service: 2/3/2020    Discharge Recommendations:  Subacute/Skilled Nursing Facility   PT Equipment Recommendations  Equipment Needed: No(defer to facility)    Assessment   Body structures, Functions, Activity limitations: Decreased posture;Decreased safe awareness;Decreased functional mobility ; Decreased balance;Decreased endurance;Decreased strength;Decreased ROM; Decreased cognition  Assessment: Pt participated fairly well with PT this morning, although still very weak and deconditioned, requiring maxA x 2 for bed mobility and stand-pivot transfers from bed>chair. Pt limited in participation by impaired cognition, difficulty following commands, and significant weakness in extremities with pt struggling to maintain his balance (both in seated and standing positions). Cont. to recommend SNF at D/C. Treatment Diagnosis: Decreased (I) with ambulation  Specific instructions for Next Treatment: Progress ther ex and mobility as tolerated  Prognosis: Fair;Guarded  Decision Making: High Complexity  PT Education: Goals;Transfer Training;Equipment;PT Role;Functional Mobility Training;Plan of Care;Home Exercise Program;Precautions;General Safety;Orientation; Energy Conservation;Disease Specific Education  Patient Education: Pt demonstrates some understanding but requires reinforcement for safety. Barriers to Learning: Confusion & lethargy. Pt requires frequent cues for sternal precautions. REQUIRES PT FOLLOW UP: Yes  Activity Tolerance: Patient limited by endurance; Patient limited by cognitive status; Patient limited by fatigue  Activity Tolerance: BP = 87/40 immediately after bed>chair transfer, increasing to 97/44 after sitting in chair 4-5 minutes. HR = 62 bpm after therapy session. Patient Diagnosis(es): There were no encounter diagnoses.      has a past medical history of moving toward R side, HOB elevated ~45 degrees)  Scootin Person assistance(dep x 2 to scoot backward in chair, maxA x 2 to scoot forward to EOB)     Transfers  Sit to Stand: 2 Person Assistance(maxA x 2 from EOB to standing, maxA x 1 from chair to standing (x 2 reps))  Stand to sit: 2 Person Assistance(maxA x 2)  Bed to Chair: 2 Person Assistance(maxA x 2 moving toward R side via stand-pivot, max cues needed for safety)  Stand Pivot Transfers: 2 Person Assistance(maxA x 2 moving toward R side)     Ambulation  Ambulation?: No(unsafe to attempt given BLE weakness and difficulty following commands)     Balance  Posture: Poor  Sitting - Static: Poor  Sitting - Dynamic: Poor  Standing - Static: Poor  Standing - Dynamic: Poor  Comments: Pt able to sit EOB x 3-4 minutes with max-totalA x 1 needed to maintain upright trunk, sometimes increasing to maxA x 2 when pt experienced a LOB (usually posteriorly or toward R side). MaxA x 2 needed to maintain standing position during stand-pivot transfer. Exercises  Heelslides: x 10 BLE with mod-maxA  Hip Abduction: x 10 BLE in supine with mod-maxA  Ankle Pumps: x 15 BLE with mod-maxA  Comments: Assist needed with LE ther ex 2* fatigue and difficulty following commands. -PAC Score  -PAC Inpatient Mobility Raw Score : 10 (20)  AM-PAC Inpatient T-Scale Score : 32.29 (20)  Mobility Inpatient CMS 0-100% Score: 76.75 (20)  Mobility Inpatient CMS G-Code Modifier : CL (20)    Goals  Short term goals  Time Frame for Short term goals: 20  Short term goal 1: Pt will complete bed mobility with maxA x 1. Short term goal 2: Pt will complete sit<>stand with modA x 1. Short term goal 3: Pt will complete bed<>chair with modA x 1 and LRAD. Short term goal 4: Pt will ambulate 10 ft with LRAD and modA x 1. Short term goal 5: Pt will verbalize understanding of sternal precautions and maintain them throughout mobility.   Patient Goals

## 2020-02-03 NOTE — PROGRESS NOTES
[facility]    [facility]              [phone]                       Plan :     Sodium continues to be very high  Continue to hold diuretics  Start dextrose drip to help sodium     Assessment :     Hypernatremia  Sodium 156 now  Due to diuretics and was n.p.o.  Diuretics on hold  Starting dextrose drip on 2/3/2020    Acute Kidney Injury  Creatinine peaked to 1.6- now 1.2  Likely due to cardiac surgery, hypotension, Cardiorenal syndrome  Getting inotropes-helped his kidney function  Off of diuretics now      hyperkalemia  Now low  Getting treated    Acidosis  Lactic acidosis likely due to hypotension poor tissue perfusion  Improved  Now has metabolic alkalosis and also respiratory alkalosis    Hypotension- now Hypertension  BP: (121-129)/(50-56) Pulse:  [66]   Low-dose of Levophed- switched to dobutamine  Now needing nicardipine drip followed by clevipine drip  Off now       [facility] would like to thank Pamela Boateng MD   for opportunity to serve this patient      Please call with questions at-   24 Hrs Answering service [phone] or  7 am- 5 pm via Perfect serve or cell phone  Dr.Sudhir Cathy Johnson          CC/reason for consult :   Acute kidney injury   HPI :     From consult note-   Marshall Palafox is a 77 y.o. male presented to   the hospital on 1/8/2020 with shortness of breath. Not able to give history because of being on BiPAP. He is known to have severe aortic stenosis and he had redo sternotomy and replacement of ascending aorta done, and also replacement with [place]quillo Intuity valve done. Following that he is in ICU and is requiring BiPAP and has a poor urine output and he is on a low-dose of vasopressor. He was here earlier this month with shortness of breath pneumonia and large left hemothorax and pneumothorax. Also had PAL and hyperkalemia.   Did not require dialysis during that hospitalization and his kidney function improved to normal.    We are consulted for

## 2020-02-03 NOTE — PLAN OF CARE
Nutrition Problem: Inadequate energy intake  Intervention: Food and/or Nutrient Delivery: Continue current diet, Start ONS, Continue current Tube Feeding  Nutritional Goals: Patient will eat 50% or greater of meals and supplements in order for tube feeding to be discontinued.

## 2020-02-03 NOTE — PROGRESS NOTES
Surgical History:   Procedure Laterality Date    AORTIC VALVE REPLACEMENT  12/27/2011    Dr. Perez Or - redo w/23mm Magna Ease bioprosthetic valve    BRONCHOSCOPY N/A 1/9/2020    w/BAL, performed by Angelica Gutiérrez MD at [address] [place] [address] N/A 1/24/2020    BRONCHOSCOPY ALVEOLAR LAVAGE performed by Jung Hoffman MD at [address] [place] [address]  1/24/2020    BRONCHOSCOPY THERAPUTIC ASPIRATION INITIAL performed by Jung Hoffman MD at [facility] CABG WITH AORTIC VALVE REPLACEMENT  2001    date approximate, x1 to OM, AVR w/porcine valve    CARDIAC CATHETERIZATION  01/13/2020    Dr. Mario Dalal Left 01/02/2020    Dr. Linda Tobar - 3200 ml blood drained in 1000 ml increments    CORONARY ANGIOPLASTY WITH STENT PLACEMENT  12/07/2010    Dr. Geovanna Damico. Roger - NURIS- 4.0 x 28 to Cx    [facility] ASCEND AORTA GRAFT W ROOT REPLACMENT. VALVE CONDUIT/CORON RECONSTR N/A 1/21/2020    Dr. 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 Jama Tapia - emergent w/evacuation of hematoma & chest wall hemostasis, pleural biopsy & XENA wedge resection    TRANSESOPHAGEAL ECHOCARDIOGRAM  01/21/2020    during ascending aorta replacement w/redo AVR    TRANSESOPHAGEAL ECHOCARDIOGRAM  12/07/2010        Allergies as of 01/08/2020 - Review Complete 01/08/2020   Allergen Reaction Noted    Penicillins Hives 05/28/2010        Patient Active Problem List   Diagnosis    Hemothorax    Acute respiratory failure with hypoxemia [facility] [address]    Tobacco abuse    AS (aortic stenosis)    CAD (coronary artery disease) s/p stent in 2010    HTN (hypertension)    Hyperlipidemia    S/P AVR (aortic valve replacement)    Hypotension    Pulmonary infiltrate    Pulmonary venous congestion    Atelectasis    Leukocytosis    Hyperglycemia    Acute bronchospasm    Atrial fibrillation with  Contrast material does opacify the   stomach. 2. No significant interval change in bilateral hazy airspace disease. 3. No definite pneumothorax identified. 4. Additional support devices as described above.     ________________________________________________________________________    Subjective:   Dietary Intake: TF via NG, nectar thick PO   no Nausea   Pain Control: controlled, prn meds  Complaints: none  Bowels: have moved, having less diarrhea after we changed TF formula 1/31    Objective:   General appearance: awake, interactive and talkative, in NAD  Lungs: diminished  Heart: S1S2 normal; SR in 70's on monitor  Chest: symmetrical expansion with inspiration and expirations; no rocking of sternum noted   Abdomen: round, soft, non-tender   Bowel sounds: normoactive  Kidneys: /375/375= 1400 ml in 24 hrs; Cr 1.2  Wound/Incisions: Midsternal incision CDI; LLE incisions with dressings CDI; Pacing wires cut by MD 1/28   Extremities: BLE pulses by doppler; 1+ swelling noted in BLE  Neurological: intact, non focal exam, speech strong  Chest tubes/Drains: all out   ________________________________________________________________________    SCIP Measures:     · Noriega catheter for:  ? IV Diuresis  ? Accurate I/O  ? D/C today  ·   · Antibiotics:  ? Have been stopped  ? Prophylaxis (POD #1 only)  ? Continued for:   ________________________________________________________________________    Assessment:   Post-op: 13 days. Condition: In stable condition. Plan:   1. Cardiovascular: s/p Aorta repair and AVR- ASA, Plavix,Statin, amio, BB, CCB  Pt currently SR (had PAF 1/30)  Hypertension: stable, continue current regimen. 2. Pulmonary: needs pulmonary expansion maneuvers, IS, OOBTC, PT/OT  Currently saturating low 90's on RA     3. Neurology: analgesia as needed. 4. Nephrology: fluid management- diurese as tolerated. Nephro following. Hypernatremia: Na 156 today.  Nephro adding dextrose drip to help decrease sodium  Will leave FWF's at 50 ml Q4 until we see how the dextrose does. 5. Endocrinology:   Continue SSI and Lantus. May have to adjust regimen 2/2 new dextrose gtt started. Monitor. 6. Hematology: acute blood loss anemia; expected, monitor. Thrombocytopenia- improving, plt 152 today. HIT+ believed to be false positive as ARTEMIO is negative. Hem/Onc has signed off. Pt had hematochezia over the weekend. Xarelto is on hold. 2 units RBC given. GI consulted- flex sig showed dark maroon stool. No signs of current bleed. Will hold off on full colonoscopy for now. Will discuss future timing with team.    7. Microbiology: Nothing at this time. BC X2 and urine culture - NGTD  Abx course completed 1/27 (meropenem)     8. Nutrition:   TF's per NG tube. Pt to remain 60 degrees upright. If he needs to go back on BiPap d/c tube feeds. He is allowed nectar thick and having another swallow evaluation this morning. Diarrhea: resolved, monitor. 9. Labs: monitor. Fungal cx negative. 10. Post-op Drains/Wires: All out. 11. D/C Goals: DCP following. Need to resolve hypernatremia and discuss [facility] plan for resuming. Likely 1-2 more days until d/c.     12. Continue post-op care of patient in the ICU    Meds:    The patient is on a beta-blocker   The patient is not on an ace-i/ARB- not indicated   The patient is on a statin   The patient is on oral antiplatelet therapy   ________________________________________________________________________  *Only bolded items apply to this patient at this time:     ? Acidemia (pH < 7.35) ______  ? Alkalemia (pH > 7.45) ______  ? Acidosis (Bicarb <20) ______  ? Electrolyte abnormality (specify)  ? Acute post-op respiratory insufficiency (difficulty weaning from vent)  ? Acute post-op blood loss anemia (hct ? 30)        ? Transfused this admission post-op  ? Cardiac arrythmia:  ? Ventricular Tachycardia     ? Atrial Flutter     ? Atrial fibrillation  ?  Acute pulmonary edema

## 2020-02-03 NOTE — OP NOTE
sizing, we fit snug to 19 for Intuity valve. First, three  sutures were placed to the neither of the valves. Valve was placed in  position, inflated, and cinched down. Good position could be seen with  no difficulty. Next, we used 3-0 Prolene to close the aorta over the  graft. Vent placed to the ascending aorta. Good deairing was performed  and hot shot was administered in an antegrade and retrograde fashion. Two V wires and two atrial wires were placed. The patient was weaned  off cardiopulmonary bypass in a decent amount of support, epi, milrinone  and Levophed. No mechanical support was required. Protamine was  administered and tolerated well by the patient. The groin was  decannulated. Perclosure was cinched down. Good hemostasis was seen  with good peripheral pulse. Next, hemostasis was secured. No active  bleeding was seen. All suture lines were checked. Transesophageal echo  confirmed absent of the fistula and good valve mobility with minimum  mean gradient. Decent ventricular function. Next, copious amount of irrigation was done. No active bleeding was  seen. Counts were told as correct. One mediastinal tube and one chest  tube were placed to the right. The patient's sternotomy was closed in a  standard fashion. Soft tissue and the skin were closed in a standard  fashion. Intercostal nerve block was placed to 7 spaces. The patient  was dispositioned to the cardiac ICU in stable condition, intubated with  no complication.         Yunior Quintana MD    D: 02/02/2020 12:32:24       T: 02/02/2020 16:27:54     [id number]  Job#: [id number]     Doc#: [id number]    CC:

## 2020-02-03 NOTE — PROGRESS NOTES
Chad Bran is a 77 y.o. male patient.     Current Facility-Administered Medications   Medication Dose Route Frequency Provider Last Rate Last Dose    pantoprazole (PROTONIX) injection 40 mg  40 mg Intravenous BID Samantha Gonzales MD   40 mg at 02/02/20 2131    melatonin ER tablet 1 mg  1 mg Oral Nightly PRN Samantha Gonzales MD   1 mg at 02/02/20 2110    0.9 % sodium chloride bolus  20 mL Intravenous Once Sherron Espinal MD   Stopped at 02/01/20 0701    collagenase ointment 1 g  1 each Topical Daily Declan Oliveira MD   1 g at 02/02/20 0840    carvedilol (COREG) tablet 12.5 mg  12.5 mg Oral BID [facility] Staff, APRN - CNP   12.5 mg at 02/03/20 0756    oxyCODONE (ROXICODONE) immediate release tablet 5 mg  5 mg Oral Q6H PRN Perrin Staff, APRN - CNP   5 mg at 01/31/20 9851    amiodarone (CORDARONE) tablet 200 mg  200 mg Oral Daily Perrin Staff, APRN - CNP   200 mg at 02/02/20 3010    insulin glargine (LANTUS) injection vial 10 Units  10 Units Subcutaneous BID Perrin Staff, APRN - CNP   10 Units at 02/02/20 2110    Venelex ointment   Topical BID Declan Oliveira MD        insulin lispro (HUMALOG) injection vial 0-18 Units  0-18 Units Subcutaneous Q4H Samantha Gonzales MD   3 Units at 02/03/20 0522    sodium chloride flush 0.9 % injection 10 mL  10 mL Intravenous 2 times per day Sailaja Ferris MD   10 mL at 02/02/20 0838    sodium chloride flush 0.9 % injection 10 mL  10 mL Intravenous PRN Sailaja Ferris MD   10 mL at 02/02/20 0839    potassium chloride 20 mEq/50 mL IVPB (Central Line)  20 mEq Intravenous PRN Sailaja Ferris MD 50 mL/hr at 02/03/20 0757 20 mEq at 02/03/20 0757    magnesium sulfate 2 g in 50 mL IVPB premix  2 g Intravenous PRN Jeffrey Troy MD        acetaminophen (TYLENOL) tablet 650 mg  650 mg Oral Q4H PRN Jeffrey Boyer MD   650 mg at 02/01/20 1003    acetaminophen (TYLENOL) suppository 650 mg  650 mg Rectal Q4H PRN Jeffrey Boyer MD        docusate sodium (COLACE) capsule 100 mg  100 mg Oral BID Jeffrey Jaime MD   100 mg at 02/02/20 2110    ondansetron (ZOFRAN) injection 4 mg  4 mg Intravenous Q8H PRN Jeffrey Jaime MD   4 mg at 02/01/20 0717    chlorhexidine (PERIDEX) 0.12 % solution 15 mL  15 mL Mouth/Throat BID Jeffrey Jaime MD   15 mL at 02/02/20 2103    [Held by provider] atorvastatin (LIPITOR) tablet 20 mg  20 mg Oral Nightly Jeffrey Jaime MD   Stopped at 01/24/20 2100    albuterol (PROVENTIL) nebulizer solution 2.5 mg  2.5 mg Nebulization Q4H [place] Jeffrey Boyer MD   2.5 mg at 02/02/20 1935    glucose (GLUTOSE) 40 % oral gel 15 g  15 g Oral PRN Jeffrey Boyer MD        dextrose 50 % IV solution  12.5 g Intravenous PRN Jeffrey Jaime MD        glucagon (rDNA) injection 1 mg  1 mg Intramuscular PRN Jeffrey Boyer MD        dextrose 5 % solution  100 mL/hr Intravenous PRN Jeffrey Boyer MD         Allergies   Allergen Reactions    Penicillins Hives     Active Problems:    Acute respiratory failure with hypoxemia (HCC)    Tobacco abuse    AS (aortic stenosis)    S/P AVR (aortic valve replacement)    Hypotension    Pulmonary infiltrate    Pulmonary venous congestion    Atelectasis    Leukocytosis    Hyperglycemia    Acute bronchospasm    Atrial fibrillation with RVR (HCC)    Cardiogenic shock (HCC)    Acute on chronic diastolic congestive heart failure (HCC)    Obesity, Class I, BMI 30.0-34.9 (see actual BMI)    S/P thoracotomy    Status post partial removal of lung    Hemothorax, left    Restrictive lung disease    Hypernatremia  Resolved Problems:    * No resolved hospital problems. *    Blood pressure (!) 129/50, pulse 66, temperature 97.8 °F (36.6 °C), temperature source Oral, resp. rate 18, height 5' 9\" (1.753 m), weight 176 lb 5.9 oz (80 kg), SpO2 93 %. Subjective:  Symptoms:  Stable. Pain:  He reports no pain. Objective:  General Appearance: In no acute distress and not in pain.     Vital signs: (most recent): Blood pressure (!) 129/50, pulse 66, temperature 97.8 °F (36.6 °C), temperature source Oral, resp. rate 18, height 5' 9\" (1.753 m), weight 176 lb 5.9 oz (80 kg), SpO2 93 %. Abdomen: Abdomen is soft. Bowel sounds are normal.   There is no abdominal tenderness. Assessment & Plan  78 yo w HTN and CAD admitted w acute Hemothorax and cardiac arrest and underwent emergent AVR and aorta repair. Started having hematochezia that has since resolved. Flex sig on 2/1 revealed large amount of dark maroon stools. Is having NGT feeding.  H/H 9/27.  - F/U H/H and clinically for any recurrent bleed  - Will need full colonoscopy (probably on Wednesday) if OK w CT Surgery  - Continue NGT feeding  - Will follow     Peter Leija MD       (O) [phone]  Peter Leija MD  2/3/2020

## 2020-02-03 NOTE — PROGRESS NOTES
Speech Language Pathology  Facility/Department: [facility] C2 CARD TELEMETRY  Swallow Re-Evaluation / Dysphagia Daily Treatment Note      Recommendations:  1. Upgrade to Soft and Bite Size (Dysphagia III) with moderately thick (HONEY) liquids/Liquids via spoon or straws/Crush crushable meds and given in puree  2. Assist with feeding and cue pt to take small sips. 3. Continued Dysphagia treatment to determine safest and least restrictive diet. Consult Speech Therapy if the pt is unable to consistently tolerate this diet. NAME: Tia Cintron  : 1950  MRN: [id number]    Patient Diagnosis(es):   Patient Active Problem List    Diagnosis Date Noted    Hypernatremia 2020    Restrictive lung disease 2020    Myocardial infarction ([facility] [address] 2020    Cardiogenic shock ([address]     Acute on chronic diastolic congestive heart failure ([address]     Pulmonary infiltrate 2020    Pulmonary venous congestion 2020    Atelectasis 2020    Leukocytosis 2020    Hyperglycemia 2020    Acute bronchospasm 2020    Atrial fibrillation with RVR ([address] 2020    Hypotension 2020    Acute respiratory failure with hypoxemia ([address] 2020    Tobacco abuse 2020    CAD (coronary artery disease) s/p stent in 2020    Hyperlipidemia 2020    Hemothorax 2020    Obesity, Class I, BMI 30.0-34.9 (see actual BMI) 2020    S/P thoracotomy 2020    Status post partial removal of lung 2020    Hemothorax, left 2020    S/P AVR (aortic valve replacement) 2011    AS (aortic stenosis) 2011    HTN (hypertension) 2010     Allergies: Allergies   Allergen Reactions    Penicillins Hives     Subjective: The pt was seen upright in bed. RN ok'd SLP entry and threatment. Noted confusion but the pt was able to generally participate in treatment. Pain: pt w/o pain during the session.     Current Diet: DIET FULL LIQUID; Moderately Thick (Honey); Daily Fluid Restriction: 1500 ml  Diet Tube Feed Continuous/Cyclic w/ Diet  Dietary Nutrition Supplements: Diabetic Oral Supplement    Diet Tolerance:  Patient NPO prior to evaluation; NG in place    P.O. Trials:  Puree   x x5 puree trials via spoon    Soft x x2 eddie cracker   Thins     Nectar x x5 trials via straw   Honey x x5 trials via spoon and x5 via straw     Dysphagia Treatment and Impressions: The pt was seen with NG in place. Noted confusion and impulsivity with pt requiring cues for small, single sips. Pt oriented to only self and place. The pt was able to tolerate all solids as well as trials of puree and  honey-thick liquid via spoon and straw without s/s of aspiration. Noted reduced bolus control and x1 immediate strong cough with nectar-thick trials via straw. 02 Sats remained steady throughout the assessment at 98 or >. RR remained stable at 20. Recommendations:  1. Upgrade to Soft and Bite Size (Dysphagia III) with moderately thick (HONEY) liquids/Liquids via spoon and straw/Crush crushable meds and given in puree  2. Assist with feeding and cue pt to take small sips. 3. Continued Dysphagia treatment to determine safest and least restrictive diet. Consult Speech Therapy if the pt is unable to consistently tolerate this diet. Dysphagia Goals:   Short term goals: 3-5x/wk for 1 week until 02/06/20  1. Pt will tolerate ongoing assessment of swallowing; 02/03: Progressing; Continue current diet  2. Pt will tolerate instrumental swallowing assessment during this admission; Would benefit from MBS in future    Long term goals, 1-2 weeks  1. Pt will tolerate ongoing assessment of swallowing 02/03: Progressing; Continue current diet      Patient/Family/Caregiver Education: Education was given to the pt and his RN re: results and recommendations. Pt did not demonstrate evidence of learning.     Compensatory Strategies:   · Small bites/sips  · Reduce rate of intake  · 90 degree position with PO and at least 30 minutes after PO  · Only feed when fully alert  · Do not allow the pt to take another bite/sip until he has swallowed the previous one. Plan:    Continued Dysphagia treatment with goals per plan of care. Discharge Recommendations: to be determined    If pt discharges from hospital prior to Speech/Swallowing discharge, this note serves as tx and discharge summary.      Total Treatment Time / Charges     Time in Time out Total Time / units   Cognitive Tx         Speech Tx      Dysphagia Tx 0905  0930  25 min / 1 unit     Signature:    Cecilio Tracy  Speech Therapy     DEA Mistry  Speech-language pathologist  [phone]

## 2020-02-04 LAB
ALBUMIN SERPL-MCNC: 2.9 G/DL (ref 3.4–5)
ALP BLD-CCNC: 88 U/L (ref 40–129)
ALT SERPL-CCNC: 37 U/L (ref 10–40)
ANION GAP SERPL CALCULATED.3IONS-SCNC: 10 MMOL/L (ref 3–16)
ANION GAP SERPL CALCULATED.3IONS-SCNC: 10 MMOL/L (ref 3–16)
AST SERPL-CCNC: 34 U/L (ref 15–37)
BASOPHILS ABSOLUTE: 0.1 K/UL (ref 0–0.2)
BASOPHILS RELATIVE PERCENT: 0.4 %
BILIRUB SERPL-MCNC: 1.1 MG/DL (ref 0–1)
BILIRUBIN DIRECT: 0.5 MG/DL (ref 0–0.3)
BILIRUBIN, INDIRECT: 0.6 MG/DL (ref 0–1)
BUN BLDV-MCNC: 37 MG/DL (ref 7–20)
BUN BLDV-MCNC: 41 MG/DL (ref 7–20)
CALCIUM SERPL-MCNC: 8.5 MG/DL (ref 8.3–10.6)
CALCIUM SERPL-MCNC: 8.6 MG/DL (ref 8.3–10.6)
CHLORIDE BLD-SCNC: 109 MMOL/L (ref 99–110)
CHLORIDE BLD-SCNC: 110 MMOL/L (ref 99–110)
CO2: 30 MMOL/L (ref 21–32)
CO2: 33 MMOL/L (ref 21–32)
CREAT SERPL-MCNC: 1 MG/DL (ref 0.8–1.3)
CREAT SERPL-MCNC: 1 MG/DL (ref 0.8–1.3)
EOSINOPHILS ABSOLUTE: 0.1 K/UL (ref 0–0.6)
EOSINOPHILS RELATIVE PERCENT: 0.5 %
GFR AFRICAN AMERICAN: >60
GFR AFRICAN AMERICAN: >60
GFR NON-AFRICAN AMERICAN: >60
GFR NON-AFRICAN AMERICAN: >60
GLUCOSE BLD-MCNC: 115 MG/DL (ref 70–99)
GLUCOSE BLD-MCNC: 120 MG/DL (ref 70–99)
GLUCOSE BLD-MCNC: 139 MG/DL (ref 70–99)
GLUCOSE BLD-MCNC: 139 MG/DL (ref 70–99)
GLUCOSE BLD-MCNC: 148 MG/DL (ref 70–99)
GLUCOSE BLD-MCNC: 151 MG/DL (ref 70–99)
GLUCOSE BLD-MCNC: 162 MG/DL (ref 70–99)
GLUCOSE BLD-MCNC: 234 MG/DL (ref 70–99)
GLUCOSE BLD-MCNC: 249 MG/DL (ref 70–99)
GLUCOSE BLD-MCNC: 84 MG/DL (ref 70–99)
HCT VFR BLD CALC: 26.6 % (ref 40.5–52.5)
HEMOGLOBIN: 8.6 G/DL (ref 13.5–17.5)
LYMPHOCYTES ABSOLUTE: 1.1 K/UL (ref 1–5.1)
LYMPHOCYTES RELATIVE PERCENT: 10 %
MAGNESIUM: 2.3 MG/DL (ref 1.8–2.4)
MCH RBC QN AUTO: 30.3 PG (ref 26–34)
MCHC RBC AUTO-ENTMCNC: 32.5 G/DL (ref 31–36)
MCV RBC AUTO: 93.1 FL (ref 80–100)
MONOCYTES ABSOLUTE: 1.3 K/UL (ref 0–1.3)
MONOCYTES RELATIVE PERCENT: 11.1 %
NEUTROPHILS ABSOLUTE: 8.9 K/UL (ref 1.7–7.7)
NEUTROPHILS RELATIVE PERCENT: 78 %
PDW BLD-RTO: 16.9 % (ref 12.4–15.4)
PERFORMED ON: ABNORMAL
PERFORMED ON: NORMAL
PLATELET # BLD: 137 K/UL (ref 135–450)
PMV BLD AUTO: 9.6 FL (ref 5–10.5)
POTASSIUM REFLEX MAGNESIUM: 3.6 MMOL/L (ref 3.5–5.1)
POTASSIUM REFLEX MAGNESIUM: 3.7 MMOL/L (ref 3.5–5.1)
RBC # BLD: 2.86 M/UL (ref 4.2–5.9)
SODIUM BLD-SCNC: 149 MMOL/L (ref 136–145)
SODIUM BLD-SCNC: 153 MMOL/L (ref 136–145)
TOTAL PROTEIN: 5.3 G/DL (ref 6.4–8.2)
WBC # BLD: 11.4 K/UL (ref 4–11)

## 2020-02-04 PROCEDURE — 80076 HEPATIC FUNCTION PANEL: CPT

## 2020-02-04 PROCEDURE — 2580000003 HC RX 258: Performed by: INTERNAL MEDICINE

## 2020-02-04 PROCEDURE — 97530 THERAPEUTIC ACTIVITIES: CPT

## 2020-02-04 PROCEDURE — 94640 AIRWAY INHALATION TREATMENT: CPT

## 2020-02-04 PROCEDURE — 99024 POSTOP FOLLOW-UP VISIT: CPT | Performed by: THORACIC SURGERY (CARDIOTHORACIC VASCULAR SURGERY)

## 2020-02-04 PROCEDURE — C9113 INJ PANTOPRAZOLE SODIUM, VIA: HCPCS | Performed by: THORACIC SURGERY (CARDIOTHORACIC VASCULAR SURGERY)

## 2020-02-04 PROCEDURE — 6360000002 HC RX W HCPCS: Performed by: THORACIC SURGERY (CARDIOTHORACIC VASCULAR SURGERY)

## 2020-02-04 PROCEDURE — 83735 ASSAY OF MAGNESIUM: CPT

## 2020-02-04 PROCEDURE — 2140000000 HC CCU INTERMEDIATE R&B

## 2020-02-04 PROCEDURE — 6370000000 HC RX 637 (ALT 250 FOR IP): Performed by: THORACIC SURGERY (CARDIOTHORACIC VASCULAR SURGERY)

## 2020-02-04 PROCEDURE — 80048 BASIC METABOLIC PNL TOTAL CA: CPT

## 2020-02-04 PROCEDURE — 6370000000 HC RX 637 (ALT 250 FOR IP): Performed by: INTERNAL MEDICINE

## 2020-02-04 PROCEDURE — 2580000003 HC RX 258: Performed by: THORACIC SURGERY (CARDIOTHORACIC VASCULAR SURGERY)

## 2020-02-04 PROCEDURE — 6370000000 HC RX 637 (ALT 250 FOR IP): Performed by: NURSE PRACTITIONER

## 2020-02-04 PROCEDURE — 94669 MECHANICAL CHEST WALL OSCILL: CPT

## 2020-02-04 PROCEDURE — 85025 COMPLETE CBC W/AUTO DIFF WBC: CPT

## 2020-02-04 PROCEDURE — 6360000002 HC RX W HCPCS: Performed by: INTERNAL MEDICINE

## 2020-02-04 RX ADMIN — CASTOR OIL AND BALSAM, PERU: 788; 87 OINTMENT TOPICAL at 21:21

## 2020-02-04 RX ADMIN — PANTOPRAZOLE SODIUM 40 MG: 40 INJECTION, POWDER, LYOPHILIZED, FOR SOLUTION INTRAVENOUS at 16:50

## 2020-02-04 RX ADMIN — Medication 1 MG: at 21:20

## 2020-02-04 RX ADMIN — CASTOR OIL AND BALSAM, PERU: 788; 87 OINTMENT TOPICAL at 08:22

## 2020-02-04 RX ADMIN — INSULIN LISPRO 3 UNITS: 100 INJECTION, SOLUTION INTRAVENOUS; SUBCUTANEOUS at 12:54

## 2020-02-04 RX ADMIN — AMIODARONE HYDROCHLORIDE 200 MG: 200 TABLET ORAL at 08:21

## 2020-02-04 RX ADMIN — ALBUTEROL SULFATE 2.5 MG: 2.5 SOLUTION RESPIRATORY (INHALATION) at 07:57

## 2020-02-04 RX ADMIN — INSULIN GLARGINE 10 UNITS: 100 INJECTION, SOLUTION SUBCUTANEOUS at 21:21

## 2020-02-04 RX ADMIN — INSULIN LISPRO 3 UNITS: 100 INJECTION, SOLUTION INTRAVENOUS; SUBCUTANEOUS at 00:25

## 2020-02-04 RX ADMIN — PANTOPRAZOLE SODIUM 40 MG: 40 INJECTION, POWDER, LYOPHILIZED, FOR SOLUTION INTRAVENOUS at 08:21

## 2020-02-04 RX ADMIN — Medication 10 ML: at 21:21

## 2020-02-04 RX ADMIN — INSULIN GLARGINE 10 UNITS: 100 INJECTION, SOLUTION SUBCUTANEOUS at 08:20

## 2020-02-04 RX ADMIN — ALBUTEROL SULFATE 2.5 MG: 2.5 SOLUTION RESPIRATORY (INHALATION) at 11:40

## 2020-02-04 RX ADMIN — POTASSIUM CHLORIDE: 2 INJECTION, SOLUTION, CONCENTRATE INTRAVENOUS at 13:49

## 2020-02-04 RX ADMIN — CARVEDILOL 12.5 MG: 6.25 TABLET, FILM COATED ORAL at 17:35

## 2020-02-04 RX ADMIN — CHLORHEXIDINE GLUCONATE 15 ML: 1.2 RINSE ORAL at 08:22

## 2020-02-04 RX ADMIN — ALBUTEROL SULFATE 2.5 MG: 2.5 SOLUTION RESPIRATORY (INHALATION) at 20:05

## 2020-02-04 RX ADMIN — RIVAROXABAN 15 MG: 15 TABLET, FILM COATED ORAL at 17:35

## 2020-02-04 RX ADMIN — CHLORHEXIDINE GLUCONATE 15 ML: 1.2 RINSE ORAL at 21:04

## 2020-02-04 RX ADMIN — POTASSIUM CHLORIDE: 2 INJECTION, SOLUTION, CONCENTRATE INTRAVENOUS at 00:58

## 2020-02-04 RX ADMIN — DOCUSATE SODIUM 100 MG: 100 CAPSULE, LIQUID FILLED ORAL at 21:21

## 2020-02-04 RX ADMIN — Medication 10 ML: at 08:21

## 2020-02-04 RX ADMIN — ATORVASTATIN CALCIUM 20 MG: 10 TABLET, FILM COATED ORAL at 21:20

## 2020-02-04 RX ADMIN — INSULIN LISPRO 6 UNITS: 100 INJECTION, SOLUTION INTRAVENOUS; SUBCUTANEOUS at 16:45

## 2020-02-04 RX ADMIN — INSULIN LISPRO 3 UNITS: 100 INJECTION, SOLUTION INTRAVENOUS; SUBCUTANEOUS at 08:20

## 2020-02-04 RX ADMIN — POTASSIUM CHLORIDE: 2 INJECTION, SOLUTION, CONCENTRATE INTRAVENOUS at 23:40

## 2020-02-04 RX ADMIN — COLLAGENASE SANTYL 1 G: 250 OINTMENT TOPICAL at 08:22

## 2020-02-04 RX ADMIN — CARVEDILOL 12.5 MG: 6.25 TABLET, FILM COATED ORAL at 08:21

## 2020-02-04 RX ADMIN — INSULIN LISPRO 6 UNITS: 100 INJECTION, SOLUTION INTRAVENOUS; SUBCUTANEOUS at 21:21

## 2020-02-04 RX ADMIN — ALBUTEROL SULFATE 2.5 MG: 2.5 SOLUTION RESPIRATORY (INHALATION) at 16:11

## 2020-02-04 ASSESSMENT — PAIN SCALES - GENERAL
PAINLEVEL_OUTOF10: 0

## 2020-02-04 NOTE — PROGRESS NOTES
CVTS Cardiothoracic Progress Note:                                CC:  Post op follow up     Surgery: 1/21/20 RE-DO STERNOTOMY X3, REPLACEMENT OF ASCENDING AORTA, RE-DO AORTIC VALVE REPLACEMENT WITH 19MM BLOCK INTUITY VALVE, CLOSURE OF OUTFLOW TRACK ATRIAL FISTULA, HYPOTHERMIC ARREST,  WITH TRANSESOPHAGEAL ECHOCARDIOGRAM, CIRCULATORY ARREST, 5 LEVEL BILATERAL INTERCOSTAL NERVE BLOCK     Hospital course:   1/22 resting in bed, awake, alert, responding appropriately on SBT  1/23 Pt with increasing oxygen demand overnight, Cr up to 1.6, hyperkalemia 5.9, and lactic 9.7  1/24 pt resting in bed, intubated and sedated. 1/25 & 1/26 remains intubated and sedated, FiO2 requirement improving. 1/27 FiO2 on vent is 30%, Peep of 10. Will work at weaning down Peep  1/28 DTI found overnight by RN, air mattress not functioning properly. In process of getting new bed for patient. Working on weaning sedation with end goal of extubation (hopefully) today. 1/29 sitting up in chair, weaned off of BiPap and tolerating 5L per NC well. Appears much more alert and interactive today. 1/30 up in chair, had multiple loose stools, now has rectal tube. Awake, alert and talkative, only on 4L today (sat 96%)   1/31 sitting up in bed, on 3L per NC, talkative and in NAD. Currently SR.  2/1 overnight had hypotension with hematochezia, urgent GI consultation made  2/2 patient lying comfortably in bed, no BM, stable from GI bleed standpoint   2/3 pt resting comfortably in bed, getting ready to work with PT/OT  2/4 sitting up in bed, in NAD. Reports feeling more comfort without NG.     Past Medical History:   Diagnosis Date    CAD (coronary artery disease)     COPD (chronic obstructive pulmonary disease) ([facility] [address]     Diabetes mellitus ([facility] [address]     Gout     Hemothorax, left 01/2020    Hyperlipidemia     Hypertension     Obesity, Class I, BMI 30.0-34.9 (see actual BMI) 01/2020    30.7    S/P thoracotomy 01/2020    for hemothorax    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 Mell Rhoades - redo w/23mm Magna Ease bioprosthetic valve    BRONCHOSCOPY N/A 1/9/2020    w/BAL, performed by Marti Yanes MD at [address] [place] [address] N/A 1/24/2020    BRONCHOSCOPY ALVEOLAR LAVAGE performed by Kay Gandhi MD at [address] [place] [address]  1/24/2020    BRONCHOSCOPY THERAPUTIC ASPIRATION INITIAL performed by Kay Gandhi MD at [facility] CABG WITH AORTIC VALVE REPLACEMENT  2001    date approximate, x1 to OM, AVR w/porcine valve    CARDIAC CATHETERIZATION  01/13/2020    Dr. Joyce Nair Left 01/02/2020    Dr. Freya Patrick - 3200 ml blood drained in 1000 ml increments    CORONARY ANGIOPLASTY WITH STENT PLACEMENT  12/07/2010    Dr. Aminah Rasheed. Roger - NURIS- 4.0 x 28 to Cx    MD ASCEND AORTA GRAFT W ROOT REPLACMENT. VALVE CONDUIT/CORON RECONSTR N/A 1/21/2020    Dr. 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 Lorenso Councilman - emergent w/evacuation of hematoma & chest wall hemostasis, pleural biopsy & XENA wedge resection    TRANSESOPHAGEAL ECHOCARDIOGRAM  01/21/2020    during ascending aorta replacement w/redo AVR    TRANSESOPHAGEAL ECHOCARDIOGRAM  12/07/2010        Allergies as of 01/08/2020 - Review Complete 01/08/2020   Allergen Reaction Noted    Penicillins Hives 05/28/2010        Patient Active Problem List   Diagnosis    Hemothorax    Acute respiratory failure with hypoxemia ([facility] [address]    Tobacco abuse    AS (aortic stenosis)    CAD (coronary artery disease) s/p stent in 2010    HTN (hypertension)    Hyperlipidemia    S/P AVR (aortic valve 2/3)    Nephro increasing dextrose drip to further decrease sodium     5. Endocrinology: BG stable, monitor. Continue SSI and Lantus. 6. Hematology: acute blood loss anemia; expected, monitor. Thrombocytopenia- improving, plt 137 today. HIT+ believed to be false positive as ARTEMIO is negative. Hem/Onc has signed off. Pt had hematochezia over the weekend. Xarelto was held. 2 units RBC given. GI consulted- flex sig showed dark maroon stool. No signs of current bleed. Will restart low dose Xarelto    7. Microbiology: Nothing at this time. BC X2 and urine culture - NGTD  Abx course completed 1/27 (meropenem)     8. Nutrition: per speech therapy recommendations 2/03/20  1. Upgrade to Soft and Bite Size (Dysphagia III) with moderately thick (HONEY) liquids/Liquids via spoon or straws/Crush crushable meds and given in puree  2. Assist with feeding and cue pt to take small sips. 9. Labs: monitor. Fungal cx negative. 10. Post-op Drains/Wires: All out. 11. D/C Goals: DCP following. Need to resolve hypernatremia prior to d/c. Likely 2-3 more days. No beds in ARU, will continue with setting up SNF for when patient is ready to d/c.    12. Continue post-op care of patient in the ICU    Meds:    The patient is on a beta-blocker   The patient is not on an ace-i/ARB- not indicated   The patient is on a statin   The patient is on oral antiplatelet therapy   ________________________________________________________________________  *Only bolded items apply to this patient at this time:     ? Acidemia (pH < 7.35) ______  ? Alkalemia (pH > 7.45) ______  ? Acidosis (Bicarb <20) ______  ? Electrolyte abnormality (specify)  ? Acute post-op respiratory insufficiency (difficulty weaning from vent)  ? Acute post-op blood loss anemia (hct ? 30)        ? Transfused this admission post-op  ? Cardiac arrythmia:  ? Ventricular Tachycardia     ? Atrial Flutter     ? Atrial fibrillation  ? Acute pulmonary edema due to:      ?

## 2020-02-04 NOTE — PLAN OF CARE
Problem: OXYGENATION/RESPIRATORY FUNCTION  Goal: Patient will achieve/maintain normal respiratory rate/effort  Description  Respiratory rate and effort will be within normal limits for the patient  Outcome: Ongoing     Problem: Serum Glucose Level - Abnormal:  Goal: Ability to maintain appropriate glucose levels will improve  Description  Ability to maintain appropriate glucose levels will improve  Outcome: Ongoing  Patient's EF (Ejection Fraction) is greater than 40%    Heart Failure Medications:  Diuretics[de-identified] Furosemide  ACE[de-identified] None  ARB[de-identified] None  ARNI[de-identified] None  Evidenced Based Beta Blocker[de-identified] Carvedilol- Coreg    Patient's Last Weight: See Flowsheets    Intake/Output Summary (Last 24 hours) at 2/4/2020 1441  Last data filed at 2/4/2020 1300  Gross per 24 hour   Intake 1665 ml   Output 970 ml   Net 695 ml       Comorbidities Reviewed Yes  Patient has a past medical history of CAD (coronary artery disease), COPD (chronic obstructive pulmonary disease) ([facility] [address] Diabetes mellitus ([facility] [address] Gout, Hemothorax, left, Hyperlipidemia, Hypertension, Obesity, Class I, BMI 30.0-34.9 (see actual BMI), S/P thoracotomy, Status post partial removal of lung, and Thyroid disease.     >> For CHF and Comorbidity Education Time and Topics, please see Education Tab. Patient stated Daily Functional Goal: (Note:help the patient identify a challenging but achievable goal per shift that can aid in progression towards increased functional capacity at discharge ie sit in the chair for x amount of time, Decrease walk time by x seconds, stand or walk with minimal assistance, decrease pain to a level of x/10, etc- ok to document [place] or use family POA goals if the patient is unable to communicate) *Cannot state goal at this time. *    Pt is currently Room air . Pt without lower extremity edema.  Patient's weights and intake/output reviewed:      Patient and/or Family's stated Goal of Care this Admission: reduce shortness of breath, increase activity

## 2020-02-04 NOTE — PROGRESS NOTES
Chad Bran is a 77 y.o. male patient.     Current Facility-Administered Medications   Medication Dose Route Frequency Provider Last Rate Last Dose    potassium chloride 20 mEq in dextrose 5 % 1,000 mL infusion   Intravenous Continuous Thomas Teran  mL/hr at 02/04/20 1032      rivaroxaban (XARELTO) tablet 15 mg  15 mg Oral Daily [place] Staff, APRN - CNP   15 mg at 02/03/20 1725    pantoprazole (PROTONIX) injection 40 mg  40 mg Intravenous BID Samantha Gonzales MD   40 mg at 02/04/20 2959    melatonin ER tablet 1 mg  1 mg Oral Nightly PRN Samantha Gonzales MD   1 mg at 02/03/20 2134    0.9 % sodium chloride bolus  20 mL Intravenous Once Sherron Espinal MD   Stopped at 02/01/20 0701    collagenase ointment 1 g  1 each Topical Daily Declan Oliveira MD   1 g at 02/04/20 0935    carvedilol (COREG) tablet 12.5 mg  12.5 mg Oral BID [facility] Staff, APRN - CNP   12.5 mg at 02/04/20 0625    oxyCODONE (ROXICODONE) immediate release tablet 5 mg  5 mg Oral Q6H PRN [facility] Staff, APRN - CNP   5 mg at 01/31/20 7494    amiodarone (CORDARONE) tablet 200 mg  200 mg Oral Daily [facility] Staff, APRN - CNP   200 mg at 02/04/20 4276    insulin glargine (LANTUS) injection vial 10 Units  10 Units Subcutaneous BID [facility] Staff, APRN - CNP   10 Units at 02/04/20 0820    Venelex ointment   Topical BID Declan Oliveira MD        insulin lispro (HUMALOG) injection vial 0-18 Units  0-18 Units Subcutaneous Q4H Samantha Gonzales MD   3 Units at 02/04/20 0820    sodium chloride flush 0.9 % injection 10 mL  10 mL Intravenous 2 times per day Sailaja Ferris MD   10 mL at 02/04/20 0821    sodium chloride flush 0.9 % injection 10 mL  10 mL Intravenous PRN Sailaja Ferris MD   10 mL at 02/02/20 0839    potassium chloride 20 mEq/50 mL IVPB (Central Line)  20 mEq Intravenous PRN Sailaja Ferris MD   Stopped at 02/03/20 1000    magnesium sulfate 2 g in 50 mL IVPB premix  2 g Intravenous PRN Jeffrey Troy MD        acetaminophen (TYLENOL) tablet 650 mg  650 mg Oral Q4H PRN Jeffrey Sales MD   650 mg at 02/01/20 1003    acetaminophen (TYLENOL) suppository 650 mg  650 mg Rectal Q4H PRN Jeffrey Sales MD        docusate sodium (COLACE) capsule 100 mg  100 mg Oral BID Jeffery Carcamo MD   Stopped at 02/04/20 0840    ondansetron (ZOFRAN) injection 4 mg  4 mg Intravenous Q8H PRN Jeffrey Sales MD   4 mg at 02/01/20 0717    chlorhexidine (PERIDEX) 0.12 % solution 15 mL  15 mL Mouth/Throat BID Jeffrey Boyer MD   15 mL at 02/04/20 8366    atorvastatin (LIPITOR) tablet 20 mg  20 mg Oral Nightly Jeffery Carcamo MD   Stopped at 01/24/20 2100    albuterol (PROVENTIL) nebulizer solution 2.5 mg  2.5 mg Nebulization Q4H WA Jeffrey Boyer MD   2.5 mg at 02/04/20 0757    glucose (GLUTOSE) 40 % oral gel 15 g  15 g Oral PRN Jeffrey Boyer MD        dextrose 50 % IV solution  12.5 g Intravenous PRN Jeffrey Sales MD        glucagon (rDNA) injection 1 mg  1 mg Intramuscular PRN Jeffrey Boyer MD        dextrose 5 % solution  100 mL/hr Intravenous PRN Jeffrey Boyer MD         Allergies   Allergen Reactions    Penicillins Hives     Active Problems:    Acute respiratory failure with hypoxemia (HCC)    Tobacco abuse    AS (aortic stenosis)    S/P AVR (aortic valve replacement)    Hypotension    Pulmonary infiltrate    Pulmonary venous congestion    Atelectasis    Leukocytosis    Hyperglycemia    Acute bronchospasm    Atrial fibrillation with RVR (HCC)    Cardiogenic shock (HCC)    Acute on chronic diastolic congestive heart failure ([facility])    Obesity, Class I, BMI 30.0-34.9 (see actual BMI)    S/P thoracotomy    Status post partial removal of lung    Hemothorax, left    Restrictive lung disease    Hypernatremia  Resolved Problems:    * No resolved hospital problems. *    Blood pressure 135/71, pulse 72, temperature 98.4 °F (36.9 °C), temperature source Core, resp. rate 20, height 5' 9\" (1.753 m), weight 177 lb 0.5 oz (80.3 kg), SpO2 95 %. Subjective:  Symptoms:  Stable.

## 2020-02-04 NOTE — CONSULTS
Patient: Manuel Moya  [id number]  Date: 2/4/2020      Chief Complaint: Coronary artery disease    History of Present Illness/Hospital Course: The patient is a 25-year-old gentleman with a history of tobacco use, aortic valve replacement, hypertension, hyperlipidemia, diabetes, COPD, heart disease who was recently treated for a large left hemothorax after a fall. He then developed increasing dyspnea and was transferred from outside hospital for further evaluation. He was found to be hypotensive and in acute on chronic diastolic heart failure. He was started on dobutamine and was diuresed. Cardiothoracic surgery was consulted for possible surgical intervention. On January 21 he underwent redo sternotomy with replacement of the ascending aorta with 2 grafts, aortic valve replacement. Postoperatively gastroenterology was consulted due to hematochezia that has since resolved. Flexible sigmoidoscopy revealed large amounts of maroon stools. Recommendations are for full colonoscopy in the future. Nephrology also following for hypernatremia. With therapy he generally requires 2-3 people for transfers and has not yet attempted ambulation. has a past medical history of CAD (coronary artery disease), COPD (chronic obstructive pulmonary disease) ([address] Diabetes mellitus ([address] Gout, Hemothorax, left, Hyperlipidemia, Hypertension, Obesity, Class I, BMI 30.0-34.9 (see actual BMI), S/P thoracotomy, Status post partial removal of lung, and Thyroid disease. has a past surgical history that includes thoracotomy (Left, 1/2/2020); bronchoscopy (N/A, 1/9/2020); Coronary angioplasty with stent (12/07/2010); pr ascend aorta graft w root replacment. valve conduit/coron reconstr (N/A, 1/21/2020); transesophageal echocardiogram (01/21/2020); Cardiac catheterization (01/13/2020); chest tube insertion (Left, 01/02/2020); Aortic valve replacement (12/27/2011); transesophageal echocardiogram (12/07/2010);  Coronary artery bypass Lab Results   Component Value Date    ALT 37 02/04/2020    AST 34 02/04/2020    ALKPHOS 88 02/04/2020    BILITOT 1.1 (H) 02/04/2020     EXAMINATION:   ONE XRAY VIEW OF THE CHEST       1/30/2020 5:30 am       COMPARISON:   01/27/2020 radiograph       HISTORY:   ORDERING SYSTEM PROVIDED HISTORY: resp failure   TECHNOLOGIST PROVIDED HISTORY:   Reason for exam:->resp failure   Reason for Exam: resp failure       FINDINGS:   Interval extubation.  Right vascular sheath and enteric tube remain stable. The heart is enlarged.  Mild perihilar airspace opacification has increased   centrally on the left.  No pleural fluid.  Right lung appears clear allowing   for decreased inspiratory effort.  No significant skeletal finding.           Impression   Worsening perihilar airspace changes on the left from prior imaging with no   obvious pleural fluid.  This may represent asymmetric edema.  Developing   pneumonitis is considered less likely.           Assessment:  1. Aortic stenosis  2. Hyponatremia  3. GI bleed     Recommendations:  After reviewing current therapy notes, I am concerned that he would not tolerate the required amount of therapy needed for inpatient rehabilitation. However, he does not yet appear ready for discharge from a medical perspective. I am hopeful that his therapy tolerance will improve as his medical issues stabilize. I would not begin prior authorization with insurance yet, as I do not believe he would be approved at this point. Will follow along. Rivera Childers MD 2/4/2020, 3:48 PM

## 2020-02-04 NOTE — PROGRESS NOTES
Assumed care of pt. Received report via off going RN. 4 eye skin assessment complete, see separate note for details. MAR handoff complete. Lines verified. Pt slung back to bed and repositioned. Pt resting comfortably. Will continue to monitor.

## 2020-02-04 NOTE — PROGRESS NOTES
BID  ondansetron (ZOFRAN) injection 4 mg, 4 mg, Intravenous, Q8H PRN  chlorhexidine (PERIDEX) 0.12 % solution 15 mL, 15 mL, Mouth/Throat, BID  atorvastatin (LIPITOR) tablet 20 mg, 20 mg, Oral, Nightly  albuterol (PROVENTIL) nebulizer solution 2.5 mg, 2.5 mg, Nebulization, Q4H WA  glucose (GLUTOSE) 40 % oral gel 15 g, 15 g, Oral, PRN  dextrose 50 % IV solution, 12.5 g, Intravenous, PRN  glucagon (rDNA) injection 1 mg, 1 mg, Intramuscular, PRN  dextrose 5 % solution, 100 mL/hr, Intravenous, PRN       Vitals :     Vitals:    02/04/20 1000   BP:    Pulse: 72   Resp: 20   Temp:    SpO2:        I & O :       Intake/Output Summary (Last 24 hours) at 2/4/2020 1015  Last data filed at 2/4/2020 0600  Gross per 24 hour   Intake 2549 ml   Output 945 ml   Net 1604 ml        Physical Examination :     General appearance: Anxious- no, distressed- no, in good spirits- yes,   Comfortable, communicative  AAO X 3  HEENT: Lips- normal, teeth- ok , oral mucosa- moist  Neck : Mass- no, appears symmetrical, JVD- not visible  Respiratory: Respiratory effort-  normal, wheeze- no, crackles - no, Oxygen-RA  Cardiovascular:  Ausculation- No M/R/G, Edema none  Abdomen: visible mass- no, distention- no, scar- no, tenderness- no                            hepatosplenomegaly-  no  Musculoskeletal:  clubbing no,cyanosis- no , digital ischemia- no                           muscle strength- grossly normal , tone - grossly normal  Skin: rashes- no , ulcers- no, induration- no, tightening - no  Psychiatric:  Judgement and insight- normal         LABS:     Recent Labs     02/02/20  0506 02/02/20  1240 02/03/20  0432 02/04/20  0415   WBC 11.0  --  11.8* 11.4*   HGB 9.6* 9.7* 9.0* 8.6*   HCT 28.9* 29.1* 27.3* 26.6*     --  143 137     Recent Labs     02/02/20  0506 02/03/20  0432 02/03/20  1610 02/04/20  0415   * 156* 152* 153*   K 3.5 3.2* 3.5 3.6    112* 109 110   CO2 36* 35* 34* 33*   BUN 48* 54* 50* 41*   CREATININE 1.1 1.2 1.2 1.0 GLUCOSE 81 164* 158* 139*   MG 2.50* 2.50*  --  2.30

## 2020-02-04 NOTE — CARE COORDINATION
After discussions with Aimee Current from [place] [id number] and Reilly Phipps NP it was decided to continue to move forward with placement at [facility] in 2 days once sodium level stabilizes.   Sue Amaro RN
Bedside visit with patient. He is scheduled for [facility] 1/21. This writer verified with him his decision to discharge to [facility] for skilled. Called liaison Maribeth Parham) and gave an update on patient status. Will follow.       Raj Gamez RN
CM spoke to CT surgery team earlier today. Pt is going to have a SLP eval tomorrow and potentially could need PEG placement by Dr Adan Yancey on Monday. Plan is for discharge likely Monday/tue pending the above. CM placed call to [facility] with [facility] to notify her of the above. Per [facility] it would be best to wait until Monday to initiate precert as [facility] will likely want updated information from Monday and if initiated early in the day could get approval same day. CM will revisit Monday morning.   Carlitos Salguero RN
Case Management/Follow up:    Chart reviewed for length of stay. Hospital day #  15     Unit:  C-2    Diagnosis and current status as per MD progress: Plan:   1. Cardiovascular: s/p Aorta repair and AV Replace redo-ASA, Plavix,Statin  Remains atrial paced at 91. Will hold BB. Dobutamine drip restarted this morning (at 5 mcg/kg/hr), Arterial line restarted at bedside by MD's (Jonathan Del Rosario and Doretha Menon), and Cardene drip at 10 mg/hr. Stat echo revealed AV still well seated and functioning appropriately, no sign of pericardial effusion.     2. Pulmonary: needs pulmonary expansion maneuvers  CXR this morning revealed pulmonary edema. Currently requiring BiPap, just increased to 70 % FiO2. Want to keep oxygen saturation >95%. Will check ABG's Q2 hours; hoping to avoid need to re-intubate pt.     3. Neurology: analgesia as needed.     4. Nephrology: fluid management- diurese as tolerated. UOP was dropping off overnight to approximately 30 ml/hr. Lasix gtt started 1/23, will keep at 10 mg/hr. UOP is improving 132 ml/hr. Nephrology consulted. Hyperkalemia - giving Lokelma X3 doses. Monitor. BMP Q6 hours for next 2 days.     5. Endocrinology: SSI     6. Hematology: acute blood loss anemia; expected, stable, monitor. Thrombocytopenia- plt 47 today, will hold Arixtra, ASA. HIT panel sent. CBC Q6 hours for next 2 days.     7. Microbiology:   Leukocytosis: WBC 18.6 this morning; was 9.6 yesterday. BC X2 and urine culture sent. Empiric broad spectrum antibiotic started (Sergei Ora). Lactic this morning was 9.7 mmol/L at POC. Redrawn and sent to lab 2 hrs later is 7 mmol/L. Will recheck lactic's Q6 hours X24 hrs.     8. Nutrition: NPO    Anticipated d/c date: per MD note, \" too soon to tell, DCP following. \"    Expected plan for discharge: [facility] has accepted. Will need [facility] pre cert started when the Pt has a clearer discharge timeline in place.      Potential barriers: none identified    Precert
Chart review for LOS day # 8- Patient is down 19 pounds since admission. S/P LHC and ZOEY 1/13, plan for AV to be replaced when surgery reconstructs his aortic root. Pre-op testing per CT surgeon. Plan for surgery next week. Currently PT/OT recommending SNF. Provided SNF list to patient. He has [facility] [employer] [facility] and this will need [facility] pre-cert when he is closer to being ready for discharge. He will need post operative PT/OT eval. Will initiate referrals to the facility if choice if patient and family agreeable. Met with patient at bedside and he states he would want to go to [facility]. Referral initiated.
Jean notes that pt is open to [facility] who is currently active with pt vs [facility]. Jean initiated a referral on this day to begin looking into pt and to see if they have beds available. Maren in admissions notes that they have beds available and will look into pt. Jean also notes that pt is to have procedures today and is due to work with PT/OT again. Jean will need to begin precert with [facility] Medicare once a final discharge decision is made.
Spoke to pt bedside to review recs for SNF. Pt states he is having procedure done on his heart on Monday and \"my daughter has already talked to someone about all this\". Discussed with pt that it appears [facility]HC was already established but now recs are for SNF. Asked writer to speak to [employer] via telephone. Flavio Obregon has already spoken to [address] [place] [address] and this is where they will choose should they decide post procedure he needs and agrees with SNF. DCP following.
Writer contacted by Aye Cody NP today. Dr Lennox Azul is requesting that patient go to [facility] rather than [facility] due to monitoring. He feels that with electrolyte imbalance (high sodium) that he could go to rehab and initiate earlier if he were at [facility] rather than waiting on this to normalize prior to go to SNF. Writer spoke to Sukhdev Valle with [facility] who is reviewing information now.   Yoshi Montenegro RN
Writer faxed information to [facility] to initiate precert for admission to [facility]. Also notified Katya Matamoros of [facility] of potential for discharge 1-2 days.   Caleb Reina RN
Writer went to bedside to offer support. Patient extubated 2 days ago. Current discharge plans are for patient to go to [facility] when medically ready. Pt has not yet worked with therapy. Writer will follow for needs/recommendations.  Mily Topete RN
recommendations.       ECOC on chart for MD signature

## 2020-02-04 NOTE — PROGRESS NOTES
Aortic valve replacement (12/27/2011); transesophageal echocardiogram (12/07/2010); Coronary artery bypass graft (2001); bronchoscopy (N/A, 1/24/2020); bronchoscopy (1/24/2020); and sigmoidoscopy (N/A, 2/1/2020). Restrictions  Restrictions/Precautions  Restrictions/Precautions: Fall Risk, Cardiac, General Precautions, Swallowing - Thickened Liquids  Position Activity Restriction  Sternal Precautions: No Pushing, No Pulling, 5# Lifting Restrictions  Other position/activity restrictions: Ambulate pt; Hilton, IJ, telemetry, honey-thick liquids  Subjective   General  Chart Reviewed: Yes  Patient assessed for rehabilitation services?: Yes  Additional Pertinent Hx: CAD, COPD, DM, severe aortic stenosis  Response to previous treatment: Patient with no complaints from previous session  Family / Caregiver Present: No  Referring Practitioner: MIKO Lee CNP  Diagnosis: s/p RE-DO STERNOTOMY X3, REPLACEMENT OF ASCENDING AORTA, RE-DO AORTIC VALVE REPLACEMENT 1/21/20; extubated 1/28/20  Subjective  Subjective: Alert but confused  General Comment  Comments: RN cleared pt for OT; pt very restless in bed, poor safety, trying to climb over bedrails  Vital Signs  Patient Currently in Pain: Denies   Orientation  Orientation  Overall Orientation Status: Impaired  Orientation Level: Oriented to person;Disoriented to situation;Disoriented to time;Disoriented to place  Objective    ADL  Feeding: Moderate assistance; Thickened liquids  LE Dressing: Dependent/Total  Toileting: Dependent/Total(hilton catheter)        Balance  Sitting Balance: Maximum assistance(sitting EOB, Right & posterior lean; supervision supported sitting in chair)  Standing Balance: Dependent/Total(with BRITTANI HUERTA)  Bed mobility  Supine to Sit: 2 Person assistance(mod assist log roll to Right)  Sit to Supine: Unable to assess(Left up in chair for breakfast)  Scooting: Dependent/Total  Transfers  Sit to stand: Dependent/Total;2 Person assistance(with BRITTANI

## 2020-02-04 NOTE — PROGRESS NOTES
4 Eyes Skin Assessment     The patient is being assess for   Shift Handoff    I agree that 2 RN's have performed a thorough Head to Toe Skin Assessment on the patient. ALL assessment sites listed below have been assessed. Areas assessed by both nurses:   [x]   Head, Face, and Ears   [x]   Shoulders, Back, and Chest, Abdomen  [x]   Arms, Elbows, and Hands   [x]   Coccyx, Sacrum, and Ischium  [x]   Legs, Feet, and Heels        *    **SHARE this note so that the co-signing nurse is able to place an eSignature**    Co-signer eSignature: ANDERS RN    Does the Patient have Skin Breakdown?   Yes LDA WOUND CARE was Initiated documentation include the Luz-wound, Wound Assessment, Measurements, Dressing Treatment, Drainage, and Color\",          Wes Prevention initiated:  Yes   Wound Care Orders initiated:  Yes      [id number]  nurse consulted for Pressure Injury (Stage 3,4, Unstageable, DTI, NWPT, Complex wounds)and New or Established Ostomies:  Yes      Primary Nurse eSignature: Electronically signed by Swetha Leahy RN on 2/4/20 at 12:40 AM

## 2020-02-05 VITALS
DIASTOLIC BLOOD PRESSURE: 57 MMHG | SYSTOLIC BLOOD PRESSURE: 123 MMHG | OXYGEN SATURATION: 94 % | TEMPERATURE: 98 F | HEART RATE: 75 BPM | HEIGHT: 69 IN | BODY MASS INDEX: 26.55 KG/M2 | RESPIRATION RATE: 22 BRPM | WEIGHT: 179.23 LBS

## 2020-02-05 PROBLEM — I95.9 HYPOTENSION: Status: RESOLVED | Noted: 2020-01-08 | Resolved: 2020-02-05

## 2020-02-05 PROBLEM — J96.01 ACUTE RESPIRATORY FAILURE WITH HYPOXEMIA (HCC): Status: RESOLVED | Noted: 2020-01-02 | Resolved: 2020-02-05

## 2020-02-05 PROBLEM — R09.89 PULMONARY VENOUS CONGESTION: Status: RESOLVED | Noted: 2020-01-09 | Resolved: 2020-02-05

## 2020-02-05 PROBLEM — R91.8 PULMONARY INFILTRATE: Status: RESOLVED | Noted: 2020-01-09 | Resolved: 2020-02-05

## 2020-02-05 LAB
ANION GAP SERPL CALCULATED.3IONS-SCNC: 10 MMOL/L (ref 3–16)
ANION GAP SERPL CALCULATED.3IONS-SCNC: 9 MMOL/L (ref 3–16)
ANION GAP SERPL CALCULATED.3IONS-SCNC: 9 MMOL/L (ref 3–16)
BASOPHILS ABSOLUTE: 0 K/UL (ref 0–0.2)
BASOPHILS RELATIVE PERCENT: 0.3 %
BUN BLDV-MCNC: 31 MG/DL (ref 7–20)
BUN BLDV-MCNC: 31 MG/DL (ref 7–20)
BUN BLDV-MCNC: 34 MG/DL (ref 7–20)
CALCIUM SERPL-MCNC: 8.1 MG/DL (ref 8.3–10.6)
CALCIUM SERPL-MCNC: 8.3 MG/DL (ref 8.3–10.6)
CALCIUM SERPL-MCNC: 8.5 MG/DL (ref 8.3–10.6)
CHLORIDE BLD-SCNC: 106 MMOL/L (ref 99–110)
CHLORIDE BLD-SCNC: 108 MMOL/L (ref 99–110)
CHLORIDE BLD-SCNC: 109 MMOL/L (ref 99–110)
CO2: 27 MMOL/L (ref 21–32)
CO2: 29 MMOL/L (ref 21–32)
CO2: 30 MMOL/L (ref 21–32)
CREAT SERPL-MCNC: 0.9 MG/DL (ref 0.8–1.3)
CREAT SERPL-MCNC: 1 MG/DL (ref 0.8–1.3)
CREAT SERPL-MCNC: 1 MG/DL (ref 0.8–1.3)
EOSINOPHILS ABSOLUTE: 0.1 K/UL (ref 0–0.6)
EOSINOPHILS RELATIVE PERCENT: 0.6 %
GFR AFRICAN AMERICAN: >60
GFR NON-AFRICAN AMERICAN: >60
GLUCOSE BLD-MCNC: 114 MG/DL (ref 70–99)
GLUCOSE BLD-MCNC: 130 MG/DL (ref 70–99)
GLUCOSE BLD-MCNC: 215 MG/DL (ref 70–99)
GLUCOSE BLD-MCNC: 96 MG/DL (ref 70–99)
HCT VFR BLD CALC: 24.1 % (ref 40.5–52.5)
HCT VFR BLD CALC: 24.8 % (ref 40.5–52.5)
HEMOGLOBIN: 7.8 G/DL (ref 13.5–17.5)
HEMOGLOBIN: 7.9 G/DL (ref 13.5–17.5)
LYMPHOCYTES ABSOLUTE: 1.4 K/UL (ref 1–5.1)
LYMPHOCYTES RELATIVE PERCENT: 11.4 %
MAGNESIUM: 2 MG/DL (ref 1.8–2.4)
MCH RBC QN AUTO: 29.9 PG (ref 26–34)
MCHC RBC AUTO-ENTMCNC: 32.1 G/DL (ref 31–36)
MCV RBC AUTO: 93.2 FL (ref 80–100)
MONOCYTES ABSOLUTE: 1 K/UL (ref 0–1.3)
MONOCYTES RELATIVE PERCENT: 8.4 %
NEUTROPHILS ABSOLUTE: 9.7 K/UL (ref 1.7–7.7)
NEUTROPHILS RELATIVE PERCENT: 79.3 %
PDW BLD-RTO: 16.9 % (ref 12.4–15.4)
PERFORMED ON: NORMAL
PLATELET # BLD: 119 K/UL (ref 135–450)
PMV BLD AUTO: 9.1 FL (ref 5–10.5)
POTASSIUM REFLEX MAGNESIUM: 3.7 MMOL/L (ref 3.5–5.1)
POTASSIUM REFLEX MAGNESIUM: 3.9 MMOL/L (ref 3.5–5.1)
POTASSIUM REFLEX MAGNESIUM: 4.1 MMOL/L (ref 3.5–5.1)
RBC # BLD: 2.65 M/UL (ref 4.2–5.9)
SODIUM BLD-SCNC: 143 MMOL/L (ref 136–145)
SODIUM BLD-SCNC: 147 MMOL/L (ref 136–145)
SODIUM BLD-SCNC: 147 MMOL/L (ref 136–145)
WBC # BLD: 12.2 K/UL (ref 4–11)

## 2020-02-05 PROCEDURE — 85014 HEMATOCRIT: CPT

## 2020-02-05 PROCEDURE — 85018 HEMOGLOBIN: CPT

## 2020-02-05 PROCEDURE — 2580000003 HC RX 258: Performed by: THORACIC SURGERY (CARDIOTHORACIC VASCULAR SURGERY)

## 2020-02-05 PROCEDURE — 97530 THERAPEUTIC ACTIVITIES: CPT

## 2020-02-05 PROCEDURE — 94669 MECHANICAL CHEST WALL OSCILL: CPT

## 2020-02-05 PROCEDURE — 6370000000 HC RX 637 (ALT 250 FOR IP): Performed by: THORACIC SURGERY (CARDIOTHORACIC VASCULAR SURGERY)

## 2020-02-05 PROCEDURE — 85025 COMPLETE CBC W/AUTO DIFF WBC: CPT

## 2020-02-05 PROCEDURE — 6370000000 HC RX 637 (ALT 250 FOR IP): Performed by: NURSE PRACTITIONER

## 2020-02-05 PROCEDURE — 83735 ASSAY OF MAGNESIUM: CPT

## 2020-02-05 PROCEDURE — 80048 BASIC METABOLIC PNL TOTAL CA: CPT

## 2020-02-05 PROCEDURE — 97110 THERAPEUTIC EXERCISES: CPT

## 2020-02-05 PROCEDURE — 6360000002 HC RX W HCPCS: Performed by: THORACIC SURGERY (CARDIOTHORACIC VASCULAR SURGERY)

## 2020-02-05 PROCEDURE — C9113 INJ PANTOPRAZOLE SODIUM, VIA: HCPCS | Performed by: THORACIC SURGERY (CARDIOTHORACIC VASCULAR SURGERY)

## 2020-02-05 PROCEDURE — 94640 AIRWAY INHALATION TREATMENT: CPT

## 2020-02-05 RX ORDER — AMIODARONE HYDROCHLORIDE 200 MG/1
200 TABLET ORAL DAILY
Qty: 21 TABLET | Refills: 0 | DISCHARGE
Start: 2020-02-06 | End: 2020-02-25 | Stop reason: ALTCHOICE

## 2020-02-05 RX ORDER — OXYCODONE HYDROCHLORIDE 5 MG/1
5 TABLET ORAL EVERY 6 HOURS PRN
Qty: 28 TABLET | Refills: 0 | Status: ON HOLD | OUTPATIENT
Start: 2020-02-05 | End: 2020-02-14

## 2020-02-05 RX ORDER — BISACODYL 10 MG
10 SUPPOSITORY, RECTAL RECTAL ONCE
Status: DISCONTINUED | OUTPATIENT
Start: 2020-02-05 | End: 2020-02-05 | Stop reason: HOSPADM

## 2020-02-05 RX ORDER — PANTOPRAZOLE SODIUM 40 MG/1
40 TABLET, DELAYED RELEASE ORAL
Status: DISCONTINUED | OUTPATIENT
Start: 2020-02-06 | End: 2020-02-05 | Stop reason: HOSPADM

## 2020-02-05 RX ORDER — PANTOPRAZOLE SODIUM 40 MG/1
40 TABLET, DELAYED RELEASE ORAL
Qty: 30 TABLET | Refills: 0 | DISCHARGE
Start: 2020-02-06 | End: 2020-03-16 | Stop reason: SDUPTHER

## 2020-02-05 RX ORDER — CARVEDILOL 12.5 MG/1
12.5 TABLET ORAL 2 TIMES DAILY WITH MEALS
Qty: 30 TABLET | Refills: 0 | DISCHARGE
Start: 2020-02-05 | End: 2020-02-05

## 2020-02-05 RX ORDER — ATORVASTATIN CALCIUM 20 MG/1
20 TABLET, FILM COATED ORAL NIGHTLY
Qty: 30 TABLET | Refills: 0 | DISCHARGE
Start: 2020-02-05 | End: 2020-03-16 | Stop reason: SDUPTHER

## 2020-02-05 RX ORDER — CARVEDILOL 12.5 MG/1
12.5 TABLET ORAL 2 TIMES DAILY WITH MEALS
Qty: 60 TABLET | Refills: 0 | DISCHARGE
Start: 2020-02-05 | End: 2020-03-16 | Stop reason: SDUPTHER

## 2020-02-05 RX ORDER — POLYETHYLENE GLYCOL 3350 17 G/17G
17 POWDER, FOR SOLUTION ORAL DAILY
Status: DISCONTINUED | OUTPATIENT
Start: 2020-02-05 | End: 2020-02-05 | Stop reason: HOSPADM

## 2020-02-05 RX ORDER — PSEUDOEPHEDRINE HCL 30 MG
100 TABLET ORAL 2 TIMES DAILY
Qty: 14 CAPSULE | Refills: 0 | Status: ON HOLD | DISCHARGE
Start: 2020-02-05 | End: 2020-02-14

## 2020-02-05 RX ADMIN — COLLAGENASE SANTYL 1 G: 250 OINTMENT TOPICAL at 10:42

## 2020-02-05 RX ADMIN — PANTOPRAZOLE SODIUM 40 MG: 40 INJECTION, POWDER, LYOPHILIZED, FOR SOLUTION INTRAVENOUS at 10:00

## 2020-02-05 RX ADMIN — INSULIN LISPRO 6 UNITS: 100 INJECTION, SOLUTION INTRAVENOUS; SUBCUTANEOUS at 14:13

## 2020-02-05 RX ADMIN — CHLORHEXIDINE GLUCONATE 15 ML: 1.2 RINSE ORAL at 10:43

## 2020-02-05 RX ADMIN — INSULIN GLARGINE 10 UNITS: 100 INJECTION, SOLUTION SUBCUTANEOUS at 10:43

## 2020-02-05 RX ADMIN — CASTOR OIL AND BALSAM, PERU: 788; 87 OINTMENT TOPICAL at 10:45

## 2020-02-05 RX ADMIN — POLYETHYLENE GLYCOL 3350 17 G: 17 POWDER, FOR SOLUTION ORAL at 09:59

## 2020-02-05 RX ADMIN — ALBUTEROL SULFATE 2.5 MG: 2.5 SOLUTION RESPIRATORY (INHALATION) at 12:24

## 2020-02-05 RX ADMIN — ALBUTEROL SULFATE 2.5 MG: 2.5 SOLUTION RESPIRATORY (INHALATION) at 08:41

## 2020-02-05 RX ADMIN — Medication 10 ML: at 10:00

## 2020-02-05 RX ADMIN — CARVEDILOL 12.5 MG: 6.25 TABLET, FILM COATED ORAL at 09:59

## 2020-02-05 RX ADMIN — AMIODARONE HYDROCHLORIDE 200 MG: 200 TABLET ORAL at 10:00

## 2020-02-05 RX ADMIN — DOCUSATE SODIUM 100 MG: 100 CAPSULE, LIQUID FILLED ORAL at 09:59

## 2020-02-05 ASSESSMENT — PAIN SCALES - GENERAL
PAINLEVEL_OUTOF10: 0
PAINLEVEL_OUTOF10: 0

## 2020-02-05 NOTE — PROGRESS NOTES
Central removed without complication, pressure held for 10 min until hemostasis achieved, and covered with gauze and Tegaderm. Will continue to monitor.

## 2020-02-05 NOTE — PROGRESS NOTES
James Macias is a 77 y.o. male patient.     Current Facility-Administered Medications   Medication Dose Route Frequency Provider Last Rate Last Dose    insulin lispro (HUMALOG) injection vial 0-18 Units  0-18 Units Subcutaneous TID  MIKO Shepard CNP        insulin lispro (HUMALOG) injection vial 0-9 Units  0-9 Units Subcutaneous Nightly MIKO Shepard - MIKE        polyethylene glycol [facility]) packet 17 g  17 g Oral Daily MIKO Shepard CNP   17 g at 02/05/20 0507    bisacodyl (DULCOLAX) suppository 10 mg  10 mg Rectal Once MIKO Shepard CNP        potassium chloride 20 mEq in dextrose 5 % 1,000 mL infusion   Intravenous Continuous Gwadiel Ibrahim  mL/hr at 02/04/20 2340      pantoprazole (PROTONIX) injection 40 mg  40 mg Intravenous BID Kelvin James MD   40 mg at 02/05/20 1000    melatonin ER tablet 1 mg  1 mg Oral Nightly PRN Kelvin James MD   1 mg at 02/04/20 2120    0.9 % sodium chloride bolus  20 mL Intravenous Once Andrez Gallo MD   Stopped at 02/01/20 0701    collagenase ointment 1 g  1 each Topical Daily Silvia Rose MD   1 g at 02/05/20 1042    carvedilol (COREG) tablet 12.5 mg  12.5 mg Oral BID  MIKO Shepard CNP   12.5 mg at 02/05/20 9346    oxyCODONE (ROXICODONE) immediate release tablet 5 mg  5 mg Oral Q6H PRN MIKO Shepard CNP   5 mg at 01/31/20 7577    amiodarone (CORDARONE) tablet 200 mg  200 mg Oral Daily MIKO Shepard CNP   200 mg at 02/05/20 1000    insulin glargine (LANTUS) injection vial 10 Units  10 Units Subcutaneous BID MIKO Shepard CNP   10 Units at 02/05/20 1043    Venelex ointment   Topical BID Silvia Rose MD        sodium chloride flush 0.9 % injection 10 mL  10 mL Intravenous 2 times per day Sakshi Felton MD   10 mL at 02/05/20 1000    sodium chloride flush 0.9 % injection 10 mL  10 mL Intravenous PRN Jeffrey Boyer MD   10 mL at 02/02/20 0839    potassium chloride 20 mEq/50 mL IVPB (Central Line)  20 mEq Intravenous PRN Jeffery Carcamo MD   Stopped at 02/03/20 1000    magnesium sulfate 2 g in 50 mL IVPB premix  2 g Intravenous PRN Jeffrey Sales MD        acetaminophen (TYLENOL) tablet 650 mg  650 mg Oral Q4H PRN Jeffrey Sales MD   650 mg at 02/01/20 1003    acetaminophen (TYLENOL) suppository 650 mg  650 mg Rectal Q4H PRN Jeffrey Sales MD        docusate sodium (COLACE) capsule 100 mg  100 mg Oral BID Jeffrey Sales MD   100 mg at 02/05/20 0959    ondansetron (ZOFRAN) injection 4 mg  4 mg Intravenous Q8H PRN Jeffrey Sales MD   4 mg at 02/01/20 0717    chlorhexidine (PERIDEX) 0.12 % solution 15 mL  15 mL Mouth/Throat BID Jeffrey Boyer MD   15 mL at 02/05/20 1043    atorvastatin (LIPITOR) tablet 20 mg  20 mg Oral Nightly Jeffrey Boyer MD   20 mg at 02/04/20 2120    albuterol (PROVENTIL) nebulizer solution 2.5 mg  2.5 mg Nebulization Q4H Gabriela Santana MD   2.5 mg at 02/05/20 0841    glucose (GLUTOSE) 40 % oral gel 15 g  15 g Oral PRN Jeffrey Boyer MD        dextrose 50 % IV solution  12.5 g Intravenous PRN Jeffrey Sales MD        glucagon (rDNA) injection 1 mg  1 mg Intramuscular PRN Jeffery Carcamo MD        dextrose 5 % solution  100 mL/hr Intravenous PRN Jeffrey Boyer MD         Allergies   Allergen Reactions    Penicillins Hives     Active Problems:    Acute respiratory failure with hypoxemia (HCC)    Tobacco abuse    AS (aortic stenosis)    S/P AVR (aortic valve replacement)    Hypotension    Pulmonary infiltrate    Pulmonary venous congestion    Atelectasis    Leukocytosis    Hyperglycemia    Acute bronchospasm    Atrial fibrillation with RVR (HCC)    Cardiogenic shock (HCC)    Acute on chronic diastolic congestive heart failure (HCC)    Obesity, Class I, BMI 30.0-34.9 (see actual BMI)    S/P thoracotomy    Status post partial removal of lung    Hemothorax, left    Restrictive lung disease    Hypernatremia  Resolved Problems:    * No resolved hospital problems. *    Blood pressure (!) 140/115, pulse 71, temperature 98.3 °F (36.8 °C), temperature source Oral, resp. rate 25, height 5' 9\" (1.753 m), weight 179 lb 3.7 oz (81.3 kg), SpO2 94 %. Subjective:  Symptoms:  Stable. Pain:  He reports no pain. Objective:  General Appearance: In no acute distress and not in pain. Vital signs: (most recent): Blood pressure (!) 140/115, pulse 71, temperature 98.3 °F (36.8 °C), temperature source Oral, resp. rate 25, height 5' 9\" (1.753 m), weight 179 lb 3.7 oz (81.3 kg), SpO2 94 %. Abdomen: Abdomen is soft. Bowel sounds are normal.   There is no abdominal tenderness. Assessment & Plan  78 yo w HTN and CAD admitted w acute Hemothorax and cardiac arrest and underwent emergent AVR and aorta repair. Started having hematochezia that has since resolved. Flex sig on 2/1 revealed large amount of dark maroon stools.  Is having NGT feeding. H/H 9/27.  - F/U H/H and clinically for any recurrent bleed  - Will need full colonoscopy at some point (CT Surgery would like to hold off on that for the time being)  - Continue NGT feeding  - Will follow    Molina Camacho MD       [phone]        Molina Camacho MD  2/5/2020

## 2020-02-05 NOTE — DISCHARGE SUMMARY
Cardiac, Vascular & Thoracic Surgery  Discharge Summary    Patient:  Manny Benjamin 1950 [id number]   Admission Date:  1/8/2020  3:33 PM  Discharge Date: 2/05/20      Principle Diagnosis: SOB/severe bioprosthetic AVS     Secondary Diagnosis:  Active Problems:    Tobacco abuse    AS (aortic stenosis)    S/P AVR (aortic valve replacement)    Atelectasis    Leukocytosis    Hyperglycemia    Acute bronchospasm    Atrial fibrillation with RVR (HCC)    Acute on chronic diastolic congestive heart failure ([facility])    Obesity, Class I, BMI 30.0-34.9 (see actual BMI)    S/P thoracotomy    Status post partial removal of lung    Hemothorax, left    Restrictive lung disease    Hypernatremia  Resolved Problems:    Acute respiratory failure with hypoxemia ([facility])    Hypotension    Pulmonary infiltrate    Pulmonary venous congestion    Cardiogenic shock ([facility])    Cardiac Cath: 1/13/20 with Dr. Chyna Mariano      HEMODYNAMICS     CHAMBER PRESSURE SATURATION   RA  10  50 %   RV  27/10     PA  30/20  48 %   PW  25 with V waves up to 30     AORTA  87  102/53 %      RADHA CARDIAC OUTPUT  2.7 L/min   SVR  10 Wood units   PVR  1 Wood unit      LVGRAM     LVEDP  28   GRADIENT ACROSS AORTIC VALVE  53 mm mean gradient across bioprosthetic aortic valve with a valve area of 0.6 cm² which is consistent with moderate to severe bioprosthetic valve aortic stenosis      AORTAGRAM     CALIBER Moderate to severely dilated, aorta is severely calcified   AORTIC INSUFFICIENCY None     On ZOEY there was a concern for a coronary cameral fistula but this was not identified on this study nor was significant aortic insufficiency identified. BYPASS GRAFTS  1 graft marker was visualized on the ascending aorta, there was no flow noted through this. Previous reports have suggested occlusion of bypass grafts. There appeared to be no grafts to the LAD or RCA.      CORONARY ARTERIES     LM Calcified, proximal less than 10% stenosis, 25% mid-distal stenosis.        LAD Calcified, [id number] 30% stenosis.       LCX  large vessel, codominant, proximal-mid 40 to 50% stenosis. There is a mid vessel stent which is widely patent with less than 20 to 30% in-stent restenosis.     OM1 is a small vessel with a proximal-mid 90% stenosis. There are 4 additional OM branches that are small to medium in size. OM 2 is the largest OM branch and this vessel has proximal less than 10% stenosis, mid 90% stenosis. OM 3, 4 and 5 has diffuse proximal-mid 60 to 70% stenosis.       RCA  Small vessel, appears to be 100% chronic total occlusion at the ostium. There are left to right collaterals noted. Vessel is only seen in retrograde filling.      BYPASS GRAFTS  No bypass grafts were visualized.     CONCLUSIONS:      Elevated filling pressures, continue diuresis, have DC'ed dobutamine and nitroprusside  Moderate to severe bioprosthetic aortic valve stenosis  Moderate to severe coronary artery disease  We will refer for consideration of TAVR, patient has not been felt to be a candidate for redo cardiac surgery  No grafts visualized, can consider CTA coronary angiography for further assessment if needed. ZOEY did show color flow signal near aortic root which may need further characterization and CTA may be needed for this versus cardiac MRI. Add beta-blocker back to medicine regimen. Add back aspirin, will defer additional anticoagulation given due to recent hemothorax     Procedure:  1/21/20 RE-DO STERNOTOMY X3, REPLACEMENT OF ASCENDING AORTA, RE-DO AORTIC VALVE REPLACEMENT WITH 19MM BLOCK INTUITY VALVE, CLOSURE OF OUTFLOW TRACK ATRIAL FISTULA, HYPOTHERMIC ARREST,  WITH TRANSESOPHAGEAL ECHOCARDIOGRAM, CIRCULATORY ARREST, 5 LEVEL BILATERAL INTERCOSTAL NERVE BLOCK      History: The patient is a 77 y.o. male with significant past medical history of tobacco use, AVR (2000 and 2011), HTN, HLD, T2DM, COPD, CAD (with PCI) and Gout.  He was recently treated at [facility]  for a large left

## 2020-02-05 NOTE — PROGRESS NOTES
Physical Therapy  Facility/Department: [facility] C2 CARD TELEMETRY  Daily Treatment Note  NAME: Aliza Biswas  : 1950  MRN: [id number]    Date of Service: 2020    Discharge Recommendations:  Subacute/Skilled Nursing Facility   PT Equipment Recommendations  Equipment Needed: No(defer to facility)    Assessment   Body structures, Functions, Activity limitations: Decreased posture;Decreased safe awareness;Decreased functional mobility ; Decreased balance;Decreased endurance;Decreased strength;Decreased ROM; Decreased cognition  Assessment: Pt with slightly improved tolerance to therapy today vs. yesterday due to increased level of alertness. Pt required mod-maxA x 2 for sit<>stand transfers from low-height chair with lots of cueing needed for safe technique, maintenance of sternal precautions, and proper posture. ICU RN reports pt is still struggling with hospital-associated delirium. Cont. to recommend SNF at D/C. Treatment Diagnosis: Decreased (I) with ambulation  Specific instructions for Next Treatment: Progress ther ex and mobility as tolerated  Prognosis: Fair;Guarded  Decision Making: High Complexity  PT Education: Goals;Transfer Training;Equipment;PT Role;Functional Mobility Training;Plan of Care;Precautions;General Safety;Orientation; Energy Conservation;Disease Specific Education  Patient Education: Pt demonstrates some understanding but requires lots of reinforcement for safety. Barriers to Learning: Confusion & lethargy. Pt requires frequent cues for sternal precautions. REQUIRES PT FOLLOW UP: Yes  Activity Tolerance: Patient limited by cognitive status; Patient limited by endurance  Activity Tolerance: Pt with increased work of breathing noted after sit<>stands; rest breaks of 3-5 minutes needed between reps to recover with cues for pursed-lip breathing. Patient Diagnosis(es): The encounter diagnosis was Acute post-operative pain.      has a past medical history of CAD (coronary artery disease), of  (not correct year))    Objective   Bed mobility  Supine to Sit: Unable to assess(pt OOB before & after therapy)  Scooting: Dependent/Total(max-totalA x 2 needed to scoot pt backward in chair)     Transfers  Sit to Stand: 2 Person Assistance(maxA x 2 from lower-height chair to RW (3 reps))  Stand to sit: 2 Person Assistance(modA x 2)  Bed to Chair: Unable to assess(pt up in chair before & after therapy)  Comment: Max cues needed during transfers to avoid using UE's to assist and for safety. Ambulation  Ambulation?: No(pt too weak/unsteady when standing to safely attempt)     Balance  Posture: Poor  Sitting - Static: Poor;+  Sitting - Dynamic: Poor  Standing - Static: Poor;+  Standing - Dynamic: Poor  Comments: Pt able to stand x 3 reps at RW (ranging in time from 10-30 seconds). MaxA x 2 decreasing to modA x 2 needed to maintain static standing position with cues for increased hip and trunk extension. Exercises  Comments: Deferred LE ther ex today due to c/o fatigue/increased work of breathing after performing several sit<>stands. AM-PAC Score  AM-PAC Inpatient Mobility Raw Score : 10 (20)  AM-PAC Inpatient T-Scale Score : 32.29 (20)  Mobility Inpatient CMS 0-100% Score: 76.75 (20)  Mobility Inpatient CMS G-Code Modifier : CL (20)    Goals  Short term goals  Time Frame for Short term goals: 20  Short term goal 1: Pt will complete bed mobility with maxA x 1. Short term goal 2: Pt will complete sit<>stand with modA x 1. Short term goal 3: Pt will complete bed<>chair with modA x 1 and LRAD. Short term goal 4: Pt will ambulate 10 ft with LRAD and modA x 1. Short term goal 5: Pt will verbalize understanding of sternal precautions and maintain them throughout mobility. Patient Goals   Patient goals :  \"To get stronger\"    Plan    Times per week: 5-7x/week in acute care  Times per day: Daily  Specific instructions for Next Treatment: Progress ther ex and mobility as tolerated  Current Treatment Recommendations: Strengthening, ROM, Balance Training, Transfer Training, Functional Mobility Training, Endurance Training, Gait Training, Neuromuscular Re-education, Safety Education & Training, Home Exercise Program, Patient/Caregiver Education & Training, Equipment Evaluation, Education, & procurement, Positioning  Safety Devices:  All fall risk precautions in place, Call light within reach, Nurse notified, Gait belt, Patient at risk for falls, Left in chair, Telesitter in use(chair alarm not needed per ICU RN)    Therapy Time   Individual Concurrent Group Co-treatment   Time In 1124         Time Out 1202         Minutes 38         Timed Code Treatment Minutes: [address] [facility] [place], [place], [address]

## 2020-02-05 NOTE — PROGRESS NOTES
for hemothorax    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 Rosendo Lewis - redo w/23mm Magna Ease bioprosthetic valve    BRONCHOSCOPY N/A 1/9/2020    w/BAL, performed by Darwin Starks MD at [address] [place] [address] N/A 1/24/2020    BRONCHOSCOPY ALVEOLAR LAVAGE performed by Pepe Lopez MD at [address] [place] [address]  1/24/2020    BRONCHOSCOPY THERAPUTIC ASPIRATION INITIAL performed by Pepe Lopez MD at [facility] CABG WITH AORTIC VALVE REPLACEMENT  2001    date approximate, x1 to OM, AVR w/porcine valve    CARDIAC CATHETERIZATION  01/13/2020    Dr. Richards Dorr Left 01/02/2020    Dr. Raj Holt - 3200 ml blood drained in 1000 ml increments    CORONARY ANGIOPLASTY WITH STENT PLACEMENT  12/07/2010    Dr. Javier Garcia. Roger - NURIS- 4.0 x 28 to Cx    TN ASCEND AORTA GRAFT W ROOT REPLACMENT. VALVE CONDUIT/CORON RECONSTR N/A 1/21/2020    Dr. 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 1/2/2020    Dr. Amaury Osuna - emergent w/evacuation of hematoma & chest wall hemostasis, pleural biopsy & XENA wedge resection    TRANSESOPHAGEAL ECHOCARDIOGRAM  01/21/2020    during ascending aorta replacement w/redo AVR    TRANSESOPHAGEAL ECHOCARDIOGRAM  12/07/2010        Allergies as of 01/08/2020 - Review Complete 01/08/2020   Allergen Reaction Noted    Penicillins Hives 05/28/2010        Patient Active Problem List   Diagnosis    Hemothorax    Acute respiratory failure with hypoxemia (HCC)    Tobacco abuse    AS (aortic stenosis)    CAD (coronary artery disease) s/p stent in 2010    HTN (hypertension)    Hyperlipidemia    S/P AVR (aortic valve replacement)    Hypotension    Pulmonary infiltrate    Pulmonary venous congestion    Atelectasis    Leukocytosis    Hyperglycemia    Acute bronchospasm    Atrial fibrillation with RVR (HCC)    Cardiogenic shock (HCC)    Acute on chronic diastolic congestive heart failure (HCC)    Myocardial infarction (HCC)    Obesity, Class I, BMI 30.0-34.9 (see actual BMI)    S/P thoracotomy    Status post partial removal of lung    Hemothorax, left    Restrictive lung disease    Hypernatremia        Vital Signs: BP (!) 114/51   Pulse 69   Temp 98.4 °F (36.9 °C) (Oral)   Resp 20   Ht 5' 9\" (1.753 m)   Wt 179 lb 3.7 oz (81.3 kg)   SpO2 94%   BMI 26.47 kg/m²  O2 Flow Rate (L/min): 0 L/min     Admission Weight: Weight: 205 lb 12.8 oz (93.4 kg)    Weight on 1/21 (83.9 kg) Pre-op   Weight on 1/22 (95.4 kg)  1/30  87.4 kg  1/31  86.2 kg  2/01  unmeasured  2/02  80.5 kg  2/03  80 kg   2/04  80.3 kg  2/05  81.3 kg     Intake/Output:     Intake/Output Summary (Last 24 hours) at 2/5/2020 0939  Last data filed at 2/5/2020 0600  Gross per 24 hour   Intake 2818 ml   Output 225 ml   Net 2593 ml     Extubation Time: 1/22 @ 0900; re-extubation 1/28 @ 14:23  Transition Time: 1/22 @ 22:00     LABORATORY DATA:     CBC:   Recent Labs     02/03/20  0432 02/04/20 0415 02/05/20  0425   WBC 11.8* 11.4* 12.2*   HGB 9.0* 8.6* 7.9*   HCT 27.3* 26.6* 24.8*   MCV 91.1 93.1 93.2    137 119*     BMP:   Recent Labs     02/04/20  1750 02/04/20  2345 02/05/20  0425   * 147* 147*   K 3.7 3.9 3.7    108 109   CO2 30 30 29   BUN 37* 34* 31*   CREATININE 1.0 1.0 0.9     MG:    Recent Labs     02/03/20  0432 02/04/20 0415 02/05/20  0425   MG 2.50* 2.30 2.00      CXR: 1/22   Impression   Improving left basilar atelectasis. CXR: 1/24   Impression   1.  An enteric tube is noted, however, it is difficult to follow the entire   course of the enteric tube, would recommend dedicated radiographs of the   upper abdomen for better evaluation.  Contrast material does opacify the   stomach. 2. No significant interval change in bilateral hazy airspace disease. 3. No definite pneumothorax identified. 4. Additional support devices as described above.     ________________________________________________________________________    Subjective:   Dietary Intake: moderate, improving  no Nausea   Pain Control: controlled, prn meds  Complaints: none  Bowels: have moved, not recently though     Objective:   General appearance: awake, interactive and talkative, in NAD  Lungs: diminished  Heart: S1S2 normal; SR in 70's on monitor  Chest: symmetrical expansion with inspiration and expirations; no rocking of sternum noted   Abdomen: round, soft, non-tender   Bowel sounds: normoactive  Kidneys: UOP satisfactory ml in 24 hrs; Cr 0.9  Wound/Incisions: Midsternal incision CDI; LLE incisions with dressings CDI; Pacing wires cut by MD 1/28   Extremities: BLE pulses by doppler; minimal tibial swelling noted   Neurological: intact, non focal exam, speech strong  Chest tubes/Drains: all out   ________________________________________________________________________    SCIP Measures:     · Noriega catheter for:  ? IV Diuresis  ? Accurate I/O  ? D/C today  ·   · Antibiotics:  ? Have been stopped  ? Prophylaxis (POD #1 only)  ? Continued for:   ________________________________________________________________________    Assessment:   Post-op: 15 days. Condition: In stable condition. Plan:   1. Cardiovascular: s/p Aorta repair and AVR- ASA, Plavix,Statin, amio, BB, CCB  Pt remains SR (had PAF 1/30)  Hypertension: stable, continue current regimen. 2. Pulmonary: needs pulmonary expansion maneuvers, IS, OOBTC, PT/OT  Currently saturating low 90's on RA     3. Neurology: analgesia as needed. 4. Nephrology: fluid management- diurese as tolerated. Nephro following.   Hypernatremia: much improved at 147 today (was 153 Acute post-op respiratory insufficiency (difficulty weaning from vent)  ? Acute post-op blood loss anemia (hct ? 30)        ? Transfused this admission post-op  ? Cardiac arrythmia:  ? Ventricular Tachycardia     ? Atrial Flutter     ? Atrial fibrillation  ? Acute pulmonary edema due to:      ? Noncardiogenic causes  ? Acute heart failure (Syst, Diast)   ? Encephalopathy (confusion, delirium, altered mental status), (toxic, metabolic)  ? Restraints in use  ? TIA/Stroke (acute, post-op)   ? Acute kidney injury (Cr > 0.5 over baseline, oliguria, ? 5ml/kg/hr x 6 hrs)  ? Acute renal failure (Cr > 2x baseline, Dialysis started)  ? Malnutrition: ? Severe (prealbumin <10, albumin ? 2.5)      ? Moderate (prealbumin <15, albumin <3)  ? SIRS due to non-infectious process (temp > 100.5, wbc > 12, HR > 90, RR>20)  ? Coagulopathy (consumptive, HIT, fibrinolysis) ?  FFP or platelets given post-op  ________________________________________________________________________    Caleb Reyes CNP  2/5/2020  9:39 AM

## 2020-02-05 NOTE — PLAN OF CARE
Problem: Falls - Risk of:  Goal: Will remain free from falls  Description  Will remain free from falls  Outcome: Ongoing  Goal: Absence of physical injury  Description  Absence of physical injury  Outcome: Ongoing     Problem: Nutrition  Goal: Optimal nutrition therapy  Outcome: Ongoing     Problem: Pain:  Goal: Pain level will decrease  Description  Pain level will decrease  Outcome: Ongoing  Goal: Control of acute pain  Description  Control of acute pain  Outcome: Ongoing  Goal: Control of chronic pain  Description  Control of chronic pain  Outcome: Ongoing     Problem: OXYGENATION/RESPIRATORY FUNCTION  Goal: Patient will maintain patent airway  Outcome: Ongoing  Goal: Patient will achieve/maintain normal respiratory rate/effort  Description  Respiratory rate and effort will be within normal limits for the patient  2/4/2020 2311 by Yobany Bain RN  Outcome: Ongoing  2/4/2020 1441 by Deidra Varma RN  Outcome: Ongoing     Problem: HEMODYNAMIC STATUS  Goal: Patient has stable vital signs and fluid balance  Outcome: Ongoing     Problem: FLUID AND ELECTROLYTE IMBALANCE  Goal: Fluid and electrolyte balance are achieved/maintained  Outcome: Ongoing     Problem: Discharge Planning:  Goal: Discharged to appropriate level of care  Description  Discharged to appropriate level of care  Outcome: Ongoing  Goal: Ability to perform activities of daily living will improve  Description  Ability to perform activities of daily living will improve  Outcome: Ongoing  Goal: Participates in care planning  Description  Participates in care planning  Outcome: Ongoing     Problem: Serum Glucose Level - Abnormal:  Goal: Ability to maintain appropriate glucose levels will improve  Description  Ability to maintain appropriate glucose levels will improve  2/4/2020 2311 by Yobany Bain RN  Outcome: Ongoing  2/4/2020 1441 by Deidra Varma RN  Outcome: Ongoing     Problem: Sensory Perception - Impaired:  Goal: Ability to maintain a stable neurologic state will improve  Description  Ability to maintain a stable neurologic state will improve  Outcome: Ongoing  Goal: Demonstrations of improved sensory functioning will increase  Description  Demonstrations of improved sensory functioning will increase  Outcome: Ongoing  Goal: Decrease in sensory misperception frequency  Description  Decrease in sensory misperception frequency  Outcome: Ongoing  Goal: Able to refrain from responding to false sensory perceptions  Description  Able to refrain from responding to false sensory perceptions  Outcome: Ongoing  Goal: Demonstrates accurate environmental perceptions  Description  Demonstrates accurate environmental perceptions  Outcome: Ongoing  Goal: Able to distinguish between reality-based and nonreality-based thinking  Description  Able to distinguish between reality-based and nonreality-based thinking  Outcome: Ongoing  Goal: Able to interrupt nonreality-based thinking  Description  Able to interrupt nonreality-based thinking  Outcome: Ongoing     Problem:  Activity Intolerance:  Goal: Able to perform prescribed physical activity  Description  Able to perform prescribed physical activity  Outcome: Ongoing  Goal: Ability to tolerate increased activity will improve  Description  Ability to tolerate increased activity will improve  Outcome: Ongoing     Problem: Anxiety:  Goal: Level of anxiety will decrease  Description  Level of anxiety will decrease  Outcome: Ongoing     Problem: Cardiac Output - Decreased:  Goal: Cardiac output within specified parameters  Description  Cardiac output within specified parameters  Outcome: Ongoing  Goal: Hemodynamic stability will improve  Description  Hemodynamic stability will improve  Outcome: Ongoing     Problem: Fluid Volume - Imbalance:  Goal: Ability to achieve a balanced intake and output will improve  Description  Ability to achieve a balanced intake and output will improve  Outcome: Ongoing  Goal:

## 2020-02-05 NOTE — PLAN OF CARE
[address] [facility] REHAB PHASE I  POST OP OHS Education      Open heart surgery post-operative education and information was provided to the patient and/or family. Discharge instructions following open heart surgery:    Explained supplies needed at home, activity instructions, incision care, diet/nutritional intake, medications, daily weights and temperature, smoking cessation, wearing MOISES hose, using incentive spirometer, awareness of heart beating, and when to call the doctor. Reminded patient of follow up with doctor   Explained the activity log and walking protocol   Reviewed lifestyle changes        Outpatient cardiac rehab referral was completed and patient was educated on benefits of participation in outpatient cardiac rehabilitation (OPCR) and secondary prevention. Patient was informed of choices for local programs close to their home and were provided with contact information for these programs. . List located in \"Your care after heart surgery\" binder. Cardiac Rehab referral completed for Post Open Heart Surgery and provided with a brochure explaining Cardiac Rehab exercise and education program that begins 4-6 weeks post surgery, when released by surgeon. Contact numbers present on brochure. Re-enforced walking program, use of incentive spirometer, and deep breathing initiated by nursing staff in hospital to continue in home program. Thank you for this referral.     Pt and pt's family  demonstrate understanding. No additional questions at this time.     Outpatient Cardiac Rehab Location [address] [place] [address] 02/05/20 4:42 PM    Exercise Physiologist    Phone: [phone]

## 2020-02-05 NOTE — PROGRESS NOTES
[facility]    [facility]              [phone]                       Plan :     Sodium improving very slowly  Continue dextrose  Labs pm    He is unable to drink clear liquid diet- which is also affecting sodium          Assessment :     Hypernatremia  Sodium  Peaked to 156- now 147  Due to diuretics and was n.p.o.  Diuretics on hold  Started dextrose drip on 2/3/2020  increased dose of dextrose to 125 mL/h on 2/4/2020    Acute Kidney Injury  Creatinine peaked to 1.6- now 0.9  Likely due to cardiac surgery, hypotension, Cardiorenal syndrome  got inotropes-helped his kidney function  Off of diuretics now      hyperkalemia  Now low  Getting treated    Acidosis  Lactic acidosis likely due to hypotension poor tissue perfusion  Improved  Now has metabolic alkalosis and also respiratory alkalosis    Hypotension- now Hypertension  BP: (114-140)/() Pulse:  [62-71]   Low-dose of Levophed- switched to dobutamine  Now needing nicardipine drip followed by clevipine drip  Off now       [facility] [place] [facility] would like to thank Sailaja Ferris MD   for opportunity to serve this patient      Please call with questions at-   24 Hrs Answering service [phone] or  7 am- 5 pm via Perfect serve or cell phone  Dr.Sudhir Get Nguyễn          CC/reason for consult :   Acute kidney injury   HPI :     From consult note-   Chad Bran is a 77 y.o. male presented to   the hospital on 1/8/2020 with shortness of breath. Not able to give history because of being on BiPAP. He is known to have severe aortic stenosis and he had redo sternotomy and replacement of ascending aorta done, and also replacement with Carrasquillo Intuity valve done. Following that he is in ICU and is requiring BiPAP and has a poor urine output and he is on a low-dose of vasopressor. He was here earlier this month with shortness of breath pneumonia and large left hemothorax and pneumothorax. Also had PAL and hyperkalemia.   Did not require Oral, BID  ondansetron (ZOFRAN) injection 4 mg, 4 mg, Intravenous, Q8H PRN  chlorhexidine (PERIDEX) 0.12 % solution 15 mL, 15 mL, Mouth/Throat, BID  atorvastatin (LIPITOR) tablet 20 mg, 20 mg, Oral, Nightly  albuterol (PROVENTIL) nebulizer solution 2.5 mg, 2.5 mg, Nebulization, Q4H WA  glucose (GLUTOSE) 40 % oral gel 15 g, 15 g, Oral, PRN  dextrose 50 % IV solution, 12.5 g, Intravenous, PRN  glucagon (rDNA) injection 1 mg, 1 mg, Intramuscular, PRN  dextrose 5 % solution, 100 mL/hr, Intravenous, PRN       Vitals :     Vitals:    02/05/20 1003   BP: (!) 140/115   Pulse: 71   Resp: 25   Temp: 98.3 °F (36.8 °C)   SpO2:        I & O :       Intake/Output Summary (Last 24 hours) at 2/5/2020 1130  Last data filed at 2/5/2020 0600  Gross per 24 hour   Intake 2818 ml   Output --   Net 2818 ml        Physical Examination :     General appearance: Anxious- no, distressed- no, in good spirits- yes,   Comfortable, communicative  AAO X 3  HEENT: Lips- normal, teeth- ok , oral mucosa- moist  Neck : Mass- no, appears symmetrical, JVD- not visible  Respiratory: Respiratory effort-  normal, wheeze- no, crackles - no, Oxygen-RA  Cardiovascular:  Ausculation- No M/R/G, Edema none  Abdomen: visible mass- no, distention- no, scar- no, tenderness- no                            hepatosplenomegaly-  no  Musculoskeletal:  clubbing no,cyanosis- no , digital ischemia- no                           muscle strength- grossly normal , tone - grossly normal  Skin: rashes- no , ulcers- no, induration- no, tightening - no  Psychiatric:  Judgement and insight- normal         LABS:     Recent Labs     02/03/20  0432 02/04/20 0415 02/05/20  0425   WBC 11.8* 11.4* 12.2*   HGB 9.0* 8.6* 7.9*   HCT 27.3* 26.6* 24.8*    137 119*     Recent Labs     02/03/20  0432  02/04/20  0415 02/04/20  1750 02/04/20  2345 02/05/20  0425   *   < > 153* 149* 147* 147*   K 3.2*   < > 3.6 3.7 3.9 3.7   *   < > 110 109 108 109   CO2 35*   < > 33* 30 30 29 BUN 54*   < > 41* 37* 34* 31*   CREATININE 1.2   < > 1.0 1.0 1.0 0.9   GLUCOSE 164*   < > 139* 115* 130* 114*   MG 2.50*  --  2.30  --   --  2.00    < > = values in this interval not displayed.

## 2020-02-05 NOTE — PROGRESS NOTES
Occupational Therapy  Facility/Department: [facility] C2 CARD TELEMETRY  Daily Treatment Note  NAME: Tia Cintron  : 1950  MRN: [id number]    Date of Service: 2020    Discharge Recommendations:  Subacute/Skilled Nursing Facility       Assessment   Performance deficits / Impairments: Decreased functional mobility ; Decreased safe awareness;Decreased balance;Decreased ADL status; Decreased high-level IADLs;Decreased endurance;Decreased strength;Decreased coordination;Decreased fine motor control;Decreased cognition  Assessment: Pt more alert and able to actively participate in OT session, less confused. ModA of 2 to stand up to RW, longest standing period was 30 secs. Pt maxA for use of urinal. Pt looking forward to d/c back to [employer].  OT Education: OT Role;Transfer Training;Orientation;Equipment; Family Education  REQUIRES OT FOLLOW UP: Yes  Activity Tolerance  Activity Tolerance: Treatment limited secondary to decreased cognition;Patient Tolerated treatment well  Safety Devices  Type of devices: Call light within reach; Left in chair;Nurse notified(avasys in place)         Patient Diagnosis(es): There were no encounter diagnoses. has a past medical history of CAD (coronary artery disease), COPD (chronic obstructive pulmonary disease) ([facility] [address] Diabetes mellitus ([address] Gout, Hemothorax, left, Hyperlipidemia, Hypertension, Obesity, Class I, BMI 30.0-34.9 (see actual BMI), S/P thoracotomy, Status post partial removal of lung, and Thyroid disease. has a past surgical history that includes thoracotomy (Left, 2020); bronchoscopy (N/A, 2020); Coronary angioplasty with stent (2010); pr ascend aorta graft w root replacment. valve conduit/coron reconstr (N/A, 2020); transesophageal echocardiogram (2020); Cardiac catheterization (2020); chest tube insertion (Left, 2020); Aortic valve replacement (2011); transesophageal echocardiogram (2010);  Coronary artery bypass graft (2001); bronchoscopy (N/A, 1/24/2020); bronchoscopy (1/24/2020); and sigmoidoscopy (N/A, 2/1/2020). Restrictions  Restrictions/Precautions  Restrictions/Precautions: Fall Risk, Cardiac, General Precautions, Swallowing - Thickened Liquids  Position Activity Restriction  Sternal Precautions: No Pushing, No Pulling, 5# Lifting Restrictions  Other position/activity restrictions: Ambulate pt; Noriega, IJ, telemetry, honey-thick liquids  Subjective   General  Chart Reviewed: Yes  Patient assessed for rehabilitation services?: Yes  Additional Pertinent Hx: CAD, COPD, DM, severe aortic stenosis  Response to previous treatment: Patient with no complaints from previous session  Family / Caregiver Present: Yes(2 female family members)  Referring Practitioner: MIKO Mortensen CNP  Diagnosis: s/p RE-DO STERNOTOMY X3, REPLACEMENT OF ASCENDING AORTA, RE-DO AORTIC VALVE REPLACEMENT 1/21/20; extubated 1/28/20  Subjective  Subjective: Pt in the chair upon entry, pleasantly confused. States ready to get back to [facility]. Pain:denies        Orientation  Orientation  Overall Orientation Status: Impaired  Orientation Level: Oriented to person;Disoriented to time;Disoriented to place;Oriented to situation(didn't know year of birth)  Objective    ADL  Toileting: Dependent/Total(with use of urinal)        Balance  Standing Balance: Dependent/Total(modA of 2 with RW for UE support, posterior lean )  Standing Balance  Time: 30 secs decreasing to 10 secs  Activity: standing trials     Transfers  Sit to stand: 2 Person assistance;Maximum assistance(x 3 trials up to RW, heavy posterior lean cues for upright posture.                                          )  Stand to sit: 2 Person assistance;Maximum assistance      Cognition  Overall Cognitive Status: Exceptions  Arousal/Alertness: Delayed responses to stimuli  Following Commands: Follows one step commands with increased time; Follows one step commands consistently  Attention Span: Difficulty attending to directions; Difficulty dividing attention  Memory: Decreased recall of precautions;Decreased short term memory;Decreased recall of recent events  Safety Judgement: Decreased awareness of need for assistance;Decreased awareness of need for safety  Problem Solving: Assistance required to generate solutions;Assistance required to implement solutions  Insights: Decreased awareness of deficits  Initiation: Requires cues for all  Sequencing: Requires cues for all      Type of ROM/Therapeutic Exercise  Type of ROM/Therapeutic Exercise: AROM  Exercises  Elbow Flexion: x 10 reps  Elbow Extension: x 10 reps  Finger Flexion: x 20 reps  Finger Extension: x 20 reps      Plan   Plan  Times per week: 4-5x/wk while in ICU  Current Treatment Recommendations: Positioning, Safety Education & Training, Balance Training, Patient/Caregiver Education & Training, Self-Care / ADL, Functional Mobility Training, Equipment Evaluation, Education, & procurement, Endurance Training, Pain Management, Cognitive Reorientation, Neuromuscular Re-education    AM-PAC Score        AM-[facility] Inpatient Daily Activity Raw Score: 8 (02/05/20 1405)  AM-PAC Inpatient ADL T-Scale Score : 22.86 (02/05/20 1405)  ADL Inpatient CMS 0-100% Score: 85.69 (02/05/20 1405)  ADL Inpatient CMS G-Code Modifier : CM (02/05/20 1405)    Goals  Short term goals  Time Frame for Short term goals: 1 week (2/7/2020)  Short term goal 1: Pt will complete transfers with Mod A x2 progressing, but unsafe  Short term goal 2: Pt will perform 2 grooming tasks seated with min A   Short term goal 3: Pt will perform 2-3 minutes standing activity with Max A x2  Short term goal 4: Pt will adhere to sternal precautions with mod cues during ADL by 2/4; continue for 1 week (2-11-20)  Patient Goals   Patient goals : \"to be able to leave here\"       Therapy Time   Individual Concurrent Group Co-treatment   Time In       9441   Time Out       1202   Minutes       38   Timed Code

## 2020-02-07 PROBLEM — K62.5 BRIGHT RED BLOOD PER RECTUM: Status: ACTIVE | Noted: 2020-02-07

## 2020-02-08 ENCOUNTER — HOSPITAL ENCOUNTER (INPATIENT)
Age: 70
LOS: 6 days | Discharge: NURSING, REHAB OR LONG-TERM CARE | DRG: 981 | End: 2020-02-14
Attending: INTERNAL MEDICINE | Admitting: INTERNAL MEDICINE
Payer: MEDICARE

## 2020-02-08 ENCOUNTER — APPOINTMENT (OUTPATIENT)
Dept: NUCLEAR MEDICINE | Age: 70
DRG: 981 | End: 2020-02-08
Attending: INTERNAL MEDICINE
Payer: MEDICARE

## 2020-02-08 PROBLEM — E11.9 TYPE 2 DIABETES MELLITUS, WITHOUT LONG-TERM CURRENT USE OF INSULIN (HCC): Chronic | Status: ACTIVE | Noted: 2020-02-08

## 2020-02-08 PROBLEM — K62.5 BRIGHT RED BLOOD PER RECTUM: Status: ACTIVE | Noted: 2020-02-08

## 2020-02-08 PROBLEM — K92.2 LOWER GASTROINTESTINAL HEMORRHAGE: Status: ACTIVE | Noted: 2020-02-07

## 2020-02-08 LAB
A/G RATIO: 1 (ref 1.1–2.2)
ABO/RH: NORMAL
ALBUMIN SERPL-MCNC: 2.6 G/DL (ref 3.4–5)
ALP BLD-CCNC: 73 U/L (ref 40–129)
ALT SERPL-CCNC: 21 U/L (ref 10–40)
ANION GAP SERPL CALCULATED.3IONS-SCNC: 7 MMOL/L (ref 3–16)
ANTIBODY SCREEN: NORMAL
AST SERPL-CCNC: 21 U/L (ref 15–37)
BASOPHILS ABSOLUTE: 0 K/UL (ref 0–0.2)
BASOPHILS RELATIVE PERCENT: 0.4 %
BILIRUB SERPL-MCNC: 0.9 MG/DL (ref 0–1)
BUN BLDV-MCNC: 27 MG/DL (ref 7–20)
C DIFF TOXIN/ANTIGEN: NORMAL
CALCIUM SERPL-MCNC: 8.1 MG/DL (ref 8.3–10.6)
CHLORIDE BLD-SCNC: 113 MMOL/L (ref 99–110)
CO2: 26 MMOL/L (ref 21–32)
CREAT SERPL-MCNC: 0.9 MG/DL (ref 0.8–1.3)
EOSINOPHILS ABSOLUTE: 0.1 K/UL (ref 0–0.6)
EOSINOPHILS RELATIVE PERCENT: 0.8 %
GFR AFRICAN AMERICAN: >60
GFR NON-AFRICAN AMERICAN: >60
GLOBULIN: 2.5 G/DL
GLUCOSE BLD-MCNC: 107 MG/DL (ref 70–99)
GLUCOSE BLD-MCNC: 122 MG/DL (ref 70–99)
GLUCOSE BLD-MCNC: 134 MG/DL (ref 70–99)
GLUCOSE BLD-MCNC: 137 MG/DL (ref 70–99)
GLUCOSE BLD-MCNC: 163 MG/DL (ref 70–99)
HCT VFR BLD CALC: 22.3 % (ref 40.5–52.5)
HCT VFR BLD CALC: 22.3 % (ref 40.5–52.5)
HCT VFR BLD CALC: 25.5 % (ref 40.5–52.5)
HEMOGLOBIN: 7.1 G/DL (ref 13.5–17.5)
HEMOGLOBIN: 7.3 G/DL (ref 13.5–17.5)
HEMOGLOBIN: 8.2 G/DL (ref 13.5–17.5)
INR BLD: 1.43 (ref 0.86–1.14)
LYMPHOCYTES ABSOLUTE: 1.4 K/UL (ref 1–5.1)
LYMPHOCYTES RELATIVE PERCENT: 17.9 %
MAGNESIUM: 2 MG/DL (ref 1.8–2.4)
MCH RBC QN AUTO: 29.4 PG (ref 26–34)
MCHC RBC AUTO-ENTMCNC: 32.3 G/DL (ref 31–36)
MCV RBC AUTO: 90.9 FL (ref 80–100)
MONOCYTES ABSOLUTE: 0.6 K/UL (ref 0–1.3)
MONOCYTES RELATIVE PERCENT: 8.2 %
NEUTROPHILS ABSOLUTE: 5.7 K/UL (ref 1.7–7.7)
NEUTROPHILS RELATIVE PERCENT: 72.7 %
PDW BLD-RTO: 19 % (ref 12.4–15.4)
PERFORMED ON: ABNORMAL
PLATELET # BLD: 83 K/UL (ref 135–450)
PLATELET SLIDE REVIEW: ABNORMAL
PMV BLD AUTO: 9.3 FL (ref 5–10.5)
POTASSIUM SERPL-SCNC: 4.5 MMOL/L (ref 3.5–5.1)
PROTHROMBIN TIME: 16.7 SEC (ref 10–13.2)
RBC # BLD: 2.8 M/UL (ref 4.2–5.9)
SLIDE REVIEW: ABNORMAL
SODIUM BLD-SCNC: 146 MMOL/L (ref 136–145)
TOTAL PROTEIN: 5.1 G/DL (ref 6.4–8.2)
WBC # BLD: 7.8 K/UL (ref 4–11)

## 2020-02-08 PROCEDURE — 87449 NOS EACH ORGANISM AG IA: CPT

## 2020-02-08 PROCEDURE — 78278 ACUTE GI BLOOD LOSS IMAGING: CPT

## 2020-02-08 PROCEDURE — 87324 CLOSTRIDIUM AG IA: CPT

## 2020-02-08 PROCEDURE — P9016 RBC LEUKOCYTES REDUCED: HCPCS

## 2020-02-08 PROCEDURE — A9560 TC99M LABELED RBC: HCPCS | Performed by: INTERNAL MEDICINE

## 2020-02-08 PROCEDURE — 94761 N-INVAS EAR/PLS OXIMETRY MLT: CPT

## 2020-02-08 PROCEDURE — 86850 RBC ANTIBODY SCREEN: CPT

## 2020-02-08 PROCEDURE — 83735 ASSAY OF MAGNESIUM: CPT

## 2020-02-08 PROCEDURE — 2580000003 HC RX 258: Performed by: INTERNAL MEDICINE

## 2020-02-08 PROCEDURE — 80053 COMPREHEN METABOLIC PANEL: CPT

## 2020-02-08 PROCEDURE — 86923 COMPATIBILITY TEST ELECTRIC: CPT

## 2020-02-08 PROCEDURE — 86900 BLOOD TYPING SEROLOGIC ABO: CPT

## 2020-02-08 PROCEDURE — 85018 HEMOGLOBIN: CPT

## 2020-02-08 PROCEDURE — 3430000000 HC RX DIAGNOSTIC RADIOPHARMACEUTICAL: Performed by: INTERNAL MEDICINE

## 2020-02-08 PROCEDURE — 6370000000 HC RX 637 (ALT 250 FOR IP): Performed by: INTERNAL MEDICINE

## 2020-02-08 PROCEDURE — 85610 PROTHROMBIN TIME: CPT

## 2020-02-08 PROCEDURE — 36415 COLL VENOUS BLD VENIPUNCTURE: CPT

## 2020-02-08 PROCEDURE — 2700000000 HC OXYGEN THERAPY PER DAY

## 2020-02-08 PROCEDURE — 2060000000 HC ICU INTERMEDIATE R&B

## 2020-02-08 PROCEDURE — 86901 BLOOD TYPING SEROLOGIC RH(D): CPT

## 2020-02-08 PROCEDURE — 85025 COMPLETE CBC W/AUTO DIFF WBC: CPT

## 2020-02-08 PROCEDURE — 85014 HEMATOCRIT: CPT

## 2020-02-08 RX ORDER — NICOTINE POLACRILEX 4 MG
15 LOZENGE BUCCAL PRN
Status: DISCONTINUED | OUTPATIENT
Start: 2020-02-08 | End: 2020-02-14 | Stop reason: HOSPADM

## 2020-02-08 RX ORDER — ONDANSETRON 4 MG/1
4 TABLET, ORALLY DISINTEGRATING ORAL EVERY 4 HOURS PRN
Status: DISCONTINUED | OUTPATIENT
Start: 2020-02-08 | End: 2020-02-08

## 2020-02-08 RX ORDER — SODIUM CHLORIDE, SODIUM LACTATE, POTASSIUM CHLORIDE, AND CALCIUM CHLORIDE .6; .31; .03; .02 G/100ML; G/100ML; G/100ML; G/100ML
1000 INJECTION, SOLUTION INTRAVENOUS ONCE
Status: COMPLETED | OUTPATIENT
Start: 2020-02-08 | End: 2020-02-08

## 2020-02-08 RX ORDER — ALLOPURINOL 300 MG/1
300 TABLET ORAL DAILY
Status: DISCONTINUED | OUTPATIENT
Start: 2020-02-08 | End: 2020-02-14 | Stop reason: HOSPADM

## 2020-02-08 RX ORDER — ACETAMINOPHEN 160 MG/5ML
650 SOLUTION ORAL EVERY 4 HOURS PRN
Status: DISCONTINUED | OUTPATIENT
Start: 2020-02-08 | End: 2020-02-14 | Stop reason: HOSPADM

## 2020-02-08 RX ORDER — PROMETHAZINE HYDROCHLORIDE 6.25 MG/5ML
12.5 SYRUP ORAL EVERY 4 HOURS PRN
Status: DISCONTINUED | OUTPATIENT
Start: 2020-02-08 | End: 2020-02-14 | Stop reason: HOSPADM

## 2020-02-08 RX ORDER — SODIUM CHLORIDE 0.9 % (FLUSH) 0.9 %
10 SYRINGE (ML) INJECTION PRN
Status: DISCONTINUED | OUTPATIENT
Start: 2020-02-08 | End: 2020-02-14 | Stop reason: HOSPADM

## 2020-02-08 RX ORDER — CALCIUM CARBONATE 200(500)MG
1000 TABLET,CHEWABLE ORAL 3 TIMES DAILY PRN
Status: DISCONTINUED | OUTPATIENT
Start: 2020-02-08 | End: 2020-02-14 | Stop reason: HOSPADM

## 2020-02-08 RX ORDER — AMIODARONE HYDROCHLORIDE 200 MG/1
200 TABLET ORAL DAILY
Status: DISCONTINUED | OUTPATIENT
Start: 2020-02-08 | End: 2020-02-14 | Stop reason: HOSPADM

## 2020-02-08 RX ORDER — CIPROFLOXACIN 500 MG/1
500 TABLET, FILM COATED ORAL 2 TIMES DAILY
Status: ON HOLD | COMMUNITY
End: 2020-02-14 | Stop reason: HOSPADM

## 2020-02-08 RX ORDER — DEXTROSE MONOHYDRATE 25 G/50ML
12.5 INJECTION, SOLUTION INTRAVENOUS PRN
Status: DISCONTINUED | OUTPATIENT
Start: 2020-02-08 | End: 2020-02-14 | Stop reason: HOSPADM

## 2020-02-08 RX ORDER — 0.9 % SODIUM CHLORIDE 0.9 %
500 INTRAVENOUS SOLUTION INTRAVENOUS ONCE
Status: COMPLETED | OUTPATIENT
Start: 2020-02-08 | End: 2020-02-08

## 2020-02-08 RX ORDER — FENOFIBRATE 200 MG/1
200 CAPSULE ORAL
COMMUNITY
End: 2020-03-16 | Stop reason: SDUPTHER

## 2020-02-08 RX ORDER — POTASSIUM CHLORIDE 20 MEQ/1
40 TABLET, EXTENDED RELEASE ORAL PRN
Status: DISCONTINUED | OUTPATIENT
Start: 2020-02-08 | End: 2020-02-10

## 2020-02-08 RX ORDER — ATORVASTATIN CALCIUM 10 MG/1
20 TABLET, FILM COATED ORAL NIGHTLY
Status: DISCONTINUED | OUTPATIENT
Start: 2020-02-08 | End: 2020-02-14 | Stop reason: HOSPADM

## 2020-02-08 RX ORDER — ACETAMINOPHEN 325 MG/1
650 TABLET ORAL EVERY 4 HOURS PRN
Status: DISCONTINUED | OUTPATIENT
Start: 2020-02-08 | End: 2020-02-14 | Stop reason: HOSPADM

## 2020-02-08 RX ORDER — ONDANSETRON 2 MG/ML
4 INJECTION INTRAMUSCULAR; INTRAVENOUS EVERY 4 HOURS PRN
Status: DISCONTINUED | OUTPATIENT
Start: 2020-02-08 | End: 2020-02-08

## 2020-02-08 RX ORDER — PANTOPRAZOLE SODIUM 40 MG/1
40 TABLET, DELAYED RELEASE ORAL
Status: DISCONTINUED | OUTPATIENT
Start: 2020-02-08 | End: 2020-02-14 | Stop reason: HOSPADM

## 2020-02-08 RX ORDER — FENOFIBRATE 160 MG/1
160 TABLET ORAL DAILY
Status: DISCONTINUED | OUTPATIENT
Start: 2020-02-08 | End: 2020-02-14 | Stop reason: HOSPADM

## 2020-02-08 RX ORDER — POTASSIUM CHLORIDE 7.45 MG/ML
10 INJECTION INTRAVENOUS PRN
Status: DISCONTINUED | OUTPATIENT
Start: 2020-02-08 | End: 2020-02-10

## 2020-02-08 RX ORDER — CARVEDILOL 6.25 MG/1
12.5 TABLET ORAL 2 TIMES DAILY WITH MEALS
Status: DISCONTINUED | OUTPATIENT
Start: 2020-02-08 | End: 2020-02-08

## 2020-02-08 RX ORDER — OXYCODONE HYDROCHLORIDE 5 MG/1
5 TABLET ORAL EVERY 6 HOURS PRN
Status: DISCONTINUED | OUTPATIENT
Start: 2020-02-08 | End: 2020-02-14 | Stop reason: HOSPADM

## 2020-02-08 RX ORDER — MAGNESIUM SULFATE 1 G/100ML
1 INJECTION INTRAVENOUS PRN
Status: DISCONTINUED | OUTPATIENT
Start: 2020-02-08 | End: 2020-02-10

## 2020-02-08 RX ORDER — MECLIZINE HCL 12.5 MG/1
25 TABLET ORAL 3 TIMES DAILY PRN
Status: DISCONTINUED | OUTPATIENT
Start: 2020-02-08 | End: 2020-02-14 | Stop reason: HOSPADM

## 2020-02-08 RX ORDER — PROMETHAZINE HYDROCHLORIDE 25 MG/ML
12.5 INJECTION, SOLUTION INTRAMUSCULAR; INTRAVENOUS EVERY 4 HOURS PRN
Status: DISCONTINUED | OUTPATIENT
Start: 2020-02-08 | End: 2020-02-14 | Stop reason: HOSPADM

## 2020-02-08 RX ORDER — INSULIN GLARGINE 100 [IU]/ML
10 INJECTION, SOLUTION SUBCUTANEOUS 2 TIMES DAILY
Status: DISCONTINUED | OUTPATIENT
Start: 2020-02-08 | End: 2020-02-14 | Stop reason: HOSPADM

## 2020-02-08 RX ORDER — LEVOTHYROXINE SODIUM 0.03 MG/1
25 TABLET ORAL DAILY
COMMUNITY
End: 2020-03-16 | Stop reason: SDUPTHER

## 2020-02-08 RX ORDER — ACETAMINOPHEN 650 MG/1
650 SUPPOSITORY RECTAL EVERY 4 HOURS PRN
Status: DISCONTINUED | OUTPATIENT
Start: 2020-02-08 | End: 2020-02-14 | Stop reason: HOSPADM

## 2020-02-08 RX ORDER — DEXTROSE MONOHYDRATE 50 MG/ML
100 INJECTION, SOLUTION INTRAVENOUS PRN
Status: DISCONTINUED | OUTPATIENT
Start: 2020-02-08 | End: 2020-02-14 | Stop reason: HOSPADM

## 2020-02-08 RX ORDER — CITALOPRAM 10 MG/1
10 TABLET ORAL EVERY MORNING
COMMUNITY
End: 2020-03-16 | Stop reason: SDUPTHER

## 2020-02-08 RX ORDER — PROMETHAZINE HYDROCHLORIDE 25 MG/1
12.5 TABLET ORAL EVERY 4 HOURS PRN
Status: DISCONTINUED | OUTPATIENT
Start: 2020-02-08 | End: 2020-02-14 | Stop reason: HOSPADM

## 2020-02-08 RX ORDER — FEBUXOSTAT 40 MG/1
40 TABLET, FILM COATED ORAL DAILY
Status: DISCONTINUED | OUTPATIENT
Start: 2020-02-08 | End: 2020-02-08

## 2020-02-08 RX ADMIN — AMIODARONE HYDROCHLORIDE 200 MG: 200 TABLET ORAL at 10:34

## 2020-02-08 RX ADMIN — PANTOPRAZOLE SODIUM 40 MG: 40 TABLET, DELAYED RELEASE ORAL at 06:38

## 2020-02-08 RX ADMIN — INSULIN GLARGINE 10 UNITS: 100 INJECTION, SOLUTION SUBCUTANEOUS at 02:46

## 2020-02-08 RX ADMIN — ATORVASTATIN CALCIUM 20 MG: 10 TABLET, FILM COATED ORAL at 22:56

## 2020-02-08 RX ADMIN — INSULIN GLARGINE 10 UNITS: 100 INJECTION, SOLUTION SUBCUTANEOUS at 22:56

## 2020-02-08 RX ADMIN — Medication 21.3 MILLICURIE: at 12:10

## 2020-02-08 RX ADMIN — SODIUM CHLORIDE, POTASSIUM CHLORIDE, SODIUM LACTATE AND CALCIUM CHLORIDE 1000 ML: 600; 310; 30; 20 INJECTION, SOLUTION INTRAVENOUS at 02:46

## 2020-02-08 RX ADMIN — OXYCODONE 5 MG: 5 TABLET ORAL at 17:26

## 2020-02-08 RX ADMIN — OXYCODONE 5 MG: 5 TABLET ORAL at 23:57

## 2020-02-08 RX ADMIN — FENOFIBRATE 160 MG: 160 TABLET ORAL at 10:34

## 2020-02-08 RX ADMIN — INSULIN LISPRO 5 UNITS: 100 INJECTION, SOLUTION INTRAVENOUS; SUBCUTANEOUS at 17:26

## 2020-02-08 RX ADMIN — SODIUM CHLORIDE 500 ML: 9 INJECTION, SOLUTION INTRAVENOUS at 08:26

## 2020-02-08 ASSESSMENT — PAIN SCALES - GENERAL
PAINLEVEL_OUTOF10: 7
PAINLEVEL_OUTOF10: 3

## 2020-02-08 ASSESSMENT — PAIN DESCRIPTION - PAIN TYPE: TYPE: ACUTE PAIN

## 2020-02-08 ASSESSMENT — PAIN DESCRIPTION - LOCATION: LOCATION: BUTTOCKS

## 2020-02-08 NOTE — PLAN OF CARE
Problem: Risk for Impaired Skin Integrity  Goal: Tissue integrity - skin and mucous membranes  Description  Structural intactness and normal physiological function of skin and  mucous membranes. Outcome: Ongoing  Note:   DTI present on admission- b/l heels & sacrum. Q2 Turns, lying on his side, heels elevated from bed. WC consult already placed, will continue to monitor.

## 2020-02-08 NOTE — H&P
Hospital Medicine History & Physical      Patient:  Kamlesh Sanchez  :   1950  MRN:   [id number]  Date of Service: 20    CHIEF COMPLAINT:  hematochezia    HISTORY OF PRESENT ILLNESS:    Kamlesh Sanchez is a 77 y.o. male. He was accepted in transfer from [facility] ER where he presented following one bloody stool at his SNF. He had another blood stool in the [facility]. He just had a long hospitalization  - 2020 during which he underwent re-do aortic valve replacement and ascending aortic graft replacement. His course was complicated and included one episode of hematochezia. He underwent flexible sigmoidoscopy at that time but no bleeding source was found. He has demonstrated a propensity to bleeding on several occasions in the past, so he was discharged with ASA 81mg daily and no other antiplatelet or anticoagulant agents. Patient describes the hematochezia as painless. He had been doing fairly well for the past 2 days. He describes a strong appetite and oral intake. He denies rigors, sweats, nausea, and diarrhea. In addition to the two blood stools described above, patient states he passed more blood while en route from [place] to [place]. Review of Systems:  All pertinent positives and negatives are as noted in the HPI section. All other systems were reviewed and are negative.     Past Medical History:   Diagnosis Date    CAD (coronary artery disease)     [facility] (ch[facility] disease) ([facility] [address]     Diabetes mellitus ([facility] [address]     Gout     Hemothorax, left 2020    Hyperlipidemia     Hypertension     Obesity, Class I, BMI 30.0-34.9 (see actual BMI) 2020    30.7    S/P thoracotomy 2020    for hemothorax    Status post partial removal of lung 2020    XENA    Thyroid disease        Past Surgical History:   Procedure Laterality Date    AORTIC VALVE REPLACEMENT  2011    Dr. Pearl Morales - redo w/23mm Magna Ease bioprosthetic valve    BRONCHOSCOPY N/A 1/9/2020    w/BAL, performed by Kash Escoto MD at [address] [place] [address] N/A 1/24/2020    BRONCHOSCOPY ALVEOLAR LAVAGE performed by Carolyne Trinidad MD at [address] [place] [address]  1/24/2020    BRONCHOSCOPY THERAPUTIC ASPIRATION INITIAL performed by Carolyne Trinidad MD at [facility] CABG WITH AORTIC VALVE REPLACEMENT  2001    date approximate, x1 to OM, AVR w/porcine valve    CARDIAC CATHETERIZATION  01/13/2020    Dr. Luis Alberto Gray Left 01/02/2020    Dr. Fabian Minaya - 3200 ml blood drained in 1000 ml increments    CORONARY ANGIOPLASTY WITH STENT PLACEMENT  12/07/2010    Dr. Osiris Cantrell. Roger - NURIS- 4.0 x 28 to Cx    NH ASCEND AORTA GRAFT W ROOT REPLACMENT. VALVE CONDUIT/CORON RECONSTR N/A 1/21/2020    Dr. 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 Benedicto Faustin - emergent w/evacuation of hematoma & chest wall hemostasis, pleural biopsy & XENA wedge resection    TRANSESOPHAGEAL ECHOCARDIOGRAM  01/21/2020    during ascending aorta replacement w/redo AVR    TRANSESOPHAGEAL ECHOCARDIOGRAM  12/07/2010         Prior to Admission medications    Medication Sig Start Date End Date Taking? Authorizing Provider   oxyCODONE (ROXICODONE) 5 MG immediate release tablet Take 1 tablet by mouth every 6 hours as needed (acute post op pain) for up to 7 days.  2/5/20 2/12/20  Charmayne Billing, APRN - CNP   amiodarone (CORDARONE) 200 MG tablet Take 1 tablet by mouth daily for 21 days 2/6/20 2/27/20  Charmayne Billing, APRN - CNP   atorvastatin (LIPITOR) 20 MG tablet Take 1 tablet by mouth nightly 2/5/20   Charmayne Billing, APRN - CNP   docusate sodium (COLACE, DULCOLAX) 100 MG CAPS Take 100 mg by mouth 2 times daily for 7 days 2/5/20 2/12/20  Charmayne Billing, APRN - CNP pantoprazole (PROTONIX) 40 MG tablet Take 1 tablet by mouth every morning (before breakfast) 2/6/20 3/7/20  MIKO العلي CNP   carvedilol (COREG) 12.5 MG tablet Take 1 tablet by mouth 2 times daily (with meals) 2/5/20   MIKO العلي CNP   choline fenofibrate (TRILIPIX) 135 MG CPDR delayed release capsule Take 135 mg by mouth daily    Historical Provider, MD   febuxostat (ULORIC) 40 MG TABS tablet Take 40 mg by mouth daily    Historical Provider, MD   glipiZIDE (GLUCOTROL) 5 MG tablet Take 1 tablet by mouth daily    Historical Provider, MD   meclizine (ANTIVERT) 25 MG tablet Take 1 tablet by mouth daily    Historical Provider, MD   allopurinol (ZYLOPRIM) 300 MG tablet Take 300 mg by mouth daily    Historical Provider, MD       Allergies:   Penicillins    Social:   reports that he has been smoking cigarettes. He has been smoking about 2.00 packs per day. He uses smokeless tobacco.   reports current alcohol use of about 6.0 standard drinks of alcohol per week. Social History     Substance and Sexual Activity   Drug Use Not Currently       History reviewed. No pertinent family history. PHYSICAL EXAM:  I performed this physical examination. Vitals:  Patient Vitals for the past 24 hrs:   BP Temp Temp src Pulse Resp SpO2 Height Weight   02/08/20 0045 115/68 98.2 °F (36.8 °C) Oral 75 18 98 % 5' 9\" (1.753 m) 179 lb (81.2 kg)     GEN:  Appearance:  Age appropriate . Level of Consciousness:  alert . Orientation:  full    HEENT: Sclera anicteric.  no conjunctival chemosis. moist mucus membranes. no specific or diagnostic oral lesions. NECK:  No signs of meningismus. Jugular veins not distended. Carotid pulses  2+.  no cervical lymphadenopathy. no thyromegaly. CV:  regular rhythm. Normal S1 & crisp S2.    2/6 systolic ejection murmur. CHEST: Sternotomy incision margins well approximated and appear to be healing normally. Stable sternum    PULM:  Chest excursion is symmetric. Breath sounds are vesicular. Crackles are absent. Wheezes are absent. Rubs are absent. AB:  Abdominal shape is obese. Bowel sounds are active. Generally soft to palpation. no tenderness is present. no involuntary guarding. no rebound guarding. RECTAL: Didier Forward blood pooled in perineum and adult diaper. :      EXTR:  Skin is warm. Capillary refill brisk. no specific or pathognomic rash. no clubbing. no pitting edema. no active wound or ulcer. LABS:  [place] [facility] labs reviewed. Hgb = 7.7 g/dL. Discharge Hgb on 2/5 was 7.8 g/dL. IMAGING:  None this admission. EKG:  None this admission. Active Hospital Problems    Diagnosis Date Noted    Type 2 diabetes mellitus, without long-term current use of insulin ([facility] [address] [E11.9] 02/08/2020    Lower gastrointestinal hemorrhage [K92.2] 02/07/2020    Hyperlipidemia [E78.5] 01/02/2020    S/P AVR (aortic valve replacement) [Z95.2] 12/27/2011    HTN (hypertension) [I10] 12/03/2010       ASSESSMENT & PLAN  Hold aspirin. Observe for evidence of ongoing bleeding. H&H q6h. Blood product support as needed. Received 1U prBC at [facility]. Will request tagged RBC scan and GI input. NPO x meds and ice chips. Maintenance LR infusion. Otherwise continue remainder of outpatient regimen including allopurinol, amiodarone, atorvastatin, carvedilol, fenofibrate and pantoprazole. Hold all oral antidiabetic agents. Start s.c. Insulin regimen based on the working regimen at the time of his discharge.     DVT prophylaxis: SCDs only  Code Status:  Full  Disposition:  Observation    Maurice Farris MD

## 2020-02-08 NOTE — PROGRESS NOTES
/48 HR 69. Patient alert and oriented to self only. Bolus infusing, will reassess. Bed alarm remains in use. Will monitor.

## 2020-02-08 NOTE — PROGRESS NOTES
Patient transferred to Nuclear med. Family and nuclear med aware patient to be transferred to . Belongings taken to  by Patricio Burch.

## 2020-02-09 LAB
ANION GAP SERPL CALCULATED.3IONS-SCNC: 8 MMOL/L (ref 3–16)
BASOPHILS ABSOLUTE: 0 K/UL (ref 0–0.2)
BASOPHILS RELATIVE PERCENT: 0.4 %
BLOOD BANK DISPENSE STATUS: NORMAL
BLOOD BANK PRODUCT CODE: NORMAL
BPU ID: NORMAL
BUN BLDV-MCNC: 20 MG/DL (ref 7–20)
CALCIUM SERPL-MCNC: 8.3 MG/DL (ref 8.3–10.6)
CHLORIDE BLD-SCNC: 111 MMOL/L (ref 99–110)
CO2: 26 MMOL/L (ref 21–32)
CREAT SERPL-MCNC: 0.6 MG/DL (ref 0.8–1.3)
DESCRIPTION BLOOD BANK: NORMAL
EOSINOPHILS ABSOLUTE: 0.1 K/UL (ref 0–0.6)
EOSINOPHILS RELATIVE PERCENT: 1.5 %
GFR AFRICAN AMERICAN: >60
GFR NON-AFRICAN AMERICAN: >60
GLUCOSE BLD-MCNC: 104 MG/DL (ref 70–99)
GLUCOSE BLD-MCNC: 130 MG/DL (ref 70–99)
GLUCOSE BLD-MCNC: 141 MG/DL (ref 70–99)
GLUCOSE BLD-MCNC: 193 MG/DL (ref 70–99)
GLUCOSE BLD-MCNC: 91 MG/DL (ref 70–99)
HCT VFR BLD CALC: 21.1 % (ref 40.5–52.5)
HCT VFR BLD CALC: 21.5 % (ref 40.5–52.5)
HCT VFR BLD CALC: 24.1 % (ref 40.5–52.5)
HEMOGLOBIN: 6.9 G/DL (ref 13.5–17.5)
HEMOGLOBIN: 7 G/DL (ref 13.5–17.5)
HEMOGLOBIN: 7.9 G/DL (ref 13.5–17.5)
LYMPHOCYTES ABSOLUTE: 1.2 K/UL (ref 1–5.1)
LYMPHOCYTES RELATIVE PERCENT: 18.6 %
MAGNESIUM: 2 MG/DL (ref 1.8–2.4)
MCH RBC QN AUTO: 29.6 PG (ref 26–34)
MCHC RBC AUTO-ENTMCNC: 32.9 G/DL (ref 31–36)
MCV RBC AUTO: 90.1 FL (ref 80–100)
MONOCYTES ABSOLUTE: 0.5 K/UL (ref 0–1.3)
MONOCYTES RELATIVE PERCENT: 6.8 %
NEUTROPHILS ABSOLUTE: 4.9 K/UL (ref 1.7–7.7)
NEUTROPHILS RELATIVE PERCENT: 72.7 %
PDW BLD-RTO: 18.9 % (ref 12.4–15.4)
PERFORMED ON: ABNORMAL
PLATELET # BLD: 79 K/UL (ref 135–450)
PMV BLD AUTO: 9.2 FL (ref 5–10.5)
POTASSIUM SERPL-SCNC: 4.1 MMOL/L (ref 3.5–5.1)
RBC # BLD: 2.34 M/UL (ref 4.2–5.9)
SODIUM BLD-SCNC: 145 MMOL/L (ref 136–145)
WBC # BLD: 6.7 K/UL (ref 4–11)

## 2020-02-09 PROCEDURE — 6370000000 HC RX 637 (ALT 250 FOR IP): Performed by: INTERNAL MEDICINE

## 2020-02-09 PROCEDURE — 36415 COLL VENOUS BLD VENIPUNCTURE: CPT

## 2020-02-09 PROCEDURE — 2580000003 HC RX 258: Performed by: INTERNAL MEDICINE

## 2020-02-09 PROCEDURE — 36430 TRANSFUSION BLD/BLD COMPNT: CPT

## 2020-02-09 PROCEDURE — 80048 BASIC METABOLIC PNL TOTAL CA: CPT

## 2020-02-09 PROCEDURE — 85014 HEMATOCRIT: CPT

## 2020-02-09 PROCEDURE — 2500000003 HC RX 250 WO HCPCS: Performed by: INTERNAL MEDICINE

## 2020-02-09 PROCEDURE — P9016 RBC LEUKOCYTES REDUCED: HCPCS

## 2020-02-09 PROCEDURE — 2060000000 HC ICU INTERMEDIATE R&B

## 2020-02-09 PROCEDURE — 85018 HEMOGLOBIN: CPT

## 2020-02-09 PROCEDURE — 83735 ASSAY OF MAGNESIUM: CPT

## 2020-02-09 PROCEDURE — 85025 COMPLETE CBC W/AUTO DIFF WBC: CPT

## 2020-02-09 RX ORDER — POLYETHYLENE GLYCOL 3350 17 G/17G
120 POWDER, FOR SOLUTION ORAL ONCE
Status: COMPLETED | OUTPATIENT
Start: 2020-02-10 | End: 2020-02-10

## 2020-02-09 RX ORDER — LEVOTHYROXINE SODIUM 0.03 MG/1
25 TABLET ORAL DAILY
Status: DISCONTINUED | OUTPATIENT
Start: 2020-02-09 | End: 2020-02-14 | Stop reason: HOSPADM

## 2020-02-09 RX ORDER — POLYETHYLENE GLYCOL 3350 17 G/17G
120 POWDER, FOR SOLUTION ORAL ONCE
Status: COMPLETED | OUTPATIENT
Start: 2020-02-09 | End: 2020-02-09

## 2020-02-09 RX ORDER — HYDROPHILIC CREAM
PASTE (GRAM) TOPICAL DAILY
Status: DISCONTINUED | OUTPATIENT
Start: 2020-02-09 | End: 2020-02-14 | Stop reason: HOSPADM

## 2020-02-09 RX ORDER — 0.9 % SODIUM CHLORIDE 0.9 %
20 INTRAVENOUS SOLUTION INTRAVENOUS ONCE
Status: COMPLETED | OUTPATIENT
Start: 2020-02-09 | End: 2020-02-09

## 2020-02-09 RX ADMIN — FENOFIBRATE 160 MG: 160 TABLET ORAL at 08:07

## 2020-02-09 RX ADMIN — AMIODARONE HYDROCHLORIDE 200 MG: 200 TABLET ORAL at 08:07

## 2020-02-09 RX ADMIN — INSULIN GLARGINE 10 UNITS: 100 INJECTION, SOLUTION SUBCUTANEOUS at 23:10

## 2020-02-09 RX ADMIN — INSULIN LISPRO 5 UNITS: 100 INJECTION, SOLUTION INTRAVENOUS; SUBCUTANEOUS at 17:22

## 2020-02-09 RX ADMIN — OXYCODONE 5 MG: 5 TABLET ORAL at 23:10

## 2020-02-09 RX ADMIN — ALLOPURINOL 300 MG: 300 TABLET ORAL at 08:07

## 2020-02-09 RX ADMIN — POLYETHYLENE GLYCOL 3350 120 G: 17 POWDER, FOR SOLUTION ORAL at 18:50

## 2020-02-09 RX ADMIN — BISACODYL 20 MG: 5 TABLET, COATED ORAL at 17:19

## 2020-02-09 RX ADMIN — ATORVASTATIN CALCIUM 20 MG: 10 TABLET, FILM COATED ORAL at 23:10

## 2020-02-09 RX ADMIN — Medication: at 14:29

## 2020-02-09 RX ADMIN — INSULIN LISPRO 1 UNITS: 100 INJECTION, SOLUTION INTRAVENOUS; SUBCUTANEOUS at 13:10

## 2020-02-09 RX ADMIN — INSULIN LISPRO 5 UNITS: 100 INJECTION, SOLUTION INTRAVENOUS; SUBCUTANEOUS at 08:09

## 2020-02-09 RX ADMIN — INSULIN LISPRO 5 UNITS: 100 INJECTION, SOLUTION INTRAVENOUS; SUBCUTANEOUS at 13:05

## 2020-02-09 RX ADMIN — LEVOTHYROXINE SODIUM 25 MCG: 25 TABLET ORAL at 10:59

## 2020-02-09 RX ADMIN — SODIUM CHLORIDE 20 ML: 9 INJECTION, SOLUTION INTRAVENOUS at 09:46

## 2020-02-09 RX ADMIN — INSULIN GLARGINE 10 UNITS: 100 INJECTION, SOLUTION SUBCUTANEOUS at 08:09

## 2020-02-09 ASSESSMENT — PAIN SCALES - GENERAL: PAINLEVEL_OUTOF10: 0

## 2020-02-09 NOTE — PROGRESS NOTES
Called Dr. Mikaela Hart with CT of ABD results from [facility] done on 2-7-20. Order to D/C CT ordered for today.

## 2020-02-09 NOTE — CONSULTS
Attempted to assess patient 2/8 but pt transferred to C4; spoke to primary RN on 2/9 regarding sacral wound; per RN the wound has a strong odor and slough; recommended consult to general surgery for possible sharp debridement; also suggested order for Triad cream for wound; mattress ordered but patient in Environmental precautions; recommended attaching air pump to mattress. Will assess pt tomorrow 2/10.

## 2020-02-09 NOTE — PROGRESS NOTES
BLOODU Negative 01/20/2020    SPECGRAV 1.010 01/20/2020    GLUCOSEU Negative 01/20/2020       Radiology:  NM GI BLOOD LOSS   Final Result   No evidence of active GI bleeding during acquisition. RECOMMENDATIONS:   If the patient shows hemodynamic signs of an active bleed in the next 20   hours, additional images can be acquired. CT ABDOMEN PELVIS W IV CONTRAST Additional Contrast? None    (Results Pending)           Assessment/Plan:    Active Hospital Problems    Diagnosis    Lower gastrointestinal hemorrhage [K92.2]     Priority: High    Bright red blood per rectum [K62.5]     Priority: Medium    Type 2 diabetes mellitus, without long-term current use of insulin (HCC) [E11.9]    Hyperlipidemia [E78.5]    S/P AVR (aortic valve replacement) [Z95.2]    HTN (hypertension) [I10]       LGIB : recurrent. Unclear etiology. Tagged rbc scan neg. GI  recs appreciated. CT abd pending. Monitor for any recurrent bleed. Aspirin held      Acute blood loss anemia: likely due to LGIB. S/p 1PRBC at outside ER.   hgb 6.9 today. 1 PRBC ordered. Monitor hgb and transfuse PRN to keep hgb >7. Decubitus ulcers: sacral ulcer malodorous and indurated- may need debridement. Gen surg and wound care consulted. DMII : Continue Lantus and sliding scale. Hypertension : Coreg held due to borderline low BP in light of GI bleed. BP stable currently. S/p TAVR on 1/21/2020. On amiodarone, aspirin held due to GIB. DVT Prophylaxis: SCDs  Diet: DIET CLEAR LIQUID;  Code Status: Full Code    PT/OT Eval Status: Not indicated at this time    Dispo -hard to say,  pending clinical course.         Harlan Sifuentes MD

## 2020-02-09 NOTE — PROGRESS NOTES
Was asked by pt's nurse, Christie Garland, to obtain report from 2/79/20 of CT of abdomen/pelvis from [facility] @ 1230 2/9/20. Spoke with Fani Batista, imaging staff @ [facility] & report faxed to C4 nurse's station. Report placed in pocket of pt's soft chart @ nurse's station @ 1320 2/9/20. Pt's nurse Christie Garland, aware. -[phone] [facility]

## 2020-02-09 NOTE — CONSULTS
hematuria  Neurological: negative for coordination problems, dizziness and gait problems  Behavioral/Psych: negative for anxiety, depression and mood swings    Physical Exam   BP (!) 114/45   Pulse 75   Temp 98.1 °F (36.7 °C) (Oral)   Resp 18   Ht 5' 9\" (1.753 m)   Wt 181 lb 10.5 oz (82.4 kg)   SpO2 95%   BMI 26.83 kg/m²     General appearance: alert, appears stated age and cooperative  Head: Normocephalic, without obvious abnormality, atraumatic  Eyes: conjunctivae/corneas clear. PERRL, EOM's intact. Fundi benign. Neck: no adenopathy and supple, symmetrical, trachea midline  Lungs: clear to auscultation bilaterally  Heart: regular rate and rhythm, S1, S2 normal, no murmur, click, rub or gallop  Abdomen: soft, non-tender; bowel sounds normal; no masses,  no organomegaly  Extremities: extremities normal, atraumatic, no cyanosis or edema    Lab and Imaging Review   Labs:  CBC:   Recent Labs     02/08/20  0309  02/08/20  1643 02/09/20  0018 02/09/20  0558   WBC 7.8  --   --   --  6.7   HGB 8.2*   < > 7.1* 7.0* 6.9*   HCT 25.5*   < > 22.3* 21.5* 21.1*   MCV 90.9  --   --   --  90.1   PLT 83*  --   --   --  79*    < > = values in this interval not displayed. BMP:   Recent Labs     02/08/20  0309 02/09/20  0558   * 145   K 4.5 4.1   * 111*   CO2 26 26   BUN 27* 20   CREATININE 0.9 0.6*     LIVER PROFILE:   Recent Labs     02/08/20 0309   AST 21   ALT 21   PROT 5.1*   BILITOT 0.9   ALKPHOS 73     PT/INR:   Recent Labs     02/08/20 0309   INR 1.43*       Assessment:     77year old male with history of HTN, HLD, CAD s/p stent, Aortic stenosis s/p valve replacement complicated by aortic root fistula requiring redo sternotomy replacement of ascending aorta and re do AVR, DM, admitted with painless hematochezia. Differential includes diverticulosis, colitis (ischemic), AVMs, polyps and malignancy    Plan:   1. Continue supportive care  2. Monitor Hgb  3. Observe for signs of bleeding  4.  Hold

## 2020-02-10 LAB
ANION GAP SERPL CALCULATED.3IONS-SCNC: 9 MMOL/L (ref 3–16)
BASOPHILS ABSOLUTE: 0 K/UL (ref 0–0.2)
BASOPHILS RELATIVE PERCENT: 0.3 %
BUN BLDV-MCNC: 11 MG/DL (ref 7–20)
CALCIUM SERPL-MCNC: 7.9 MG/DL (ref 8.3–10.6)
CHLORIDE BLD-SCNC: 105 MMOL/L (ref 99–110)
CO2: 24 MMOL/L (ref 21–32)
CREAT SERPL-MCNC: <0.5 MG/DL (ref 0.8–1.3)
EOSINOPHILS ABSOLUTE: 0.1 K/UL (ref 0–0.6)
EOSINOPHILS RELATIVE PERCENT: 0.9 %
FUNGUS (MYCOLOGY) CULTURE: ABNORMAL
FUNGUS (MYCOLOGY) CULTURE: ABNORMAL
FUNGUS STAIN: ABNORMAL
GFR AFRICAN AMERICAN: >60
GFR NON-AFRICAN AMERICAN: >60
GLUCOSE BLD-MCNC: 100 MG/DL (ref 70–99)
GLUCOSE BLD-MCNC: 127 MG/DL (ref 70–99)
GLUCOSE BLD-MCNC: 142 MG/DL (ref 70–99)
GLUCOSE BLD-MCNC: 72 MG/DL (ref 70–99)
GLUCOSE BLD-MCNC: 79 MG/DL (ref 70–99)
GLUCOSE BLD-MCNC: 83 MG/DL (ref 70–99)
GLUCOSE BLD-MCNC: 86 MG/DL (ref 70–99)
HCT VFR BLD CALC: 23.5 % (ref 40.5–52.5)
HEMOGLOBIN: 7.8 G/DL (ref 13.5–17.5)
LYMPHOCYTES ABSOLUTE: 1.4 K/UL (ref 1–5.1)
LYMPHOCYTES RELATIVE PERCENT: 19 %
MAGNESIUM: 1.8 MG/DL (ref 1.8–2.4)
MCH RBC QN AUTO: 29.7 PG (ref 26–34)
MCHC RBC AUTO-ENTMCNC: 33.1 G/DL (ref 31–36)
MCV RBC AUTO: 89.7 FL (ref 80–100)
MONOCYTES ABSOLUTE: 0.6 K/UL (ref 0–1.3)
MONOCYTES RELATIVE PERCENT: 8.2 %
NEUTROPHILS ABSOLUTE: 5.3 K/UL (ref 1.7–7.7)
NEUTROPHILS RELATIVE PERCENT: 71.6 %
ORGANISM: ABNORMAL
ORGANISM: ABNORMAL
PDW BLD-RTO: 17.1 % (ref 12.4–15.4)
PERFORMED ON: ABNORMAL
PERFORMED ON: NORMAL
PLATELET # BLD: 77 K/UL (ref 135–450)
PMV BLD AUTO: 10.1 FL (ref 5–10.5)
POTASSIUM SERPL-SCNC: 3.8 MMOL/L (ref 3.5–5.1)
RBC # BLD: 2.62 M/UL (ref 4.2–5.9)
SODIUM BLD-SCNC: 138 MMOL/L (ref 136–145)
WBC # BLD: 7.4 K/UL (ref 4–11)

## 2020-02-10 PROCEDURE — 7100000011 HC PHASE II RECOVERY - ADDTL 15 MIN: Performed by: INTERNAL MEDICINE

## 2020-02-10 PROCEDURE — 80048 BASIC METABOLIC PNL TOTAL CA: CPT

## 2020-02-10 PROCEDURE — 99222 1ST HOSP IP/OBS MODERATE 55: CPT | Performed by: SURGERY

## 2020-02-10 PROCEDURE — 2709999900 HC NON-CHARGEABLE SUPPLY: Performed by: INTERNAL MEDICINE

## 2020-02-10 PROCEDURE — 83735 ASSAY OF MAGNESIUM: CPT

## 2020-02-10 PROCEDURE — 88305 TISSUE EXAM BY PATHOLOGIST: CPT

## 2020-02-10 PROCEDURE — 2580000003 HC RX 258: Performed by: INTERNAL MEDICINE

## 2020-02-10 PROCEDURE — 6370000000 HC RX 637 (ALT 250 FOR IP): Performed by: INTERNAL MEDICINE

## 2020-02-10 PROCEDURE — 94761 N-INVAS EAR/PLS OXIMETRY MLT: CPT

## 2020-02-10 PROCEDURE — 2060000000 HC ICU INTERMEDIATE R&B

## 2020-02-10 PROCEDURE — 6360000002 HC RX W HCPCS: Performed by: INTERNAL MEDICINE

## 2020-02-10 PROCEDURE — 99153 MOD SED SAME PHYS/QHP EA: CPT | Performed by: INTERNAL MEDICINE

## 2020-02-10 PROCEDURE — 85025 COMPLETE CBC W/AUTO DIFF WBC: CPT

## 2020-02-10 PROCEDURE — 7100000010 HC PHASE II RECOVERY - FIRST 15 MIN: Performed by: INTERNAL MEDICINE

## 2020-02-10 PROCEDURE — 2700000000 HC OXYGEN THERAPY PER DAY

## 2020-02-10 PROCEDURE — 0DBL8ZZ EXCISION OF TRANSVERSE COLON, VIA NATURAL OR ARTIFICIAL OPENING ENDOSCOPIC: ICD-10-PCS | Performed by: INTERNAL MEDICINE

## 2020-02-10 PROCEDURE — 99152 MOD SED SAME PHYS/QHP 5/>YRS: CPT | Performed by: INTERNAL MEDICINE

## 2020-02-10 PROCEDURE — 99024 POSTOP FOLLOW-UP VISIT: CPT | Performed by: THORACIC SURGERY (CARDIOTHORACIC VASCULAR SURGERY)

## 2020-02-10 PROCEDURE — 3609010600 HC COLONOSCOPY POLYPECTOMY SNARE/COLD BIOPSY: Performed by: INTERNAL MEDICINE

## 2020-02-10 RX ORDER — SODIUM CHLORIDE, SODIUM LACTATE, POTASSIUM CHLORIDE, CALCIUM CHLORIDE 600; 310; 30; 20 MG/100ML; MG/100ML; MG/100ML; MG/100ML
INJECTION, SOLUTION INTRAVENOUS CONTINUOUS
Status: DISCONTINUED | OUTPATIENT
Start: 2020-02-10 | End: 2020-02-11

## 2020-02-10 RX ORDER — CASTOR OIL AND BALSAM, PERU 788; 87 MG/G; MG/G
OINTMENT TOPICAL 2 TIMES DAILY
Status: DISCONTINUED | OUTPATIENT
Start: 2020-02-10 | End: 2020-02-14 | Stop reason: HOSPADM

## 2020-02-10 RX ORDER — MIDAZOLAM HYDROCHLORIDE 5 MG/ML
INJECTION INTRAMUSCULAR; INTRAVENOUS PRN
Status: DISCONTINUED | OUTPATIENT
Start: 2020-02-10 | End: 2020-02-10 | Stop reason: ALTCHOICE

## 2020-02-10 RX ORDER — SODIUM CHLORIDE, SODIUM LACTATE, POTASSIUM CHLORIDE, CALCIUM CHLORIDE 600; 310; 30; 20 MG/100ML; MG/100ML; MG/100ML; MG/100ML
INJECTION, SOLUTION INTRAVENOUS CONTINUOUS PRN
Status: COMPLETED | OUTPATIENT
Start: 2020-02-10 | End: 2020-02-10

## 2020-02-10 RX ORDER — FENTANYL CITRATE 50 UG/ML
INJECTION, SOLUTION INTRAMUSCULAR; INTRAVENOUS PRN
Status: DISCONTINUED | OUTPATIENT
Start: 2020-02-10 | End: 2020-02-10 | Stop reason: ALTCHOICE

## 2020-02-10 RX ADMIN — SODIUM CHLORIDE, POTASSIUM CHLORIDE, SODIUM LACTATE AND CALCIUM CHLORIDE: 600; 310; 30; 20 INJECTION, SOLUTION INTRAVENOUS at 15:59

## 2020-02-10 RX ADMIN — ATORVASTATIN CALCIUM 20 MG: 10 TABLET, FILM COATED ORAL at 23:10

## 2020-02-10 RX ADMIN — AMIODARONE HYDROCHLORIDE 200 MG: 200 TABLET ORAL at 10:25

## 2020-02-10 RX ADMIN — SODIUM CHLORIDE, POTASSIUM CHLORIDE, SODIUM LACTATE AND CALCIUM CHLORIDE: 600; 310; 30; 20 INJECTION, SOLUTION INTRAVENOUS at 13:11

## 2020-02-10 RX ADMIN — INSULIN LISPRO 1 UNITS: 100 INJECTION, SOLUTION INTRAVENOUS; SUBCUTANEOUS at 23:11

## 2020-02-10 RX ADMIN — OXYCODONE 5 MG: 5 TABLET ORAL at 11:47

## 2020-02-10 RX ADMIN — CASTOR OIL AND BALSAM, PERU: 788; 87 OINTMENT TOPICAL at 23:17

## 2020-02-10 RX ADMIN — POLYETHYLENE GLYCOL 3350 120 G: 17 POWDER, FOR SOLUTION ORAL at 04:05

## 2020-02-10 RX ADMIN — FENOFIBRATE 160 MG: 160 TABLET ORAL at 10:25

## 2020-02-10 RX ADMIN — Medication: at 10:26

## 2020-02-10 RX ADMIN — INSULIN GLARGINE 10 UNITS: 100 INJECTION, SOLUTION SUBCUTANEOUS at 23:11

## 2020-02-10 RX ADMIN — INSULIN GLARGINE 10 UNITS: 100 INJECTION, SOLUTION SUBCUTANEOUS at 11:40

## 2020-02-10 RX ADMIN — Medication 10 ML: at 23:20

## 2020-02-10 RX ADMIN — CASTOR OIL AND BALSAM, PERU: 788; 87 OINTMENT TOPICAL at 15:59

## 2020-02-10 RX ADMIN — ALLOPURINOL 300 MG: 300 TABLET ORAL at 10:25

## 2020-02-10 ASSESSMENT — PAIN SCALES - GENERAL
PAINLEVEL_OUTOF10: 7
PAINLEVEL_OUTOF10: 0
PAINLEVEL_OUTOF10: 7
PAINLEVEL_OUTOF10: 0
PAINLEVEL_OUTOF10: 7
PAINLEVEL_OUTOF10: 0

## 2020-02-10 ASSESSMENT — PAIN DESCRIPTION - PAIN TYPE
TYPE: CHRONIC PAIN
TYPE: CHRONIC PAIN

## 2020-02-10 ASSESSMENT — PAIN DESCRIPTION - LOCATION
LOCATION: SACRUM
LOCATION: SACRUM

## 2020-02-10 NOTE — CONSULTS
Department of General Surgery Consult    PATIENT NAME: Amita Riojas   YOB: 1950    ADMISSION DATE: 2/8/2020 12:54 AM      TODAY'S DATE: 2/10/2020    Reason for Consult:  Sacral wound    Chief Complaint: GI bleed    Requesting Physician:  Fengwjose angel    HISTORY OF PRESENT ILLNESS:              The patient is a 77 y.o. male who presents with GI bleed. Has had recent operations including replacement of aorta and redo AVR. Postop had GI bleed with sigmoidoscopy on 2/1 negative for active source. Tagged RBC negative. Undergoing colonoscopy today. Also with worsening sacral wound. 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 Ezekiel Childs - redo w/23mm Magna Ease bioprosthetic valve    BRONCHOSCOPY N/A 1/9/2020    w/BAL, performed by Jennifer Cuba MD at [address] [place] [address] N/A 1/24/2020    BRONCHOSCOPY ALVEOLAR LAVAGE performed by Elias Villalba MD at [address] [place] [address]  1/24/2020    BRONCHOSCOPY THERAPUTIC ASPIRATION INITIAL performed by Elias Villalba MD at [facility] CABG WITH AORTIC VALVE REPLACEMENT  2001    date approximate, x1 to OM, AVR w/porcine valve    CARDIAC CATHETERIZATION  01/13/2020    Dr. Brandi Cope Left 01/02/2020    Dr. Gomez Born - 3200 ml blood drained in 1000 ml increments    CORONARY ANGIOPLASTY WITH STENT PLACEMENT  12/07/2010    Dr. Patterson Chaim. Roger - NURIS- 4.0 x 28 to Cx    MS ASCEND AORTA GRAFT W ROOT REPLACMENT. VALVE CONDUIT/CORON RECONSTR N/A 1/21/2020    Dr. Mason Valverde - redo sternotomy x3, replacement of ascending aorta, redo AVR w/19mm Carrasquillo Intuity valve, closure of outflow tract of atrial fistula    SIGMOIDOSCOPY N/A 2/1/2020    emergent, performed by Pablito Gomez MD at [address] Left 1/2/2020    Dr. Dong Robin - emergent w/evacuation of hematoma & chest wall hemostasis, pleural biopsy & XENA wedge resection    TRANSESOPHAGEAL ECHOCARDIOGRAM  01/21/2020    during ascending aorta replacement w/redo AVR    TRANSESOPHAGEAL ECHOCARDIOGRAM  12/07/2010       Current Medications:   Current Facility-Administered Medications: lactated ringers infusion, , Intravenous, Continuous  Venelex ointment, , Topical, BID  lactated ringers infusion, , , Continuous PRN  fentaNYL (SUBLIMAZE) injection, , , PRN  midazolam (VERSED) injection, , , PRN  levothyroxine (SYNTHROID) tablet 25 mcg, 25 mcg, Oral, Daily  zinc oxide (TRIAD HYDROPHILIC) paste, , Topical, Daily  allopurinol (ZYLOPRIM) tablet 300 mg, 300 mg, Oral, Daily  amiodarone (CORDARONE) tablet 200 mg, 200 mg, Oral, Daily  atorvastatin (LIPITOR) tablet 20 mg, 20 mg, Oral, Nightly  fenofibrate tablet 160 mg, 160 mg, Oral, Daily  oxyCODONE (ROXICODONE) immediate release tablet 5 mg, 5 mg, Oral, Q6H PRN  pantoprazole (PROTONIX) tablet 40 mg, 40 mg, Oral, QAM AC  meclizine (ANTIVERT) tablet 25 mg, 25 mg, Oral, TID PRN  melatonin ER tablet 1 mg, 1 mg, Oral, Nightly PRN  sodium chloride flush 0.9 % injection 10 mL, 10 mL, Intravenous, PRN  glucose (GLUTOSE) 40 % oral gel 15 g, 15 g, Oral, PRN  dextrose 50 % IV solution, 12.5 g, Intravenous, PRN  glucagon (rDNA) injection 1 mg, 1 mg, Intramuscular, PRN  dextrose 5 % solution, 100 mL/hr, Intravenous, PRN  insulin glargine (LANTUS) injection vial 10 Units, 10 Units, Subcutaneous, BID  insulin lispro (HUMALOG) injection vial 5 Units, 5 Units, Subcutaneous, TID WC  insulin lispro (HUMALOG) injection vial 0-6 Units, 0-6 Units, Subcutaneous, TID WC  insulin lispro (HUMALOG) injection vial 0-3 Units, 0-3 Units, Subcutaneous, Nightly  acetaminophen (TYLENOL) tablet 650 mg, 650 mg, Oral, Q4H PRN  acetaminophen (TYLENOL) 160 MG/5ML solution 650 mg, 650 mg, Oral, Q4H PRN  acetaminophen (TYLENOL) suppository 650 mg, 650 mg, Rectal, Q4H PRN  promethazine (PHENERGAN) injection 12.5 mg, 12.5 mg, Intravenous, Q4H PRN  promethazine (PHENERGAN) 6.25 MG/5ML syrup 12.5 mg, 12.5 mg, Oral, Q4H PRN  promethazine (PHENERGAN) tablet 12.5 mg, 12.5 mg, Oral, Q4H PRN  calcium carbonate (TUMS) chewable tablet 1,000 mg, 1,000 mg, Oral, TID PRN  Prior to Admission medications    Medication Sig Start Date End Date Taking? Authorizing Provider   citalopram (CELEXA) 10 MG tablet Take 10 mg by mouth every morning   Yes Historical Provider, MD   ciprofloxacin (CIPRO) 500 MG tablet Take 500 mg by mouth 2 times daily   Yes Historical Provider, MD   fenofibrate micronized (LOFIBRA) 200 MG capsule Take 200 mg by mouth every morning (before breakfast)   Yes Historical Provider, MD   levothyroxine (SYNTHROID) 25 MCG tablet Take 25 mcg by mouth Daily   Yes Historical Provider, MD   oxyCODONE (ROXICODONE) 5 MG immediate release tablet Take 1 tablet by mouth every 6 hours as needed (acute post op pain) for up to 7 days.  2/5/20 2/12/20  Charmayne Billing, APRN - CNP   amiodarone (CORDARONE) 200 MG tablet Take 1 tablet by mouth daily for 21 days 2/6/20 2/27/20  Charmayne Billing, APRN - CNP   atorvastatin (LIPITOR) 20 MG tablet Take 1 tablet by mouth nightly 2/5/20   Charmayne Billing, APRN - CNP   docusate sodium (COLACE, DULCOLAX) 100 MG CAPS Take 100 mg by mouth 2 times daily for 7 days 2/5/20 2/12/20  Charmayne Billing, APRN - CNP   pantoprazole (PROTONIX) 40 MG tablet Take 1 tablet by mouth every morning (before breakfast) 2/6/20 3/7/20  Charmayne Billing, APRN - CNP   carvedilol (COREG) 12.5 MG tablet Take 1 tablet by mouth 2 times daily (with meals) 2/5/20   Charmayne Billing, APRN - CNP choline fenofibrate (TRILIPIX) 135 MG CPDR delayed release capsule Take 135 mg by mouth daily    Historical Provider, MD   febuxostat (ULORIC) 40 MG TABS tablet Take 40 mg by mouth daily    Historical Provider, MD   glipiZIDE (GLUCOTROL) 5 MG tablet Take 1 tablet by mouth daily    Historical Provider, MD   meclizine (ANTIVERT) 25 MG tablet Take 1 tablet by mouth daily    Historical Provider, MD   allopurinol (ZYLOPRIM) 300 MG tablet Take 300 mg by mouth daily    Historical Provider, MD        Allergies:  Penicillins    Social History:   TOBACCO:  yes  ETOH:   yes    Family History:    History reviewed. No pertinent family history. REVIEW OF SYSTEMS:  CONSTITUTIONAL:  negative  HEENT:  negative  RESPIRATORY:  negative  CARDIOVASCULAR:  negative  GASTROINTESTINAL:  negative except for hemtochezia  GENITOURINARY:  negative  HEMATOLOGIC/LYMPHATIC:  negative  NEUROLOGICAL:  Negative  * All other ROS reviewed and negative. PHYSICAL EXAM:  VITALS:  BP (!) 127/58   Pulse 64   Temp 98.2 °F (36.8 °C) (Oral)   Resp 17   Ht 5' 9\" (1.753 m)   Wt 182 lb 4.8 oz (82.7 kg)   SpO2 96%   BMI 26.92 kg/m²   24HR INTAKE/OUTPUT:    I/O last 3 completed shifts: In: 5288 [P.O.:2680; Blood:350]  Out: 775 [Urine:775]  No intake/output data recorded.     CONSTITUTIONAL:  alert, no apparent distress and normal weight  EYES:  PERRL, sclera clear  ENT:  Normocephalic,atraumatic, without obvious abnormality  NECK:  supple, symmetrical, trachea midline  LUNGS: Resp effort easy and unlabored  CARDIOVASCULAR:  NO JVD, regular rate  ABDOMEN:  , normal bowel sounds, soft, non-distended, non-tender, voluntary guarding absent,  MUSCULOSKELETAL: No clubbing or cyanosis, 0+ pitting edema lower extremities  NEUROLOGIC:  Mental Status Exam:  Level of Alertness:   awake  PSYCHIATRIC:   person, place, time  SKIN:  Large eschar to sacrum, tender    DATA:    CBC:   Recent Labs     02/08/20  0309  02/09/20  0558 02/09/20  1532 02/10/20  1840 WBC 7.8  --  6.7  --  7.4   HGB 8.2*   < > 6.9* 7.9* 7.8*   HCT 25.5*   < > 21.1* 24.1* 23.5*   PLT 83*  --  79*  --  77*    < > = values in this interval not displayed. BMP:    Recent Labs     02/08/20 0309 02/09/20  0558 02/10/20  0521   * 145 138   K 4.5 4.1 3.8   * 111* 105   CO2 26 26 24   BUN 27* 20 11   CREATININE 0.9 0.6* <0.5*   GLUCOSE 137* 91 86     Hepatic:   Recent Labs     02/08/20 0309   AST 21   ALT 21   BILITOT 0.9   ALKPHOS 73     Mag:      Recent Labs     02/08/20  0309 02/09/20  0558 02/10/20  0521   MG 2.00 2.00 1.80      Phos:   No results for input(s): PHOS in the last 72 hours. INR:   Recent Labs     02/08/20 0309   INR 1.43*       Radiology Review: Images personally reviewed by me. NA      IMPRESSION/RECOMMENDATIONS:    76 yo with sacral wound  1. Plan for debridement in OR tomorrow  2.   Discussed plan with patient and family along with need for postop wound care    Electronically signed by Gustavus Duverney, MD     [address] Surgery  [id number]

## 2020-02-10 NOTE — PROGRESS NOTES
bilaterally. Full range of motion without deformity. Skin: Skin color, texture, turgor normal.  No rashes or lesions. Neurologic:  Neurovascularly intact without any focal sensory/motor deficits. Cranial nerves: II-XII intact, grossly non-focal.  Psychiatric: Alert and oriented, thought content appropriate, normal insight  Capillary Refill: Brisk,< 3 seconds   Peripheral Pulses: +2 palpable, equal bilaterally       Labs:   Recent Labs     02/08/20  0309  02/09/20  0558 02/09/20  1532 02/10/20  0521   WBC 7.8  --  6.7  --  7.4   HGB 8.2*   < > 6.9* 7.9* 7.8*   HCT 25.5*   < > 21.1* 24.1* 23.5*   PLT 83*  --  79*  --  77*    < > = values in this interval not displayed. Recent Labs     02/08/20  0309 02/09/20  0558 02/10/20  0521   * 145 138   K 4.5 4.1 3.8   * 111* 105   CO2 26 26 24   BUN 27* 20 11   CREATININE 0.9 0.6* <0.5*   CALCIUM 8.1* 8.3 7.9*     Recent Labs     02/08/20  0309   AST 21   ALT 21   BILITOT 0.9   ALKPHOS 73     Recent Labs     02/08/20  0309   INR 1.43*     No results for input(s): CKTOTAL, TROPONINI in the last 72 hours. Urinalysis:      Lab Results   Component Value Date    NITRU Negative 01/20/2020    BLOODU Negative 01/20/2020    SPECGRAV 1.010 01/20/2020    GLUCOSEU Negative 01/20/2020       Consults:    IP CONSULT TO SOCIAL WORK  IP CONSULT TO GI  IP CONSULT TO GENERAL SURGERY      Assessment/Plan:    Active Hospital Problems    Diagnosis    Type 2 diabetes mellitus, without long-term current use of insulin (HCC) [E11.9]    Bright red blood per rectum [K62.5]    Lower gastrointestinal hemorrhage [K92.2]    Hyperlipidemia [E78.5]    S/P AVR (aortic valve replacement) [Z95.2]    HTN (hypertension) [I10]       LGIB - recurrent, of unclear etiology w/ tagged RBC scan negative. GI consulted and appreciated. Monitor for any recurrent bleed. Aspirin held      Anemia - 2nd to acute GI blood loss w/out evidence of ongoing hemodynamically active bleeding/hemolysis.   S/P

## 2020-02-10 NOTE — PROGRESS NOTES
POCT      Blood Glucose:      (Normal Range 70-99)  79    PT:      (Normal Range 9.8 - 13)    INR:      (Normal Range 0.86-1.14)

## 2020-02-10 NOTE — PROGRESS NOTES
[address] [place] [address] or Facility: [facility] From: Chelle Paniagua RE: efraín cesar 1950 RM: [id number] patient had AVR repair in Jan of this year, the family stated his follow up with cardiothoracic was suppose to be today, would you be able to consult cardiothoracic so they could follow up with him here?  Thank you Need Callback: NO CALLBACK REQ C4 PROGRESSIVE CARE    Sent to Dr Ruby Santizo via [employer]

## 2020-02-10 NOTE — PROGRESS NOTES
4 Eyes Skin Assessment     The patient is being assess for   Shift Handoff    I agree that 2 RN's have performed a thorough Head to Toe Skin Assessment on the patient. ALL assessment sites listed below have been assessed. Areas assessed by both nurses:   [x]   Head, Face, and Ears   [x]   Shoulders, Back, and Chest, Abdomen  [x]   Arms, Elbows, and Hands   [x]   Coccyx, Sacrum, and Ischium  []   Legs, Feet, and Heels        No new skin issues (has existing wounds to buttocks/sacrum and bilateral heels). **SHARE this note so that the co-signing nurse is able to place an eSignature**    Co-signer eSignature:    Does the Patient have Skin Breakdown?   Yes but no NEW skin breakdown noted          Wes Prevention initiated:  Already in   Wound Care Orders initiated:  Already in      [facility] consulted for Pressure Injury (Stage 3,4, Unstageable, DTI, NWPT, Complex wounds)and New or Established Ostomies:  Yes      Primary Nurse eSignature: Electronically signed by May Morfin RN on 2/10/20 at 4:13 AM

## 2020-02-10 NOTE — CARE COORDINATION
CASE MANAGEMENT INITIAL ASSESSMENT      Reviewed chart and met with patient today, re: potential discharge needs   Explained Case Management role/services. Family present: none  Primary contact information: Josseline Amezcua [phone]    Admit date/status: 2/8/20 Inpatient   Diagnosis: bloody stool   Is this a Readmission?:  (If yes, why did you come back to the hospital? E.g. Alarming symptoms, medication issues, PCP recs) yes, long length of stay last admission for shortness of breath     Insurance: [address] required for SNF - Y        3 night stay required -  N    Living arrangements, Adls, care needs, prior to admission: normally lives alone in a mobile home, independent at home     Transportation: [place]    [address] at home: through facility     Services in the home and/or outpatient, prior to admission: through facility     Dialysis Facility (if applicable)   · Name:  · Address:  · Dialysis Schedule:  · Phone:  · Fax:    PT/OT recs: none as of yet, will need to return to SNF, sticky note left on chart     [address] Notification (HEN): not needed to return to [facility]     Barriers to discharge: none    Plan/comments: Patient came skilled from [facility]. The plan is to return when medically stable. Will need updated PT/OT evals to initiate precert with [employer] to return to [facility]. Patient with GI bleed and hypotension and scheduled for colonoscopy later today. Will continue to follow and initiate precert with [employer] at appropriate time as turn around time will likely be same day.      ECOC on chart for MD signature

## 2020-02-10 NOTE — H&P
Current Medications:    Prior to Admission medications    Medication Sig Start Date End Date Taking? Authorizing Provider   citalopram (CELEXA) 10 MG tablet Take 10 mg by mouth every morning   Yes Historical Provider, MD   ciprofloxacin (CIPRO) 500 MG tablet Take 500 mg by mouth 2 times daily   Yes Historical Provider, MD   fenofibrate micronized (LOFIBRA) 200 MG capsule Take 200 mg by mouth every morning (before breakfast)   Yes Historical Provider, MD   levothyroxine (SYNTHROID) 25 MCG tablet Take 25 mcg by mouth Daily   Yes Historical Provider, MD   oxyCODONE (ROXICODONE) 5 MG immediate release tablet Take 1 tablet by mouth every 6 hours as needed (acute post op pain) for up to 7 days.  2/5/20 2/12/20  MIKO Gonzalez CNP   amiodarone (CORDARONE) 200 MG tablet Take 1 tablet by mouth daily for 21 days 2/6/20 2/27/20  MIKO Gonzalez CNP   atorvastatin (LIPITOR) 20 MG tablet Take 1 tablet by mouth nightly 2/5/20   MIKO Gonzalez CNP   docusate sodium (COLACE, DULCOLAX) 100 MG CAPS Take 100 mg by mouth 2 times daily for 7 days 2/5/20 2/12/20  MIKO Gonzalez CNP   pantoprazole (PROTONIX) 40 MG tablet Take 1 tablet by mouth every morning (before breakfast) 2/6/20 3/7/20  MIKO Gonzalez CNP   carvedilol (COREG) 12.5 MG tablet Take 1 tablet by mouth 2 times daily (with meals) 2/5/20   MIKO Gonzalez CNP   choline fenofibrate (TRILIPIX) 135 MG CPDR delayed release capsule Take 135 mg by mouth daily    Historical Provider, MD   febuxostat (ULORIC) 40 MG TABS tablet Take 40 mg by mouth daily    Historical Provider, MD   glipiZIDE (GLUCOTROL) 5 MG tablet Take 1 tablet by mouth daily    Historical Provider, MD   meclizine (ANTIVERT) 25 MG tablet Take 1 tablet by mouth daily    Historical Provider, MD   allopurinol (ZYLOPRIM) 300 MG tablet Take 300 mg by mouth daily    Historical Provider, MD         Current Facility-Administered Medications:     lactated ringers infusion, , Intravenous, Continuous, Angel Borja Gray, DO, Last Rate: 100 mL/hr at 02/10/20 1311    Venelex ointment, , Topical, BID, Nakul Scherer MD    levothyroxine (SYNTHROID) tablet 25 mcg, 25 mcg, Oral, Daily, Herlinda Serna MD, 25 mcg at 02/09/20 1059    zinc oxide (TRIAD HYDROPHILIC) paste, , Topical, Daily, Herlinda Serna MD    allopurinol (ZYLOPRIM) tablet 300 mg, 300 mg, Oral, Daily, Herlinda Serna MD, 300 mg at 02/10/20 1025    amiodarone (CORDARONE) tablet 200 mg, 200 mg, Oral, Daily, Herlinda Serna MD, 200 mg at 02/10/20 1025    atorvastatin (LIPITOR) tablet 20 mg, 20 mg, Oral, Nightly, Herlinda Serna MD, 20 mg at 02/09/20 2310    fenofibrate tablet 160 mg, 160 mg, Oral, Daily, Herlinda Serna MD, 160 mg at 02/10/20 1025    oxyCODONE (ROXICODONE) immediate release tablet 5 mg, 5 mg, Oral, Q6H PRN, Herlinda Serna MD, 5 mg at 02/10/20 1147    pantoprazole (PROTONIX) tablet 40 mg, 40 mg, Oral, QAM AC, Herlinda Serna MD, Stopped at 02/09/20 0700    meclizine (ANTIVERT) tablet 25 mg, 25 mg, Oral, TID PRN, Eugenio Serna MD    melatonin ER tablet 1 mg, 1 mg, Oral, Nightly PRN, Eugenio Serna MD    sodium chloride flush 0.9 % injection 10 mL, 10 mL, Intravenous, PRN, Herlinda Senra MD    glucose (GLUTOSE) 40 % oral gel 15 g, 15 g, Oral, PRN, Herlinda Serna MD    dextrose 50 % IV solution, 12.5 g, Intravenous, PRN, Herlinda Serna MD    glucagon (rDNA) injection 1 mg, 1 mg, Intramuscular, PRN, Herlinda Serna MD    dextrose 5 % solution, 100 mL/hr, Intravenous, PRN, Herlinda Serna MD    insulin glargine (LANTUS) injection vial 10 Units, 10 Units, Subcutaneous, BID, Herlinda Serna MD, 10 Units at 02/10/20 1140    insulin lispro (HUMALOG) injection vial 5 Units, 5 Units, Subcutaneous, TID WC, Herlinda Serna MD, 5 Units at 02/09/20 1722    insulin lispro (HUMALOG) injection vial 0-6 Units, 0-6 Units, Subcutaneous, TID Herlinda ROSENBERG CREATININE 0.9 0.6* <0.5*     LIVER PROFILE:   Recent Labs     02/08/20  0309   AST 21   ALT 21   PROT 5.1*   BILITOT 0.9   ALKPHOS 73     PT/INR:   Recent Labs     02/08/20  0309   INR 1.43*       Pre-Procedure Assessment / Plan:  ASA: Class 3 - A patient with severe systemic disease that limits activity but is not incapacitating  Airway: Mallampati: II (soft palate, uvula, fauces visible)  Level of Sedation Plan: Moderate sedation  Post Procedure plan: Return to same level of care      Plan:   1. Colonoscopy    I assessed the patient and find that the patient is in satisfactory condition to proceed with the planned procedure and sedation plan. I have explained the risk, benefits, and alternatives to the procedure; the patient understands and agrees to proceed.        Nader Ortez  2/10/2020

## 2020-02-10 NOTE — CONSULTS
[facility] Nurse  Consult Note       NAME:  Joanne Lopez  MEDICAL RECORD NUMBER:  [id number]  AGE: 77 y.o. GENDER: male  : 1950  TODAY'S DATE:  2/10/2020    Subjective: Pt familiar to Wound Care from previous admission   Reason for WOCN Evaluation and Assessment: DTI on b/l heels and buttocks      Joanne Lopez is a 77 y.o. male referred by:   [] Physician  [x] Nursing  [] Other:     Wound Identification:  Wound Type: pressure  Contributing Factors: diabetes, chronic pressure, decreased mobility, shear force and obesity    Wound History: Joanne Lopez is a 77 y.o. male. He was accepted in transfer from [facility] ER where he presented following one bloody stool at his SNF. He had another blood stool in the [facility] ER. He just had a long hospitalization  - 2020 during which he underwent re-do aortic valve replacement and ascending aortic graft replacement. His course was complicated and included one episode of hematochezia. He underwent flexible sigmoidoscopy at that time but no bleeding source was found. He has demonstrated a propensity to bleeding on several occasions in the past, so he was discharged with ASA 81mg daily and no other antiplatelet or anticoagulant agents.     Patient describes the hematochezia as painless. He had been doing fairly well for the past 2 days. He describes a strong appetite and oral intake. He denies rigors, sweats, nausea, and diarrhea. In addition to the two blood stools described above, patient states he passed more blood while en route from [facility] to [facility].   Current Wound Care Treatment: Sacrum/Coccyx - moist-moist dressing  Bilateral Heels - Venelex    Patient Goal of Care:  [x] Wound Healing  [x] Odor Control  [] Palliative Care  [] Pain Control   [] Other:         PAST MEDICAL HISTORY        Diagnosis Date    CAD (coronary artery disease)     [facility]     Diabetes mellitus ([facility]     Gout     Hemothorax, left 01/2020    Hyperlipidemia     Hypertension     Obesity, Class I, BMI 30.0-34.9 (see actual BMI) 01/2020    30.7    S/P thoracotomy 01/2020    for hemothorax    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 Lainez Standard - redo w/23mm Magna Ease bioprosthetic valve    BRONCHOSCOPY N/A 1/9/2020    w/BAL, performed by Silvia Rose MD at [address] [place] [address] N/A 1/24/2020    BRONCHOSCOPY ALVEOLAR LAVAGE performed by Isabell Damon MD at [address] [place] [address]  1/24/2020    BRONCHOSCOPY THERAPUTIC ASPIRATION INITIAL performed by Isabell Damon MD at [address] [facility] [facility] [place]G WITH AORTIC VALVE REPLACEMENT  2001    date approximate, x1 to OM, AVR w/porcine valve    CARDIAC CATHETERIZATION  01/13/2020    Dr. Cerda Lean Left 01/02/2020    Dr. Jason Santos - 3200 ml blood drained in 1000 ml increments    CORONARY ANGIOPLASTY WITH STENT PLACEMENT  12/07/2010    Dr. Ledesma [place]. [place] - NURIS- 4.0 x 28 to Cx    GA ASCEND AORTA GRAFT W ROOT REPLACMENT. VALVE CONDUIT/CORON RECONSTR N/A 1/21/2020    Dr. 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 Granda Postin - emergent w/evacuation of hematoma & chest wall hemostasis, pleural biopsy & XENA wedge resection    TRANSESOPHAGEAL ECHOCARDIOGRAM  01/21/2020    during ascending aorta replacement w/redo AVR    TRANSESOPHAGEAL ECHOCARDIOGRAM  12/07/2010       FAMILY HISTORY    History reviewed. No pertinent family history.     SOCIAL HISTORY    Social History     Tobacco Use    Smoking status: Current Every Day Smoker     Packs/day: 2.00     Types: Cigarettes    Smokeless tobacco: Current User   Substance Use Topics    Alcohol use: Yes     Alcohol/week: 6.0 standard drinks     Types: 6 Cans of beer per week     Frequency: 4 or more times a week     Drinks per session: 5 or 6     Binge frequency: Daily or almost daily    Drug use: Not Currently       ALLERGIES    Allergies   Allergen Reactions    Penicillins Hives       MEDICATIONS    No current facility-administered medications on file prior to encounter. Current Outpatient Medications on File Prior to Encounter   Medication Sig Dispense Refill    citalopram (CELEXA) 10 MG tablet Take 10 mg by mouth every morning      ciprofloxacin (CIPRO) 500 MG tablet Take 500 mg by mouth 2 times daily      fenofibrate micronized (LOFIBRA) 200 MG capsule Take 200 mg by mouth every morning (before breakfast)      levothyroxine (SYNTHROID) 25 MCG tablet Take 25 mcg by mouth Daily      oxyCODONE (ROXICODONE) 5 MG immediate release tablet Take 1 tablet by mouth every 6 hours as needed (acute post op pain) for up to 7 days.  28 tablet 0    amiodarone (CORDARONE) 200 MG tablet Take 1 tablet by mouth daily for 21 days 21 tablet 0    atorvastatin (LIPITOR) 20 MG tablet Take 1 tablet by mouth nightly 30 tablet 0    docusate sodium (COLACE, DULCOLAX) 100 MG CAPS Take 100 mg by mouth 2 times daily for 7 days 14 capsule 0    pantoprazole (PROTONIX) 40 MG tablet Take 1 tablet by mouth every morning (before breakfast) 30 tablet 0    carvedilol (COREG) 12.5 MG tablet Take 1 tablet by mouth 2 times daily (with meals) 60 tablet 0    choline fenofibrate (TRILIPIX) 135 MG CPDR delayed release capsule Take 135 mg by mouth daily      febuxostat (ULORIC) 40 MG TABS tablet Take 40 mg by mouth daily      glipiZIDE (GLUCOTROL) 5 MG tablet Take 1 tablet by mouth daily      meclizine (ANTIVERT) 25 MG tablet Take 1 tablet by mouth daily      allopurinol (ZYLOPRIM) 300 MG tablet Take 300 mg by mouth daily         Objective: Lying in bed; A/O    BP (!) 127/58   Pulse 64   Temp 98.2 °F (36.8 °C) (Oral)   Resp 17   Ht 5' 9\" (1.753 m)   Wt 182 lb 4.8 oz (82.7 kg)   SpO2 96%   BMI 26.92 kg/m²     LABS:  WBC:    Lab Results   Component Value Date    WBC 7.4 02/10/2020     H/H:    Lab Results   Component Value Date    HGB 7.8 02/10/2020    HCT 23.5 02/10/2020     PTT:    Lab Results   Component Value Date    APTT 34.8 02/01/2020   [APTT}  PT/INR:    Lab Results   Component Value Date    PROTIME 16.7 02/08/2020    INR 1.43 02/08/2020     HgBA1c:    Lab Results   Component Value Date    LABA1C 4.7 01/21/2020       Assessment: Sacrum? Coccyx - large area of yellow and brown slough; strong foul odor  Bilateral Heels - maroon/purple areas; dry and intact   Wes Risk Score: Wes Scale Score: 13    Patient Active Problem List   Diagnosis Code    Hemothorax J94.2    Tobacco abuse Z72.0    AS (aortic stenosis) I35.0    CAD (coronary artery disease) s/p stent in 2010 I25.10    HTN (hypertension) I10    Hyperlipidemia E78.5    S/P AVR (aortic valve replacement) Z95.2    Atelectasis J98.11    Leukocytosis D72.829    Hyperglycemia R73.9    Acute bronchospasm J98.01    Atrial fibrillation with RVR (HCC) I48.91    Acute on chronic diastolic congestive heart failure (HCC) I50.33    Myocardial infarction (HCC) I21.9    Obesity, Class I, BMI 30.0-34.9 (see actual BMI) E66.9    S/P thoracotomy Z98.890    Status post partial removal of lung Z90.2    Hemothorax, left J94.2    Restrictive lung disease J98.4    Hypernatremia E87.0    Lower gastrointestinal hemorrhage K92.2    Type 2 diabetes mellitus, without long-term current use of insulin (HCC) E11.9    Bright red blood per rectum K62.5       Measurements:  Wound 01/27/20 Buttocks Right;Mid;Left this is not perineum, it is R, Mid & L buttocks.   Evolved to Unstagable 2/1/20 (Active)   Wound Image   2/10/2020 12:16 PM   Wound Pressure Unstageable 2/10/2020 12:16 PM   Offloading for Diabetic Foot Ulcers Other (comment) 2/1/2020  3:00 PM   Dressing Status New drainage; Changed 2/10/2020 12:16 PM   Dressing Changed Changed/New 2/10/2020 12:16 PM   Dressing/Treatment Moist to moist;ABD 2/10/2020 12:16 PM   Wound Cleansed Rinsed/Irrigated with saline 2/10/2020 12:16 PM   Dressing Change Due 02/10/20 2/10/2020 12:16 PM   Wound Length (cm) 11 cm 2/10/2020 12:16 PM   Wound Width (cm) 8 cm 2/10/2020 12:16 PM   Wound Depth (cm) 0.4 cm 2/10/2020 12:16 PM   Wound Surface Area (cm^2) 88 cm^2 2/10/2020 12:16 PM   Change in Wound Size % (l*w) 49.43 2/10/2020 12:16 PM   Wound Volume (cm^3) 35.2 cm^3 2/10/2020 12:16 PM   Post-Procedure Length (cm) 0 cm 1/28/2020  9:19 AM   Post-Procedure Width (cm) 0 cm 1/28/2020  9:19 AM   Post-Procedure Depth (cm) 0 cm 1/28/2020  9:19 AM   Post-Procedure Surface Area (cm^2) 0 cm^2 1/28/2020  9:19 AM   Post-Procedure Volume (cm^3) 0 cm^3 1/28/2020  9:19 AM   Distance Tunneling (cm) 0 cm 2/1/2020  3:00 PM   Tunneling Position ___ O'Clock 0 2/1/2020  3:00 PM   Undermining Starts ___ O'Clock 12 2/10/2020 12:16 PM   Undermining Ends___ O'Clock 12 2/10/2020 12:16 PM   Undermining Maxium Distance (cm) .5 2/10/2020 12:16 PM   Wound Assessment Brown;Slough 2/10/2020 12:16 PM   Drainage Amount Moderate 2/10/2020 12:16 PM   Drainage Description Purulent 2/10/2020 12:16 PM   Odor Strong 2/10/2020 12:16 PM   Margins Attached edges; Defined edges 2/10/2020 12:16 PM   Luz-wound Assessment Non-blanchable erythema 2/10/2020 12:16 PM   Non-staged Wound Description Full thickness 2/10/2020 12:16 PM   Red%Wound Bed 30 2/5/2020  8:00 AM   Yellow%Wound Bed 100 2/10/2020 12:16 PM   Black%Wound Bed 60 2/5/2020  8:00 AM   Purple%Wound Bed 10 2/5/2020  8:00 AM   Culture Taken No 2/10/2020 12:16 PM   Number of days: 13    Sacrum/Coccyx:         Wound 01/28/20 Heel Right (Active)   Wound Image   2/10/2020 12:16 PM   Wound Deep tissue/Injury 2/10/2020 12:16 PM   Offloading for Diabetic Foot Ulcers Change Due 02/10/20 2/10/2020 12:16 PM   Wound Length (cm) 5 cm 2/10/2020 12:16 PM   Wound Width (cm) 4.5 cm 2/10/2020 12:16 PM   Wound Depth (cm) 0 cm 2/10/2020 12:16 PM   Wound Surface Area (cm^2) 22.5 cm^2 2/10/2020 12:16 PM   Change in Wound Size % (l*w) 0 2/10/2020 12:16 PM   Wound Volume (cm^3) 0 cm^3 2/10/2020 12:16 PM   Post-Procedure Length (cm) 0 cm 2/10/2020 12:16 PM   Post-Procedure Width (cm) 0 cm 2/10/2020 12:16 PM   Post-Procedure Depth (cm) 0 cm 2/10/2020 12:16 PM   Post-Procedure Surface Area (cm^2) 0 cm^2 2/10/2020 12:16 PM   Post-Procedure Volume (cm^3) 0 cm^3 2/10/2020 12:16 PM   Distance Tunneling (cm) 0 cm 2/10/2020 12:16 PM   Tunneling Position ___ O'Clock 0 2/10/2020 12:16 PM   Undermining Starts ___ O'Clock 0 2/10/2020 12:16 PM   Undermining Ends___ O'Clock 0 2/10/2020 12:16 PM   Undermining Maxium Distance (cm) 0 2/10/2020 12:16 PM   Wound Assessment Dry; Intact;Maroon; Purple 2/10/2020 12:16 PM   Drainage Amount None 2/10/2020 12:16 PM   Odor None 2/10/2020 12:16 PM   Margins Attached edges; Defined edges 2/10/2020 12:16 PM   Luz-wound Assessment Calloused 2/10/2020 12:16 PM   Purple%Wound Bed 100 2/10/2020 12:16 PM   Culture Taken No 2/10/2020 12:16 PM   Number of days: 13   L Heel:        Incision 01/02/20 Chest Lateral;Left;Lower (Active)   Wound Assessment Dry; Intact 2/9/2020  4:05 AM   Luz-wound Assessment Dry; Intact 2/8/2020  9:30 AM   Closure Steri-Strips 2/8/2020  1:21 AM   Drainage Amount None 2/9/2020  4:05 AM   Drainage Description Sanguinous 2/8/2020  1:21 AM   Odor None 2/8/2020  1:21 AM   Dressing/Treatment Steri-strips 2/9/2020 10:00 AM   Dressing Status Old drainage 2/8/2020  1:21 AM   Dressing Change Due 01/26/20 1/31/2020  4:00 AM   Number of days: 39       Incision 01/08/20 Back Lateral;Left (Active)   Wound Assessment Clean;Dry; Intact 2/8/2020  9:30 AM   Luz-wound Assessment Clean;Dry; Intact 2/8/2020  9:30 AM   Closure Approximated 2/8/2020  1:21 AM   Drainage Amount None 2/8/2020  9:30 AM   Odor None 2/8/2020  9:30 AM   Dressing/Treatment Open to air 2/8/2020  9:30 AM   Dressing Changed Changed/New 1/31/2020  4:00 AM   Dressing Status Dry; Intact 2/5/2020  8:00 AM   Number of days: 32       Incision 01/21/20 Sternum (Active)   Wound Assessment Clean;Dry; Intact 2/10/2020  9:45 AM   Luz-wound Assessment Dry; Intact 2/8/2020  9:30 AM   Closure Surgical glue 2/8/2020  9:30 AM   Drainage Amount None 2/9/2020  4:05 AM   Odor None 2/8/2020  9:30 AM   Dressing/Treatment Open to air 2/9/2020 10:00 AM   Dressing Status Clean;Dry; Intact 2/5/2020  8:00 AM   Dressing Change Due 01/27/20 1/31/2020  4:00 AM   Number of days: 20       Incision 01/21/20 Groin Left (Active)   Wound Assessment Clean;Dry; Intact 2/9/2020  4:05 AM   Luz-wound Assessment Clean;Dry; Intact 2/8/2020  9:30 AM   Closure Sutures 2/9/2020  4:05 AM   Drainage Amount None 2/9/2020  4:05 AM   Odor None 2/8/2020  9:30 AM   Dressing/Treatment Open to air 2/9/2020 10:00 AM   Dressing Status Clean;Dry; Intact 2/8/2020  9:30 AM   Number of days: 20     Response to treatment:  With complaints of pain. Pain Assessment:  Severity:  8 / 10  Quality of pain: sharp  Wound Pain Timing/Severity: intermittent  Premedicated: No    Plan   Plan of Care: Wound 01/27/20 Buttocks Right;Mid;Left this is not perineum, it is R, Mid & L buttocks.   Evolved to [employer] 2/1/20-Dressing/Treatment: Moist to moist, ABD  Wound 01/28/20 Heel Right-Dressing/Treatment: Pharmaceutical agent (see MAR)(Venelex)  Wound 01/28/20 Heel Left-Dressing/Treatment: Pharmaceutical agent (see MAR)(Venelex)  Incision 01/08/20 Back Lateral;Left-Dressing/Treatment: Open to air  Incision 01/21/20 Sternum-Dressing/Treatment: Open to air  Incision 01/21/20 Groin Left-Dressing/Treatment: Open to air  Incision 01/02/20 Chest Lateral;Left;Lower-Dressing/Treatment: Steri-strips   Recommendation: Clean all wounds with NSS; pat dry  Sacrum/Coccyx - apply moist-moist dressing;

## 2020-02-10 NOTE — DISCHARGE INSTR - COC
1/28/2020  9:19 AM   Post-Procedure Depth (cm) 0 cm 1/28/2020  9:19 AM   Post-Procedure Surface Area (cm^2) 0 cm^2 1/28/2020  9:19 AM   Post-Procedure Volume (cm^3) 0 cm^3 1/28/2020  9:19 AM   Distance Tunneling (cm) 0 cm 2/1/2020  3:00 PM   Tunneling Position ___ O'Clock 0 2/1/2020  3:00 PM   Undermining Starts ___ O'Clock 0 2/1/2020  3:00 PM   Undermining Ends___ O'Clock 0 2/1/2020  3:00 PM   Undermining Maxium Distance (cm) 0 2/1/2020  3:00 PM   Wound Assessment Painful;Red;Slough; Yellow 2/8/2020  9:30 AM   Drainage Amount None 2/8/2020  9:30 AM   Drainage Description Yellow;Serosanguinous 2/8/2020  9:30 AM   Odor Strong 2/8/2020  9:30 AM   Margins Defined edges; Attached edges 2/5/2020  8:00 AM   Luz-wound Assessment Non-blanchable erythema 2/8/2020  9:30 AM   Red%Wound Bed 30 2/5/2020  8:00 AM   Black%Wound Bed 60 2/5/2020  8:00 AM   Purple%Wound Bed 10 2/5/2020  8:00 AM   Culture Taken No 2/1/2020  3:00 PM   Number of days: 13       Wound 01/28/20 Heel Right (Active)   Wound Image   2/1/2020  3:00 PM   Wound Deep tissue/Injury 2/8/2020  1:21 AM   Offloading for Diabetic Foot Ulcers Offloading boot 2/1/2020  3:00 PM   Dressing Status Clean;Dry; Intact 2/8/2020  9:30 AM   Dressing Changed Changed/New 2/8/2020  9:30 AM   Dressing/Treatment Foam 2/9/2020 10:00 AM   Wound Cleansed Rinsed/Irrigated with saline 2/1/2020  3:00 PM   Dressing Change Due 01/28/20 1/31/2020  4:00 AM   Wound Length (cm) 2.2 cm 2/8/2020  1:21 AM   Wound Width (cm) 1.4 cm 2/8/2020  1:21 AM   Wound Depth (cm) 0 cm 2/1/2020  3:00 PM   Wound Surface Area (cm^2) 3.08 cm^2 2/8/2020  1:21 AM   Change in Wound Size % (l*w) 87.17 2/8/2020  1:21 AM   Wound Volume (cm^3) 0 cm^3 2/1/2020  3:00 PM   Post-Procedure Length (cm) 0 cm 1/28/2020  9:19 AM   Post-Procedure Width (cm) 0 cm 1/28/2020  9:19 AM   Post-Procedure Depth (cm) 0 cm 1/28/2020  9:19 AM   Post-Procedure Surface Area (cm^2) 0 cm^2 1/28/2020  9:19 AM   Post-Procedure Volume (cm^3) 0 cm^3 1/28/2020  9:19 AM   Distance Tunneling (cm) 0 cm 2/1/2020  3:00 PM   Tunneling Position ___ O'Clock 0 2/1/2020  3:00 PM   Undermining Starts ___ O'Clock 0 2/1/2020  3:00 PM   Undermining Ends___ O'Clock 0 2/1/2020  3:00 PM   Undermining Maxium Distance (cm) 0 2/1/2020  3:00 PM   Wound Assessment Dry; Intact; Purple 2/8/2020  9:30 AM   Drainage Amount None 2/8/2020  9:30 AM   Odor None 2/8/2020  9:30 AM   Margins Attached edges 2/8/2020  9:30 AM   Luz-wound Assessment Calloused 2/8/2020  9:30 AM   Purple%Wound Bed 100 2/5/2020  8:00 AM   Culture Taken No 2/1/2020  3:00 PM   Number of days: 13       Wound 01/28/20 Heel Left (Active)   Wound Image   2/1/2020  3:00 PM   Wound Deep tissue/Injury 2/8/2020  1:21 AM   Offloading for Diabetic Foot Ulcers Offloading boot 2/1/2020  3:00 PM   Dressing Status Clean;Dry; Intact 2/8/2020  9:30 AM   Dressing Changed Changed/New 2/8/2020  9:30 AM   Dressing/Treatment Foam 2/9/2020 10:00 AM   Wound Cleansed Rinsed/Irrigated with saline 2/1/2020  3:00 PM   Dressing Change Due 02/08/20 2/2/2020  4:00 AM   Wound Length (cm) 3 cm 2/8/2020  1:21 AM   Wound Width (cm) 3.6 cm 2/8/2020  1:21 AM   Wound Depth (cm) 0 cm 2/1/2020  3:00 PM   Wound Surface Area (cm^2) 10.8 cm^2 2/8/2020  1:21 AM   Change in Wound Size % (l*w) 52 2/8/2020  1:21 AM   Wound Volume (cm^3) 0 cm^3 2/1/2020  3:00 PM   Post-Procedure Length (cm) 0 cm 1/28/2020  9:19 AM   Post-Procedure Width (cm) 0 cm 1/28/2020  9:19 AM   Post-Procedure Depth (cm) 0 cm 1/28/2020  9:19 AM   Post-Procedure Surface Area (cm^2) 0 cm^2 1/28/2020  9:19 AM   Post-Procedure Volume (cm^3) 0 cm^3 1/28/2020  9:19 AM   Distance Tunneling (cm) 0 cm 2/1/2020  3:00 PM   Tunneling Position ___ O'Clock 0 2/1/2020  3:00 PM   Undermining Starts ___ O'Clock 0 2/1/2020  3:00 PM   Undermining Ends___ O'Clock 0 2/1/2020  3:00 PM   Undermining Maxium Distance (cm) 0 2/1/2020  3:00 PM   Wound Assessment Dry;Edges well approximated; Purple 2/8/2020  9:30 AM Drainage Amount None 2/8/2020  9:30 AM   Odor None 2/8/2020  9:30 AM   Margins Attached edges 2/8/2020  9:30 AM   Luz-wound Assessment Calloused 2/8/2020  9:30 AM   Purple%Wound Bed 100 2/5/2020  8:00 AM   Culture Taken No 2/1/2020  3:00 PM   Number of days: 13        Elimination:  Continence:   · Bowel: No  · Bladder: Yes  Urinary Catheter: None   Colostomy/Ileostomy/Ileal Conduit: No       Date of Last BM: 2/14/20    Intake/Output Summary (Last 24 hours) at 2/10/2020 1019  Last data filed at 2/10/2020 0600  Gross per 24 hour   Intake 2790 ml   Output 775 ml   Net 2015 ml     I/O last 3 completed shifts: In: 3030 [P.O.:2680; Blood:350]  Out: 364 [Urine:775]    Safety Concerns: At Risk for Falls    Impairments/Disabilities:      None    Nutrition Therapy:  Current Nutrition Therapy:   - Oral Diet:  Dental Soft, Cardiac  Magic cup with meals    D/C Summary - Swallowing recommendations by SLP  Evaluation completed on: 2/12/20  Diet recommendation: Dental soft diet with thin liquids  Response to education: Pt verbalized understanding, needs reinforcement  Follow up recommended after d/c: TBD    Routes of Feeding: Oral  Liquids: Thin Liquids  Daily Fluid Restriction: no  Last Modified Barium Swallow with Video (Video Swallowing Test): not done    Treatments at the Time of Hospital Discharge:   Respiratory Treatments: ***  Oxygen Therapy:  is not on home oxygen therapy.   Ventilator:    - No ventilator support    Rehab Therapies: Physical Therapy and Occupational Therapy  Weight Bearing Status/Restrictions: No weight bearing restirctions  Other Medical Equipment (for information only, NOT a DME order):  hospital bed  Other Treatments: ***    Patient's personal belongings (please select all that are sent with patient):  Glasses    RN SIGNATURE:  Electronically signed by Bebe Chris RN on 2/14/20 at 3:52 PM    CASE MANAGEMENT/SOCIAL WORK SECTION    Inpatient Status Date: ***    Readmission Risk Assessment

## 2020-02-10 NOTE — OP NOTE
Colonoscopy Note    Patient:   Mike Hernandez    YOB: 1950    Facility:     [address] [place] [id number]   [Inpatient]  Referring/PCP: No primary care provider on file. Procedure:   Colonoscopy --diagnostic  Date:     2/10/2020  Endoscopist:  Jimbo Sepulveda DO    Preoperative Diagnosis:  rectal bleeding. Postoperative Diagnosis:  Colon polyps    Anesthesia: Versed 4 Fentanyl 50 mcg    Estimated blood loss: < 5 ml    Complications:  None    Description of Procedure:  Informed consent was obtained from the patient after explanation of the procedure including indications, description of the procedure,  benefits and possible risks and complications of the procedure, and alternatives. Questions were answered. The patient's history was reviewed and a directed physical examination was performed prior to the procedure. Patient was monitored throughout the procedure with pulse oximetry and periodic assessment of vital signs. Patient was sedated as noted above. The Nursing staff and I performed a time out. With the patient initially in the left lateral decubitus position, a digital rectal examination was performed and revealed negative without mass, lesions or tenderness. The Olympus video colonoscope was placed in the patient's rectum and advanced without difficulty  to the cecum, which was identified by the ileocecal valve and appendiceal orifice. Photographs were taken. The prep was poor. Examination of the mucosa was performed during both introduction and withdrawal of the colonoscope. Retroflexed view of the rectum was performed. Withdrawal time was greater than 6 minutes. Findings:     1. Sessile polyps x 6, 6-8 mm in size s/p cold snare   2. Fair prep  3. Sigmoid diverticulosis     Recommendations:  1. Await pathology results  2. Repeat colonoscopy in 3 years pending clinical status  3. No blood or active bleeding seen.     Andres Presume

## 2020-02-11 ENCOUNTER — ANESTHESIA (OUTPATIENT)
Dept: OPERATING ROOM | Age: 70
DRG: 981 | End: 2020-02-11
Payer: MEDICARE

## 2020-02-11 ENCOUNTER — ANESTHESIA EVENT (OUTPATIENT)
Dept: OPERATING ROOM | Age: 70
DRG: 981 | End: 2020-02-11
Payer: MEDICARE

## 2020-02-11 VITALS
SYSTOLIC BLOOD PRESSURE: 87 MMHG | RESPIRATION RATE: 20 BRPM | OXYGEN SATURATION: 100 % | DIASTOLIC BLOOD PRESSURE: 57 MMHG

## 2020-02-11 LAB
ANION GAP SERPL CALCULATED.3IONS-SCNC: 8 MMOL/L (ref 3–16)
BASOPHILS ABSOLUTE: 0 K/UL (ref 0–0.2)
BASOPHILS RELATIVE PERCENT: 0.2 %
BUN BLDV-MCNC: 7 MG/DL (ref 7–20)
CALCIUM SERPL-MCNC: 8.2 MG/DL (ref 8.3–10.6)
CHLORIDE BLD-SCNC: 103 MMOL/L (ref 99–110)
CO2: 26 MMOL/L (ref 21–32)
CREAT SERPL-MCNC: <0.5 MG/DL (ref 0.8–1.3)
EOSINOPHILS ABSOLUTE: 0.1 K/UL (ref 0–0.6)
EOSINOPHILS RELATIVE PERCENT: 0.8 %
GFR AFRICAN AMERICAN: >60
GFR NON-AFRICAN AMERICAN: >60
GLUCOSE BLD-MCNC: 106 MG/DL (ref 70–99)
GLUCOSE BLD-MCNC: 232 MG/DL (ref 70–99)
GLUCOSE BLD-MCNC: 76 MG/DL (ref 70–99)
GLUCOSE BLD-MCNC: 79 MG/DL (ref 70–99)
GLUCOSE BLD-MCNC: 80 MG/DL (ref 70–99)
GLUCOSE BLD-MCNC: 83 MG/DL (ref 70–99)
HCT VFR BLD CALC: 23.7 % (ref 40.5–52.5)
HEMOGLOBIN: 7.9 G/DL (ref 13.5–17.5)
LYMPHOCYTES ABSOLUTE: 1.1 K/UL (ref 1–5.1)
LYMPHOCYTES RELATIVE PERCENT: 14.8 %
MAGNESIUM: 1.7 MG/DL (ref 1.8–2.4)
MCH RBC QN AUTO: 29.3 PG (ref 26–34)
MCHC RBC AUTO-ENTMCNC: 33.3 G/DL (ref 31–36)
MCV RBC AUTO: 88.1 FL (ref 80–100)
MONOCYTES ABSOLUTE: 0.6 K/UL (ref 0–1.3)
MONOCYTES RELATIVE PERCENT: 7.8 %
NEUTROPHILS ABSOLUTE: 5.8 K/UL (ref 1.7–7.7)
NEUTROPHILS RELATIVE PERCENT: 76.4 %
PDW BLD-RTO: 17.4 % (ref 12.4–15.4)
PERFORMED ON: ABNORMAL
PERFORMED ON: ABNORMAL
PERFORMED ON: NORMAL
PLATELET # BLD: 86 K/UL (ref 135–450)
PMV BLD AUTO: 9 FL (ref 5–10.5)
POTASSIUM SERPL-SCNC: 3.8 MMOL/L (ref 3.5–5.1)
RBC # BLD: 2.69 M/UL (ref 4.2–5.9)
SODIUM BLD-SCNC: 137 MMOL/L (ref 136–145)
WBC # BLD: 7.5 K/UL (ref 4–11)

## 2020-02-11 PROCEDURE — 85025 COMPLETE CBC W/AUTO DIFF WBC: CPT

## 2020-02-11 PROCEDURE — 83735 ASSAY OF MAGNESIUM: CPT

## 2020-02-11 PROCEDURE — 80048 BASIC METABOLIC PNL TOTAL CA: CPT

## 2020-02-11 PROCEDURE — 0KBN0ZZ EXCISION OF RIGHT HIP MUSCLE, OPEN APPROACH: ICD-10-PCS | Performed by: SURGERY

## 2020-02-11 PROCEDURE — 3600000012 HC SURGERY LEVEL 2 ADDTL 15MIN: Performed by: SURGERY

## 2020-02-11 PROCEDURE — 2709999900 HC NON-CHARGEABLE SUPPLY: Performed by: SURGERY

## 2020-02-11 PROCEDURE — 11043 DBRDMT MUSC&/FSCA 1ST 20/<: CPT | Performed by: SURGERY

## 2020-02-11 PROCEDURE — 2580000003 HC RX 258: Performed by: SURGERY

## 2020-02-11 PROCEDURE — 36415 COLL VENOUS BLD VENIPUNCTURE: CPT

## 2020-02-11 PROCEDURE — 11046 DBRDMT MUSC&/FSCA EA ADDL: CPT | Performed by: SURGERY

## 2020-02-11 PROCEDURE — 2060000000 HC ICU INTERMEDIATE R&B

## 2020-02-11 PROCEDURE — 6370000000 HC RX 637 (ALT 250 FOR IP): Performed by: SURGERY

## 2020-02-11 PROCEDURE — 7100000000 HC PACU RECOVERY - FIRST 15 MIN: Performed by: SURGERY

## 2020-02-11 PROCEDURE — 3600000002 HC SURGERY LEVEL 2 BASE: Performed by: SURGERY

## 2020-02-11 PROCEDURE — 2500000003 HC RX 250 WO HCPCS: Performed by: NURSE ANESTHETIST, CERTIFIED REGISTERED

## 2020-02-11 PROCEDURE — 3700000000 HC ANESTHESIA ATTENDED CARE: Performed by: SURGERY

## 2020-02-11 PROCEDURE — 2580000003 HC RX 258: Performed by: INTERNAL MEDICINE

## 2020-02-11 PROCEDURE — 6360000002 HC RX W HCPCS: Performed by: NURSE ANESTHETIST, CERTIFIED REGISTERED

## 2020-02-11 PROCEDURE — 6360000002 HC RX W HCPCS: Performed by: SURGERY

## 2020-02-11 PROCEDURE — 6370000000 HC RX 637 (ALT 250 FOR IP): Performed by: INTERNAL MEDICINE

## 2020-02-11 PROCEDURE — 3700000001 HC ADD 15 MINUTES (ANESTHESIA): Performed by: SURGERY

## 2020-02-11 PROCEDURE — 7100000001 HC PACU RECOVERY - ADDTL 15 MIN: Performed by: SURGERY

## 2020-02-11 PROCEDURE — 0KBP0ZZ EXCISION OF LEFT HIP MUSCLE, OPEN APPROACH: ICD-10-PCS | Performed by: SURGERY

## 2020-02-11 RX ORDER — OXYCODONE HYDROCHLORIDE AND ACETAMINOPHEN 5; 325 MG/1; MG/1
1 TABLET ORAL PRN
Status: DISCONTINUED | OUTPATIENT
Start: 2020-02-11 | End: 2020-02-11 | Stop reason: HOSPADM

## 2020-02-11 RX ORDER — MAGNESIUM HYDROXIDE 1200 MG/15ML
LIQUID ORAL CONTINUOUS PRN
Status: COMPLETED | OUTPATIENT
Start: 2020-02-11 | End: 2020-02-11

## 2020-02-11 RX ORDER — ONDANSETRON 2 MG/ML
INJECTION INTRAMUSCULAR; INTRAVENOUS PRN
Status: DISCONTINUED | OUTPATIENT
Start: 2020-02-11 | End: 2020-02-11 | Stop reason: SDUPTHER

## 2020-02-11 RX ORDER — ONDANSETRON 2 MG/ML
4 INJECTION INTRAMUSCULAR; INTRAVENOUS PRN
Status: DISCONTINUED | OUTPATIENT
Start: 2020-02-11 | End: 2020-02-11 | Stop reason: HOSPADM

## 2020-02-11 RX ORDER — FENTANYL CITRATE 50 UG/ML
INJECTION, SOLUTION INTRAMUSCULAR; INTRAVENOUS PRN
Status: DISCONTINUED | OUTPATIENT
Start: 2020-02-11 | End: 2020-02-11 | Stop reason: SDUPTHER

## 2020-02-11 RX ORDER — MORPHINE SULFATE 2 MG/ML
1 INJECTION, SOLUTION INTRAMUSCULAR; INTRAVENOUS EVERY 5 MIN PRN
Status: DISCONTINUED | OUTPATIENT
Start: 2020-02-11 | End: 2020-02-11 | Stop reason: HOSPADM

## 2020-02-11 RX ORDER — PROMETHAZINE HYDROCHLORIDE 25 MG/ML
6.25 INJECTION, SOLUTION INTRAMUSCULAR; INTRAVENOUS
Status: DISCONTINUED | OUTPATIENT
Start: 2020-02-11 | End: 2020-02-11 | Stop reason: HOSPADM

## 2020-02-11 RX ORDER — HYDRALAZINE HYDROCHLORIDE 20 MG/ML
5 INJECTION INTRAMUSCULAR; INTRAVENOUS EVERY 10 MIN PRN
Status: DISCONTINUED | OUTPATIENT
Start: 2020-02-11 | End: 2020-02-11 | Stop reason: HOSPADM

## 2020-02-11 RX ORDER — PROPOFOL 10 MG/ML
INJECTION, EMULSION INTRAVENOUS PRN
Status: DISCONTINUED | OUTPATIENT
Start: 2020-02-11 | End: 2020-02-11 | Stop reason: SDUPTHER

## 2020-02-11 RX ORDER — MIDAZOLAM HYDROCHLORIDE 1 MG/ML
INJECTION INTRAMUSCULAR; INTRAVENOUS PRN
Status: DISCONTINUED | OUTPATIENT
Start: 2020-02-11 | End: 2020-02-11 | Stop reason: SDUPTHER

## 2020-02-11 RX ORDER — LABETALOL HYDROCHLORIDE 5 MG/ML
5 INJECTION, SOLUTION INTRAVENOUS EVERY 10 MIN PRN
Status: DISCONTINUED | OUTPATIENT
Start: 2020-02-11 | End: 2020-02-11 | Stop reason: HOSPADM

## 2020-02-11 RX ORDER — DIPHENHYDRAMINE HYDROCHLORIDE 50 MG/ML
12.5 INJECTION INTRAMUSCULAR; INTRAVENOUS
Status: DISCONTINUED | OUTPATIENT
Start: 2020-02-11 | End: 2020-02-11 | Stop reason: HOSPADM

## 2020-02-11 RX ORDER — DEXAMETHASONE SODIUM PHOSPHATE 4 MG/ML
INJECTION, SOLUTION INTRA-ARTICULAR; INTRALESIONAL; INTRAMUSCULAR; INTRAVENOUS; SOFT TISSUE PRN
Status: DISCONTINUED | OUTPATIENT
Start: 2020-02-11 | End: 2020-02-11 | Stop reason: SDUPTHER

## 2020-02-11 RX ORDER — OXYCODONE HYDROCHLORIDE AND ACETAMINOPHEN 5; 325 MG/1; MG/1
2 TABLET ORAL PRN
Status: DISCONTINUED | OUTPATIENT
Start: 2020-02-11 | End: 2020-02-11 | Stop reason: HOSPADM

## 2020-02-11 RX ORDER — PHENYLEPHRINE HCL IN 0.9% NACL 1 MG/10 ML
SYRINGE (ML) INTRAVENOUS PRN
Status: DISCONTINUED | OUTPATIENT
Start: 2020-02-11 | End: 2020-02-11 | Stop reason: SDUPTHER

## 2020-02-11 RX ORDER — ROCURONIUM BROMIDE 10 MG/ML
INJECTION, SOLUTION INTRAVENOUS PRN
Status: DISCONTINUED | OUTPATIENT
Start: 2020-02-11 | End: 2020-02-11 | Stop reason: SDUPTHER

## 2020-02-11 RX ORDER — LIDOCAINE HYDROCHLORIDE 20 MG/ML
INJECTION, SOLUTION INFILTRATION; PERINEURAL PRN
Status: DISCONTINUED | OUTPATIENT
Start: 2020-02-11 | End: 2020-02-11 | Stop reason: SDUPTHER

## 2020-02-11 RX ORDER — MORPHINE SULFATE 2 MG/ML
2 INJECTION, SOLUTION INTRAMUSCULAR; INTRAVENOUS EVERY 5 MIN PRN
Status: DISCONTINUED | OUTPATIENT
Start: 2020-02-11 | End: 2020-02-11 | Stop reason: HOSPADM

## 2020-02-11 RX ADMIN — INSULIN LISPRO 1 UNITS: 100 INJECTION, SOLUTION INTRAVENOUS; SUBCUTANEOUS at 21:55

## 2020-02-11 RX ADMIN — SODIUM CHLORIDE, POTASSIUM CHLORIDE, SODIUM LACTATE AND CALCIUM CHLORIDE: 600; 310; 30; 20 INJECTION, SOLUTION INTRAVENOUS at 12:07

## 2020-02-11 RX ADMIN — PANTOPRAZOLE SODIUM 40 MG: 40 TABLET, DELAYED RELEASE ORAL at 06:42

## 2020-02-11 RX ADMIN — DEXAMETHASONE SODIUM PHOSPHATE 8 MG: 4 INJECTION, SOLUTION INTRAMUSCULAR; INTRAVENOUS at 13:00

## 2020-02-11 RX ADMIN — FENTANYL CITRATE 100 MCG: 50 INJECTION INTRAMUSCULAR; INTRAVENOUS at 12:49

## 2020-02-11 RX ADMIN — SUGAMMADEX 200 MG: 100 INJECTION, SOLUTION INTRAVENOUS at 13:19

## 2020-02-11 RX ADMIN — MIDAZOLAM HYDROCHLORIDE 2 MG: 2 INJECTION, SOLUTION INTRAMUSCULAR; INTRAVENOUS at 12:43

## 2020-02-11 RX ADMIN — FENOFIBRATE 160 MG: 160 TABLET ORAL at 08:50

## 2020-02-11 RX ADMIN — ALLOPURINOL 300 MG: 300 TABLET ORAL at 08:49

## 2020-02-11 RX ADMIN — ONDANSETRON 4 MG: 2 INJECTION INTRAMUSCULAR; INTRAVENOUS at 13:00

## 2020-02-11 RX ADMIN — LIDOCAINE HYDROCHLORIDE 60 MG: 20 INJECTION, SOLUTION INFILTRATION; PERINEURAL at 12:49

## 2020-02-11 RX ADMIN — CEFAZOLIN SODIUM 2 G: 10 INJECTION, POWDER, FOR SOLUTION INTRAVENOUS at 12:55

## 2020-02-11 RX ADMIN — INSULIN GLARGINE 10 UNITS: 100 INJECTION, SOLUTION SUBCUTANEOUS at 08:50

## 2020-02-11 RX ADMIN — CASTOR OIL AND BALSAM, PERU: 788; 87 OINTMENT TOPICAL at 08:49

## 2020-02-11 RX ADMIN — OXYCODONE 5 MG: 5 TABLET ORAL at 17:57

## 2020-02-11 RX ADMIN — LEVOTHYROXINE SODIUM 25 MCG: 25 TABLET ORAL at 06:42

## 2020-02-11 RX ADMIN — Medication: at 08:49

## 2020-02-11 RX ADMIN — Medication 100 MCG: at 13:08

## 2020-02-11 RX ADMIN — Medication 100 MCG: at 13:01

## 2020-02-11 RX ADMIN — ATORVASTATIN CALCIUM 20 MG: 10 TABLET, FILM COATED ORAL at 21:55

## 2020-02-11 RX ADMIN — Medication 10 ML: at 22:07

## 2020-02-11 RX ADMIN — PROPOFOL 140 MG: 10 INJECTION, EMULSION INTRAVENOUS at 12:49

## 2020-02-11 RX ADMIN — Medication 100 MCG: at 12:56

## 2020-02-11 RX ADMIN — CASTOR OIL AND BALSAM, PERU: 788; 87 OINTMENT TOPICAL at 22:00

## 2020-02-11 RX ADMIN — ROCURONIUM BROMIDE 50 MG: 10 SOLUTION INTRAVENOUS at 12:49

## 2020-02-11 RX ADMIN — INSULIN LISPRO 5 UNITS: 100 INJECTION, SOLUTION INTRAVENOUS; SUBCUTANEOUS at 17:57

## 2020-02-11 RX ADMIN — INSULIN GLARGINE 10 UNITS: 100 INJECTION, SOLUTION SUBCUTANEOUS at 21:55

## 2020-02-11 RX ADMIN — SODIUM CHLORIDE, POTASSIUM CHLORIDE, SODIUM LACTATE AND CALCIUM CHLORIDE: 600; 310; 30; 20 INJECTION, SOLUTION INTRAVENOUS at 02:09

## 2020-02-11 RX ADMIN — AMIODARONE HYDROCHLORIDE 200 MG: 200 TABLET ORAL at 08:49

## 2020-02-11 ASSESSMENT — PULMONARY FUNCTION TESTS
PIF_VALUE: 1
PIF_VALUE: 15
PIF_VALUE: 15
PIF_VALUE: 16
PIF_VALUE: 15
PIF_VALUE: 2
PIF_VALUE: 15
PIF_VALUE: 24
PIF_VALUE: 16
PIF_VALUE: 18
PIF_VALUE: 17
PIF_VALUE: 19
PIF_VALUE: 2
PIF_VALUE: 1
PIF_VALUE: 25
PIF_VALUE: 1
PIF_VALUE: 14
PIF_VALUE: 19
PIF_VALUE: 20
PIF_VALUE: 19
PIF_VALUE: 24
PIF_VALUE: 19
PIF_VALUE: 25
PIF_VALUE: 3
PIF_VALUE: 19
PIF_VALUE: 15
PIF_VALUE: 0
PIF_VALUE: 19
PIF_VALUE: 25
PIF_VALUE: 1
PIF_VALUE: 25
PIF_VALUE: 19
PIF_VALUE: 15
PIF_VALUE: 21
PIF_VALUE: 1
PIF_VALUE: 17
PIF_VALUE: 1
PIF_VALUE: 12
PIF_VALUE: 2
PIF_VALUE: 25
PIF_VALUE: 2
PIF_VALUE: 1

## 2020-02-11 ASSESSMENT — PAIN SCALES - GENERAL
PAINLEVEL_OUTOF10: 0
PAINLEVEL_OUTOF10: 0
PAINLEVEL_OUTOF10: 7

## 2020-02-11 NOTE — FLOWSHEET NOTE
Gastrointestinal   Abdominal (WDL) X  (admitted with GI bleeding)   RUQ Bowel Sounds Active; Audible   LUQ Bowel Sounds Active; Audible   RLQ Bowel Sounds Active; Audible   LLQ Bowel Sounds Active; Audible   Abdomen Inspection Rounded   Last BM (including prior to admit) 02/10/20   Tenderness Soft; No guarding;Nontender   Peripheral Vascular   Peripheral Vascular (WDL) WDL   Edema None   Sensation RUE Full sensation   Sensation LUE Full sensation   Sensation RLE AUSTIN   Sensation LLE AUSTIN   RUE Neurovascular Assessment   Capillary Refill Less than/equal to 3 seconds   Color Appropriate for ethnicity   Temperature Warm   R Radial Pulse +2   LUE Neurovascular Assessment   Capillary Refill Less than/equal to 3 seconds   Color Appropriate for ethnicity   Temperature Warm   L Radial Pulse +2   RLE Neurovascular Assessment   Capillary Refill Less than/equal to 3 seconds   Color Appropriate for ethnicity   Temperature Warm   R Pedal Pulse +1   LLE Neurovascular Assessment   Capillary Refill Less than/equal to 3 seconds   Color Appropriate for ethnicity   Temperature Warm   L Pedal Pulse +1   Skin Color/Condition   Skin Color/Condition (WDL) X   Skin Color Pale   Skin Condition/Temp Dry;Warm;Poor turgor   Skin Integrity   Skin Integrity (WDL) X   Skin Integrity Other (Comment)  (Wound; see flowsheets)   Location   (Sacrum; Bilateral heels)   Assessed this shift? Yes   Skin Fold Management No   Multiple Skin Integrity Sites Yes  (See LDAs)   Skin Integrity Site 2   Skin Integrity Location 2 Bruising   Location 2 Scattered   Preventative Dressing No   Assessed this shift? Yes   Musculoskeletal   Musculoskeletal (WDL) X   RUE Full movement   LUE Full movement   RL Extremity Weakness; Full movement   LL Extremity Weakness; Full movement   Genitourinary   Genitourinary (WDL) X  (intermittent incontinence)   Flank Tenderness AUSTIN   Suprapubic Tenderness AUSTIN   Dysuria AUSTIN   Urine Assessment   Incontinence Yes   Urine Color Yellow/straw Urine Appearance Clear   Urine Odor Malodorous   Anus/Rectum   Anus/Rectum (WDL) WDL   Wound 01/27/20 Buttocks Right;Mid;Left this is not perineum, it is R, Mid & L buttocks. Evolved to Unstagable 2/1/20   Date First Assessed/Time First Assessed: 01/27/20 1900   Present on Hospital Admission: No  Primary Wound Type: Pressure Injury  Location: Buttocks  Wound Location Orientation: Right;Mid;Left  Wound Description (Comments): this is not perineum, it is . .. Wound Pressure Unstageable   Dressing Status Clean;Dry; Intact   Dressing/Treatment Moist to moist;ABD   Wound 01/28/20 Heel Right   Date First Assessed/Time First Assessed: 01/28/20 0933   Present on Hospital Admission: No  Primary Wound Type: Pressure Injury  Location: Heel  Wound Location Orientation: Right   Wound Deep tissue/Injury   Drainage Amount None   Odor None   Margins Attached edges   Luz-wound Assessment Calloused   Wound 01/28/20 Heel Left   Date First Assessed/Time First Assessed: 01/28/20 0934   Present on Hospital Admission: No  Primary Wound Type: Pressure Injury  Location: Heel  Wound Location Orientation: Left   Wound Deep tissue/Injury   Drainage Amount None   Odor None   Margins Attached edges   Luz-wound Assessment Calloused   Incision 01/21/20 Sternum   Date First Assessed: 01/21/20   Present on Hospital Admission: No  Primary Wound Type: Incision  Location: Sternum   Wound Assessment Clean;Dry; Intact   Psychosocial   Psychosocial (WDL) WD   Patient Behaviors Calm; Cooperative      02/10/20 2000   Assessment   Charting Type Shift assessment   Neurological   Neuro (WDL) X   Level of Consciousness 0   Orientation Level Oriented to person;Oriented to place; Disoriented to time;Disoriented to situation   Cognition Short term memory loss; Follows commands; Appropriate safety awareness;Poor judgement   Language Clear   Size R Pupil (mm) 3   R Pupil Shape Round   R Pupil Reaction Brisk   Size L Pupil (mm) 3   L Pupil Shape Round   L Pupil Sensation LLE A[place]   RUE Neurovascular Assessment   Capillary Refill Less than/equal to 3 seconds   Color Appropriate for ethnicity   Temperature Warm   R Radial Pulse +2   LUE Neurovascular Assessment   Capillary Refill Less than/equal to 3 seconds   Color Appropriate for ethnicity   Temperature Warm   L Radial Pulse +2   RLE Neurovascular Assessment   Capillary Refill Less than/equal to 3 seconds   Color Appropriate for ethnicity   Temperature Warm   R Pedal Pulse +1   LLE Neurovascular Assessment   Capillary Refill Less than/equal to 3 seconds   Color Appropriate for ethnicity   Temperature Warm   L Pedal Pulse +1   Skin Color/Condition   Skin Color/Condition (WDL) X   Skin Color Pale   Skin Condition/Temp Dry;Warm;Poor turgor   Skin Integrity   Skin Integrity (WDL) X   Skin Integrity Other (Comment)  (Wound; see flowsheets)   Location   (Sacrum; Bilateral heels)   Assessed this shift? Yes   Skin Fold Management No   Multiple Skin Integrity Sites Yes  (See LDAs)   Skin Integrity Site 2   Skin Integrity Location 2 Bruising   Location 2 Scattered   Preventative Dressing No   Assessed this shift? Yes   Musculoskeletal   Musculoskeletal (WDL) X   RUE Full movement   LUE Full movement   RL Extremity Weakness; Full movement   LL Extremity Weakness; Full movement   Genitourinary   Genitourinary (WDL) X  (intermittent incontinence)   Flank Tenderness AUSTIN   Suprapubic Tenderness AUSTIN   Dysuria AUSTIN   Urine Assessment   Incontinence Yes   Urine Color Yellow/straw   Urine Appearance Clear   Urine Odor Malodorous   Anus/Rectum   Anus/Rectum (WDL) WDL   Wound 01/27/20 Buttocks Right;Mid;Left this is not perineum, it is R, Mid & L buttocks. Evolved to Unstagable 2/1/20   Date First Assessed/Time First Assessed: 01/27/20 1900   Present on Hospital Admission: No  Primary Wound Type: Pressure Injury  Location: Buttocks  Wound Location Orientation: Right;Mid;Left  Wound Description (Comments): this is not perineum, it is . ..    Wound

## 2020-02-11 NOTE — PROGRESS NOTES
without deformity. Skin: Skin color, texture, turgor normal.  No rashes or lesions. Neurologic:  Neurovascularly intact without any focal sensory/motor deficits. Cranial nerves: II-XII intact, grossly non-focal.  Psychiatric: Alert and oriented, thought content appropriate, normal insight  Capillary Refill: Brisk,< 3 seconds   Peripheral Pulses: +2 palpable, equal bilaterally       Labs:   Recent Labs     02/09/20  0558 02/09/20  1532 02/10/20  0521 02/11/20  0546   WBC 6.7  --  7.4 7.5   HGB 6.9* 7.9* 7.8* 7.9*   HCT 21.1* 24.1* 23.5* 23.7*   PLT 79*  --  77* 86*     Recent Labs     02/09/20  0558 02/10/20  0521 02/11/20  0546    138 137   K 4.1 3.8 3.8   * 105 103   CO2 26 24 26   BUN 20 11 7   CREATININE 0.6* <0.5* <0.5*   CALCIUM 8.3 7.9* 8.2*     No results for input(s): AST, ALT, BILIDIR, BILITOT, ALKPHOS in the last 72 hours. No results for input(s): INR in the last 72 hours. No results for input(s): Alferd Michael in the last 72 hours. Urinalysis:      Lab Results   Component Value Date    NITRU Negative 01/20/2020    BLOODU Negative 01/20/2020    SPECGRAV 1.010 01/20/2020    GLUCOSEU Negative 01/20/2020       Consults:    IP CONSULT TO SOCIAL WORK  IP CONSULT TO GI  IP CONSULT TO GENERAL SURGERY  IP CONSULT TO DIETITIAN      Assessment/Plan:    Active Hospital Problems    Diagnosis    Sacral wound [S31.000A]    Type 2 diabetes mellitus, without long-term current use of insulin (HCC) [E11.9]    Bright red blood per rectum [K62.5]    Lower gastrointestinal hemorrhage [K92.2]    Hyperlipidemia [E78.5]    S/P AVR (aortic valve replacement) [Z95.2]    HTN (hypertension) [I10]       LGIB - recurrent, of unclear etiology w/ tagged RBC scan negative. GI consulted and appreciated s/p Colonoscopy 10 Feb w/ small sessile polyps and sigmoid diverticulosis w/out evidence of bleeding seen. Monitor for any recurrent bleed.   Aspirin held      Anemia - 2nd to acute GI blood loss w/out evidence of ongoing hemodynamically active bleeding/hemolysis. S/P transfusion, now stable and asymptomatic w/out indication for additiional transfusion. Follow serial labs. Reviewed and documented as above.     HTN - w/out known CAD and no evidence of active signs/sxs of ischemia/failure. Currently controlled off home meds - 2nd to above, w/ vitals reviewed and documented as above. HyperLipidemia - controlled on home Statin. Continue, w/ f/u and med adjustment w/ PCP    DM2 - controlled on home oral antiGlycemics - held. Lantus started and follow FSBS/SSI low regimen. Last HbA1c 4.7% dated Jan 2020. Anticipate resuming/continuing home regimen at discharge.      Decubitus ulcers - sacral ulcer malodorous and indurated - may need debridement. General Surgery and Wound care consulted and appreciated w/ plan for debridement in OR Tues 11 Feb.       S/P TAVR on 21 Jan 2020. On Amiodarone, ASA held due to GIB.        DVT Prophylaxis: IPC  Diet: Diet NPO, After Midnight Exceptions are: Ice Chips, Sips with Meds  Dietary Nutrition Supplements: Frozen Oral Supplement  Code Status: Full Code      PT/OT Eval Status: not ordered. Dispo - Here for next few days pending further w/up.      Benjamin Evans MD

## 2020-02-11 NOTE — OP NOTE
Date of Surgery: 2/11/20    Preop Dx:  Sacral Wound    Postop Dx:  Same    Procedure:  Excisional Debridement of Sacral Wound    Surgeon:  Padmini Parker    Assistant:      Anesthesia:  GETA    EBL:   <50ml    Specimen:  none    Complications: none    Drains/Lines:  none    Indications:  78 yo with sacral wound requiring debridement    Description:  Patient was given adequate description of the risks and rewards of the procedure, including bleeding, infection, need for further operations and freely consented. He was given appropriate antibiotics and brought to the OR where GETA anesthesia was induced. He was placed in prone position. Prepped and draped in usual sterile fashion. Using a combination of #10 blade and electrocautery nonviable tissue was sharply/excisionally debrided. This involved skin, subcutaneous tissue and muscle. Irrigated with saline and hemostasis achieved with electrocautery. Wound measured [id number] at conclusion. Packed with saline soaked kerlex dressing. Outer gauze dressing placed. All suture, sponge and instrument count correct times two at end of case. Transferred to PACU in stable condition.     Nadja Boston MD

## 2020-02-11 NOTE — PROGRESS NOTES
PROGRESS NOTE  S:69 yrs Patient  admitted on 2/8/2020 with Bright red blood per rectum [K62.5]  Bright red blood per rectum [K62.5] . No further bleeding. Wound debridement today    Current Hospital Schedued Meds   Venelex   Topical BID    levothyroxine  25 mcg Oral Daily    zinc oxide   Topical Daily    allopurinol  300 mg Oral Daily    amiodarone  200 mg Oral Daily    atorvastatin  20 mg Oral Nightly    fenofibrate  160 mg Oral Daily    pantoprazole  40 mg Oral QAM AC    insulin glargine  10 Units Subcutaneous BID    insulin lispro  5 Units Subcutaneous TID WC    insulin lispro  0-6 Units Subcutaneous TID WC    insulin lispro  0-3 Units Subcutaneous Nightly     Current Hospital IV Meds   dextrose       Current Hospital PRN Meds  oxyCODONE, meclizine, melatonin ER, sodium chloride flush, glucose, dextrose, glucagon (rDNA), dextrose, acetaminophen, acetaminophen, acetaminophen, promethazine, promethazine, promethazine, calcium carbonate    Exam:   Vitals:    02/11/20 1746   BP: (!) 157/70   Pulse: 70   Resp:    Temp:    SpO2: 95%     I/O last 3 completed shifts: In: 1572.2 [P.O.:240;  I.V.:1332.2]  Out: 1675 [Urine:1675]   General appearance: alert, appears stated age and cooperative  HEENT: PERRLA  Neck: no adenopathy, no carotid bruit, no JVD, supple, symmetrical, trachea midline and thyroid not enlarged, symmetric, no tenderness/mass/nodules  Lungs: clear to auscultation bilaterally  Heart: regular rate and rhythm, S1, S2 normal, no murmur, click, rub or gallop  Abdomen: soft, non-tender; bowel sounds normal; no masses,  no organomegaly  Extremities: extremities normal, atraumatic, no cyanosis or edema     Labs:  CBC:   Recent Labs     02/09/20  0558 02/09/20  1532 02/10/20  0521 02/11/20  0546   WBC 6.7  --  7.4 7.5   HGB 6.9* 7.9* 7.8* 7.9*   HCT 21.1* 24.1* 23.5* 23.7*   MCV 90.1  --  89.7 88.1   PLT 79*  --  77* 86*     BMP:   Recent Labs

## 2020-02-11 NOTE — ANESTHESIA PRE PROCEDURE
Department of Anesthesiology  Preprocedure Note       Name:  Patricia Tena   Age:  77 y.o.  :  1950                                          MRN:  [id number]         Date:  2020      Surgeon: Christa Boast):  Suleiman Cordova MD    Procedure: DEBRIDEMENT OF SACRAL WOUND (N/A )    Medications prior to admission:   Prior to Admission medications    Medication Sig Start Date End Date Taking? Authorizing Provider   citalopram (CELEXA) 10 MG tablet Take 10 mg by mouth every morning   Yes Historical Provider, MD   ciprofloxacin (CIPRO) 500 MG tablet Take 500 mg by mouth 2 times daily   Yes Historical Provider, MD   fenofibrate micronized (LOFIBRA) 200 MG capsule Take 200 mg by mouth every morning (before breakfast)   Yes Historical Provider, MD   levothyroxine (SYNTHROID) 25 MCG tablet Take 25 mcg by mouth Daily   Yes Historical Provider, MD   oxyCODONE (ROXICODONE) 5 MG immediate release tablet Take 1 tablet by mouth every 6 hours as needed (acute post op pain) for up to 7 days.  20  MIKO Thornton CNP   amiodarone (CORDARONE) 200 MG tablet Take 1 tablet by mouth daily for 21 days 20  MIKO Thornton CNP   atorvastatin (LIPITOR) 20 MG tablet Take 1 tablet by mouth nightly 20   MIKO Thornton CNP   docusate sodium (COLACE, DULCOLAX) 100 MG CAPS Take 100 mg by mouth 2 times daily for 7 days 20  MIKO Thornton CNP   pantoprazole (PROTONIX) 40 MG tablet Take 1 tablet by mouth every morning (before breakfast) 2/6/20 3/7/20  MIKO Thornton CNP   carvedilol (COREG) 12.5 MG tablet Take 1 tablet by mouth 2 times daily (with meals) 20   MIKO Thornton CNP   choline fenofibrate (TRILIPIX) 135 MG CPDR delayed release capsule Take 135 mg by mouth daily    Historical Provider, MD   febuxostat (ULORIC) 40 MG TABS tablet Take 40 mg by mouth daily    Historical Provider, MD   glipiZIDE (GLUCOTROL) 5 MG tablet Take 1 tablet by mouth daily Historical Provider, MD   meclizine (ANTIVERT) 25 MG tablet Take 1 tablet by mouth daily    Historical Provider, MD   allopurinol (ZYLOPRIM) 300 MG tablet Take 300 mg by mouth daily    Historical Provider, MD       Current medications:    Current Facility-Administered Medications   Medication Dose Route Frequency Provider Last Rate Last Dose    lactated ringers infusion   Intravenous Continuous Ramos Gray,  mL/hr at 02/11/20 0209      Venelex ointment   Topical BID Nito Zavala MD        levothyroxine (SYNTHROID) tablet 25 mcg  25 mcg Oral Daily Erin Rodriguez MD   25 mcg at 02/11/20 7677    zinc oxide (TRIAD HYDROPHILIC) paste   Topical Daily Herlinda Serna MD        allopurinol (ZYLOPRIM) tablet 300 mg  300 mg Oral Daily Herlinda Serna MD   300 mg at 02/10/20 1025    amiodarone (CORDARONE) tablet 200 mg  200 mg Oral Daily Herlinda Serna MD   200 mg at 02/10/20 1025    atorvastatin (LIPITOR) tablet 20 mg  20 mg Oral Nightly Herlinda Serna MD   20 mg at 02/10/20 2310    fenofibrate tablet 160 mg  160 mg Oral Daily Herlinda Serna MD   160 mg at 02/10/20 1025    oxyCODONE (ROXICODONE) immediate release tablet 5 mg  5 mg Oral Q6H PRN Herlinda Serna MD   5 mg at 02/10/20 1147    pantoprazole (PROTONIX) tablet 40 mg  40 mg Oral QAM AC Herlinda Serna MD   40 mg at 02/11/20 0383    meclizine (ANTIVERT) tablet 25 mg  25 mg Oral TID PRN Erin Rodriguez MD        melatonin ER tablet 1 mg  1 mg Oral Nightly PRN Herlinda Serna MD        sodium chloride flush 0.9 % injection 10 mL  10 mL Intravenous PRN Herlinda Serna MD   10 mL at 02/10/20 2320    glucose (GLUTOSE) 40 % oral gel 15 g  15 g Oral PRN Herlinda Serna MD        dextrose 50 % IV solution  12.5 g Intravenous PRN Herlinda Serna MD        glucagon (rDNA) injection 1 mg  1 mg Intramuscular PRN Herlinda Serna MD        dextrose 5 % solution  100 mL/hr Intravenous PRN Herlinda w/19mm Carrasquillo Intuity valve, 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 Denise Mccormack - emergent w/evacuation of hematoma & chest wall hemostasis, pleural biopsy & XENA wedge resection    TRANSESOPHAGEAL ECHOCARDIOGRAM  01/21/2020    during ascending aorta replacement w/redo AVR    TRANSESOPHAGEAL ECHOCARDIOGRAM  12/07/2010       Social History:    Social History     Tobacco Use    Smoking status: Current Every Day Smoker     Packs/day: 2.00     Types: Cigarettes    Smokeless tobacco: Current User   Substance Use Topics    Alcohol use: Yes     Alcohol/week: 6.0 standard drinks     Types: 6 Cans of beer per week     Frequency: 4 or more times a week     Drinks per session: 5 or 6     Binge frequency: Daily or almost daily                                Ready to quit: Not Answered  Counseling given: Not Answered      Vital Signs (Current):   Vitals:    02/10/20 2328 02/11/20 0224 02/11/20 0431 02/11/20 0432   BP: 139/68  126/60    Pulse: 90  73 73   Resp: 18  18    Temp: 98.3 °F (36.8 °C)  98.4 °F (36.9 °C)    TempSrc: Oral  Oral    SpO2: 95%  95%    Weight:  180 lb 1.9 oz (81.7 kg)     Height:                                                  BP Readings from Last 3 Encounters:   02/11/20 126/60   02/05/20 (!) 123/57   01/21/20 (!) 65/41       NPO Status: Time of last liquid consumption: 1300                        Time of last solid consumption: 1300                        Date of last liquid consumption: 02/09/20                        Date of last solid food consumption: 02/09/20    BMI:   Wt Readings from Last 3 Encounters:   02/11/20 180 lb 1.9 oz (81.7 kg)   02/05/20 179 lb 3.7 oz (81.3 kg)   01/06/20 209 lb 7 oz (95 kg)     Body mass index is 26.6 kg/m².     CBC:   Lab Results   Component Value Date    WBC 7.5 02/11/2020    RBC 2.69 02/11/2020    HGB 7.9 02/11/2020    HCT 23.7 ejection fraction of 50-55%. The left ventricle is normal in size with normal wall thickness. Cannot exclude regional wall motion abnormalities secondary to poor   endocardial visualization. Indeterminate diastolic function. A 19mm Carrasquillo Intuity bioprosthetic aortic valve is not well visualized but   appears to be well seated. Inadequate Doppler angles to assess function. There is no evidence of aortic valve regurgitation. No pericardial effusion noted. Signature      ------------------------------------------------------------------   Electronically signed by Ki Sparrow MD (Interpreting   physician) on 01/23/2020 at 12:15 PM   ------------------------------------------------------------------     Neuro/Psych:   Negative Neuro/Psych ROS              GI/Hepatic/Renal: Neg GI/Hepatic/Renal ROS       (-) hiatal hernia and GERD       Endo/Other: Negative Endo/Other ROS   (+) Diabetes, . Abdominal:           Vascular:                                      Anesthesia Plan      general     ASA 3     (I discussed with the patient the risks and benefits of PIV, general anesthesia, IV Narcotics, PACU. All questions were answered the patient agrees with the plan and wishes to proceed.  )  Induction: intravenous. Pre-Operative Diagnosis: Bright red blood per rectum [K62.5]; Bright red blood per rectum [K62.5]    77 y.o.   BMI:  Body mass index is 26.6 kg/m².      Vitals:    02/11/20 0224 02/11/20 0431 02/11/20 0432 02/11/20 0845   BP:  126/60  (!) 143/61   Pulse:  73 73 83   Resp:  18  20   Temp:  98.4 °F (36.9 °C)  99.3 °F (37.4 °C)   TempSrc:  Oral  Oral   SpO2:  95%  96%   Weight: 180 lb 1.9 oz (81.7 kg)      Height:           Allergies   Allergen Reactions    Penicillins Hives       Social History     Tobacco Use    Smoking status: Current Every Day Smoker     Packs/day: 2.00     Types: Cigarettes    Smokeless tobacco: Current User   Substance Use Topics    Alcohol use: Yes     Alcohol/week: 6.0 standard drinks     Types: 6 Cans of beer per week     Frequency: 4 or more times a week     Drinks per session: 5 or 6     Binge frequency: Daily or almost daily       LABS:    CBC  Lab Results   Component Value Date/Time    WBC 7.5 02/11/2020 05:46 AM    HGB 7.9 (L) 02/11/2020 05:46 AM    HCT 23.7 (L) 02/11/2020 05:46 AM    PLT 86 (L) 02/11/2020 05:46 AM     RENAL  Lab Results   Component Value Date/Time     02/11/2020 05:46 AM    K 3.8 02/11/2020 05:46 AM    K 4.1 02/05/2020 01:37 PM     02/11/2020 05:46 AM    CO2 26 02/11/2020 05:46 AM    BUN 7 02/11/2020 05:46 AM    CREATININE <0.5 (L) 02/11/2020 05:46 AM    GLUCOSE 76 02/11/2020 05:46 AM     COAGS  Lab Results   Component Value Date/Time    PROTIME 16.7 (H) 02/08/2020 03:09 AM    INR 1.43 (H) 02/08/2020 03:09 AM    APTT 34.8 02/01/2020 04:35 AM         Polo Muller MD   2/11/2020

## 2020-02-12 LAB
ANION GAP SERPL CALCULATED.3IONS-SCNC: 8 MMOL/L (ref 3–16)
BASOPHILS ABSOLUTE: 0 K/UL (ref 0–0.2)
BASOPHILS RELATIVE PERCENT: 0.1 %
BUN BLDV-MCNC: 10 MG/DL (ref 7–20)
CALCIUM SERPL-MCNC: 8.1 MG/DL (ref 8.3–10.6)
CHLORIDE BLD-SCNC: 101 MMOL/L (ref 99–110)
CO2: 27 MMOL/L (ref 21–32)
CREAT SERPL-MCNC: 0.7 MG/DL (ref 0.8–1.3)
EOSINOPHILS ABSOLUTE: 0 K/UL (ref 0–0.6)
EOSINOPHILS RELATIVE PERCENT: 0 %
GFR AFRICAN AMERICAN: >60
GFR NON-AFRICAN AMERICAN: >60
GLUCOSE BLD-MCNC: 126 MG/DL (ref 70–99)
GLUCOSE BLD-MCNC: 143 MG/DL (ref 70–99)
GLUCOSE BLD-MCNC: 178 MG/DL (ref 70–99)
GLUCOSE BLD-MCNC: 201 MG/DL (ref 70–99)
GLUCOSE BLD-MCNC: 220 MG/DL (ref 70–99)
HCT VFR BLD CALC: 23.2 % (ref 40.5–52.5)
HEMOGLOBIN: 7.6 G/DL (ref 13.5–17.5)
LYMPHOCYTES ABSOLUTE: 0.7 K/UL (ref 1–5.1)
LYMPHOCYTES RELATIVE PERCENT: 9.8 %
MAGNESIUM: 1.7 MG/DL (ref 1.8–2.4)
MCH RBC QN AUTO: 29.2 PG (ref 26–34)
MCHC RBC AUTO-ENTMCNC: 32.9 G/DL (ref 31–36)
MCV RBC AUTO: 89 FL (ref 80–100)
MONOCYTES ABSOLUTE: 0.4 K/UL (ref 0–1.3)
MONOCYTES RELATIVE PERCENT: 5.7 %
NEUTROPHILS ABSOLUTE: 5.7 K/UL (ref 1.7–7.7)
NEUTROPHILS RELATIVE PERCENT: 84.4 %
PDW BLD-RTO: 17.3 % (ref 12.4–15.4)
PERFORMED ON: ABNORMAL
PLATELET # BLD: 100 K/UL (ref 135–450)
PMV BLD AUTO: 9.1 FL (ref 5–10.5)
POTASSIUM SERPL-SCNC: 4.2 MMOL/L (ref 3.5–5.1)
RBC # BLD: 2.61 M/UL (ref 4.2–5.9)
SODIUM BLD-SCNC: 136 MMOL/L (ref 136–145)
WBC # BLD: 6.8 K/UL (ref 4–11)

## 2020-02-12 PROCEDURE — 85025 COMPLETE CBC W/AUTO DIFF WBC: CPT

## 2020-02-12 PROCEDURE — 2580000003 HC RX 258: Performed by: SURGERY

## 2020-02-12 PROCEDURE — 83735 ASSAY OF MAGNESIUM: CPT

## 2020-02-12 PROCEDURE — 92610 EVALUATE SWALLOWING FUNCTION: CPT

## 2020-02-12 PROCEDURE — 99231 SBSQ HOSP IP/OBS SF/LOW 25: CPT | Performed by: SURGERY

## 2020-02-12 PROCEDURE — 1200000000 HC SEMI PRIVATE

## 2020-02-12 PROCEDURE — 6370000000 HC RX 637 (ALT 250 FOR IP): Performed by: SURGERY

## 2020-02-12 PROCEDURE — 36415 COLL VENOUS BLD VENIPUNCTURE: CPT

## 2020-02-12 PROCEDURE — 80048 BASIC METABOLIC PNL TOTAL CA: CPT

## 2020-02-12 PROCEDURE — 97606 NEG PRS WND THER DME>50 SQCM: CPT

## 2020-02-12 RX ADMIN — INSULIN GLARGINE 10 UNITS: 100 INJECTION, SOLUTION SUBCUTANEOUS at 21:05

## 2020-02-12 RX ADMIN — AMIODARONE HYDROCHLORIDE 200 MG: 200 TABLET ORAL at 09:14

## 2020-02-12 RX ADMIN — LEVOTHYROXINE SODIUM 25 MCG: 25 TABLET ORAL at 06:36

## 2020-02-12 RX ADMIN — INSULIN GLARGINE 10 UNITS: 100 INJECTION, SOLUTION SUBCUTANEOUS at 09:15

## 2020-02-12 RX ADMIN — INSULIN LISPRO 5 UNITS: 100 INJECTION, SOLUTION INTRAVENOUS; SUBCUTANEOUS at 13:23

## 2020-02-12 RX ADMIN — INSULIN LISPRO 5 UNITS: 100 INJECTION, SOLUTION INTRAVENOUS; SUBCUTANEOUS at 09:15

## 2020-02-12 RX ADMIN — Medication: at 09:15

## 2020-02-12 RX ADMIN — OXYCODONE 5 MG: 5 TABLET ORAL at 10:33

## 2020-02-12 RX ADMIN — CASTOR OIL AND BALSAM, PERU: 788; 87 OINTMENT TOPICAL at 09:15

## 2020-02-12 RX ADMIN — Medication 10 ML: at 09:14

## 2020-02-12 RX ADMIN — ALLOPURINOL 300 MG: 300 TABLET ORAL at 09:14

## 2020-02-12 RX ADMIN — PANTOPRAZOLE SODIUM 40 MG: 40 TABLET, DELAYED RELEASE ORAL at 06:36

## 2020-02-12 RX ADMIN — FENOFIBRATE 160 MG: 160 TABLET ORAL at 09:14

## 2020-02-12 RX ADMIN — INSULIN LISPRO 2 UNITS: 100 INJECTION, SOLUTION INTRAVENOUS; SUBCUTANEOUS at 13:22

## 2020-02-12 RX ADMIN — CASTOR OIL AND BALSAM, PERU: 788; 87 OINTMENT TOPICAL at 21:04

## 2020-02-12 RX ADMIN — ATORVASTATIN CALCIUM 20 MG: 10 TABLET, FILM COATED ORAL at 21:03

## 2020-02-12 RX ADMIN — INSULIN LISPRO 2 UNITS: 100 INJECTION, SOLUTION INTRAVENOUS; SUBCUTANEOUS at 09:15

## 2020-02-12 RX ADMIN — INSULIN LISPRO 1 UNITS: 100 INJECTION, SOLUTION INTRAVENOUS; SUBCUTANEOUS at 21:04

## 2020-02-12 ASSESSMENT — PAIN SCALES - GENERAL
PAINLEVEL_OUTOF10: 0
PAINLEVEL_OUTOF10: 0
PAINLEVEL_OUTOF10: 6
PAINLEVEL_OUTOF10: 0

## 2020-02-12 NOTE — PROGRESS NOTES
of BSE. He pleasantly declined solid PO trials this date d/t recently finishing lunch. Needs further assessment. Dysphagia Outcome Severity Scale: Level 5: Mild dysphagia- Distant supervision. May need one diet consistency restricted   Pt seen upright in bed, alert and agreeable to evaluation, but pleasantly confused (pt oriented to self and place). RN OK'd SLP entry and evaluation. Pt currently on RA. Oral-motor exam grossly unremarkable, he completed timely volitional swallow. Pt observed with nectar-thick liquid trials via cup and thin liquid via cup and straw (~6 oz total of TL) with grossly timely swallow initiation noted throughout, no overt s/s of aspiration/penetration -- no coughing, throat clearing, wet vocal quality, or change in O2. Pt's RR consistent at 20-24/min before, during, and after PO and O2 sats 95-96% throughout BSE. Pt currently on a Dental soft diet and declined all solid PO trials this date d/t recently finishing lunch. *Noted pt was impulsive at times and benefited from occasional cues for small single sips when drinking (*also recommended d/t pt's confusion). Recommend continuing pt's Dental soft diet with upgrade to thin liquids, meds whole with water, strict aspiration precautions. ST to continue to follow. Treatment Plan  Requires SLP Intervention: Yes  Duration/Frequency of Treatment: 2-3x/week for LOS  D/C Recommendations: To be determined  Recommendations: Dysphagia treatment  Therapeutic Interventions: Diet tolerance monitoring;Patient/Family education    Compensatory Swallowing Strategies  Compensatory Swallowing Strategies: Remain upright for 30-45 minutes after meals;Eat/Feed slowly; Small bites/sips;Upright as possible for all oral intake; External pacing    Treatment/Goals  Short-term Goals  Timeframe for Short-term Goals: 3 days (2/15/20)  Long-term Goals  Timeframe for Long-term Goals: 5 days (2/17/20)  Goal 1: The pt will tolerate safest and least restrictive diet without s/s of aspiration. Dysphagia Goals: The patient will tolerate recommended diet without observed clinical signs of aspiration; The patient/caregiver will demonstrate understanding of compensatory strategies for improved swallowing safety. ;The patient will tolerate regular consistency solids 10/10. General  Chart Reviewed: Yes  Comments: Pt admitted d/t lower GI bleed. Pt has hx of recent thoracotomy and sternotomy and COPD per chart. Behavior/Cognition: Alert; Cooperative;Pleasant mood;Confused  Respiratory Status: Room air  O2 Device: None (Room air)  Communication Observation: Functional(Pleasantly confused)  Follows Directions: Simple  Dentition: Adequate  Patient Positioning: Upright in bed  Baseline Vocal Quality: Normal  Volitional Cough: Strong  Prior Dysphagia History: Pt recently [facility] by [facility], most recently on 2/3/20 with recommendations for a Dysphagia III (soft and bite sized) diet with honey-thick liquids via tsp or straw. Consistencies Administered: Nectar - cup; Thin - straw; Thin - cup(Pt had recently finished lunch; declined solid PO trials at this time)    Vision/Hearing  Vision  Vision: Within Functional Limits  Hearing  Hearing: Within functional limits    Oral Motor Deficits  Oral/Motor  Oral Motor: Within functional limits    Oral Phase Dysfunction  Oral Phase  Oral Phase: WFL  Oral Phase  Oral Phase - Comment: Appears grossly WFL with thin and nectar-thick liquid trials (cup, straw), however, needs further assessment. Pt currently on a Dental soft diet and declined all solid PO trials this date. Indicators of Pharyngeal Phase Dysfunction   Pharyngeal Phase  Pharyngeal Phase: WFL  Pharyngeal Phase   Pharyngeal: Appears grossly WFL with thin and nectar-thick liquid trials (cup, straw), however, needs further assessment. Pt currently on a Dental soft diet and declined all solid PO trials this date.   No overt s/s of aspiration/penetration noted throughout BSE, pt's RR consistent at 20-24/min before, during, and after PO trials. Pt's O2 sats 95-96% throughout BSE. Prognosis  Prognosis  Prognosis for safe diet advancement: fair  Barriers to reach goals: cognitive deficits;time post onset;behavior  Individuals consulted  Consulted and agree with results and recommendations: Patient;RN    Education  Patient Education: Pt educated on reason for referral, role of ST, assessment results and recommendations. Patient Education Response: Verbalizes understanding;Needs reinforcement  Safety Devices in place: Yes  Type of devices: Left in bed;Nurse notified;Call light within reach; Bed alarm in place       Therapy Time  SLP Individual Minutes  Time In: 4573  Time Out: 1408  Minutes: 15 minutes; dysphagia seth Gonzalez M.S. Dolores Murray  Speech-language pathologist  [phone]

## 2020-02-12 NOTE — CARE COORDINATION
PT/OT unable to assess today due to patient getting dressing changed. They will attempt again when time allows. Once rec's are in, this CM will be able to initiate precert with Ariadna/Laura for patient to return to [facility]. Placed call to [facility] with ACM and updated her on the above. Also notified her of the patient's wound vac.

## 2020-02-12 NOTE — PROGRESS NOTES
Hospitalist Progress Note      PCP: No primary care provider on file. Date of Admission: 2/8/2020    Chief Complaint: Lower GIB    Subjective: no new c/o. Medications:  Reviewed    Infusion Medications    dextrose       Scheduled Medications    Venelex   Topical BID    levothyroxine  25 mcg Oral Daily    zinc oxide   Topical Daily    allopurinol  300 mg Oral Daily    amiodarone  200 mg Oral Daily    atorvastatin  20 mg Oral Nightly    fenofibrate  160 mg Oral Daily    pantoprazole  40 mg Oral QAM AC    insulin glargine  10 Units Subcutaneous BID    insulin lispro  5 Units Subcutaneous TID WC    insulin lispro  0-6 Units Subcutaneous TID WC    insulin lispro  0-3 Units Subcutaneous Nightly     PRN Meds: oxyCODONE, meclizine, melatonin ER, sodium chloride flush, glucose, dextrose, glucagon (rDNA), dextrose, acetaminophen, acetaminophen, acetaminophen, promethazine, promethazine, promethazine, calcium carbonate      Intake/Output Summary (Last 24 hours) at 2/12/2020 0755  Last data filed at 2/12/2020 0615  Gross per 24 hour   Intake 1320 ml   Output 1825 ml   Net -505 ml       Physical Exam Performed:    BP (!) 118/51   Pulse 70   Temp 98.2 °F (36.8 °C) (Oral)   Resp 16   Ht 5' 9\" (1.753 m)   Wt 186 lb 1.1 oz (84.4 kg)   SpO2 95%   BMI 27.48 kg/m²     General appearance: No apparent distress, appears stated age and cooperative. HEENT: Pupils equal, round, and reactive to light. Conjunctivae/corneas clear. Neck: Supple, with full range of motion. No jugular venous distention. Trachea midline. Respiratory:  Normal respiratory effort. Clear to auscultation, bilaterally without Rales/Wheezes/Rhonchi. Cardiovascular: Regular rate and rhythm with normal S1/S2 without murmurs, rubs or gallops. Abdomen: Soft, non-tender, non-distended with normal bowel sounds. Musculoskeletal: No clubbing, cyanosis or edema bilaterally. Full range of motion without deformity.   Skin: Skin color, texture, turgor normal.  No rashes or lesions. Neurologic:  Neurovascularly intact without any focal sensory/motor deficits. Cranial nerves: II-XII intact, grossly non-focal.  Psychiatric: Alert and oriented, thought content appropriate, normal insight  Capillary Refill: Brisk,< 3 seconds   Peripheral Pulses: +2 palpable, equal bilaterally       Labs:   Recent Labs     02/10/20  0521 02/11/20  0546 02/12/20  0545   WBC 7.4 7.5 6.8   HGB 7.8* 7.9* 7.6*   HCT 23.5* 23.7* 23.2*   PLT 77* 86* 100*     Recent Labs     02/10/20  0521 02/11/20  0546 02/12/20  0546    137 136   K 3.8 3.8 4.2    103 101   CO2 24 26 27   BUN 11 7 10   CREATININE <0.5* <0.5* 0.7*   CALCIUM 7.9* 8.2* 8.1*     No results for input(s): AST, ALT, BILIDIR, BILITOT, ALKPHOS in the last 72 hours. No results for input(s): INR in the last 72 hours. No results for input(s): Eulene Sarks in the last 72 hours. Urinalysis:      Lab Results   Component Value Date    NITRU Negative 01/20/2020    BLOODU Negative 01/20/2020    SPECGRAV 1.010 01/20/2020    GLUCOSEU Negative 01/20/2020       Consults:    IP CONSULT TO SOCIAL WORK  IP CONSULT TO GI  IP CONSULT TO GENERAL SURGERY  IP CONSULT TO DIETITIAN      Assessment/Plan:    Active Hospital Problems    Diagnosis    Sacral wound [S31.000A]    Type 2 diabetes mellitus, without long-term current use of insulin (HCC) [E11.9]    Bright red blood per rectum [K62.5]    Lower gastrointestinal hemorrhage [K92.2]    Hyperlipidemia [E78.5]    S/P AVR (aortic valve replacement) [Z95.2]    HTN (hypertension) [I10]       LGIB - recurrent, of unclear etiology w/ tagged RBC scan negative. GI consulted and appreciated s/p Colonoscopy 10 Feb w/ small sessile polyps and sigmoid diverticulosis w/out evidence of bleeding seen. Monitor for any recurrent bleed. Aspirin held      Anemia - 2nd to acute GI blood loss w/out evidence of ongoing hemodynamically active bleeding/hemolysis.   S/P transfusion, now stable and asymptomatic w/out indication for additiional transfusion. Follow serial labs. Reviewed and documented as above.     HTN - w/out known CAD and no evidence of active signs/sxs of ischemia/failure. Currently controlled off home meds - 2nd to above, w/ vitals reviewed and documented as above. HyperLipidemia - controlled on home Statin. Continue, w/ f/u and med adjustment w/ PCP    DM2 - controlled on home oral antiGlycemics - held. Lantus started and follow FSBS/SSI low regimen. Last HbA1c 4.7% dated Jan 2020. Anticipate resuming/continuing home regimen at discharge.      Decubitus ulcers - sacral ulcer malodorous and indurated - may need debridement. General Surgery and Wound care consulted and appreciated s/p debridement in OR Tues 11 Feb w/out complications.       S/P TAVR on 21 Jan 2020. On Amiodarone, ASA held due to GIB. CT surgery consulted and appreciated, now signed off.        DVT Prophylaxis: IPC  Diet: Dietary Nutrition Supplements: Frozen Oral Supplement  DIET CARDIAC; Dental Soft; Mildly Thick (Nectar)  Code Status: Full Code      PT/OT Eval Status: not ordered. Dispo - Here for next few days pending further w/up.      Lourdes Holland MD

## 2020-02-12 NOTE — FLOWSHEET NOTE
02/11/20 2200   Assessment   Charting Type Shift assessment   Neurological   Neuro (WDL) X   Level of Consciousness 0   Orientation Level Oriented to person;Oriented to place; Disoriented to time;Oriented to situation   Cognition Short term memory loss; Follows commands; Appropriate safety awareness;Poor judgement   Language Clear   Size R Pupil (mm) 2   R Pupil Shape Round   R Pupil Reaction Brisk   Size L Pupil (mm) 2   L Pupil Shape Round   L Pupil Reaction Brisk   R Hand  Moderate   L Hand  Moderate   R Foot Dorsiflexion Moderate   L Foot Dorsiflexion Moderate   R Foot Plantar Flexion Weak   L Foot Plantar Flexion Weak   RUE Motor Response Responds to command;Normal extension;Normal flexion; No tremor   LUE Motor Response Responds to command;Normal extension;Normal flexion; No tremor   RLE Motor Response Responds to command;Normal extension;Normal flexion; No tremor   LLE Motor Response Responds to command;Normal extension;Normal flexion; No tremor   Tongue Deviation None   [place] Coma Scale   Eye Opening 4   Best Verbal Response 4   Best Motor Response 6   [place] Coma Scale Score 14   NIH/MNHISS Stroke Scale   NIH/MNIHSS Stroke Scale Assessed No   HEENT   HEENT (WDL) X   Right Eye Impaired vision   Left Eye Impaired vision   Nose Intact   Throat Intact   Teeth Dentures lower;Dentures upper   Respiratory   Respiratory (WDL) X   Respiratory Pattern Regular   Respiratory Depth Normal   Respiratory Quality/Effort Unlabored   Chest Assessment Chest expansion symmetrical;Trachea midline   L Breath Sounds Clear;Diminished   R Breath Sounds Clear;Diminished   Breath Sounds   Right Upper Lobe Diminished   Right Middle Lobe Diminished   Right Lower Lobe Diminished   Left Upper Lobe Diminished   Left Lower Lobe Diminished   Cough/Sputum   Cough None   Sputum Amount None   Sputum Color None   Tenacity None   Cardiac   Cardiac (WDL) X   Cardiac Regularity Regular   Heart Sounds S1, S2   Cardiac Rhythm NSR   Cardiac Monitor   Telemetry Monitor On Yes   Telemetry Audible Yes   Telemetry Alarms Set Yes   Telemetry Box Number 9   Gastrointestinal   Abdominal (WDL) X  (admitted with GI bleeding)   RUQ Bowel Sounds Active; Audible   LUQ Bowel Sounds Active; Audible   RLQ Bowel Sounds Active; Audible   LLQ Bowel Sounds Active; Audible   Abdomen Inspection Rounded   Tenderness Soft; No guarding;Nontender   Peripheral Vascular   Peripheral Vascular (WDL) WDL   Edema None   Edema Generalized None   Sensation RUE Full sensation   Sensation LUE Full sensation   Sensation RLE Full sensation   Sensation LLE Full sensation   RUE Neurovascular Assessment   Capillary Refill Less than/equal to 3 seconds   Color Appropriate for ethnicity   Temperature Warm   R Radial Pulse +2   LUE Neurovascular Assessment   Capillary Refill Less than/equal to 3 seconds   Color Appropriate for ethnicity   Temperature Warm   L Radial Pulse +2   RLE Neurovascular Assessment   Capillary Refill Less than/equal to 3 seconds   Color Appropriate for ethnicity   Temperature Warm   R Pedal Pulse +1   LLE Neurovascular Assessment   Capillary Refill Less than/equal to 3 seconds   Color Appropriate for ethnicity   Temperature Warm   L Pedal Pulse +1   Skin Color/Condition   Skin Color/Condition (WDL) X   Skin Color Appropriate for ethnicity; Ecchymosis; Pale   Skin Condition/Temp Poor turgor;Warm;Dry   Skin Integrity   Skin Integrity (WDL) X   Skin Integrity Other (Comment)  (See LDAs and Wound Care Flowsheet)   Location   (Sacrum; Bilateral Heels)   Preventative Dressing Yes   Dressing Site Heel   Date Applied 02/11/20   Assessed this shift? Yes   Skin Fold Management No   Multiple Skin Integrity Sites Yes  (See LDAs)   Skin Integrity Site 2   Skin Integrity Location 2 Bruising   Location 2 Scattered   Preventative Dressing Yes   Dressing Site Heel   Date Applied 02/11/20   Assessed this shift?  Yes   Skin Integrity Site 3   Skin Integrity Location 3 Other (Comment)  (Pressure

## 2020-02-12 NOTE — PROGRESS NOTES
PROGRESS NOTE  S:69 yrs Patient  admitted on 2/8/2020 with Bright red blood per rectum [K62.5]  Bright red blood per rectum [K62.5] . Stll no bleeding. S/P debridement    Current Hospital Schedued Meds   Venelex   Topical BID    levothyroxine  25 mcg Oral Daily    zinc oxide   Topical Daily    allopurinol  300 mg Oral Daily    amiodarone  200 mg Oral Daily    atorvastatin  20 mg Oral Nightly    fenofibrate  160 mg Oral Daily    pantoprazole  40 mg Oral QAM AC    insulin glargine  10 Units Subcutaneous BID    insulin lispro  5 Units Subcutaneous TID WC    insulin lispro  0-6 Units Subcutaneous TID WC    insulin lispro  0-3 Units Subcutaneous Nightly     Current Hospital IV Meds   dextrose       Current Hospital PRN Meds  oxyCODONE, meclizine, melatonin ER, sodium chloride flush, glucose, dextrose, glucagon (rDNA), dextrose, acetaminophen, acetaminophen, acetaminophen, promethazine, promethazine, promethazine, calcium carbonate    Exam:   Vitals:    02/12/20 0920   BP:    Pulse: 77   Resp: 16   Temp: 98 °F (36.7 °C)   SpO2: 96%     I/O last 3 completed shifts: In: 1320 [P.O.:720;  I.V.:600]  Out: 1825 [Urine:1825]   General appearance: alert, appears stated age and cooperative  HEENT: PERRLA  Neck: no adenopathy, no carotid bruit, no JVD, supple, symmetrical, trachea midline and thyroid not enlarged, symmetric, no tenderness/mass/nodules  Lungs: clear to auscultation bilaterally  Heart: regular rate and rhythm, S1, S2 normal, no murmur, click, rub or gallop  Abdomen: soft, non-tender; bowel sounds normal; no masses,  no organomegaly  Extremities: extremities normal, atraumatic, no cyanosis or edema     Labs:  CBC:   Recent Labs     02/10/20  0521 02/11/20  0546 02/12/20  0545   WBC 7.4 7.5 6.8   HGB 7.8* 7.9* 7.6*   HCT 23.5* 23.7* 23.2*   MCV 89.7 88.1 89.0   PLT 77* 86* 100*     BMP:   Recent Labs     02/10/20  0521 02/11/20  0546 02/12/20  0546    137 136 K 3.8 3.8 4.2    103 101   CO2 24 26 27   BUN 11 7 10   CREATININE <0.5* <0.5* 0.7*     LIVER PROFILE: No results for input(s): AST, ALT, LIPASE, PROT, BILIDIR, BILITOT, ALKPHOS in the last 72 hours. Invalid input(s): AMYLASE,  ALB  PT/INR: No results for input(s): INR in the last 72 hours. Invalid input(s): PT    IMAGING:  No results found. Hospital Problems           Last Modified POA    * (Principal) Lower gastrointestinal hemorrhage 2/9/2020 Yes    HTN (hypertension) 2/8/2020 Yes    Hyperlipidemia 2/8/2020 Yes    S/P AVR (aortic valve replacement) 2/8/2020 Yes    Type 2 diabetes mellitus, without long-term current use of insulin (HCC) (Chronic) 2/8/2020 Yes    Bright red blood per rectum 2/9/2020 Yes    Sacral wound 2/10/2020 Yes         Impression:  78 yo male with rectal bleeding    Recommendation:  1. No further bleeding, likely secondary to diverticulosis  2. Please call if further issues or concerns. Will sign off  3.   Will call with pathology results      Yulissa Tiwari DO  12:50 PM 2/12/2020            [facility]    [address]      [address]     Phone: [phone]     Fax: [phone]

## 2020-02-12 NOTE — PROGRESS NOTES
[facility] GENERAL SURGERY    Surgery Progress Note           POD # 1    PATIENT NAME: Kamlesh Sanchez     TODAY'S DATE: 2/12/2020    INTERVAL HISTORY:    Pt with mild pain. OBJECTIVE:   VITALS:  BP (!) 118/51   Pulse 77   Temp 98 °F (36.7 °C) (Oral)   Resp 16   Ht 5' 9\" (1.753 m)   Wt 186 lb 1.1 oz (84.4 kg)   SpO2 96%   BMI 27.48 kg/m²     INTAKE/OUTPUT:    I/O last 3 completed shifts: In: 1320 [P.O.:720; I.V.:600]  Out: 1825 [Urine:1825]  I/O this shift:  In: -   Out: 200 [Urine:200]              CONSTITUTIONAL:  awake and alert  LUNGS:  no crackles or wheezing  ABDOMEN:   normal bowel sounds, soft, non-distended, non-tender   WOUND: dressing with mild drainage    Data:  CBC:   Recent Labs     02/10/20  0521 02/11/20  0546 02/12/20  0545   WBC 7.4 7.5 6.8   HGB 7.8* 7.9* 7.6*   HCT 23.5* 23.7* 23.2*   PLT 77* 86* 100*     BMP:    Recent Labs     02/10/20  0521 02/11/20  0546 02/12/20  0546    137 136   K 3.8 3.8 4.2    103 101   CO2 24 26 27   BUN 11 7 10   CREATININE <0.5* <0.5* 0.7*   GLUCOSE 86 76 178*     Hepatic: No results for input(s): AST, ALT, ALB, BILITOT, ALKPHOS in the last 72 hours. Mag:      Recent Labs     02/10/20  0521 02/11/20  0546 02/12/20  0546   MG 1.80 1.70* 1.70*      Phos:   No results for input(s): PHOS in the last 72 hours. INR: No results for input(s): INR in the last 72 hours. Radiology Review:  NA    ASSESSMENT AND PLAN:  77 y.o. male status post sacral wound debridement  1. Continue offloading and improved nutrition  2.   Vac placement today      Electronically signed by Kristy Arizmendi MD

## 2020-02-12 NOTE — PROGRESS NOTES
[id number] [facility] Wound Ostomy Continence Nurse  Follow-up Progress Note       NAME:  Renan Harding  MEDICAL RECORD NUMBER:  [id number]  AGE:  77 y.o. GENDER:  male  :  1950  TODAY'S DATE:  2020    Subjective:  I had my pain medication so I do not hurt to bad right now. Wound Identification:  Wound Type: The sacral coccygeal wound is a [facility] Ac[facility] pressure injury that started in the ICU post op open heart after the mattress was not inflated correctly and patient was sitting on the metal bed frame. 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 Post I & D 20 by Dr Sharona Hu. Other wounds and incisions not assessed on this visit. Contributing Factors: diabetes, chronic pressure, decreased mobility, shear force, obesity, incontinence of stool and incontinence of urine        Patient Goal of Care:  [x] Wound Healing  [] Odor Control   [] Palliative Care  [] Pain Control   [] Other:     Objective:  Lying on side. Wedge pillows in roon. BP (!) 122/59   Pulse 78   Temp 98.1 °F (36.7 °C) (Oral)   Resp 16   Ht 5' 9\" (1.753 m)   Wt 186 lb 1.1 oz (84.4 kg)   SpO2 95%   BMI 27.48 kg/m²   Wes Risk Score: Wes Scale Score: 15  Assessment:  Large open stage 4 / post op incision after I & D sacrum, coccyx, right and left buttocks. Red, yellow, black wound bed. Luz wound with blanchable redness and yellow eschar. Measurements:  Negative Pressure Wound Therapy Coccyx Mid (Active)   $ Standard NPWT >50 sq cm PER TX $ Yes 2020  2:44 PM   Wound Type Surgical;Pressure ulcer: Stage IV 2020  2:44 PM   Unit Type [facility] 2020  2:44 PM   Dressing Type Black foam 2020  2:44 PM   Number of pieces used 1 2020  2:44 PM   Cycle Continuous 2020  2:44 PM   Target Pressure (mmHg) 125 2020  2:44 PM   Intensity 1 2020  2:44 PM   Canister changed?  Yes 2020  2:44 PM   Dressing PM   Drainage Description Serosanguinous 2/12/2020  2:44 PM   Odor None 2/12/2020  2:44 PM   Margins Unattached edges; Defined edges 2/12/2020  2:44 PM   Luz-wound Assessment Blanchable erythema 2/12/2020  2:44 PM   Non-staged Wound Description Full thickness 2/12/2020  2:44 PM   Red%Wound Bed 85 2/12/2020  2:44 PM   Yellow%Wound Bed 10 2/12/2020  2:44 PM   Black%Wound Bed 5 2/12/2020  2:44 PM   Purple%Wound Bed 10 2/5/2020  8:00 AM   Culture Taken No 2/12/2020  2:44 PM   Number of days: 15       Wound 01/28/20 Heel Right (Active)   Wound Image   2/10/2020 12:16 PM   Wound Deep tissue/Injury 2/11/2020 10:00 PM   Offloading for Diabetic Foot Ulcers Offloading boot 2/10/2020 12:16 PM   Dressing Status Clean;Dry; Intact 2/12/2020  9:20 AM   Dressing Changed Changed/New 2/11/2020 10:00 PM   Dressing/Treatment Foam;Protective barrier 2/12/2020  9:20 AM   Wound Cleansed Not Cleansed 2/11/2020 10:00 PM   Dressing Change Due 02/12/20 2/11/2020 10:00 PM   Wound Length (cm) 1.5 cm 2/10/2020 12:16 PM   Wound Width (cm) 1.5 cm 2/10/2020 12:16 PM   Wound Depth (cm) 0 cm 2/10/2020 12:16 PM   Wound Surface Area (cm^2) 2.25 cm^2 2/10/2020 12:16 PM   Change in Wound Size % (l*w) 90.62 2/10/2020 12:16 PM   Wound Volume (cm^3) 0 cm^3 2/10/2020 12:16 PM   Post-Procedure Length (cm) 0 cm 2/10/2020 12:16 PM   Post-Procedure Width (cm) 0 cm 2/10/2020 12:16 PM   Post-Procedure Depth (cm) 0 cm 2/10/2020 12:16 PM   Post-Procedure Surface Area (cm^2) 0 cm^2 2/10/2020 12:16 PM   Post-Procedure Volume (cm^3) 0 cm^3 2/10/2020 12:16 PM   Distance Tunneling (cm) 0 cm 2/10/2020 12:16 PM   Tunneling Position ___ O'Clock 0 2/10/2020 12:16 PM   Undermining Starts ___ O'Clock 0 2/10/2020 12:16 PM   Undermining Ends___ O'Clock 0 2/10/2020 12:16 PM   Undermining Maxium Distance (cm) 0 2/10/2020 12:16 PM   Wound Assessment Dry; Intact; Purple 2/10/2020 12:16 PM   Drainage Amount None 2/12/2020  9:20 AM   Odor None 2/12/2020  9:20 AM   Margins Attached edges Sacrum, R, Mid & L buttocks. Evolved to Unstagable 2/1/20, I & D 2/12/20.-Dressing/Treatment: Barrier film, Hydrocolloid, Vacuum dressing  Wound 01/28/20 Heel Right-Dressing/Treatment: Foam, Protective barrier(Mepilex heel protectors)  Wound 01/28/20 Heel Left-Dressing/Treatment: Foam, Protective barrier(Mepilex heel protectors)  Incision 01/08/20 Back Lateral;Left-Dressing/Treatment: Open to air  Incision 01/21/20 Sternum-Dressing/Treatment: Open to air  Incision 01/21/20 Groin Left-Dressing/Treatment: Open to air  Incision 01/02/20 Chest Lateral;Left;Lower-Dressing/Treatment: Steri-strips    Spoke to Dr Sharona Hu. Order for negative pressure wound therapy. Current dressing removed. Wound cleansed with normal saline. Luz wound prepped with barrier film, hydrocolloid dressing and VAC drape. One black sponge lightly packed into wound bed. Covered with VAC drape. Tracked to right anterior thigh. Plan to change 3 times a week  M-W-F. Wound care to follow. Call wound care for deterioration [phone], Pager [phone]. Spoke to bed side MARTIN Osborn) and reported unable to assess other wounds today. Instructed to place wound measurements on other wounds today. Specialty Bed Required : Yes - currently on power pro elite. [x] Low Air Loss   [x] Pressure Redistribution Please change to [place] tech stage IV  [] Fluid Immersion  [] Bariatric  [] Total Pressure Relief  [] Other:     Current Diet: Dietary Nutrition Supplements: Frozen Oral Supplement  DIET CARDIAC; Dental Soft  Dietician consult:  Yes    Discharge Plan:  Placement for patient upon discharge: intermediate care facility   Patient appropriate for Outpatient [address]: Yes    Referrals:  [x]   [] [address] [facility]  [] Supplies  [] Other    Patient/Caregiver Teaching: instructed on negative pressure wound therapy.   Level of patient/caregiver understanding able to:   [x] Indicates understanding       [x] Needs reinforcement  [] Unsuccessful      [] Verbal Understanding  [] Demonstrated understanding       [] No evidence of learning  [] Refused teaching         [] N/A       Electronically signed by Cosme Favre, RN, MSN, [place] on 2/12/2020 at 3:43 PM

## 2020-02-13 LAB
ANION GAP SERPL CALCULATED.3IONS-SCNC: 8 MMOL/L (ref 3–16)
BASOPHILS ABSOLUTE: 0 K/UL (ref 0–0.2)
BASOPHILS RELATIVE PERCENT: 0.1 %
BUN BLDV-MCNC: 9 MG/DL (ref 7–20)
CALCIUM SERPL-MCNC: 8.3 MG/DL (ref 8.3–10.6)
CHLORIDE BLD-SCNC: 102 MMOL/L (ref 99–110)
CO2: 29 MMOL/L (ref 21–32)
CREAT SERPL-MCNC: <0.5 MG/DL (ref 0.8–1.3)
EOSINOPHILS ABSOLUTE: 0 K/UL (ref 0–0.6)
EOSINOPHILS RELATIVE PERCENT: 0.5 %
GFR AFRICAN AMERICAN: >60
GFR NON-AFRICAN AMERICAN: >60
GLUCOSE BLD-MCNC: 191 MG/DL (ref 70–99)
GLUCOSE BLD-MCNC: 205 MG/DL (ref 70–99)
GLUCOSE BLD-MCNC: 62 MG/DL (ref 70–99)
GLUCOSE BLD-MCNC: 76 MG/DL (ref 70–99)
GLUCOSE BLD-MCNC: 85 MG/DL (ref 70–99)
HCT VFR BLD CALC: 24.5 % (ref 40.5–52.5)
HEMOGLOBIN: 8.2 G/DL (ref 13.5–17.5)
LYMPHOCYTES ABSOLUTE: 1.6 K/UL (ref 1–5.1)
LYMPHOCYTES RELATIVE PERCENT: 19.4 %
MAGNESIUM: 1.9 MG/DL (ref 1.8–2.4)
MCH RBC QN AUTO: 29.3 PG (ref 26–34)
MCHC RBC AUTO-ENTMCNC: 33.3 G/DL (ref 31–36)
MCV RBC AUTO: 88 FL (ref 80–100)
MONOCYTES ABSOLUTE: 0.8 K/UL (ref 0–1.3)
MONOCYTES RELATIVE PERCENT: 9.7 %
NEUTROPHILS ABSOLUTE: 5.9 K/UL (ref 1.7–7.7)
NEUTROPHILS RELATIVE PERCENT: 70.3 %
PDW BLD-RTO: 18 % (ref 12.4–15.4)
PERFORMED ON: ABNORMAL
PERFORMED ON: ABNORMAL
PERFORMED ON: NORMAL
PERFORMED ON: NORMAL
PLATELET # BLD: 121 K/UL (ref 135–450)
PMV BLD AUTO: 8.9 FL (ref 5–10.5)
POTASSIUM SERPL-SCNC: 3.6 MMOL/L (ref 3.5–5.1)
RBC # BLD: 2.78 M/UL (ref 4.2–5.9)
SODIUM BLD-SCNC: 139 MMOL/L (ref 136–145)
WBC # BLD: 8.4 K/UL (ref 4–11)

## 2020-02-13 PROCEDURE — 1200000000 HC SEMI PRIVATE

## 2020-02-13 PROCEDURE — 97110 THERAPEUTIC EXERCISES: CPT

## 2020-02-13 PROCEDURE — 6370000000 HC RX 637 (ALT 250 FOR IP): Performed by: SURGERY

## 2020-02-13 PROCEDURE — 97535 SELF CARE MNGMENT TRAINING: CPT

## 2020-02-13 PROCEDURE — 97163 PT EVAL HIGH COMPLEX 45 MIN: CPT

## 2020-02-13 PROCEDURE — 80048 BASIC METABOLIC PNL TOTAL CA: CPT

## 2020-02-13 PROCEDURE — 83735 ASSAY OF MAGNESIUM: CPT

## 2020-02-13 PROCEDURE — 97530 THERAPEUTIC ACTIVITIES: CPT

## 2020-02-13 PROCEDURE — 97166 OT EVAL MOD COMPLEX 45 MIN: CPT

## 2020-02-13 PROCEDURE — 97116 GAIT TRAINING THERAPY: CPT

## 2020-02-13 PROCEDURE — 85025 COMPLETE CBC W/AUTO DIFF WBC: CPT

## 2020-02-13 PROCEDURE — 2580000003 HC RX 258: Performed by: SURGERY

## 2020-02-13 PROCEDURE — 36415 COLL VENOUS BLD VENIPUNCTURE: CPT

## 2020-02-13 RX ADMIN — ALLOPURINOL 300 MG: 300 TABLET ORAL at 08:05

## 2020-02-13 RX ADMIN — FENOFIBRATE 160 MG: 160 TABLET ORAL at 08:05

## 2020-02-13 RX ADMIN — OXYCODONE 5 MG: 5 TABLET ORAL at 11:55

## 2020-02-13 RX ADMIN — INSULIN GLARGINE 10 UNITS: 100 INJECTION, SOLUTION SUBCUTANEOUS at 21:01

## 2020-02-13 RX ADMIN — CASTOR OIL AND BALSAM, PERU: 788; 87 OINTMENT TOPICAL at 20:45

## 2020-02-13 RX ADMIN — Medication 10 ML: at 08:05

## 2020-02-13 RX ADMIN — LEVOTHYROXINE SODIUM 25 MCG: 25 TABLET ORAL at 05:39

## 2020-02-13 RX ADMIN — ATORVASTATIN CALCIUM 20 MG: 10 TABLET, FILM COATED ORAL at 20:46

## 2020-02-13 RX ADMIN — PANTOPRAZOLE SODIUM 40 MG: 40 TABLET, DELAYED RELEASE ORAL at 05:39

## 2020-02-13 RX ADMIN — AMIODARONE HYDROCHLORIDE 200 MG: 200 TABLET ORAL at 08:05

## 2020-02-13 RX ADMIN — INSULIN LISPRO 1 UNITS: 100 INJECTION, SOLUTION INTRAVENOUS; SUBCUTANEOUS at 21:01

## 2020-02-13 RX ADMIN — CASTOR OIL AND BALSAM, PERU: 788; 87 OINTMENT TOPICAL at 08:14

## 2020-02-13 ASSESSMENT — PAIN DESCRIPTION - LOCATION
LOCATION: SACRUM
LOCATION: SACRUM
LOCATION: SACRUM;LEG
LOCATION: SACRUM;FOOT

## 2020-02-13 ASSESSMENT — PAIN SCALES - GENERAL
PAINLEVEL_OUTOF10: 9
PAINLEVEL_OUTOF10: 9
PAINLEVEL_OUTOF10: 7
PAINLEVEL_OUTOF10: 0
PAINLEVEL_OUTOF10: 9
PAINLEVEL_OUTOF10: 0
PAINLEVEL_OUTOF10: 0

## 2020-02-13 ASSESSMENT — PAIN DESCRIPTION - PAIN TYPE
TYPE: ACUTE PAIN
TYPE: ACUTE PAIN
TYPE: ACUTE PAIN;SURGICAL PAIN
TYPE: ACUTE PAIN

## 2020-02-13 ASSESSMENT — PAIN DESCRIPTION - ORIENTATION
ORIENTATION: LEFT
ORIENTATION: LEFT

## 2020-02-13 NOTE — PROGRESS NOTES
Family updated via phone regarding patient's status and POC. Daughter concerned about possible UTI - she said he was placed on cipro prior to coming to ER but was worried that treatment stopped d/t hospital admission.  Page sent to Dr. Donna Cortés via perfect serve per family's request.

## 2020-02-13 NOTE — PROGRESS NOTES
With the help of the PRN zyprexa the patient was able to wear her BIPAP for an hour. Pt is now placed on 4L NC and SpO2 is WNL.

## 2020-02-13 NOTE — PROGRESS NOTES
History  Lives With: Alone  Type of Home: Trailer  Home Layout: One level  Home Access: Stairs to enter with rails  Entrance Stairs - Number of Steps: 1  Entrance Stairs - Rails: Left  Bathroom Shower/Tub: Tub/Shower unit  Bathroom Toilet: Standard  Bathroom Equipment: Shower chair, Grab bars in shower, Hand-held shower  Home Equipment: Standard walker, Rolling walker  ADL Assistance: Independent  Homemaking Assistance: Independent  Homemaking Responsibilities: Yes  Meal Prep Responsibility: Primary  Laundry Responsibility: Primary  Cleaning Responsibility: Primary  Shopping Responsibility: Primary  Ambulation Assistance: Independent  Transfer Assistance: Independent  Active : Yes  Mode of Transportation: Car  Occupation: Workers comp  Type of occupation:           Objective          AROM RLE (degrees)  RLE AROM: WFL  AROM LLE (degrees)  LLE AROM : WFL  Strength RLE  Comment: grossly 3+ to 4/5 throughout  Strength LLE  Comment: grossly 3+ to 4/5 throughout        Bed mobility  Scooting: Moderate assistance(to EOB, dependent for boost to [facility])  Transfers  Sit to Stand: 2 Person Assistance(mod A x2)  Stand to sit: 2 Person Assistance(mod A x2)  Ambulation  Ambulation?: Yes  Ambulation 1  Surface: level tile  Device: Rolling Walker  Assistance:  Moderate assistance  Quality of Gait: very short shuffling lateral steps to [facility], attempting to take weight off LLE due to pain in back of knee with WB ing  Distance: 2 ft to [facility] x 2 trials     Balance  Posture: Fair  Sitting - Static: Fair  Sitting - Dynamic: Fair  Standing - Static: Fair  Standing - Dynamic: Fair  Comments: Pt sat EOB x greater than 15 min with SBA to min A;  Pt stood x 2 trials with mod A for balance x 30-60 seconds  Exercises  Quad Sets: x 10 B  Gluteal Sets: x 10 B  Hip Flexion: x 10 B  Knee Long Arc Quad: x 10 B  Ankle Pumps: x 10 B     Plan   Plan  Times per week: 3-5  Current Treatment Recommendations: Strengthening, Gait Training, ROM,

## 2020-02-13 NOTE — PROGRESS NOTES
Hospitalist Progress Note      PCP: No primary care provider on file. Date of Admission: 2/8/2020    Chief Complaint: Lower GIB    Subjective: no new c/o. Medications:  Reviewed    Infusion Medications    dextrose       Scheduled Medications    Venelex   Topical BID    levothyroxine  25 mcg Oral Daily    zinc oxide   Topical Daily    allopurinol  300 mg Oral Daily    amiodarone  200 mg Oral Daily    atorvastatin  20 mg Oral Nightly    fenofibrate  160 mg Oral Daily    pantoprazole  40 mg Oral QAM AC    insulin glargine  10 Units Subcutaneous BID    insulin lispro  5 Units Subcutaneous TID WC    insulin lispro  0-6 Units Subcutaneous TID WC    insulin lispro  0-3 Units Subcutaneous Nightly     PRN Meds: oxyCODONE, meclizine, melatonin ER, sodium chloride flush, glucose, dextrose, glucagon (rDNA), dextrose, acetaminophen, acetaminophen, acetaminophen, promethazine, promethazine, promethazine, calcium carbonate      Intake/Output Summary (Last 24 hours) at 2/13/2020 1005  Last data filed at 2/13/2020 0759  Gross per 24 hour   Intake 120 ml   Output 2325 ml   Net -2205 ml       Physical Exam Performed:    BP (!) 148/74   Pulse 70   Temp 97.4 °F (36.3 °C) (Oral)   Resp 18   Ht 5' 9\" (1.753 m)   Wt 187 lb 3.2 oz (84.9 kg)   SpO2 94%   BMI 27.64 kg/m²     General appearance: No apparent distress, appears stated age and cooperative. HEENT: Pupils equal, round, and reactive to light. Conjunctivae/corneas clear. Neck: Supple, with full range of motion. No jugular venous distention. Trachea midline. Respiratory:  Normal respiratory effort. Clear to auscultation, bilaterally without Rales/Wheezes/Rhonchi. Cardiovascular: Regular rate and rhythm with normal S1/S2 without murmurs, rubs or gallops. Abdomen: Soft, non-tender, non-distended with normal bowel sounds. Musculoskeletal: No clubbing, cyanosis or edema bilaterally. Full range of motion without deformity.   Skin: Skin color, now stable and asymptomatic w/out indication for additiional transfusion. Follow serial labs. Reviewed and documented as above.     HTN - w/out known CAD and no evidence of active signs/sxs of ischemia/failure. Currently controlled off home meds - 2nd to above, w/ vitals reviewed and documented as above. HyperLipidemia - controlled on home Statin. Continue, w/ f/u and med adjustment w/ PCP    DM2 - controlled on home oral antiGlycemics - held. Lantus started and follow FSBS/SSI low regimen. Last HbA1c 4.7% dated Jan 2020. Anticipate resuming/continuing home regimen at discharge.      Decubitus ulcers - sacral ulcer malodorous and indurated - may need debridement. General Surgery and Wound care consulted and appreciated s/p debridement in OR Tues 11 Feb w/out complications.       S/P TAVR on 21 Jan 2020. On Amiodarone, ASA held due to GIB. CT surgery consulted and appreciated, now signed off.        DVT Prophylaxis: IPC  Diet: Dietary Nutrition Supplements: Frozen Oral Supplement  DIET CARDIAC; Dental Soft  Code Status: Full Code      PT/OT Eval Status: ordered and pending. Came from SNF    Dispo - Here for next few days pending further w/up.      Mukul Shabazz MD

## 2020-02-13 NOTE — PROGRESS NOTES
4 Eyes Skin Assessment     The patient is being assess for   Shift Handoff    I agree that 2 RN's have performed a thorough Head to Toe Skin Assessment on the patient. ALL assessment sites listed below have been assessed. Areas assessed by both nurses:   [x]   Head, Face, and Ears   [x]   Shoulders, Back, and Chest, Abdomen  [x]   Arms, Elbows, and Hands   [x]   Coccyx, Sacrum, and Ischium  [x]   Legs, Feet, and Heels      St 4 to coccyx, DTI to bilateral heels. **SHARE this note so that the co-signing nurse is able to place an eSignature**    Co-signer eSignature: {Esignature:[id number]}    Does the Patient have Skin Breakdown?   Yes LDA WOUND CARE was Initiated documentation include the Luz-wound, Wound Assessment, Measurements, Dressing Treatment, Drainage, and Color\",          Wes Prevention initiated:  Yes   Wound Care Orders initiated:  Yes      [address]  nurse consulted for Pressure Injury (Stage 3,4, Unstageable, DTI, NWPT, Complex wounds)and New or Established Ostomies:  Yes      Primary Nurse eSignature: Electronically signed by Arthor Runner, RN on 2/13/20 at 4:33 PM

## 2020-02-13 NOTE — PROGRESS NOTES
Occupational Therapy   Occupational Therapy Initial Assessment and Treatment Note  Date: 2020   Patient Name: Denzel Mckeon  MRN: [id number]     : 1950    Date of Service: 2020    Discharge Recommendations:  Subacute/Skilled Nursing Facility     Assessment   Performance deficits / Impairments: Decreased functional mobility ; Decreased ADL status; Decreased safe awareness;Decreased cognition;Decreased balance;Decreased endurance  Assessment: Pt admitted for rectal bleeding and is s/p I&D of sacral wound. Pt is functioning significantly below baseline for ADLs and mobility. Prior to AVR in 2020, pt was independent and lived alone. Recommend SNF for skilled OT services. Cont OT. Prognosis: Good  Decision Making: Medium Complexity  OT Education: OT Role;Plan of Care;ADL Adaptive Strategies;Transfer Training;Precautions  REQUIRES OT FOLLOW UP: Yes  Activity Tolerance  Activity Tolerance: Patient Tolerated treatment well  Safety Devices  Safety Devices in place: Yes  Type of devices: Nurse notified;Gait belt;Call light within reach; Left in bed;Bed alarm in place         Patient Diagnosis(es): There were no encounter diagnoses. has a past medical history of CAD (coronary artery disease), COPD (chronic obstructive pulmonary disease) ([facility] [address] Diabetes mellitus ([facility] [address] Gout, Hemothorax, left, Hyperlipidemia, Hypertension, Obesity, Class I, BMI 30.0-34.9 (see actual BMI), S/P thoracotomy, Status post partial removal of lung, and Thyroid disease. has a past surgical history that includes thoracotomy (Left, 2020); bronchoscopy (N/A, 2020); Coronary angioplasty with stent (2010); pr ascend aorta graft w root replacment. valve conduit/coron reconstr (N/A, 2020); transesophageal echocardiogram (2020); Cardiac catheterization (2020); chest tube insertion (Left, 2020); Aortic valve replacement (2011); transesophageal echocardiogram (2010);  Coronary artery bypass Equipment: Standard walker, Rolling walker  ADL Assistance: Independent  Homemaking Assistance: Independent  Homemaking Responsibilities: Yes  Meal Prep Responsibility: Primary  Laundry Responsibility: Primary  Cleaning Responsibility: Primary  Shopping Responsibility: Primary  Ambulation Assistance: Independent  Transfer Assistance: Independent  Active : Yes  Mode of Transportation: Car  Occupation: Workers comp  Type of occupation:      Objective   Vision: Impaired  Vision Exceptions: Wears glasses for reading;Wears glasses at all times  Hearing: Within functional limits    Orientation  Overall Orientation Status: Impaired  Orientation Level: Oriented to place;Oriented to situation;Oriented to person;Disoriented to time     Balance  Sitting Balance: Contact guard assistance  Standing Balance: Dependent/Total(mod x2 RW)  Standing Balance  Time: Pt sat EOB>15 min for UE exer and grooming. Activity: Stood 3x from EOB and took side steps with RW and mod x2. Pt instructed throughout session on sternal precautions during mobility  (pt has little retention of instructions). He frequently attempted to use BUE though he has heart pillow. ADL  Grooming: Minimal assistance(comb hair while seated )  UE Dressing: Moderate assistance  LE Dressing: Dependent/Total  Toileting: Dependent/Total  Tone RUE  RUE Tone: Normotonic  Tone LUE  LUE Tone: Normotonic  Coordination  Movements Are Fluid And Coordinated: Yes     Bed mobility  Supine to Sit: Minimal assistance; Moderate assistance;2 Person assistance  Sit to Supine: Minimal assistance; Moderate assistance;2 Person assistance  Comment: Pt instructed on sternal precautions during bed mobility. He needs reinforcement of instructions. Transfers  Stand Step Transfers: Moderate assistance;2 Person assistance(at EOB with RW)  Sit to stand:  Moderate assistance;2 Person assistance  Stand to sit: Moderate assistance;2 Person assistance     Cognition  Overall Cognitive Status: Exceptions  Following Commands: Follows one step commands with repetition; Follows one step commands with increased time  Attention Span: Attends with cues to redirect; Difficulty attending to directions  Memory: Decreased recall of recent events;Decreased recall of precautions;Decreased short term memory  Safety Judgement: Decreased awareness of need for assistance;Decreased awareness of need for safety  Problem Solving: Assistance required to identify errors made;Assistance required to correct errors made;Decreased awareness of errors  Insights: Decreased awareness of deficits  Initiation: Requires cues for some  Sequencing: Requires cues for some      Type of ROM/Therapeutic Exercise  Type of ROM/Therapeutic Exercise: AROM  Comment: seated  Exercises  Shoulder Elevation: 10x  Elbow Flexion: 10x  Elbow Extension: 10x  Grasp/Release: 10x     LUE AROM (degrees)  LUE AROM : WFL  LUE General AROM: within sternal precautions  RUE AROM (degrees)  RUE AROM : WFL  RUE General AROM: within sternal precautions        Plan   Plan  Times per week: 3-5x    AM-PAC Score   AM-[facility] Inpatient Daily Activity Raw Score: 13 (02/13/20 1553)  AM-PAC Inpatient ADL T-Scale Score : 32.03 (02/13/20 1553)  ADL Inpatient CMS 0-100% Score: 63.03 (02/13/20 1553)  ADL Inpatient CMS G-Code Modifier : CL (02/13/20 1553)  Goals  Short term goals  Time Frame for Short term goals: for 1 week (2/20) unless noted  Short term goal 1: Perform 3 ADL tasks while seated with SBA  Short term goal 2: Stand >1 min at EOB with RW and min x1  Short term goal 3: Perform functional transfer with mod A x1 with AD by 2/18  Patient Goals   Patient goals :  \"Be able to walk in my room\"     Therapy Time   Individual Concurrent Group Co-treatment   Time In 1342         Time Out 1426         Minutes 44         Timed Code Treatment Minutes: 34 Minutes(10 min eval )    If pt is discharged prior to next OT session, this note will serve as the discharge

## 2020-02-14 VITALS
WEIGHT: 181.88 LBS | OXYGEN SATURATION: 95 % | HEIGHT: 69 IN | BODY MASS INDEX: 26.94 KG/M2 | TEMPERATURE: 98.4 F | HEART RATE: 77 BPM | DIASTOLIC BLOOD PRESSURE: 60 MMHG | SYSTOLIC BLOOD PRESSURE: 124 MMHG | RESPIRATION RATE: 18 BRPM

## 2020-02-14 LAB
ANION GAP SERPL CALCULATED.3IONS-SCNC: 9 MMOL/L (ref 3–16)
ANISOCYTOSIS: ABNORMAL
BANDED NEUTROPHILS RELATIVE PERCENT: 9 % (ref 0–7)
BASOPHILIC STIPPLING: ABNORMAL
BASOPHILS ABSOLUTE: 0 K/UL (ref 0–0.2)
BASOPHILS RELATIVE PERCENT: 0 %
BUN BLDV-MCNC: 8 MG/DL (ref 7–20)
CALCIUM SERPL-MCNC: 8.1 MG/DL (ref 8.3–10.6)
CHLORIDE BLD-SCNC: 98 MMOL/L (ref 99–110)
CO2: 28 MMOL/L (ref 21–32)
CREAT SERPL-MCNC: <0.5 MG/DL (ref 0.8–1.3)
EOSINOPHILS ABSOLUTE: 0.1 K/UL (ref 0–0.6)
EOSINOPHILS RELATIVE PERCENT: 2 %
GFR AFRICAN AMERICAN: >60
GFR NON-AFRICAN AMERICAN: >60
GLUCOSE BLD-MCNC: 115 MG/DL (ref 70–99)
GLUCOSE BLD-MCNC: 124 MG/DL (ref 70–99)
GLUCOSE BLD-MCNC: 152 MG/DL (ref 70–99)
GLUCOSE BLD-MCNC: 223 MG/DL (ref 70–99)
HCT VFR BLD CALC: 25.5 % (ref 40.5–52.5)
HEMOGLOBIN: 8.4 G/DL (ref 13.5–17.5)
LYMPHOCYTES ABSOLUTE: 0.7 K/UL (ref 1–5.1)
LYMPHOCYTES RELATIVE PERCENT: 10 %
MAGNESIUM: 1.7 MG/DL (ref 1.8–2.4)
MCH RBC QN AUTO: 28.9 PG (ref 26–34)
MCHC RBC AUTO-ENTMCNC: 32.8 G/DL (ref 31–36)
MCV RBC AUTO: 88.3 FL (ref 80–100)
METAMYELOCYTES RELATIVE PERCENT: 1 %
MONOCYTES ABSOLUTE: 0.5 K/UL (ref 0–1.3)
MONOCYTES RELATIVE PERCENT: 7 %
NEUTROPHILS ABSOLUTE: 5.8 K/UL (ref 1.7–7.7)
NEUTROPHILS RELATIVE PERCENT: 71 %
PDW BLD-RTO: 17.6 % (ref 12.4–15.4)
PERFORMED ON: ABNORMAL
PLATELET # BLD: 118 K/UL (ref 135–450)
PLATELET SLIDE REVIEW: ABNORMAL
PMV BLD AUTO: 8.4 FL (ref 5–10.5)
POLYCHROMASIA: ABNORMAL
POTASSIUM SERPL-SCNC: 3.8 MMOL/L (ref 3.5–5.1)
RBC # BLD: 2.89 M/UL (ref 4.2–5.9)
SODIUM BLD-SCNC: 135 MMOL/L (ref 136–145)
WBC # BLD: 7.1 K/UL (ref 4–11)

## 2020-02-14 PROCEDURE — 6370000000 HC RX 637 (ALT 250 FOR IP): Performed by: SURGERY

## 2020-02-14 PROCEDURE — 83735 ASSAY OF MAGNESIUM: CPT

## 2020-02-14 PROCEDURE — 36415 COLL VENOUS BLD VENIPUNCTURE: CPT

## 2020-02-14 PROCEDURE — 85025 COMPLETE CBC W/AUTO DIFF WBC: CPT

## 2020-02-14 PROCEDURE — 80048 BASIC METABOLIC PNL TOTAL CA: CPT

## 2020-02-14 RX ORDER — OXYCODONE HYDROCHLORIDE 5 MG/1
5 TABLET ORAL EVERY 8 HOURS PRN
Qty: 21 TABLET | Refills: 0 | Status: SHIPPED | OUTPATIENT
Start: 2020-02-14 | End: 2020-02-21

## 2020-02-14 RX ORDER — PSEUDOEPHEDRINE HCL 30 MG
100 TABLET ORAL 2 TIMES DAILY
Qty: 14 CAPSULE | Refills: 0 | Status: SHIPPED | OUTPATIENT
Start: 2020-02-14 | End: 2020-02-21

## 2020-02-14 RX ADMIN — INSULIN LISPRO 2 UNITS: 100 INJECTION, SOLUTION INTRAVENOUS; SUBCUTANEOUS at 12:34

## 2020-02-14 RX ADMIN — CASTOR OIL AND BALSAM, PERU: 788; 87 OINTMENT TOPICAL at 08:37

## 2020-02-14 RX ADMIN — LEVOTHYROXINE SODIUM 25 MCG: 25 TABLET ORAL at 05:27

## 2020-02-14 RX ADMIN — ALLOPURINOL 300 MG: 300 TABLET ORAL at 08:37

## 2020-02-14 RX ADMIN — INSULIN LISPRO 5 UNITS: 100 INJECTION, SOLUTION INTRAVENOUS; SUBCUTANEOUS at 12:34

## 2020-02-14 RX ADMIN — OXYCODONE 5 MG: 5 TABLET ORAL at 08:37

## 2020-02-14 RX ADMIN — AMIODARONE HYDROCHLORIDE 200 MG: 200 TABLET ORAL at 08:37

## 2020-02-14 RX ADMIN — PANTOPRAZOLE SODIUM 40 MG: 40 TABLET, DELAYED RELEASE ORAL at 05:27

## 2020-02-14 RX ADMIN — FENOFIBRATE 160 MG: 160 TABLET ORAL at 08:37

## 2020-02-14 RX ADMIN — OXYCODONE 5 MG: 5 TABLET ORAL at 16:27

## 2020-02-14 ASSESSMENT — PAIN SCALES - GENERAL
PAINLEVEL_OUTOF10: 7
PAINLEVEL_OUTOF10: 0
PAINLEVEL_OUTOF10: 7

## 2020-02-14 ASSESSMENT — PAIN DESCRIPTION - PAIN TYPE: TYPE: ACUTE PAIN

## 2020-02-14 ASSESSMENT — PAIN DESCRIPTION - LOCATION: LOCATION: SACRUM

## 2020-02-14 NOTE — CARE COORDINATION
PT/OT notes are in and recommending SNF. Patient plans to return to [facility] to complete his skilled stay. All clinicals including facesheet, NPI number, PT/OT rec's, H&P, latest progress notes and CM initial assessment faxed to [facility] in order to initiate precert. 1202 addendum:  Placed call to [facility]. with Trae Byers and she confirmed that they received call clinicals and that the precert is under review with [facility] .

## 2020-02-14 NOTE — PROGRESS NOTES
Nutrition Assessment    Type and Reason for Visit: Reassess    Nutrition Recommendations:   1. Cardiac, Dental soft diet  2. Magic cup with meals  3. Will monitor nutritional adequacy, nutrition-related labs, weights, BMs, and clinical progress     Nutrition Assessment: Follow up:  Continues on a dental soft diet with Magic cup tid. Patient stated tolerating diet without swallowing difficulty. Patient likes Magic cups. Patient to be discharged to nursing home today. Malnutrition Assessment:  · Malnutrition Status: At risk for malnutrition  · Context: Chronic illness  · Findings of the 6 clinical characteristics of malnutrition (Minimum of 2 out of 6 clinical characteristics is required to make the diagnosis of moderate or severe Protein Calorie Malnutrition based on AND/ASPEN Guidelines):  1. Energy Intake-Less than or equal to 75% of estimated energy requirement,      2. Weight Loss-Unable to assess,    3. Fat Loss-Unable to assess,    4. Muscle Loss-Unable to assess,    5. Fluid Accumulation-Unable to assess,    6.  Strength-Not measured    Nutrition Risk Level:  Moderate    Nutrient Needs:  · Estimated Daily Total Kcal: 1877-5681  · Estimated Daily Protein (g):   · Estimated Daily Total Fluid (ml/day): 1 kcal/ml    Nutrition Diagnosis:   · Problem: Increased nutrient needs  · Etiology: related to Insufficient energy/nutrient consumption, Increased demand for energy/nutrients     Signs and symptoms:  as evidenced by Presence of wounds    Objective Information:  · Nutrition-Focused Physical Findings: BM x 1 on 2/12  · Wound Type: (redness on sacrum, bilateral heels)  · Current Nutrition Therapies:  · Oral Diet Orders: Cardiac(NPO after midnight)   · Oral Diet intake: 51-75%, %  · Oral Nutrition Supplement (ONS) Orders: Frozen Oral Supplement  · ONS intake: 51-75%, %  · Anthropometric Measures:  · Ht: 5' 9\" (175.3 cm)   · Current Body Wt: 182 lb 4 oz (82.7 kg)  · Ideal Body Wt: 160 lb (72.6 kg), % Ideal Body    · BMI Classification: BMI 25.0 - 29.9 Overweight    Nutrition Interventions:   Continue current diet, Continue current ONS  Continued Inpatient Monitoring    Nutrition Evaluation:   · Evaluation: Progressing toward goals   · Goals: Patient will eat 50% or greater of meals and supplements.       · Monitoring: Meal Intake, Supplement Intake, Pertinent Labs, Nutrition Progression      Electronically signed by Kayley Marino RD, LAISHA on 2/14/20 at 3:29 PM    Contact Number: Office: [phone]; [address]: [phone]

## 2020-02-14 NOTE — PROGRESS NOTES
Pt refused to change to change over to new bed ordered by Joe Rubalcava, wound care RN. Pt is already on a specialty bed for stage 4 pressure ulcers and bed company said the bed he is currently on and the bed that was delivered are both used for stage 4 pressure ulcers.

## 2020-02-14 NOTE — PROGRESS NOTES
[facility] Wound Ostomy Continence Nurse  Follow-up Progress Note       NAME:  Manuel Moya  MEDICAL RECORD NUMBER:  [id number]  AGE:  77 y.o. GENDER:  male  :  1950  TODAY'S DATE:  2020    Subjective: Spoke to Dr. Ilda Cabrera - pt to be discharged to SNF; place a moist-moist dressing   Wound Identification: The sacral coccygeal wound is a [facility] Ac[facility] pressure injury that started in the ICU post op open heart after the mattress was not inflated correctly and patient was sitting on the metal bed frame. 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 Post I & D 20 by Dr Ilda Cabrera. Other wounds and incisions not assessed on this visit. Contributing Factors: diabetes, chronic pressure, decreased mobility, shear force, obesity, incontinence of stool and incontinence of urine        Patient Goal of Care:  [x] Wound Healing  [] Odor Control  [] Palliative Care  [] Pain Control   [] Other:     Objective: A/O; lying in bed; incontinent of stool    BP (!) 130/56   Pulse 75   Temp 98.2 °F (36.8 °C) (Oral)   Resp 18   Ht 5' 9\" (1.753 m)   Wt 181 lb 14.1 oz (82.5 kg)   SpO2 96%   BMI 26.86 kg/m²   Wes Risk Score: Wes Scale Score: 16  Assessment: Sacral coccygeal wound - malodorous; yellow slough and black tissue noted   Measurements:  Negative Pressure Wound Therapy Coccyx Mid (Active)   $ Standard NPWT >50 sq cm PER TX $ Yes 2020  2:44 PM   Wound Type Surgical;Pressure ulcer: Stage IV 2020  2:44 PM   Unit Type [facility] 2020  2:44 PM   Dressing Type Black foam 2020  8:45 PM   Number of pieces used 1 2020  2:44 PM   Cycle Continuous 2020  8:45 PM   Target Pressure (mmHg) 125 2020  8:45 PM   Intensity 1 2020  2:44 PM   Canister changed?  Yes 2020  2:44 PM   Dressing Status Changed 2020  2:44 PM   Dressing Changed Changed/New 2020  2:44 PM   Drainage Amount Small 2/12/2020  2:44 PM   Drainage Description Serosanguinous 2/12/2020  2:44 PM   Dressing Change Due 02/14/20 2/12/2020  2:44 PM   Output (ml) 0 ml 2/14/2020  5:24 AM   Wound Assessment Red;Yellow;Black 2/12/2020  2:44 PM   Luz-wound Assessment Blanchable erythema 2/12/2020  2:44 PM   Shape heart shaped 2/12/2020  2:44 PM   Odor None 2/12/2020  2:44 PM   Number of days: 1       Wound 01/27/20 Buttocks Right;Mid;Left Sacrum, R, Mid & L buttocks. Evolved to Unstagable 2/1/20, I & D 2/12/20. (Active)   Wound Image    2/12/2020  2:44 PM   Wound Pressure Stage  4 2/14/2020 11:45 AM   Dressing Status Intact; New drainage; Changed 2/14/2020 11:45 AM   Dressing Changed Changed/New 2/14/2020 11:45 AM   Dressing/Treatment Moist to moist;ABD; Medipore 2/14/2020 11:45 AM   Wound Cleansed Rinsed/Irrigated with saline 2/14/2020 11:45 AM   Dressing Change Due 02/14/20 2/14/2020 11:45 AM   Wound Length (cm) 11 cm 2/12/2020  2:44 PM   Wound Width (cm) 8 cm 2/12/2020  2:44 PM   Wound Depth (cm) 3 cm 2/12/2020  2:44 PM   Wound Surface Area (cm^2) 88 cm^2 2/12/2020  2:44 PM   Change in Wound Size % (l*w) 49.43 2/12/2020  2:44 PM   Wound Volume (cm^3) 264 cm^3 2/12/2020  2:44 PM   Post-Procedure Length (cm) 0 cm 1/28/2020  9:19 AM   Post-Procedure Width (cm) 0 cm 1/28/2020  9:19 AM   Post-Procedure Depth (cm) 0 cm 1/28/2020  9:19 AM   Post-Procedure Surface Area (cm^2) 0 cm^2 1/28/2020  9:19 AM   Post-Procedure Volume (cm^3) 0 cm^3 1/28/2020  9:19 AM   Distance Tunneling (cm) 0 cm 2/12/2020  2:44 PM   Tunneling Position ___ O'Clock 0 2/12/2020  2:44 PM   Undermining Starts ___ O'Clock 12 2/12/2020  2:44 PM   Undermining Ends___ O'Clock 3 2/12/2020  2:44 PM   Undermining Maxium Distance (cm) 2.5 2/12/2020  2:44 PM   Wound Assessment Red;Slough 2/14/2020 11:45 AM   Drainage Amount Moderate 2/14/2020 11:45 AM   Drainage Description Purulent 2/14/2020 11:45 AM   Odor Strong 2/14/2020 11:45 AM   Margins Unattached edges 2/14/2020 11:45 AM Luz-wound Assessment Blanchable erythema 2/14/2020 11:45 AM   Non-staged Wound Description Full thickness 2/12/2020  2:44 PM   Red%Wound Bed 85 2/12/2020  2:44 PM   Yellow%Wound Bed 10 2/12/2020  2:44 PM   Black%Wound Bed 5 2/12/2020  2:44 PM   Purple%Wound Bed 10 2/5/2020  8:00 AM   Culture Taken No 2/12/2020  2:44 PM   Number of days: 17       Wound 01/28/20 Heel Right (Active)   Wound Image   2/10/2020 12:16 PM   Wound Deep tissue/Injury 2/13/2020  8:45 PM   Offloading for Diabetic Foot Ulcers Refused; Offloading boot 2/13/2020  8:45 PM   Dressing Status Clean;Dry; Intact 2/13/2020  8:45 PM   Dressing Changed Changed/New 2/13/2020  8:45 PM   Dressing/Treatment Protective barrier 2/13/2020  8:45 PM   Wound Cleansed Not Cleansed 2/11/2020 10:00 PM   Dressing Change Due 02/12/20 2/11/2020 10:00 PM   Wound Length (cm) 1.5 cm 2/10/2020 12:16 PM   Wound Width (cm) 1.5 cm 2/10/2020 12:16 PM   Wound Depth (cm) 0 cm 2/10/2020 12:16 PM   Wound Surface Area (cm^2) 2.25 cm^2 2/10/2020 12:16 PM   Change in Wound Size % (l*w) 90.62 2/10/2020 12:16 PM   Wound Volume (cm^3) 0 cm^3 2/10/2020 12:16 PM   Post-Procedure Length (cm) 0 cm 2/10/2020 12:16 PM   Post-Procedure Width (cm) 0 cm 2/10/2020 12:16 PM   Post-Procedure Depth (cm) 0 cm 2/10/2020 12:16 PM   Post-Procedure Surface Area (cm^2) 0 cm^2 2/10/2020 12:16 PM   Post-Procedure Volume (cm^3) 0 cm^3 2/10/2020 12:16 PM   Distance Tunneling (cm) 0 cm 2/10/2020 12:16 PM   Tunneling Position ___ O'Clock 0 2/10/2020 12:16 PM   Undermining Starts ___ O'Clock 0 2/10/2020 12:16 PM   Undermining Ends___ O'Clock 0 2/10/2020 12:16 PM   Undermining Maxium Distance (cm) 0 2/10/2020 12:16 PM   Wound Assessment Dry; Intact; Purple 2/10/2020 12:16 PM   Drainage Amount None 2/13/2020  8:45 PM   Odor None 2/13/2020  8:45 PM   Margins Attached edges 2/13/2020  8:45 PM   Luz-wound Assessment Calloused; Red 2/13/2020  8:45 PM   Purple%Wound Bed 100 2/10/2020 12:16 PM   Culture Taken No 2/10/2020 12:16 PM   Number of days: 17       Wound 01/28/20 Heel Left (Active)   Wound Image   2/10/2020 12:16 PM   Wound Deep tissue/Injury 2/13/2020  8:45 PM   Offloading for Diabetic Foot Ulcers No;Offloading boot 2/13/2020  8:45 PM   Dressing Status Clean;Dry; Intact 2/13/2020  8:45 PM   Dressing Changed Changed/New 2/13/2020  8:45 PM   Dressing/Treatment Protective barrier 2/13/2020  8:45 PM   Wound Cleansed Not Cleansed 2/11/2020 10:00 PM   Dressing Change Due 02/12/20 2/11/2020 10:00 PM   Wound Length (cm) 5 cm 2/10/2020 12:16 PM   Wound Width (cm) 4.5 cm 2/10/2020 12:16 PM   Wound Depth (cm) 0 cm 2/10/2020 12:16 PM   Wound Surface Area (cm^2) 22.5 cm^2 2/10/2020 12:16 PM   Change in Wound Size % (l*w) 0 2/10/2020 12:16 PM   Wound Volume (cm^3) 0 cm^3 2/10/2020 12:16 PM   Post-Procedure Length (cm) 0 cm 2/10/2020 12:16 PM   Post-Procedure Width (cm) 0 cm 2/10/2020 12:16 PM   Post-Procedure Depth (cm) 0 cm 2/10/2020 12:16 PM   Post-Procedure Surface Area (cm^2) 0 cm^2 2/10/2020 12:16 PM   Post-Procedure Volume (cm^3) 0 cm^3 2/10/2020 12:16 PM   Distance Tunneling (cm) 0 cm 2/10/2020 12:16 PM   Tunneling Position ___ O'Clock 0 2/10/2020 12:16 PM   Undermining Starts ___ O'Clock 0 2/10/2020 12:16 PM   Undermining Ends___ O'Clock 0 2/10/2020 12:16 PM   Undermining Maxium Distance (cm) 0 2/10/2020 12:16 PM   Wound Assessment Clean;Dry; Intact;Edges well approximated; Purple;Red 2/13/2020  8:45 PM   Drainage Amount None 2/13/2020  8:45 PM   Odor None 2/13/2020  8:45 PM   Margins Attached edges 2/13/2020  8:45 PM   Luz-wound Assessment Calloused; Purple;Red 2/13/2020  8:45 PM   Purple%Wound Bed 100 2/10/2020 12:16 PM   Culture Taken No 2/10/2020 12:16 PM   Number of days: 17       Response to treatment:  Well tolerated by patient.      Pain Assessment:  Severity:  0 / 10  Quality of pain: N/A  Wound Pain Timing/Severity: none  Premedicated: No  Plan:   Plan of Care: Wound 01/27/20 Buttocks Right;Mid;Left Sacrum, R, Mid & L buttocks. Evolved to Unstagable 2/1/20, I & D 2/12/20.-Dressing/Treatment: Moist to moist, ABD, Medipore  Wound 01/28/20 Heel Right-Dressing/Treatment: Protective barrier(MONTANA per wound RN)  Wound 01/28/20 Heel Left-Dressing/Treatment: Protective barrier(MONTANA - per Wound RN)  Incision 01/08/20 Back Lateral;Left-Dressing/Treatment: Open to air  Incision 01/21/20 Sternum-Dressing/Treatment: Open to air  Incision 01/21/20 Groin Left-Dressing/Treatment: Open to air  Incision 01/02/20 Chest Lateral;Left;Lower-Dressing/Treatment: Steri-strips     Specialty Bed Required : Yes  Pro Power Elite  [x] Low Air Loss   [x] Pressure Redistribution  [] Fluid Immersion  [] Bariatric  [] Total Pressure Relief  [] Other:     Current Diet: Dietary Nutrition Supplements: Frozen Oral Supplement  DIET CARDIAC;  Dental Soft  Dietician consult:  Yes    Discharge Plan:  Placement for patient upon discharge: skilled nursing   Patient appropriate for Outpatient [address]: Yes    Referrals:  [x]  following  [] [facility]  [] Supplies  [] Other    Patient/Caregiver Teaching:  Level of patient/caregiver understanding able to:   [] Indicates understanding       [] Needs reinforcement  [] Unsuccessful      [] Verbal Understanding  [] Demonstrated understanding       [] No evidence of learning  [] Refused teaching         [] N/A       Electronically signed by Navjot Patricia RN, Ana Arguelles on 2/14/2020 at 2:00 PM

## 2020-02-14 NOTE — PROGRESS NOTES
Hospitalist Progress Note      PCP: No primary care provider on file. Date of Admission: 2/8/2020    Chief Complaint: Lower GIB    Subjective: no new c/o. Medications:  Reviewed    Infusion Medications    dextrose       Scheduled Medications    Venelex   Topical BID    levothyroxine  25 mcg Oral Daily    zinc oxide   Topical Daily    allopurinol  300 mg Oral Daily    amiodarone  200 mg Oral Daily    atorvastatin  20 mg Oral Nightly    fenofibrate  160 mg Oral Daily    pantoprazole  40 mg Oral QAM AC    insulin glargine  10 Units Subcutaneous BID    insulin lispro  5 Units Subcutaneous TID WC    insulin lispro  0-6 Units Subcutaneous TID WC    insulin lispro  0-3 Units Subcutaneous Nightly     PRN Meds: oxyCODONE, meclizine, melatonin ER, sodium chloride flush, glucose, dextrose, glucagon (rDNA), dextrose, acetaminophen, acetaminophen, acetaminophen, promethazine, promethazine, promethazine, calcium carbonate      Intake/Output Summary (Last 24 hours) at 2/14/2020 0805  Last data filed at 2/14/2020 0700  Gross per 24 hour   Intake 0 ml   Output 2500 ml   Net -2500 ml       Physical Exam Performed:    /62   Pulse 73   Temp 98 °F (36.7 °C) (Oral)   Resp 18   Ht 5' 9\" (1.753 m)   Wt 181 lb 14.1 oz (82.5 kg)   SpO2 95%   BMI 26.86 kg/m²     General appearance: No apparent distress, appears stated age and cooperative. HEENT: Pupils equal, round, and reactive to light. Conjunctivae/corneas clear. Neck: Supple, with full range of motion. No jugular venous distention. Trachea midline. Respiratory:  Normal respiratory effort. Clear to auscultation, bilaterally without Rales/Wheezes/Rhonchi. Cardiovascular: Regular rate and rhythm with normal S1/S2 without murmurs, rubs or gallops. Abdomen: Soft, non-tender, non-distended with normal bowel sounds. Musculoskeletal: No clubbing, cyanosis or edema bilaterally. Full range of motion without deformity.   Skin: Skin color, texture, turgor normal.  No rashes or lesions. Neurologic:  Neurovascularly intact without any focal sensory/motor deficits. Cranial nerves: II-XII intact, grossly non-focal.  Psychiatric: Alert and oriented, thought content appropriate, normal insight  Capillary Refill: Brisk,< 3 seconds   Peripheral Pulses: +2 palpable, equal bilaterally       Labs:   Recent Labs     02/12/20  0545 02/13/20  0529 02/14/20  0504   WBC 6.8 8.4 7.1   HGB 7.6* 8.2* 8.4*   HCT 23.2* 24.5* 25.5*   * 121* 118*     Recent Labs     02/12/20  0546 02/13/20  0529 02/14/20  0504    139 135*   K 4.2 3.6 3.8    102 98*   CO2 27 29 28   BUN 10 9 8   CREATININE 0.7* <0.5* <0.5*   CALCIUM 8.1* 8.3 8.1*     No results for input(s): AST, ALT, BILIDIR, BILITOT, ALKPHOS in the last 72 hours. No results for input(s): INR in the last 72 hours. No results for input(s): Claudette Dapper in the last 72 hours. Urinalysis:      Lab Results   Component Value Date    NITRU Negative 01/20/2020    BLOODU Negative 01/20/2020    SPECGRAV 1.010 01/20/2020    GLUCOSEU Negative 01/20/2020       Consults:    IP CONSULT TO SOCIAL WORK  IP CONSULT TO GI  IP CONSULT TO GENERAL SURGERY  IP CONSULT TO DIETITIAN      Assessment/Plan:    Active Hospital Problems    Diagnosis    Sacral wound [S31.000A]    Type 2 diabetes mellitus, without long-term current use of insulin (HCC) [E11.9]    Bright red blood per rectum [K62.5]    Lower gastrointestinal hemorrhage [K92.2]    Hyperlipidemia [E78.5]    S/P AVR (aortic valve replacement) [Z95.2]    HTN (hypertension) [I10]       LGIB - recurrent, of unclear etiology w/ tagged RBC scan negative. GI consulted and appreciated s/p Colonoscopy 10 Feb w/ small sessile polyps and sigmoid diverticulosis w/out evidence of bleeding seen. Monitor for any recurrent bleed. Aspirin held      Anemia - 2nd to acute GI blood loss w/out evidence of ongoing hemodynamically active bleeding/hemolysis.   S/P transfusion, now stable and asymptomatic w/out indication for additional transfusion. Follow serial labs. Reviewed and documented as above.     HTN - w/out known CAD and no evidence of active signs/sxs of ischemia/failure. Currently controlled off home meds - 2nd to above, w/ vitals reviewed and documented as above. HyperLipidemia - controlled on home Statin. Continue, w/ f/u and med adjustment w/ PCP    DM2 - controlled on home oral antiGlycemics - held. Lantus started and follow FSBS/SSI low regimen. Last HbA1c 4.7% dated Jan 2020. Anticipate resuming/continuing home regimen at discharge.      Decubitus ulcers - sacral ulcer malodorous and indurated - may need debridement. General Surgery and Wound care consulted and appreciated s/p debridement in OR Tues 11 Feb w/out complications. Wound Vac change for Friday 14 Feb and then OK to SNF per Surgery.       S/P TAVR on 21 Jan 2020. On Amiodarone, ASA held due to GIB. CT surgery consulted and appreciated, now signed off.        DVT Prophylaxis: IPC  Diet: Dietary Nutrition Supplements: Frozen Oral Supplement  DIET CARDIAC; Dental Soft  Code Status: Full Code      PT/OT Eval Status: ordered and pending. Came from SNF    Dispo - Likely to [facility] Friday/Sat 14/15 Feb pending subspecialty recs.       Pavithra Johnson MD

## 2020-02-14 NOTE — CARE COORDINATION
Received call from Gala Ugarte with [employer] who states precert was approved. Ref #: [id number], good for 5 days, next review date: 2/18, PT/OT: TM, Speech: SF, Nursing: CKE1, NTA: ND, 576 hours/week, : Crystal Marlys, fax #: [phone]. CASE MANAGEMENT DISCHARGE SUMMARY      Discharge to: [address] [place] [address] completed: yes  Hospital Exemption Notification (HENS) completed: not needed to return to [facility]    Transportation: ambulance    Medical Transport explained to pt/family. Pt/family voice no agency preference. Agency used: [facility]    time: 5:30pm    Ambulance form completed: Yes    Notified: patient aware of discharge plan   Family: family aware of discharge plan, placed call to daughter Jane Matters   Facility/Agency: [facility] faxed to [phone]   RN: Morgan Pedroza aware of discharge plan    Phone number for report to facility: [phone] [facility] aware of discharge and transport time. Note: Discharging nurse to complete [facility], reconcile [facility], and place final copy with patient's discharge packet. RN to ensure that written prescriptions for  Level II medications are sent with patient to the facility as per protocol.

## 2020-02-14 NOTE — PROGRESS NOTES
[facility] Rem[facility] IV and stopped bleeding. Catheter intact. Pt tolerated well. No redness noted at site. Notified [facility] and removed tele box. Script for oxycodone to be given to transport upon arrival. Awaiting transport  now.

## 2020-02-17 ENCOUNTER — OUTSIDE SERVICES (OUTPATIENT)
Dept: WOUND CARE | Age: 70
End: 2020-02-17
Payer: MEDICARE

## 2020-02-17 PROCEDURE — 97598 DBRDMT OPN WND ADDL 20CM/<: CPT | Performed by: CLINICAL NURSE SPECIALIST

## 2020-02-17 PROCEDURE — 97597 DBRDMT OPN WND 1ST 20 CM/<: CPT | Performed by: CLINICAL NURSE SPECIALIST

## 2020-02-17 NOTE — PROGRESS NOTES
bronchospasms, respiratory failure with hypoxia, atherosclerosis of coronary artery bypass graft, COPD, dysphagia, emphysema, essential hypertension, GI hemorrhage, hemothorax, hyperlipidemia, hypothyroidism, nonrheumatic aortic valve stenosis, hyperlipidemia, pleural effusion, presence of heart valve replacement, and diabetes. Medications and allergies are detailed in the nursing home chart, and were reviewed by me today.  _______________________    General Physical exam:    Vital signs:  112/57, temperature 98.2, pulse 88, respirations 18    General Appearance: alert and oriented to person, place and time, well developed and well-nourished, in no acute distress  Psychiatric:  Mood and affect appropriate for situation  Skin: warm and dry, no rash  Head: normocephalic and atraumatic  Eyes: pupils equal, round, sclerae anicteric, conjunctivae normal  ENT: no thrush or oral ulcers  Neck:No complaints, normal appearance  Pulmonary/Chest: Respirations easy at rest, no cough or respiratory distress  Cardiovascular: No chest pain, normal rate, toes warm, cap refill normal, bilateral PT and DP pulse audible with doppler  Abdomen: No nausea or vomiting  Extremities: no cyanosis, edema or cellulitis  Musculoskeletal: Ambulatory, moves all extremities, no deformities  Neurologic: distal sensation to light touch intact, no allodynia. Wound exam:    Wound location: Sacum   Length (cm) 8.5   Width (cm) 8.5   Depth (cm) 3.2   Tunneling 0   Undermining 1.3 cm (@ 12:00) from 9 o'clock to 5 o'clock. Wound type:   Pressure  Grade - stage - thickness: Stage 4     Description of periwound: Erythema as well as fungal rash    Description of wound bed: Wound with extensive nonviable tissue (70%), necrotic and fibrin with areas of red granulation. Wound bed moist, moderate amount of brown drainage, edges open and unattached 9 to 5. Surrounding tissue and ulcer with erythema and fungal rash.   Brown drainage and malodor, with

## 2020-02-18 NOTE — DISCHARGE SUMMARY
Hospital Medicine Discharge Summary    Patient ID: Anne Beverly      Patient's PCP: No primary care provider on file. Admit Date: 2/8/2020     Discharge Date: 2/14/2020      Admitting Physician: Ira Winters MD     Discharge Physician: Alida Andrew MD     Discharge Diagnoses: Active Hospital Problems    Diagnosis    Sacral wound [S31.000A]    Type 2 diabetes mellitus, without long-term current use of insulin (HCC) [E11.9]    Bright red blood per rectum [K62.5]    Lower gastrointestinal hemorrhage [K92.2]    Hyperlipidemia [E78.5]    S/P AVR (aortic valve replacement) [Z95.2]    HTN (hypertension) [I10]       The patient was seen and examined on day of discharge and this discharge summary is in conjunction with any daily progress note from day of discharge. Hospital Course:            LGIB - recurrent, of unclear etiology w/ tagged RBC scan negative.  GI consulted and appreciated s/p Colonoscopy 10 Feb w/ small sessile polyps and sigmoid diverticulosis w/out evidence of bleeding seen. Monitored w/out any recurrent bleed.  Aspirin held      Anemia - 2nd to acute GI blood loss w/out evidence of ongoing hemodynamically active bleeding/hemolysis. S/P transfusion, now stable and asymptomatic w/out indication for additional transfusion.     HTN - w/out known CAD and no evidence of active signs/sxs of ischemia/failure. Currently controlled off home meds - 2nd to above     HyperLipidemia - controlled on home Statin. Continue, w/ f/u and med adjustment w/ PCP     DM2 - controlled on home oral antiGlycemics - held. Lantus started and follow FSBS/SSI low regimen. Last HbA1c 4.7% dated Jan 2020. Anticipate resuming/continuing home regimen at discharge.      Decubitus ulcers - sacral ulcer malodorous and indurated - may need debridement.  General Surgery and Wound care consulted and appreciated s/p debridement in OR Tues 11 Feb w/out complications. Wound Vac change for Friday 14 Feb and then OK to SNF per Surgery.       S/P TAVR on 21 Jan 2020.  On Amiodarone, ASA held due to GIB. CT surgery consulted and appreciated, now signed off.        Labs: For convenience and continuity at follow-up the following most recent labs are provided:      CBC:    Lab Results   Component Value Date    WBC 7.1 02/14/2020    HGB 8.4 02/14/2020    HCT 25.5 02/14/2020     02/14/2020       Renal:    Lab Results   Component Value Date     02/14/2020    K 3.8 02/14/2020    K 4.1 02/05/2020    CL 98 02/14/2020    CO2 28 02/14/2020    BUN 8 02/14/2020    CREATININE <0.5 02/14/2020    CALCIUM 8.1 02/14/2020    PHOS 2.3 01/07/2020         Significant Diagnostic Studies    Radiology:   NM GI BLOOD LOSS   Final Result   No evidence of active GI bleeding during acquisition. RECOMMENDATIONS:   If the patient shows hemodynamic signs of an active bleed in the next 20   hours, additional images can be acquired. Consults:     IP CONSULT TO SOCIAL WORK  IP CONSULT TO GI  IP CONSULT TO GENERAL SURGERY  IP CONSULT TO DIETITIAN    Disposition: SNF    Condition at Discharge: Stable    Discharge Instructions/Follow-up:  w/ PCP 1-2 weeks and subspecialists as arranged. Code Status:  Full Code    Activity: activity as tolerated    Diet: cardiac diet dental soft      Discharge Medications:     Discharge Medication List as of 2/14/2020  3:52 PM           Details   oxyCODONE (ROXICODONE) 5 MG immediate release tablet Take 1 tablet by mouth every 8 hours as needed (acute post op pain) for up to 7 days. , Disp-21 tablet, R-0Print      docusate (COLACE, DULCOLAX) 100 MG CAPS Take 100 mg by mouth 2 times daily for 7 days, Disp-14 capsule, R-0Print              Details   citalopram (CELEXA) 10 MG tablet Take 10 mg by mouth every morningHistorical Med      fenofibrate micronized (LOFIBRA) 200 MG capsule Take 200 mg by mouth every morning (before breakfast)Historical Med      levothyroxine (SYNTHROID) 25 MCG tablet Take 25 mcg by mouth DailyHistorical Med      amiodarone (CORDARONE) 200 MG tablet Take 1 tablet by mouth daily for 21 days, Disp-21 tablet, R-0DC to SNF      atorvastatin (LIPITOR) 20 MG tablet Take 1 tablet by mouth nightly, Disp-30 tablet, R-0DC to SNF      pantoprazole (PROTONIX) 40 MG tablet Take 1 tablet by mouth every morning (before breakfast), Disp-30 tablet, R-0DC to SNF      carvedilol (COREG) 12.5 MG tablet Take 1 tablet by mouth 2 times daily (with meals), Disp-60 tablet, R-0DC to SNF      choline fenofibrate (TRILIPIX) 135 MG CPDR delayed release capsule Take 135 mg by mouth dailyHistorical Med      glipiZIDE (GLUCOTROL) 5 MG tablet Take 1 tablet by mouth dailyHistorical Med      meclizine (ANTIVERT) 25 MG tablet Take 1 tablet by mouth dailyHistorical Med      allopurinol (ZYLOPRIM) 300 MG tablet Take 300 mg by mouth dailyHistorical Med             Time Spent on discharge is more than 30 minutes in the examination, evaluation, counseling and review of medications and discharge plan.       Signed:    Benjamin Evans MD   2/17/2020

## 2020-02-21 ENCOUNTER — TELEPHONE (OUTPATIENT)
Dept: CARDIOLOGY CLINIC | Age: 70
End: 2020-02-21

## 2020-02-24 ENCOUNTER — OUTSIDE SERVICES (OUTPATIENT)
Dept: WOUND CARE | Age: 70
End: 2020-02-24
Payer: MEDICARE

## 2020-02-24 LAB
FUNGUS (MYCOLOGY) CULTURE: NORMAL
FUNGUS STAIN: NORMAL

## 2020-02-24 PROCEDURE — 97598 DBRDMT OPN WND ADDL 20CM/<: CPT | Performed by: CLINICAL NURSE SPECIALIST

## 2020-02-24 PROCEDURE — 97597 DBRDMT OPN WND 1ST 20 CM/<: CPT | Performed by: CLINICAL NURSE SPECIALIST

## 2020-02-24 NOTE — PROGRESS NOTES
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 Additional ulcer(s) noted?  none    History of Present Illness: Patient started on IV vancomycin for Enterococcus faecalis to sacral wound. Patient with significant improvement of nonviable tissue. Appetite is improving. Right and left heel ulcers stable, not open. On protein supplement. Patient is offloading. Lower loss mattress. Side to side when in bed. Pressure redistribution chair cushion. Minimal time in chair. No fever or chills. Review of Systems: Pertinent systems reviewed in the HPI; all other systems reviewed, and negative. Pertinent elements of past medical, surgical, family, and/or social history: Status post AVR and recent pneumonia.   History of MI, CHF, alcohol abuse, atherosclerosis, constipation, hyperglycemia, hypertension, major depressive disorder, disorders of lung, tobacco use, bronchospasms, atherosclerotic heart disease with bypass, COPD, dysphagia, emphysema, essential hypertension, hyperlipidemia, hypothyroidism and diabetes    Medications and allergies are detailed in the nursing home chart, and were reviewed by me today.  _______________________    General Physical exam:    Vital signs:  106/45, 97.5, 67, 18    General Appearance: alert and oriented to person, place and time, well developed and well-nourished, in no acute distress  Psychiatric:  Mood and affect appropriate for situation  Skin: warm and dry, no rash  Head: normocephalic and atraumatic  Eyes: pupils equal, round, sclerae anicteric, conjunctivae normal  ENT: no thrush or oral ulcers  Neck:No complaints, normal appearance  Pulmonary/Chest: Respirations easy at rest, no cough or faecalis. 2/17/2020: Glucose 71, BUN 10, creatinine 0.6, , albumin 2.5, total protein 4.5, WBC 7, RBC 2.75, hemoglobin/hematocrit: 8/24, A1c 4.8  _______________________     Abi Barrowagustín diagnoses & assessment:  Stage IV sacral ulcer, stage II right heel pressure ulcer, deep tissue injury to left heel    Debridement is  indicated today, based on the history and exam above.  _______________________    Procedure:    Consent obtained. Time out performed per [facility]. Topical anesthetic applied: 5% topical lidocaine. Using a curette, I sharply debrided the sacral ulcer(s) down through and including the removal of epidermis and dermis. The type(s) of tissue debrided included fibrin, biofilm, slough, necrotic/eschar and exudate. Total Surface Area Debrided: estimated 80 sq cm. The ulcers were then irrigated with normal saline solution. The procedure was completed with a small amount of bleeding, and hemostasis was by pressure. The patient tolerated the procedure well, with no significant complications. The patient's level of pain during and after the procedure was monitored. If post-debridement measurements are significantly different from initial measurements, those will be noted here.   _______________________    Recommendations:    - Dressings / Compression / Offloading: Prior to dressing change one fourth strength Dakin's soaked for 5-10 minutes. Cresting to periwound with antifungal powder with each dressing change. Triad, alginate AG and fluff gauze, cover with dry dressing daily. Low air loss mattress. Side to side when in bed. Pressure redistribution chair cushion.  Minimal time in chair.     - Labs / Diagnostic studies: none    - Medications / nutritional support: Prostat    - Further Consultations recommended: OT, PT, ST following    - Anticipated follow-up: Weekly evaluation  _______________________    Electronically signed by MIKO Burnette CNP on 2/24/2020 at 4:56 PM

## 2020-02-25 ENCOUNTER — OFFICE VISIT (OUTPATIENT)
Dept: CARDIOTHORACIC SURGERY | Age: 70
End: 2020-02-25

## 2020-02-25 VITALS
SYSTOLIC BLOOD PRESSURE: 142 MMHG | DIASTOLIC BLOOD PRESSURE: 80 MMHG | OXYGEN SATURATION: 98 % | HEART RATE: 81 BPM | WEIGHT: 185 LBS | HEIGHT: 69 IN | BODY MASS INDEX: 27.4 KG/M2 | TEMPERATURE: 97.7 F

## 2020-02-25 LAB
AFB CULTURE (MYCOBACTERIA): NORMAL
AFB SMEAR: NORMAL

## 2020-02-25 PROCEDURE — 99024 POSTOP FOLLOW-UP VISIT: CPT | Performed by: THORACIC SURGERY (CARDIOTHORACIC VASCULAR SURGERY)

## 2020-02-25 RX ORDER — FEBUXOSTAT 40 MG/1
40 TABLET, FILM COATED ORAL DAILY
COMMUNITY
End: 2020-03-16

## 2020-02-25 RX ORDER — OXYCODONE HYDROCHLORIDE 5 MG/1
5 TABLET ORAL EVERY 6 HOURS PRN
COMMUNITY
End: 2020-03-16 | Stop reason: SDUPTHER

## 2020-03-02 ENCOUNTER — OUTSIDE SERVICES (OUTPATIENT)
Dept: WOUND CARE | Age: 70
End: 2020-03-02
Payer: MEDICARE

## 2020-03-02 PROCEDURE — 97597 DBRDMT OPN WND 1ST 20 CM/<: CPT | Performed by: CLINICAL NURSE SPECIALIST

## 2020-03-02 PROCEDURE — 97598 DBRDMT OPN WND ADDL 20CM/<: CPT | Performed by: CLINICAL NURSE SPECIALIST

## 2020-03-02 NOTE — PROGRESS NOTES
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 Franklyn Almonte follow-up  Patient significantly improved since last discharge early issues pressure ulcer  Vital signs stable afebrile  S1 plus S2 +0 no additional sound  Bilaterally clear no additional sound  Abdomen is soft nondistended nontender bowel sounds positive  Plan  Follow-up in 3 weeks  I will arrange for plastic surgery to see his pressure ulcer for possible debridement and primary closure

## 2020-03-09 ENCOUNTER — OUTSIDE SERVICES (OUTPATIENT)
Dept: WOUND CARE | Age: 70
End: 2020-03-09
Payer: MEDICARE

## 2020-03-09 PROCEDURE — 97597 DBRDMT OPN WND 1ST 20 CM/<: CPT | Performed by: CLINICAL NURSE SPECIALIST

## 2020-03-09 PROCEDURE — 97598 DBRDMT OPN WND ADDL 20CM/<: CPT | Performed by: CLINICAL NURSE SPECIALIST

## 2020-03-10 LAB
AFB CULTURE (MYCOBACTERIA): NORMAL
AFB SMEAR: NORMAL

## 2020-03-12 ENCOUNTER — TELEPHONE (OUTPATIENT)
Dept: CARDIOTHORACIC SURGERY | Age: 70
End: 2020-03-12

## 2020-03-16 ENCOUNTER — HOSPITAL ENCOUNTER (OUTPATIENT)
Age: 70
Setting detail: OBSERVATION
Discharge: NURSING, REHAB OR LONG-TERM CARE | End: 2020-03-19
Attending: EMERGENCY MEDICINE | Admitting: INTERNAL MEDICINE
Payer: MEDICARE

## 2020-03-16 ENCOUNTER — APPOINTMENT (OUTPATIENT)
Dept: GENERAL RADIOLOGY | Age: 70
End: 2020-03-16
Payer: MEDICARE

## 2020-03-16 ENCOUNTER — OFFICE VISIT (OUTPATIENT)
Dept: CARDIOTHORACIC SURGERY | Age: 70
End: 2020-03-16

## 2020-03-16 ENCOUNTER — OUTSIDE SERVICES (OUTPATIENT)
Dept: WOUND CARE | Age: 70
End: 2020-03-16
Payer: MEDICARE

## 2020-03-16 VITALS
WEIGHT: 176 LBS | BODY MASS INDEX: 26.07 KG/M2 | SYSTOLIC BLOOD PRESSURE: 130 MMHG | HEART RATE: 92 BPM | OXYGEN SATURATION: 96 % | DIASTOLIC BLOOD PRESSURE: 66 MMHG | TEMPERATURE: 98.6 F | HEIGHT: 69 IN

## 2020-03-16 PROBLEM — R53.1 GENERALIZED WEAKNESS: Status: ACTIVE | Noted: 2020-03-16

## 2020-03-16 LAB
A/G RATIO: 1.1 (ref 1.1–2.2)
ALBUMIN SERPL-MCNC: 3.8 G/DL (ref 3.4–5)
ALP BLD-CCNC: 78 U/L (ref 40–129)
ALT SERPL-CCNC: 9 U/L (ref 10–40)
ANION GAP SERPL CALCULATED.3IONS-SCNC: 11 MMOL/L (ref 3–16)
ANISOCYTOSIS: ABNORMAL
AST SERPL-CCNC: 32 U/L (ref 15–37)
ATYPICAL LYMPHOCYTE RELATIVE PERCENT: 3 % (ref 0–6)
BANDED NEUTROPHILS RELATIVE PERCENT: 14 % (ref 0–7)
BASOPHILS ABSOLUTE: 0 K/UL (ref 0–0.2)
BASOPHILS RELATIVE PERCENT: 0 %
BILIRUB SERPL-MCNC: 0.6 MG/DL (ref 0–1)
BILIRUBIN URINE: NEGATIVE
BLOOD, URINE: NEGATIVE
BUN BLDV-MCNC: 14 MG/DL (ref 7–20)
CALCIUM SERPL-MCNC: 9.5 MG/DL (ref 8.3–10.6)
CHLORIDE BLD-SCNC: 90 MMOL/L (ref 99–110)
CLARITY: CLEAR
CO2: 27 MMOL/L (ref 21–32)
COLOR: YELLOW
CREAT SERPL-MCNC: 0.6 MG/DL (ref 0.8–1.3)
EOSINOPHILS ABSOLUTE: 0 K/UL (ref 0–0.6)
EOSINOPHILS RELATIVE PERCENT: 0 %
GFR AFRICAN AMERICAN: >60
GFR NON-AFRICAN AMERICAN: >60
GLOBULIN: 3.4 G/DL
GLUCOSE BLD-MCNC: 87 MG/DL (ref 70–99)
GLUCOSE URINE: NEGATIVE MG/DL
HCT VFR BLD CALC: 30.3 % (ref 40.5–52.5)
HEMOGLOBIN: 9.9 G/DL (ref 13.5–17.5)
INR BLD: 1.31 (ref 0.86–1.14)
KETONES, URINE: NEGATIVE MG/DL
LACTIC ACID: 1 MMOL/L (ref 0.4–2)
LEUKOCYTE ESTERASE, URINE: NEGATIVE
LYMPHOCYTES ABSOLUTE: 0.6 K/UL (ref 1–5.1)
LYMPHOCYTES RELATIVE PERCENT: 6 %
MCH RBC QN AUTO: 27.5 PG (ref 26–34)
MCHC RBC AUTO-ENTMCNC: 32.9 G/DL (ref 31–36)
MCV RBC AUTO: 83.7 FL (ref 80–100)
MICROSCOPIC EXAMINATION: NORMAL
MONOCYTES ABSOLUTE: 1 K/UL (ref 0–1.3)
MONOCYTES RELATIVE PERCENT: 14 %
NEUTROPHILS ABSOLUTE: 5.3 K/UL (ref 1.7–7.7)
NEUTROPHILS RELATIVE PERCENT: 63 %
NITRITE, URINE: NEGATIVE
NUCLEATED RED BLOOD CELLS: 1 /100 WBC
NUCLEATED RED BLOOD CELLS: 1 /100 WBC
OVALOCYTES: ABNORMAL
PDW BLD-RTO: 19 % (ref 12.4–15.4)
PH UA: 6.5 (ref 5–8)
PLATELET # BLD: 215 K/UL (ref 135–450)
PMV BLD AUTO: 7.6 FL (ref 5–10.5)
POLYCHROMASIA: ABNORMAL
POTASSIUM SERPL-SCNC: 4.4 MMOL/L (ref 3.5–5.1)
PRO-BNP: 2464 PG/ML (ref 0–124)
PROTEIN UA: NEGATIVE MG/DL
PROTHROMBIN TIME: 15.2 SEC (ref 10–13.2)
RBC # BLD: 3.62 M/UL (ref 4.2–5.9)
SODIUM BLD-SCNC: 128 MMOL/L (ref 136–145)
SPECIFIC GRAVITY UA: 1.01 (ref 1–1.03)
SPHEROCYTES: ABNORMAL
STOMATOCYTES: ABNORMAL
TARGET CELLS: ABNORMAL
TOTAL PROTEIN: 7.2 G/DL (ref 6.4–8.2)
TROPONIN: 0.04 NG/ML
URINE TYPE: NORMAL
UROBILINOGEN, URINE: 0.2 E.U./DL
WBC # BLD: 6.9 K/UL (ref 4–11)

## 2020-03-16 PROCEDURE — 99024 POSTOP FOLLOW-UP VISIT: CPT | Performed by: THORACIC SURGERY (CARDIOTHORACIC VASCULAR SURGERY)

## 2020-03-16 PROCEDURE — 81003 URINALYSIS AUTO W/O SCOPE: CPT

## 2020-03-16 PROCEDURE — 85025 COMPLETE CBC W/AUTO DIFF WBC: CPT

## 2020-03-16 PROCEDURE — 2580000003 HC RX 258: Performed by: EMERGENCY MEDICINE

## 2020-03-16 PROCEDURE — 83880 ASSAY OF NATRIURETIC PEPTIDE: CPT

## 2020-03-16 PROCEDURE — 80053 COMPREHEN METABOLIC PANEL: CPT

## 2020-03-16 PROCEDURE — 93005 ELECTROCARDIOGRAM TRACING: CPT | Performed by: EMERGENCY MEDICINE

## 2020-03-16 PROCEDURE — G0378 HOSPITAL OBSERVATION PER HR: HCPCS

## 2020-03-16 PROCEDURE — 71045 X-RAY EXAM CHEST 1 VIEW: CPT

## 2020-03-16 PROCEDURE — 83605 ASSAY OF LACTIC ACID: CPT

## 2020-03-16 PROCEDURE — 99285 EMERGENCY DEPT VISIT HI MDM: CPT

## 2020-03-16 PROCEDURE — 6370000000 HC RX 637 (ALT 250 FOR IP): Performed by: INTERNAL MEDICINE

## 2020-03-16 PROCEDURE — 97597 DBRDMT OPN WND 1ST 20 CM/<: CPT | Performed by: CLINICAL NURSE SPECIALIST

## 2020-03-16 PROCEDURE — 84484 ASSAY OF TROPONIN QUANT: CPT

## 2020-03-16 PROCEDURE — 87040 BLOOD CULTURE FOR BACTERIA: CPT

## 2020-03-16 PROCEDURE — 85610 PROTHROMBIN TIME: CPT

## 2020-03-16 RX ORDER — MECLIZINE HYDROCHLORIDE 25 MG/1
25 TABLET ORAL DAILY
Qty: 30 TABLET | Refills: 1 | Status: SHIPPED | OUTPATIENT
Start: 2020-03-16 | End: 2020-04-15

## 2020-03-16 RX ORDER — CARVEDILOL 6.25 MG/1
12.5 TABLET ORAL 2 TIMES DAILY WITH MEALS
Status: DISCONTINUED | OUTPATIENT
Start: 2020-03-17 | End: 2020-03-19 | Stop reason: HOSPADM

## 2020-03-16 RX ORDER — POLYETHYLENE GLYCOL 3350 17 G/17G
17 POWDER, FOR SOLUTION ORAL DAILY PRN
Status: DISCONTINUED | OUTPATIENT
Start: 2020-03-16 | End: 2020-03-19 | Stop reason: HOSPADM

## 2020-03-16 RX ORDER — PANTOPRAZOLE SODIUM 40 MG/1
40 TABLET, DELAYED RELEASE ORAL
Status: DISCONTINUED | OUTPATIENT
Start: 2020-03-17 | End: 2020-03-19 | Stop reason: HOSPADM

## 2020-03-16 RX ORDER — OXYCODONE HYDROCHLORIDE 5 MG/1
5 TABLET ORAL EVERY 6 HOURS PRN
Status: DISCONTINUED | OUTPATIENT
Start: 2020-03-16 | End: 2020-03-19 | Stop reason: HOSPADM

## 2020-03-16 RX ORDER — SODIUM CHLORIDE 0.9 % (FLUSH) 0.9 %
10 SYRINGE (ML) INJECTION PRN
Status: DISCONTINUED | OUTPATIENT
Start: 2020-03-16 | End: 2020-03-19 | Stop reason: HOSPADM

## 2020-03-16 RX ORDER — ATORVASTATIN CALCIUM 20 MG/1
20 TABLET, FILM COATED ORAL NIGHTLY
Qty: 30 TABLET | Refills: 1 | Status: SHIPPED | OUTPATIENT
Start: 2020-03-16 | End: 2020-05-29 | Stop reason: SDUPTHER

## 2020-03-16 RX ORDER — MECLIZINE HCL 12.5 MG/1
25 TABLET ORAL DAILY
Status: DISCONTINUED | OUTPATIENT
Start: 2020-03-17 | End: 2020-03-19 | Stop reason: HOSPADM

## 2020-03-16 RX ORDER — CARVEDILOL 12.5 MG/1
12.5 TABLET ORAL 2 TIMES DAILY WITH MEALS
Qty: 60 TABLET | Refills: 1 | Status: SHIPPED | OUTPATIENT
Start: 2020-03-16 | End: 2020-05-29 | Stop reason: SDUPTHER

## 2020-03-16 RX ORDER — LEVOTHYROXINE SODIUM 0.03 MG/1
25 TABLET ORAL DAILY
Status: DISCONTINUED | OUTPATIENT
Start: 2020-03-17 | End: 2020-03-19 | Stop reason: HOSPADM

## 2020-03-16 RX ORDER — ONDANSETRON 2 MG/ML
4 INJECTION INTRAMUSCULAR; INTRAVENOUS EVERY 4 HOURS PRN
Status: DISCONTINUED | OUTPATIENT
Start: 2020-03-16 | End: 2020-03-19 | Stop reason: HOSPADM

## 2020-03-16 RX ORDER — CITALOPRAM 20 MG/1
10 TABLET ORAL EVERY MORNING
Status: DISCONTINUED | OUTPATIENT
Start: 2020-03-17 | End: 2020-03-19 | Stop reason: HOSPADM

## 2020-03-16 RX ORDER — OXYCODONE HYDROCHLORIDE 5 MG/1
5 TABLET ORAL EVERY 6 HOURS PRN
Qty: 28 TABLET | Refills: 0 | Status: ON HOLD | OUTPATIENT
Start: 2020-03-16 | End: 2020-03-19 | Stop reason: HOSPADM

## 2020-03-16 RX ORDER — ACETAMINOPHEN 325 MG/1
650 TABLET ORAL EVERY 6 HOURS PRN
Status: DISCONTINUED | OUTPATIENT
Start: 2020-03-16 | End: 2020-03-19 | Stop reason: HOSPADM

## 2020-03-16 RX ORDER — GLIPIZIDE 5 MG/1
5 TABLET ORAL DAILY
Qty: 30 TABLET | Refills: 1 | Status: SHIPPED | OUTPATIENT
Start: 2020-03-16 | End: 2020-04-15

## 2020-03-16 RX ORDER — LEVOTHYROXINE SODIUM 0.03 MG/1
25 TABLET ORAL DAILY
Qty: 30 TABLET | Refills: 1 | Status: SHIPPED | OUTPATIENT
Start: 2020-03-16 | End: 2020-11-27

## 2020-03-16 RX ORDER — 0.9 % SODIUM CHLORIDE 0.9 %
500 INTRAVENOUS SOLUTION INTRAVENOUS ONCE
Status: COMPLETED | OUTPATIENT
Start: 2020-03-16 | End: 2020-03-16

## 2020-03-16 RX ORDER — PANTOPRAZOLE SODIUM 40 MG/1
40 TABLET, DELAYED RELEASE ORAL
Qty: 30 TABLET | Refills: 1 | Status: SHIPPED | OUTPATIENT
Start: 2020-03-16 | End: 2020-11-27

## 2020-03-16 RX ORDER — FENOFIBRATE 200 MG/1
200 CAPSULE ORAL
Qty: 30 CAPSULE | Refills: 1 | Status: ON HOLD
Start: 2020-03-16 | End: 2020-03-30 | Stop reason: HOSPADM

## 2020-03-16 RX ORDER — CITALOPRAM 10 MG/1
10 TABLET ORAL EVERY MORNING
Qty: 30 TABLET | Refills: 1 | Status: SHIPPED | OUTPATIENT
Start: 2020-03-16 | End: 2020-11-27

## 2020-03-16 RX ORDER — ONDANSETRON 2 MG/ML
4 INJECTION INTRAMUSCULAR; INTRAVENOUS EVERY 4 HOURS PRN
Status: DISCONTINUED | OUTPATIENT
Start: 2020-03-16 | End: 2020-03-16 | Stop reason: CLARIF

## 2020-03-16 RX ORDER — SODIUM CHLORIDE 0.9 % (FLUSH) 0.9 %
10 SYRINGE (ML) INJECTION EVERY 12 HOURS SCHEDULED
Status: DISCONTINUED | OUTPATIENT
Start: 2020-03-17 | End: 2020-03-19 | Stop reason: HOSPADM

## 2020-03-16 RX ORDER — ALLOPURINOL 300 MG/1
300 TABLET ORAL DAILY
Status: DISCONTINUED | OUTPATIENT
Start: 2020-03-17 | End: 2020-03-19 | Stop reason: HOSPADM

## 2020-03-16 RX ORDER — ALLOPURINOL 300 MG/1
300 TABLET ORAL DAILY
Qty: 30 TABLET | Refills: 1 | Status: SHIPPED | OUTPATIENT
Start: 2020-03-16 | End: 2020-11-27

## 2020-03-16 RX ORDER — ATORVASTATIN CALCIUM 10 MG/1
20 TABLET, FILM COATED ORAL NIGHTLY
Status: DISCONTINUED | OUTPATIENT
Start: 2020-03-16 | End: 2020-03-19 | Stop reason: HOSPADM

## 2020-03-16 RX ADMIN — SODIUM CHLORIDE 500 ML: 9 INJECTION, SOLUTION INTRAVENOUS at 21:00

## 2020-03-16 RX ADMIN — ATORVASTATIN CALCIUM 20 MG: 10 TABLET, FILM COATED ORAL at 23:49

## 2020-03-16 ASSESSMENT — PAIN DESCRIPTION - PAIN TYPE: TYPE: ACUTE PAIN;CHRONIC PAIN

## 2020-03-16 ASSESSMENT — ENCOUNTER SYMPTOMS
SHORTNESS OF BREATH: 0
ABDOMINAL PAIN: 0
NAUSEA: 0
DIARRHEA: 0
VOMITING: 0
SORE THROAT: 0
COUGH: 0
BACK PAIN: 1

## 2020-03-16 ASSESSMENT — PAIN SCALES - GENERAL
PAINLEVEL_OUTOF10: 0
PAINLEVEL_OUTOF10: 6

## 2020-03-16 ASSESSMENT — PAIN DESCRIPTION - LOCATION: LOCATION: BACK

## 2020-03-16 ASSESSMENT — PAIN DESCRIPTION - ORIENTATION: ORIENTATION: LOWER

## 2020-03-16 NOTE — PROGRESS NOTES
situation  Skin: warm and dry, no rash  Head: normocephalic and atraumatic  Eyes: pupils equal, round, sclerae anicteric, conjunctivae normal  ENT: no thrush or oral ulcers  Neck:No complaints, normal appearance  Pulmonary/Chest: Respirations easy at rest, no cough or respiratory distress  Cardiovascular: No chest pain, normal rate, toes warm, cap refill normal  Abdomen: No nausea or vomiting  Extremities: no cyanosis, edema or cellulitis  Musculoskeletal: Ambulatory, moves all extremities, no deformities  Neurologic: distal sensation to light touch intact, no allodynia. Wound exam:    Wound location: Sacrum   Length (cm) 9.3   Width (cm) 1.7   Depth (cm) 2.6   Tunneling 0   Undermining 2.8 cm (@1:00) from 12 o'clock to 2 o'clock. Wound type:   Pressure  Grade - stage - thickness: Stage 4     Description of periwound: Fungal rash fading    Description of wound bed: Wound with red granulation, hypergranulation and biofilm. Wound bed moist, moderate amount of serous and serosanguineous drainage, edges open and unattached 12 to 2. Surrounding tissue and ulcer without signs and symptoms of infection. No purulence, malodor, erythema, increased temperature, or increased pain. Wound exam:    Wound location: Left Heel   Length (cm) 4.6   Width (cm) 4.1   Depth (cm) 0.1   Tunneling 0   Undermining 0    Wound type:   Pressure  Grade - stage - thickness: Unstageable     Description of periwound: Intacat    Description of wound bed: Wound with dry, stable intact eschar. Surrounding tissue and ulcer without signs and symptoms of infection. No purulence, malodor, erythema, increased temperature, or increased pain. Wound exam:    Wound location: Right heel   Length (cm) 1.4   Width (cm) 1.5   Depth (cm) 0.1   Tunneling 0   Undermining 0    Wound type:   Pressure  Grade - stage - thickness: Stage 2     Description of periwound: Intact    Description of wound bed: Wound reabsorbing with brown discoloration. Intact. Dry. Stable. Surrounding tissue and ulcer without signs and symptoms of infection. No purulence, malodor, erythema, increased temperature, or increased pain.  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 I recommend patient stand and facility, as wound is difficult to seal.  Stable eschar to left heel and stable stage II pressure injury to right heel. Debridement is  indicated today, based on the history and exam above.  _______________________    Procedure:    Consent obtained. Time out performed per [facility]. Topical anesthetic applied: 4% topical lidocaine. Using a curette, I sharply debrided the sacral ulcer(s) down through and including the removal of epidermis and dermis. The type(s) of tissue debrided included fibrin and biofilm. Total Surface Area Debrided: 15.81 sq cm. The ulcers were then irrigated with normal saline solution. The procedure was completed with a small amount of bleeding, and hemostasis was by pressure. The patient tolerated the procedure well, with no significant complications. The patient's level of pain during and after the procedure was monitored. If post-debridement measurements are significantly different from initial measurements, those will be noted here.   _______________________    Secondary procedures performed: To encourage better epithelial cell coverage, I did use AgNO3 to chemically cauterize hypergranulation tissue on the sacral ulcer(s), after application of 4% lidocaine topical solution. This was tolerated well, with no pain or skin injury. _______________________    Recommendations:    - Dressings / Compression / Offloadin-10 minutes soak with one fourth strength Dakin solution to sacral wound.   Crusting of periwound with nystatin powder. Alginate AG and hydrocolloid to distal ends of wound on right and left buttock. Thin strip of drape to area between wound and rectum. Small application of ostomy strip paste to area between wound and rectum. Negative pressure wound therapy at -1 25 mmHg continuous suction. Change 3 times per week. Bridge faceplate away from wound to hip area. Betadine to both heels. Instructed to lift and not to scoot with transfers. Low-air-loss mattress. Side to side when in bed. Pressure redistribution chair cushion. Minimal time in chair.    - Labs / Diagnostic studies: none    - Medications / nutritional support: Prostat    - Further Consultations recommended: Home care for wound care 3 times per week. Follow up with wound clinic when discharged.     - Anticipated follow-up: Weekly evaluation  _______________________    Electronically signed by MIKO Graff CNP on 3/16/2020 at 2:57 PM

## 2020-03-17 PROBLEM — I48.91 ATRIAL FIBRILLATION (HCC): Status: ACTIVE | Noted: 2020-03-17

## 2020-03-17 PROBLEM — L98.429 SACRAL ULCER (HCC): Status: ACTIVE | Noted: 2020-03-17

## 2020-03-17 PROBLEM — R77.8 ELEVATED TROPONIN: Status: ACTIVE | Noted: 2020-03-17

## 2020-03-17 PROBLEM — E11.9 DMII (DIABETES MELLITUS, TYPE 2) (HCC): Status: ACTIVE | Noted: 2020-03-17

## 2020-03-17 PROBLEM — R79.1 SUBTHERAPEUTIC INTERNATIONAL NORMALIZED RATIO (INR): Status: ACTIVE | Noted: 2020-03-17

## 2020-03-17 PROBLEM — E87.1 HYPONATREMIA: Status: ACTIVE | Noted: 2020-03-17

## 2020-03-17 PROBLEM — R29.6 FREQUENT FALLS: Status: ACTIVE | Noted: 2020-03-17

## 2020-03-17 LAB
ANION GAP SERPL CALCULATED.3IONS-SCNC: 9 MMOL/L (ref 3–16)
ANISOCYTOSIS: ABNORMAL
ATYPICAL LYMPHOCYTE RELATIVE PERCENT: 2 % (ref 0–6)
BANDED NEUTROPHILS RELATIVE PERCENT: 13 % (ref 0–7)
BASOPHILS ABSOLUTE: 0 K/UL (ref 0–0.2)
BASOPHILS RELATIVE PERCENT: 0 %
BUN BLDV-MCNC: 11 MG/DL (ref 7–20)
CALCIUM SERPL-MCNC: 9.2 MG/DL (ref 8.3–10.6)
CHLORIDE BLD-SCNC: 97 MMOL/L (ref 99–110)
CO2: 26 MMOL/L (ref 21–32)
CREAT SERPL-MCNC: <0.5 MG/DL (ref 0.8–1.3)
EKG ATRIAL RATE: 91 BPM
EKG DIAGNOSIS: NORMAL
EKG P AXIS: 37 DEGREES
EKG P-R INTERVAL: 128 MS
EKG Q-T INTERVAL: 430 MS
EKG QRS DURATION: 146 MS
EKG QTC CALCULATION (BAZETT): 528 MS
EKG R AXIS: 55 DEGREES
EKG T AXIS: 4 DEGREES
EKG VENTRICULAR RATE: 91 BPM
EOSINOPHILS ABSOLUTE: 0 K/UL (ref 0–0.6)
EOSINOPHILS RELATIVE PERCENT: 0 %
GFR AFRICAN AMERICAN: >60
GFR NON-AFRICAN AMERICAN: >60
GLUCOSE BLD-MCNC: 115 MG/DL (ref 70–99)
GLUCOSE BLD-MCNC: 157 MG/DL (ref 70–99)
GLUCOSE BLD-MCNC: 87 MG/DL (ref 70–99)
GLUCOSE BLD-MCNC: 99 MG/DL (ref 70–99)
HCT VFR BLD CALC: 27 % (ref 40.5–52.5)
HEMATOLOGY PATH CONSULT: NO
HEMOGLOBIN: 8.9 G/DL (ref 13.5–17.5)
INR BLD: 1.33 (ref 0.86–1.14)
LYMPHOCYTES ABSOLUTE: 1.2 K/UL (ref 1–5.1)
LYMPHOCYTES RELATIVE PERCENT: 24 %
MCH RBC QN AUTO: 27.6 PG (ref 26–34)
MCHC RBC AUTO-ENTMCNC: 32.9 G/DL (ref 31–36)
MCV RBC AUTO: 84 FL (ref 80–100)
MICROCYTES: ABNORMAL
MONOCYTES ABSOLUTE: 0.6 K/UL (ref 0–1.3)
MONOCYTES RELATIVE PERCENT: 14 %
NEUTROPHILS ABSOLUTE: 2.8 K/UL (ref 1.7–7.7)
NEUTROPHILS RELATIVE PERCENT: 47 %
OVALOCYTES: ABNORMAL
PDW BLD-RTO: 19.2 % (ref 12.4–15.4)
PERFORMED ON: ABNORMAL
PERFORMED ON: ABNORMAL
PERFORMED ON: NORMAL
PLATELET # BLD: 179 K/UL (ref 135–450)
PLATELET SLIDE REVIEW: ADEQUATE
PMV BLD AUTO: 7.3 FL (ref 5–10.5)
POTASSIUM REFLEX MAGNESIUM: 3.8 MMOL/L (ref 3.5–5.1)
PROCALCITONIN: 0.11 NG/ML (ref 0–0.15)
PROTHROMBIN TIME: 15.5 SEC (ref 10–13.2)
RBC # BLD: 3.22 M/UL (ref 4.2–5.9)
SLIDE REVIEW: ABNORMAL
SODIUM BLD-SCNC: 132 MMOL/L (ref 136–145)
SPHEROCYTES: ABNORMAL
STOMATOCYTES: ABNORMAL
TARGET CELLS: ABNORMAL
WBC # BLD: 4.6 K/UL (ref 4–11)

## 2020-03-17 PROCEDURE — 97166 OT EVAL MOD COMPLEX 45 MIN: CPT

## 2020-03-17 PROCEDURE — 97110 THERAPEUTIC EXERCISES: CPT

## 2020-03-17 PROCEDURE — 99221 1ST HOSP IP/OBS SF/LOW 40: CPT | Performed by: INTERNAL MEDICINE

## 2020-03-17 PROCEDURE — G0378 HOSPITAL OBSERVATION PER HR: HCPCS

## 2020-03-17 PROCEDURE — 85610 PROTHROMBIN TIME: CPT

## 2020-03-17 PROCEDURE — 96372 THER/PROPH/DIAG INJ SC/IM: CPT

## 2020-03-17 PROCEDURE — 97530 THERAPEUTIC ACTIVITIES: CPT

## 2020-03-17 PROCEDURE — 36415 COLL VENOUS BLD VENIPUNCTURE: CPT

## 2020-03-17 PROCEDURE — 6360000002 HC RX W HCPCS: Performed by: INTERNAL MEDICINE

## 2020-03-17 PROCEDURE — 97116 GAIT TRAINING THERAPY: CPT

## 2020-03-17 PROCEDURE — 97606 NEG PRS WND THER DME>50 SQCM: CPT

## 2020-03-17 PROCEDURE — 85025 COMPLETE CBC W/AUTO DIFF WBC: CPT

## 2020-03-17 PROCEDURE — 80048 BASIC METABOLIC PNL TOTAL CA: CPT

## 2020-03-17 PROCEDURE — 6370000000 HC RX 637 (ALT 250 FOR IP): Performed by: INTERNAL MEDICINE

## 2020-03-17 PROCEDURE — 84145 PROCALCITONIN (PCT): CPT

## 2020-03-17 PROCEDURE — 2580000003 HC RX 258: Performed by: INTERNAL MEDICINE

## 2020-03-17 PROCEDURE — 97162 PT EVAL MOD COMPLEX 30 MIN: CPT

## 2020-03-17 RX ORDER — DEXTROSE MONOHYDRATE 50 MG/ML
100 INJECTION, SOLUTION INTRAVENOUS PRN
Status: DISCONTINUED | OUTPATIENT
Start: 2020-03-17 | End: 2020-03-19 | Stop reason: HOSPADM

## 2020-03-17 RX ORDER — SODIUM CHLORIDE 9 MG/ML
INJECTION, SOLUTION INTRAVENOUS CONTINUOUS
Status: DISCONTINUED | OUTPATIENT
Start: 2020-03-17 | End: 2020-03-18

## 2020-03-17 RX ORDER — NICOTINE POLACRILEX 4 MG
15 LOZENGE BUCCAL PRN
Status: DISCONTINUED | OUTPATIENT
Start: 2020-03-17 | End: 2020-03-19 | Stop reason: HOSPADM

## 2020-03-17 RX ORDER — DEXTROSE MONOHYDRATE 25 G/50ML
12.5 INJECTION, SOLUTION INTRAVENOUS PRN
Status: DISCONTINUED | OUTPATIENT
Start: 2020-03-17 | End: 2020-03-19 | Stop reason: HOSPADM

## 2020-03-17 RX ORDER — WARFARIN SODIUM 5 MG/1
5 TABLET ORAL
Status: COMPLETED | OUTPATIENT
Start: 2020-03-17 | End: 2020-03-17

## 2020-03-17 RX ADMIN — CARVEDILOL 12.5 MG: 6.25 TABLET, FILM COATED ORAL at 17:02

## 2020-03-17 RX ADMIN — MECLIZINE 25 MG: 12.5 TABLET ORAL at 09:36

## 2020-03-17 RX ADMIN — ENOXAPARIN SODIUM 80 MG: 80 INJECTION SUBCUTANEOUS at 20:25

## 2020-03-17 RX ADMIN — ATORVASTATIN CALCIUM 20 MG: 10 TABLET, FILM COATED ORAL at 20:25

## 2020-03-17 RX ADMIN — CARVEDILOL 12.5 MG: 6.25 TABLET, FILM COATED ORAL at 09:36

## 2020-03-17 RX ADMIN — ALLOPURINOL 300 MG: 300 TABLET ORAL at 09:36

## 2020-03-17 RX ADMIN — PANTOPRAZOLE SODIUM 40 MG: 40 TABLET, DELAYED RELEASE ORAL at 06:14

## 2020-03-17 RX ADMIN — WARFARIN SODIUM 5 MG: 5 TABLET ORAL at 17:02

## 2020-03-17 RX ADMIN — LEVOTHYROXINE SODIUM 25 MCG: 25 TABLET ORAL at 06:14

## 2020-03-17 RX ADMIN — CITALOPRAM HYDROBROMIDE 10 MG: 20 TABLET ORAL at 09:36

## 2020-03-17 RX ADMIN — INSULIN LISPRO 2 UNITS: 100 INJECTION, SOLUTION INTRAVENOUS; SUBCUTANEOUS at 13:53

## 2020-03-17 RX ADMIN — ACETAMINOPHEN 650 MG: 325 TABLET ORAL at 17:10

## 2020-03-17 RX ADMIN — SODIUM CHLORIDE: 9 INJECTION, SOLUTION INTRAVENOUS at 03:37

## 2020-03-17 RX ADMIN — ENOXAPARIN SODIUM 80 MG: 80 INJECTION SUBCUTANEOUS at 09:36

## 2020-03-17 ASSESSMENT — PAIN DESCRIPTION - ORIENTATION
ORIENTATION: LOWER

## 2020-03-17 ASSESSMENT — PAIN SCALES - GENERAL
PAINLEVEL_OUTOF10: 6
PAINLEVEL_OUTOF10: 6
PAINLEVEL_OUTOF10: 5
PAINLEVEL_OUTOF10: 6
PAINLEVEL_OUTOF10: 5
PAINLEVEL_OUTOF10: 6

## 2020-03-17 ASSESSMENT — PAIN DESCRIPTION - LOCATION
LOCATION: BACK

## 2020-03-17 ASSESSMENT — PAIN DESCRIPTION - PAIN TYPE
TYPE: CHRONIC PAIN

## 2020-03-17 ASSESSMENT — PAIN - FUNCTIONAL ASSESSMENT: PAIN_FUNCTIONAL_ASSESSMENT: PREVENTS OR INTERFERES SOME ACTIVE ACTIVITIES AND ADLS

## 2020-03-17 NOTE — PROGRESS NOTES
Patient called out stating \"I am bleeding\" RN entered room and found blood to be on the floor and on patients gown/bed. IV site bleeding. RN covered with gauze and pressure, bleeding subsided. Will attempt another Peripheral IV.

## 2020-03-17 NOTE — CONSULTS
Pharmacy to Dose Warfarin    Dx: Atrial Fibrillation   Goal INR range 2-3  Home Warfarin dose: 5 mg daily? (patient unsure)    Date  INR  Warfarin  3/17                1.31                   5 mg    Recommend Warfarin 5 mg tonight x1. Daily INR ordered. Rx will continue to manage therapy per consult order.   Alice Toscano, PharmD  3/17/2020 6:10 AM

## 2020-03-17 NOTE — CARE COORDINATION
CASE MANAGEMENT INITIAL ASSESSMENT      Reviewed chart and met with patient today, re: potential dc planning needs  Explained Case Management role/services. Family present: none  Primary contact information: Amador Finch, daughter [phone]    Admit date/status: 03/16/20  Diagnosis: generalized weakness  Is this a Readmission?:  No     Insurance: [employer] required for SNF - Y        3 night stay required -  N    Living arrangements, Adls, care needs, prior to admission: lives in mobile home alone. Was d/c'd from [facility] yesterday and was not able to care for self at home alone. Transportation: drives when he is well    Durable Medical Equipment at home: Walker_X_Cane__RTS__ BSC__Shower Chair__  02__ HHN__ CPAP__  BiPap__  Hospital Bed__ W/C_X__ Other__________    Services in the home and/or outpatient, prior to admission: none    Dialysis Facility (if applicable) n/a  · Name:  · Address:  · Dialysis Schedule:  · Phone:  · Fax:    PT/OT recs: [facility] Exemption Notification (HEN): n/a    Barriers to discharge: none identified    Plan/comments: IPREHAB recs, Pt in agreement. Referral made to Samir Peters with [facility]. Follow.      ECOC on chart for MD signature

## 2020-03-17 NOTE — PLAN OF CARE
Bed in lowest position, wheels locked, 2/4 side rails up, nonskid footwear on. Bed/ chair check alarm in place, call light within reach. Pt instructed to call out when needing assistance. Pt stated understanding. Nurse will continue to monitor.

## 2020-03-17 NOTE — DISCHARGE INSTR - COC
removal of lung Z90.2    Hemothorax, left J94.2    Restrictive lung disease J98.4    Hypernatremia E87.0    Lower gastrointestinal hemorrhage K92.2    Type 2 diabetes mellitus, without long-term current use of insulin (HCC) E11.9    Bright red blood per rectum K62.5    Sacral wound S31.000A    Generalized weakness R53.1    DMII (diabetes mellitus, type 2) ([facility]) E11.9    Atrial fibrillation ([facility]) I48.91    Frequent falls R29.6    Sacral ulcer ([address] L98.429    Elevated troponin R79.89    Hyponatremia E87.1    Subtherapeutic international normalized ratio (INR) R79.1       Isolation/Infection:   Isolation          No Isolation        Patient Infection Status     None to display          Nurse Assessment:  Last Vital Signs: /65   Pulse 75   Temp 97.6 °F (36.4 °C) (Oral)   Resp 20   Ht 5' 9\" (1.753 m)   Wt 176 lb (79.8 kg)   SpO2 94%   BMI 25.99 kg/m²     Last documented pain score (0-10 scale): Pain Level: 6  Last Weight:   Wt Readings from Last 1 Encounters:   03/16/20 176 lb (79.8 kg)     Mental Status:  oriented and alert    IV Access:  - None    Nursing Mobility/ADLs:  Walking   Assisted  Transfer  Assisted  Bathing  Assisted  Dressing  Independent  Toileting  Assisted  Feeding  Independent  Med Admin  Assisted  Med Delivery   whole    Wound Care Documentation and Therapy:  Measurements:  Negative Pressure Wound Therapy Coccyx Mid (Active)   Number of days: 35       Negative Pressure Wound Therapy Sacrum (Active)   $ Standard NPWT >50 sq cm PER TX $ Yes 3/18/2020  4:51 PM   Wound Type Pressure ulcer: Stage IV;Surgical 3/18/2020  4:51 PM   Unit Type [employer] Rental # VFVR 3/18/2020  4:51 PM   Dressing Type Black foam 3/18/2020  4:51 PM   Number of pieces used 1 3/18/2020  4:51 PM   Cycle Intermittent 3/18/2020  4:51 PM   Target Pressure (mmHg) 125 3/18/2020  4:51 PM   Intensity 1 3/18/2020  4:51 PM   Irrigation Solution Normal Saline 3/18/2020  4:51 PM   Instillation Volume  32 mL 3/18/2020 4:51 PM   Soak Time  2 minutes 3/18/2020  4:51 PM   Vac Frequency 2 hours 3/18/2020  4:51 PM   Canister changed? No 3/18/2020  4:51 PM   Dressing Status Changed 3/18/2020  4:51 PM   Dressing Changed Changed/New 3/18/2020  4:51 PM   Drainage Amount Small 3/18/2020  4:51 PM   Drainage Description Serosanguinous 3/18/2020  4:51 PM   Dressing Change Due 03/20/20 3/18/2020  4:51 PM   Wound Assessment Yellow;Pink 3/18/2020  4:51 PM   Luz-wound Assessment Blanchable erythema 3/18/2020  4:51 PM   Shape oval 3/18/2020  4:51 PM   Odor None 3/18/2020  4:51 PM   Number of days: 1       Wound 01/27/20 Buttocks Right;Mid;Left Sacrum, R, Mid & L buttocks. Evolved to Unstagable 2/1/20, I & D 2/12/20. (Active)   Number of days: 50       Wound 01/28/20 Heel Right (Active)   Number of days: 50       Wound 01/28/20 Heel Left (Active)   Number of days: 50       Wound 03/16/20 Sacrum with wound vac in place (Active)   Wound Image   3/17/2020 11:14 AM   Wound Pressure Stage  4 3/17/2020 11:14 AM   Dressing Status Changed 3/18/2020  4:51 PM   Dressing Changed Changed/New 3/18/2020  4:51 PM   Dressing/Treatment Barrier film;Hydrocolloid; Vacuum dressing 3/18/2020  4:51 PM   Wound Cleansed Rinsed/Irrigated with saline 3/18/2020  4:51 PM   Dressing Change Due 03/20/20 3/18/2020  4:51 PM   Wound Length (cm) 9 cm 3/17/2020 11:14 AM   Wound Width (cm) 2.5 cm 3/17/2020 11:14 AM   Wound Depth (cm) 3 cm 3/17/2020 11:14 AM   Wound Surface Area (cm^2) 22.5 cm^2 3/17/2020 11:14 AM   Wound Volume (cm^3) 67.5 cm^3 3/17/2020 11:14 AM   Post-Procedure Length (cm) 0 cm 3/17/2020 11:14 AM   Post-Procedure Width (cm) 0 cm 3/17/2020 11:14 AM   Post-Procedure Depth (cm) 0 cm 3/17/2020 11:14 AM   Post-Procedure Surface Area (cm^2) 0 cm^2 3/17/2020 11:14 AM   Post-Procedure Volume (cm^3) 0 cm^3 3/17/2020 11:14 AM   Distance Tunneling (cm) 0 cm 3/18/2020  4:51 PM   Tunneling Position ___ O'Clock 0 3/18/2020  4:51 PM   Undermining Starts ___ O'Clock 0 3/18/2020 [place]%Wound Bed 80 3/18/2020  9:30 AM   Red%Wound Bed 15 3/18/2020  9:30 AM   Black%Wound Bed 5 3/18/2020  9:30 AM   Other%Wound Bed 100 3/17/2020 11:14 AM   Culture Taken No 3/17/2020 11:14 AM   Number of days: 1       Wound 03/16/20 Heel Left unstageable with necrotic tissue (Active)   Wound Image   3/17/2020 11:14 AM   Wound Pressure Unstageable 3/17/2020 11:14 AM   Dressing Status Other (Comment) 3/18/2020  9:30 AM   Dressing/Treatment Betadine swabs 3/18/2020  9:30 AM   Dressing Change Due 03/18/20 3/18/2020  9:30 AM   Wound Length (cm) 3 cm 3/17/2020 11:14 AM   Wound Width (cm) 3 cm 3/17/2020 11:14 AM   Wound Depth (cm) 0 cm 3/17/2020 11:14 AM   Wound Surface Area (cm^2) 9 cm^2 3/17/2020 11:14 AM   Wound Volume (cm^3) 0 cm^3 3/17/2020 11:14 AM   Post-Procedure Length (cm) 0 cm 3/17/2020 11:14 AM   Post-Procedure Width (cm) 0 cm 3/17/2020 11:14 AM   Post-Procedure Depth (cm) 0 cm 3/17/2020 11:14 AM   Post-Procedure Surface Area (cm^2) 0 cm^2 3/17/2020 11:14 AM   Post-Procedure Volume (cm^3) 0 cm^3 3/17/2020 11:14 AM   Distance Tunneling (cm) 0 cm 3/17/2020 11:14 AM   Tunneling Position ___ O'Clock 0 3/17/2020 11:14 AM   Undermining Starts ___ O'Clock 0 3/17/2020 11:14 AM   Undermining Ends___ O'Clock 0 3/17/2020 11:14 AM   Undermining Maxium Distance (cm) 0 3/17/2020 11:14 AM   Wound Assessment Dry; Intact; Black 3/18/2020  9:30 AM   Drainage Amount None 3/18/2020  9:30 AM   Odor None 3/18/2020  9:30 AM   Margins Attached edges 3/18/2020  9:30 AM   Luz-wound Assessment Blanchable erythema 3/18/2020  9:30 AM   Black%Wound Bed 100 3/18/2020  9:30 AM   Culture Taken No 3/17/2020 11:14 AM   Number of days: 1     Incision 01/02/20 Chest Lateral;Left;Lower (Active)   Number of days: 76       Incision 01/08/20 Back Lateral;Left (Active)   Number of days: 69       Incision 01/21/20 Sternum (Active)   Number of days: 57       Incision 01/21/20 Groin Left (Active)   Number of days: 57        Elimination:  Continence: · Bowel: Yes  · Bladder: Yes  Urinary Catheter: None   Colostomy/Ileostomy/Ileal Conduit: No       Date of Last BM:     Intake/Output Summary (Last 24 hours) at 3/17/2020 1034  Last data filed at 3/17/2020 0617  Gross per 24 hour   Intake --   Output 1100 ml   Net -1100 ml     I/O last 3 completed shifts:  In: -   Out: 1100 [Urine:1100]    Safety Concerns: At Risk for Falls    Impairments/Disabilities:      Vision    Nutrition Therapy:  Current Nutrition Therapy:   - Oral Diet:  Carb Control 5 carbs/meal (2000kcals/day)    Routes of Feeding: Oral  Liquids: No Restrictions  Daily Fluid Restriction: no  Last Modified Barium Swallow with Video (Video Swallowing Test): not done    Treatments at the Time of Hospital Discharge:   Respiratory Treatments:   Oxygen Therapy:  is not on home oxygen therapy.   Ventilator:    - No ventilator support    Rehab Therapies: Physical Therapy and Occupational Therapy  Weight Bearing Status/Restrictions: No weight bearing restirctions  Other Medical Equipment (for information only, NOT a DME order):  walker and stedy  Other Treatments:     Patient's personal belongings (please select all that are sent with patient):  Glasses, Dentures upper and lower    RN SIGNATURE:  Electronically signed by Lien Sargent RN on 3/19/20 at 5:30 PM EDT    CASE MANAGEMENT/SOCIAL WORK SECTION    Inpatient Status Date:     Readmission Risk Assessment Score:  Readmission Risk              Risk of Unplanned Readmission:        0           Discharging to Facility/ Agency   · Name:   · Address:  · Phone:  · Fax:    Dialysis Facility (if applicable)   · Name:  · Address:  · Dialysis Schedule:  · Phone:  · Fax:    / signature: {Esignature:[id number]}    PHYSICIAN SECTION    Prognosis: Good    Condition at Discharge: Stable    Rehab Potential (if transferring to Rehab): Good    Recommended Labs or Other Treatments After Discharge:   Negative pressure wound therapy to sacral/coccygeal

## 2020-03-17 NOTE — CONSULTS
to OM, AVR w/porcine valve    CARDIAC CATHETERIZATION  01/13/2020    Dr. Lorrie Gavin Left 01/02/2020    Dr. Bishop Dudley - 3200 ml blood drained in 1000 ml increments    COLONOSCOPY  02/10/2020    polyps    COLONOSCOPY N/A 2/10/2020    COLONOSCOPY POLYPECTOMY SNARE/COLD BIOPSY performed by Emmie Jung DO at [address]  12/07/2010    Dr. Oksana Lao. Roger - NURIS- 4.0 x 28 to Cx    PILONIDAL CYST EXCISION N/A 2/11/2020    DEBRIDEMENT OF SACRAL WOUND performed by Angelia Marcelino MD at [facility]. VALVE CONDUIT/CORON RECONSTR N/A 1/21/2020    Dr. Homar White - redo sternotomy x3, replacement of ascending aorta, redo AVR w/19mm [place] Intuity valve, closure of outflow tract of atrial fistula    SIGMOIDOSCOPY N/A 2/1/2020    emergent, performed by Sherron Espinal MD at [address] Left 1/2/2020    Dr. Rhonda Dominguez - emergent w/evacuation of hematoma & chest wall hemostasis, pleural biopsy & XENA wedge resection    TRANSESOPHAGEAL ECHOCARDIOGRAM  01/21/2020    during ascending aorta replacement w/redo AVR    TRANSESOPHAGEAL ECHOCARDIOGRAM  12/07/2010       Social History:    TOBACCO:   reports that he quit smoking about 8 weeks ago. His smoking use included cigarettes. He smoked 2.00 packs per day. He uses smokeless tobacco.  ETOH:   reports current alcohol use of about 6.0 standard drinks of alcohol per week. There is no history of illicit drug use or other significant epidemiologic exposures. Family History:   History reviewed. No pertinent family history. There is no family history of autoimmune diseases or significant infectious diseases.       Current Medications:    Current Facility-Administered Medications: 0.9 % sodium chloride infusion, , Intravenous, Continuous  enoxaparin (LOVENOX) injection 80 mg, 1 mg/kg, Subcutaneous, BID  insulin lispro (HUMALOG) injection vial Recs:  Continue observation off of abx  Follow-up pending BC  Continue local wound care to sacrum with off-loading     Case d/w Dr. Jefferson Kim M.D. Thank you for the opportunity to participate in the care of your patient.     Please do not hesitate to contact me:   [phone] office  [phone] mobile

## 2020-03-17 NOTE — PROGRESS NOTES
Occupational Therapy   Occupational Therapy Initial Assessment  Date: 3/17/2020   Patient Name: Kelvin Lee  MRN: [id number]     : 1950    Date of Service: 3/17/2020    Discharge Recommendations:  IP Rehab   Barriers to home discharge:   [x] Steps to access home entry or bed/bath:   [x] Reported available assist at home upon discharge limited           Assessment   Performance deficits / Impairments: Decreased functional mobility ; Decreased balance;Decreased ADL status; Decreased high-level IADLs;Decreased strength;Decreased safe awareness;Decreased cognition  Assessment: pt with multiple falls, but motivated to increase independence with BADL's & functional transfers; pt to benefit from skilled OT services while in acute care setting  OT Education: OT Role;Plan of Care;IADL Safety  REQUIRES OT FOLLOW UP: Yes  Activity Tolerance  Activity Tolerance: Patient Tolerated treatment well  Safety Devices  Safety Devices in place: Yes  Type of devices: Call light within reach; Chair alarm in place; Left in chair;Nurse notified           Patient Diagnosis(es): The primary encounter diagnosis was Frequent falls. Diagnoses of Pressure injury of skin of buttock, unspecified injury stage, unspecified laterality, Hyponatremia, Left leg weakness, Elevated troponin, and Bandemia were also pertinent to this visit. has a past medical history of CAD (coronary artery disease), COPD (chronic obstructive pulmonary disease) ([address] Diabetes mellitus ([address] Gout, Hemothorax, left, Hyperlipidemia, Hypertension, Obesity, Class I, BMI 30.0-34.9 (see actual BMI), S/P thoracotomy, Status post partial removal of lung, and Thyroid disease. has a past surgical history that includes thoracotomy (Left, 2020); bronchoscopy (N/A, 2020); Coronary angioplasty with stent (2010); pr ascend aorta graft w root replacment. valve conduit/coron reconstr (N/A, 2020); transesophageal echocardiogram (2020);  Cardiac catheterization (01/13/2020); chest tube insertion (Left, 01/02/2020); Aortic valve replacement (12/27/2011); transesophageal echocardiogram (12/07/2010); Coronary artery bypass graft (2001); bronchoscopy (N/A, 1/24/2020); bronchoscopy (1/24/2020); sigmoidoscopy (N/A, 2/1/2020); Colonoscopy (02/10/2020); Colonoscopy (N/A, 2/10/2020); and Pilonidal cyst excision (N/A, 2/11/2020). Restrictions  Restrictions/Precautions  Restrictions/Precautions: General Precautions, Fall Risk  Position Activity Restriction  Other position/activity restrictions: Mobilize patient out of bed ASAP and a minimum of 3 times per day, up with assist, Right buttock wound vac, AVAYS video monitering    Subjective   General  Chart Reviewed: Yes  Patient assessed for rehabilitation services?: Yes  Additional Pertinent Hx: Severe aortic valve stenosis, s/p Redo sternotomy 1-08-20  Family / Caregiver Present: No  Referring Practitioner: Dr. Francisco Tucker  Diagnosis: generalized weakness  General Comment  Comments: RN cleared pt for OT eval; pt resting in bed, agreeable to therapy  Patient Currently in Pain: Yes  Pain Assessment  Pain Assessment: 0-10  Pain Level: 6  Patient's Stated Pain Goal: No pain  Pain Type: Chronic pain  Pain Location: Back  Pain Orientation: Lower  Functional Pain Assessment: Prevents or interferes some active activities and ADLs  Non-Pharmaceutical Pain Intervention(s): Therapeutic presence; Ambulation/Increased Activity;Repositioned  Pre Treatment Pain Screening  Intervention List: Patient able to continue with treatment    Social/Functional History  Social/Functional History  Lives With: Alone  Type of Home: Mobile home  Home Layout: One level  Home Access: Stairs to enter with rails  Entrance Stairs - Number of Steps: 1 plus 1  Entrance Stairs - Rails: Both  Bathroom Shower/Tub: Tub/Shower unit  Bathroom Toilet: Standard  Home Equipment: [employer], Standard walker  ADL Assistance: Needs assistance  Bath: Moderate assistance  Dressing: Moderate assistance  Toileting: Needs assistance  Homemaking Assistance: (pt lives at home alone, doews not have assistance for homemaking)  Homemaking Responsibilities: No  Ambulation Assistance: Needs assistance(non ambulatory when DC'd from rehab, using [address])  Transfer Assistance: (supervision with transfers)  Additional Comments: just discharged from [facility] for rehab day of admission to hospital, unable to transfer or care for self       Objective   Vision: Impaired  Vision Exceptions: Wears glasses at all times  Hearing: Within functional limits    Orientation  Overall Orientation Status: Within Functional Limits(but with difficulty recalling date & reason for current hospitalization )     Balance  Sitting Balance: Supervision  Standing Balance: Minimal assistance  Standing Balance  Time: 30 seconds  Activity: sit<-->stand to cecy briefs & stand-pivot transfers  ADL  Feeding: Independent  Grooming: Setup(seated to wash face & hands)  UE Dressing: Minimal assistance  LE Dressing: Minimal assistance(seated EOB)  Toileting: Setup(for urinal)    RUE Tone: Normotonic  LUE Tone: Normotonic    Coordination  Movements Are Fluid And Coordinated: Yes     Bed mobility  Supine to Sit: Contact guard assistance  Transfers  Stand Pivot Transfers: Minimal assistance  Sit to stand: Minimal assistance  Stand to sit: Contact guard assistance     Vision - Basic Assessment  Prior Vision: Wears glasses all the time     Cognition  Overall Cognitive Status: Exceptions  Arousal/Alertness: Appropriate responses to stimuli  Following Commands:  Follows one step commands with increased time  Attention Span: Attends with cues to redirect  Memory: Decreased recall of recent events;Decreased short term memory  Safety Judgement: Decreased awareness of need for assistance;Decreased awareness of need for safety  Insights: Decreased awareness of deficits  Initiation: Requires cues for some  Sequencing: Does not require cues         LUE AROM : WFL  RUE AROM : WFL    Gross LUE Strength: (3/5 by observation )  Gross RUE Strength: (3/5 by observation )      Plan   Plan  Times per week: 3-5x/ week   Current Treatment Recommendations: Strengthening, Balance Training, Safety Education & Training, Self-Care / ADL, Functional Mobility Training, Endurance Training, Positioning    AM-PAC Score        AM-PAC Inpatient Daily Activity Raw Score: 16 (03/17/20 1035)  AM-PAC Inpatient ADL T-Scale Score : 35.96 (03/17/20 1035)  ADL Inpatient CMS 0-100% Score: 53.32 (03/17/20 1035)  ADL Inpatient CMS G-Code Modifier : CK (03/17/20 1035)    Goals  Short term goals  Time Frame for Short term goals: 1 week (3-24-20)  Short term goal 1: CGA with functional/toilet transfers from w/c level  Short term goal 2: CGA static standing ADL's for 2 minutes by 3-24-20   Short term goal 3: set up for UE self care by 3-20-20  Short term goal 4: independent with toileting hygiene  Short term goal 5: tolerate 15-20 reps graded UE strengthening exercises to increase strength for safe transfers  Patient Goals   Patient goals : get stronger to take care of myself       Therapy Time   Individual Concurrent Group Co-treatment   Time In 0900         Time Out 0935         Minutes [facility]

## 2020-03-17 NOTE — H&P
bioprosthetic valve    BRONCHOSCOPY N/A 1/9/2020    w/BAL, performed by Tan Lopes MD at [address] N/A 1/24/2020    BRONCHOSCOPY ALVEOLAR LAVAGE performed by Bernie Camacho MD at [address]  1/24/2020    BRONCHOSCOPY THERAPUTIC ASPIRATION INITIAL performed by Bernie Camacho MD at [address] [facility] CABG WITH AORTIC VALVE REPLACEMENT  2001    date approximate, x1 to OM, AVR w/porcine valve    CARDIAC CATHETERIZATION  01/13/2020    Dr. Mireles Collar Left 01/02/2020    Dr. Linda Ndiaye - 3200 ml blood drained in 1000 ml increments    COLONOSCOPY  02/10/2020    polyps    COLONOSCOPY N/A 2/10/2020    COLONOSCOPY POLYPECTOMY SNARE/COLD BIOPSY performed by Jonas Kehr, DO at [address]  12/07/2010    Dr. Valerio Ceja. Roger - NURIS- 4.0 x 28 to Cx    PILONIDAL CYST EXCISION N/A 2/11/2020    DEBRIDEMENT OF SACRAL WOUND performed by Frank Plaza MD at [address] [place] [address]. VALVE CONDUIT/CORON RECONSTR N/A 1/21/2020    Dr. 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 Christopher Parmar - emergent w/evacuation of hematoma & chest wall hemostasis, pleural biopsy & XENA wedge resection    TRANSESOPHAGEAL ECHOCARDIOGRAM  01/21/2020    during ascending aorta replacement w/redo AVR    TRANSESOPHAGEAL ECHOCARDIOGRAM  12/07/2010       Allergies:  Penicillins    Medications Prior to Admission:    Prior to Admission medications    Medication Sig Start Date End Date Taking? Authorizing Provider   oxyCODONE (ROXICODONE) 5 MG immediate release tablet Take 1 tablet by mouth every 6 hours as needed for Pain for up to 7 days.  Use smallest amount necessary to control pain 3/16/20 3/23/20 Yes Neisha Avlia MD   levothyroxine (SYNTHROID) 25 MCG tablet Take 1 tablet by mouth Daily 3/16/20 4/15/20 Yes Neisha Avila MD   pantoprazole (PROTONIX) 40 MG tablet Take 1 tablet by mouth every morning (before breakfast) 3/16/20 4/15/20 Yes Neisha Avila MD   carvedilol (COREG) 12.5 MG tablet Take 1 tablet by mouth 2 times daily (with meals) 3/16/20 4/15/20 Yes Neisha Avila MD   glipiZIDE (GLUCOTROL) 5 MG tablet Take 1 tablet by mouth daily 3/16/20 4/15/20 Yes Neisha Avila MD   meclizine (ANTIVERT) 25 MG tablet Take 1 tablet by mouth daily 3/16/20 4/15/20 Yes Neisha Avila MD   citalopram (CELEXA) 10 MG tablet Take 1 tablet by mouth every morning 3/16/20 4/15/20  Neisha Avila MD   fenofibrate micronized (LOFIBRA) 200 MG capsule Take 1 capsule by mouth every morning (before breakfast) 3/16/20 4/15/20  Neisha Avila MD   atorvastatin (LIPITOR) 20 MG tablet Take 1 tablet by mouth nightly 3/16/20 4/15/20  Neisha Avila MD   allopurinol (ZYLOPRIM) 300 MG tablet Take 1 tablet by mouth daily 3/16/20 4/15/20  Neisha Avila MD       Family History:   Family history is negative for accelerated coronary artery disease, diabetes or malignancies. Social History:   TOBACCO:   reports that he quit smoking about 8 weeks ago. His smoking use included cigarettes. He smoked 2.00 packs per day. He uses smokeless tobacco.  ETOH:   reports current alcohol use of about 6.0 standard drinks of alcohol per week.   OCCUPATION:      REVIEW OF SYSTEMS:  A full review of systems was performed and is negative except for that which appears in the HPI    Physical Exam:    Vitals: BP (!) 161/62   Pulse 91   Temp 98.9 °F (37.2 °C) (Oral)   Resp 24   Ht 5' 9\" (1.753 m)   Wt 176 lb (79.8 kg)   SpO2 95%   BMI 25.99 kg/m²   General appearance: WD/WN 77y.o. year-old  male who is alert, appears stated age and is cooperative  HEENT: Head: Normocephalic, no lesions, without obvious Date: 3/16/2020  EXAMINATION: ONE XRAY VIEW OF THE CHEST 3/16/2020 5:53 pm COMPARISON: 01/30/2020 HISTORY: ORDERING SYSTEM PROVIDED HISTORY: generalized weakness TECHNOLOGIST PROVIDED HISTORY: Reason for exam:->generalized weakness Reason for Exam: cough Acuity: Acute Type of Exam: Initial FINDINGS: Cardial pericardial silhouette is enlarged but stable. The pulmonary vasculature is congested. No focal infiltrate is identified, but the retrocardiac region is not well evaluated. No pneumothorax is seen. No free air. No acute bony abnormality. Sternotomy wires are unchanged in configuration. Pulmonary vascular congestion. Otherwise no acute abnormality identified. EKG:   Read by ER in the absence of a Cardiologist shows  normal sinus rhythm with a rate of 91  Axis is   Normal  QTc is  within an acceptable range  Intervals and Durations are unremarkable. ST Segments: no acute change and nonspecific changes  No significant change from prior EKG dated - 1/26/20  No STEMI,  RBBB from old EKG is still present today       Assessment:   Principal Problem:    Generalized weakness  Active Problems:    CAD (coronary artery disease) s/p stent in 2010    Essential hypertension    Hyperlipidemia    DMII (diabetes mellitus, type 2) (HCC)    Atrial fibrillation (HCC)    Frequent falls    Sacral ulcer (HCC)    Elevated troponin    Hyponatremia    Subtherapeutic international normalized ratio (INR)  Resolved Problems:    * No resolved hospital problems. *      Plan:       Generalized weakness with frequent falls - Pt fell 3x at home today after being discharged from Rehab earlier in the day. He reports chronic left leg weakness > right leg weakness Will ask PT/OT to evaluate and treat patient, and if necessary to provide recommendations for post hospital therapy    CAD (coronary artery disease) s/p stent in 2010 - Pt denies chest pain.  Continue Beta Blocker, Statin    Elevated troponin - Chronic and has been

## 2020-03-17 NOTE — PROGRESS NOTES
history that includes thoracotomy (Left, 1/2/2020); bronchoscopy (N/A, 1/9/2020); Coronary angioplasty with stent (12/07/2010); pr ascend aorta graft w root replacment. valve conduit/coron reconstr (N/A, 1/21/2020); transesophageal echocardiogram (01/21/2020); Cardiac catheterization (01/13/2020); chest tube insertion (Left, 01/02/2020); Aortic valve replacement (12/27/2011); transesophageal echocardiogram (12/07/2010); Coronary artery bypass graft (2001); bronchoscopy (N/A, 1/24/2020); bronchoscopy (1/24/2020); sigmoidoscopy (N/A, 2/1/2020); Colonoscopy (02/10/2020); Colonoscopy (N/A, 2/10/2020); and Pilonidal cyst excision (N/A, 2/11/2020). Restrictions  Restrictions/Precautions  Restrictions/Precautions: General Precautions, Fall Risk  Position Activity Restriction  Other position/activity restrictions: Mobilize patient out of bed ASAP and a minimum of 3 times per day, up with assist, Right buttock wound vac, AVAYS video monitering  Vision/Hearing  Vision: Impaired  Vision Exceptions: Wears glasses at all times  Hearing: Within functional limits     Subjective  General  Chart Reviewed: Yes  Patient assessed for rehabilitation services?: Yes  Response To Previous Treatment: Not applicable  Family / Caregiver Present: No  Referring Practitioner: Blessing Mcguire MD  Referral Date : 03/16/20  Diagnosis: general weakness  Follows Commands: Within Functional Limits  General Comment  Comments: Pt sitting up in chair upon entry, RN cleared pt for therapy  Subjective  Subjective: Pt agreeable to PT  Pain Screening  Patient Currently in Pain: Yes  Pain Assessment  Pain Assessment: 0-10  Pain Level: 6  Pain Type: Chronic pain  Pain Location: Back  Pain Orientation: Lower  Non-Pharmaceutical Pain Intervention(s): Therapeutic presence; Ambulation/Increased Activity;Repositioned  Vital Signs  Patient Currently in Pain: Yes  Pre Treatment Pain Screening  Intervention List: Patient able to continue with treatment    Orientation  Orientation  Overall Orientation Status: Impaired  Orientation Level: Disoriented to time;Oriented to person;Oriented to situation;Oriented to place  Social/Functional History  Social/Functional History  Lives With: Alone  Type of Home: Mobile home  Home Layout: One level  Home Access: Stairs to enter with rails  Entrance Stairs - Number of Steps: 1 plus 1  Entrance Stairs - Rails: Both  Bathroom Shower/Tub: Tub/Shower unit  Bathroom Toilet: Standard  Home Equipment: [employer], Standard walker  ADL Assistance: Needs assistance  Bath: Moderate assistance  Dressing: Moderate assistance  Toileting: Needs assistance  Homemaking Assistance: (pt lives at home alone, doews not have assistance for homemaking)  Homemaking Responsibilities: No  Ambulation Assistance: Needs assistance(non ambulatory when DC'd from rehab, using [facility])  Transfer Assistance: (supervision with transfers)  Additional Comments: just discharged from [facility] for rehab day of admission to hospital, unable to transfer or care for self         Objective          AROM RLE (degrees)  RLE AROM: WFL  AROM LLE (degrees)  LLE AROM : WFL  Strength RLE  Comment: grossly 4- to 4/5 throughout  Strength LLE  Strength LLE: WFL  Comment: grossly 4+/5 throughout        Bed mobility  Supine to Sit: Unable to assess(pt up in chair upon entry)  Sit to Supine: Unable to assess(up in chair at EOS)  Scooting: Contact guard assistance(to scoot forward and back in chair)  Transfers  Sit to Stand: Minimal Assistance  Stand to sit: Minimal Assistance  Bed to Chair: Unable to assess  Ambulation  Ambulation?: Yes  Ambulation 1  Surface: level tile  Device: Rolling Walker  Assistance:  Moderate assistance;Minimal assistance  Quality of Gait: very unsteady with difficulty advancing RLE, 2 episodes of LOB, mod A to correct  Distance: 12 ft  Comments: followed with chair     Balance  Posture: Fair  Sitting - Static: Good  Sitting - Dynamic: Good  Standing - Static: Fair  Standing - Dynamic: Poor;+  Exercises  Hip Flexion: x 15 B  Hip Abduction: x 15 B clamshells  Knee Long Arc Quad: x 15 B  Ankle Pumps: x 15 B  Comments: x 5 reps of chair push up with use of oth UE's and LE's to clear buttocks for pressure relieving     Plan   Plan  Times per week: 3-5  Current Treatment Recommendations: Strengthening, Gait Training, ROM, Balance Training, Functional Mobility Training, Endurance Training, Transfer Training  Safety Devices  Type of devices: All fall risk precautions in place, Left in chair, Call light within reach, Chair alarm in place, Nurse notified, Gait belt, Patient at risk for falls      AM-PAC Score     AM-PAC Inpatient Mobility without Stair Climbing Raw Score : 11 (03/17/20 1031)  AM-PAC Inpatient without Stair Climbing T-Scale Score : 35.66 (03/17/20 1031)  Mobility Inpatient CMS 0-100% Score: 67.36 (03/17/20 1031)  Mobility Inpatient without Stair CMS G-Code Modifier : CL (03/17/20 1031)       Goals  Short term goals  Time Frame for Short term goals: 3/24/20 unless noted  Short term goal 1: Pt will perform bed mobility with Pako x1 by 3/23/20  Short term goal 2: Pt will perform transfers with CGA  Short term goal 3: Pt will ambulate 20 ft with min A  Patient Goals   Patient goals : \"to get stronger\"       Therapy Time   Individual Concurrent Group Co-treatment   Time In 0930         Time Out 1003         Minutes 33         Timed Code Treatment Minutes: 23 Minutes     If pt is discharged prior to next therapy session, this note will serve as discharge summary.   Margaret Peters, PT

## 2020-03-17 NOTE — PROGRESS NOTES
RN received call from [address] [place] [address] stating blood on patients gown. RN went into patients room, IV was out of arm. Site was no longer bleeding. Patient cleaned up, now working with PT/OT. RN will replace IV once therapy session done.

## 2020-03-17 NOTE — CONSULTS
left leg weakness > right leg weakness  Current Wound Care Treatment: Sacrum - NPWT  Bilateral Heels - betadine swabs    Patient Goal of Care:  [x] Wound Healing  [] Odor Control  [] Palliative Care  [] Pain Control   [] Other:         PAST MEDICAL HISTORY        Diagnosis Date    CAD (coronary artery disease)     [facility] obstructive [facility] disease) [facility]     Diabetes mellitus ([facility]     Gout     Hemothorax, left 01/2020    Hyperlipidemia     Hypertension     Obesity, Class I, BMI 30.0-34.9 (see actual BMI) 01/2020    30.7    S/P thoracotomy 01/2020    for hemothorax    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 Jakub Driver - hanny w/23mm Magna Ease bioprosthetic valve    BRONCHOSCOPY N/A 1/9/2020    w/BAL, performed by Jose Daniel Candelario MD at [address] [place] [address] N/A 1/24/2020    BRONCHOSCOPY ALVEOLAR LAVAGE performed by María Elena Vera MD at [address] [place] [address]  1/24/2020    BRONCHOSCOPY THERAPUTIC ASPIRATION INITIAL performed by María Elena Vera MD at [facility] CABG WITH AORTIC VALVE REPLACEMENT  2001    date approximate, x1 to OM, AVR w/porcine valve    CARDIAC CATHETERIZATION  01/13/2020    Dr. Flora Hills Left 01/02/2020    Dr. Svetlana Haro - 3200 ml blood drained in 1000 ml increments    COLONOSCOPY  02/10/2020    polyps    COLONOSCOPY N/A 2/10/2020    COLONOSCOPY POLYPECTOMY SNARE/COLD BIOPSY performed by Lj Kaur DO at [address]  12/07/2010    Dr. Bakari Weiner. Roger - NURIS- 4.0 x 28 to Cx    PILONIDAL CYST EXCISION N/A 2/11/2020    DEBRIDEMENT OF SACRAL WOUND performed by Suleiman Cordova MD at [facility]. VALVE CONDUIT/CORON RECONSTR N/A 1/21/2020    Dr. Ovi gamino mouth daily 30 tablet 1    citalopram (CELEXA) 10 MG tablet Take 1 tablet by mouth every morning 30 tablet 1    fenofibrate micronized (LOFIBRA) 200 MG capsule Take 1 capsule by mouth every morning (before breakfast) 30 capsule 1    atorvastatin (LIPITOR) 20 MG tablet Take 1 tablet by mouth nightly 30 tablet 1    allopurinol (ZYLOPRIM) 300 MG tablet Take 1 tablet by mouth daily 30 tablet 1       Objective: A/O; assisted return to bed using STEDY; current NPWT intact and sealed [facility] - will  today)    /65   Pulse 75   Temp 97.6 °F (36.4 °C) (Oral)   Resp 20   Ht 5' 9\" (1.753 m)   Wt 176 lb (79.8 kg)   SpO2 94%   BMI 25.99 kg/m²     LABS:  WBC:    Lab Results   Component Value Date    WBC 4.6 03/17/2020     H/H:    Lab Results   Component Value Date    HGB 8.9 03/17/2020    HCT 27.0 03/17/2020     PTT:    Lab Results   Component Value Date    APTT 34.8 02/01/2020   [APTT}  PT/INR:    Lab Results   Component Value Date    PROTIME 15.5 03/17/2020    INR 1.33 03/17/2020     HgBA1c:    Lab Results   Component Value Date    LABA1C 4.7 01/21/2020       Assessment: Sacrum - previous admission wound bed has improved; smaller; red and pink granulation tissue  R Heel - almost resolved  L Heel - dry, intact black eschar   Wes Risk Score: Wes Scale Score: 18    Patient Active Problem List   Diagnosis Code    Hemothorax J94.2    Tobacco abuse Z72.0    AS (aortic stenosis) I35.0    CAD (coronary artery disease) s/p stent in 2010 I25.10    Essential hypertension I10    Hyperlipidemia E78.5    S/P AVR (aortic valve replacement) Z95.2    Atelectasis J98.11    Leukocytosis D72.829    Hyperglycemia R73.9    Acute bronchospasm J98.01    Atrial fibrillation with RVR (HCC) I48.91    Acute on chronic diastolic congestive heart failure (HCC) I50.33    Myocardial infarction (HCC) I21.9    Obesity, Class I, BMI 30.0-34.9 (see actual BMI) E66.9    S/P thoracotomy Z98.890    Status post partial removal of lung Z90.2    Hemothorax, left J94.2    Restrictive lung disease J98.4    Hypernatremia E87.0    Lower gastrointestinal hemorrhage K92.2    Type 2 diabetes mellitus, without long-term current use of insulin ([facility]) E11.9    Bright red blood per rectum K62.5    Sacral wound S31.000A    Generalized weakness R53.1    DMII (diabetes mellitus, type 2) ([facility]) E11.9    Atrial fibrillation ([facility]) I48.91    Frequent falls R29.6    Sacral ulcer ([address] L98.429    Elevated troponin R79.89    Hyponatremia E87.1    Subtherapeutic international normalized ratio (INR) R79.1       Measurements:  Negative Pressure Wound Therapy Coccyx Mid (Active)   Number of days: 33       Negative Pressure Wound Therapy Sacrum (Active)   $ Standard NPWT >50 sq cm PER TX $ Yes 3/17/2020 11:14 AM   Wound Type Pressure ulcer: Stage IV;Surgical 3/17/2020 11:14 AM   Unit Type KCI VAC 3/17/2020 11:14 AM   Dressing Type Black foam 3/17/2020 11:14 AM   Number of pieces used 2 3/17/2020 11:14 AM   Cycle Continuous 3/17/2020 11:14 AM   Target Pressure (mmHg) 125 3/17/2020 11:14 AM   Intensity 1 3/17/2020 11:14 AM   Dressing Status Clean;Dry; Intact 3/17/2020 11:14 AM   Dressing Changed Changed/New 3/17/2020 11:14 AM   Drainage Amount Small 3/17/2020 11:14 AM   Drainage Description Serosanguinous 3/17/2020 11:14 AM   Dressing Change Due 03/17/20 3/17/2020 11:14 AM   Wound Assessment Pink;Red 3/17/2020 11:14 AM   Luz-wound Assessment Blanchable erythema; Maceration 3/17/2020 11:14 AM   Number of days: 0       Wound 01/27/20 Buttocks Right;Mid;Left Sacrum, R, Mid & L buttocks. Evolved to Unstagable 2/1/20, I & D 2/12/20.  (Active)   Number of days: 49       Wound 01/28/20 Heel Right (Active)   Number of days: 49       Wound 01/28/20 Heel Left (Active)   Number of days: 49       Wound 03/16/20 Sacrum with wound vac in place (Active)   Wound Image   3/17/2020 11:14 AM   Wound Pressure Stage  4 3/17/2020 11:14 AM   Dressing Status Intact; New drainage; Changed 3/17/2020 11:14 AM   Dressing Changed Changed/New 3/17/2020 11:14 AM   Dressing/Treatment Vacuum dressing;Hydrocolloid 3/17/2020 11:14 AM   Wound Cleansed Rinsed/Irrigated with saline 3/17/2020 11:14 AM   Dressing Change Due 03/18/20 3/17/2020 11:14 AM   Wound Length (cm) 9 cm 3/17/2020 11:14 AM   Wound Width (cm) 2.5 cm 3/17/2020 11:14 AM   Wound Depth (cm) 3 cm 3/17/2020 11:14 AM   Wound Surface Area (cm^2) 22.5 cm^2 3/17/2020 11:14 AM   Wound Volume (cm^3) 67.5 cm^3 3/17/2020 11:14 AM   Post-Procedure Length (cm) 0 cm 3/17/2020 11:14 AM   Post-Procedure Width (cm) 0 cm 3/17/2020 11:14 AM   Post-Procedure Depth (cm) 0 cm 3/17/2020 11:14 AM   Post-Procedure Surface Area (cm^2) 0 cm^2 3/17/2020 11:14 AM   Post-Procedure Volume (cm^3) 0 cm^3 3/17/2020 11:14 AM   Distance Tunneling (cm) 0 cm 3/17/2020 11:14 AM   Tunneling Position ___ O'Clock 0 3/17/2020 11:14 AM   Undermining Starts ___ O'Clock 0 3/17/2020 11:14 AM   Undermining Ends___ O'Clock 0 3/17/2020 11:14 AM   Undermining Maxium Distance (cm) 0 3/17/2020 11:14 AM   Wound Assessment Pink;Red 3/17/2020 11:14 AM   Drainage Amount Small 3/17/2020 11:14 AM   Drainage Description Serosanguinous 3/17/2020 11:14 AM   Odor None 3/17/2020 11:14 AM   Margins Unattached edges 3/17/2020 11:14 AM   Luz-wound Assessment Blanchable erythema; Maceration 3/17/2020 11:14 AM   Non-staged Wound Description Full thickness 3/17/2020 11:14 AM   [place]%Wound Bed 10 3/17/2020 11:14 AM   Red%Wound Bed 90 3/17/2020 11:14 AM   Culture Taken No 3/17/2020 11:14 AM   Number of days: 0    Sacrum:         Wound 03/16/20 Heel Right (Active)   Wound Image   3/17/2020 11:14 AM   Wound Pressure Unstageable 3/17/2020 11:14 AM   Dressing Status Clean;Dry; Intact 3/17/2020 11:14 AM   Dressing/Treatment Betadine swabs 3/17/2020 11:14 AM   Dressing Change Due 03/17/20 3/17/2020 11:14 AM   Wound Length (cm) 1.5 cm 3/17/2020 11:14 AM   Wound Width (cm) 2 cm 3/17/2020 11:14 AM Volume (cm^3) 0 cm^3 3/17/2020 11:14 AM   Distance Tunneling (cm) 0 cm 3/17/2020 11:14 AM   Tunneling Position ___ O'Clock 0 3/17/2020 11:14 AM   Undermining Starts ___ O'Clock 0 3/17/2020 11:14 AM   Undermining Ends___ O'Clock 0 3/17/2020 11:14 AM   Undermining Maxium Distance (cm) 0 3/17/2020 11:14 AM   Wound Assessment Dry; Intact; Black 3/17/2020 11:14 AM   Drainage Amount None 3/17/2020 11:14 AM   Odor None 3/17/2020 11:14 AM   Margins Attached edges 3/17/2020 11:14 AM   Luz-wound Assessment Blanchable erythema 3/17/2020 11:14 AM   Black%Wound Bed 100 3/17/2020 11:14 AM   Culture Taken No 3/17/2020 11:14 AM   Number of days: 0   L Heel:        Incision 01/02/20 Chest Lateral;Left;Lower (Active)   Number of days: 75       Incision 01/08/20 Back Lateral;Left (Active)   Number of days: 68       Incision 01/21/20 Sternum (Active)   Number of days: 56       Incision 01/21/20 Groin Left (Active)   Number of days: 56         Response to treatment:  Well tolerated by patient. Pain Assessment:  Severity:  0 / 10  Quality of pain: N/A  Wound Pain Timing/Severity: none  Premedicated: No    Plan   Plan of Care: Wound 03/16/20 Sacrum with wound vac in place-Dressing/Treatment: Vacuum dressing, Hydrocolloid  Wound 03/16/20 Heel Right-Dressing/Treatment: Betadine swabs  Wound 03/16/20 Heel Left unstageable with necrotic tissue-Dressing/Treatment: Betadine swabs   Recommendation: Sacrum - removed NPWT dressing; clean wound with NSS; pat dry; place hydrocolloid around the wound; frame the wound with the NPWT drape; track along R Hip; black foam to wound bed; cover with drape; attach to tracker pad; (2) black foam; Wound Care RN to change NPWT dressing on M, W, and F.     Bilateral Heels - clean with foamy cleanser, pat dry; paint with betadine daily  Heels floated off the bed with pillow or off loading boots. Call Wound Care for deterioration and/or leaking [phone]; pager [phone]    Specialty Bed Required :  Yes

## 2020-03-17 NOTE — ED PROVIDER NOTES
[facility]  ED  EMERGENCY DEPARTMENT ENCOUNTER        Pt Name: Joanne Lopez  MRN: [id number]  Rosangelagfnestor 1950  Date of evaluation: 3/16/2020  Provider: Sabrina Katz MD  PCP: No primary care provider on file. CHIEF COMPLAINT       Chief Complaint   Patient presents with    Fall     Pt states that he has fallen 3 times today. Pt had open heart surgery 1 mionth ago at this hospital, was sent to a SNF and was discharged today. HISTORY OFPRESENT ILLNESS   (Location/Symptom, Timing/Onset, Context/Setting, Quality, Duration, Modifying Factors,Severity)  Note limiting factors. Joanne Lopez is a 77 y.o. male presenting today due to concern for being discharged from a skilled nursing facility earlier today but once he got home having 3 episodes where he tried to get up with a walker but was unable to lift his body up and ultimately would fall onto the bed and is too weak to get up. He lives by himself. He denies any actual falls to the point where he landed on his buttocks or hit his head. He denies any headache, neck pain, chest pain, shortness of breath, abdominal pain, vomiting, diarrhea, extremity pain. He has chronic weakness to the left leg but no new changes today. He states he has had this for at least the last month with the left leg. He also has a decubitus ulcer that is being treated as an outpatient. 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 His main concern is unable to ambulate and unable to take care of himself at home so he returned to the emergency department for further evaluation. REVIEW OF SYSTEMS    (2-9 systems for level 4, 10 or more for level 5)     Review of Systems   Constitutional: Positive for activity change and fatigue. Negative for chills, diaphoresis and fever. HENT: Negative for congestion and sore throat.     Respiratory: Negative for cough and shortness of breath. Cardiovascular: Negative for chest pain. Gastrointestinal: Negative for abdominal pain, diarrhea, nausea and vomiting. Genitourinary: Negative for flank pain. Musculoskeletal: Positive for back pain (sacral only) and gait problem. Negative for neck pain. Skin: Positive for wound (chronic sacral). Neurological: Positive for weakness (generalized but left leg worse which has been present for one month per patient). Negative for syncope, light-headedness and headaches. Hematological: Bruises/bleeds easily. Psychiatric/Behavioral: Negative for confusion. Positives and Pertinent negatives as per HPI.       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 Jenn Santiago - redo w/23mm Magna Ease bioprosthetic valve    BRONCHOSCOPY N/A 1/9/2020    w/BAL, performed by Tasneem Chan MD at [address] [place] [address] N/A 1/24/2020    BRONCHOSCOPY ALVEOLAR LAVAGE performed by Fan Aguila MD at [address] [place] [address]  1/24/2020    BRONCHOSCOPY THERAPUTIC ASPIRATION INITIAL performed by Fan Aguila MD at [facility] CABG WITH AORTIC VALVE REPLACEMENT  2001    date approximate, x1 to OM, AVR w/porcine valve    CARDIAC CATHETERIZATION  01/13/2020    Dr. Ricardo Lara Left 01/02/2020    Dr. Danni Sow - 3200 ml blood drained in 1000 ml increments    COLONOSCOPY  02/10/2020    polyps    COLONOSCOPY N/A 2/10/2020 for sacral exam ). Constitutional:       General: He is awake. He is not in acute distress. Appearance: Normal appearance. He is well-developed, well-groomed and normal weight. He is not ill-appearing, toxic-appearing or diaphoretic. HENT:      Head: Normocephalic and atraumatic. Right Ear: External ear normal.      Left Ear: External ear normal.      Nose: Nose normal.      Mouth/Throat:      Mouth: Mucous membranes are dry. Pharynx: No oropharyngeal exudate or posterior oropharyngeal erythema. Eyes:      General: No scleral icterus. Right eye: No discharge. Left eye: No discharge. Pupils: Pupils are equal, round, and reactive to light. Neck:      Musculoskeletal: Full passive range of motion without pain, normal range of motion and neck supple. Normal range of motion. No edema, erythema, neck rigidity or muscular tenderness. Trachea: Trachea normal. No tracheal deviation. Cardiovascular:      Rate and Rhythm: Normal rate and regular rhythm. Pulses: Normal pulses. Heart sounds: Murmur present. Pulmonary:      Effort: Pulmonary effort is normal. No accessory muscle usage or respiratory distress. Breath sounds: Normal breath sounds. No stridor. No decreased breath sounds, wheezing, rhonchi or rales. Chest:      Chest wall: No tenderness. Abdominal:      General: Abdomen is flat. Bowel sounds are normal. There is no distension. Palpations: Abdomen is soft. Tenderness: There is no abdominal tenderness. There is no guarding or rebound. Musculoskeletal: Normal range of motion. General: No swelling, deformity or signs of injury. Cervical back: He exhibits normal range of motion, no tenderness and no bony tenderness. Thoracic back: He exhibits normal range of motion, no tenderness and no bony tenderness. Lumbar back: He exhibits tenderness. Back:       Right lower leg: No edema. Left lower leg: No edema. Comments: MSK: Normal range of motion of bilateral shoulders, elbows, wrists, hips, knees, ankles and nontender to palpation of all joints      Skin:     General: Skin is warm and dry. Coloration: Skin is not jaundiced or pale. Findings: No bruising, erythema, lesion or rash. Neurological:      General: No focal deficit present. Mental Status: He is alert and oriented to person, place, and time. Mental status is at baseline. GCS: GCS eye subscore is 4. GCS verbal subscore is 5. GCS motor subscore is 6. Cranial Nerves: No cranial nerve deficit, dysarthria or facial asymmetry. Sensory: Sensation is intact. No sensory deficit. Motor: Weakness (left leg 4+/5 with leg extension ) present. No tremor, atrophy, abnormal muscle tone, seizure activity or pronator drift. Psychiatric:         Mood and Affect: Mood normal.         Behavior: Behavior is cooperative.              DIAGNOSTIC RESULTS   :    Labs Reviewed   CBC WITH AUTO DIFFERENTIAL - Abnormal; Notable for the following components:       Result Value    RBC 3.62 (*)     Hemoglobin 9.9 (*)     Hematocrit 30.3 (*)     RDW 19.0 (*)     Lymphocytes Absolute 0.6 (*)     Bands Relative 14 (*)     nRBC 1 (*)     nRBC 1 (*)     Anisocytosis 1+ (*)     Polychromasia 1+ (*)     Ovalocytes Occasional (*)     Spherocytes Occasional (*)     Stomatocytes Occasional (*)     Target Cells Occasional (*)     All other components within normal limits    Narrative:     Performed at:  [place] [address] [employer]   Phone [phone]   COMPREHENSIVE METABOLIC PANEL - Abnormal; Notable for the following components:    Sodium 128 (*)     Chloride 90 (*)     CREATININE 0.6 (*)     ALT 9 (*)     All other components within normal limits    Narrative:     Performed at:  [place] [address] [employer]   Phone [phone]   TROPONIN - Abnormal; Notable for the following components:    Troponin 0.04 (*)     All other components within normal limits    Narrative:     Performed at:  [address], [facility] [employer]   Phone [phone] - Abnormal; Notable for the following components:    Pro-BNP 2,464 (*)     All other components within normal limits    Narrative:     Performed at:  [address], [address] [employer]   Phone [phone] - Abnormal; Notable for the following components:    Protime 15.2 (*)     INR 1.31 (*)     All other components within normal limits    Narrative:     Performed at:  [address], [facility] [employer]   Phone [phone]   CULTURE, BLOOD 1   CULTURE, BLOOD 2   LACTIC ACID, PLASMA    Narrative:     Performed at:  [address], [facility] [employer]   Phone [phone]   URINALYSIS       All other labs were within normal range or not returned asof this dictation. EKG: All EKG's are interpreted by the Emergency Department Physician who either signs or Co-signs this chart in the absence of a cardiologist.    The Ekg interpreted by me shows  normal sinus rhythm with a rate of 91  Axis is   Normal  QTc is  within an acceptable range  Intervals and Durations are unremarkable. ST Segments: no acute change and nonspecific changes  No significant change from prior EKG dated - 1/26/20  No STEMI,  RBBB from old EKG is still present today          RADIOLOGY:   Non-plain film images such as CT, Ultrasound and MRI are read by the radiologist. Annabelle Kruse images are visualized and preliminarily interpreted by the  ED Provider with the belowfindings:    No obvious sign of pneumonia. Interpreted by me.   Radiologist still has not read the x-ray but patient denies any chest pain, cough, shortness of breath. Interpretation per the Radiologist below, if available at the time of this note:    XR CHEST PORTABLE    (Results Pending)         PROCEDURES   Unless otherwise noted below, none     Procedures    CRITICAL CARE TIME   N/A    CONSULTS: Spoke with Dr. Feliciano Armas at 2148 for admission. IP CONSULT TO HOSPITALIST    EMERGENCY DEPARTMENT COURSE and DIFFERENTIAL DIAGNOSIS/MDM:   Vitals:    Vitals:    03/16/20 2008   BP: (!) 138/53   Pulse: 83   Resp: 16   Temp: 98.5 °F (36.9 °C)   TempSrc: Oral   SpO2: 95%   Weight: 176 lb (79.8 kg)   Height: 5' 9\" (1.753 m)       Patient was given the following medications:  Medications   0.9 % sodium chloride bolus (500 mLs Intravenous New Bag 3/16/20 2100)     Patient was evaluated due to concern for being unable to stand and subsequently falling back on the bed 3 times today after being discharged from a skilled nursing facility. He told me that he is been having increasing weakness for 2 days and was unsure why they discharged him in the first place. He did have a bandemia with a known decubitus ulcer and therefore there is concern for infection, but I will wait to start antibiotics once ID sees him since this is a chronic wound and no leukocytosis and fever at this time. He will need further evaluation in the hospital with infectious disease consultation along with wound care and physical and Occupational Therapy. He is unsafe to go home at this time. His INR is not therapeutic. He is stable for the floor with telemetry. The patient tolerated their visit well. The patient and / or the family were informed of the results of any tests, a time was given to answer questions. FINAL IMPRESSION      1. Frequent falls    2. Pressure injury of skin of buttock, unspecified injury stage, unspecified laterality    3. Hyponatremia    4. Left leg weakness    5. Elevated troponin    6.  Bandemia          DISPOSITION/PLAN   DISPOSITION  - decision to admit PATIENT REFERRED TO:  No follow-up provider specified.     DISCHARGEMEDICATIONS:  New Prescriptions    No medications on file       DISCONTINUED MEDICATIONS:  Discontinued Medications    No medications on file              (Please note that portions of this note were completed with a voicerecognition program.  Efforts were made to edit the dictations but occasionally words are mis-transcribed.)    Sabrina Katz MD (electronically signed)            Sabrina Katz MD  03/16/20 [id number]

## 2020-03-17 NOTE — PROGRESS NOTES
Hospitalist Progress Note      PCP: No primary care provider on file. Date of Admission: 3/16/2020    Chief Complaint: Weakness/Falls    Subjective: no new c/o. Medications:  Reviewed    Infusion Medications    sodium chloride 75 mL/hr at 03/17/20 0859    dextrose       Scheduled Medications    enoxaparin  1 mg/kg Subcutaneous BID    insulin lispro  0-12 Units Subcutaneous TID WC    insulin lispro  0-6 Units Subcutaneous Nightly    warfarin (COUMADIN) daily dosing (placeholder)   Other RX Placeholder    warfarin  5 mg Oral Once    pantoprazole  40 mg Oral QAM AC    meclizine  25 mg Oral Daily    levothyroxine  25 mcg Oral Daily    citalopram  10 mg Oral QAM    carvedilol  12.5 mg Oral BID WC    atorvastatin  20 mg Oral Nightly    allopurinol  300 mg Oral Daily    sodium chloride flush  10 mL Intravenous 2 times per day     PRN Meds: glucose, dextrose, glucagon (rDNA), dextrose, oxyCODONE, sodium chloride flush, acetaminophen, polyethylene glycol, ondansetron      Intake/Output Summary (Last 24 hours) at 3/17/2020 1104  Last data filed at 3/17/2020 0617  Gross per 24 hour   Intake --   Output 1100 ml   Net -1100 ml       Physical Exam Performed:    /65   Pulse 75   Temp 97.6 °F (36.4 °C) (Oral)   Resp 20   Ht 5' 9\" (1.753 m)   Wt 176 lb (79.8 kg)   SpO2 94%   BMI 25.99 kg/m²     General appearance: No apparent distress, appears stated age and cooperative. HEENT: Pupils equal, round, and reactive to light. Conjunctivae/corneas clear. Neck: Supple, with full range of motion. No jugular venous distention. Trachea midline. Respiratory:  Normal respiratory effort. Clear to auscultation, bilaterally without Rales/Wheezes/Rhonchi. Cardiovascular: Regular rate and rhythm with normal S1/S2 without murmurs, rubs or gallops. Abdomen: Soft, non-tender, non-distended with normal bowel sounds. Musculoskeletal: No clubbing, cyanosis or edema bilaterally.   Full range of motion without deformity. Skin: Skin color, texture, turgor normal.  No rashes or lesions. Neurologic:  Neurovascularly intact without any focal sensory/motor deficits. Cranial nerves: II-XII intact, grossly non-focal.  Psychiatric: Alert and oriented, thought content appropriate, normal insight  Capillary Refill: Brisk,< 3 seconds   Peripheral Pulses: +2 palpable, equal bilaterally       Labs:   Recent Labs     03/16/20 2027 03/17/20  0653   WBC 6.9 4.6   HGB 9.9* 8.9*   HCT 30.3* 27.0*    179     Recent Labs     03/16/20 2027 03/17/20  0653   * 132*   K 4.4 3.8   CL 90* 97*   CO2 27 26   BUN 14 11   CREATININE 0.6* <0.5*   CALCIUM 9.5 9.2     Recent Labs     03/16/20 2027   AST 32   ALT 9*   BILITOT 0.6   ALKPHOS 78     Recent Labs     03/16/20 2027 03/17/20  0653   INR 1.31* 1.33*     Recent Labs     03/16/20 2027   TROPONINI 0.04*       Urinalysis:      Lab Results   Component Value Date    NITRU Negative 03/16/2020    BLOODU Negative 03/16/2020    SPECGRAV 1.010 03/16/2020    GLUCOSEU Negative 03/16/2020       Consults:    IP CONSULT TO HOSPITALIST  IP CONSULT TO INFECTIOUS DISEASES  IP CONSULT TO PHARMACY  IP CONSULT TO CASE MANAGEMENT  IP CONSULT TO PHYSICAL MEDICINE REHAB      Assessment/Plan:    Active Hospital Problems    Diagnosis    [facility] [E11.9]    Atrial fibrillation ([facility] [I48.91]    Frequent falls [R29.6]    Sacral ulcer ([facility] [L98.429]    Elevated troponin [R79.89]    Hyponatremia [E87.1]    Subtherapeutic international normalized ratio (INR) [R79.1]    Generalized weakness [R53.1]    Hyperlipidemia [E78.5]    CAD (coronary artery disease) s/p stent in 2010 [I25.10]    Essential hypertension [I10]          Generalized weakness with frequent falls - Pt fell 3x at home today after being discharged from Rehab earlier in the day.  He reports chronic left leg weakness > right leg weakness Will ask PT/OT to evaluate and treat patient, and if necessary to provide recommendations for post hospital therapy     HTN/CAD - w/ known CAD s/p prior stent 2010 but no evidence of active signs/sxs of ischemia/failure. Currently controlled on home meds w/ vitals reviewed and documented as above. Troponin elevation - of unclear clinical significance w/ etiology clinically unable to determine, w/out signs/sxs of active ischemia. Follow serial troponins. Reviewed and documented as above. Chronic and has been drifting lower since peaking on 1/01/2020     HyperLipidemia - controlled on home Statin. Continue, w/ f/u and med adjustment w/ PCP    Afib - chronic paroxysmal of unspecified and clinically unable to determine etiology. Normally rate controlled on BBlocker - continued. Anticoagulated at baseline on home Coumadin - continued. Monitored on tele. LMWH bridge started. Follow INR, reviewed and documented as above. Actively adjusting Coumadin dosing as required.      HypoNatremia - etiology clinically unable to determine but likely hypovolemic. Follow serial labs on IVF. Reviewed and documented as above. Sacral ulcer ([facility] - will ask ID to evaluate for possible infection, though I have a lower clinical concern and will hold antibiotics for now. Will check a Procalcitonin      Anemia - etiology clinically unable to determine, w/out evidence of active bleeding/hemolysis. Asymptomatic w/out indication for transfusion. Follow serial labs. Reviewed and documented as above. DM2 - controlled on home oral antiGlycemics - held. Follow FSBS/SSI medium regimen. Last HbA1c 4.7% dated Jan 2020. Anticipate resuming/continuing home regimen at discharge.        DVT Prophylaxis: Coumadin/Tx dose LMWH  Diet: DIET CARB CONTROL;  Code Status: Full Code      PT/OT Eval Status: ordered and pending. Dispo - Placed in OBServation. Possible discharge Tues/Wed 17/18 march pending PT/OT eval and placement decision.      Ginny Angel MD

## 2020-03-18 LAB
ANION GAP SERPL CALCULATED.3IONS-SCNC: 9 MMOL/L (ref 3–16)
BASOPHILS ABSOLUTE: 0.1 K/UL (ref 0–0.2)
BASOPHILS RELATIVE PERCENT: 1.2 %
BUN BLDV-MCNC: 15 MG/DL (ref 7–20)
CALCIUM SERPL-MCNC: 8.9 MG/DL (ref 8.3–10.6)
CHLORIDE BLD-SCNC: 100 MMOL/L (ref 99–110)
CO2: 26 MMOL/L (ref 21–32)
CREAT SERPL-MCNC: 0.6 MG/DL (ref 0.8–1.3)
EOSINOPHILS ABSOLUTE: 0.1 K/UL (ref 0–0.6)
EOSINOPHILS RELATIVE PERCENT: 1.6 %
GFR AFRICAN AMERICAN: >60
GFR NON-AFRICAN AMERICAN: >60
GLUCOSE BLD-MCNC: 109 MG/DL (ref 70–99)
GLUCOSE BLD-MCNC: 124 MG/DL (ref 70–99)
GLUCOSE BLD-MCNC: 159 MG/DL (ref 70–99)
GLUCOSE BLD-MCNC: 183 MG/DL (ref 70–99)
GLUCOSE BLD-MCNC: 78 MG/DL (ref 70–99)
HCT VFR BLD CALC: 25.8 % (ref 40.5–52.5)
HEMOGLOBIN: 8.5 G/DL (ref 13.5–17.5)
INR BLD: 1.32 (ref 0.86–1.14)
LYMPHOCYTES ABSOLUTE: 1.3 K/UL (ref 1–5.1)
LYMPHOCYTES RELATIVE PERCENT: 27.2 %
MCH RBC QN AUTO: 27.7 PG (ref 26–34)
MCHC RBC AUTO-ENTMCNC: 33 G/DL (ref 31–36)
MCV RBC AUTO: 84 FL (ref 80–100)
MONOCYTES ABSOLUTE: 0.8 K/UL (ref 0–1.3)
MONOCYTES RELATIVE PERCENT: 16.3 %
NEUTROPHILS ABSOLUTE: 2.6 K/UL (ref 1.7–7.7)
NEUTROPHILS RELATIVE PERCENT: 53.7 %
PDW BLD-RTO: 18.7 % (ref 12.4–15.4)
PERFORMED ON: ABNORMAL
PLATELET # BLD: 177 K/UL (ref 135–450)
PMV BLD AUTO: 7.8 FL (ref 5–10.5)
POTASSIUM REFLEX MAGNESIUM: 3.6 MMOL/L (ref 3.5–5.1)
PROTHROMBIN TIME: 15.3 SEC (ref 10–13.2)
RBC # BLD: 3.08 M/UL (ref 4.2–5.9)
SODIUM BLD-SCNC: 135 MMOL/L (ref 136–145)
WBC # BLD: 4.8 K/UL (ref 4–11)

## 2020-03-18 PROCEDURE — 97606 NEG PRS WND THER DME>50 SQCM: CPT

## 2020-03-18 PROCEDURE — 2580000003 HC RX 258: Performed by: INTERNAL MEDICINE

## 2020-03-18 PROCEDURE — 80048 BASIC METABOLIC PNL TOTAL CA: CPT

## 2020-03-18 PROCEDURE — 85025 COMPLETE CBC W/AUTO DIFF WBC: CPT

## 2020-03-18 PROCEDURE — 6360000002 HC RX W HCPCS: Performed by: INTERNAL MEDICINE

## 2020-03-18 PROCEDURE — 97110 THERAPEUTIC EXERCISES: CPT

## 2020-03-18 PROCEDURE — 85610 PROTHROMBIN TIME: CPT

## 2020-03-18 PROCEDURE — 36415 COLL VENOUS BLD VENIPUNCTURE: CPT

## 2020-03-18 PROCEDURE — 6370000000 HC RX 637 (ALT 250 FOR IP): Performed by: INTERNAL MEDICINE

## 2020-03-18 PROCEDURE — 96372 THER/PROPH/DIAG INJ SC/IM: CPT

## 2020-03-18 PROCEDURE — 97116 GAIT TRAINING THERAPY: CPT

## 2020-03-18 PROCEDURE — G0378 HOSPITAL OBSERVATION PER HR: HCPCS

## 2020-03-18 RX ORDER — WARFARIN SODIUM 5 MG/1
5 TABLET ORAL
Status: COMPLETED | OUTPATIENT
Start: 2020-03-18 | End: 2020-03-18

## 2020-03-18 RX ORDER — SODIUM CHLORIDE 9 MG/ML
INJECTION, SOLUTION INTRAVENOUS CONTINUOUS
Status: DISCONTINUED | OUTPATIENT
Start: 2020-03-18 | End: 2020-03-19 | Stop reason: HOSPADM

## 2020-03-18 RX ORDER — SODIUM CHLORIDE 9 MG/ML
INJECTION, SOLUTION INTRAVENOUS
Status: DISPENSED
Start: 2020-03-18 | End: 2020-03-19

## 2020-03-18 RX ADMIN — ACETAMINOPHEN 650 MG: 325 TABLET ORAL at 14:36

## 2020-03-18 RX ADMIN — ALLOPURINOL 300 MG: 300 TABLET ORAL at 09:55

## 2020-03-18 RX ADMIN — ATORVASTATIN CALCIUM 20 MG: 10 TABLET, FILM COATED ORAL at 21:33

## 2020-03-18 RX ADMIN — SODIUM CHLORIDE 75 ML/HR: 9 INJECTION, SOLUTION INTRAVENOUS at 06:34

## 2020-03-18 RX ADMIN — INSULIN LISPRO 1 UNITS: 100 INJECTION, SOLUTION INTRAVENOUS; SUBCUTANEOUS at 21:34

## 2020-03-18 RX ADMIN — ENOXAPARIN SODIUM 80 MG: 80 INJECTION SUBCUTANEOUS at 21:33

## 2020-03-18 RX ADMIN — LEVOTHYROXINE SODIUM 25 MCG: 25 TABLET ORAL at 10:07

## 2020-03-18 RX ADMIN — PANTOPRAZOLE SODIUM 40 MG: 40 TABLET, DELAYED RELEASE ORAL at 10:08

## 2020-03-18 RX ADMIN — MECLIZINE 25 MG: 12.5 TABLET ORAL at 09:55

## 2020-03-18 RX ADMIN — INSULIN LISPRO 2 UNITS: 100 INJECTION, SOLUTION INTRAVENOUS; SUBCUTANEOUS at 12:22

## 2020-03-18 RX ADMIN — ENOXAPARIN SODIUM 80 MG: 80 INJECTION SUBCUTANEOUS at 09:55

## 2020-03-18 RX ADMIN — CITALOPRAM HYDROBROMIDE 10 MG: 20 TABLET ORAL at 09:55

## 2020-03-18 RX ADMIN — WARFARIN SODIUM 5 MG: 5 TABLET ORAL at 17:38

## 2020-03-18 RX ADMIN — OXYCODONE 5 MG: 5 TABLET ORAL at 17:43

## 2020-03-18 RX ADMIN — Medication 10 ML: at 21:39

## 2020-03-18 RX ADMIN — CARVEDILOL 12.5 MG: 6.25 TABLET, FILM COATED ORAL at 17:38

## 2020-03-18 ASSESSMENT — PAIN SCALES - GENERAL
PAINLEVEL_OUTOF10: 0
PAINLEVEL_OUTOF10: 6

## 2020-03-18 NOTE — PROGRESS NOTES
[facility] Wound Ostomy Continence Nurse  Follow-up Progress Note       NAME:  Shaun Santos  MEDICAL RECORD NUMBER:  [id number]  AGE:  77 y.o. GENDER:  male  :  1950  TODAY'S DATE:  3/18/2020    Subjective:  I feel fairly good today. Wound Identification:  Wound Type: Sacrum Stage 4 Pressure Injury debrided 2020 Dr. Rosalia Valentine prior admission; L Heel Unstageable Pressure Injury with dry, intact black eschar; R Heel Unstageable Pressure Injury with dry, intact brown eschar. Hel wounds not observed this visit. Contributing Factors: diabetes, chronic pressure, decreased mobility, shear force and obesity      Patient Goal of Care:  [x] Wound Healing  [] Odor Control   [] Palliative Care  [] Pain Control   [] Other:     Objective:  Lying on side in bed. BP (!) 149/71   Pulse 84   Temp 97.7 °F (36.5 °C) (Oral)   Resp 16   Ht 5' 9\" (1.753 m)   Wt 176 lb (79.8 kg)   SpO2 99%   BMI 25.99 kg/m²   Wes Risk Score: Wes Scale Score: 19  Assessment:  Wound bed with red granulation tissue No tunneling. Luz wound red and denuded at the distal edge of the wound bed. Measurements:  Negative Pressure Wound Therapy Coccyx Mid (Active)   Number of days: 35       Negative Pressure Wound Therapy Sacrum (Active)   $ Standard NPWT >50 sq cm PER TX $ Yes 3/18/2020  4:51 PM   Wound Type Pressure ulcer: Stage IV;Surgical 3/18/2020  4:51 PM   Unit Type [employer] # VFVR 3/18/2020  4:51 PM   Dressing Type Black foam 3/18/2020  4:51 PM   Number of pieces used 1 3/18/2020  4:51 PM   Cycle Intermittent 3/18/2020  4:51 PM   Target Pressure (mmHg) 125 3/18/2020  4:51 PM   Intensity 1 3/18/2020  4:51 PM   Irrigation Solution Normal Saline 3/18/2020  4:51 PM   Instillation Volume  32 mL 3/18/2020  4:51 PM   Soak Time  2 minutes 3/18/2020  4:51 PM   Vac Frequency 2 hours 3/18/2020  4:51 PM   Canister changed?  No 3/18/2020  4:51 PM   Dressing Status Changed 3/18/2020  4:51 PM   Dressing Changed Changed/New 3/18/2020  4:51 PM   Drainage Amount Small 3/18/2020  4:51 PM   Drainage Description Serosanguinous 3/18/2020  4:51 PM   Dressing Change Due 03/20/20 3/18/2020  4:51 PM   Wound Assessment Yellow;Pink 3/18/2020  4:51 PM   Luz-wound Assessment Blanchable erythema 3/18/2020  4:51 PM   Shape oval 3/18/2020  4:51 PM   Odor None 3/18/2020  4:51 PM   Number of days: 1       Wound 01/27/20 Buttocks Right;Mid;Left Sacrum, R, Mid & L buttocks. Evolved to Unstagable 2/1/20, I & D 2/12/20. (Active)   Number of days: 50       Wound 01/28/20 Heel Right (Active)   Number of days: 50       Wound 01/28/20 Heel Left (Active)   Number of days: 50       Wound 03/16/20 Sacrum with wound vac in place (Active)   Wound Image   3/17/2020 11:14 AM   Wound Pressure Stage  4 3/17/2020 11:14 AM   Dressing Status Changed 3/18/2020  4:51 PM   Dressing Changed Changed/New 3/18/2020  4:51 PM   Dressing/Treatment Barrier film;Hydrocolloid; Vacuum dressing 3/18/2020  4:51 PM   Wound Cleansed Rinsed/Irrigated with saline 3/18/2020  4:51 PM   Dressing Change Due 03/20/20 3/18/2020  4:51 PM   Wound Length (cm) 9 cm 3/17/2020 11:14 AM   Wound Width (cm) 2.5 cm 3/17/2020 11:14 AM   Wound Depth (cm) 3 cm 3/17/2020 11:14 AM   Wound Surface Area (cm^2) 22.5 cm^2 3/17/2020 11:14 AM   Wound Volume (cm^3) 67.5 cm^3 3/17/2020 11:14 AM   Post-Procedure Length (cm) 0 cm 3/17/2020 11:14 AM   Post-Procedure Width (cm) 0 cm 3/17/2020 11:14 AM   Post-Procedure Depth (cm) 0 cm 3/17/2020 11:14 AM   Post-Procedure Surface Area (cm^2) 0 cm^2 3/17/2020 11:14 AM   Post-Procedure Volume (cm^3) 0 cm^3 3/17/2020 11:14 AM   Distance Tunneling (cm) 0 cm 3/18/2020  4:51 PM   Tunneling Position ___ O'Clock 0 3/18/2020  4:51 PM   Undermining Starts ___ O'Clock 0 3/18/2020  4:51 PM   Undermining Ends___ O'Clock 0 3/18/2020  4:51 PM   Undermining Maxium Distance (cm) 0 3/18/2020  4:51 PM   Wound Assessment Red 3/18/2020  4:51 PM   Drainage Amount Small 3/18/2020  4:51 PM 03/16/20 Heel Left unstageable with necrotic tissue (Active)   Wound Image   3/17/2020 11:14 AM   Wound Pressure Unstageable 3/17/2020 11:14 AM   Dressing Status Other (Comment) 3/18/2020  9:30 AM   Dressing/Treatment Betadine swabs 3/18/2020  9:30 AM   Dressing Change Due 03/18/20 3/18/2020  9:30 AM   Wound Length (cm) 3 cm 3/17/2020 11:14 AM   Wound Width (cm) 3 cm 3/17/2020 11:14 AM   Wound Depth (cm) 0 cm 3/17/2020 11:14 AM   Wound Surface Area (cm^2) 9 cm^2 3/17/2020 11:14 AM   Wound Volume (cm^3) 0 cm^3 3/17/2020 11:14 AM   Post-Procedure Length (cm) 0 cm 3/17/2020 11:14 AM   Post-Procedure Width (cm) 0 cm 3/17/2020 11:14 AM   Post-Procedure Depth (cm) 0 cm 3/17/2020 11:14 AM   Post-Procedure Surface Area (cm^2) 0 cm^2 3/17/2020 11:14 AM   Post-Procedure Volume (cm^3) 0 cm^3 3/17/2020 11:14 AM   Distance Tunneling (cm) 0 cm 3/17/2020 11:14 AM   Tunneling Position ___ O'Clock 0 3/17/2020 11:14 AM   Undermining Starts ___ O'Clock 0 3/17/2020 11:14 AM   Undermining Ends___ O'Clock 0 3/17/2020 11:14 AM   Undermining Maxium Distance (cm) 0 3/17/2020 11:14 AM   Wound Assessment Dry; Intact; Black 3/18/2020  9:30 AM   Drainage Amount None 3/18/2020  9:30 AM   Odor None 3/18/2020  9:30 AM   Margins Attached edges 3/18/2020  9:30 AM   Luz-wound Assessment Blanchable erythema 3/18/2020  9:30 AM   Black%Wound Bed 100 3/18/2020  9:30 AM   Culture Taken No 3/17/2020 11:14 AM   Number of days: 1     Incision 01/02/20 Chest Lateral;Left;Lower (Active)   Number of days: 76       Incision 01/08/20 Back Lateral;Left (Active)   Number of days: 69       Incision 01/21/20 Sternum (Active)   Number of days: 57       Incision 01/21/20 Groin Left (Active)   Number of days: 57       Response to treatment:  Well tolerated by patient.      Pain Assessment:  Severity:  0 / 10  Quality of pain: N/A  Wound Pain Timing/Severity: none  Premedicated: No  Plan:   Plan of Care: Wound 03/16/20 Sacrum with wound vac in place-Dressing/Treatment: Barrier film, Hydrocolloid, Vacuum dressing  Wound 03/16/20 Heel Right-Dressing/Treatment: Betadine swabs  Wound 03/16/20 Heel Left unstageable with necrotic tissue-Dressing/Treatment: Betadine swabs     Spoke to Dr Oumar Ferguson earlier today. MD viewed photo and ordered veraflo to be added to negative pressure wound therapy treatment. Recommendation:   Sacrum - removed NPWT dressing; clean wound with NSS; pat dry; place hydrocolloid around the wound; frame the wound with the NPWT drape; track along R Hip; black foam to wound bed; cover with drape; attach to tracker pad; (1) black foam; Veriflo with normal saline instil 32 ml dwell x 2 minutes every 2 hours. Wound Care RN to change NPWT dressing on M, W, and F. Specialty Bed Required : Yes Sen tech stage IV mattress in place  [x] Low Air Loss   [x] Pressure Redistribution  [] Fluid Immersion  [] Bariatric  [] Total Pressure Relief  [] Other:     Current Diet: DIET CARB CONTROL; Dietician consult:  Yes    Discharge Plan:  Placement for patient upon discharge: intermediate care facility   Patient appropriate for Outpatient [address]: Yes follow up with Dr Oumar Ferguson    Referrals:  []  looking for placement, pre cert pending. [] [address] [facility]  [] Supplies  [] Other    Patient/Caregiver Teaching: Instructed on veraflo irrigating the wound bed every 2 hours.   Level of patient/caregiver understanding able to:   [x] Indicates understanding       [] Needs reinforcement  [] Unsuccessful      [] Verbal Understanding  [] Demonstrated understanding       [] No evidence of learning  [] Refused teaching         [] N/A       Electronically signed by Ron Batista RN, MSN, Parris Mario on 3/18/2020 at 5:01 PM

## 2020-03-18 NOTE — PROGRESS NOTES
Spoke with Dr Unique Parkinson. MD viewed photo taken yesterday. Order to start Veraflo negative pressure wound therapy dressing changes.

## 2020-03-18 NOTE — PROGRESS NOTES
without deformity. Skin: Skin color, texture, turgor normal.  No rashes or lesions. Neurologic:  Neurovascularly intact without any focal sensory/motor deficits. Cranial nerves: II-XII intact, grossly non-focal.  Psychiatric: Alert and oriented, thought content appropriate, normal insight  Capillary Refill: Brisk,< 3 seconds   Peripheral Pulses: +2 palpable, equal bilaterally       Labs:   Recent Labs     03/16/20 2027 03/17/20  0653 03/18/20  0506   WBC 6.9 4.6 4.8   HGB 9.9* 8.9* 8.5*   HCT 30.3* 27.0* 25.8*    179 177     Recent Labs     03/16/20 2027 03/17/20  0653 03/18/20  0506   * 132* 135*   K 4.4 3.8 3.6   CL 90* 97* 100   CO2 27 26 26   BUN 14 11 15   CREATININE 0.6* <0.5* 0.6*   CALCIUM 9.5 9.2 8.9     Recent Labs     03/16/20 2027   AST 32   ALT 9*   BILITOT 0.6   ALKPHOS 78     Recent Labs     03/16/20 2027 03/17/20  0653 03/18/20  0506   INR 1.31* 1.33* 1.32*     Recent Labs     03/16/20 2027   TROPONINI 0.04*       Urinalysis:      Lab Results   Component Value Date    NITRU Negative 03/16/2020    BLOODU Negative 03/16/2020    SPECGRAV 1.010 03/16/2020    GLUCOSEU Negative 03/16/2020       Consults:    IP CONSULT TO HOSPITALIST  IP CONSULT TO INFECTIOUS DISEASES  IP CONSULT TO PHARMACY  IP CONSULT TO CASE MANAGEMENT  IP CONSULT TO PHYSICAL MEDICINE REHAB      Assessment/Plan:    Active Hospital Problems    Diagnosis    DMII (diabetes mellitus, type 2) ([facility] [E11.9]    Atrial fibrillation ([facility] [I48.91]    Frequent falls [R29.6]    Sacral ulcer ([facility] [L98.429]    Elevated troponin [R79.89]    Hyponatremia [E87.1]    Subtherapeutic international normalized ratio (INR) [R79.1]    Generalized weakness [R53.1]    Hyperlipidemia [E78.5]    CAD (coronary artery disease) s/p stent in 2010 [I25.10]    Essential hypertension [I10]          Generalized weakness with frequent falls - Pt fell 3x at home today after being discharged from Rehab earlier in the day.  He reports chronic Code      PT/OT Eval Status: seen w/ recs for inpatient rehab. Dispo - Placed in OBServation. Possible discharge Wed 18 March pending placement decision - possible ARU pending pre-cert.      Chapin Greene MD

## 2020-03-18 NOTE — PROGRESS NOTES
1/21/2020); transesophageal echocardiogram (01/21/2020); Cardiac catheterization (01/13/2020); chest tube insertion (Left, 01/02/2020); Aortic valve replacement (12/27/2011); transesophageal echocardiogram (12/07/2010); Coronary artery bypass graft (2001); bronchoscopy (N/A, 1/24/2020); bronchoscopy (1/24/2020); sigmoidoscopy (N/A, 2/1/2020); Colonoscopy (02/10/2020); Colonoscopy (N/A, 2/10/2020); and Pilonidal cyst excision (N/A, 2/11/2020). Restrictions  Restrictions/Precautions  Restrictions/Precautions: General Precautions, Fall Risk  Position Activity Restriction  Other position/activity restrictions: Mobilize patient out of bed ASAP and a minimum of 3 times per day, up with assist, Right buttock wound vac, AVAYS video monitering  Subjective   General  Chart Reviewed: Yes  Response To Previous Treatment: Patient with no complaints from previous session. Family / Caregiver Present: No  Referring Practitioner: Devyn Grant MD  Subjective  Subjective: Pt agreeable to PT  General Comment  Comments: Pt supine in bed upon entry, RN cleared pt for therapy  Pain Screening  Patient Currently in Pain: Denies  Vital Signs  Patient Currently in Pain: Denies       Orientation  Orientation  Orientation Level: Disoriented to time;Oriented to person;Oriented to situation;Oriented to place  Cognition      Objective   Bed mobility  Supine to Sit: Stand by assistance  Transfers  Sit to Stand: Minimal Assistance  Stand to sit: Minimal Assistance  Ambulation  Ambulation?: Yes  Ambulation 1  Surface: level tile  Device: Rolling Walker  Assistance:  Moderate assistance;Minimal assistance  Quality of Gait: very unsteady with difficulty advancing RLE  Gait Deviations: Slow Beena;Decreased step length;Decreased step height  Distance: 6 ft  Comments: Pt demos moderately unsteady d/t BLE's buckling during short walk w/o LOB     Balance  Posture: Fair  Sitting - Static: Good  Sitting - Dynamic: Good  Standing - Static: Fair  Standing -

## 2020-03-18 NOTE — CARE COORDINATION
Received update from [facility]. Precert is now at Medical Director level, and should have determination shortly.    Electronically signed by Peyton Kahn RCP on 3/18/2020 at 11:18 AM

## 2020-03-19 ENCOUNTER — HOSPITAL ENCOUNTER (INPATIENT)
Age: 70
LOS: 12 days | Discharge: HOME HEALTH | DRG: 947 | End: 2020-03-31
Attending: PHYSICAL MEDICINE & REHABILITATION | Admitting: PHYSICAL MEDICINE & REHABILITATION
Payer: MEDICARE

## 2020-03-19 VITALS
OXYGEN SATURATION: 99 % | DIASTOLIC BLOOD PRESSURE: 60 MMHG | SYSTOLIC BLOOD PRESSURE: 134 MMHG | RESPIRATION RATE: 16 BRPM | WEIGHT: 176 LBS | TEMPERATURE: 97.8 F | HEART RATE: 83 BPM | HEIGHT: 69 IN | BODY MASS INDEX: 26.07 KG/M2

## 2020-03-19 PROBLEM — R53.81 DEBILITY: Status: ACTIVE | Noted: 2020-03-19

## 2020-03-19 LAB
ANION GAP SERPL CALCULATED.3IONS-SCNC: 10 MMOL/L (ref 3–16)
BASOPHILS ABSOLUTE: 0 K/UL (ref 0–0.2)
BASOPHILS RELATIVE PERCENT: 0.9 %
BUN BLDV-MCNC: 9 MG/DL (ref 7–20)
CALCIUM SERPL-MCNC: 8.8 MG/DL (ref 8.3–10.6)
CHLORIDE BLD-SCNC: 99 MMOL/L (ref 99–110)
CO2: 28 MMOL/L (ref 21–32)
CREAT SERPL-MCNC: <0.5 MG/DL (ref 0.8–1.3)
EOSINOPHILS ABSOLUTE: 0.1 K/UL (ref 0–0.6)
EOSINOPHILS RELATIVE PERCENT: 2.4 %
GFR AFRICAN AMERICAN: >60
GFR NON-AFRICAN AMERICAN: >60
GLUCOSE BLD-MCNC: 125 MG/DL (ref 70–99)
GLUCOSE BLD-MCNC: 126 MG/DL (ref 70–99)
GLUCOSE BLD-MCNC: 136 MG/DL (ref 70–99)
GLUCOSE BLD-MCNC: 83 MG/DL (ref 70–99)
GLUCOSE BLD-MCNC: 92 MG/DL (ref 70–99)
HCT VFR BLD CALC: 27.1 % (ref 40.5–52.5)
HEMOGLOBIN: 8.7 G/DL (ref 13.5–17.5)
INR BLD: 1.35 (ref 0.86–1.14)
LYMPHOCYTES ABSOLUTE: 1.4 K/UL (ref 1–5.1)
LYMPHOCYTES RELATIVE PERCENT: 31.8 %
MCH RBC QN AUTO: 27.4 PG (ref 26–34)
MCHC RBC AUTO-ENTMCNC: 32.2 G/DL (ref 31–36)
MCV RBC AUTO: 84.9 FL (ref 80–100)
MONOCYTES ABSOLUTE: 0.6 K/UL (ref 0–1.3)
MONOCYTES RELATIVE PERCENT: 13.6 %
NEUTROPHILS ABSOLUTE: 2.3 K/UL (ref 1.7–7.7)
NEUTROPHILS RELATIVE PERCENT: 51.3 %
PDW BLD-RTO: 18.7 % (ref 12.4–15.4)
PERFORMED ON: ABNORMAL
PERFORMED ON: NORMAL
PLATELET # BLD: 180 K/UL (ref 135–450)
PMV BLD AUTO: 7.4 FL (ref 5–10.5)
POTASSIUM REFLEX MAGNESIUM: 4 MMOL/L (ref 3.5–5.1)
PROTHROMBIN TIME: 15.7 SEC (ref 10–13.2)
RBC # BLD: 3.19 M/UL (ref 4.2–5.9)
SODIUM BLD-SCNC: 137 MMOL/L (ref 136–145)
WBC # BLD: 4.6 K/UL (ref 4–11)

## 2020-03-19 PROCEDURE — 96372 THER/PROPH/DIAG INJ SC/IM: CPT

## 2020-03-19 PROCEDURE — 6370000000 HC RX 637 (ALT 250 FOR IP): Performed by: INTERNAL MEDICINE

## 2020-03-19 PROCEDURE — 36415 COLL VENOUS BLD VENIPUNCTURE: CPT

## 2020-03-19 PROCEDURE — 80048 BASIC METABOLIC PNL TOTAL CA: CPT

## 2020-03-19 PROCEDURE — 6360000002 HC RX W HCPCS: Performed by: INTERNAL MEDICINE

## 2020-03-19 PROCEDURE — 97116 GAIT TRAINING THERAPY: CPT

## 2020-03-19 PROCEDURE — 6370000000 HC RX 637 (ALT 250 FOR IP): Performed by: PHYSICAL MEDICINE & REHABILITATION

## 2020-03-19 PROCEDURE — 97110 THERAPEUTIC EXERCISES: CPT

## 2020-03-19 PROCEDURE — 1280000000 HC REHAB R&B

## 2020-03-19 PROCEDURE — 85610 PROTHROMBIN TIME: CPT

## 2020-03-19 PROCEDURE — 6360000002 HC RX W HCPCS: Performed by: PHYSICAL MEDICINE & REHABILITATION

## 2020-03-19 PROCEDURE — 97530 THERAPEUTIC ACTIVITIES: CPT

## 2020-03-19 PROCEDURE — G0378 HOSPITAL OBSERVATION PER HR: HCPCS

## 2020-03-19 PROCEDURE — 85025 COMPLETE CBC W/AUTO DIFF WBC: CPT

## 2020-03-19 PROCEDURE — 2580000003 HC RX 258: Performed by: INTERNAL MEDICINE

## 2020-03-19 RX ORDER — ALLOPURINOL 300 MG/1
300 TABLET ORAL DAILY
Status: CANCELLED | OUTPATIENT
Start: 2020-03-20

## 2020-03-19 RX ORDER — ATORVASTATIN CALCIUM 10 MG/1
20 TABLET, FILM COATED ORAL NIGHTLY
Status: DISCONTINUED | OUTPATIENT
Start: 2020-03-19 | End: 2020-03-31 | Stop reason: HOSPADM

## 2020-03-19 RX ORDER — PANTOPRAZOLE SODIUM 40 MG/1
40 TABLET, DELAYED RELEASE ORAL
Status: CANCELLED | OUTPATIENT
Start: 2020-03-20

## 2020-03-19 RX ORDER — CITALOPRAM 20 MG/1
10 TABLET ORAL EVERY MORNING
Status: DISCONTINUED | OUTPATIENT
Start: 2020-03-20 | End: 2020-03-31 | Stop reason: HOSPADM

## 2020-03-19 RX ORDER — DEXTROSE MONOHYDRATE 50 MG/ML
100 INJECTION, SOLUTION INTRAVENOUS PRN
Status: CANCELLED | OUTPATIENT
Start: 2020-03-19

## 2020-03-19 RX ORDER — NICOTINE POLACRILEX 4 MG
15 LOZENGE BUCCAL PRN
Status: DISCONTINUED | OUTPATIENT
Start: 2020-03-19 | End: 2020-03-31 | Stop reason: HOSPADM

## 2020-03-19 RX ORDER — MECLIZINE HCL 12.5 MG/1
25 TABLET ORAL DAILY
Status: CANCELLED | OUTPATIENT
Start: 2020-03-20

## 2020-03-19 RX ORDER — WARFARIN SODIUM 2 MG/1
6 TABLET ORAL DAILY
Qty: 90 TABLET | Refills: 0 | Status: ON HOLD | OUTPATIENT
Start: 2020-03-19 | End: 2020-03-30 | Stop reason: HOSPADM

## 2020-03-19 RX ORDER — CARVEDILOL 6.25 MG/1
12.5 TABLET ORAL 2 TIMES DAILY WITH MEALS
Status: DISCONTINUED | OUTPATIENT
Start: 2020-03-20 | End: 2020-03-31 | Stop reason: HOSPADM

## 2020-03-19 RX ORDER — CITALOPRAM 20 MG/1
10 TABLET ORAL EVERY MORNING
Status: CANCELLED | OUTPATIENT
Start: 2020-03-20

## 2020-03-19 RX ORDER — OXYCODONE HYDROCHLORIDE 5 MG/1
5 TABLET ORAL EVERY 6 HOURS PRN
Status: CANCELLED | OUTPATIENT
Start: 2020-03-19

## 2020-03-19 RX ORDER — CARVEDILOL 6.25 MG/1
12.5 TABLET ORAL 2 TIMES DAILY WITH MEALS
Status: CANCELLED | OUTPATIENT
Start: 2020-03-19

## 2020-03-19 RX ORDER — DEXTROSE MONOHYDRATE 50 MG/ML
100 INJECTION, SOLUTION INTRAVENOUS PRN
Status: DISCONTINUED | OUTPATIENT
Start: 2020-03-19 | End: 2020-03-31 | Stop reason: HOSPADM

## 2020-03-19 RX ORDER — MECLIZINE HCL 12.5 MG/1
25 TABLET ORAL DAILY
Status: DISCONTINUED | OUTPATIENT
Start: 2020-03-20 | End: 2020-03-31 | Stop reason: HOSPADM

## 2020-03-19 RX ORDER — ALLOPURINOL 300 MG/1
300 TABLET ORAL DAILY
Status: DISCONTINUED | OUTPATIENT
Start: 2020-03-20 | End: 2020-03-31 | Stop reason: HOSPADM

## 2020-03-19 RX ORDER — PANTOPRAZOLE SODIUM 40 MG/1
40 TABLET, DELAYED RELEASE ORAL
Status: DISCONTINUED | OUTPATIENT
Start: 2020-03-20 | End: 2020-03-31 | Stop reason: HOSPADM

## 2020-03-19 RX ORDER — OXYCODONE HYDROCHLORIDE 5 MG/1
5 TABLET ORAL EVERY 6 HOURS PRN
Status: DISCONTINUED | OUTPATIENT
Start: 2020-03-19 | End: 2020-03-31 | Stop reason: HOSPADM

## 2020-03-19 RX ORDER — ACETAMINOPHEN 325 MG/1
650 TABLET ORAL EVERY 4 HOURS PRN
Status: DISCONTINUED | OUTPATIENT
Start: 2020-03-19 | End: 2020-03-31 | Stop reason: HOSPADM

## 2020-03-19 RX ORDER — ATORVASTATIN CALCIUM 10 MG/1
20 TABLET, FILM COATED ORAL NIGHTLY
Status: CANCELLED | OUTPATIENT
Start: 2020-03-19

## 2020-03-19 RX ORDER — DEXTROSE MONOHYDRATE 25 G/50ML
12.5 INJECTION, SOLUTION INTRAVENOUS PRN
Status: DISCONTINUED | OUTPATIENT
Start: 2020-03-19 | End: 2020-03-31 | Stop reason: HOSPADM

## 2020-03-19 RX ORDER — LEVOTHYROXINE SODIUM 0.03 MG/1
25 TABLET ORAL DAILY
Status: DISCONTINUED | OUTPATIENT
Start: 2020-03-20 | End: 2020-03-31 | Stop reason: HOSPADM

## 2020-03-19 RX ORDER — POLYETHYLENE GLYCOL 3350 17 G/17G
17 POWDER, FOR SOLUTION ORAL DAILY PRN
Status: CANCELLED | OUTPATIENT
Start: 2020-03-19

## 2020-03-19 RX ORDER — NICOTINE POLACRILEX 4 MG
15 LOZENGE BUCCAL PRN
Status: CANCELLED | OUTPATIENT
Start: 2020-03-19

## 2020-03-19 RX ORDER — LEVOTHYROXINE SODIUM 0.03 MG/1
25 TABLET ORAL DAILY
Status: CANCELLED | OUTPATIENT
Start: 2020-03-20

## 2020-03-19 RX ORDER — OXYCODONE HYDROCHLORIDE 5 MG/1
5 TABLET ORAL EVERY 6 HOURS PRN
Qty: 28 TABLET | Refills: 0 | Status: ON HOLD | OUTPATIENT
Start: 2020-03-19 | End: 2020-03-30

## 2020-03-19 RX ORDER — ACETAMINOPHEN 325 MG/1
650 TABLET ORAL EVERY 4 HOURS PRN
Status: CANCELLED | OUTPATIENT
Start: 2020-03-19

## 2020-03-19 RX ORDER — DEXTROSE MONOHYDRATE 25 G/50ML
12.5 INJECTION, SOLUTION INTRAVENOUS PRN
Status: CANCELLED | OUTPATIENT
Start: 2020-03-19

## 2020-03-19 RX ORDER — POLYETHYLENE GLYCOL 3350 17 G/17G
17 POWDER, FOR SOLUTION ORAL DAILY PRN
Status: DISCONTINUED | OUTPATIENT
Start: 2020-03-19 | End: 2020-03-31 | Stop reason: HOSPADM

## 2020-03-19 RX ADMIN — ENOXAPARIN SODIUM 80 MG: 80 INJECTION SUBCUTANEOUS at 09:28

## 2020-03-19 RX ADMIN — OXYCODONE 5 MG: 5 TABLET ORAL at 09:29

## 2020-03-19 RX ADMIN — WARFARIN SODIUM 6 MG: 5 TABLET ORAL at 18:03

## 2020-03-19 RX ADMIN — CARVEDILOL 12.5 MG: 6.25 TABLET, FILM COATED ORAL at 09:29

## 2020-03-19 RX ADMIN — Medication 10 ML: at 09:28

## 2020-03-19 RX ADMIN — CARVEDILOL 12.5 MG: 6.25 TABLET, FILM COATED ORAL at 18:03

## 2020-03-19 RX ADMIN — MECLIZINE 25 MG: 12.5 TABLET ORAL at 09:28

## 2020-03-19 RX ADMIN — LEVOTHYROXINE SODIUM 25 MCG: 25 TABLET ORAL at 06:14

## 2020-03-19 RX ADMIN — PANTOPRAZOLE SODIUM 40 MG: 40 TABLET, DELAYED RELEASE ORAL at 06:14

## 2020-03-19 RX ADMIN — ENOXAPARIN SODIUM 80 MG: 80 INJECTION SUBCUTANEOUS at 21:37

## 2020-03-19 RX ADMIN — WARFARIN SODIUM 6 MG: 5 TABLET ORAL at 21:37

## 2020-03-19 RX ADMIN — CITALOPRAM HYDROBROMIDE 10 MG: 20 TABLET ORAL at 09:29

## 2020-03-19 RX ADMIN — ALLOPURINOL 300 MG: 300 TABLET ORAL at 09:29

## 2020-03-19 RX ADMIN — ATORVASTATIN CALCIUM 20 MG: 10 TABLET, FILM COATED ORAL at 21:37

## 2020-03-19 ASSESSMENT — PAIN SCALES - WONG BAKER
WONGBAKER_NUMERICALRESPONSE: 4
WONGBAKER_NUMERICALRESPONSE: 4

## 2020-03-19 ASSESSMENT — PAIN DESCRIPTION - ONSET: ONSET: ON-GOING

## 2020-03-19 ASSESSMENT — PAIN DESCRIPTION - PAIN TYPE
TYPE: ACUTE PAIN
TYPE: ACUTE PAIN

## 2020-03-19 ASSESSMENT — PAIN DESCRIPTION - LOCATION
LOCATION: BUTTOCKS
LOCATION: BUTTOCKS

## 2020-03-19 ASSESSMENT — PAIN DESCRIPTION - PROGRESSION: CLINICAL_PROGRESSION: NOT CHANGED

## 2020-03-19 ASSESSMENT — PAIN SCALES - GENERAL
PAINLEVEL_OUTOF10: 7
PAINLEVEL_OUTOF10: 5
PAINLEVEL_OUTOF10: 0
PAINLEVEL_OUTOF10: 3

## 2020-03-19 ASSESSMENT — PAIN - FUNCTIONAL ASSESSMENT: PAIN_FUNCTIONAL_ASSESSMENT: ACTIVITIES ARE NOT PREVENTED

## 2020-03-19 ASSESSMENT — PAIN DESCRIPTION - FREQUENCY: FREQUENCY: CONTINUOUS

## 2020-03-19 ASSESSMENT — PAIN DESCRIPTION - DESCRIPTORS: DESCRIPTORS: ACHING;BURNING

## 2020-03-19 NOTE — PROGRESS NOTES
Pharmacy to Dose Warfarin    Dx: Atrial Fibrillation   Goal INR range 2-3  Home Warfarin dose: 5 mg daily? (patient unsure)    Date  INR  Warfarin  3/17                1.31                   5 mg  3/18                1.32                   5 mg  3/19                1.35                   6 mg    Recommend Warfarin 6 mg tonight x1. Daily INR ordered. Rx will continue to manage therapy per consult order.   Pankaj Fung, PharmD 3/19/2020  9:05 AM

## 2020-03-19 NOTE — PROGRESS NOTES
CAD s/p prior stent 2010 but no evidence of active signs/sxs of ischemia/failure. Currently controlled on home meds w/ vitals reviewed and documented as above. Troponin elevation - of unclear clinical significance w/ etiology clinically unable to determine, w/out signs/sxs of active ischemia. Followed serial troponins. Reviewed and documented as above. Chronic and has been drifting lower since peaking on 1/01/2020     HyperLipidemia - controlled on home Statin. Continue, w/ f/u and med adjustment w/ PCP    Afib - chronic paroxysmal of unspecified and clinically unable to determine etiology. Normally rate controlled on BBlocker - continued. Anticoagulated at baseline on home Coumadin - continued. Monitored on tele. LMWH bridge started. Follow INR, reviewed and documented as above. Actively adjusting Coumadin dosing as required. S/P AVR redo Jan 2020.     HypoNatremia - etiology clinically unable to determine but likely hypovolemic. Follow serial labs on IVF. Reviewed and documented as above. Sacral ulcer ([address] - Stage 4. Wound care and Infectious disease consulted and appreciated - currently off ABX specifically for same. Wound vac reapplied. Discussed face to face w/ General Surgery 18 March who has seen wound care photos w/out need for surgical intervention       Anemia - etiology clinically unable to determine, w/out evidence of active bleeding/hemolysis. Asymptomatic w/out indication for transfusion. Follow serial labs. Reviewed and documented as above. DM2 - controlled on home oral antiGlycemics - held. Follow FSBS/SSI medium regimen. Last HbA1c 4.7% dated Jan 2020. Anticipate resuming/continuing home regimen at discharge.        DVT Prophylaxis: Coumadin/Tx dose LMWH  Diet: DIET CARB CONTROL;  Code Status: Full Code      PT/OT Eval Status: seen w/ recs for SNF. Dispo - Placed in OBServation. Possible discharge Thurs 19 March pending placement decision.      Dacia Ceja MD

## 2020-03-19 NOTE — CARE COORDINATION
Per Analy Laws at [facility], referral that was faxed via [employer] was not received. Referral refaxed per request to [phone]. Awaiting return call after review.      Wyatt Augustin RN

## 2020-03-19 NOTE — PROGRESS NOTES
Physical Therapy  Facility/Department: [facility] C5 - MED SURG/ORTHO  Daily Treatment Note  NAME: Vasile Salazar  : 1950  MRN: [id number]    Date of Service: 3/19/2020    Discharge Recommendations:  Subacute/Skilled Nursing Facility        Assessment   Body structures, Functions, Activity limitations: Decreased functional mobility ; Decreased cognition;Decreased endurance;Decreased ROM; Decreased strength;Decreased balance  Assessment: Pt demos generalized weakness with accompanying low back pain and decreased LE stability with gait. Pt level of assist varies from CGA to mod A with use of RW for functional transfers and ambulation up to 70 ft. Pt tolerated BLE ther ex with minimal rest breaks. Pt would benefit from skilled acute PT to address deficits. Recommend SNF at [place] from acute setting. Treatment Diagnosis: decreased mobility  Prognosis: Good  Decision Making: Medium Complexity  Barriers to Learning: slow processing  REQUIRES PT FOLLOW UP: Yes  Activity Tolerance  Activity Tolerance: Patient Tolerated treatment well;Patient limited by endurance     Patient Diagnosis(es): The primary encounter diagnosis was Frequent falls. Diagnoses of Pressure injury of skin of buttock, unspecified injury stage, unspecified laterality, Hyponatremia, Left leg weakness, Elevated troponin, and Bandemia were also pertinent to this visit. has a past medical history of CAD (coronary artery disease), COPD (chronic obstructive pulmonary disease) ([facility] [address] Diabetes mellitus ([facility] [address] Gout, Hemothorax, left, Hyperlipidemia, Hypertension, Obesity, Class I, BMI 30.0-34.9 (see actual BMI), S/P thoracotomy, Status post partial removal of lung, and Thyroid disease. has a past surgical history that includes thoracotomy (Left, 2020); bronchoscopy (N/A, 2020); Coronary angioplasty with stent (2010); pr ascend aorta graft w root replacment. valve conduit/coron reconstr (N/A, 2020); transesophageal echocardiogram (2020); Cardiac catheterization (01/13/2020); chest tube insertion (Left, 01/02/2020); Aortic valve replacement (12/27/2011); transesophageal echocardiogram (12/07/2010); Coronary artery bypass graft (2001); bronchoscopy (N/A, 1/24/2020); bronchoscopy (1/24/2020); sigmoidoscopy (N/A, 2/1/2020); Colonoscopy (02/10/2020); Colonoscopy (N/A, 2/10/2020); and Pilonidal cyst excision (N/A, 2/11/2020). Restrictions  Restrictions/Precautions  Restrictions/Precautions: General Precautions, Fall Risk  Position Activity Restriction  Other position/activity restrictions: Mobilize patient out of bed ASAP and a minimum of 3 times per day, up with assist, Right buttock wound vac, Ava[employer] video monitering  Subjective   General  Chart Reviewed: Yes  Response To Previous Treatment: Patient with no complaints from previous session. Family / Caregiver Present: No  Referring Practitioner: Logan Llanos MD  Subjective  Subjective: Pt agreeable to PT  General Comment  Comments: Pt supine in bed upon entry, RN cleared pt for therapy  Pain Screening  Patient Currently in Pain: Yes  Pain Assessment  Pain Assessment: Faces  Holman-Baker Pain Rating: Hurts little more  Non-Pharmaceutical Pain Intervention(s): Ambulation/Increased Activity;Repositioned  Vital Signs  Patient Currently in Pain: Yes       Orientation  Orientation  Overall Orientation Status: Within Functional Limits  Cognition      Objective   Bed mobility  Supine to Sit: Stand by assistance(HOB elev)  Transfers  Sit to Stand: Contact guard assistance;Minimal Assistance  Stand to sit: Moderate Assistance(demos poor eccentric control)  Ambulation  Ambulation?: Yes  Ambulation 1  Surface: level tile  Device: Rolling Walker  Assistance:  Moderate assistance  Quality of Gait: mod unsteady with assist advancing RLE proper distance, Small base of support, near crossing of feet with turns  Gait Deviations: Slow Beena;Deviated path;Shuffles  Distance: 70 ft, 40 ft  Comments: no LE buckling during Balance  Posture: Fair  Sitting - Static: Good  Sitting - Dynamic: Good  Standing - Static: Fair  Standing - Dynamic: Poor;+(w/ RW)  Exercises  Hip Flexion: x 15 B sitting marches, standing in rw marches x 10 w/ CGA x1 for stability  Hip Abduction: x 15 B clamshells  Knee Long Arc Quad: x 15 B  Ankle Pumps: x 15 B; standing HR x15  Other exercises  Other exercises?: Yes  Other exercises 1: standing semi squats x 10   AM-PAC Score     AM-PAC Inpatient Mobility without Stair Climbing Raw Score : 14 (03/19/20 1308)  AM-PAC Inpatient without Stair Climbing T-Scale Score : 40.85 (03/19/20 1308)  Mobility Inpatient CMS 0-100% Score: 53.33 (03/19/20 1308)  Mobility Inpatient without Stair CMS G-Code Modifier : CK (03/19/20 1308)       Goals  Short term goals  Time Frame for Short term goals: 3/24/20 unless noted  Short term goal 1: Pt will perform bed mobility with Pako x1 by 3/23/20 -- Met SBA 3/19  Short term goal 2: Pt will perform transfers with CGA -- CGA to Min Ax1 3/19  Short term goal 3: Pt will ambulate 20 ft with min A -- Mod x1 6 ft 3/19  Patient Goals   Patient goals : \"to get stronger\"    Plan    Plan  Times per week: 3-5  Current Treatment Recommendations: Strengthening, Gait Training, ROM, Balance Training, Functional Mobility Training, Endurance Training, Transfer Training  Safety Devices  Type of devices:  All fall risk precautions in place, Left in chair, Call light within reach, Chair alarm in place, Nurse notified, Gait belt, Patient at risk for falls     Therapy Time   Individual Concurrent Group Co-treatment   Time In 1010         Time Out 1033         Minutes 23         Timed Code Treatment Minutes: [address]

## 2020-03-19 NOTE — PROGRESS NOTES
Occupational Therapy  Facility/Department: [facility] C5 - MED SURG/ORTHO  Daily Treatment Note  NAME: Mauri Llanos  : 1950  MRN: [id number]    Date of Service: 3/19/2020    Discharge Recommendations:  Subacute/Skilled Nursing Facility     Assessment   Performance deficits / Impairments: Decreased functional mobility ; Decreased balance;Decreased ADL status; Decreased high-level IADLs;Decreased strength;Decreased safe awareness;Decreased cognition;Decreased endurance  Assessment: Pt pleasant and motivated to participate in OT treatment. Pt level of assist varies from CGA-mod A with use of RW for functional transfers and mobility. Pt tolerated BUE ther ex with minimal rest breaks. Pt declined all ADLs this session, pt would benefit from continued skilled OT in SNF setting at d/c. Prognosis: Good  OT Education: OT Role;Plan of Care;ADL Adaptive Strategies;Transfer Training;Equipment;Precautions  REQUIRES OT FOLLOW UP: Yes  Activity Tolerance  Activity Tolerance: Patient Tolerated treatment well  Safety Devices  Safety Devices in place: Yes  Type of devices: Left in chair;Chair alarm in place;Call light within reach;Nurse notified;Gait belt         Patient Diagnosis(es): The primary encounter diagnosis was Frequent falls. Diagnoses of Pressure injury of skin of buttock, unspecified injury stage, unspecified laterality, Hyponatremia, Left leg weakness, Elevated troponin, and Bandemia were also pertinent to this visit. has a past medical history of CAD (coronary artery disease), COPD (chronic obstructive pulmonary disease) ([facility] [address] Diabetes mellitus ([facility] [address] Gout, Hemothorax, left, Hyperlipidemia, Hypertension, Obesity, Class I, BMI 30.0-34.9 (see actual BMI), S/P thoracotomy, Status post partial removal of lung, and Thyroid disease. has a past surgical history that includes thoracotomy (Left, 2020); bronchoscopy (N/A, 2020);  Coronary angioplasty with stent (2010); pr ascend aorta graft w root replacment. valve conduit/coron reconstr (N/A, 1/21/2020); transesophageal echocardiogram (01/21/2020); Cardiac catheterization (01/13/2020); chest tube insertion (Left, 01/02/2020); Aortic valve replacement (12/27/2011); transesophageal echocardiogram (12/07/2010); Coronary artery bypass graft (2001); bronchoscopy (N/A, 1/24/2020); bronchoscopy (1/24/2020); sigmoidoscopy (N/A, 2/1/2020); Colonoscopy (02/10/2020); Colonoscopy (N/A, 2/10/2020); and Pilonidal cyst excision (N/A, 2/11/2020). Restrictions  Restrictions/Precautions  Restrictions/Precautions: General Precautions, Fall Risk  Position Activity Restriction  Other position/activity restrictions: Mobilize patient out of bed ASAP and a minimum of 3 times per day, up with assist, Right buttock wound vac, AvaSyS video monitering     Subjective   General  Chart Reviewed: Yes  Patient assessed for rehabilitation services?: Yes  Additional Pertinent Hx: Severe aortic valve stenosis, s/p Redo sternotomy 1-08-20  Response to previous treatment: Patient with no complaints from previous session  Family / Caregiver Present: No  Referring Practitioner: Dr. Jan Mcbride  Diagnosis: generalized weakness    Subjective  Subjective: Pt resting in bed, pleasant and agreeable to OT treatment. Pain Assessment  Pain Assessment: Faces  Holman-Baker Pain Rating: Hurts little more  Pain Type: Acute pain  Pain Location: Buttocks  Non-Pharmaceutical Pain Intervention(s): Repositioned; Ambulation/Increased Activity(cushion in chair)  Pre Treatment Pain Screening  Intervention List: Patient able to continue with treatment  Vital Signs  Patient Currently in Pain: Yes     Orientation  Orientation  Overall Orientation Status: Within Functional Limits     Objective    ADL  Additional Comments: Pt declines need for ADLs. Balance  Sitting Balance: Supervision  Standing Balance:  Moderate assistance(varies from CGA static to mod A dynamic with RW)  Standing Balance  Activity: functional mobility trials with RW    Functional Mobility  Functional - Mobility Device: Rolling Walker  Activity: Other  Assist Level: Moderate assistance    Bed mobility  Supine to Sit: Stand by assistance(to L with HOB elevated)     Transfers  Sit to stand: Minimal assistance  Stand to sit: Minimal assistance     Cognition  Overall Cognitive Status: Exceptions  Following Commands:  Follows one step commands with repetition  Safety Judgement: Decreased awareness of need for assistance;Decreased awareness of need for safety  Problem Solving: Assistance required to identify errors made;Assistance required to generate solutions;Assistance required to implement solutions  Insights: Decreased awareness of deficits     Type of ROM/Therapeutic Exercise  Type of ROM/Therapeutic Exercise: AROM  Comment: seated in chair   Exercises  Shoulder Flexion: 15x to 90*  Shoulder ABduction: 15x  Shoulder ADduction: 15x  Horizontal ABduction: 15x  Horizontal ADduction: 15x  Elbow Flexion: 15x  Elbow Extension: 15x  Supination: 15x  Pronation: 15x  Wrist Flexion: 15x  Wrist Extension: 15x  Finger Flexion: 15x  Finger Extension: 15x     Plan   Plan  Times per week: 3-5x/ week   Current Treatment Recommendations: Strengthening, Balance Training, Safety Education & Training, Self-Care / ADL, Functional Mobility Training, Endurance Training, Positioning    AM-PAC Score  -[facility] Inpatient Daily Activity Raw Score: 17 (03/19/20 1103)  AM-PAC Inpatient ADL T-Scale Score : 37.26 (03/19/20 1103)  ADL Inpatient CMS 0-100% Score: 50.11 (03/19/20 1103)  ADL Inpatient CMS G-Code Modifier : CK (03/19/20 1103)    Goals  Short term goals  Time Frame for Short term goals: 1 week (3-24-20)  Short term goal 1: CGA with functional/toilet transfers from w/c level (ongoin 3/19/20)  Short term goal 2: CGA static standing ADL's for 2 minutes by 3-24-20 (ongoing 3/19/20)  Short term goal 3: set up for UE self care by 3-20-20 (ongoing 3/19/20)  Short term goal 4:

## 2020-03-19 NOTE — PROGRESS NOTES
History:   Procedure Laterality Date    AORTIC VALVE REPLACEMENT  12/27/2011    Dr. Donavan Guardado - redo w/23mm Magna Ease bioprosthetic valve    BRONCHOSCOPY N/A 1/9/2020    w/BAL, performed by Yuliana Martinez MD at [address] [place] [address] N/A 1/24/2020    BRONCHOSCOPY ALVEOLAR LAVAGE performed by Gera Hameed MD at [address] [place] [address]  1/24/2020    BRONCHOSCOPY THERAPUTIC ASPIRATION INITIAL performed by Gera Hameed MD at [facility] CABG WITH AORTIC VALVE REPLACEMENT  2001    date approximate, x1 to OM, AVR w/porcine valve    CARDIAC CATHETERIZATION  01/13/2020    Dr. Sherlyn Fraser Left 01/02/2020    Dr. Jimmy Hardwick - 3200 ml blood drained in 1000 ml increments    COLONOSCOPY  02/10/2020    polyps    COLONOSCOPY N/A 2/10/2020    COLONOSCOPY POLYPECTOMY SNARE/COLD BIOPSY performed by Ese Panchal DO at [address] [address]  12/07/2010    Dr. Bullard Ours. Roger - NURIS- 4.0 x 28 to Cx    PILONIDAL CYST EXCISION N/A 2/11/2020    DEBRIDEMENT OF SACRAL WOUND performed by Jaya Ardon MD at [facility]. VALVE CONDUIT/CORON RECONSTR N/A 1/21/2020    Dr. 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 Faith Garcia - emergent w/evacuation of hematoma & chest wall hemostasis, pleural biopsy & XENA wedge resection    TRANSESOPHAGEAL ECHOCARDIOGRAM  01/21/2020    during ascending aorta replacement w/redo AVR    TRANSESOPHAGEAL ECHOCARDIOGRAM  12/07/2010       History reviewed. No pertinent family history.     Social History     Socioeconomic History    Marital status: Single     Spouse name: None    Number of children: None    Years of education: None  Highest education level: None   Occupational History    None   Social Needs    Financial resource strain: None    Food insecurity     Worry: None     Inability: None    Transportation needs     Medical: None     Non-medical: None   Tobacco Use    Smoking status: Former Smoker     Packs/day: 2.00     Types: Cigarettes     Last attempt to quit: 2020     Years since quittin.1    Smokeless tobacco: Current User   Substance and Sexual Activity    Alcohol use:  Yes     Alcohol/week: 6.0 standard drinks     Types: 6 Cans of beer per week     Frequency: 4 or more times a week     Drinks per session: 5 or 6     Binge frequency: Daily or almost daily    Drug use: Not Currently    Sexual activity: None   Lifestyle    Physical activity     Days per week: None     Minutes per session: None    Stress: None   Relationships    Social connections     Talks on phone: None     Gets together: None     Attends [employer] service: None     Active member of club or organization: None     Attends meetings of clubs or organizations: None     Relationship status: None    Intimate partner violence     Fear of current or ex partner: None     Emotionally abused: None     Physically abused: None     Forced sexual activity: None   Other Topics Concern    None   Social History Narrative    None           REVIEW OF SYSTEMS:   CONSTITUTIONAL: negative for fevers, chills, diaphoresis, appetite change, night sweats, unexpected weight change, fatigue  EYES: negative for blurred vision, eye discharge, visual disturbance and icterus  HEENT: negative for hearing loss, tinnitus, ear drainage, sinus pressure, nasal congestion, epistaxis and snoring  RESPIRATORY: Negative for hemoptysis, cough, sputum production  CARDIOVASCULAR: negative for chest pain, palpitations, exertional chest pressure/discomfort, syncope, edema  GASTROINTESTINAL: negative for nausea, vomiting, diarrhea, blood in stool, abdominal pain, decrease in sensation at the toes. Motor examination reveals 3/5strength in bilateral LE with 4/5 strength in bilateral UE. Reflexes normal and symmetric. Psych: Stable mood, normal judgement, normal affect     Lab Results   Component Value Date    WBC 4.6 03/19/2020    HGB 8.7 (L) 03/19/2020    HCT 27.1 (L) 03/19/2020    MCV 84.9 03/19/2020     03/19/2020     Lab Results   Component Value Date    INR 1.35 (H) 03/19/2020    INR 1.32 (H) 03/18/2020    INR 1.33 (H) 03/17/2020    PROTIME 15.7 (H) 03/19/2020    PROTIME 15.3 (H) 03/18/2020    PROTIME 15.5 (H) 03/17/2020     Lab Results   Component Value Date    CREATININE <0.5 (L) 03/19/2020    BUN 9 03/19/2020     03/19/2020    K 4.0 03/19/2020    CL 99 03/19/2020    CO2 28 03/19/2020     Lab Results   Component Value Date    ALT 9 (L) 03/16/2020    AST 32 03/16/2020    ALKPHOS 78 03/16/2020    BILITOT 0.6 03/16/2020         XR CHEST PORTABLE   Final Result   Pulmonary vascular congestion. Otherwise no acute abnormality identified. Assessment:  1. Debility- Frequent falls at home with chronic leg weakness post operation in January- unable to ambulate without significant assist. PT/OT. 2. HTN/CAD- s/p stent placement in 2010. Controlled on home meds. 3. HLD- continue home statin   4. Afib- Chronic Paroxysmal afib- Lovenox to warfarin bridge- Follow INR  5. Sacral ulcer- Stage 4- wound care consulted- Wound vac as needed   6. DM2- controlled with oral medications at home- continue current SSI while in hospital  7. Anemia- Continue Lab draws MWF     Impairments- Decreased functional mobility, Decreased ADLs    Recommendations:  ARU admit    Patient with new functional deficits and ongoing medical complexity. Demonstrates ability to tolerate 3 hours therapy/day. He is a good candidate for acute inpatient rehab when medically appropriate. Thank you for this consult. Please contact me with any questions or concerns.      Hipolito Jackson D.O.

## 2020-03-20 LAB
ANION GAP SERPL CALCULATED.3IONS-SCNC: 9 MMOL/L (ref 3–16)
BASOPHILS ABSOLUTE: 0 K/UL (ref 0–0.2)
BASOPHILS RELATIVE PERCENT: 0.8 %
BLOOD CULTURE, ROUTINE: NORMAL
BUN BLDV-MCNC: 6 MG/DL (ref 7–20)
CALCIUM SERPL-MCNC: 8.9 MG/DL (ref 8.3–10.6)
CHLORIDE BLD-SCNC: 99 MMOL/L (ref 99–110)
CO2: 28 MMOL/L (ref 21–32)
CREAT SERPL-MCNC: <0.5 MG/DL (ref 0.8–1.3)
CULTURE, BLOOD 2: NORMAL
EOSINOPHILS ABSOLUTE: 0.1 K/UL (ref 0–0.6)
EOSINOPHILS RELATIVE PERCENT: 2.4 %
GFR AFRICAN AMERICAN: >60
GFR NON-AFRICAN AMERICAN: >60
GLUCOSE BLD-MCNC: 121 MG/DL (ref 70–99)
GLUCOSE BLD-MCNC: 152 MG/DL (ref 70–99)
GLUCOSE BLD-MCNC: 164 MG/DL (ref 70–99)
GLUCOSE BLD-MCNC: 84 MG/DL (ref 70–99)
GLUCOSE BLD-MCNC: 88 MG/DL (ref 70–99)
HCT VFR BLD CALC: 26.2 % (ref 40.5–52.5)
HEMOGLOBIN: 8.7 G/DL (ref 13.5–17.5)
INR BLD: 1.99 (ref 0.86–1.14)
LYMPHOCYTES ABSOLUTE: 1.4 K/UL (ref 1–5.1)
LYMPHOCYTES RELATIVE PERCENT: 27.7 %
MCH RBC QN AUTO: 27.9 PG (ref 26–34)
MCHC RBC AUTO-ENTMCNC: 33.3 G/DL (ref 31–36)
MCV RBC AUTO: 83.7 FL (ref 80–100)
MONOCYTES ABSOLUTE: 0.6 K/UL (ref 0–1.3)
MONOCYTES RELATIVE PERCENT: 12.2 %
NEUTROPHILS ABSOLUTE: 2.8 K/UL (ref 1.7–7.7)
NEUTROPHILS RELATIVE PERCENT: 56.9 %
PDW BLD-RTO: 18.2 % (ref 12.4–15.4)
PERFORMED ON: ABNORMAL
PERFORMED ON: NORMAL
PLATELET # BLD: 176 K/UL (ref 135–450)
PMV BLD AUTO: 7.5 FL (ref 5–10.5)
POTASSIUM REFLEX MAGNESIUM: 3.9 MMOL/L (ref 3.5–5.1)
PROTHROMBIN TIME: 23.3 SEC (ref 10–13.2)
RBC # BLD: 3.13 M/UL (ref 4.2–5.9)
SODIUM BLD-SCNC: 136 MMOL/L (ref 136–145)
WBC # BLD: 5 K/UL (ref 4–11)

## 2020-03-20 PROCEDURE — 97530 THERAPEUTIC ACTIVITIES: CPT

## 2020-03-20 PROCEDURE — 80048 BASIC METABOLIC PNL TOTAL CA: CPT

## 2020-03-20 PROCEDURE — 6370000000 HC RX 637 (ALT 250 FOR IP): Performed by: PHYSICAL MEDICINE & REHABILITATION

## 2020-03-20 PROCEDURE — 6360000002 HC RX W HCPCS: Performed by: PHYSICAL MEDICINE & REHABILITATION

## 2020-03-20 PROCEDURE — 97162 PT EVAL MOD COMPLEX 30 MIN: CPT

## 2020-03-20 PROCEDURE — 1280000000 HC REHAB R&B

## 2020-03-20 PROCEDURE — 97116 GAIT TRAINING THERAPY: CPT

## 2020-03-20 PROCEDURE — 85025 COMPLETE CBC W/AUTO DIFF WBC: CPT

## 2020-03-20 PROCEDURE — 97166 OT EVAL MOD COMPLEX 45 MIN: CPT

## 2020-03-20 PROCEDURE — 97110 THERAPEUTIC EXERCISES: CPT

## 2020-03-20 PROCEDURE — 97606 NEG PRS WND THER DME>50 SQCM: CPT

## 2020-03-20 PROCEDURE — 2580000003 HC RX 258

## 2020-03-20 PROCEDURE — 97535 SELF CARE MNGMENT TRAINING: CPT

## 2020-03-20 PROCEDURE — 85610 PROTHROMBIN TIME: CPT

## 2020-03-20 PROCEDURE — 36415 COLL VENOUS BLD VENIPUNCTURE: CPT

## 2020-03-20 RX ORDER — WARFARIN SODIUM 5 MG/1
5 TABLET ORAL
Status: COMPLETED | OUTPATIENT
Start: 2020-03-20 | End: 2020-03-20

## 2020-03-20 RX ORDER — SODIUM CHLORIDE 9 MG/ML
INJECTION, SOLUTION INTRAVENOUS
Status: COMPLETED
Start: 2020-03-20 | End: 2020-03-20

## 2020-03-20 RX ADMIN — BISACODYL 5 MG: 5 TABLET, COATED ORAL at 07:59

## 2020-03-20 RX ADMIN — SODIUM CHLORIDE: 9 INJECTION, SOLUTION INTRAVENOUS at 04:43

## 2020-03-20 RX ADMIN — PANTOPRAZOLE SODIUM 40 MG: 40 TABLET, DELAYED RELEASE ORAL at 06:34

## 2020-03-20 RX ADMIN — ENOXAPARIN SODIUM 80 MG: 80 INJECTION SUBCUTANEOUS at 20:39

## 2020-03-20 RX ADMIN — CARVEDILOL 12.5 MG: 6.25 TABLET, FILM COATED ORAL at 17:16

## 2020-03-20 RX ADMIN — OXYCODONE 5 MG: 5 TABLET ORAL at 07:59

## 2020-03-20 RX ADMIN — ALLOPURINOL 300 MG: 300 TABLET ORAL at 07:59

## 2020-03-20 RX ADMIN — INSULIN LISPRO 1 UNITS: 100 INJECTION, SOLUTION INTRAVENOUS; SUBCUTANEOUS at 20:40

## 2020-03-20 RX ADMIN — MECLIZINE 25 MG: 12.5 TABLET ORAL at 07:59

## 2020-03-20 RX ADMIN — WARFARIN SODIUM 5 MG: 5 TABLET ORAL at 17:16

## 2020-03-20 RX ADMIN — CARVEDILOL 12.5 MG: 6.25 TABLET, FILM COATED ORAL at 07:59

## 2020-03-20 RX ADMIN — OXYCODONE 5 MG: 5 TABLET ORAL at 14:13

## 2020-03-20 RX ADMIN — ENOXAPARIN SODIUM 80 MG: 80 INJECTION SUBCUTANEOUS at 08:00

## 2020-03-20 RX ADMIN — ATORVASTATIN CALCIUM 20 MG: 10 TABLET, FILM COATED ORAL at 20:39

## 2020-03-20 RX ADMIN — CITALOPRAM HYDROBROMIDE 10 MG: 20 TABLET ORAL at 08:01

## 2020-03-20 RX ADMIN — INSULIN LISPRO 2 UNITS: 100 INJECTION, SOLUTION INTRAVENOUS; SUBCUTANEOUS at 11:40

## 2020-03-20 RX ADMIN — LEVOTHYROXINE SODIUM 25 MCG: 25 TABLET ORAL at 06:34

## 2020-03-20 ASSESSMENT — PAIN DESCRIPTION - LOCATION
LOCATION: BUTTOCKS;HIP
LOCATION: BUTTOCKS;BACK
LOCATION: BUTTOCKS

## 2020-03-20 ASSESSMENT — PAIN SCALES - GENERAL
PAINLEVEL_OUTOF10: 0
PAINLEVEL_OUTOF10: 6

## 2020-03-20 ASSESSMENT — PAIN - FUNCTIONAL ASSESSMENT: PAIN_FUNCTIONAL_ASSESSMENT: PREVENTS OR INTERFERES SOME ACTIVE ACTIVITIES AND ADLS

## 2020-03-20 ASSESSMENT — PAIN DESCRIPTION - PAIN TYPE
TYPE: ACUTE PAIN
TYPE: ACUTE PAIN

## 2020-03-20 NOTE — PROGRESS NOTES
Physical Therapy    Facility/Department: [facility] ARU  Initial Assessment and Treatment    NAME: Austyn Naqvi  : 1950  MRN: [id number]    Date of Service: 3/20/2020    Discharge Recommendations:  Home with assist PRN, Home with Home health PT   PT Equipment Recommendations  Other: To be determined. Assessment   Body structures, Functions, Activity limitations: Decreased functional mobility ; Decreased cognition;Decreased endurance;Decreased ROM; Decreased strength;Decreased balance;Decreased ADL status; Decreased posture;Decreased safe awareness; Increased pain;Decreased high-level IADLs  Assessment: Patient is a 77year old male who was admitted to [address] [place] [address] on 3/19/20 with multiple recent falls and debility. Patient also has right buttock wound vac for his sacral ulcer. Patient today required minimum assistance with transfers, min assist to ambulate with a rolling walker and CGA to ascend 6 stairs. Patient presented with the therapy deficits listed above. Patient will benefit from continued skilled physical therapy plan of care in the acute inpatient rehabilitaiton setting in order to address these goals and to help him return to his prior independent level of function. Treatment Diagnosis: decreased independence with functional mobility. Specific instructions for Next Treatment: progress mobility as tolerated. Prognosis: Good  Decision Making: Medium Complexity  PT Education: Goals;Transfer Training;Equipment;PT Role;Plan of Care;General Safety;Home Exercise Program;Gait Training;Functional Mobility Training; Injury Prevention;Pressure Relief  Patient Education: Patient verbalized understanding  Barriers to Learning: increased time to follow commands. REQUIRES PT FOLLOW UP: Yes  Activity Tolerance  Activity Tolerance: Patient Tolerated treatment well;Patient limited by fatigue;Patient limited by pain; Patient limited by endurance  Activity Tolerance: Vitals: Pre activity 92/43 83 BPM Post activity 106/44 83 03/19/20  Follows Commands: Within Functional Limits  General Comment  Comments: Patient in supine position in the bed upon entry of therapy staff. Subjective  Subjective: Patient agreed to participate. Pain Screening  Patient Currently in Pain: Yes  Pain Assessment  Pain Assessment: 0-10  Pain Level: 6  Pain Location: Buttocks  Functional Pain Assessment: Prevents or interferes some active activities and ADLs  Non-Pharmaceutical Pain Intervention(s): Repositioned;Rest;Ambulation/Increased Activity; Therapeutic presence  Response to Pain Intervention: Patient Satisfied  Vital Signs  Patient Currently in Pain: Yes  Pre Treatment Pain Screening  Intervention List: Patient able to continue with treatment    Orientation  Orientation  Overall Orientation Status: Within Normal Limits  Social/Functional History  Social/Functional History  Lives With: Alone  Type of Home: Mobile home  Home Layout: One level  Home Access: Stairs to enter with rails  Entrance Stairs - Number of Steps: 1 + 1  Entrance Stairs - Rails: Both  Bathroom Shower/Tub: Tub/Shower unit, Shower chair with back  Bathroom Toilet: Standard  Bathroom Equipment: 3-in-1 commode  Home Equipment: [employer], Standard walker  ADL Assistance: Needs assistance  Bath: Moderate assistance  Dressing: Moderate assistance  Toileting: Needs assistance  Homemaking Assistance: Needs assistance(pt lives at home alone, doews not have assistance for homemaking)  Homemaking Responsibilities: Yes  Ambulation Assistance: Needs assistance(non ambulatory when DC'd from rehab, using WC)  Transfer Assistance: Needs assistance(friend took home from rehab and were helping out while he was home)  Active : Yes(has not driven since January)  Additional Comments: just discharged from [facility] for rehab day of admission to hospital, unable to transfer or care for self, fell twice while he was home for less than 24 hours with RW due to BLE \"giving out\".  Prior to

## 2020-03-20 NOTE — DISCHARGE SUMMARY
Hospital Medicine Discharge Summary    Patient ID: Kamlesh Sanchez      Patient's PCP: No primary care provider on file. Admit Date: 3/16/2020     Discharge Date: 3/19/2020      Admitting Physician: Brittanie Kirkpatrick MD     Discharge Physician: Joe Souza MD     Discharge Diagnoses: Active Hospital Problems    Diagnosis    DMII (diabetes mellitus, type 2) ([facility] [address] [E11.9]    Atrial fibrillation ([facility] [address] [I48.91]    Frequent falls [R29.6]    Sacral ulcer ([facility] [address] [L98.429]    Elevated troponin [R79.89]    Hyponatremia [E87.1]    Subtherapeutic international normalized ratio (INR) [R79.1]    Generalized weakness [R53.1]    Hyperlipidemia [E78.5]    CAD (coronary artery disease) s/p stent in 2010 [I25.10]    Essential hypertension [I10]       The patient was seen and examined on day of discharge and this discharge summary is in conjunction with any daily progress note from day of discharge. Hospital Course:       Generalized weakness with frequent falls - w/ multiple falls. He reports chronic left leg weakness > right leg weakness      HTN/CAD - w/ known CAD s/p prior stent 2010 but no evidence of active signs/sxs of ischemia/failure. Currently controlled on home meds      Troponin elevation - of unclear clinical significance w/ etiology clinically unable to determine, w/out signs/sxs of active ischemia. Followed serial troponins. Chronic and has been drifting lower since peaking on 1/01/2020     HyperLipidemia - controlled on home Statin. Continue, w/ f/u and med adjustment w/ PCP     Afib - chronic paroxysmal of unspecified and clinically unable to determine etiology. Normally rate controlled on BBlocker - continued. Anticoagulated at baseline on home Coumadin - continued. Monitored on tele. LMWH bridge started. Followed INR. Actively adjusting Coumadin dosing as required. S/P AVR redo Jan 2020.     HypoNatremia - etiology clinically unable to determine but likely hypovolemic.   Followed serial labs on IVF.       Sacral ulcer (HCC) - Stage 4. Wound care and Infectious disease consulted and appreciated - currently off ABX specifically for same. Wound vac reapplied. Discussed face to face w/ General Surgery 18 March who has seen wound care photos w/out need for surgical intervention      Anemia - etiology clinically unable to determine, w/out evidence of active bleeding/hemolysis. Asymptomatic w/out indication for transfusion.      DM2 - controlled on home oral antiGlycemics - held. Followed FSBS/SSI medium regimen. Last HbA1c 4.7% dated Jan 2020. Resumed home regimen at discharge.        Labs: For convenience and continuity at follow-up the following most recent labs are provided:      CBC:    Lab Results   Component Value Date    WBC 5.0 03/20/2020    HGB 8.7 03/20/2020    HCT 26.2 03/20/2020     03/20/2020       Renal:    Lab Results   Component Value Date     03/20/2020    K 3.9 03/20/2020    CL 99 03/20/2020    CO2 28 03/20/2020    BUN 6 03/20/2020    CREATININE <0.5 03/20/2020    CALCIUM 8.9 03/20/2020    PHOS 2.3 01/07/2020         Significant Diagnostic Studies    Radiology:   XR CHEST PORTABLE   Final Result   Pulmonary vascular congestion. Otherwise no acute abnormality identified. Consults:     IP CONSULT TO HOSPITALIST  IP CONSULT TO INFECTIOUS DISEASES  IP CONSULT TO PHARMACY  IP CONSULT TO CASE MANAGEMENT  IP CONSULT TO PHYSICAL MEDICINE REHAB    Disposition: ARU     Condition at Discharge: Stable    Discharge Instructions/Follow-up:  w/ PCP 1-2 weeks and subspecialists as arranged.      Code Status:  Full code    Activity: activity as tolerated    Diet: regular diet      Discharge Medications:     Discharge Medication List as of 3/19/2020  5:30 PM           Details   warfarin (COUMADIN) 2 MG tablet Take 3 tablets by mouth daily, Disp-90 tablet, R-0Print      enoxaparin (LOVENOX) 80 MG/0.8ML injection Inject 0.8 mLs into the skin 2 times daily for 7 days, Disp-11.2 mL, R-0Print              Details   oxyCODONE (ROXICODONE) 5 MG immediate release tablet Take 1 tablet by mouth every 6 hours as needed for Pain for up to 7 days. , Disp-28 tablet, R-0Print              Details   levothyroxine (SYNTHROID) 25 MCG tablet Take 1 tablet by mouth Daily, Disp-30 tablet, R-1Normal      pantoprazole (PROTONIX) 40 MG tablet Take 1 tablet by mouth every morning (before breakfast), Disp-30 tablet, R-1Normal      carvedilol (COREG) 12.5 MG tablet Take 1 tablet by mouth 2 times daily (with meals), Disp-60 tablet, R-1Normal      glipiZIDE (GLUCOTROL) 5 MG tablet Take 1 tablet by mouth daily, Disp-30 tablet, R-1Normal      meclizine (ANTIVERT) 25 MG tablet Take 1 tablet by mouth daily, Disp-30 tablet, R-1Normal      citalopram (CELEXA) 10 MG tablet Take 1 tablet by mouth every morning, Disp-30 tablet, R-1Normal      fenofibrate micronized (LOFIBRA) 200 MG capsule Take 1 capsule by mouth every morning (before breakfast), Disp-30 capsule, R-1Normal      atorvastatin (LIPITOR) 20 MG tablet Take 1 tablet by mouth nightly, Disp-30 tablet, R-1Normal      allopurinol (ZYLOPRIM) 300 MG tablet Take 1 tablet by mouth daily, Disp-30 tablet, R-1Normal             Time Spent on discharge is more than 30 minutes in the examination, evaluation, counseling and review of medications and discharge plan.       Signed:    Amy Tracy MD   3/20/2020

## 2020-03-20 NOTE — PROGRESS NOTES
Therapies:  · Oral Diet Orders: Carb Control 4 Carbs/Meal   · Oral Diet intake: 26-50%, 51-75%, %  · Oral Nutrition Supplement (ONS) Orders: None  · ONS intake: Unable to assess  · Anthropometric Measures:  · Ht: 5' 9\" (175.3 cm)(per care everywhere)   · % Weight Change:  ,  [place] d/t no wt/ht data collected this admission    · Ideal Body Wt: 160 lb (72.6 kg) per care everywhere height,     Nutrition Interventions:   Continue current diet, Start ONS  Continued Inpatient Monitoring    Nutrition Evaluation:   · Evaluation: Goals set   · Goals: Pt will consume greater than 50% of PO intakes this ARU stay    · Monitoring: Meal Intake, Supplement Intake, Weight, Pertinent Labs, Diet Tolerance      Electronically signed by Anjana Liang MS, RD, LD on 3/20/20 at 3:47 PM EDT    Contact Number: Office: [phone]; [address]: [facility]

## 2020-03-20 NOTE — PROGRESS NOTES
ARU PATIENT TREATMENT PLAN  [facility] [place]   [phone]    Serjio Abdullahi    : 1950  Acct #: [de-identified]  MRN: [id number]   PHYSICIAN:  Remy Burnett DO  Primary Problem    Patient Active Problem List   Diagnosis    Hemothorax    Tobacco abuse    AS (aortic stenosis)    CAD (coronary artery disease) s/p stent in     Essential hypertension    Hyperlipidemia    S/P AVR (aortic valve replacement)    Atelectasis    Leukocytosis    Hyperglycemia    Acute bronchospasm    Atrial fibrillation with RVR (HCC)    Acute on chronic diastolic congestive heart failure (HCC)    Myocardial infarction (HCC)    Obesity, Class I, BMI 30.0-34.9 (see actual BMI)    S/P thoracotomy    Status post partial removal of lung    Hemothorax, left    Restrictive lung disease    Hypernatremia    Lower gastrointestinal hemorrhage    Type 2 diabetes mellitus, without long-term current use of insulin (HCC)    Bright red blood per rectum    Sacral wound    Generalized weakness    DMII (diabetes mellitus, type 2) (HCC)    Atrial fibrillation (HCC)    Frequent falls    Sacral ulcer (HCC)    Elevated troponin    Hyponatremia    Subtherapeutic international normalized ratio (INR)    Debility       Rehabilitation Diagnosis:     Debility [R53.81]       ADMIT DATE:3/19/2020    Patient Goals: To improve mobility. Admitting Impairments: This is a 66yo M who came into the hospital with generalized weakness and trouble ambulating resulting in Decreased functional mobility ; Decreased balance;Decreased ADL status; Decreased high-level IADLs;Decreased strength;Decreased safe awareness;Decreased cognition;Decreased endurance  Barriers: none  Participation: Good, limited 2* to pain.      CARE PLAN     NURSING:  Serjio Abdullahi while on this unit will:     [] Be continent of bowel and bladder     [x] Have an adequate number of bowel movements  [] Urinate with no urinary coordination with insurance during ARU stay. Admission Period/Goal FIM SCORES  FIM Admit/Goal Score   Eating/Swallowing    Grooming    Bathing    Upper Body Dressing    Lower Body Dressing    Toileting    Bladder Donavon, Accidents = FIM    Bowel  Donavon, Accidents = FIM    Bed/Chair Transfer    Toilet Transfer    Tub/Shower Transfer     Locomotion:  Ambulation (Walk) / Wheelchair:  W = walk , w/c = wheelchair  Distance:   1= 0-49 ft , 2=  ft, 3= 150+ ft Distance:    Level of Assist:    Mode:    FIM:       Stairs    Comprehension    Expression    Social Interaction    Problem Solving    Memory         Coleman Sprague will be seen a minimum of 3 hours of therapy per day, a minimum of 5 out of 7 days per week. [] In this rare instance due to the nature of this patient's medical involvement, this patient will be seen 15 hours per week (900 minutes within a 7 day period). Treatments may include therapeutic exercises, gait training, neuromuscular re-ed, transfer training, community reintegration, bed mobility, w/c mobility and training, self care, home mgmt, cognitive training, energy conservation,dysphagia tx, speech/language/communication therapy, group therapy, and patient/family education. In addition, dietician/nutritionist may monitor calorie count as well as intake and collaboratively work with SLP on dietary upgrades. Neuropsychology/Psychology may evaluate and provide necessary support.     Medical issues being managed closely and that require 24 hour availability of a physician:   [] Swallowing Precautions  [x] Bowel/Bladder Fx  [x] Weight bearing precautions   [x] Wound Care    [x] Pain Mgmt   [x] Infection Protection   [x] DVT Prophylaxis   [x] Fall Precautions  [] Fluid/Electrolyte/Nutrition Balance   [] Voice Protection   [] Respiratory  [] Other:    Medical Prognosis: [] Good  [x] Fair    [] Guarded   Total expected IRF days 14  Anticipated discharge destination:    [] Home Independently   [x] Home

## 2020-03-20 NOTE — PROGRESS NOTES
[facility] Wound Ostomy Continence Nurse  Follow-up Progress Note       NAME:  Go Manning  MEDICAL RECORD NUMBER:  [id number]  AGE:  77 y.o. GENDER:  male  :  1950  TODAY'S DATE:  3/20/2020    Subjective: It hurts a little today. Wound Identification:  Wound Type: Sacrum Stage 4 Pressure Injury debrided 2020 Dr. Keiko Taylor prior admission; L Heel Unstageable Pressure Injury with dry, intact black eschar; R Heel Unstageable Pressure Injury with dry, intact brown eschar. Heel wounds not observed this visit. Contributing Factors: diabetes, chronic pressure, decreased mobility, shear force and obesity          Patient Goal of Care:  [x] Wound Healing  [] Odor Control  [] Palliative Care  [] Pain Control   [] Other:     Objective: A/O; lying in bed; current Veraflow dressing intact and sealed    BP (!) 102/46   Pulse 79   Temp 98.1 °F (36.7 °C) (Oral)   Resp 16   Ht 5' 9\" (1.753 m) Comment: per care everywhere  SpO2 96%   BMI 25.99 kg/m²   Wes Risk Score: Wes Scale Score: 18  Assessment: Sacrum - wound bed pink and some red granulated tissue; laurie wound is blanchable erythema and indurated   Measurements:  Negative Pressure Wound Therapy Coccyx Mid (Active)   Number of days: 36       Negative Pressure Wound Therapy Sacrum (Active)   $ Standard NPWT >50 sq cm PER TX $ Yes 3/20/2020  3:58 PM   Wound Type Pressure ulcer: Stage IV;Surgical 3/20/2020  3:58 PM   Unit Type KCI VAC 3/20/2020  3:58 PM   Dressing Type Veraflo 3/20/2020  3:58 PM   Number of pieces used 1 3/20/2020  3:58 PM   Cycle Intermittent 3/20/2020  3:58 PM   Target Pressure (mmHg) 125 3/20/2020  3:58 PM   Intensity 1 3/20/2020  3:58 PM   Irrigation Solution Normal Saline 3/20/2020  3:58 PM   Instillation Volume  32 mL 3/20/2020  3:58 PM   Soak Time  2 minutes 3/20/2020  3:58 PM   Vac Frequency 2 hours 3/20/2020  3:58 PM   Canister changed? No 3/20/2020  3:58 PM   Dressing Status Dry; Intact; Changed 3/17/2020 11:14 AM   Number of days: 3       Wound 03/16/20 Heel Left unstageable with necrotic tissue (Active)   Wound Image   3/17/2020 11:14 AM   Wound Other 3/19/2020  8:30 AM   Dressing Status Other (Comment) 3/20/2020  8:06 AM   Dressing/Treatment Betadine swabs 3/20/2020  8:06 AM   Dressing Change Due 03/18/20 3/18/2020  9:30 AM   Wound Length (cm) 3 cm 3/17/2020 11:14 AM   Wound Width (cm) 3 cm 3/17/2020 11:14 AM   Wound Depth (cm) 0 cm 3/17/2020 11:14 AM   Wound Surface Area (cm^2) 9 cm^2 3/17/2020 11:14 AM   Wound Volume (cm^3) 0 cm^3 3/17/2020 11:14 AM   Post-Procedure Length (cm) 0 cm 3/17/2020 11:14 AM   Post-Procedure Width (cm) 0 cm 3/17/2020 11:14 AM   Post-Procedure Depth (cm) 0 cm 3/17/2020 11:14 AM   Post-Procedure Surface Area (cm^2) 0 cm^2 3/17/2020 11:14 AM   Post-Procedure Volume (cm^3) 0 cm^3 3/17/2020 11:14 AM   Distance Tunneling (cm) 0 cm 3/17/2020 11:14 AM   Tunneling Position ___ O'Clock 0 3/17/2020 11:14 AM   Undermining Starts ___ O'Clock 0 3/17/2020 11:14 AM   Undermining Ends___ O'Clock 0 3/17/2020 11:14 AM   Undermining Maxium Distance (cm) 0 3/17/2020 11:14 AM   Wound Assessment Dry; Intact; Black 3/20/2020  8:06 AM   Drainage Amount None 3/18/2020  9:30 AM   Odor None 3/20/2020  8:06 AM   Margins Attached edges 3/18/2020  9:30 AM   Luz-wound Assessment Blanchable erythema 3/18/2020  9:30 AM   Black%Wound Bed 100 3/18/2020  9:30 AM   Culture Taken No 3/17/2020 11:14 AM   Number of days: 3       Response to treatment:  Well tolerated by patient.      Pain Assessment:  Severity:  0 / 10  Quality of pain: N/A  Wound Pain Timing/Severity: none  Premedicated: No  Plan:   Plan of Care: Wound 03/16/20 Heel Right-Dressing/Treatment: Betadine swabs  Wound 03/16/20 Sacrum with wound vac in place-Dressing/Treatment: Barrier film, Hydrocolloid, Vacuum dressing  Wound 03/16/20 Heel Left unstageable with necrotic tissue-Dressing/Treatment: Betadine swabs   Recommendation: Sacrum - removed NPWT

## 2020-03-20 NOTE — PROGRESS NOTES
Occupational Therapy   Occupational Therapy Initial Assessment  Date: 3/20/2020   Patient Name: Alexander Rosen  MRN: [id number]     : 1950    Date of Service: 3/20/2020    Discharge Recommendations:  Home with assist PRN, Home with Home health OT, S Level 3  OT Equipment Recommendations  Other: CTA: pt is not sure about tub transfer bench, maybe need dressing equipment    Assessment   Performance deficits / Impairments: Decreased functional mobility ; Decreased balance;Decreased ADL status; Decreased high-level IADLs;Decreased strength;Decreased safe awareness;Decreased cognition;Decreased endurance    Assessment: Pt pleasant and agreeable to therapy. Pt admitted to ARU after debility and multiple falls at home. Pt was discharged from [facility] and fell two times at home that day. Pt currently has a wound vac on sacral wound. Pt reports he returned home to a w/c level but prior to January was IND. Pt lives alone and will only have prn assist available. Pt is currently at a min-mod A level for functional transfers with RW. Pt required max A for LB bathing. Pt fatigued quickly and limited due to pain. Pt would benefit from 3 hours of therapy a day to return home at  a mod I level. CTA home equipment needed based on confirmation of equipment at home. Prognosis: Good  Decision Making: Medium Complexity  OT Education: OT Role;Plan of Care;ADL Adaptive Strategies;Transfer Training;Equipment;Precautions  REQUIRES OT FOLLOW UP: Yes  Activity Tolerance  Activity Tolerance: Patient Tolerated treatment well;Patient limited by pain  Safety Devices  Safety Devices in place: Yes  Type of devices: Call light within reach;Nurse notified;Gait belt;Bed alarm in place; Left in bed           Patient Diagnosis(es): There were no encounter diagnoses.      has a past medical history of CAD (coronary artery disease), COPD (chronic obstructive pulmonary disease) ([facility] [address] Diabetes mellitus ([facility] [address] Gout, Hemothorax, left, Satisfied  Vital Signs  Patient Currently in Pain: Yes     Social/Functional History  Social/Functional History  Lives With: Alone  Type of Home: Mobile home  Home Layout: One level  Home Access: Stairs to enter with rails  Entrance Stairs - Number of Steps: 1 + 1  Entrance Stairs - Rails: Both  Bathroom Shower/Tub: Tub/Shower unit, Shower chair with back  Bathroom Toilet: Standard  Bathroom Equipment: 3-in-1 commode  Home Equipment: [employer], Standard walker  ADL Assistance: Needs assistance  Bath: Moderate assistance  Dressing: Moderate assistance  Toileting: Needs assistance  Homemaking Assistance: Needs assistance(pt lives at home alone, doews not have assistance for homemaking)  Homemaking Responsibilities: Yes  Ambulation Assistance: Needs assistance(non ambulatory when DC'd from rehab, using WC)  Transfer Assistance: Needs assistance(friend took home from rehab and were helping out while he was home)  Active : Yes(has not driven since January)  Additional Comments: just discharged from [facility] for rehab day of admission to hospital, unable to transfer or care for self, fell twice while he was home for less than 24 hours with RW due to BLE \"giving out\". Prior to beginning of January 2020 he was completely IND. Objective   Vision: Impaired  Vision Exceptions: Wears glasses at all times  Hearing: Exceptions to [employer] [facility]  Hearing Exceptions: Hard of hearing/hearing concerns    Orientation  Overall Orientation Status: Within Functional Limits  Observation/Palpation  Posture: Fair  Balance  Sitting Balance: Supervision  Standing Balance:  Moderate assistance(no AD)  Standing Balance  Time: ~30 seconds x6, ~5 minutes   Activity: standing for transfers, standing for adls, standing to complete functional activity for increased endurance  Functional Mobility  Functional - Mobility Device: Rolling Walker  Activity: Other  Assist Level: Minimal assistance  Toilet Transfers  Toilet - Technique: through functional tasks  RUE Strength  R Hand General: 4/5  RUE Strength Comment: observation through functional tasks                   Plan   Plan  Times per week: 3-5x/ week   Current Treatment Recommendations: Strengthening, Balance Training, Safety Education & Training, Self-Care / ADL, Functional Mobility Training, Endurance Training, Positioning      Goals  Short term goals  Time Frame for Short term goals: 1 week (3-26-20)  Short term goal 1: CGA with functional/toilet transfers   Short term goal 2: CGA static standing ADL's for 2 minutes   Short term goal 3: Pt will complete 20 reps of BUE exercises for increased endurance. Short term goal 4: Pt will complete LB dressing with S.   Long term goals  Time Frame for Long term goals : 2 week (4-2-20)  Long term goal 1: Pt will complete tub transfer with DME mod I.   Long term goal 2: Pt will complete sponge bath mod I.   Long term goal 3: Pt will complete LB dressing mod I.   Long term goal 4: Pt will complete light kitchen task mod I.    Patient Goals   Patient goals : Delcie Derick get around better\"       Therapy Time   Individual Concurrent Group Co-treatment   Time In 0830(15 minutes for evaluation)         Time Out 1000         Minutes 90         Timed Code Treatment Minutes: 75 Minutes       Jefferson Saldaña OTR/L

## 2020-03-20 NOTE — H&P
Department of Physical Medicine & Rehabilitation  History & Physical      Patient Identification:  Antonia Goldstein  : 1950  Admit date: 3/19/2020   Attending provider: Marito Campo DO        Primary care provider: No primary care provider on file. Chief Complaint: debility     History of Present Illness/Hospital Course: This is a 68yo M with a PMH of HTN, HLD, DM2, CAD and Afib who came into the hospital with generalized weakness and trouble ambulating. In January of this year he had a repeat aortic valve replacement. Since this discharge (to home) in january he has had multiple falls at home. Upon his examination at [facility] he had elevated Troponin without signs of ischemia or failure, a stage 4 sacral ulcer, anemia, Afib, and generalized weakness. He was admitted for treatment  And received IVF, wound care, anticoagulation, and therapy. He has progressed in therapy but still has trouble with ambulation and gait. He would like to come to the ARU for further treatments and therapy to improve his function so he can discharge home. Pt denies head trauma, and associated signs and symptoms do not include chest pain, shortness of breath or neurological symptoms prior to the fall. Patient denies neck pain or stiffness, diplopia or other vision changes, difficulty swallowing, numbness in the arms or legs, or focal neurological deficits. He does report chronic left leg weakness > right leg weakness.     Prior Level of Function:  Independent for mobility, ADLs, and IADLs    Current Level of Function:  Mod/max assist     Pertinent Social History:  Support: family at home  Home set-up: 1 level mobile home     Past Medical History:   Diagnosis Date    CAD (coronary artery disease)     [facility]     Diabetes [facility]     Gout     Hemothorax, left 2020    Hyperlipidemia     Hypertension     Obesity, Class I, BMI 30.0-34.9 (see actual BMI) 2020    30.7  S/P thoracotomy 01/2020    for hemothorax    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 Kamlesh Rodriguez - redo w/23mm Magna Ease bioprosthetic valve    BRONCHOSCOPY N/A 1/9/2020    w/BAL, performed by Elba Jeter MD at [address] [place] [address] N/A 1/24/2020    BRONCHOSCOPY ALVEOLAR LAVAGE performed by Renzo Forrest MD at [address] [place] [address]  1/24/2020    BRONCHOSCOPY THERAPUTIC ASPIRATION INITIAL performed by Renzo Forrest MD at [facility] CABG WITH AORTIC VALVE REPLACEMENT  2001    date approximate, x1 to OM, AVR w/porcine valve    CARDIAC CATHETERIZATION  01/13/2020    Dr. Torrez Nose Left 01/02/2020    Dr. Jeff Potts - 3200 ml blood drained in 1000 ml increments    COLONOSCOPY  02/10/2020    polyps    COLONOSCOPY N/A 2/10/2020    COLONOSCOPY POLYPECTOMY SNARE/COLD BIOPSY performed by Marya Spann DO at [facility]  12/07/2010     [facility]. Roger - NURIS- 4.0 x 28 to Cx    PILONIDAL CYST EXCISION N/A 2/11/2020    DEBRIDEMENT OF SACRAL WOUND performed by Jimbo Marie MD at [address] VALVE CONDUIT/CORON RECONSTR N/A 1/21/2020    Dr. 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 Janeth Fink - emergent w/evacuation of hematoma & chest wall hemostasis, pleural biopsy & XENA wedge resection    TRANSESOPHAGEAL ECHOCARDIOGRAM  01/21/2020    during ascending aorta replacement w/redo AVR    TRANSESOPHAGEAL ECHOCARDIOGRAM  12/07/2010     Major Surgery in past 100 days: inpatient rehabilitation facility and can tolerate the intensity of service consisting of at least:  --180 minutes of therapy a day, 5 out of 7 days a week. OR  --15 hours of intensive therapy within a 7 consecutive day period. The patient/family has a good understanding of our discharge process and will benefit from an interdisciplinary inpatient rehabilitation program. The patient has potential to make improvement and is in need of at least two of the following multidisciplinary therapies including but not limited to physical, occupational, respiratory, and speech, nutritional services, wound care, and prosthetics and orthotics. Given the patients complex condition and risk of further medical complications, rehabilitation services cannot be safely provided at a lower level of care such as a skilled nursing facility. All of the goals listed below were reviewed with the patient and he/she is in agreement. I have compared the patients medical and functional status at the time of the preadmission screening and the same on this date, and there are no significant changes. By signing this document, I acknowledge that I have personally performed a full physical examination on this patient within 24 hours of admission to this inpatient rehabilitation facility and have determined the patient to be able to tolerate the above course of treatment at an intensive level for a reasonable period of time. I will be completing a detailed individualized  Plan of Care for this patient by day four of the patients stay based upon the Preadmission Screen, this Post-Admission Evaluation, and the therapy evaluations.      Barriers: Decreased functional mobilty, medical comorbidities  Services Required: PT, OT, SLP  Goals: mod I  Prognosis: Good  Anticipated Dispo: home  ELOS: 10 days     Rehabilitation Diagnosis:   16.0, Debility (non-cardiac, non-pulmonary      Assessment and Plan:  . Debility- Frequent falls at home with

## 2020-03-21 LAB
GLUCOSE BLD-MCNC: 100 MG/DL (ref 70–99)
GLUCOSE BLD-MCNC: 121 MG/DL (ref 70–99)
GLUCOSE BLD-MCNC: 166 MG/DL (ref 70–99)
GLUCOSE BLD-MCNC: 170 MG/DL (ref 70–99)
INR BLD: 3.36 (ref 0.86–1.14)
PERFORMED ON: ABNORMAL
PROTHROMBIN TIME: 39.4 SEC (ref 10–13.2)

## 2020-03-21 PROCEDURE — 1280000000 HC REHAB R&B

## 2020-03-21 PROCEDURE — 85610 PROTHROMBIN TIME: CPT

## 2020-03-21 PROCEDURE — 97535 SELF CARE MNGMENT TRAINING: CPT

## 2020-03-21 PROCEDURE — 6370000000 HC RX 637 (ALT 250 FOR IP): Performed by: PHYSICAL MEDICINE & REHABILITATION

## 2020-03-21 PROCEDURE — 99223 1ST HOSP IP/OBS HIGH 75: CPT | Performed by: SURGERY

## 2020-03-21 PROCEDURE — 97116 GAIT TRAINING THERAPY: CPT

## 2020-03-21 PROCEDURE — 97530 THERAPEUTIC ACTIVITIES: CPT

## 2020-03-21 PROCEDURE — 97110 THERAPEUTIC EXERCISES: CPT

## 2020-03-21 PROCEDURE — 36415 COLL VENOUS BLD VENIPUNCTURE: CPT

## 2020-03-21 RX ORDER — LIDOCAINE 4 G/G
1 PATCH TOPICAL DAILY
Status: DISCONTINUED | OUTPATIENT
Start: 2020-03-21 | End: 2020-03-31 | Stop reason: HOSPADM

## 2020-03-21 RX ADMIN — CARVEDILOL 12.5 MG: 6.25 TABLET, FILM COATED ORAL at 16:26

## 2020-03-21 RX ADMIN — PANTOPRAZOLE SODIUM 40 MG: 40 TABLET, DELAYED RELEASE ORAL at 06:24

## 2020-03-21 RX ADMIN — CARVEDILOL 12.5 MG: 6.25 TABLET, FILM COATED ORAL at 08:24

## 2020-03-21 RX ADMIN — BISACODYL 5 MG: 5 TABLET, COATED ORAL at 08:24

## 2020-03-21 RX ADMIN — INSULIN LISPRO 2 UNITS: 100 INJECTION, SOLUTION INTRAVENOUS; SUBCUTANEOUS at 16:20

## 2020-03-21 RX ADMIN — INSULIN LISPRO 1 UNITS: 100 INJECTION, SOLUTION INTRAVENOUS; SUBCUTANEOUS at 20:44

## 2020-03-21 RX ADMIN — ALLOPURINOL 300 MG: 300 TABLET ORAL at 08:24

## 2020-03-21 RX ADMIN — LEVOTHYROXINE SODIUM 25 MCG: 25 TABLET ORAL at 06:24

## 2020-03-21 RX ADMIN — MECLIZINE 25 MG: 12.5 TABLET ORAL at 08:24

## 2020-03-21 RX ADMIN — ATORVASTATIN CALCIUM 20 MG: 10 TABLET, FILM COATED ORAL at 20:22

## 2020-03-21 RX ADMIN — OXYCODONE 5 MG: 5 TABLET ORAL at 16:26

## 2020-03-21 RX ADMIN — OXYCODONE 5 MG: 5 TABLET ORAL at 06:30

## 2020-03-21 RX ADMIN — CITALOPRAM HYDROBROMIDE 10 MG: 20 TABLET ORAL at 08:24

## 2020-03-21 ASSESSMENT — PAIN SCALES - GENERAL
PAINLEVEL_OUTOF10: 6
PAINLEVEL_OUTOF10: 3
PAINLEVEL_OUTOF10: 5
PAINLEVEL_OUTOF10: 0
PAINLEVEL_OUTOF10: 6
PAINLEVEL_OUTOF10: 0
PAINLEVEL_OUTOF10: 6
PAINLEVEL_OUTOF10: 0
PAINLEVEL_OUTOF10: 7
PAINLEVEL_OUTOF10: 0

## 2020-03-21 ASSESSMENT — PAIN - FUNCTIONAL ASSESSMENT
PAIN_FUNCTIONAL_ASSESSMENT: PREVENTS OR INTERFERES SOME ACTIVE ACTIVITIES AND ADLS

## 2020-03-21 ASSESSMENT — PAIN DESCRIPTION - PROGRESSION
CLINICAL_PROGRESSION: NOT CHANGED

## 2020-03-21 ASSESSMENT — PAIN DESCRIPTION - PAIN TYPE
TYPE: CHRONIC PAIN
TYPE: ACUTE PAIN
TYPE: CHRONIC PAIN

## 2020-03-21 ASSESSMENT — PAIN DESCRIPTION - ONSET
ONSET: ON-GOING

## 2020-03-21 ASSESSMENT — PAIN DESCRIPTION - ORIENTATION
ORIENTATION: LOWER

## 2020-03-21 ASSESSMENT — PAIN DESCRIPTION - FREQUENCY
FREQUENCY: CONTINUOUS

## 2020-03-21 ASSESSMENT — PAIN DESCRIPTION - LOCATION
LOCATION: BUTTOCKS
LOCATION: BACK
LOCATION: BACK
LOCATION: BUTTOCKS
LOCATION: BACK

## 2020-03-21 ASSESSMENT — PAIN DESCRIPTION - DESCRIPTORS
DESCRIPTORS: ACHING
DESCRIPTORS: ACHING
DESCRIPTORS: ACHING;BURNING
DESCRIPTORS: ACHING

## 2020-03-21 NOTE — PLAN OF CARE
Pt using call light for assistance. No attempts to self transfer noted. Bed/chair alarms remain in place and education and cueing continue.

## 2020-03-21 NOTE — PROGRESS NOTES
Physical Therapy  Facility/Department: [facility]  Daily Treatment Note  NAME: Zoya Delgado  : 1950  MRN: [id number]    Date of Service: 3/21/2020    Discharge Recommendations:  Home with Home health PT, 24 hour supervision or assist   PT Equipment Recommendations  Equipment Needed: Yes  Mobility Devices: Lelan Edith: Rolling    Assessment   Body structures, Functions, Activity limitations: Decreased functional mobility ; Decreased cognition;Decreased endurance;Decreased ROM; Decreased strength;Decreased balance;Decreased ADL status; Decreased posture;Decreased safe awareness; Increased pain;Decreased high-level IADLs  Assessment: pt pleasant and agreeable to therapy. pt not oriented to time (states 2018), therapist reoriented pt. pt impulsive,jerky and slightly ataxic throughout session. min A for transfers, min A for gait with Rw. poor (+) to fair (-) dyn stand balance demonstrated. Treatment Diagnosis: decreased independence with functional mobility. Specific instructions for Next Treatment: progress mobility as tolerated. Prognosis: Good  Decision Making: Medium Complexity  PT Education: Goals;Transfer Training;Equipment;PT Role;Plan of Care;General Safety;Home Exercise Program;Gait Training;Functional Mobility Training; Injury Prevention;Pressure Relief  Patient Education: Patient verbalized understanding however requires reinforcement   Barriers to Learning: increased time to follow commands. REQUIRES PT FOLLOW UP: Yes  Activity Tolerance  Activity Tolerance: Patient Tolerated treatment well     Patient Diagnosis(es): There were no encounter diagnoses. has a past medical history of CAD (coronary artery disease), COPD (chronic obstructive pulmonary disease) ([facility] [address] Diabetes mellitus ([facility] [address] Gout, Hemothorax, left, Hyperlipidemia, Hypertension, Obesity, Class I, BMI 30.0-34.9 (see actual BMI), S/P thoracotomy, Status post partial removal of lung, and Thyroid disease.    has a past surgical history 61 Minutes     Second Session Therapy Time:   Individual Concurrent Group Co-treatment   Time In 1100         Time Out 1130         Minutes 30           Timed Code Treatment Minutes:  30  Total Treatment Minutes:  90    Lucero Acevedo PT

## 2020-03-21 NOTE — PROGRESS NOTES
Occupational Therapy  Facility/Department: [facility]  Daily Treatment Note  NAME: Shirley Aparicio  : 1950  MRN: [id number]    Date of Service: 3/21/2020    Discharge Recommendations:  Home with assist PRN, Home with Home health OT, S Level 3  OT Equipment Recommendations  Other: CTA: pt is not sure about tub transfer bench, maybe need dressing equipment    Assessment   Performance deficits / Impairments: Decreased functional mobility ; Decreased balance;Decreased ADL status; Decreased high-level IADLs;Decreased strength;Decreased safe awareness;Decreased cognition;Decreased endurance    Assessment: first Session Assessment: Pt pleasant and agreeable to therapy. Pt independent for feeding seated EOB with S for sitting balance. Pt reported decreased pain at this time. Pt completed LB dressing with CGA and increased time. Pt completed bathroom mobility with CGA and cues for safety awareness with RW management. Second Session Assessment: Pt limited during session due to increased pain. Pt completed toileting and toileting transfer with min A. Pt required min A to don R foot in pants due to increased pain. Pt participated in functional activity for item retrieval and transport to address safety awareness, problem solving, dynamic balance, reaching to various heights and RW management. Pt unable to tolerate up right position for extended periods due to pain. Pt completed increased reps of BUE exercises seated EOB and in supine for pain management. Cont with POC.      OT Education: OT Role;Plan of Care;ADL Adaptive Strategies;Transfer Training;Equipment;Precautions  REQUIRES OT FOLLOW UP: Yes  Activity Tolerance  Activity Tolerance: Patient Tolerated treatment well;Patient limited by pain  Activity Tolerance: pt reported increased back pain in second session requiring increased time for all tasks   Safety Devices  Safety Devices in place: Yes  Type of devices: Call light within reach;Nurse notified;Gait belt;Bed alarm in

## 2020-03-21 NOTE — CONSULTS
[facility]    CC: Sacral pressurewound    Consulting physician: Catrachita Owens MD    HPI:69 y.o.male with a PMHx as delineated below who developed a sacral pressure wound. He underwent AVR and did well, though was readmitted with GI bleeding. He developed a sacral pressure wound. The wound has not been implicated in sepsis, but has minimal granulation tissue, thus plastic surgery was consulted for assistance with management and evaluation for reconstruction.   History of the wound and wound care is as follows:    Ambulatory: Yes  Likely etiology: multifactorial  Incontinent: No  Age of onset: 77  Prior interventions: special dressings  Pressure offloading therapies: pressure offloading  Date of most recent procedure: None  Flaps in the past: None  Nutrition: tolerates PO      PMHx:   Past Medical History:   Diagnosis Date    CAD (coronary artery disease)     [facility] [address] [facility]     Diabetes mellitus ([facility]     Gout     Hemothorax, left 01/2020    Hyperlipidemia     Hypertension     Obesity, Class I, BMI 30.0-34.9 (see actual BMI) 01/2020    30.7    S/P thoracotomy 01/2020    for hemothorax    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 Nancy gamino w/23mm Magna Ease bioprosthetic valve    BRONCHOSCOPY N/A 1/9/2020    w/BAL, performed by Silvana David MD at [address] [place] [address] N/A 1/24/2020    BRONCHOSCOPY ALVEOLAR LAVAGE performed by Gloria Osorio MD at [address] [place] [address]  1/24/2020    BRONCHOSCOPY THERAPUTIC ASPIRATION INITIAL performed by Gloria [facility]sorio MD at [facility] CABG WITH AORTIC VALVE REPLACEMENT  2001    date approximate, x1 to OM, AVR w/porcine valve    CARDIAC CATHETERIZATION  01/13/2020    Dr. Ascencion Pena  CHEST TUBE INSERTION Left 01/02/2020    Dr. Andie Hollingsworth - 3200 ml blood drained in 1000 ml increments    COLONOSCOPY  02/10/2020    polyps    COLONOSCOPY N/A 2/10/2020    COLONOSCOPY POLYPECTOMY SNARE/COLD BIOPSY performed by Gillermina Runner, DO at [facility]  12/07/2010    Dr. Alejandro Magdaleno. Roger - NURIS- 4.0 x 28 to Cx    PILONIDAL CYST EXCISION N/A 2/11/2020    DEBRIDEMENT OF SACRAL WOUND performed by Kira Ellis MD at [address] [place] [address]. VALVE CONDUIT/CORON RECONSTR N/A 1/21/2020    Dr. 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 Anita Barcenas - emergent w/evacuation of hematoma & chest wall hemostasis, pleural biopsy & XENA wedge resection    TRANSESOPHAGEAL ECHOCARDIOGRAM  01/21/2020    during ascending aorta replacement w/redo AVR    TRANSESOPHAGEAL ECHOCARDIOGRAM  12/07/2010     ALLERGIES:   Allergies   Allergen Reactions    Penicillins Hives     FHx: Reviewed and is noncontributory    Meds:   Current Facility-Administered Medications   Medication Dose Route Frequency Provider Last Rate Last Dose    dextrose 5 % solution  100 mL/hr Intravenous PRN Saúl Burnett DO        dextrose 50 % IV solution  12.5 g Intravenous PRN Saúl Burnett DO        glucagon (rDNA) injection 1 mg  1 mg Intramuscular PRN Saúl Burnett DO        glucose (GLUTOSE) 40 % oral gel 15 g  15 g Oral PRN Saúl Burnett DO        insulin lispro (HUMALOG) injection vial 0-12 Units  0-12 Units Subcutaneous TID  Saúl Burnett DO   2 Units at 03/20/20 1140    insulin lispro (HUMALOG) injection vial 0-6 Units  0-6 Units Subcutaneous Nightly Juliet Burnett DO   1 Units at 03/20/20 2040    acetaminophen and myalgias. Skin: Negative for color change, pallor and rash. Endocrine: negative for tremors, temperature intolerance or polydipsia. Allergic/Immunologic: Negative for new environmental or food allergies. Neurological: Negative for dizziness, seizures, speech difficulty, numbness. Hematological: Negative for adenopathy. Psychiatric/Behavioral: Negative for agitation and confusion. EXAM     /67   Pulse 86   Temp 98.1 °F (36.7 °C) (Oral)   Resp 18   Ht 5' 9\" (1.753 m)   Wt 174 lb 3.2 oz (79 kg)   SpO2 98%   BMI 25.72 kg/m²     GEN: NAD, pleasant, appears somewhat weak  CVS: RRR  PULM: No respiratory distress  HEENT: PERRLA/EOMI; dentition poor, hearing appears within normal limits  NECK: Supple with trachea in midline, no masses  FACE:No masses  EXT: No lymphedema, but heel pressure wounds noted   ABD: soft/NT/ND  NEURO: No focal deficits, no obvious CN deficits    WOUND: Sacral; Vac intact (pictures reviewed from yesterday); Stage 4; withmoderate fibrinous exudate within wound. There was no purulence that was expressed. The surrounding skin is intact. Sensation was intact. IMAGING: None    MICRO: Wound culture: None    PATHOLOGY: None    LABS    Nutrition: Prealbumin NA; Transferrin NA; Albumin 3.8    IMP: 69 y.o.male with stage 4 pressure wound  PLAN: Would benefit from continued nutrition optimization and local wound care with vac therapy. For improved granulation tissue while in ARU, may consider transition to [place] therapy. Will return to evaluate wound with next dressing change for additional recommendations. The pathology and recurrent nature of pressure wounds were discussed with the patient. He was counseled on medical optimization (nutrition, pressure offloading, dressing changes, etc.) as well as surgical woundoptimization with debridement(s).   Additional conversation regarding flap reconstructive options as well as postoperative care regimen was carried out with

## 2020-03-22 LAB
GLUCOSE BLD-MCNC: 108 MG/DL (ref 70–99)
GLUCOSE BLD-MCNC: 128 MG/DL (ref 70–99)
GLUCOSE BLD-MCNC: 174 MG/DL (ref 70–99)
GLUCOSE BLD-MCNC: 191 MG/DL (ref 70–99)
INR BLD: 2.9 (ref 0.86–1.14)
PERFORMED ON: ABNORMAL
PROTHROMBIN TIME: 34 SEC (ref 10–13.2)

## 2020-03-22 PROCEDURE — 2580000003 HC RX 258

## 2020-03-22 PROCEDURE — 6370000000 HC RX 637 (ALT 250 FOR IP): Performed by: PHYSICAL MEDICINE & REHABILITATION

## 2020-03-22 PROCEDURE — 36415 COLL VENOUS BLD VENIPUNCTURE: CPT

## 2020-03-22 PROCEDURE — 85610 PROTHROMBIN TIME: CPT

## 2020-03-22 PROCEDURE — 1280000000 HC REHAB R&B

## 2020-03-22 RX ORDER — SODIUM CHLORIDE 9 MG/ML
INJECTION, SOLUTION INTRAVENOUS
Status: COMPLETED
Start: 2020-03-22 | End: 2020-03-22

## 2020-03-22 RX ORDER — WARFARIN SODIUM 1 MG/1
1 TABLET ORAL DAILY
Status: COMPLETED | OUTPATIENT
Start: 2020-03-22 | End: 2020-03-22

## 2020-03-22 RX ADMIN — INSULIN LISPRO 2 UNITS: 100 INJECTION, SOLUTION INTRAVENOUS; SUBCUTANEOUS at 11:16

## 2020-03-22 RX ADMIN — ALLOPURINOL 300 MG: 300 TABLET ORAL at 07:43

## 2020-03-22 RX ADMIN — INSULIN LISPRO 1 UNITS: 100 INJECTION, SOLUTION INTRAVENOUS; SUBCUTANEOUS at 20:52

## 2020-03-22 RX ADMIN — OXYCODONE 5 MG: 5 TABLET ORAL at 05:13

## 2020-03-22 RX ADMIN — SODIUM CHLORIDE 1000 ML: 9 INJECTION, SOLUTION INTRAVENOUS at 11:17

## 2020-03-22 RX ADMIN — BISACODYL 5 MG: 5 TABLET, COATED ORAL at 07:43

## 2020-03-22 RX ADMIN — MECLIZINE 25 MG: 12.5 TABLET ORAL at 07:43

## 2020-03-22 RX ADMIN — OXYCODONE 5 MG: 5 TABLET ORAL at 17:40

## 2020-03-22 RX ADMIN — CITALOPRAM HYDROBROMIDE 10 MG: 20 TABLET ORAL at 07:43

## 2020-03-22 RX ADMIN — WARFARIN SODIUM 1 MG: 1 TABLET ORAL at 17:40

## 2020-03-22 RX ADMIN — ATORVASTATIN CALCIUM 20 MG: 10 TABLET, FILM COATED ORAL at 20:52

## 2020-03-22 RX ADMIN — PANTOPRAZOLE SODIUM 40 MG: 40 TABLET, DELAYED RELEASE ORAL at 05:13

## 2020-03-22 RX ADMIN — CARVEDILOL 12.5 MG: 6.25 TABLET, FILM COATED ORAL at 07:43

## 2020-03-22 RX ADMIN — LEVOTHYROXINE SODIUM 25 MCG: 25 TABLET ORAL at 05:13

## 2020-03-22 RX ADMIN — CARVEDILOL 12.5 MG: 6.25 TABLET, FILM COATED ORAL at 17:40

## 2020-03-22 ASSESSMENT — PAIN SCALES - GENERAL
PAINLEVEL_OUTOF10: 0
PAINLEVEL_OUTOF10: 0
PAINLEVEL_OUTOF10: 8
PAINLEVEL_OUTOF10: 4
PAINLEVEL_OUTOF10: 0

## 2020-03-22 NOTE — PLAN OF CARE
Pt using call light for needs. Is impulsive at times. Bed/chair alarm in place. Education and safety cueing continues.

## 2020-03-23 LAB
ANION GAP SERPL CALCULATED.3IONS-SCNC: 10 MMOL/L (ref 3–16)
ANISOCYTOSIS: ABNORMAL
ATYPICAL LYMPHOCYTE RELATIVE PERCENT: 2 % (ref 0–6)
BANDED NEUTROPHILS RELATIVE PERCENT: 14 % (ref 0–7)
BASOPHILS ABSOLUTE: 0 K/UL (ref 0–0.2)
BASOPHILS RELATIVE PERCENT: 0 %
BUN BLDV-MCNC: 9 MG/DL (ref 7–20)
CALCIUM SERPL-MCNC: 9.6 MG/DL (ref 8.3–10.6)
CHLORIDE BLD-SCNC: 98 MMOL/L (ref 99–110)
CO2: 29 MMOL/L (ref 21–32)
CREAT SERPL-MCNC: <0.5 MG/DL (ref 0.8–1.3)
EOSINOPHILS ABSOLUTE: 0.5 K/UL (ref 0–0.6)
EOSINOPHILS RELATIVE PERCENT: 6 %
GFR AFRICAN AMERICAN: >60
GFR NON-AFRICAN AMERICAN: >60
GLUCOSE BLD-MCNC: 103 MG/DL (ref 70–99)
GLUCOSE BLD-MCNC: 113 MG/DL (ref 70–99)
GLUCOSE BLD-MCNC: 115 MG/DL (ref 70–99)
GLUCOSE BLD-MCNC: 124 MG/DL (ref 70–99)
GLUCOSE BLD-MCNC: 173 MG/DL (ref 70–99)
HCT VFR BLD CALC: 30.2 % (ref 40.5–52.5)
HEMATOLOGY PATH CONSULT: NO
HEMOGLOBIN: 9.8 G/DL (ref 13.5–17.5)
INR BLD: 2.27 (ref 0.86–1.14)
LYMPHOCYTES ABSOLUTE: 2.6 K/UL (ref 1–5.1)
LYMPHOCYTES RELATIVE PERCENT: 30 %
MCH RBC QN AUTO: 27.5 PG (ref 26–34)
MCHC RBC AUTO-ENTMCNC: 32.5 G/DL (ref 31–36)
MCV RBC AUTO: 84.5 FL (ref 80–100)
MICROCYTES: ABNORMAL
MONOCYTES ABSOLUTE: 0.3 K/UL (ref 0–1.3)
MONOCYTES RELATIVE PERCENT: 4 %
MYELOCYTE PERCENT: 1 %
NEUTROPHILS ABSOLUTE: 4.6 K/UL (ref 1.7–7.7)
NEUTROPHILS RELATIVE PERCENT: 43 %
NUCLEATED RED BLOOD CELLS: 1 /100 WBC
NUCLEATED RED BLOOD CELLS: 1 /100 WBC
OVALOCYTES: ABNORMAL
PDW BLD-RTO: 18.3 % (ref 12.4–15.4)
PERFORMED ON: ABNORMAL
PLATELET # BLD: 220 K/UL (ref 135–450)
PLATELET SLIDE REVIEW: ADEQUATE
PMV BLD AUTO: 7.8 FL (ref 5–10.5)
POLYCHROMASIA: ABNORMAL
POTASSIUM REFLEX MAGNESIUM: 4.5 MMOL/L (ref 3.5–5.1)
PROTHROMBIN TIME: 26.6 SEC (ref 10–13.2)
RBC # BLD: 3.57 M/UL (ref 4.2–5.9)
SLIDE REVIEW: ABNORMAL
SODIUM BLD-SCNC: 137 MMOL/L (ref 136–145)
SPHEROCYTES: ABNORMAL
STOMATOCYTES: ABNORMAL
TARGET CELLS: ABNORMAL
WBC # BLD: 8 K/UL (ref 4–11)

## 2020-03-23 PROCEDURE — 6370000000 HC RX 637 (ALT 250 FOR IP): Performed by: PHYSICAL MEDICINE & REHABILITATION

## 2020-03-23 PROCEDURE — 97530 THERAPEUTIC ACTIVITIES: CPT

## 2020-03-23 PROCEDURE — 1280000000 HC REHAB R&B

## 2020-03-23 PROCEDURE — 97606 NEG PRS WND THER DME>50 SQCM: CPT

## 2020-03-23 PROCEDURE — 99232 SBSQ HOSP IP/OBS MODERATE 35: CPT | Performed by: SURGERY

## 2020-03-23 PROCEDURE — 85610 PROTHROMBIN TIME: CPT

## 2020-03-23 PROCEDURE — 97112 NEUROMUSCULAR REEDUCATION: CPT

## 2020-03-23 PROCEDURE — 97535 SELF CARE MNGMENT TRAINING: CPT

## 2020-03-23 PROCEDURE — 97110 THERAPEUTIC EXERCISES: CPT

## 2020-03-23 PROCEDURE — 97116 GAIT TRAINING THERAPY: CPT

## 2020-03-23 PROCEDURE — 36415 COLL VENOUS BLD VENIPUNCTURE: CPT

## 2020-03-23 PROCEDURE — 85025 COMPLETE CBC W/AUTO DIFF WBC: CPT

## 2020-03-23 PROCEDURE — 80048 BASIC METABOLIC PNL TOTAL CA: CPT

## 2020-03-23 RX ORDER — WARFARIN SODIUM 2 MG/1
2 TABLET ORAL
Status: COMPLETED | OUTPATIENT
Start: 2020-03-23 | End: 2020-03-23

## 2020-03-23 RX ADMIN — OXYCODONE 5 MG: 5 TABLET ORAL at 08:54

## 2020-03-23 RX ADMIN — PANTOPRAZOLE SODIUM 40 MG: 40 TABLET, DELAYED RELEASE ORAL at 05:34

## 2020-03-23 RX ADMIN — CARVEDILOL 12.5 MG: 6.25 TABLET, FILM COATED ORAL at 08:55

## 2020-03-23 RX ADMIN — CITALOPRAM HYDROBROMIDE 10 MG: 20 TABLET ORAL at 08:55

## 2020-03-23 RX ADMIN — WARFARIN SODIUM 2 MG: 2 TABLET ORAL at 17:25

## 2020-03-23 RX ADMIN — ALLOPURINOL 300 MG: 300 TABLET ORAL at 08:54

## 2020-03-23 RX ADMIN — LEVOTHYROXINE SODIUM 25 MCG: 25 TABLET ORAL at 05:34

## 2020-03-23 RX ADMIN — MECLIZINE 25 MG: 12.5 TABLET ORAL at 08:54

## 2020-03-23 RX ADMIN — ACETAMINOPHEN 650 MG: 325 TABLET ORAL at 13:49

## 2020-03-23 RX ADMIN — OXYCODONE 5 MG: 5 TABLET ORAL at 15:30

## 2020-03-23 RX ADMIN — CARVEDILOL 12.5 MG: 6.25 TABLET, FILM COATED ORAL at 17:25

## 2020-03-23 RX ADMIN — BISACODYL 5 MG: 5 TABLET, COATED ORAL at 08:54

## 2020-03-23 RX ADMIN — ATORVASTATIN CALCIUM 20 MG: 10 TABLET, FILM COATED ORAL at 20:31

## 2020-03-23 RX ADMIN — INSULIN LISPRO 2 UNITS: 100 INJECTION, SOLUTION INTRAVENOUS; SUBCUTANEOUS at 17:26

## 2020-03-23 ASSESSMENT — PAIN DESCRIPTION - ORIENTATION
ORIENTATION: LOWER
ORIENTATION: LOWER

## 2020-03-23 ASSESSMENT — PAIN SCALES - GENERAL
PAINLEVEL_OUTOF10: 6
PAINLEVEL_OUTOF10: 3
PAINLEVEL_OUTOF10: 0
PAINLEVEL_OUTOF10: 6
PAINLEVEL_OUTOF10: 5
PAINLEVEL_OUTOF10: 0
PAINLEVEL_OUTOF10: 5
PAINLEVEL_OUTOF10: 0
PAINLEVEL_OUTOF10: 5

## 2020-03-23 ASSESSMENT — PAIN DESCRIPTION - ONSET: ONSET: ON-GOING

## 2020-03-23 ASSESSMENT — PAIN DESCRIPTION - LOCATION
LOCATION: BACK
LOCATION: BACK

## 2020-03-23 ASSESSMENT — PAIN DESCRIPTION - PAIN TYPE
TYPE: CHRONIC PAIN
TYPE: CHRONIC PAIN

## 2020-03-23 ASSESSMENT — PAIN DESCRIPTION - DESCRIPTORS
DESCRIPTORS: ACHING
DESCRIPTORS: ACHING

## 2020-03-23 ASSESSMENT — PAIN DESCRIPTION - FREQUENCY: FREQUENCY: CONTINUOUS

## 2020-03-23 ASSESSMENT — PAIN DESCRIPTION - PROGRESSION: CLINICAL_PROGRESSION: NOT CHANGED

## 2020-03-23 ASSESSMENT — PAIN - FUNCTIONAL ASSESSMENT: PAIN_FUNCTIONAL_ASSESSMENT: ACTIVITIES ARE NOT PREVENTED

## 2020-03-23 NOTE — PROGRESS NOTES
aorta graft w root replacment. valve conduit/coron reconstr (N/A, 1/21/2020); transesophageal echocardiogram (01/21/2020); Cardiac catheterization (01/13/2020); chest tube insertion (Left, 01/02/2020); Aortic valve replacement (12/27/2011); transesophageal echocardiogram (12/07/2010); Coronary artery bypass graft (2001); bronchoscopy (N/A, 1/24/2020); bronchoscopy (1/24/2020); sigmoidoscopy (N/A, 2/1/2020); Colonoscopy (02/10/2020); Colonoscopy (N/A, 2/10/2020); and Pilonidal cyst excision (N/A, 2/11/2020). Restrictions  Restrictions/Precautions  Restrictions/Precautions: General Precautions, Fall Risk, Up as Tolerated  Position Activity Restriction  Other position/activity restrictions: Mobilize patient out of bed ASAP and a minimum of 3 times per day, up with assist, Right buttock wound vac. Use cushion while in chair. Subjective   General  Chart Reviewed: Yes  Response To Previous Treatment: Patient with no complaints from previous session. Family / Caregiver Present: No  Referring Practitioner: Josefina Burnett DO  Subjective  Subjective: pt agreeable t otherapy  General Comment  Comments: reporting pain in lwo back with activity.  therapist provided pt with heatpack (with cover) to decrease pain x 25 min. skin intact prior to and after heat application   Pain Screening  Patient Currently in Pain: Yes  Pain Assessment  Pain Assessment: 0-10  Pain Level: 5  Pain Type: Chronic pain  Pain Location: Back  Pain Orientation: Lower  Pain Descriptors: Aching  Non-Pharmaceutical Pain Intervention(s): Rest;Repositioned  Response to Pain Intervention: Patient Satisfied  Vital Signs  Patient Currently in Pain: Yes       Orientation  Orientation  Overall Orientation Status: Within Functional Limits  Cognition      Objective   Bed mobility  Supine to Sit: Supervision  Sit to Supine: Supervision  Scooting: Supervision  Transfers  Sit to Stand: Contact guard assistance  Stand to sit: Contact guard assistance  Comment: sit to

## 2020-03-23 NOTE — PATIENT CARE CONFERENCE
[facility]  Inpatient Rehabilitation  Weekly Team Conference Note    Date: 3/25/2020  Patient Name: Alexander Rosen        MRN: [id number]    : 1950  (78 y.o.)  Gender: male   Referring Practitioner: Monie Burnett DO         Interventions to be utilized toward barriers to discharge, per discipline:  [address] observed barriers to dc: Confusion, Limited safety awareness, Lower extremity weakness, Skin Care and Wound Care  Nursing interventions: wound care management, pain management  Family Education: no  Fall Risk:  Yes      Physical therapy observed barriers to dc:    Baseline: mod ind with AD. Recently d/c from SNF    Current level: mod ind bed mobility, SBA transfers, close SBA gait up to 150 ft, CGA stair navigation   Barriers to DC: endurance, safety awareness, chronic back pain, wound vac    Needs in order to achieve dc home/next level of care: pt lives alone, mod ind with AD for functional mobility      Physical therapy interventions:   Current Treatment Recommendations: Strengthening, Gait Training, Patient/Caregiver Education & Training, Equipment Evaluation, Education, & procurement, Balance Training, Pain Management, Functional Mobility Training, Endurance Training, Home Exercise Program, Transfer Training, Wheelchair Mobility Training, Safety Education & Training, Stair training, ROM, ADL/Self-care Training      PHYSICAL THERAPY    PT Equipment Recommendations  Equipment Needed: Yes  Mobility Devices: Robin Kenneth: Rolling  Other: To be determined. Assessment: remains impulsive, decreased safety awareness, safety assist with transfers d/t pt neglect of lines, hurried movements. limited carryover of education provided for safer functional mobility. gait with SBA, pt continued to demo unsafe turns later in walk despite verbal cues earlier. improved stamina for distance. stair navigation with close SBa,  descent with CGA and cues for safety.        Occupational therapy observed barriers to dc:    Baseline: assist PRN for IADLs, Chloe with mobility, recent DC from SNF   Current level: CGA-Pako for mobility, SPV-CGA for ADLs   Barriers to DC: POOR cognition/safety and insight, wound vac, lives alone   Needs in order to achieve dc home/next level of care: Chloe with ADLs, IADLs, and mobility     Occupational Therapy interventions:  Current Treatment Recommendations: Strengthening, Balance Training, Safety Education & Training, Self-Care / ADL, Functional Mobility Training, Endurance Training, Positioning      OCCUPATIONAL THERAPY      Assessment: Pt is limited at this time due pain. Pt demo'd poor safety awareness with 4WW and standing at sink for adls. Pt required max cues for safety and balance. Pt demo'd poor cognition for carryover of education throughout session. Pt will likely need education on increased safety for home. Pt completed BUE exercises in bed for increased endurance and strength. Cont with POC. SPEECH THERAPY   Speech therapy not currently on caseload - order placed for cog evaluation today. To be completed as scheduled. NUTRITION  Weight: 174 lb (78.9 kg) / Body mass index is 25.7 kg/m². Diet Order: DIET CARB CONTROL; Dietary Nutrition Supplements: Diabetic Oral Supplement  PO Meals Eaten (%): 76 - 100%  Education: Declined      CASE MANAGEMENT  Assessment: Lives alone in a mobile home setting with 1+1 stairs to enter. Had a friend assisting him in the community. Was discharged from [facility] and readmitted to hospital due to inability to case for self. Has standard walker, manual wheelchair, 3 in 1 commode. Currently has wound vac on sacral wound.    PCP: Dr. Lb Castillo: [facility] [phone]  Home Health: to be determined      Interdisciplinary Goals:   1.) pt will complete functional transfers with mod ind  2.) CGA with functional/toilet transfers   3.) Pt will remain free from falls      Discharge Plan   Estimated

## 2020-03-23 NOTE — PROGRESS NOTES
pain.  Pt left in bed, assisted wound care RN on setting up to address wound vac. Pt demo's limited attn and carryover with education throughout session. Cont OT POC. Prognosis: Good  OT Education: OT Role;Plan of Care;ADL Adaptive Strategies;Transfer Training;Equipment;Precautions  Patient Education: pain management, energy conservation, OT POC, goals vs. deficits  REQUIRES OT FOLLOW UP: Yes  Activity Tolerance  Activity Tolerance: Patient Tolerated treatment well;Patient limited by pain  Activity Tolerance: pt with increased pain after PT sesion this date, pleasant and agreeable   Safety Devices  Safety Devices in place: Yes  Type of devices: Call light within reach;Nurse notified;Gait belt;Bed alarm in place; Left in bed         Patient Diagnosis(es): There were no encounter diagnoses. has a past medical history of CAD (coronary artery disease), COPD (chronic obstructive pulmonary disease) ([facility] [address] Diabetes mellitus ([facility] [address] Gout, Hemothorax, left, Hyperlipidemia, Hypertension, Obesity, Class I, BMI 30.0-34.9 (see actual BMI), S/P thoracotomy, Status post partial removal of lung, and Thyroid disease. has a past surgical history that includes thoracotomy (Left, 1/2/2020); bronchoscopy (N/A, 1/9/2020); Coronary angioplasty with stent (12/07/2010); pr ascend aorta graft w root replacment. valve conduit/coron reconstr (N/A, 1/21/2020); transesophageal echocardiogram (01/21/2020); Cardiac catheterization (01/13/2020); chest tube insertion (Left, 01/02/2020); Aortic valve replacement (12/27/2011); transesophageal echocardiogram (12/07/2010); Coronary artery bypass graft (2001); bronchoscopy (N/A, 1/24/2020); bronchoscopy (1/24/2020); sigmoidoscopy (N/A, 2/1/2020); Colonoscopy (02/10/2020); Colonoscopy (N/A, 2/10/2020); and Pilonidal cyst excision (N/A, 2/11/2020).     Restrictions  Restrictions/Precautions  Restrictions/Precautions: General Precautions, Fall Risk, Up as Tolerated  Position Activity Restriction  Other position/activity restrictions: Mobilize patient out of bed ASAP and a minimum of 3 times per day, up with assist, Right buttock wound vac. Use cushion while in chair. Subjective   General  Chart Reviewed: Yes  Patient assessed for rehabilitation services?: Yes  Additional Pertinent Hx: Severe aortic valve stenosis, s/p Redo sternotomy 1-08-20  Response to previous treatment: Patient with no complaints from previous session  Family / Caregiver Present: No  Referring Practitioner: Tacho Ordoñez MD  Diagnosis: generalized weakness  Subjective  Subjective: Pt in bed, finishing with PT. Stating increased back pain. Agreeable to bed exercises, motivated \"to try and walk with you this afternoon if pain is gone\"  General Comment  Comments: RN approved therapy  Pain Assessment  Pain Assessment: 0-10  Pain Level: 6  Pain Type: Chronic pain  Pain Location: Back  Pain Orientation: Lower  Pain Descriptors: Aching  Pain Frequency: Continuous  Pain Onset: On-going  Clinical Progression: Not changed  Functional Pain Assessment: Activities are not prevented  Non-Pharmaceutical Pain Intervention(s): Therapeutic presence; Therapeutic touch;Repositioned; Heat applied  Response to Pain Intervention: Patient Satisfied  Pre Treatment Pain Screening  Intervention List: Patient able to continue with treatment  Vital Signs  Patient Currently in Pain: Yes   Orientation  Orientation  Overall Orientation Status: Within Functional Limits  Objective    ADL  Feeding: Independent           Bed mobility  Supine to Sit: Supervision  Sit to Supine: Supervision  Scooting: Supervision              Modalities: Yes  Moist Heat  Number Minutes Moist Heat: 15  Moist Heat Location: (lower back)           Cognition  Overall Cognitive Status: Exceptions  Arousal/Alertness: Appropriate responses to stimuli  Following Commands:  Follows multistep commands with repitition  Attention Span: Attends with cues to redirect  Memory: Appears intact  Safety Judgement: Decreased awareness of need for assistance;Decreased awareness of need for safety  Problem Solving: Assistance required to identify errors made;Assistance required to generate solutions;Assistance required to implement solutions  Insights: Decreased awareness of deficits  Initiation: Requires cues for some  Sequencing: Requires cues for some  Cognition Comment: Patient was easily distracted and needed cueing to focus on the task at hand. Perception  Overall Perceptual Status: WFL              Type of ROM/Therapeutic Exercise  Type of ROM/Therapeutic Exercise: AROM  Comment: semi supine in bed/seated EOB, 3# weights used for some exercises  Exercises  Scapular Protraction: x15  Scapular Retraction: x15  Shoulder Elevation: x15  Shoulder Flexion: 15x to 90*  Shoulder ABduction: 15x  Shoulder ADduction: 15x  Horizontal ADduction: 15x  Elbow Flexion: 15x  Elbow Extension: 15x  Supination: 15x  Pronation: 15x  Wrist Flexion: 15x  Wrist Extension: 15x  Finger Flexion: 15+15x  Finger Extension: 15+15x  Other: x15 chest press, circles forwards/backwards                    Plan   Plan  Times per week: 5 out of 7 days  Current Treatment Recommendations: Strengthening, Balance Training, Safety Education & Training, Self-Care / ADL, Functional Mobility Training, Endurance Training, Positioning       Goals  Short term goals  Time Frame for Short term goals: 1 week (3-26-20)  Short term goal 1: CGA with functional/toilet transfers   Short term goal 2: CGA static standing ADL's for 2 minutes   Short term goal 3: Pt will complete 20 reps of BUE exercises for increased endurance.    Short term goal 4: Pt will complete LB dressing with S.   Long term goals  Time Frame for Long term goals : 2 week (4-2-20)  Long term goal 1: Pt will complete tub transfer with DME mod I.   Long term goal 2: Pt will complete sponge bath mod I.   Long term goal 3: Pt will complete LB dressing mod I.   Long term goal 4: Pt will complete light kitchen task mod I.    Patient Goals   Patient goals : \"to get around better\"       Therapy Time   Individual Concurrent Group Co-treatment   Time In 1000         Time Out 1030         Minutes 30         Timed Code Treatment Minutes: 30 Minutes     Second Session Therapy Time:   Individual Concurrent Group Co-treatment   Time In 1330         Time Out 1430         Minutes 60           Timed Code Treatment Minutes:  60 Minutes    Total Treatment Minutes:  [address], OTR/L

## 2020-03-23 NOTE — PROGRESS NOTES
[facility] Wound Ostomy Continence Nurse  Follow-up Progress Note       NAME:  Kyra Uribe  MEDICAL RECORD NUMBER:  [id number]  AGE:  77 y.o. GENDER:  male  :  1950  TODAY'S DATE:  3/23/2020    Subjective: Therapy going well   Wound Identification:  Wound Type: Sacrum Stage 4 Pressure Injury debrided 2020 Dr. Amos Ortiz prior admission; L Heel Unstageable Pressure Injury with dry, intact black eschar; R Heel Unstageable Pressure Injury with dry, intact brown eschar. Hel wounds not observed this visit. Contributing Factors: diabetes, chronic pressure, decreased mobility, shear force and obesity              Patient Goal of Care:  [x] Wound Healing  [] Odor Control   [] Palliative Care  [] Pain Control   [] Other:     Objective:  Current dressing with secure seal    /68   Pulse 80   Temp 98 °F (36.7 °C) (Oral)   Resp 18   Ht 5' 9\" (1.753 m)   Wt 176 lb 1.6 oz (79.9 kg)   SpO2 95%   BMI 26.01 kg/m²   Wes Risk Score: Wes Scale Score: 18  Assessment:  Wound bed red. Luz wound with some red. See photo. Heels remain dry and black. Measurements:  Negative Pressure Wound Therapy Sacrum Mid (Active)   $ Standard NPWT >50 sq cm PER TX $ Yes 3/23/2020  2:24 PM   Wound Type Pressure ulcer: Stage IV;Surgical 3/23/2020  2:24 PM   Unit Type [employer] machine # [id number] 3/23/2020  2:24 PM   Dressing Type White foam 3/23/2020  2:24 PM   Number of pieces used 1 3/23/2020  2:24 PM   Cycle Continuous 3/23/2020  2:24 PM   Target Pressure (mmHg) 75 3/23/2020  2:24 PM   Intensity 1 3/20/2020  3:58 PM   Irrigation Solution Normal Saline 3/23/2020  2:24 PM   Instillation Volume  32 mL 3/23/2020  2:24 PM   Soak Time  2 minutes 3/23/2020  2:24 PM   Vac Frequency 2 hours 3/23/2020  2:24 PM   Canister changed?  No 3/23/2020  2:24 PM   Dressing Status Changed 3/23/2020  2:24 PM   Dressing Changed Changed/New 3/23/2020  2:24 PM   Drainage Amount Small 3/23/2020  2:24 PM Culture Taken No 3/17/2020 11:14 AM   Number of days: 6     Right heel:             Wound 03/16/20 Heel Left unstageable with necrotic tissue (Active)   Wound Image   3/23/2020  2:24 PM   Wound Pressure Unstageable 3/23/2020  2:24 PM   Offloading for Diabetic Foot Ulcers No offloading required 3/22/2020  8:52 PM   Dressing Status Other (Comment) 3/23/2020  2:24 PM   Dressing Changed Changed/New 3/20/2020  5:24 PM   Dressing/Treatment Betadine swabs;Open to air 3/23/2020  2:24 PM   Dressing Change Due 03/22/20 3/22/2020  8:56 AM   Wound Length (cm) 2.8 cm 3/23/2020  2:24 PM   Wound Width (cm) 3 cm 3/23/2020  2:24 PM   Wound Depth (cm) 0 cm 3/23/2020  2:24 PM   Wound Surface Area (cm^2) 8.4 cm^2 3/23/2020  2:24 PM   Change in Wound Size % (l*w) 6.67 3/23/2020  2:24 PM   Wound Volume (cm^3) 0 cm^3 3/23/2020  2:24 PM   Post-Procedure Length (cm) 0 cm 3/17/2020 11:14 AM   Post-Procedure Width (cm) 0 cm 3/17/2020 11:14 AM   Post-Procedure Depth (cm) 0 cm 3/17/2020 11:14 AM   Post-Procedure Surface Area (cm^2) 0 cm^2 3/17/2020 11:14 AM   Post-Procedure Volume (cm^3) 0 cm^3 3/17/2020 11:14 AM   Distance Tunneling (cm) 0 cm 3/17/2020 11:14 AM   Tunneling Position ___ O'Clock 0 3/23/2020  2:24 PM   Undermining Starts ___ O'Clock 0 3/23/2020  2:24 PM   Undermining Ends___ O'Clock 0 3/23/2020  2:24 PM   Undermining Maxium Distance (cm) 0 3/23/2020  2:24 PM   Wound Assessment Black;Dry; Intact 3/23/2020  2:24 PM   Drainage Amount None 3/23/2020  2:24 PM   Odor None 3/23/2020  2:24 PM   Margins Attached edges; Defined edges 3/23/2020  2:24 PM   Luz-wound Assessment Clean;Dry; Intact 3/23/2020  2:24 PM   Black%Wound Bed 100 3/23/2020  2:24 PM   Culture Taken No 3/23/2020  2:24 PM   Number of days: 6      Left Heel:          Response to treatment:  Well tolerated by patient.      Pain Assessment:  Severity:  0 / 10  Quality of pain: N/A  Wound Pain Timing/Severity: none  Premedicated: No  Plan:   Plan of Care: Wound 03/16/20 Heel Right-Dressing/Treatment: Betadine swabs, Open to air  Wound 03/16/20 Sacrum with wound vac in place-Dressing/Treatment: Barrier film, Hydrocolloid, Vacuum dressing  Wound 03/16/20 Heel Left unstageable with necrotic tissue-Dressing/Treatment: Betadine swabs, Open to air     Recommendation:   Sacrum - removed NPWT dressing; clean wound with NSS; pat dry; place hydrocolloid around the wound; frame the wound with the NPWT drape; track along R Hip; black foam to wound bed; cover with drape; attach to tracker pad; (1) black foam; Veriflo with normal saline instil 32 ml dwell x 2 minutes every 2 hours. Wound Care RN to change NPWT dressing on M, W, and F. Specialty Bed Required : Yes  Sen tech IV low air loss mattress  [x] Low Air Loss   [x] Pressure Redistribution  [] Fluid Immersion  [] Bariatric  [] Total Pressure Relief  [] Other:     Current Diet: DIET CARB CONTROL; Dietary Nutrition Supplements: Diabetic Oral Supplement  Dietician consult:  Yes    Discharge Plan:  Placement for patient upon discharge: skilled nursing   Patient appropriate for Outpatient [address]: Yes    Referrals:  [x]  following  [] [address] [facility]  [] Supplies  [] Other    Patient/Caregiver Teaching: Updated on status and wound healing.   Level of patient/caregiver understanding able to:   [x] Indicates understanding       [] Needs reinforcement  [] Unsuccessful      [] Verbal Understanding  [] Demonstrated understanding       [] No evidence of learning  [] Refused teaching         [] N/A       Electronically signed by Jonatan Wright RN, MSN, Almita Schilling on 3/23/2020 at 2:52 PM

## 2020-03-23 NOTE — PROGRESS NOTES
Department of Physical Medicine & Rehabilitation  Progress Note    Patient Identification:  Kenna Wilkerson  [id number]  : 1950  Admit date: 3/19/2020    Chief Complaint: Debility    Subjective:   No acute events overnight. Compliant with offloading. No new c/o. ROS: No f/c, n/v, cp     Objective:  Patient Vitals for the past 24 hrs:   BP Temp Temp src Pulse Resp SpO2   20 0852 133/68 98 °F (36.7 °C) Oral 80 18 95 %   20 2030 (!) 120/53 97.6 °F (36.4 °C) Oral 81 20 94 %   20 1740 132/74 -- -- -- -- --     Const: Alert. No distress, pleasant. HEENT: Normocephalic, atraumatic. Normal sclera/conjunctiva. MMM. CV: Regular rate and rhythm. Resp: No respiratory distress. Lungs CTAB. Abd: Soft, nontender, nondistended, NABS+   Ext: No edema. Neuro: Alert, oriented, appropriately interactive. Psych: Cooperative, appropriate mood and affect    Laboratory data: Available via EMR.    Last 24 hour lab  Recent Results (from the past 24 hour(s))   POCT Glucose    Collection Time: 20 11:06 AM   Result Value Ref Range    POC Glucose 191 (H) 70 - 99 mg/dl    Performed on ACCU-CHEK    POCT Glucose    Collection Time: 20  4:09 PM   Result Value Ref Range    POC Glucose 128 (H) 70 - 99 mg/dl    Performed on ACCU-CHEK    POCT Glucose    Collection Time: 20  8:38 PM   Result Value Ref Range    POC Glucose 174 (H) 70 - 99 mg/dl    Performed on ACCU-CHEK    POCT Glucose    Collection Time: 20  6:11 AM   Result Value Ref Range    POC Glucose 115 (H) 70 - 99 mg/dl    Performed on ACCU-CHEK    Protime-INR    Collection Time: 20  7:15 AM   Result Value Ref Range    Protime 26.6 (H) 10.0 - 13.2 sec    INR 2.27 (H) 0.86 - 1.14   CBC auto differential    Collection Time: 20  7:15 AM   Result Value Ref Range    WBC 8.0 4.0 - 11.0 K/uL    RBC 3.57 (L) 4.20 - 5.90 M/uL    Hemoglobin 9.8 (L) 13.5 - 17.5 g/dL    Hematocrit 30.2 (L) 40.5 - 52.5 %    MCV 84.5 80.0 - 100.0 fL    MCH Technique: Ambulating  Equipment Used: Standard toilet  Toilet Transfers Comments: impulsive with increased speed  Assessment        SLP          Body mass index is 26.01 kg/m². Assessment and Plan:  1. Debility- Frequent falls at home with chronic leg weakness post operation in January- unable to ambulate without significant assist. PT/OT. 2. HTN/CAD- s/p stent placement in 2010. Controlled on home meds.   3. HLD- continue home statin   4. Afib- Chronic Paroxysmal afib- Lovenox to warfarin bridge- Follow INR  5. Sacral ulcer- Stage 4- wound care consulted- Wound vac as needed   6. DM2- controlled with oral medications at home- continue current SSI while in hospital  7. Anemia- Continue Lab draws MWF     Rehab Progress: Improving  Anticipated Dispo: home  Services/DME: TBD  ELOS: 10 days     Rivera Lindsey MD 3/23/2020, 11:03 AM

## 2020-03-24 LAB
GLUCOSE BLD-MCNC: 115 MG/DL (ref 70–99)
GLUCOSE BLD-MCNC: 158 MG/DL (ref 70–99)
GLUCOSE BLD-MCNC: 174 MG/DL (ref 70–99)
GLUCOSE BLD-MCNC: 180 MG/DL (ref 70–99)
INR BLD: 1.91 (ref 0.86–1.14)
PERFORMED ON: ABNORMAL
PROTHROMBIN TIME: 22.3 SEC (ref 10–13.2)

## 2020-03-24 PROCEDURE — 36415 COLL VENOUS BLD VENIPUNCTURE: CPT

## 2020-03-24 PROCEDURE — 6370000000 HC RX 637 (ALT 250 FOR IP): Performed by: PHYSICAL MEDICINE & REHABILITATION

## 2020-03-24 PROCEDURE — 97112 NEUROMUSCULAR REEDUCATION: CPT

## 2020-03-24 PROCEDURE — 97110 THERAPEUTIC EXERCISES: CPT

## 2020-03-24 PROCEDURE — 2580000003 HC RX 258

## 2020-03-24 PROCEDURE — 97530 THERAPEUTIC ACTIVITIES: CPT

## 2020-03-24 PROCEDURE — 85610 PROTHROMBIN TIME: CPT

## 2020-03-24 PROCEDURE — 97116 GAIT TRAINING THERAPY: CPT

## 2020-03-24 PROCEDURE — 1280000000 HC REHAB R&B

## 2020-03-24 PROCEDURE — 97535 SELF CARE MNGMENT TRAINING: CPT

## 2020-03-24 RX ORDER — WARFARIN SODIUM 2 MG/1
2 TABLET ORAL
Status: COMPLETED | OUTPATIENT
Start: 2020-03-24 | End: 2020-03-24

## 2020-03-24 RX ORDER — SODIUM CHLORIDE 9 MG/ML
INJECTION, SOLUTION INTRAVENOUS
Status: COMPLETED
Start: 2020-03-24 | End: 2020-03-24

## 2020-03-24 RX ADMIN — MECLIZINE 25 MG: 12.5 TABLET ORAL at 07:44

## 2020-03-24 RX ADMIN — OXYCODONE 5 MG: 5 TABLET ORAL at 13:24

## 2020-03-24 RX ADMIN — WARFARIN SODIUM 2 MG: 2 TABLET ORAL at 17:41

## 2020-03-24 RX ADMIN — INSULIN LISPRO 2 UNITS: 100 INJECTION, SOLUTION INTRAVENOUS; SUBCUTANEOUS at 11:39

## 2020-03-24 RX ADMIN — MAGNESIUM HYDROXIDE 30 ML: 2400 SUSPENSION ORAL at 11:39

## 2020-03-24 RX ADMIN — CARVEDILOL 12.5 MG: 6.25 TABLET, FILM COATED ORAL at 17:41

## 2020-03-24 RX ADMIN — ALLOPURINOL 300 MG: 300 TABLET ORAL at 07:44

## 2020-03-24 RX ADMIN — ACETAMINOPHEN 650 MG: 325 TABLET ORAL at 09:43

## 2020-03-24 RX ADMIN — CARVEDILOL 12.5 MG: 6.25 TABLET, FILM COATED ORAL at 07:44

## 2020-03-24 RX ADMIN — LEVOTHYROXINE SODIUM 25 MCG: 25 TABLET ORAL at 06:30

## 2020-03-24 RX ADMIN — INSULIN LISPRO 1 UNITS: 100 INJECTION, SOLUTION INTRAVENOUS; SUBCUTANEOUS at 20:41

## 2020-03-24 RX ADMIN — CITALOPRAM HYDROBROMIDE 10 MG: 20 TABLET ORAL at 07:45

## 2020-03-24 RX ADMIN — INSULIN LISPRO 2 UNITS: 100 INJECTION, SOLUTION INTRAVENOUS; SUBCUTANEOUS at 17:43

## 2020-03-24 RX ADMIN — BISACODYL 5 MG: 5 TABLET, COATED ORAL at 07:45

## 2020-03-24 RX ADMIN — PANTOPRAZOLE SODIUM 40 MG: 40 TABLET, DELAYED RELEASE ORAL at 06:30

## 2020-03-24 RX ADMIN — ATORVASTATIN CALCIUM 20 MG: 10 TABLET, FILM COATED ORAL at 20:39

## 2020-03-24 RX ADMIN — OXYCODONE 5 MG: 5 TABLET ORAL at 06:30

## 2020-03-24 RX ADMIN — SODIUM CHLORIDE 1000 ML: 9 INJECTION, SOLUTION INTRAVENOUS at 17:52

## 2020-03-24 ASSESSMENT — PAIN DESCRIPTION - ORIENTATION
ORIENTATION: LEFT
ORIENTATION: LOWER
ORIENTATION: LEFT;LOWER
ORIENTATION: LOWER

## 2020-03-24 ASSESSMENT — PAIN SCALES - GENERAL
PAINLEVEL_OUTOF10: 0
PAINLEVEL_OUTOF10: 6
PAINLEVEL_OUTOF10: 0
PAINLEVEL_OUTOF10: 6
PAINLEVEL_OUTOF10: 10
PAINLEVEL_OUTOF10: 8
PAINLEVEL_OUTOF10: 7
PAINLEVEL_OUTOF10: 4
PAINLEVEL_OUTOF10: 5

## 2020-03-24 ASSESSMENT — PAIN DESCRIPTION - PAIN TYPE
TYPE: CHRONIC PAIN

## 2020-03-24 ASSESSMENT — PAIN DESCRIPTION - FREQUENCY
FREQUENCY: INTERMITTENT
FREQUENCY: INTERMITTENT

## 2020-03-24 ASSESSMENT — PAIN DESCRIPTION - LOCATION
LOCATION: BACK

## 2020-03-24 ASSESSMENT — PAIN - FUNCTIONAL ASSESSMENT
PAIN_FUNCTIONAL_ASSESSMENT: PREVENTS OR INTERFERES SOME ACTIVE ACTIVITIES AND ADLS
PAIN_FUNCTIONAL_ASSESSMENT: ACTIVITIES ARE NOT PREVENTED
PAIN_FUNCTIONAL_ASSESSMENT: PREVENTS OR INTERFERES SOME ACTIVE ACTIVITIES AND ADLS

## 2020-03-24 ASSESSMENT — PAIN DESCRIPTION - ONSET: ONSET: ON-GOING

## 2020-03-24 ASSESSMENT — PAIN DESCRIPTION - PROGRESSION
CLINICAL_PROGRESSION: NOT CHANGED
CLINICAL_PROGRESSION: NOT CHANGED

## 2020-03-24 ASSESSMENT — PAIN DESCRIPTION - DESCRIPTORS
DESCRIPTORS: ACHING;CONSTANT
DESCRIPTORS: ACHING;DISCOMFORT

## 2020-03-24 NOTE — PROGRESS NOTES
Nutrition Assessment    Type and Reason for Visit: Reassess    Nutrition Recommendations:   1. Continue carb control diet  2. Glucerna BID  3. Encourage po intakes  4. Monitor po intakes, nutrition adequacy, weights, pertinent labs, BMs    Nutrition Assessment: Follow up: Pt improving from a nutritional standpoint AEB po intakes % per EMR. Pt reports that his appetite is okay and that he has been eating most of his meals. Pt also receiving Glucerna ONS to supplement po intakes. Pt reports that he has been drinking about 1-2 of them per day. Encouraged continued good po intakes. Will continue current diet and ONS. Malnutrition Assessment:  · Malnutrition Status: At risk for malnutrition  · Context: Acute illness or injury  · Findings of the 6 clinical characteristics of malnutrition (Minimum of 2 out of 6 clinical characteristics is required to make the diagnosis of moderate or severe Protein Calorie Malnutrition based on AND/ASPEN Guidelines):  1. Energy Intake-Unable to assess,      2. Weight Loss-Unable to assess,    3. Fat Loss-Unable to assess,    4. Muscle Loss-Unable to assess,    5. Fluid Accumulation-No significant fluid accumulation,    6.  Strength-Not measured    Nutrition Risk Level:  Moderate    Nutrient Needs:  · Estimated Daily Total Kcal: 7725-9512 kcal  · Estimated Daily Protein (g):   · Estimated Daily Total Fluid (ml/day): 1 ml/kcal    Nutrition Diagnosis:   · Problem: Inadequate oral intake  · Etiology: related to Increased demand for energy/nutrients     Signs and symptoms:  as evidenced by Presence of wounds    Objective Information:  · Nutrition-Focused Physical Findings: Wound VAC  · Wound Type: Stage IV, Unstageable, Pressure Ulcer  · Current Nutrition Therapies:  · Oral Diet Orders: Carb Control 4 Carbs/Meal   · Oral Diet intake: 51-75%, %  · Oral Nutrition Supplement (ONS) Orders: None  · ONS intake: %  · Anthropometric Measures:  · Ht: 5' 9\" (175.3

## 2020-03-24 NOTE — PLAN OF CARE
Problem: Pain:  Goal: Pain level will decrease  Description: Pain level will decrease  Outcome: Ongoing  Goal: Control of acute pain  Description: Control of acute pain  Outcome: Ongoing  Goal: Control of chronic pain  Description: Control of chronic pain  Outcome: Ongoing     Problem: Falls - Risk of:  Goal: Will remain free from falls  Description: Will remain free from falls  3/24/2020 1134 by Ary Carmona RN  Outcome: Ongoing  3/23/2020 2354 by Carrillo Zamudio RN  Outcome: Ongoing  Goal: Absence of physical injury  Description: Absence of physical injury  Outcome: Ongoing     Problem: Nutrition  Goal: Optimal nutrition therapy  Outcome: Ongoing

## 2020-03-24 NOTE — PROGRESS NOTES
Pharmacy to Dose Warfarin     Dx: Atrial Fibrillation   Goal INR range 2-3  Home Warfarin dose: 5 mg daily? (patient unsure)     Date                 INR                  Warfarin  3/17                1.31                   5 mg  3/18                1.32                   5 mg  3/19                1.35                   6 mg  3/20                1.99                   5 mg  3/21                3.36                   Hold   3/22                2.9                      1mg  3/23                2.27                   2 mg  3/24                1.91                   2 mg    Recommend 2 mg warfarin tonight x 1. Daily INR ordered. Rx will continue to manage therapy per consult order.   Puneet Casiano, PharmD 3/24/2020  9:14 AM

## 2020-03-24 NOTE — PROGRESS NOTES
Physical Therapy  Facility/Department: [facility]  Daily Treatment Note  NAME: Alexander Rosen  : 1950  MRN: [id number]    Date of Service: 3/24/2020    Discharge Recommendations:  Home with Home health PT, 24 hour supervision or assist   PT Equipment Recommendations  Equipment Needed: Yes  Walker: Rolling    Assessment   Body structures, Functions, Activity limitations: Decreased functional mobility ; Decreased cognition;Decreased endurance;Decreased ROM; Decreased strength;Decreased balance;Decreased ADL status; Decreased posture;Decreased safe awareness; Increased pain;Decreased high-level IADLs  Assessment: pt impulsive with therapy activities. limited carryover of education provided for safer functional mobility. limited throughout by chronic low back pain, pt declining therapist offer of heat pack to decrease pain. complete functional transfers with supv and cues for safety, gait with SBA, stair navigation with CGA-close SBa and cues for safety. Therapist conferred with MD regarding cog eval to be ordered. Treatment Diagnosis: decreased independence with functional mobility. Specific instructions for Next Treatment: progress mobility as tolerated. Prognosis: Good  Decision Making: Medium Complexity  PT Education: Goals;Transfer Training;Equipment;PT Role;Plan of Care;General Safety;Home Exercise Program;Gait Training;Functional Mobility Training; Injury Prevention;Pressure Relief  Patient Education: Patient verbalized understanding however requires reinforcement   Barriers to Learning: limited carryover   REQUIRES PT FOLLOW UP: Yes  Activity Tolerance  Activity Tolerance: Patient limited by pain; Patient limited by fatigue     Patient Diagnosis(es): There were no encounter diagnoses.      has a past medical history of CAD (coronary artery disease), COPD (chronic obstructive pulmonary disease) ([facility] [address] Diabetes mellitus ([facility] [address] Gout, Hemothorax, left, Hyperlipidemia, Hypertension, Obesity, Class I, BMI 30.0-34.9 (see actual BMI), S/P thoracotomy, Status post partial removal of lung, and Thyroid disease. has a past surgical history that includes thoracotomy (Left, 1/2/2020); bronchoscopy (N/A, 1/9/2020); Coronary angioplasty with stent (12/07/2010); pr ascend aorta graft w root replacment. valve conduit/coron reconstr (N/A, 1/21/2020); transesophageal echocardiogram (01/21/2020); Cardiac catheterization (01/13/2020); chest tube insertion (Left, 01/02/2020); Aortic valve replacement (12/27/2011); transesophageal echocardiogram (12/07/2010); Coronary artery bypass graft (2001); bronchoscopy (N/A, 1/24/2020); bronchoscopy (1/24/2020); sigmoidoscopy (N/A, 2/1/2020); Colonoscopy (02/10/2020); Colonoscopy (N/A, 2/10/2020); and Pilonidal cyst excision (N/A, 2/11/2020). Restrictions  Restrictions/Precautions  Restrictions/Precautions: General Precautions, Fall Risk, Up as Tolerated  Position Activity Restriction  Other position/activity restrictions: Mobilize patient out of bed ASAP and a minimum of 3 times per day, up with assist, Right buttock wound vac. Use cushion while in chair. Subjective   General  Chart Reviewed: Yes  Response To Previous Treatment: Patient with no complaints from previous session. Family / Caregiver Present: No  Referring Practitioner: Lupe Burnett DO  Subjective  Subjective: pt remains impulsive, decreased safety awareness  General Comment  Comments: lowback pain with activity, limits activity tolerance   Pain Screening  Patient Currently in Pain: Yes  Pain Assessment  Pain Assessment: 0-10  Pain Level: 6  Pain Type: Chronic pain  Pain Location: Back  Pain Orientation: Lower  Pain Descriptors: Aching;Constant  Non-Pharmaceutical Pain Intervention(s): Repositioned; Therapeutic presence  Response to Pain Intervention: Patient Satisfied  Vital Signs  Patient Currently in Pain: Yes       Orientation  Orientation  Overall Orientation Status: Within Functional Limits  Cognition      Objective   Bed goals : \"I want to walk more. \"    Plan    Plan  Times per week: 5 of 7 days a week  Plan weeks: 2 week 4/2/20  Specific instructions for Next Treatment: progress mobility as tolerated. Current Treatment Recommendations: Strengthening, Gait Training, Patient/Caregiver Education & Training, Equipment Evaluation, Education, & procurement, Balance Training, Pain Management, Functional Mobility Training, Endurance Training, Home Exercise Program, Transfer Training, Wheelchair Mobility Training, Safety Education & Training, Stair training, ROM, ADL/Self-care Training  Safety Devices  Type of devices:  All fall risk precautions in place, Call light within reach, Nurse notified, Gait belt, Patient at risk for falls, Bed alarm in place, Left in bed     Therapy Time   Individual Concurrent Group Co-treatment   Time In 0800         Time Out 0900         Minutes 60         Timed Code Treatment Minutes: Angel 61 Time:   Individual Concurrent Group Co-treatment   Time In 1100         Time Out 1130         Minutes 30           Timed Code Treatment Minutes:  30    Total Treatment Minutes:  90    Matt Henderson PT

## 2020-03-25 LAB
ANION GAP SERPL CALCULATED.3IONS-SCNC: 10 MMOL/L (ref 3–16)
ANISOCYTOSIS: ABNORMAL
BANDED NEUTROPHILS RELATIVE PERCENT: 5 % (ref 0–7)
BASOPHILS ABSOLUTE: 0 K/UL (ref 0–0.2)
BASOPHILS RELATIVE PERCENT: 0 %
BUN BLDV-MCNC: 11 MG/DL (ref 7–20)
CALCIUM SERPL-MCNC: 9.7 MG/DL (ref 8.3–10.6)
CHLORIDE BLD-SCNC: 96 MMOL/L (ref 99–110)
CO2: 28 MMOL/L (ref 21–32)
CREAT SERPL-MCNC: 0.6 MG/DL (ref 0.8–1.3)
EOSINOPHILS ABSOLUTE: 0.4 K/UL (ref 0–0.6)
EOSINOPHILS RELATIVE PERCENT: 5 %
GFR AFRICAN AMERICAN: >60
GFR NON-AFRICAN AMERICAN: >60
GLUCOSE BLD-MCNC: 105 MG/DL (ref 70–99)
GLUCOSE BLD-MCNC: 109 MG/DL (ref 70–99)
GLUCOSE BLD-MCNC: 126 MG/DL (ref 70–99)
GLUCOSE BLD-MCNC: 128 MG/DL (ref 70–99)
GLUCOSE BLD-MCNC: 200 MG/DL (ref 70–99)
HCT VFR BLD CALC: 31.5 % (ref 40.5–52.5)
HEMOGLOBIN: 10 G/DL (ref 13.5–17.5)
INR BLD: 1.8 (ref 0.86–1.14)
LYMPHOCYTES ABSOLUTE: 2 K/UL (ref 1–5.1)
LYMPHOCYTES RELATIVE PERCENT: 24 %
MCH RBC QN AUTO: 27.2 PG (ref 26–34)
MCHC RBC AUTO-ENTMCNC: 31.8 G/DL (ref 31–36)
MCV RBC AUTO: 85.6 FL (ref 80–100)
MONOCYTES ABSOLUTE: 0.3 K/UL (ref 0–1.3)
MONOCYTES RELATIVE PERCENT: 4 %
MYELOCYTE PERCENT: 1 %
NEUTROPHILS ABSOLUTE: 5.6 K/UL (ref 1.7–7.7)
NEUTROPHILS RELATIVE PERCENT: 61 %
OVALOCYTES: ABNORMAL
PDW BLD-RTO: 19.1 % (ref 12.4–15.4)
PERFORMED ON: ABNORMAL
PLATELET # BLD: 251 K/UL (ref 135–450)
PLATELET SLIDE REVIEW: ADEQUATE
PMV BLD AUTO: 8 FL (ref 5–10.5)
POIKILOCYTES: ABNORMAL
POLYCHROMASIA: ABNORMAL
POTASSIUM REFLEX MAGNESIUM: 4.3 MMOL/L (ref 3.5–5.1)
PROTHROMBIN TIME: 21 SEC (ref 10–13.2)
RBC # BLD: 3.68 M/UL (ref 4.2–5.9)
SCHISTOCYTES: ABNORMAL
SLIDE REVIEW: ABNORMAL
SODIUM BLD-SCNC: 134 MMOL/L (ref 136–145)
SPHEROCYTES: ABNORMAL
WBC # BLD: 8.4 K/UL (ref 4–11)

## 2020-03-25 PROCEDURE — 97110 THERAPEUTIC EXERCISES: CPT

## 2020-03-25 PROCEDURE — 97116 GAIT TRAINING THERAPY: CPT

## 2020-03-25 PROCEDURE — 97530 THERAPEUTIC ACTIVITIES: CPT

## 2020-03-25 PROCEDURE — 85025 COMPLETE CBC W/AUTO DIFF WBC: CPT

## 2020-03-25 PROCEDURE — 92522 EVALUATE SPEECH PRODUCTION: CPT

## 2020-03-25 PROCEDURE — 80048 BASIC METABOLIC PNL TOTAL CA: CPT

## 2020-03-25 PROCEDURE — 85610 PROTHROMBIN TIME: CPT

## 2020-03-25 PROCEDURE — 6370000000 HC RX 637 (ALT 250 FOR IP): Performed by: PHYSICAL MEDICINE & REHABILITATION

## 2020-03-25 PROCEDURE — 1280000000 HC REHAB R&B

## 2020-03-25 PROCEDURE — 36415 COLL VENOUS BLD VENIPUNCTURE: CPT

## 2020-03-25 PROCEDURE — 97535 SELF CARE MNGMENT TRAINING: CPT

## 2020-03-25 PROCEDURE — 97605 NEG PRS WND THER DME<=50SQCM: CPT

## 2020-03-25 RX ORDER — WARFARIN SODIUM 2.5 MG/1
2.5 TABLET ORAL
Status: COMPLETED | OUTPATIENT
Start: 2020-03-25 | End: 2020-03-25

## 2020-03-25 RX ADMIN — CITALOPRAM HYDROBROMIDE 10 MG: 20 TABLET ORAL at 07:47

## 2020-03-25 RX ADMIN — LEVOTHYROXINE SODIUM 25 MCG: 25 TABLET ORAL at 05:00

## 2020-03-25 RX ADMIN — PANTOPRAZOLE SODIUM 40 MG: 40 TABLET, DELAYED RELEASE ORAL at 05:00

## 2020-03-25 RX ADMIN — ALLOPURINOL 300 MG: 300 TABLET ORAL at 07:48

## 2020-03-25 RX ADMIN — WARFARIN SODIUM 2.5 MG: 2.5 TABLET ORAL at 17:53

## 2020-03-25 RX ADMIN — OXYCODONE 5 MG: 5 TABLET ORAL at 07:47

## 2020-03-25 RX ADMIN — ATORVASTATIN CALCIUM 20 MG: 10 TABLET, FILM COATED ORAL at 21:10

## 2020-03-25 RX ADMIN — CARVEDILOL 12.5 MG: 6.25 TABLET, FILM COATED ORAL at 07:47

## 2020-03-25 RX ADMIN — MECLIZINE 25 MG: 12.5 TABLET ORAL at 07:47

## 2020-03-25 RX ADMIN — OXYCODONE 5 MG: 5 TABLET ORAL at 14:53

## 2020-03-25 RX ADMIN — INSULIN LISPRO 2 UNITS: 100 INJECTION, SOLUTION INTRAVENOUS; SUBCUTANEOUS at 21:10

## 2020-03-25 RX ADMIN — BISACODYL 5 MG: 5 TABLET, COATED ORAL at 07:47

## 2020-03-25 RX ADMIN — CARVEDILOL 12.5 MG: 6.25 TABLET, FILM COATED ORAL at 17:53

## 2020-03-25 ASSESSMENT — PAIN DESCRIPTION - ORIENTATION
ORIENTATION: LOWER

## 2020-03-25 ASSESSMENT — PAIN DESCRIPTION - FREQUENCY
FREQUENCY: CONTINUOUS
FREQUENCY: INTERMITTENT

## 2020-03-25 ASSESSMENT — PAIN DESCRIPTION - DESCRIPTORS: DESCRIPTORS: ACHING;SHARP

## 2020-03-25 ASSESSMENT — PAIN DESCRIPTION - ONSET: ONSET: ON-GOING

## 2020-03-25 ASSESSMENT — PAIN SCALES - GENERAL
PAINLEVEL_OUTOF10: 0
PAINLEVEL_OUTOF10: 5
PAINLEVEL_OUTOF10: 10
PAINLEVEL_OUTOF10: 7
PAINLEVEL_OUTOF10: 0
PAINLEVEL_OUTOF10: 0

## 2020-03-25 ASSESSMENT — PAIN DESCRIPTION - PAIN TYPE
TYPE: CHRONIC PAIN
TYPE: CHRONIC PAIN

## 2020-03-25 ASSESSMENT — PAIN DESCRIPTION - PROGRESSION: CLINICAL_PROGRESSION: NOT CHANGED

## 2020-03-25 ASSESSMENT — PAIN DESCRIPTION - LOCATION
LOCATION: BACK

## 2020-03-25 ASSESSMENT — PAIN - FUNCTIONAL ASSESSMENT
PAIN_FUNCTIONAL_ASSESSMENT: PREVENTS OR INTERFERES SOME ACTIVE ACTIVITIES AND ADLS
PAIN_FUNCTIONAL_ASSESSMENT: 0-10

## 2020-03-25 ASSESSMENT — PAIN SCALES - WONG BAKER: WONGBAKER_NUMERICALRESPONSE: 4

## 2020-03-25 NOTE — PROGRESS NOTES
Occupational Therapy  Facility/Department: [address] [place] [address]  Daily Treatment Note  NAME: Murry Mohs  : 1950  MRN: [id number]    Date of Service: 3/25/2020    Discharge Recommendations:  Home with Home health OT, S Level 3, 24 hour supervision or assist  OT Equipment Recommendations  Other: CTA: pt is not sure about tub transfer bench, maybe need dressing equipment    Assessment   Performance deficits / Impairments: Decreased functional mobility ; Decreased balance;Decreased ADL status; Decreased high-level IADLs;Decreased strength;Decreased safe awareness;Decreased cognition;Decreased endurance    Assessment: Pt is limited at this time due pain. Pt demo'd poor safety awareness with 4WW and standing at sink for adls. Pt required max cues for safety and balance. Pt demo'd poor cognition for carryover of education throughout session. Pt will likely need education on increased safety for home. Pt completed BUE exercises in bed for increased endurance and strength. Cont with POC. Second session: Patient continues to lack safety awareness, sequencing and problem solving. Pt needing frequent rest breaks throughout due to back pain but was able to gather items from couch with CGA due to poor balance with reaching out of VANIA. Pt with poor safety with standing and turning with wound vac, needs continued cues for IDing where to place wound vac for safety with mobility. Pt stood 3x3-4 minutes with 4-5 min seated rest break to complete full bathing, dressing, and grooming. Pt continues to show poor carryover of all education during session. Pt was educated and given orange theraband to complete BUE ex. Pt completed internal/external rotation, chest press, elbow flexion/extension 2x15 reps. Cont to rec 24 hour SPV at home with HHOT due to poor safety and increase risk of falls at home despite extensive education. Cont OT POC.     Prognosis: Good  OT Education: OT Role;Plan of Care;ADL Adaptive Strategies;Transfer Training;Equipment;Precautions  Patient Education: pain management, energy conservation, OT POC, goals vs. deficits  REQUIRES OT FOLLOW UP: Yes  Activity Tolerance  Activity Tolerance: Patient limited by fatigue;Patient limited by pain  Activity Tolerance: pt with increased pain needing encouragement to remain tstanding  Safety Devices  Safety Devices in place: Yes  Type of devices: Call light within reach;Nurse notified;Gait belt;Bed alarm in place; Left in bed;Patient at risk for falls; All fall risk precautions in place         Patient Diagnosis(es): There were no encounter diagnoses. has a past medical history of CAD (coronary artery disease), COPD (chronic obstructive pulmonary disease) ([facility] Diabetes mellitus ([facility] [address] Gout, Hemothorax, left, Hyperlipidemia, Hypertension, Obesity, Class I, BMI 30.0-34.9 (see actual BMI), S/P thoracotomy, Status post partial removal of lung, and Thyroid disease. has a past surgical history that includes thoracotomy (Left, 1/2/2020); bronchoscopy (N/A, 1/9/2020); Coronary angioplasty with stent (12/07/2010); pr ascend aorta graft w root replacment. valve conduit/coron reconstr (N/A, 1/21/2020); transesophageal echocardiogram (01/21/2020); Cardiac catheterization (01/13/2020); chest tube insertion (Left, 01/02/2020); Aortic valve replacement (12/27/2011); transesophageal echocardiogram (12/07/2010); Coronary artery bypass graft (2001); bronchoscopy (N/A, 1/24/2020); bronchoscopy (1/24/2020); sigmoidoscopy (N/A, 2/1/2020); Colonoscopy (02/10/2020); Colonoscopy (N/A, 2/10/2020); and Pilonidal cyst excision (N/A, 2/11/2020). Restrictions  Restrictions/Precautions  Restrictions/Precautions: General Precautions, Fall Risk, Up as Tolerated  Position Activity Restriction  Other position/activity restrictions: Right buttock wound vac. Use cushion while in chair.       Subjective   General  Chart Reviewed: Yes  Patient assessed for rehabilitation services?: Yes  Additional Pertinent Hx: Severe aortic valve stenosis, s/p Redo sternotomy 1-08-20  Response to previous treatment: Patient with no complaints from previous session  Family / Caregiver Present: No  Referring Practitioner: Joyce Pack MD  Diagnosis: generalized weakness  Subjective  Subjective: Pt continues to report pain and lacks safety awareness with therapy  General Comment  Comments: RN approved therapy  Pain Assessment  Pain Assessment: 0-10  Pain Level: 10  Pain Type: Chronic pain  Pain Location: Back  Pain Orientation: Lower  Non-Pharmaceutical Pain Intervention(s): Ambulation/Increased Activity; Distraction;Repositioned  Vital Signs  Patient Currently in Pain: Yes     Orientation  Orientation  Overall Orientation Status: Within Functional Limits     Objective    ADL  Grooming: Minimal assistance(decreased balance at sink for oral care due to pain, max cues for safety awareness)        Balance  Sitting Balance: Supervision  Standing Balance: Contact guard assistance(4WW)  Standing Balance  Time: ~4 minutes   Activity: standing for adls   Comment: Poor safety and attn with tasks, poor use of 4WW,   Functional Mobility  Functional - Mobility Device: 4-Wheeled Walker  Activity: To/from bathroom  Assist Level: Contact guard assistance  Functional Mobility Comments: poor safety awareness   Bed mobility  Supine to Sit: Unable to assess  Sit to Supine: Modified independent  Transfers  Sit to stand: Contact guard assistance  Stand to sit: Contact guard assistance  Transfer Comments: cues for safe hand placement, 4WW                       Cognition  Overall Cognitive Status: Exceptions  Arousal/Alertness: Appropriate responses to stimuli  Following Commands:  Follows multistep commands with repitition  Attention Span: Attends with cues to redirect  Memory: Appears intact  Safety Judgement: Decreased awareness of need for assistance;Decreased awareness of need for safety  Problem Solving: Assistance required to identify errors made;Assistance required to generate solutions;Assistance required to implement solutions  Insights: Decreased awareness of deficits  Initiation: Requires cues for some  Sequencing: Requires cues for some  Cognition Comment: Patient was easily distracted and needed cueing to focus on the task at hand. Type of ROM/Therapeutic Exercise  Type of ROM/Therapeutic Exercise: AROM  Comment: semi supine in bed/seated EOB, 3# weights used for some exercises  Exercises  Shoulder Elevation: x15  Shoulder Flexion: 15x to 90*  Shoulder ABduction: 15x  Shoulder ADduction: 15x  Horizontal ABduction: 15x  Horizontal ADduction: 15x  Elbow Flexion: 15x  Elbow Extension: 15x  Supination: 15x  Pronation: 15x  Wrist Flexion: 15x  Wrist Extension: 15x  Finger Flexion: 15+15x  Finger Extension: 15+15x  Other: x15 chest press,                     Plan   Plan  Times per week: 5 out of 7 days  Current Treatment Recommendations: Strengthening, Balance Training, Safety Education & Training, Self-Care / ADL, Functional Mobility Training, Endurance Training, Positioning    Goals  Short term goals  Time Frame for Short term goals: 1 week (3-26-20)  Short term goal 1: CGA with functional/toilet transfers   Short term goal 2: CGA static standing ADL's for 2 minutes   Short term goal 3: Pt will complete 20 reps of BUE exercises for increased endurance. Short term goal 4: Pt will complete LB dressing with S.   Long term goals  Time Frame for Long term goals : 2 week (4-2-20)  Long term goal 1: Pt will complete tub transfer with DME mod I.   Long term goal 2: Pt will complete sponge bath mod I.   Long term goal 3: Pt will complete LB dressing mod I.   Long term goal 4: Pt will complete light kitchen task mod I.    Patient Goals   Patient goals : \"to get around better\"       Therapy Time   Individual Concurrent Group Co-treatment   Time In 0900         Time Out 0930         Minutes 30         Timed Code Treatment Minutes: 30

## 2020-03-25 NOTE — PROGRESS NOTES
Department of Physical Medicine & Rehabilitation  Progress Note    Patient Identification:  Marylen Rucks  [id number]  : 1950  Admit date: 3/19/2020    Chief Complaint: Debility    Subjective:   Limited by impulsivity; no new c/o. ROS: No f/c, n/v, cp     Objective:  Patient Vitals for the past 24 hrs:   BP Temp Temp src Pulse Resp SpO2   20 0743 (!) 123/50 97.9 °F (36.6 °C) Oral 80 16 95 %   20 130/66 97.5 °F (36.4 °C) Oral 80 16 95 %   20 1730 (!) 152/66 -- -- 88 -- --     Const: Alert. No distress, pleasant. HEENT: Normocephalic, atraumatic. Normal sclera/conjunctiva. MMM. CV: Regular rate and rhythm. Resp: No respiratory distress. Lungs CTAB. Abd: Soft, nontender, nondistended, NABS+   Ext: No edema. Neuro: Alert, oriented, appropriately interactive. Psych: Cooperative, appropriate mood and affect    Laboratory data: Available via EMR.    Last 24 hour lab  Recent Results (from the past 24 hour(s))   POCT Glucose    Collection Time: 20 11:33 AM   Result Value Ref Range    POC Glucose 180 (H) 70 - 99 mg/dl    Performed on ACCU-CHEK    POCT Glucose    Collection Time: 20  4:16 PM   Result Value Ref Range    POC Glucose 158 (H) 70 - 99 mg/dl    Performed on ACCU-CHEK    POCT Glucose    Collection Time: 20  8:05 PM   Result Value Ref Range    POC Glucose 174 (H) 70 - 99 mg/dl    Performed on ACCU-CHEK    POCT Glucose    Collection Time: 20  6:00 AM   Result Value Ref Range    POC Glucose 105 (H) 70 - 99 mg/dl    Performed on ACCU-CHEK    Protime-INR    Collection Time: 20  6:49 AM   Result Value Ref Range    Protime 21.0 (H) 10.0 - 13.2 sec    INR 1.80 (H) 0.86 - 5.71   Basic Metabolic Panel w/ Reflex to MG    Collection Time: 20  6:49 AM   Result Value Ref Range    Sodium 134 (L) 136 - 145 mmol/L    Potassium reflex Magnesium 4.3 3.5 - 5.1 mmol/L    Chloride 96 (L) 99 - 110 mmol/L    CO2 28 21 - 32 mmol/L    Anion Gap 10 3 - 16    Glucose 109 (H) 70 - 99 mg/dL    BUN 11 7 - 20 mg/dL    CREATININE 0.6 (L) 0.8 - 1.3 mg/dL    GFR Non-African American >60 >60    GFR African American >60 >60    Calcium 9.7 8.3 - 10.6 mg/dL   CBC auto differential    Collection Time: 03/25/20  6:49 AM   Result Value Ref Range    WBC 8.4 4.0 - 11.0 K/uL    RBC 3.68 (L) 4.20 - 5.90 M/uL    Hemoglobin 10.0 (L) 13.5 - 17.5 g/dL    Hematocrit 31.5 (L) 40.5 - 52.5 %    MCV 85.6 80.0 - 100.0 fL    MCH 27.2 26.0 - 34.0 pg    MCHC 31.8 31.0 - 36.0 g/dL    RDW 19.1 (H) 12.4 - 15.4 %    Platelets 798 289 - 158 K/uL    MPV 8.0 5.0 - 10.5 fL    PLATELET SLIDE REVIEW Adequate     SLIDE REVIEW see below     Neutrophils % 61.0 %    Lymphocytes % 24.0 %    Monocytes % 4.0 %    Eosinophils % 5.0 %    Basophils % 0.0 %    Neutrophils Absolute 5.6 1.7 - 7.7 K/uL    Lymphocytes Absolute 2.0 1.0 - 5.1 K/uL    Monocytes Absolute 0.3 0.0 - 1.3 K/uL    Eosinophils Absolute 0.4 0.0 - 0.6 K/uL    Basophils Absolute 0.0 0.0 - 0.2 K/uL    Bands Relative 5 0 - 7 %    Myelocyte Percent 1 (A) %    Anisocytosis Occasional (A)     Polychromasia 1+ (A)     Poikilocytes Occasional (A)     Schistocytes Occasional (A)     Ovalocytes Occasional (A)     Spherocytes Occasional (A)    POCT Glucose    Collection Time: 03/25/20 10:54 AM   Result Value Ref Range    POC Glucose 126 (H) 70 - 99 mg/dl    Performed on ACCU-CHEK        Therapy progress:  PT  Position Activity Restriction  Other position/activity restrictions: Right buttock wound vac. Use cushion while in chair. Objective     Sit to Stand: Stand by assistance  Stand to sit: Stand by assistance  Bed to Chair: Minimal assistance(safety assist d/t pt neglect of lines, hurried movements)  Device: Rolling Walker  Assistance: Stand by assistance  Distance: 30 ft, 427 ft  OT  PT Equipment Recommendations  Equipment Needed: Yes  Mobility Devices: [place]: Rolling  Other: To be determined.    Toilet - Technique: Ambulating  Equipment Used: Standard toilet  Toilet Transfers Comments: impulsive with increased speed  Assessment        SLP          Body mass index is 25.7 kg/m². Assessment and Plan:  1. Debility- Frequent falls at home with chronic leg weakness post operation in January- unable to ambulate without significant assist. PT/OT. 2. HTN/CAD- s/p stent placement in 2010. Controlled on home meds.   3. HLD- continue home statin   4. Afib- Chronic Paroxysmal afib- Lovenox to warfarin bridge- Follow INR  5. Sacral ulcer- Stage 4- wound care consulted- Wound vac as needed   6. DM2- controlled with oral medications at home- continue current SSI while in hospital  7. Anemia- Continue Lab draws MWF   8. Cog deficits. SLP consulted. HERMAN: 3/31 home with home health  DME: NICOLE CHAN    Interdisciplinary team conference was held today with entire rehab treatment team including PT, OT, SLP, Dietician, RN, and SW. Discussion focused on progress toward rehab goals and discharge planning. Barriers: cognition, impulsivity, wound care. Total treatment time >35 min with greater than 50% spent in care coordination. Rivera Geller MD 3/25/2020, 11:23 AM

## 2020-03-25 NOTE — PROGRESS NOTES
3:11 PM   Margins Defined edges;Epibole (rolled edges) 3/25/2020  3:11 PM   Luz-wound Assessment Blanchable erythema;Denuded 3/25/2020  3:11 PM   Non-staged Wound Description Full thickness 3/25/2020  3:11 PM   [place]%Wound Bed 10 3/18/2020  4:51 PM   Red%Wound Bed 100 3/25/2020  3:11 PM   Culture Taken No 3/25/2020  3:11 PM   Number of days: 8       Wound 03/16/20 Heel Right (Active)   Wound Image   3/23/2020  2:24 PM   Wound Pressure Unstageable 3/24/2020  7:00 AM   Offloading for Diabetic Foot Ulcers No offloading required 3/22/2020  8:52 PM   Dressing Status Other (Comment) 3/23/2020  2:24 PM   Dressing Changed Changed/New 3/20/2020  5:24 PM   Dressing/Treatment Betadine swabs;Open to air 3/24/2020  7:00 AM   Dressing Change Due 03/23/20 3/23/2020  8:50 AM   Wound Length (cm) 1 cm 3/23/2020  2:24 PM   Wound Width (cm) 3 cm 3/23/2020  2:24 PM   Wound Depth (cm) 0 cm 3/23/2020  2:24 PM   Wound Surface Area (cm^2) 3 cm^2 3/23/2020  2:24 PM   Change in Wound Size % (l*w) 0 3/23/2020  2:24 PM   Wound Volume (cm^3) 0 cm^3 3/23/2020  2:24 PM   Post-Procedure Length (cm) 0 cm 3/17/2020 11:14 AM   Post-Procedure Width (cm) 0 cm 3/17/2020 11:14 AM   Post-Procedure Depth (cm) 0 cm 3/17/2020 11:14 AM   Post-Procedure Surface Area (cm^2) 0 cm^2 3/17/2020 11:14 AM   Post-Procedure Volume (cm^3) 0 cm^3 3/17/2020 11:14 AM   Distance Tunneling (cm) 0 cm 3/23/2020  2:24 PM   Tunneling Position ___ O'Clock 0 3/23/2020  2:24 PM   Undermining Starts ___ O'Clock 0 3/23/2020  2:24 PM   Undermining Ends___ O'Clock 0 3/23/2020  2:24 PM   Undermining Maxium Distance (cm) 0 3/23/2020  2:24 PM   Wound Assessment Dry;Black 3/24/2020  7:00 AM   Drainage Amount None 3/23/2020  2:24 PM   Odor None 3/23/2020  2:24 PM   Margins Attached edges; Defined edges 3/23/2020  2:24 PM   Luz-wound Assessment Clean;Dry; Intact 3/24/2020  7:00 AM   [place]%Wound Bed 80 3/18/2020  9:30 AM   Red%Wound Bed 15 3/18/2020  9:30 AM   Black%Wound Bed 100 3/23/2020  2:24 Heel Right-Dressing/Treatment: Betadine swabs, Open to air  Wound 03/16/20 Sacrum with wound vac in place-Dressing/Treatment: Barrier film, Vacuum dressing  Wound 03/16/20 Heel Left unstageable with necrotic tissue-Dressing/Treatment: Betadine swabs, Open to air     Recommendation:   Sacrum - removed NPWT dressing; clean wound with NSS; pat dry; barrier film and hydrocolloid around the wound; frame the wound with the NPWT drape; track along R Hip; black foam to wound bed; cover with drape; attach to tracker pad; (1) black foam; Veriflo with normal saline instil 32 ml dwell x 2 minutes every 2 hours. Wound Care RN to change NPWT dressing on M, W, and F. Call wound care for problems with seal. [phone], Pager [phone].       Specialty Bed Required : Yes   [x] Low Air Loss   [x] Pressure Redistribution  [] Fluid Immersion  [] Bariatric  [] Total Pressure Relief  [] Other:     Current Diet: DIET CARB CONTROL; Dietary Nutrition Supplements: Diabetic Oral Supplement  Dietician consult:  Yes    Discharge Plan:  Placement for patient upon discharge: skilled nursing   Patient appropriate for Outpatient [address]: Yes    Referrals:  []  setting up home care  [] [address] [facility]  [] Supplies  [] Other    Patient/Caregiver Teaching: Updated on progress of wound bed.   Level of patient/caregiver understanding able to:   [x] Indicates understanding       [] Needs reinforcement  [] Unsuccessful      [] Verbal Understanding  [] Demonstrated understanding       [] No evidence of learning  [] Refused teaching         [] N/A       Electronically signed by Micheal Montano RN, MSN, Gracie Leach on 3/25/2020 at 3:56 PM

## 2020-03-25 NOTE — PROGRESS NOTES
Speech Language Pathology  Facility/Department: [facility]  Initial Speech/Language/Cognitive Assessment    NAME: Sheridan Mead  : 1950   MRN: [id number]  ADMISSION DATE: 3/19/2020  ADMITTING DIAGNOSIS: has Hemothorax; Tobacco abuse; AS (aortic stenosis); CAD (coronary artery disease) s/p stent in ; Essential hypertension; Hyperlipidemia; S/P AVR (aortic valve replacement); Atelectasis; Leukocytosis; Hyperglycemia; Acute bronchospasm; Atrial fibrillation with RVR ([address] Acute on chronic diastolic congestive heart failure ([address] Myocardial infarction ([address] Obesity, Class I, BMI 30.0-34.9 (see actual BMI); S/P thoracotomy; Status post partial removal of lung; Hemothorax, left; Restrictive lung disease; Hypernatremia; Lower gastrointestinal hemorrhage; Type 2 diabetes mellitus, without long-term current use of insulin ([address] Bright red blood per rectum; Sacral wound; Generalized weakness; DMII (diabetes mellitus, type 2) ([address] Atrial fibrillation ([address] Frequent falls; Sacral ulcer ([address] Elevated troponin; Hyponatremia; Subtherapeutic international normalized ratio (INR); and Debility on their problem list.  DATE ONSET: admit 3/19/20    Date of Eval: 3/25/2020   Evaluating Therapist: APPLE Pena    RECENT RESULTS  CT OF HEAD/MRI: 20  Impression   No evidence of acute intracranial abnormality on a study mildly limited as   described above. Primary Complaint: Pt reports long standing memory issues since ~ when he had a heart attack; reports being on disability since this time related to changes to memory. States \"It took them too long to get the oxygen on me and I don't remember much since. \"    Pain:  Pain Assessment  Pain Assessment: 0-10  Pain Level: 5  Holman-Baker Pain Rating: Hurts little more  Patient's Stated Pain Goal: No pain  Pain Type: Chronic pain  Pain Location: Back  Pain Orientation: Lower  Pain Descriptors: Aching, Sharp  Pain Frequency: Continuous  Pain Onset: related to ADLs and (I)ADLs with min verbal cues  Long-term Goals  Timeframe for Long-term Goals: 1 week (04/01/20)  Goal 1: Pt will use external aids to recall information to improve safety and independence  Goal 2: Pt will improve safety and problem solving related ADLs and (I)ADLs   Patient/family involved in developing goals and treatment plan: Yes, patient; pt with dec insight    Subjective:   Previous level of function and limitations: Suspect pt has baseline cognitive/memory deficits as per his reports since ~2000     Social/Functional History  Lives With: Alone  Type of Home: House  ADL Assistance: Independent  Bath: Independent  Dressing: Independent  Toileting: Independent  Homemaking Assistance: Independent(pt reports he completes meds, money, finances and meals)  Homemaking Responsibilities: Yes  Active : Yes  Mode of Transportation: Car  Occupation: On disability; Retired  Vision  Vision: Impaired  Vision Exceptions: Wears glasses at all times  Hearing  Hearing: Within functional limits           Objective:     Oral/Motor  Oral Motor: Within functional limits    Auditory Comprehension  Comprehension: Within Functional Limits(to informal conversation)         Expression  Primary Mode of Expression: Verbal    Verbal Expression  Verbal Expression: Within functional limits(to information conversation)    Written Expression  Dominant Hand: Right  Written Expression: Within Functional Limits(copying an address and writing out a check)    Motor Speech  Motor Speech: Within Functional Limits    Pragmatics/Social Functioning  Pragmatics: Within functional limits    Cognition:      Orientation  Overall Orientation Status: Within Normal Limits(indep utillized calendar to confirm date)  Attention  Attention: Exceptions to [facility]  Alternating Attention: Mild  Divided Attention: Mild  Memory  Memory: Exceptions to [facility]  Daily Routines: Mild  Long-term Memory:  Moderate(reports impairment since 2000 heart attack)  Short-term Memory: Severe(reports impairment since 2000 heart attack)  Working Memory: Moderate(required repetition on instructions; reports impairment since 2000 heart attack)  Problem Solving  Problem Solving: Exceptions to [employer]  Complex Functional Tasks: Mild  Managing Finances: Mild  Safety/Judgement  Safety/Judgement: Exceptions to [employer]  Complex Functional Tasks: Mild  Novel Situations: Moderate  Unable to Self-monitor and Self-correct Consistently: Moderate  Insight: Mild  ; Moderate      Additional Assessments:   SLUMS  Oriented x3  Recall: 1/5 words after delay  Money: 0/2; impaired working memory impacted pt's understanding of problem despite repetition from therapist  Reverse number sequences: 2/3 correct  Clock drawing task: correct sequencing and contour of numbers, incorrect hand placement at 2:10  Visual spatial task: 2/2 correct  Story recall: 0/4  Overall score: 11/30    Portions of MARCELLO  Subtest 1 Telling Time: 9/10  Subtest 2: counting money: 7/10  Subtest 3: addressing an envelope: 10/10  Subtest 4: Daily math problems: 6/10  Subtest 5 (partial): Pt correctly wrote check, 6/6        Prognosis:  Speech Therapy Prognosis  Prognosis: Fair  Prognosis Considerations: Other (Comment)  Additional Comments: pt reports memory (and likely other cognitive deficits) since heart attack in 2000 \"They left me off the oxygen too long and I can't remember much since. \"  Individuals consulted  Consulted and agree with results and recommendations: Patient    Education:  Patient Education: pt educated on results of evaluation; recommendations for creation of external memory supports and cognitive goals to support safety and independence  Patient Education Response: Needs reinforcement(resistant to education/recommendations)  Safety Devices in place: Yes  Type of devices: Patient at risk for falls; Bed alarm in place; Left in bed;Nurse notified;Call light within reach    Therapy Time:   Individual Concurrent Group

## 2020-03-26 LAB
GLUCOSE BLD-MCNC: 116 MG/DL (ref 70–99)
GLUCOSE BLD-MCNC: 120 MG/DL (ref 70–99)
GLUCOSE BLD-MCNC: 134 MG/DL (ref 70–99)
GLUCOSE BLD-MCNC: 190 MG/DL (ref 70–99)
INR BLD: 1.6 (ref 0.86–1.14)
PERFORMED ON: ABNORMAL
PROTHROMBIN TIME: 18.7 SEC (ref 10–13.2)

## 2020-03-26 PROCEDURE — 1280000000 HC REHAB R&B

## 2020-03-26 PROCEDURE — 97110 THERAPEUTIC EXERCISES: CPT

## 2020-03-26 PROCEDURE — 97530 THERAPEUTIC ACTIVITIES: CPT

## 2020-03-26 PROCEDURE — 2580000003 HC RX 258

## 2020-03-26 PROCEDURE — 97535 SELF CARE MNGMENT TRAINING: CPT

## 2020-03-26 PROCEDURE — 36415 COLL VENOUS BLD VENIPUNCTURE: CPT

## 2020-03-26 PROCEDURE — 97129 THER IVNTJ 1ST 15 MIN: CPT

## 2020-03-26 PROCEDURE — 85610 PROTHROMBIN TIME: CPT

## 2020-03-26 PROCEDURE — 97130 THER IVNTJ EA ADDL 15 MIN: CPT

## 2020-03-26 PROCEDURE — 6370000000 HC RX 637 (ALT 250 FOR IP): Performed by: PHYSICAL MEDICINE & REHABILITATION

## 2020-03-26 PROCEDURE — 97116 GAIT TRAINING THERAPY: CPT

## 2020-03-26 RX ORDER — SODIUM CHLORIDE 9 MG/ML
INJECTION, SOLUTION INTRAVENOUS
Status: COMPLETED
Start: 2020-03-26 | End: 2020-03-26

## 2020-03-26 RX ORDER — WARFARIN SODIUM 2 MG/1
4 TABLET ORAL
Status: COMPLETED | OUTPATIENT
Start: 2020-03-26 | End: 2020-03-26

## 2020-03-26 RX ADMIN — CARVEDILOL 12.5 MG: 6.25 TABLET, FILM COATED ORAL at 07:43

## 2020-03-26 RX ADMIN — PANTOPRAZOLE SODIUM 40 MG: 40 TABLET, DELAYED RELEASE ORAL at 06:20

## 2020-03-26 RX ADMIN — INSULIN LISPRO 1 UNITS: 100 INJECTION, SOLUTION INTRAVENOUS; SUBCUTANEOUS at 20:19

## 2020-03-26 RX ADMIN — ATORVASTATIN CALCIUM 20 MG: 10 TABLET, FILM COATED ORAL at 20:19

## 2020-03-26 RX ADMIN — DICLOFENAC 4 G: 10 GEL TOPICAL at 15:31

## 2020-03-26 RX ADMIN — OXYCODONE 5 MG: 5 TABLET ORAL at 07:42

## 2020-03-26 RX ADMIN — LEVOTHYROXINE SODIUM 25 MCG: 25 TABLET ORAL at 06:20

## 2020-03-26 RX ADMIN — SODIUM CHLORIDE 1000 ML: 9 INJECTION, SOLUTION INTRAVENOUS at 20:19

## 2020-03-26 RX ADMIN — CARVEDILOL 12.5 MG: 6.25 TABLET, FILM COATED ORAL at 17:29

## 2020-03-26 RX ADMIN — CITALOPRAM HYDROBROMIDE 10 MG: 20 TABLET ORAL at 07:42

## 2020-03-26 RX ADMIN — MECLIZINE 25 MG: 12.5 TABLET ORAL at 07:43

## 2020-03-26 RX ADMIN — ALLOPURINOL 300 MG: 300 TABLET ORAL at 07:42

## 2020-03-26 RX ADMIN — BISACODYL 5 MG: 5 TABLET, COATED ORAL at 07:43

## 2020-03-26 RX ADMIN — WARFARIN SODIUM 4 MG: 2 TABLET ORAL at 17:30

## 2020-03-26 ASSESSMENT — PAIN DESCRIPTION - PAIN TYPE
TYPE: CHRONIC PAIN

## 2020-03-26 ASSESSMENT — PAIN DESCRIPTION - PROGRESSION
CLINICAL_PROGRESSION: NOT CHANGED
CLINICAL_PROGRESSION: NOT CHANGED

## 2020-03-26 ASSESSMENT — PAIN DESCRIPTION - FREQUENCY
FREQUENCY: CONTINUOUS
FREQUENCY: CONTINUOUS

## 2020-03-26 ASSESSMENT — PAIN DESCRIPTION - DESCRIPTORS
DESCRIPTORS: ACHING;SHARP
DESCRIPTORS: ACHING;CONSTANT

## 2020-03-26 ASSESSMENT — PAIN DESCRIPTION - ORIENTATION
ORIENTATION: LOWER

## 2020-03-26 ASSESSMENT — PAIN DESCRIPTION - LOCATION
LOCATION: BACK

## 2020-03-26 ASSESSMENT — PAIN - FUNCTIONAL ASSESSMENT
PAIN_FUNCTIONAL_ASSESSMENT: PREVENTS OR INTERFERES SOME ACTIVE ACTIVITIES AND ADLS
PAIN_FUNCTIONAL_ASSESSMENT: PREVENTS OR INTERFERES SOME ACTIVE ACTIVITIES AND ADLS

## 2020-03-26 ASSESSMENT — PAIN SCALES - GENERAL
PAINLEVEL_OUTOF10: 6
PAINLEVEL_OUTOF10: 4
PAINLEVEL_OUTOF10: 6
PAINLEVEL_OUTOF10: 5
PAINLEVEL_OUTOF10: 0

## 2020-03-26 ASSESSMENT — PAIN DESCRIPTION - ONSET
ONSET: ON-GOING
ONSET: ON-GOING

## 2020-03-26 NOTE — PROGRESS NOTES
Gout, Hemothorax, left, Hyperlipidemia, Hypertension, Obesity, Class I, BMI 30.0-34.9 (see actual BMI), S/P thoracotomy, Status post partial removal of lung, and Thyroid disease. has a past surgical history that includes thoracotomy (Left, 1/2/2020); bronchoscopy (N/A, 1/9/2020); Coronary angioplasty with stent (12/07/2010); pr ascend aorta graft w root replacment. valve conduit/coron reconstr (N/A, 1/21/2020); transesophageal echocardiogram (01/21/2020); Cardiac catheterization (01/13/2020); chest tube insertion (Left, 01/02/2020); Aortic valve replacement (12/27/2011); transesophageal echocardiogram (12/07/2010); Coronary artery bypass graft (2001); bronchoscopy (N/A, 1/24/2020); bronchoscopy (1/24/2020); sigmoidoscopy (N/A, 2/1/2020); Colonoscopy (02/10/2020); Colonoscopy (N/A, 2/10/2020); and Pilonidal cyst excision (N/A, 2/11/2020). Restrictions  Restrictions/Precautions  Restrictions/Precautions: General Precautions, Fall Risk, Up as Tolerated  Position Activity Restriction  Other position/activity restrictions: Right buttock wound vac. Use cushion while in chair. Subjective   General  Chart Reviewed: Yes  Response To Previous Treatment: Patient with no complaints from previous session. Family / Caregiver Present: No  Referring Practitioner: Jenelle Hodgkins Heis, DO  Subjective  Subjective: Pt agrees to PT session. General Comment  Comments: lowback pain with activity  Pain Screening  Patient Currently in Pain: Yes  Pain Assessment  Pain Assessment: 0-10  Pain Level: 6  Pain Type: Chronic pain  Pain Location: Back  Non-Pharmaceutical Pain Intervention(s): Therapeutic presence; Ambulation/Increased Activity; Therapeutic touch;Repositioned;Splinting(abdominal bracing with activity)  Vital Signs  Patient Currently in Pain: Yes       Orientation  Orientation  Overall Orientation Status: Within Functional Limits       Objective   Bed mobility  Rolling to Left: Modified independent  Rolling to Right: Modified hooklying hip abduction with bolster squeeze  Knee Long Arc Quad: x 20 B  Ankle Pumps: x 20 B HR/ TR  Core Strengthening: 2 x 10 hooklying crunches  Comments: also 3 x 10 hooklying trunk rotation in pain free ROM. Pt also performed standing step ups on 6 inch step 2 x 10 B, standing hip flexion, hip abduction, hip extension, standing march, mini squats, all performed 2x 10 B with abdominal bracing throughout exercise                    Goals  Short term goals  Time Frame for Short term goals: 3/26/20  Short term goal 1: Supine <> sit with modified independence. - GOAL MET 3/24. completes bed mobility with mod ind  Short term goal 2: Sit <> stand with modified independence. Short term goal 3: Bed <> chair with LRAD and supervision- GOAL MET 3/24. completes transfers with supv  Short term goal 4: Ambulate 50 feet with LRAD and supervision. Short term goal 5: Ascend 5 steps with LRAD and rail with supervision. Long term goals  Time Frame for Long term goals : 4/2/20  Long term goal 1: Perform car transfer with modified independence. Long term goal 2: Ambulate 150 feet with LRAD and modified independence. Long term goal 3: Ascend 12 steps with rail and modified independence. Long term goal 4: Patient will be independent with his home exercise program.   Patient Goals   Patient goals : \"I want to walk more. \"    Plan    Plan  Times per week: 5 of 7 days a week  Plan weeks: 2 week 4/2/20  Specific instructions for Next Treatment: progress mobility as tolerated. Current Treatment Recommendations: Strengthening, Gait Training, Patient/Caregiver Education & Training, Equipment Evaluation, Education, & procurement, Balance Training, Pain Management, Functional Mobility Training, Endurance Training, Home Exercise Program, Transfer Training, Wheelchair Mobility Training, Safety Education & Training, Stair training, ROM, ADL/Self-care Training  Safety Devices  Type of devices:  All fall risk precautions in place,

## 2020-03-26 NOTE — PROGRESS NOTES
Hypertension, Obesity, Class I, BMI 30.0-34.9 (see actual BMI), S/P thoracotomy, Status post partial removal of lung, and Thyroid disease. has a past surgical history that includes thoracotomy (Left, 1/2/2020); bronchoscopy (N/A, 1/9/2020); Coronary angioplasty with stent (12/07/2010); pr ascend aorta graft w root replacment. valve conduit/coron reconstr (N/A, 1/21/2020); transesophageal echocardiogram (01/21/2020); Cardiac catheterization (01/13/2020); chest tube insertion (Left, 01/02/2020); Aortic valve replacement (12/27/2011); transesophageal echocardiogram (12/07/2010); Coronary artery bypass graft (2001); bronchoscopy (N/A, 1/24/2020); bronchoscopy (1/24/2020); sigmoidoscopy (N/A, 2/1/2020); Colonoscopy (02/10/2020); Colonoscopy (N/A, 2/10/2020); and Pilonidal cyst excision (N/A, 2/11/2020). Restrictions  Restrictions/Precautions  Restrictions/Precautions: General Precautions, Fall Risk, Up as Tolerated  Position Activity Restriction  Other position/activity restrictions: Right buttock wound vac. Use cushion while in chair. Subjective   General  Chart Reviewed: Yes  Response To Previous Treatment: Patient with no complaints from previous session. Family / Caregiver Present: No  Referring Practitioner: Remy Burnett DO  Subjective  Subjective: Pt agrees to PT session. General Comment  Comments: lowback pain with activity  Pain Screening  Patient Currently in Pain: Yes  Pain Assessment  Pain Assessment: 0-10  Pain Level: 6  Pain Type: Chronic pain  Pain Location: Back  Pain Orientation: Lower  Non-Pharmaceutical Pain Intervention(s): Repositioned; Therapeutic presence; Ambulation/Increased Activity(abdominal bracing with activity)  Vital Signs  Patient Currently in Pain: Yes       Orientation  Orientation  Overall Orientation Status: Within Functional Limits       Objective   Bed mobility  Rolling to Left: Modified independent  Rolling to Right: Modified independent  Supine to Sit: Modified independent  Sit to Supine: Modified independent  Scooting: Modified independent  Transfers  Sit to Stand: Stand by assistance  Stand to sit: Stand by assistance  Bed to Chair: Stand by assistance(close SBA mat table to chair)  Comment: improved safety awareness today  Ambulation  Ambulation?: Yes  More Ambulation?: Yes  Ambulation 1  Surface: level tile  Device: Rolling Walker  Assistance: Stand by assistance  Quality of Gait: narrow VANIA, decreased heel strike, decreased step length, cues early in walk to avoid too quick turns leading to mild unsteadiness, SBA for this trial of ambulation with cues to keep VANIA within walker frame during turns, but no scissoring noted with turns this afternoon session  Gait Deviations: Slow Beena;Deviated path;Shuffles;Decreased step length;Decreased step height  Distance: 265 ft  Comments: cues early in walk to avoid too quick turns leading to mild unsteadiness, pt continued to demo unsafe turns later in walk. improved stamina for distance. Ambulation 2  Surface - 2: level tile  Device 2: Rollator  Assistance 2: Contact guard assistance;Stand by assistance  Quality of Gait 2: Narrow VANIA with decreased balance for 2nd gait trial, with L knee partially giving way with most steps again this afternoon, CGA for second gait trial due to this, pt reporting fatigue and low back pain. Distance: 270 ft  Stairs/Curb  Stairs?: Yes  Stairs  # Steps : 12  Stairs Height: 6\"  Rails: Bilateral  Device: No Device  Assistance: Contact guard assistance  Comment: navigates flight of steps with heavy use of UE's on BHR reciprocal pattern ascending and step-to pattern descent with CGA and cues to slow descent.      Balance  Posture: Fair  Sitting - Static: Good  Sitting - Dynamic: Good  Standing - Static: Fair;+  Standing - Dynamic: Fair  Exercises  Bridging: (2 x 10 B)  Straight Leg Raise: 2 x 10 B  Heelslides: 2 x 10 B  Hip Flexion: 2 x 10 supine hookying hip flexion with abdominal bracing plus x 20 seated march B  Hip Abduction: 2 x 10 with grey TB, plus 2 x 10 hooklying hip abduction with bolster squeeze  Knee Long Arc Quad: x 20 B  Ankle Pumps: x 20 B HR/ TR  Core Strengthening: 2 x 10 hooklying crunches  Comments: also 3 x 10 hooklying trunk rotation in pain free ROM. Pt also performed standing step ups on 6 inch step 2 x 10 B, standing hip flexion, hip abduction, hip extension, standing march, mini squats, all performed 2x 10 B with abdominal bracing throughout exercise  Other exercises  Other exercises 1: seated HR/ TR x 20 B; standing in parallel bars hip abduction, flexion, extension 2 x 10 resp with abdominal bracing, 2 x 10 mini squats in parallel bars with abdominal bracing; seated chair push ups 2 x 5 to alleviated LBP and for pressure management                          Goals  Short term goals  Time Frame for Short term goals: 3/26/20  Short term goal 1: Supine <> sit with modified independence. - GOAL MET 3/24. completes bed mobility with mod ind  Short term goal 2: Sit <> stand with modified independence. Short term goal 3: Bed <> chair with LRAD and supervision- GOAL MET 3/24. completes transfers with supv  Short term goal 4: Ambulate 50 feet with LRAD and supervision. Short term goal 5: Ascend 5 steps with LRAD and rail with supervision. Long term goals  Time Frame for Long term goals : 4/2/20  Long term goal 1: Perform car transfer with modified independence. Long term goal 2: Ambulate 150 feet with LRAD and modified independence. Long term goal 3: Ascend 12 steps with rail and modified independence. Long term goal 4: Patient will be independent with his home exercise program.   Patient Goals   Patient goals : \"I want to walk more. \"    Plan    Plan  Times per week: 5 of 7 days a week  Plan weeks: 2 week 4/2/20  Specific instructions for Next Treatment: progress mobility as tolerated.    Current Treatment Recommendations: Strengthening, Gait Training, Patient/Caregiver Education & Training, Equipment Evaluation, Education, & procurement, Balance Training, Pain Management, Functional Mobility Training, Endurance Training, Home Exercise Program, Transfer Training, Wheelchair Mobility Training, Safety Education & Training, Stair training, ROM, ADL/Self-care Training  Safety Devices  Type of devices:  All fall risk precautions in place, Call light within reach, Nurse notified, Gait belt, Patient at risk for falls, Chair alarm in place, Left in bed, Bed alarm in place     Therapy Time   Individual Concurrent Group Co-treatment   Time In 1302         Time Out 1329         Minutes 27         Timed Code Treatment Minutes: MIRZA Delacruz

## 2020-03-26 NOTE — PROGRESS NOTES
Training;Equipment;Precautions  Patient Education: pain management, energy conservation, OT POC, goals vs. deficits  REQUIRES OT FOLLOW UP: Yes  Activity Tolerance  Activity Tolerance: Patient limited by fatigue;Patient limited by pain  Activity Tolerance: pt with increased pain needing encouragement to remain standing  Safety Devices  Safety Devices in place: Yes  Type of devices: Call light within reach;Nurse notified;Gait belt;Bed alarm in place; Left in bed;Patient at risk for falls; All fall risk precautions in place  Restraints  Initially in place: No         Patient Diagnosis(es): There were no encounter diagnoses. has a past medical history of CAD (coronary artery disease), COPD (chronic obstructive pulmonary disease) ([facility] [address] Diabetes mellitus ([facility] [address] Gout, Hemothorax, left, Hyperlipidemia, Hypertension, Obesity, Class I, BMI 30.0-34.9 (see actual BMI), S/P thoracotomy, Status post partial removal of lung, and Thyroid disease. has a past surgical history that includes thoracotomy (Left, 1/2/2020); bronchoscopy (N/A, 1/9/2020); Coronary angioplasty with stent (12/07/2010); pr ascend aorta graft w root replacment. valve conduit/coron reconstr (N/A, 1/21/2020); transesophageal echocardiogram (01/21/2020); Cardiac catheterization (01/13/2020); chest tube insertion (Left, 01/02/2020); Aortic valve replacement (12/27/2011); transesophageal echocardiogram (12/07/2010); Coronary artery bypass graft (2001); bronchoscopy (N/A, 1/24/2020); bronchoscopy (1/24/2020); sigmoidoscopy (N/A, 2/1/2020); Colonoscopy (02/10/2020); Colonoscopy (N/A, 2/10/2020); and Pilonidal cyst excision (N/A, 2/11/2020). Restrictions  Restrictions/Precautions  Restrictions/Precautions: General Precautions, Fall Risk, Up as Tolerated  Position Activity Restriction  Other position/activity restrictions: Right buttock wound vac. Use cushion while in chair.    Subjective   General  Chart Reviewed: Yes  Patient assessed for rehabilitation

## 2020-03-27 LAB
ANION GAP SERPL CALCULATED.3IONS-SCNC: 12 MMOL/L (ref 3–16)
ANISOCYTOSIS: ABNORMAL
ATYPICAL LYMPHOCYTE RELATIVE PERCENT: 2 % (ref 0–6)
BANDED NEUTROPHILS RELATIVE PERCENT: 25 % (ref 0–7)
BASOPHILS ABSOLUTE: 0.2 K/UL (ref 0–0.2)
BASOPHILS RELATIVE PERCENT: 2 %
BUN BLDV-MCNC: 13 MG/DL (ref 7–20)
CALCIUM SERPL-MCNC: 9.6 MG/DL (ref 8.3–10.6)
CHLORIDE BLD-SCNC: 97 MMOL/L (ref 99–110)
CO2: 26 MMOL/L (ref 21–32)
CREAT SERPL-MCNC: 0.6 MG/DL (ref 0.8–1.3)
EOSINOPHILS ABSOLUTE: 0.2 K/UL (ref 0–0.6)
EOSINOPHILS RELATIVE PERCENT: 2 %
GFR AFRICAN AMERICAN: >60
GFR NON-AFRICAN AMERICAN: >60
GLUCOSE BLD-MCNC: 118 MG/DL (ref 70–99)
GLUCOSE BLD-MCNC: 119 MG/DL (ref 70–99)
GLUCOSE BLD-MCNC: 123 MG/DL (ref 70–99)
GLUCOSE BLD-MCNC: 175 MG/DL (ref 70–99)
GLUCOSE BLD-MCNC: 221 MG/DL (ref 70–99)
HCT VFR BLD CALC: 28.9 % (ref 40.5–52.5)
HEMATOLOGY PATH CONSULT: NO
HEMOGLOBIN: 9.5 G/DL (ref 13.5–17.5)
INR BLD: 1.63 (ref 0.86–1.14)
LYMPHOCYTES ABSOLUTE: 1.4 K/UL (ref 1–5.1)
LYMPHOCYTES RELATIVE PERCENT: 16 %
MACROCYTES: ABNORMAL
MCH RBC QN AUTO: 27.8 PG (ref 26–34)
MCHC RBC AUTO-ENTMCNC: 32.9 G/DL (ref 31–36)
MCV RBC AUTO: 84.4 FL (ref 80–100)
METAMYELOCYTES RELATIVE PERCENT: 2 %
MICROCYTES: ABNORMAL
MONOCYTES ABSOLUTE: 0.5 K/UL (ref 0–1.3)
MONOCYTES RELATIVE PERCENT: 6 %
MYELOCYTE PERCENT: 3 %
NEUTROPHILS ABSOLUTE: 5.6 K/UL (ref 1.7–7.7)
NEUTROPHILS RELATIVE PERCENT: 42 %
OVALOCYTES: ABNORMAL
PDW BLD-RTO: 18.9 % (ref 12.4–15.4)
PERFORMED ON: ABNORMAL
PLATELET # BLD: 230 K/UL (ref 135–450)
PMV BLD AUTO: 7.7 FL (ref 5–10.5)
POIKILOCYTES: ABNORMAL
POLYCHROMASIA: ABNORMAL
POTASSIUM REFLEX MAGNESIUM: 4.2 MMOL/L (ref 3.5–5.1)
PROTHROMBIN TIME: 19 SEC (ref 10–13.2)
RBC # BLD: 3.42 M/UL (ref 4.2–5.9)
SODIUM BLD-SCNC: 135 MMOL/L (ref 136–145)
SPHEROCYTES: ABNORMAL
STOMATOCYTES: ABNORMAL
TARGET CELLS: ABNORMAL
WBC # BLD: 7.8 K/UL (ref 4–11)

## 2020-03-27 PROCEDURE — 1280000000 HC REHAB R&B

## 2020-03-27 PROCEDURE — 80048 BASIC METABOLIC PNL TOTAL CA: CPT

## 2020-03-27 PROCEDURE — 97110 THERAPEUTIC EXERCISES: CPT

## 2020-03-27 PROCEDURE — 97130 THER IVNTJ EA ADDL 15 MIN: CPT

## 2020-03-27 PROCEDURE — 6370000000 HC RX 637 (ALT 250 FOR IP): Performed by: PHYSICAL MEDICINE & REHABILITATION

## 2020-03-27 PROCEDURE — 97129 THER IVNTJ 1ST 15 MIN: CPT

## 2020-03-27 PROCEDURE — 36415 COLL VENOUS BLD VENIPUNCTURE: CPT

## 2020-03-27 PROCEDURE — 97606 NEG PRS WND THER DME>50 SQCM: CPT

## 2020-03-27 PROCEDURE — 97530 THERAPEUTIC ACTIVITIES: CPT

## 2020-03-27 PROCEDURE — 85610 PROTHROMBIN TIME: CPT

## 2020-03-27 PROCEDURE — 97116 GAIT TRAINING THERAPY: CPT

## 2020-03-27 PROCEDURE — 97535 SELF CARE MNGMENT TRAINING: CPT

## 2020-03-27 PROCEDURE — 97112 NEUROMUSCULAR REEDUCATION: CPT

## 2020-03-27 PROCEDURE — 85025 COMPLETE CBC W/AUTO DIFF WBC: CPT

## 2020-03-27 RX ORDER — WARFARIN SODIUM 2 MG/1
4 TABLET ORAL
Status: COMPLETED | OUTPATIENT
Start: 2020-03-27 | End: 2020-03-27

## 2020-03-27 RX ADMIN — ATORVASTATIN CALCIUM 20 MG: 10 TABLET, FILM COATED ORAL at 20:33

## 2020-03-27 RX ADMIN — OXYCODONE 5 MG: 5 TABLET ORAL at 09:09

## 2020-03-27 RX ADMIN — CARVEDILOL 12.5 MG: 6.25 TABLET, FILM COATED ORAL at 17:16

## 2020-03-27 RX ADMIN — CARVEDILOL 12.5 MG: 6.25 TABLET, FILM COATED ORAL at 07:43

## 2020-03-27 RX ADMIN — INSULIN LISPRO 1 UNITS: 100 INJECTION, SOLUTION INTRAVENOUS; SUBCUTANEOUS at 20:34

## 2020-03-27 RX ADMIN — CITALOPRAM HYDROBROMIDE 10 MG: 20 TABLET ORAL at 07:44

## 2020-03-27 RX ADMIN — PANTOPRAZOLE SODIUM 40 MG: 40 TABLET, DELAYED RELEASE ORAL at 06:13

## 2020-03-27 RX ADMIN — LEVOTHYROXINE SODIUM 25 MCG: 25 TABLET ORAL at 06:13

## 2020-03-27 RX ADMIN — WARFARIN SODIUM 4 MG: 2 TABLET ORAL at 17:16

## 2020-03-27 RX ADMIN — DICLOFENAC 4 G: 10 GEL TOPICAL at 07:44

## 2020-03-27 RX ADMIN — DICLOFENAC 4 G: 10 GEL TOPICAL at 20:34

## 2020-03-27 RX ADMIN — BISACODYL 5 MG: 5 TABLET, COATED ORAL at 07:43

## 2020-03-27 RX ADMIN — ALLOPURINOL 300 MG: 300 TABLET ORAL at 07:43

## 2020-03-27 RX ADMIN — MECLIZINE 25 MG: 12.5 TABLET ORAL at 07:43

## 2020-03-27 ASSESSMENT — PAIN DESCRIPTION - LOCATION
LOCATION: BUTTOCKS
LOCATION: BACK
LOCATION: BUTTOCKS

## 2020-03-27 ASSESSMENT — PAIN DESCRIPTION - PAIN TYPE
TYPE: ACUTE PAIN
TYPE: ACUTE PAIN
TYPE: CHRONIC PAIN

## 2020-03-27 ASSESSMENT — PAIN DESCRIPTION - ORIENTATION
ORIENTATION: LOWER;LEFT
ORIENTATION: LOWER

## 2020-03-27 ASSESSMENT — PAIN DESCRIPTION - FREQUENCY
FREQUENCY: CONTINUOUS
FREQUENCY: INTERMITTENT

## 2020-03-27 ASSESSMENT — PAIN DESCRIPTION - ONSET
ONSET: GRADUAL
ONSET: ON-GOING

## 2020-03-27 ASSESSMENT — PAIN SCALES - GENERAL
PAINLEVEL_OUTOF10: 0
PAINLEVEL_OUTOF10: 7
PAINLEVEL_OUTOF10: 6
PAINLEVEL_OUTOF10: 7
PAINLEVEL_OUTOF10: 7
PAINLEVEL_OUTOF10: 0
PAINLEVEL_OUTOF10: 3

## 2020-03-27 ASSESSMENT — PAIN DESCRIPTION - PROGRESSION
CLINICAL_PROGRESSION: NOT CHANGED
CLINICAL_PROGRESSION: GRADUALLY WORSENING

## 2020-03-27 ASSESSMENT — PAIN DESCRIPTION - DESCRIPTORS
DESCRIPTORS: ACHING;SHARP
DESCRIPTORS: ACHING;CONSTANT

## 2020-03-27 NOTE — PROGRESS NOTES
[facility]: ACUTE REHAB UNIT  SPEECH-LANGUAGE PATHOLOGY      [x] Daily  [] Weekly Care Conference Note  [] Discharge    Patient:Saúl Cook      QEG:3[birth year]  VRM:[id number]  Rehab Dx/Hx: Tootie Tam [R53.81]    Precautions: Impulsivity and reduced self awareness  Home situation: Living independently at home prior to admission  ST Dx: [] Aphasia  [] Dysarthria  [] Apraxia   [] Oropharyngeal dysphagia [x] Cognitive Impairment  [] Other:   Date of Admit: 3/19/2020  Room #: 0170/0170-01    Current functional status (updated daily):         Pt being seen for : [] Speech/Language Treatment  [] Dysphagia Treatment [x] Cognitive Treatment  [] Other:  Communication: [x]WFL  [] Aphasia  [] Dysarthria  [] Apraxia  [] Pragmatic Impairment [] Non-verbal  [] Hearing Loss  [] Other:   Cognition: [] WFL  [] Mild  [x] Moderate  [] Severe [] Unable to Assess  [] Other:  Memory: [] WFL  [] Mild  [] Moderate  [x] Severe [] Unable to Assess  [] Other:  Behavior: [x] Alert  [x] Cooperative  [x]  Pleasant  [] Confused  [] Agitated  [] Uncooperative  [] Distractible [] Motivated  [] Self-Limiting [] Anxious  [] Other:  Endurance:  [x] Adequate for participation in SLP sessions  [] Reduced overall  [] Lethargic  [] Other:  Safety: [] No concerns at this time  [x] Reduced insight into deficits  [x]  Reduced safety awareness [] Not following call light procedures  [] Unable to Assess  [] Other:    Current Diet Order:DIET CARB CONTROL;   Dietary Nutrition Supplements: Diabetic Oral Supplement  Swallowing Precautions: n/a        Date: 3/27/2020      Tx session 1  [id number]  Juan Antonio Lorenzana MA CCC-SLP Tx session 2  [id number]  Rebecca Ashby MA CCC-SLP   Total Timed Code Min 30 30   Total Treatment Minutes 30 30   Individual Treatment Minutes 30 30   Group Treatment Minutes 0 0   Co-Treat Minutes 0 0   Variance/Reason:  n/a    Pain The patient does not complain of pain at this time    Pain Intervention [x] RN notified  [] Repositioned  [] Intervention Education was given re: rationale for tx, role of SLP in pt's care, current POC    Safety Devices: [x] Call light within reach  [] Chair alarm activated  [x] Bed alarm activated  [] Other: [] Call light within reach  [] Chair alarm activated  [] Bed alarm activated  [] Other:    Assessment: Treatment Session 1: The patient is noted with continued impulsivity regarding responses. He appears to exhibit reduced insight into deficits which could also be contributing to observed impulsivity. He lacks desire to problem solve as he would frequently ask for the answer vs trying to seek answer independently. Use of conversational approach vs structured tasks appears to increase participation. Treatment Session 2:The pt struggled with the short-term memory task this date. He attributed this difficulty to the fact that if he is not interested in something that he does not pay attention. Impulsivity and poor insight in deficits persist.         Plan: Continue as per plan of care. Additional Information: Concern regarding pt's impulsivity    Barriers toward progress: Cognitive deficits and impulsivity  Discharge recommendations:  [] Home independently  [x] Home with assistance []  24 hour supervision  [] ECF [] Other:  Continued Tx Upon Discharge: ? [] Yes [] No [x] TBD based on progress while on ARU [] Vital Stim indicated [] Other:   Estimated discharge date: To be determined    Interventions used this date:  [] Speech/Language Treatment  [] Instruction in HEP [] Group [] Dysphagia Treatment [x] Cognitive Treatment   [] Other:       Total Time Breakdown / Charges    Time in Time out Total Time / units   Cognitive Tx 1000  1300 1030  1330 30 mins / 2 units  30 mins/ 2 units   Speech Tx      Dysphagia Tx          Electronically Signed by     Session 1:  Nilam Hoyos M.A., [address] [place] [address] [id number]  Speech-Language Pathologist    Session 2:  Warren Covarrubias MA, [address] [place] [address]#[id number]  Speech-Language Pathologist  Phone:

## 2020-03-27 NOTE — PROGRESS NOTES
Pharmacy to Dose Warfarin     Dx: Atrial Fibrillation   Goal INR range 2-3  Home Warfarin dose: 5 mg daily? (patient unsure)     Date                 INR                  Warfarin  3/17                1.31                   5 mg  3/18                1.32                   5 mg  3/19                1.35                   6 mg  3/20                1.99                   5 mg  3/21                3.36                   Hold   3/22                2.9                      1mg  3/23                2.27                   2 mg  3/24                1.91                   2 mg  3/25                1.80                   2.5 mg  3/26                1.60                   4 mg   3/27                1.63                   4 mg      Recommend 4 mg warfarin tonight x 1. Daily INR ordered. Rx will continue to manage therapy per consult order.   Jolynn Moody/Kayode. 3/27/20 11:34 AM EDT'

## 2020-03-27 NOTE — PROGRESS NOTES
Learning: impulsivity, decreased carryover. REQUIRES PT FOLLOW UP: Yes  Activity Tolerance  Activity Tolerance: Patient Tolerated treatment well;Patient limited by endurance; Patient limited by pain  Activity Tolerance: Vitals: 115/36  78 BPM 96% room air. Patient Diagnosis(es): There were no encounter diagnoses. has a past medical history of CAD (coronary artery disease), COPD (chronic obstructive pulmonary disease) ([facility] [address] Diabetes mellitus ([facility] [address] Gout, Hemothorax, left, Hyperlipidemia, Hypertension, Obesity, Class I, BMI 30.0-34.9 (see actual BMI), S/P thoracotomy, Status post partial removal of lung, and Thyroid disease. has a past surgical history that includes thoracotomy (Left, 1/2/2020); bronchoscopy (N/A, 1/9/2020); Coronary angioplasty with stent (12/07/2010); pr ascend aorta graft w root replacment. valve conduit/coron reconstr (N/A, 1/21/2020); transesophageal echocardiogram (01/21/2020); Cardiac catheterization (01/13/2020); chest tube insertion (Left, 01/02/2020); Aortic valve replacement (12/27/2011); transesophageal echocardiogram (12/07/2010); Coronary artery bypass graft (2001); bronchoscopy (N/A, 1/24/2020); bronchoscopy (1/24/2020); sigmoidoscopy (N/A, 2/1/2020); Colonoscopy (02/10/2020); Colonoscopy (N/A, 2/10/2020); and Pilonidal cyst excision (N/A, 2/11/2020). Restrictions  Restrictions/Precautions  Restrictions/Precautions: General Precautions, Fall Risk, Up as Tolerated  Position Activity Restriction  Other position/activity restrictions: Right buttock wound vac. Use cushion while in chair. Subjective   General  Chart Reviewed: Yes  Response To Previous Treatment: Patient with no complaints from previous session. Family / Caregiver Present: No  Subjective  Subjective: Pt agrees to PT session. General Comment  Comments: Cleared for therapy by RN.    Pain Screening  Patient Currently in Pain: Yes  Pain Assessment  Pain Assessment: 0-10  Pain Level: 7  Pain Type: Acute pain  Pain Location: Buttocks  Pain Orientation: Lower  Functional Pain Assessment: Prevents or interferes some active activities and ADLs  Non-Pharmaceutical Pain Intervention(s): Repositioned;Rest;Ambulation/Increased Activity; Therapeutic presence  Vital Signs  Patient Currently in Pain: Yes       Orientation  Orientation  Overall Orientation Status: Within Functional Limits  Cognition   Cognition  Overall Cognitive Status: Exceptions  Arousal/Alertness: Appropriate responses to stimuli  Following Commands: Follows multistep commands with repitition  Attention Span: Attends with cues to redirect  Memory: Appears intact  Safety Judgement: Decreased awareness of need for assistance;Decreased awareness of need for safety  Problem Solving: Assistance required to identify errors made;Assistance required to generate solutions;Assistance required to implement solutions  Insights: Decreased awareness of deficits  Initiation: Requires cues for some  Sequencing: Requires cues for some  Cognition Comment: Patient was easily distracted and needed cueing to focus on the task at hand. Objective   Bed mobility  Rolling to Left: Modified independent  Rolling to Right: Modified independent  Supine to Sit: Modified independent  Sit to Supine: Modified independent  Scooting: Modified independent  Transfers  Sit to Stand: Stand by assistance  Stand to sit: Stand by assistance  Bed to Chair: Stand by assistance  Comment: patient continues to be impulsive and needs cueing for safety. Ambulation  Ambulation?: Yes  More Ambulation?: Yes  Ambulation 2  Surface - 2: level tile;carpet  Device 2: Rollator  Assistance 2: Contact guard assistance  Quality of Gait 2: colin frequently demonstrated scissoring/narrow VANIA (cueing to widen base of support), decreased heel strike, decreased step length, patient required cueing to slow down for turns.  he also needed frequent cueing to lock rollator brakes and to always use safe hand placement,, patient needed frequent cueing to maintain his base of support within the rollator. Distance: AM Session: 15 feet (total distance limited by loss of balance however patient regained his balance independently). 175 feet. 90 feet. PM session: 50 feet. 200 feet. 175 feet. 150 feet. Comments: No complaints of shortness of breath, chest pain or dizziness. Stairs/Curb  Stairs?: Yes  Stairs  # Steps : 8  Stairs Height: 6\"  Rails: Bilateral  Device: No Device  Assistance: Contact guard assistance  Comment: reciprocal pattern ascending and descending. cueing to slow patient's descent. no overt loss of balance. Balance  Posture: Fair  Sitting - Static: Good  Sitting - Dynamic: Good  Standing - Static: Fair;+  Standing - Dynamic: Fair(with Rollator)  Exercises  Bridging: (2 x 10)  Straight Leg Raise: 2 x 10 bilateral  Hamstring Sets: 2 x 10 with grey theraband resistance. Heelslides: 1 x 10 bilateral.   Hip Flexion: 2 x 10 with 4# weight  Hip Abduction: 2 x 10 with grey theraband resistance. also supine hip abduction 1 x 10  Knee Long Arc Quad: 2 x 10 with 4# weight   Ankle Pumps: 2 x 10 ankle pumps/heel raises. Other exercises  Other exercises?: Yes  Other exercises 3: Dynamic balance activity: \"fishing activity\" where patient leaned out of base of support to  objects with fishing pole. 1 x 5. Goals  Short term goals  Time Frame for Short term goals: 3/26/20  Short term goal 1: Supine <> sit with modified independence. - GOAL MET 3/24. completes bed mobility with mod ind  Short term goal 2: Sit <> stand with modified independence. Progressing SBA on 3/27  Short term goal 3: Bed <> chair with LRAD and supervision- GOAL MET 3/24. completes transfers with supv  Short term goal 4: Ambulate 50 feet with LRAD and supervision. Progressing CGA on 3/27  Short term goal 5: Ascend 5 steps with LRAD and rail with supervision.  Progressing CGA on 3/27  Long term goals  Time Frame for Long term goals : 4/2/20  Long term goal 1: Perform car transfer with modified independence. Long term goal 2: Ambulate 150 feet with LRAD and modified independence. Long term goal 3: Ascend 12 steps with rail and modified independence. Long term goal 4: Patient will be independent with his home exercise program.   Patient Goals   Patient goals : To decrease his pain. To walk. Plan    Plan  Times per week: 5 of 7 days a week  Plan weeks: 2 week 4/2/20  Specific instructions for Next Treatment: progress mobility as tolerated. Current Treatment Recommendations: Strengthening, Gait Training, Patient/Caregiver Education & Training, Equipment Evaluation, Education, & procurement, Balance Training, Pain Management, Functional Mobility Training, Endurance Training, Home Exercise Program, Transfer Training, Wheelchair Mobility Training, Safety Education & Training, Stair training, ROM, ADL/Self-care Training  Safety Devices  Type of devices:  All fall risk precautions in place, Call light within reach, Nurse notified, Gait belt, Patient at risk for falls, Chair alarm in place, Left in bed, Bed alarm in place     Therapy Time   Individual Concurrent Group Co-treatment   Time In 1100         Time Out 1130         Minutes 30         Timed Code Treatment Minutes: [address], [place]   Time In 1400         Time Out 1500         Minutes 60         Timed Code Treatment Minutes: Quan 40, PT

## 2020-03-27 NOTE — PROGRESS NOTES
Occupational Therapy  Facility/Department: [facility] ARU  Daily Treatment Note  NAME: Opal Leahy  : 1950  MRN: [id number]    Date of Service: 3/27/2020    Discharge Recommendations:  Home with Home health OT, S Level 3, 24 hour supervision or assist  OT Equipment Recommendations  Other: CTA: pt is not sure about tub transfer bench, maybe need dressing equipment    Assessment   Performance deficits / Impairments: Decreased functional mobility ; Decreased balance;Decreased ADL status; Decreased high-level IADLs;Decreased strength;Decreased safe awareness;Decreased cognition;Decreased endurance  Assessment: Pt with better safety awareness and balance this date but continues to need prolonged seated and standing rest breaks during ADLs and functional mobility due to back pain and fatigue. Pt continues to be unsafe with turning 4ww and needs cues for locking brakes but does a better job with wound vac managmenet. Pt continues to need cues for upright balance during bimanual tasks. Pt tolerated BUE ex 2x15 reps with rest breaks PRN. RN was alerted for pain mediciation, educated patient on timing of meds for tx. Pt has intermittent moments of safety and clarity with functional mobilti yand ADLs but then needs cues and CGA to recover balance at times. Cont to rec 24 hour SPV and HHOT at DC. Cont OT POC.   Prognosis: Good  OT Education: OT Role;Plan of Care;ADL Adaptive Strategies;Transfer Training;Equipment;Precautions  Patient Education: pain management, energy conservation, OT POC, goals vs. deficits  REQUIRES OT FOLLOW UP: Yes  Activity Tolerance  Activity Tolerance: Patient limited by fatigue;Patient limited by pain  Activity Tolerance: pt with increaed pain as therapy progressed; RN called to administer the medication and education on timing of meds prior to tx with patient even if not in much pain; pt verbalized understanding  Safety Devices  Safety Devices in place: Yes  Type of devices: Call light within Comment  Comments: RN approved therapy  Pain Assessment  Pain Assessment: 0-10  Pain Level: 6  Pain Type: Chronic pain  Pain Location: Back  Pain Orientation: Lower; Left  Pain Descriptors: Aching;Constant  Pain Frequency: Continuous  Pain Onset: On-going  Clinical Progression: Not changed  Functional Pain Assessment: Prevents or interferes some active activities and ADLs  Non-Pharmaceutical Pain Intervention(s): Distraction; Therapeutic touch; Therapeutic presence;Repositioned; Heat applied  Response to Pain Intervention: Patient Satisfied  Pre Treatment Pain Screening  Intervention List: Patient able to continue with treatment;Nurse called to administer meds;Nurse/Physician notified  Vital Signs  Patient Currently in Pain: Yes   Orientation  Orientation  Overall Orientation Status: Within Functional Limits  Objective    ADL  Feeding: Independent  Grooming: Stand by assistance(standing 2 trials, sitting x10 minutes to finish 2/2 back pain)  UE Bathing: Stand by assistance(sitting on 4ww seat sinkside)  LE Bathing: Contact guard assistance;Minimal assistance(assist with balance and cues for safety with bathing feet and lower legs sitting vs. standing-- given LHS)  UE Dressing: Setup; Increased time to complete(sitting on seat of 4WW)  Toileting: Stand by assistance;Contact guard assistance(1 bout of LOB backwards needing CGA to recover, educated on seated vs standing toileting to ensure no falls)  Additional Comments: Cues for management of wound vac wiht mobility, better balance and standing tolerance noted this date.         Balance  Sitting Balance: Supervision  Standing Balance: Contact guard assistance(4WW)  Standing Balance  Time: x6 minutes, x5 minutes, x4.5 minutes, x6 minutes  Activity: mobility in room/bathroom, standing ADLs, mobility to/from gym, functional mobility activity in gym  Comment: continues better use of 4ww this date, cues however for wound vac and line management with better understanding of for safety  Problem Solving: Assistance required to identify errors made;Assistance required to generate solutions;Assistance required to implement solutions  Insights: Decreased awareness of deficits  Initiation: Requires cues for some  Sequencing: Requires cues for some  Cognition Comment: Patient was easily distracted and needed cueing to focus on the task at hand. Perception  Overall Perceptual Status: WFL              Type of ROM/Therapeutic Exercise  Type of ROM/Therapeutic Exercise: Free weights  Comment: sitting in chair mostly 2# free weights but 3# weights used for some exercises  Exercises  Scapular Protraction: x15  Scapular Retraction: x15  Shoulder Elevation: x15  Shoulder Flexion: 15x to 90*  Shoulder ABduction: 15x  Shoulder ADduction: 15x  Horizontal ABduction: 15x  Horizontal ADduction: 15x  Elbow Flexion: 15x  Elbow Extension: 15x  Supination: 15x  Pronation: 15x  Wrist Flexion: 15x  Wrist Extension: 15x  Finger Flexion: 15+15x  Other: x15 chest press, circles forwards/backwards, internal/external rotation                    Plan   Plan  Times per week: 5 out of 7 days  Current Treatment Recommendations: Strengthening, Balance Training, Safety Education & Training, Self-Care / ADL, Functional Mobility Training, Endurance Training, Positioning    Goals  Short term goals  Time Frame for Short term goals: 1 week (3-26-20)  Short term goal 1: CGA with functional/toilet transfers -GOAL MET 3/26  Short term goal 2: CGA static standing ADL's for 2 minutes -GOAL MET 3/26  Short term goal 3: Pt will complete 20 reps of BUE exercises for increased endurance.  -GOAL MET 3/26, CTA  Short term goal 4: Pt will complete LB dressing with S. -GOAL MET 3/25  Long term goals  Time Frame for Long term goals : 2 week (4-2-20)  Long term goal 1: Pt will complete tub transfer with DME mod I.   Long term goal 2: Pt will complete sponge bath mod I.   Long term goal 3: Pt will complete LB dressing mod I.   Long term

## 2020-03-28 LAB
GLUCOSE BLD-MCNC: 119 MG/DL (ref 70–99)
GLUCOSE BLD-MCNC: 136 MG/DL (ref 70–99)
GLUCOSE BLD-MCNC: 142 MG/DL (ref 70–99)
GLUCOSE BLD-MCNC: 156 MG/DL (ref 70–99)
INR BLD: 1.7 (ref 0.86–1.14)
PERFORMED ON: ABNORMAL
PROTHROMBIN TIME: 19.8 SEC (ref 10–13.2)

## 2020-03-28 PROCEDURE — 1280000000 HC REHAB R&B

## 2020-03-28 PROCEDURE — 6370000000 HC RX 637 (ALT 250 FOR IP): Performed by: PHYSICAL MEDICINE & REHABILITATION

## 2020-03-28 PROCEDURE — 2580000003 HC RX 258

## 2020-03-28 PROCEDURE — 36415 COLL VENOUS BLD VENIPUNCTURE: CPT

## 2020-03-28 PROCEDURE — 85610 PROTHROMBIN TIME: CPT

## 2020-03-28 RX ORDER — SODIUM CHLORIDE 9 MG/ML
INJECTION, SOLUTION INTRAVENOUS
Status: COMPLETED
Start: 2020-03-28 | End: 2020-03-28

## 2020-03-28 RX ORDER — WARFARIN SODIUM 2 MG/1
4 TABLET ORAL
Status: COMPLETED | OUTPATIENT
Start: 2020-03-28 | End: 2020-03-28

## 2020-03-28 RX ADMIN — DICLOFENAC 4 G: 10 GEL TOPICAL at 21:59

## 2020-03-28 RX ADMIN — SODIUM CHLORIDE 1000 ML: 9 INJECTION, SOLUTION INTRAVENOUS at 06:29

## 2020-03-28 RX ADMIN — CITALOPRAM HYDROBROMIDE 10 MG: 20 TABLET ORAL at 08:03

## 2020-03-28 RX ADMIN — INSULIN LISPRO 1 UNITS: 100 INJECTION, SOLUTION INTRAVENOUS; SUBCUTANEOUS at 21:59

## 2020-03-28 RX ADMIN — MECLIZINE 25 MG: 12.5 TABLET ORAL at 08:03

## 2020-03-28 RX ADMIN — PANTOPRAZOLE SODIUM 40 MG: 40 TABLET, DELAYED RELEASE ORAL at 06:29

## 2020-03-28 RX ADMIN — CARVEDILOL 12.5 MG: 6.25 TABLET, FILM COATED ORAL at 16:36

## 2020-03-28 RX ADMIN — CARVEDILOL 12.5 MG: 6.25 TABLET, FILM COATED ORAL at 08:03

## 2020-03-28 RX ADMIN — DICLOFENAC 4 G: 10 GEL TOPICAL at 10:32

## 2020-03-28 RX ADMIN — INSULIN LISPRO 2 UNITS: 100 INJECTION, SOLUTION INTRAVENOUS; SUBCUTANEOUS at 12:20

## 2020-03-28 RX ADMIN — ALLOPURINOL 300 MG: 300 TABLET ORAL at 08:03

## 2020-03-28 RX ADMIN — BISACODYL 5 MG: 5 TABLET, COATED ORAL at 08:03

## 2020-03-28 RX ADMIN — WARFARIN SODIUM 4 MG: 2 TABLET ORAL at 16:36

## 2020-03-28 RX ADMIN — ATORVASTATIN CALCIUM 20 MG: 10 TABLET, FILM COATED ORAL at 21:59

## 2020-03-28 RX ADMIN — OXYCODONE 5 MG: 5 TABLET ORAL at 15:40

## 2020-03-28 RX ADMIN — LEVOTHYROXINE SODIUM 25 MCG: 25 TABLET ORAL at 06:29

## 2020-03-28 ASSESSMENT — PAIN SCALES - GENERAL
PAINLEVEL_OUTOF10: 0
PAINLEVEL_OUTOF10: 0
PAINLEVEL_OUTOF10: 6

## 2020-03-28 NOTE — PLAN OF CARE
Problem: Pain:  Goal: Pain level will decrease  Description: Pain level will decrease  Outcome: Ongoing  Goal: Control of acute pain  Description: Control of acute pain  Outcome: Ongoing     Problem: Falls - Risk of:  Goal: Will remain free from falls  Description: Will remain free from falls  3/28/2020 1026 by Kym Olivarez RN  Outcome: Ongoing  3/27/2020 2334 by Jodi Gonzalez RN  Outcome: Ongoing  Note: Pt. Free from falls or self injury at this time. Falls risk protocol maintained. Call light within reach.     Goal: Absence of physical injury  Description: Absence of physical injury  Outcome: Ongoing

## 2020-03-29 LAB
GLUCOSE BLD-MCNC: 101 MG/DL (ref 70–99)
GLUCOSE BLD-MCNC: 119 MG/DL (ref 70–99)
GLUCOSE BLD-MCNC: 163 MG/DL (ref 70–99)
GLUCOSE BLD-MCNC: 174 MG/DL (ref 70–99)
INR BLD: 1.74 (ref 0.86–1.14)
PERFORMED ON: ABNORMAL
PROTHROMBIN TIME: 20.3 SEC (ref 10–13.2)

## 2020-03-29 PROCEDURE — 6370000000 HC RX 637 (ALT 250 FOR IP): Performed by: PHYSICAL MEDICINE & REHABILITATION

## 2020-03-29 PROCEDURE — 36415 COLL VENOUS BLD VENIPUNCTURE: CPT

## 2020-03-29 PROCEDURE — 1280000000 HC REHAB R&B

## 2020-03-29 PROCEDURE — 85610 PROTHROMBIN TIME: CPT

## 2020-03-29 RX ADMIN — BISACODYL 5 MG: 5 TABLET, COATED ORAL at 08:00

## 2020-03-29 RX ADMIN — ALLOPURINOL 300 MG: 300 TABLET ORAL at 08:00

## 2020-03-29 RX ADMIN — INSULIN LISPRO 1 UNITS: 100 INJECTION, SOLUTION INTRAVENOUS; SUBCUTANEOUS at 21:03

## 2020-03-29 RX ADMIN — PANTOPRAZOLE SODIUM 40 MG: 40 TABLET, DELAYED RELEASE ORAL at 06:34

## 2020-03-29 RX ADMIN — ATORVASTATIN CALCIUM 20 MG: 10 TABLET, FILM COATED ORAL at 21:02

## 2020-03-29 RX ADMIN — CITALOPRAM HYDROBROMIDE 10 MG: 20 TABLET ORAL at 08:00

## 2020-03-29 RX ADMIN — DICLOFENAC 4 G: 10 GEL TOPICAL at 21:03

## 2020-03-29 RX ADMIN — CARVEDILOL 12.5 MG: 6.25 TABLET, FILM COATED ORAL at 18:10

## 2020-03-29 RX ADMIN — LEVOTHYROXINE SODIUM 25 MCG: 25 TABLET ORAL at 06:34

## 2020-03-29 RX ADMIN — MECLIZINE 25 MG: 12.5 TABLET ORAL at 08:00

## 2020-03-29 RX ADMIN — CARVEDILOL 12.5 MG: 6.25 TABLET, FILM COATED ORAL at 08:00

## 2020-03-29 RX ADMIN — INSULIN LISPRO 2 UNITS: 100 INJECTION, SOLUTION INTRAVENOUS; SUBCUTANEOUS at 13:01

## 2020-03-29 ASSESSMENT — PAIN SCALES - GENERAL
PAINLEVEL_OUTOF10: 0
PAINLEVEL_OUTOF10: 0

## 2020-03-30 LAB
ANION GAP SERPL CALCULATED.3IONS-SCNC: 11 MMOL/L (ref 3–16)
BASOPHILS ABSOLUTE: 0.1 K/UL (ref 0–0.2)
BASOPHILS RELATIVE PERCENT: 1.2 %
BUN BLDV-MCNC: 11 MG/DL (ref 7–20)
CALCIUM SERPL-MCNC: 9.4 MG/DL (ref 8.3–10.6)
CHLORIDE BLD-SCNC: 98 MMOL/L (ref 99–110)
CO2: 27 MMOL/L (ref 21–32)
CREAT SERPL-MCNC: 0.6 MG/DL (ref 0.8–1.3)
EOSINOPHILS ABSOLUTE: 0.4 K/UL (ref 0–0.6)
EOSINOPHILS RELATIVE PERCENT: 5 %
GFR AFRICAN AMERICAN: >60
GFR NON-AFRICAN AMERICAN: >60
GLUCOSE BLD-MCNC: 102 MG/DL (ref 70–99)
GLUCOSE BLD-MCNC: 105 MG/DL (ref 70–99)
GLUCOSE BLD-MCNC: 126 MG/DL (ref 70–99)
GLUCOSE BLD-MCNC: 160 MG/DL (ref 70–99)
GLUCOSE BLD-MCNC: 175 MG/DL (ref 70–99)
HCT VFR BLD CALC: 28.2 % (ref 40.5–52.5)
HEMOGLOBIN: 9.5 G/DL (ref 13.5–17.5)
INR BLD: 1.77 (ref 0.86–1.14)
LYMPHOCYTES ABSOLUTE: 1.6 K/UL (ref 1–5.1)
LYMPHOCYTES RELATIVE PERCENT: 21.8 %
MCH RBC QN AUTO: 28.3 PG (ref 26–34)
MCHC RBC AUTO-ENTMCNC: 33.7 G/DL (ref 31–36)
MCV RBC AUTO: 83.9 FL (ref 80–100)
MONOCYTES ABSOLUTE: 0.8 K/UL (ref 0–1.3)
MONOCYTES RELATIVE PERCENT: 11.1 %
NEUTROPHILS ABSOLUTE: 4.3 K/UL (ref 1.7–7.7)
NEUTROPHILS RELATIVE PERCENT: 60.9 %
PDW BLD-RTO: 18.5 % (ref 12.4–15.4)
PERFORMED ON: ABNORMAL
PLATELET # BLD: 215 K/UL (ref 135–450)
PMV BLD AUTO: 7.6 FL (ref 5–10.5)
POTASSIUM REFLEX MAGNESIUM: 4.2 MMOL/L (ref 3.5–5.1)
PROTHROMBIN TIME: 20.7 SEC (ref 10–13.2)
RBC # BLD: 3.36 M/UL (ref 4.2–5.9)
SODIUM BLD-SCNC: 136 MMOL/L (ref 136–145)
WBC # BLD: 7.1 K/UL (ref 4–11)

## 2020-03-30 PROCEDURE — 97530 THERAPEUTIC ACTIVITIES: CPT

## 2020-03-30 PROCEDURE — 1280000000 HC REHAB R&B

## 2020-03-30 PROCEDURE — 36415 COLL VENOUS BLD VENIPUNCTURE: CPT

## 2020-03-30 PROCEDURE — 97130 THER IVNTJ EA ADDL 15 MIN: CPT

## 2020-03-30 PROCEDURE — 97606 NEG PRS WND THER DME>50 SQCM: CPT

## 2020-03-30 PROCEDURE — 80048 BASIC METABOLIC PNL TOTAL CA: CPT

## 2020-03-30 PROCEDURE — 6370000000 HC RX 637 (ALT 250 FOR IP): Performed by: PHYSICAL MEDICINE & REHABILITATION

## 2020-03-30 PROCEDURE — 85025 COMPLETE CBC W/AUTO DIFF WBC: CPT

## 2020-03-30 PROCEDURE — 97535 SELF CARE MNGMENT TRAINING: CPT

## 2020-03-30 PROCEDURE — 97110 THERAPEUTIC EXERCISES: CPT

## 2020-03-30 PROCEDURE — 99232 SBSQ HOSP IP/OBS MODERATE 35: CPT | Performed by: SURGERY

## 2020-03-30 PROCEDURE — 85610 PROTHROMBIN TIME: CPT

## 2020-03-30 PROCEDURE — 97129 THER IVNTJ 1ST 15 MIN: CPT

## 2020-03-30 RX ORDER — WARFARIN SODIUM 2 MG/1
4 TABLET ORAL DAILY
Qty: 100 TABLET | Refills: 3 | Status: SHIPPED | OUTPATIENT
Start: 2020-03-30 | End: 2020-05-29 | Stop reason: SDUPTHER

## 2020-03-30 RX ORDER — WARFARIN SODIUM 2 MG/1
4 TABLET ORAL
Status: COMPLETED | OUTPATIENT
Start: 2020-03-30 | End: 2020-03-30

## 2020-03-30 RX ORDER — OXYCODONE HYDROCHLORIDE 5 MG/1
5 TABLET ORAL EVERY 6 HOURS PRN
Qty: 28 TABLET | Refills: 0 | Status: SHIPPED | OUTPATIENT
Start: 2020-03-30 | End: 2020-04-06

## 2020-03-30 RX ADMIN — CARVEDILOL 12.5 MG: 6.25 TABLET, FILM COATED ORAL at 07:39

## 2020-03-30 RX ADMIN — PANTOPRAZOLE SODIUM 40 MG: 40 TABLET, DELAYED RELEASE ORAL at 06:18

## 2020-03-30 RX ADMIN — INSULIN LISPRO 2 UNITS: 100 INJECTION, SOLUTION INTRAVENOUS; SUBCUTANEOUS at 10:57

## 2020-03-30 RX ADMIN — WARFARIN SODIUM 4 MG: 2 TABLET ORAL at 17:12

## 2020-03-30 RX ADMIN — DICLOFENAC 4 G: 10 GEL TOPICAL at 20:05

## 2020-03-30 RX ADMIN — CARVEDILOL 12.5 MG: 6.25 TABLET, FILM COATED ORAL at 17:12

## 2020-03-30 RX ADMIN — INSULIN LISPRO 1 UNITS: 100 INJECTION, SOLUTION INTRAVENOUS; SUBCUTANEOUS at 20:05

## 2020-03-30 RX ADMIN — ATORVASTATIN CALCIUM 20 MG: 10 TABLET, FILM COATED ORAL at 20:05

## 2020-03-30 RX ADMIN — MECLIZINE 25 MG: 12.5 TABLET ORAL at 07:39

## 2020-03-30 RX ADMIN — BISACODYL 5 MG: 5 TABLET, COATED ORAL at 07:39

## 2020-03-30 RX ADMIN — LEVOTHYROXINE SODIUM 25 MCG: 25 TABLET ORAL at 06:18

## 2020-03-30 RX ADMIN — CITALOPRAM HYDROBROMIDE 10 MG: 20 TABLET ORAL at 07:39

## 2020-03-30 RX ADMIN — ALLOPURINOL 300 MG: 300 TABLET ORAL at 07:39

## 2020-03-30 ASSESSMENT — PAIN SCALES - GENERAL
PAINLEVEL_OUTOF10: 0
PAINLEVEL_OUTOF10: 5
PAINLEVEL_OUTOF10: 0

## 2020-03-30 ASSESSMENT — PAIN DESCRIPTION - PAIN TYPE
TYPE: ACUTE PAIN
TYPE: CHRONIC PAIN

## 2020-03-30 ASSESSMENT — PAIN DESCRIPTION - ORIENTATION: ORIENTATION: LOWER

## 2020-03-30 ASSESSMENT — PAIN DESCRIPTION - LOCATION: LOCATION: BACK

## 2020-03-30 ASSESSMENT — PAIN DESCRIPTION - DESCRIPTORS: DESCRIPTORS: ACHING

## 2020-03-30 NOTE — PROGRESS NOTES
Tolerance  Activity Tolerance: Treatment limited secondary to medical complications (free text)  Activity Tolerance: pt c/o severe dizziness with LOB during ambulation. BP once seated in rollatro = 100/42. BP following 3 min restbreak= 97/43. pt reported ease in dizziness however therapist assisted pt back to supine position. BP supine= 141/65. BP seated EOB= 139/62. BP standing= 89/45. standing activities deferred for remainder of am session due to orthstatsis. Patient Diagnosis(es): The encounter diagnosis was Pressure injury of skin of buttock, unspecified injury stage, unspecified laterality. has a past medical history of CAD (coronary artery disease), COPD (chronic obstructive pulmonary disease) ([facility] [address] Diabetes mellitus ([facility] [address] Gout, Hemothorax, left, Hyperlipidemia, Hypertension, Obesity, Class I, BMI 30.0-34.9 (see actual BMI), S/P thoracotomy, Status post partial removal of lung, and Thyroid disease. has a past surgical history that includes thoracotomy (Left, 1/2/2020); bronchoscopy (N/A, 1/9/2020); Coronary angioplasty with stent (12/07/2010); pr ascend aorta graft w root replacment. valve conduit/coron reconstr (N/A, 1/21/2020); transesophageal echocardiogram (01/21/2020); Cardiac catheterization (01/13/2020); chest tube insertion (Left, 01/02/2020); Aortic valve replacement (12/27/2011); transesophageal echocardiogram (12/07/2010); Coronary artery bypass graft (2001); bronchoscopy (N/A, 1/24/2020); bronchoscopy (1/24/2020); sigmoidoscopy (N/A, 2/1/2020); Colonoscopy (02/10/2020); Colonoscopy (N/A, 2/10/2020); and Pilonidal cyst excision (N/A, 2/11/2020). Restrictions  Restrictions/Precautions  Restrictions/Precautions: General Precautions, Fall Risk, Up as Tolerated  Position Activity Restriction  Other position/activity restrictions: wound packed (no wound vac this AM), RN clears for shower. Use cushion while in chair.    Subjective   General  Chart Reviewed: Yes  Response To Previous Treatment: Patient with no complaints from previous session. Family / Caregiver Present: No  Referring Practitioner: Priscilla Burnett DO  Subjective  Subjective: pleasant and agreeable. reporting chronic back pain  General Comment  Comments: pt limtied by orthostatic hypotension. RN made aware of pt's vitals. Pain Assessment  Pain Assessment: 0-10  Pain Level: 5  Pain Type: Chronic pain  Pain Location: Back  Pain Orientation: Lower  Pain Descriptors: Aching  Non-Pharmaceutical Pain Intervention(s): Repositioned  Response to Pain Intervention: Patient Satisfied       Orientation  Orientation  Overall Orientation Status: Within Functional Limits  Cognition      Objective   Bed mobility  Supine to Sit: Modified independent  Sit to Supine: Modified independent  Scooting: Modified independent  Transfers  Sit to Stand: Supervision  Stand to sit: Supervision  Comment: sit to stand from EOB to 4ww and sit <> stand from 4ww with supv. Ambulation  Ambulation?: Yes  Ambulation 1  Surface: level tile  Device: Rollator  Assistance: Stand by assistance;Contact guard assistance  Quality of Gait: narrow VANIA, forward flexed posture, decreased step lengths  Gait Deviations: Slow Beena;Deviated path;Shuffles;Decreased step length;Decreased step height  Distance: 8 ft  Comments: pt with 2 LOB with min A to correct, both posterior. pt c/o severe dizziness. see activity tolerance for details. Exercises  Heelslides: x15 BLE   Hip Abduction: x15 BLE   Ankle Pumps: x15 BLE   Comments: completed supine        Second Session:  Pt found supine in bed. Reports 6/10 back pain, agreeable to therapy. Denies dizziness throughout. RN made aware of pts request for topical pain medication to be applied to back. Supine <> sit with ind. Sit to stand from EOB to 4ww with spv. Gait x 170 ft (10 ft carpet) with 4ww and supv. Car transfer completed with spv and 4ww.    Pt ascended/descended 12 steps with BHR and supv with mobility as tolerated. Current Treatment Recommendations: Strengthening, Gait Training, Patient/Caregiver Education & Training, Equipment Evaluation, Education, & procurement, Balance Training, Pain Management, Functional Mobility Training, Endurance Training, Home Exercise Program, Transfer Training, Wheelchair Mobility Training, Safety Education & Training, Stair training, ROM, ADL/Self-care Training  Safety Devices  Type of devices:  All fall risk precautions in place, Call light within reach, Nurse notified, Gait belt, Patient at risk for falls, Left in bed, Bed alarm in place     Therapy Time   Individual Concurrent Group Co-treatment   Time In 0930         Time Out 1000         Minutes 30         Timed Code Treatment Minutes: 30 [address] Time:   Individual Concurrent Group Co-treatment   Time In 1230         Time Out 1330         Minutes 60           Timed Code Treatment Minutes:  60    Total Treatment Minutes:  90    Raffi Kirk PT

## 2020-03-30 NOTE — PROGRESS NOTES
Pharmacy to Dose Warfarin     Dx: Atrial Fibrillation   Goal INR range 2-3  Home Warfarin dose: 5 mg daily? (patient unsure)     Date                 INR                  Warfarin  3/17                1.31                   5 mg  3/18                1.32                   5 mg  3/19                1.35                   6 mg  3/20                1.99                   5 mg  3/21                3.36                   Hold   3/22                2.9                      1mg  3/23                2.27                   2 mg  3/24                1.91                   2 mg  3/25                1.80                   2.5 mg  3/26                1.60                   4 mg   3/27                1.63                   4 mg   3/28                1.70                   4 mg   3/29                1.74                   not given   3.30                1.77                   4 mg      Recommend 4 mg warfarin tonight x 1. Daily INR ordered. Rx will continue to manage therapy per consult order.   Divya Corona, PharmD 3/30/2020 8:16 AM

## 2020-03-30 NOTE — PROGRESS NOTES
Nutrition Assessment    Type and Reason for Visit: Reassess    Nutrition Recommendations:   1. Continue carb control diet   2. Continue Glucerna BID   3. Encourage PO intakes   4. Monitor nutrition adequacy, pertinent labs, bowel habits, wt changes, and clinical progress    Nutrition Assessment: Follow up: Pt nutritionally improving AEB po intakes of % per EMR. BG elevated, pt declined education. Pt reports good appetite and continues to drink Glucerna BID. Noted leaking wound vac to right buttock. Encouraged continued good PO intakes. Will continue carb control diet and ONS. Malnutrition Assessment:  · Malnutrition Status: At risk for malnutrition    Nutrition Risk Level:  Moderate    Nutrient Needs:  · Estimated Daily Total Kcal: 2263-6817 kcal  · Estimated Daily Protein (g):   · Estimated Daily Total Fluid (ml/day): 1 ml/kcal    Nutrition Diagnosis:   · Problem: Inadequate oral intake  · Etiology: related to Increased demand for energy/nutrients     Signs and symptoms:  as evidenced by Presence of wounds    Objective Information:  · Nutrition-Focused Physical Findings: Wound VAC  · Wound Type: Stage IV, Unstageable, Pressure Ulcer  · Current Nutrition Therapies:  · Oral Diet Orders: Carb Control 4 Carbs/Meal   · Oral Diet intake: %  · Oral Nutrition Supplement (ONS) Orders: Diabetic Oral Supplement  · ONS intake: %  · Anthropometric Measures:  · Ht: 5' 9\" (175.3 cm)   · Current Body Wt: 171 lb (77.6 kg)  · % Weight Change:  ,  pt down 8.7 L since ARU admit  · Ideal Body Wt: 160 lb (72.6 kg),   · BMI Classification: BMI 25.0 - 29.9 Overweight    Nutrition Interventions:   Continue current diet, Continue current ONS  Continued Inpatient Monitoring    Nutrition Evaluation:   · Evaluation: Progressing toward goals   · Goals: Pt will consume greater than 50% of PO intakes this ARU stay    · Monitoring: Meal Intake, Supplement Intake, Weight, Pertinent Labs, Diet

## 2020-03-30 NOTE — PROGRESS NOTES
Physical Therapy  Physical Therapy  Discharge Summary    Name:Saúl Kowalski  NMY:[id number]  TGZ:[birth year]  Treatment Diagnosis: decreased independence with functional mobility. Restrictions/Precautions  Restrictions/Precautions: General Precautions, Fall Risk, Up as Tolerated           Position Activity Restriction  Other position/activity restrictions: wound packed (no wound vac this AM), RN clears for shower. Use cushion while in chair. Goals:                  Short term goals  Time Frame for Short term goals: 3/26/20  Short term goal 1: Supine <> sit with modified independence. - GOAL MET 3/24. completes bed mobility with mod ind  Short term goal 2: Sit <> stand with modified independence. Progressing SBA on 3/27  Short term goal 3: Bed <> chair with LRAD and supervision- GOAL MET 3/24. completes transfers with supv  Short term goal 4: Ambulate 50 feet with LRAD and supervision. Progressing CGA on 3/27  Short term goal 5: Ascend 5 steps with LRAD and rail with supervision. Progressing CGA on 3/27            Long term goals  Time Frame for Long term goals : 4/2/20  Long term goal 1: Perform car transfer with modified independence. - GOAL NOT MET. requires supv. Long term goal 2: Ambulate 150 feet with LRAD and modified independence. - GOAL NOT MET. requires SBA-supv  Long term goal 3: Ascend 12 steps with rail and modified independence. - GOAL NOT MET. requires SBA-supv   Long term goal 4: Patient will be independent with his home exercise program. - GOAL MET. able to complete seated HEP ind (seated ankle pumps, LAQ, marches, hip abduction with TKE). Pt. Met 2/5 short term goals and 1/4 long term goals.    Discharge PT IRF:    CARE Score: 6                                   Pt. Currently ambulates 150 feet with 4ww/RW and supv  Up/down 12 steps with supv  Up/down curb step with CGA  Sit to/from stand with supv  Bed mobility with mod ind  Recommend HHPT in order to increase dyn stand balance and

## 2020-03-30 NOTE — PROGRESS NOTES
Heel Right-Dressing/Treatment: Betadine swabs  Wound 03/16/20 Sacrum with wound vac in place-Dressing/Treatment: Vacuum dressing  Wound 03/16/20 Heel Left unstageable with necrotic tissue-Dressing/Treatment: Betadine swabs, Open to air   Recommendation:   Sacrum - removed NPWT dressing; clean wound with NSS; pat dry; barrier film; frame the wound with the NPWT drape; piece of barrier strip used at distal edge to maintain seal; track along R Hip; black foam to wound bed; cover with drape; attach to tracker pad; Wound Care RN to change NPWT dressing on M, W, and F.   Call wound care for problems with seal. [phone], Pager [phone]. Specialty Bed Required : Yes   [x] Low Air Loss   [x] Pressure Redistribution  [] Fluid Immersion  [] Bariatric  [] Total Pressure Relief  [] Other:     Current Diet: DIET CARB CONTROL;   Dietary Nutrition Supplements: Diabetic Oral Supplement  Dietician consult:  Yes    Discharge Plan:  Placement for patient upon discharge: home with support   Patient appropriate for Outpatient [address]: Yes follow up with surgery    Referrals:  [x]   [] [facility]  [] Supplies  [] Other    Patient/Caregiver Teaching:  Level of patient/caregiver understanding able to:   [x] Indicates understanding       [] Needs reinforcement  [] Unsuccessful      [] Verbal Understanding  [] Demonstrated understanding       [] No evidence of learning  [] Refused teaching         [] N/A       Electronically signed by Alicia Molina RN, CWOCN on 3/30/2020 at 2:19 PM

## 2020-03-30 NOTE — CARE COORDINATION
Appeal to insurance was successful in overturning the denial.  Auth [id number].   Daja Valentin is [facility]  Q:[phone]   X:[phone]    Electronically signed by Simona Drummond RCP on 3/23/2020 at 12:16 PM
[facility]  Spoke with patient/Krissy daughter regarding homecare services. Demographics verified, and patient is agreeable to home care services. Home care to see patient by: 24-48 hr post hosp DC. DC order noted, all docs needed have been faxed to [facility] for home care services.     Lukasz Harrell RN, BSN CTN  [facility] [phone]
[facility]    Referral received from  to follow for home care services. I will follow for needs, and speak with patient to verify demos.     Ольга Roman RN, BSN CTN  [facility] [phone]
Has patient ever been treated for emotional disorder(s)? __No  _X_Yes, how does this affect current situation? Mental health diagnoses include major depressive disorder, tobacco and alcohol abuse. Is patient and family coping appropriately with stressful events and this hospitalization? _X_Yes  __No, please explain:    Support system at home and in the community: Family and friends provide sources of support, encouragement and socialization. Caregiver on discharge: Self with limited supports. 24 hour care on discharge: Self with limited supports. What patient needs to be at to return home alone or with family: to be determined. Discharge plan: Intent is to return to home setting where patient lives in a mobile home alone. Summary:   **ORIENTATION/COGNITION** Mr. Derrell Kowalski presents as alert and oriented. He is able to verbally communicate his needs in an appropriate manner. **FAMILY/SOCIAL** Mr. Derrell Kowalski is single, his daughter [facility] lives locally and assists as she is able. He lives alone and independently in a mobile home where he was having assistance from a friend. Mr. Derrell Kowalski was at [facility] and was discharged home. He readmitted to the hospital for inability to provide care for self. Mr. Derrell Kowalski states he was independent prior to his decline since January. He states he was driving but has not driven since January 2020. He state his has limited assistance in the community and does not currently receive any personal care or homemaking services. Mr. Derrell Kowalski has a history of alcohol and tobacco use, he states he quit smoking in January of this year and that he was smoking approximately 2 packs per day. He discloses daily alcohol use and denies any current drug use at this time. **CODE STATUS** Full code updated on 3/19/2020  **LOSSES/CONCERNS/ROOM** Concerns include limited assistance in the home. Mr. Derrell Kowalski resides in a private room while on the acute rehab  Unit.

## 2020-03-30 NOTE — PROGRESS NOTES
Department of Physical Medicine & Rehabilitation  Progress Note    Patient Identification:  Shala Harrell  [id number]  : 1950  Admit date: 3/19/2020    Chief Complaint: Debility    Subjective:   Planning for d/c home tomorrow; some difficulties with wound vac maintaining seal.     ROS: No f/c, n/v, cp     Objective:  Patient Vitals for the past 24 hrs:   BP Temp Temp src Pulse Resp SpO2   20 0737 (!) 142/69 98 °F (36.7 °C) Oral 80 18 95 %   20 2100 121/62 97.9 °F (36.6 °C) Oral 81 16 96 %     Const: Alert. No distress, pleasant. HEENT: Normocephalic, atraumatic. Normal sclera/conjunctiva. MMM. CV: Warm, Well perfused   Resp: No respiratory distress. Abd: Soft, nontender, nondistended  Ext: No edema. Neuro: Alert, oriented, appropriately interactive. Psych: Cooperative, appropriate mood and affect  MSK: 5/5 strength throughout    Laboratory data: Available via EMR.    Last 24 hour lab  Recent Results (from the past 24 hour(s))   Protime-INR    Collection Time: 20 11:09 AM   Result Value Ref Range    Protime 20.3 (H) 10.0 - 13.2 sec    INR 1.74 (H) 0.86 - 1.14   POCT Glucose    Collection Time: 20 11:23 AM   Result Value Ref Range    POC Glucose 163 (H) 70 - 99 mg/dl    Performed on ACCU-CHEK    POCT Glucose    Collection Time: 20  4:21 PM   Result Value Ref Range    POC Glucose 119 (H) 70 - 99 mg/dl    Performed on ACCU-CHEK    POCT Glucose    Collection Time: 20  8:22 PM   Result Value Ref Range    POC Glucose 174 (H) 70 - 99 mg/dl    Performed on ACCU-CHEK    Protime-INR    Collection Time: 20  5:58 AM   Result Value Ref Range    Protime 20.7 (H) 10.0 - 13.2 sec    INR 1.77 (H) 0.86 - 8.93   Basic Metabolic Panel w/ Reflex to MG    Collection Time: 20  5:58 AM   Result Value Ref Range    Sodium 136 136 - 145 mmol/L    Potassium reflex Magnesium 4.2 3.5 - 5.1 mmol/L    Chloride 98 (L) 99 - 110 mmol/L    CO2 27 21 - 32 mmol/L    Anion Gap 11 3 - 16

## 2020-03-30 NOTE — DISCHARGE INSTR - COC
COLONOSCOPY N/A 2/10/2020    COLONOSCOPY POLYPECTOMY SNARE/COLD BIOPSY performed by Arlen Almaraz DO at [address]  12/07/2010    Dr. Chinedu Bolton. Roger - NUIRS- 4.0 x 28 to Cx    PILONIDAL CYST EXCISION N/A 2/11/2020    DEBRIDEMENT OF SACRAL WOUND performed by Jose David Duran MD at [address] VALVE CONDUIT/CORON RECONSTR N/A 1/21/2020    Dr. 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 Susana Ríos - emergent w/evacuation of hematoma & chest wall hemostasis, pleural biopsy & XENA wedge resection    TRANSESOPHAGEAL ECHOCARDIOGRAM  01/21/2020    during ascending aorta replacement w/redo AVR    TRANSESOPHAGEAL ECHOCARDIOGRAM  12/07/2010       Immunization History:   Immunization History   Administered Date(s) Administered    Influenza Virus Vaccine 11/01/2019    Influenza, High Dose (Fluzone 65 yrs and older) 10/09/2019    Pneumococcal Conjugate 7-valent (Prevnar7) 11/01/2019    Tetanus Toxoid, absorbed 10/02/2006       Active Problems:  Patient Active Problem List   Diagnosis Code    Hemothorax J94.2    Tobacco abuse Z72.0    AS (aortic stenosis) I35.0    CAD (coronary artery disease) s/p stent in 2010 I25.10    Essential hypertension I10    Hyperlipidemia E78.5    S/P AVR (aortic valve replacement) Z95.2    Atelectasis J98.11    Leukocytosis D72.829    Hyperglycemia R73.9    Acute bronchospasm J98.01    Atrial fibrillation with RVR (HCC) I48.91    Acute on chronic diastolic congestive heart failure (HCC) I50.33    Myocardial infarction (HCC) I21.9    Obesity, Class I, BMI 30.0-34.9 (see actual BMI) E66.9    S/P thoracotomy Z98.890    Status post partial removal of lung Dressing Status Changed 3/29/2020 12:00 PM   Dressing Changed Other (Comment) 3/28/2020  8:05 AM   Drainage Amount Small 3/27/2020  1:12 PM   Dressing Change Due 03/30/20 3/27/2020  1:12 PM   Output (ml) 475 ml 3/29/2020  3:50 AM   Wound Assessment Other (Comment) 3/28/2020  8:05 AM   Luz-wound Assessment Red;Denuded; Maceration 3/27/2020  1:12 PM   Shape oval 3/25/2020  3:11 PM   Odor Mild 3/25/2020  3:11 PM   Number of days: 12       Wound 03/16/20 Sacrum with wound vac in place (Active)   Wound Image   3/23/2020  2:24 PM   Wound Pressure Stage  4 3/27/2020  1:12 PM   Offloading for Diabetic Foot Ulcers Other (comment) 3/26/2020  7:30 AM   Dressing Status Other (Comment) 3/29/2020 10:32 PM   Dressing Changed Changed/New 3/27/2020  1:12 PM   Dressing/Treatment Moist to dry 3/29/2020 10:32 PM   Wound Cleansed Rinsed/Irrigated with saline 3/29/2020 10:32 PM   Dressing Change Due 03/30/20 3/29/2020 10:32 PM   Wound Length (cm) 8 cm 3/25/2020  3:11 PM   Wound Width (cm) 2 cm 3/25/2020  3:11 PM   Wound Depth (cm) 2 cm 3/25/2020  3:11 PM   Wound Surface Area (cm^2) 16 cm^2 3/25/2020  3:11 PM   Change in Wound Size % (l*w) 28.89 3/25/2020  3:11 PM   Wound Volume (cm^3) 32 cm^3 3/25/2020  3:11 PM   Wound Healing % 53 3/25/2020  3:11 PM   Post-Procedure Length (cm) 0 cm 3/17/2020 11:14 AM   Post-Procedure Width (cm) 0 cm 3/17/2020 11:14 AM   Post-Procedure Depth (cm) 0 cm 3/17/2020 11:14 AM   Post-Procedure Surface Area (cm^2) 0 cm^2 3/17/2020 11:14 AM   Post-Procedure Volume (cm^3) 0 cm^3 3/17/2020 11:14 AM   Distance Tunneling (cm) 0 cm 3/25/2020  3:11 PM   Tunneling Position ___ O'Clock 0 3/25/2020  3:11 PM   Undermining Starts ___ O'Clock 1200 3/25/2020  3:11 PM   Undermining Ends___ O'Clock 100 3/25/2020  3:11 PM   Undermining Maxium Distance (cm) 1.5 3/25/2020  3:11 PM   Wound Assessment Other (Comment) 3/26/2020  7:30 AM   Drainage Amount Other (Comment) 3/27/2020  1:12 PM   Drainage Description Serous 3/25/2020 24 hours) at 3/30/2020 1006  Last data filed at 3/30/2020 0924  Gross per 24 hour   Intake 1200 ml   Output 1275 ml   Net -75 ml     I/O last 3 completed shifts: In: 12 [P.O.:960]  Out: 1275 [Urine:1275]    Safety Concerns: At Risk for Falls    Impairments/Disabilities:      None    Nutrition Therapy:  Current Nutrition Therapy:   - Oral Diet:  Carb Control 5 carbs/meal (2000kcals/day)    Routes of Feeding: Oral  Liquids: Thin Liquids  Daily Fluid Restriction: no  Last Modified Barium Swallow with Video (Video Swallowing Test): not done    Treatments at the Time of Hospital Discharge:   Respiratory Treatments: none  Oxygen Therapy:  is not on home oxygen therapy. Ventilator:    - No ventilator support     Rehab Therapies: Physical Therapy, Occupational Therapy, Speech/Language Therapy and Nurse  Weight Bearing Status/Restrictions: No weight bearing restirctions  Other Medical Equipment (for information only, NOT a DME order):   Wound vac  Other Treatments: HOME HEALTH CARE: LEVEL 4 SICK   Home health agency to establish plan of care for patient over 60 day period  Nursing:  Initial home SN evaluation visit to occur within 24-48 hours of hospital discharge  [facility] nursing visits daily, then QOD with progression as warranted for:  1)  medication management  2)  VS and clinical assessment  3)  S&S chronic disease exacerbation education + when to contact MD/NP  4)  care coordination  Medication Reconciliation during 1st SN visit  Nurse telephone visit on the days that visit is not being made in person for first 30 days                               PT/OT/Speech   Evaluations in home within 24-48 hours of hospital discharge to include DME and home safety   Frontload therapy 5 days, then 3x a week   OT evaluation for [address] needs for personal care    Palliative Care referral within 5 days of hospital discharge    evaluation within 24-48 hours of hospital discharge to evaluate resources &

## 2020-03-30 NOTE — PROGRESS NOTES
Modified independent ; Increased time to complete  UE Bathing: Setup; Increased time to complete  LE Bathing: Setup; Increased time to complete;Supervision  UE Dressing: Increased time to complete;Setup  LE Dressing: Increased time to complete;Supervision  Toileting: Modified independent ; Increased time to complete  Additional Comments: cues for safety in shower, however able to gather all clothing distant SPV and cecy/doff in bathroom. Setup for shower temp, standing SPV for bathing due to poor safety and balance; pt sat to toilet Chloe with 4WW and grab bar; stil lremains impulsive with poor safety awareness  Instrumental ADL's  Instrumental ADLs: Yes  Meal Prep  Meal Prep Level: Walker  Meal Prep Level of Assistance: Modified independent  Meal Preparation: 4WW to gather coffee in RN station kitchen wiht good balance and endurance noted     Balance  Sitting Balance: Modified independent   Standing Balance: Supervision(4WW)  Standing Balance  Time: x5 minutes, 2x1-2 minutes, 2x2 minutes, x4 minutes, x7 minutes, x6.5 minutes  Activity: mobility in room/bathroom to gather items for bathing/dressing, standing ADLs for dressing, bathing, and grooming, mobility to/from gym  Comment: able to use 4ww with better safety awareness and able to lock/unlock without cues. Pt does remain impuslive needing intermittent cues for safety turning in bathroom. Functional Mobility  Functional - Mobility Device: 4-Wheeled Walker  Activity: To/from bathroom; To/From therapy gym;Transport items; Retrieve items  Assist Level: Supervision  Functional Mobility Comments: decreased safety awareness at times with limited attn  Toilet Transfers  Toilet - Technique: Ambulating  Equipment Used: Standard toilet  Toilet Transfer: Modified independent  Tub Transfers  Tub - Transfer From: Rolling walker  Tub - Transfer Type: To and From  Tub - Transfer To: Transfer tub bench  Tub - Technique: To right;Ambulating; To left  Tub Transfers: Supervision  Shower Transfers  Shower - Transfer From: [place] - Transfer Type: To and From  Shower - Transfer To: Transfer tub bench  Shower - Technique: To left; To right;Ambulating  Shower Transfers: Supervision  Bed mobility  Rolling to Left: Modified independent  Rolling to Right: Modified independent  Supine to Sit: Modified independent  Sit to Supine: Modified independent  Scooting: Modified independent  Transfers  Stand Step Transfers: Supervision  Sit to stand: Modified independent  Stand to sit: Modified independent        Coordination  Gross Motor: Better balance and GM coordintion with manuvering  4ww but still needs cues in bathroom with turning  Fine Motor: fair coordination with opening and management of containers during ADLs at sinkside              Cognition  Overall Cognitive Status: Exceptions  Arousal/Alertness: Appropriate responses to stimuli  Following Commands: Follows multistep commands with repitition  Attention Span: Attends with cues to redirect  Memory: Appears intact  Safety Judgement: Decreased awareness of need for assistance;Decreased awareness of need for safety  Problem Solving: Assistance required to identify errors made;Assistance required to generate solutions;Assistance required to implement solutions  Insights: Decreased awareness of deficits  Initiation: Requires cues for some  Sequencing: Requires cues for some  Cognition Comment: Patient was easily distracted and needed cueing to focus on the task at hand.       Perception  Overall Perceptual Status: WFL              Type of ROM/Therapeutic Exercise  Type of ROM/Therapeutic Exercise: Free weights  Comment: sitting in chair mostly 2# free weights but 3# weights used for some exercises  Exercises  Scapular Protraction: x15  Scapular Retraction: x15  Shoulder Elevation: x15  Shoulder Flexion: 15x to 90*  Shoulder ABduction: 15x  Shoulder ADduction: 15x  Horizontal ABduction: 15x  Elbow Flexion: 15x  Elbow Extension: 15x  Supination: 15x  Pronation: 15x  Wrist Flexion: 15x  Wrist Extension: 15x  Finger Flexion: 15+15x  Finger Extension: 15+15x  Other: x15 chest press, circles forwards/backwards, internal/external rotation                    Plan   Plan  Times per week: 5 out of 7 days  Current Treatment Recommendations: Strengthening, Balance Training, Safety Education & Training, Self-Care / ADL, Functional Mobility Training, Endurance Training, Positioning       Goals  Short term goals  Time Frame for Short term goals: 1 week (3-26-20)  Short term goal 1: CGA with functional/toilet transfers -GOAL MET 3/26  Short term goal 2: CGA static standing ADL's for 2 minutes -GOAL MET 3/26  Short term goal 3: Pt will complete 20 reps of BUE exercises for increased endurance.  -GOAL MET 3/26  Short term goal 4: Pt will complete LB dressing with S. -GOAL MET 3/25  Long term goals  Time Frame for Long term goals : 2 week (4-2-20)  Long term goal 1: Pt will complete tub transfer with DME mod I. - NOT MET, remains SPV 3/30  Long term goal 2: Pt will complete sponge bath mod I. - NOT MET, remains SPV 3/30  Long term goal 3: Pt will complete LB dressing mod I. -NOT MET remains SPV 3/30  Long term goal 4: Pt will complete light kitchen task mod I. -GOAL MET 3/30  Patient Goals   Patient goals : \"to get around better\"       Therapy Time   Individual Concurrent Group Co-treatment   Time In 6926         Time Out 0905         Minutes 70         Timed Code Treatment Minutes: [address] Jonathan Escort, OTR/L

## 2020-03-30 NOTE — FLOWSHEET NOTE
03/30/20 1553   Encounter Summary   Services provided to: Patient   Continue Visiting   (3/30 TelCall-d/c today; excited to go home;blessing)   Complexity of Encounter Low   Length of Encounter 15 minutes   Spiritual Assessment Completed Yes

## 2020-03-30 NOTE — PLAN OF CARE
Problem: Nutrition  Goal: Optimal nutrition therapy  Outcome: Ongoing  Note: Nutrition Problem: Inadequate oral intake  Intervention: Food and/or Nutrient Delivery: Continue current diet, Continue current ONS  Nutritional Goals: Pt will consume greater than 50% of PO intakes this ARU stay

## 2020-03-31 ENCOUNTER — TELEPHONE (OUTPATIENT)
Dept: SURGERY | Age: 70
End: 2020-03-31

## 2020-03-31 VITALS
WEIGHT: 171.7 LBS | DIASTOLIC BLOOD PRESSURE: 74 MMHG | RESPIRATION RATE: 18 BRPM | SYSTOLIC BLOOD PRESSURE: 148 MMHG | HEART RATE: 69 BPM | HEIGHT: 69 IN | TEMPERATURE: 98.2 F | OXYGEN SATURATION: 97 % | BODY MASS INDEX: 25.43 KG/M2

## 2020-03-31 LAB
GLUCOSE BLD-MCNC: 112 MG/DL (ref 70–99)
GLUCOSE BLD-MCNC: 133 MG/DL (ref 70–99)
INR BLD: 1.5 (ref 0.86–1.14)
PERFORMED ON: ABNORMAL
PERFORMED ON: ABNORMAL
PROTHROMBIN TIME: 17.5 SEC (ref 10–13.2)

## 2020-03-31 PROCEDURE — 6370000000 HC RX 637 (ALT 250 FOR IP): Performed by: PHYSICAL MEDICINE & REHABILITATION

## 2020-03-31 PROCEDURE — 36415 COLL VENOUS BLD VENIPUNCTURE: CPT

## 2020-03-31 PROCEDURE — 85610 PROTHROMBIN TIME: CPT

## 2020-03-31 RX ADMIN — LEVOTHYROXINE SODIUM 25 MCG: 25 TABLET ORAL at 06:08

## 2020-03-31 RX ADMIN — MECLIZINE 25 MG: 12.5 TABLET ORAL at 08:21

## 2020-03-31 RX ADMIN — PANTOPRAZOLE SODIUM 40 MG: 40 TABLET, DELAYED RELEASE ORAL at 06:08

## 2020-03-31 RX ADMIN — DICLOFENAC 4 G: 10 GEL TOPICAL at 08:22

## 2020-03-31 RX ADMIN — CARVEDILOL 12.5 MG: 6.25 TABLET, FILM COATED ORAL at 08:18

## 2020-03-31 RX ADMIN — BISACODYL 5 MG: 5 TABLET, COATED ORAL at 08:21

## 2020-03-31 RX ADMIN — CITALOPRAM HYDROBROMIDE 10 MG: 20 TABLET ORAL at 08:19

## 2020-03-31 RX ADMIN — ALLOPURINOL 300 MG: 300 TABLET ORAL at 08:18

## 2020-03-31 RX ADMIN — OXYCODONE 5 MG: 5 TABLET ORAL at 08:19

## 2020-03-31 ASSESSMENT — PAIN SCALES - GENERAL
PAINLEVEL_OUTOF10: 4
PAINLEVEL_OUTOF10: 2

## 2020-03-31 ASSESSMENT — PAIN DESCRIPTION - PAIN TYPE: TYPE: CHRONIC PAIN

## 2020-03-31 ASSESSMENT — PAIN DESCRIPTION - ORIENTATION: ORIENTATION: LOWER

## 2020-03-31 ASSESSMENT — PAIN DESCRIPTION - FREQUENCY: FREQUENCY: INTERMITTENT

## 2020-03-31 ASSESSMENT — PAIN DESCRIPTION - LOCATION: LOCATION: BACK

## 2020-03-31 ASSESSMENT — PAIN DESCRIPTION - DESCRIPTORS: DESCRIPTORS: ACHING;DISCOMFORT

## 2020-03-31 NOTE — TELEPHONE ENCOUNTER
Called patient, requested I call daughter.      Set up patient to [facility] and scheduled E Visit

## 2020-03-31 NOTE — PROGRESS NOTES
Call from bed side RN. Reported Dr Edgardo Soriano was in and wanted a photo of the wound on next dressing change. Change not due until tomorrow and patient is scheduled to DC today. Had planned to just switch to home VAC. Call to MD office to see if need to change dressing today and re-photo wound bed. Patient was ready to leave at 1200 pm.  Family was here and left again. Will await return call from MD regarding photo.

## 2020-03-31 NOTE — PLAN OF CARE
Problem: Pain:  Goal: Pain level will decrease  Description: Pain level will decrease  Outcome: Ongoing  Goal: Control of acute pain  Description: Control of acute pain  Outcome: Ongoing  Goal: Control of chronic pain  Description: Control of chronic pain  Outcome: Ongoing     Problem: Falls - Risk of:  Goal: Will remain free from falls  Description: Will remain free from falls  Outcome: Ongoing  Goal: Absence of physical injury  Description: Absence of physical injury  Outcome: Ongoing     Problem: Nutrition  Goal: Optimal nutrition therapy  Outcome: Ongoing

## 2020-03-31 NOTE — PROGRESS NOTES
Pharmacy to Dose Warfarin     Dx: Atrial Fibrillation   Goal INR range 2-3  Home Warfarin dose: 5 mg daily? (patient unsure)     Date                 INR                  Warfarin  3/17                1.31                   5 mg  3/18                1.32                   5 mg  3/19                1.35                   6 mg  3/20                1.99                   5 mg  3/21                3.36                   Hold   3/22                2.9                      1mg  3/23                2.27                   2 mg  3/24                1.91                   2 mg  3/25                1.80                   2.5 mg  3/26                1.60                   4 mg   3/27                1.63                   4 mg   3/28                1.70                   4 mg   3/29                1.74                   not given   3/30                1.77                   4 mg  3/31                1.50                   6 mg      Recommend 6 mg warfarin tonight x 1. Daily INR ordered. Rx will continue to manage therapy per consult order.   Lana Sandoval PharmD  3/31/2020 at 8:53 AM

## 2020-03-31 NOTE — TELEPHONE ENCOUNTER
Patient appointment was set up in earlier phone message. :)     This was done with patient's daughter, who will be assisting her father, the day of appointment.

## 2020-03-31 NOTE — PROGRESS NOTES
Edna Peterson called back from Dr Phuong Reyna office and OK'ed photo to be emailed to MD after home care visit tomorrow. Any questions the RN can call her at [phone]    Spoke to Estela Pena at [address] Dr Phuong Reyna email address given to her and she will communicate to home care RN that a photo needs to be taken of sacral/coccyx wound and emailed to Dr Ally Smith after visit on 4/1/20. Any questions call Edna Peterson ad Phuong Reyna office at [phone].

## 2020-04-01 ENCOUNTER — CARE COORDINATION (OUTPATIENT)
Dept: CASE MANAGEMENT | Age: 70
End: 2020-04-01

## 2020-04-02 ENCOUNTER — CARE COORDINATION (OUTPATIENT)
Dept: CASE MANAGEMENT | Age: 70
End: 2020-04-02

## 2020-04-02 ENCOUNTER — TELEPHONE (OUTPATIENT)
Dept: CARDIOLOGY CLINIC | Age: 70
End: 2020-04-02

## 2020-04-02 ENCOUNTER — TELEPHONE (OUTPATIENT)
Dept: CARDIOTHORACIC SURGERY | Age: 70
End: 2020-04-02

## 2020-04-02 NOTE — TELEPHONE ENCOUNTER
Spoke with Lori Whittaker at Dr. Shyann Jose office. INR was deferred to PCP for management by Dr. Danny Henley with ARU. Pt had been on Xarelto and ASA which were dcd due to GIB. Pt was started on Coumadin while on ARU for Afib. Pt had Paroxysmal Afib right after surgery and has not been monitored since discharge and readmission. Per Dr. Carolyne Zabala, pt needs anticoagulation until 4/21/20 from a surgical standpoint. Due to the Afib pt may require anticoagulation a little longer. He requests an evaluation by EP. Lori Whittaker states that their office will contact the pt and sched an appt with Dr. Aracelis Zuniga. Remain available to assist as needed.   Carina Dodd

## 2020-04-02 NOTE — CARE COORDINATION
patient has wound vac in place [facility]. nurse changed dressing yesterday. Patient has apt on 4/6/20 with Teagan Escudero. Ashley Bojorquez stated patient is doing overall ok. CTN discussed wound care instructions with Ashley Bojorquez and she reported [facility] nurse will be out tomorrow as well. Denies any acute needs at present time. Agreeable to f/u calls. Educated on the use of urgent care or physicians 24 hr access line if assistance is needed after hours & that they can always contact their home care provider to request a nurse visit even if it isn't their regularly scheduled day for their nurse to visit.      Cierra BENITEZN, RN, [facility]  Care Transition Nurse   [phone]

## 2020-04-03 PROBLEM — J94.2 HEMOTHORAX, LEFT: Status: RESOLVED | Noted: 2020-01-01 | Resolved: 2020-04-03

## 2020-04-03 PROBLEM — D72.829 LEUKOCYTOSIS: Status: RESOLVED | Noted: 2020-01-09 | Resolved: 2020-04-03

## 2020-04-03 PROBLEM — E87.1 HYPONATREMIA: Status: RESOLVED | Noted: 2020-03-17 | Resolved: 2020-04-03

## 2020-04-03 PROBLEM — I48.91 ATRIAL FIBRILLATION WITH RVR (HCC): Status: RESOLVED | Noted: 2020-01-09 | Resolved: 2020-04-03

## 2020-04-03 PROBLEM — E87.0 HYPERNATREMIA: Status: RESOLVED | Noted: 2020-01-29 | Resolved: 2020-04-03

## 2020-04-03 PROBLEM — J98.01 ACUTE BRONCHOSPASM: Status: RESOLVED | Noted: 2020-01-09 | Resolved: 2020-04-03

## 2020-04-03 PROBLEM — J94.2 HEMOTHORAX: Status: RESOLVED | Noted: 2020-01-01 | Resolved: 2020-04-03

## 2020-04-03 PROBLEM — J98.11 ATELECTASIS: Status: RESOLVED | Noted: 2020-01-09 | Resolved: 2020-04-03

## 2020-04-06 ENCOUNTER — TELEMEDICINE (OUTPATIENT)
Dept: SURGERY | Age: 70
End: 2020-04-06
Payer: MEDICARE

## 2020-04-06 ENCOUNTER — TELEPHONE (OUTPATIENT)
Dept: CARDIOTHORACIC SURGERY | Age: 70
End: 2020-04-06

## 2020-04-06 PROCEDURE — 99213 OFFICE O/P EST LOW 20 MIN: CPT | Performed by: SURGERY

## 2020-04-06 PROCEDURE — 4040F PNEUMOC VAC/ADMIN/RCVD: CPT | Performed by: SURGERY

## 2020-04-06 PROCEDURE — 1123F ACP DISCUSS/DSCN MKR DOCD: CPT | Performed by: SURGERY

## 2020-04-06 PROCEDURE — 3017F COLORECTAL CA SCREEN DOC REV: CPT | Performed by: SURGERY

## 2020-04-06 PROCEDURE — G8417 CALC BMI ABV UP PARAM F/U: HCPCS | Performed by: SURGERY

## 2020-04-06 PROCEDURE — 1111F DSCHRG MED/CURRENT MED MERGE: CPT | Performed by: SURGERY

## 2020-04-06 PROCEDURE — G8428 CUR MEDS NOT DOCUMENT: HCPCS | Performed by: SURGERY

## 2020-04-06 PROCEDURE — 4004F PT TOBACCO SCREEN RCVD TLK: CPT | Performed by: SURGERY

## 2020-04-06 NOTE — PROGRESS NOTES
Other RX Placeholder Saúl Burnett DO             SocHx: Smoking: Former               ETOH: occasional               Drug use: No                 ROS   Constitutional: Negative for chills and fever. HENT: Negative for congestion, facial swelling, and voice change. Eyes: Negative for photophobia and visual disturbance. Respiratory: Negative for apnea, cough, chest tightness and shortness of breath. Cardiovascular: Negative for chest pain and palpitations. Gastrointestinal: Negative for dysphagia and early satiety. Genitourinary: Negative for difficulty urinating, dysuria, flank pain, frequency and hematuria. Musculoskeletal: Negative for new gait problem, joint swelling and myalgias. Skin: Negative for color change, pallor and rash. Endocrine: negative for tremors, temperature intolerance or polydipsia. Allergic/Immunologic: Negative for new environmental or food allergies. Neurological: Negative for dizziness, seizures, speech difficulty, numbness. Hematological: Negative for adenopathy. Psychiatric/Behavioral: Negative for agitation and confusion.     EXAM      Video conference, thus no vital signs obtained      GEN: NAD, pleasant,   CVS: Clinical assessment of perfusion satisfactory  PULM: No respiratory distress, no coughing  HEENT: PERRLA/EOMI; dentition poor, hearing appears within normal limits  NECK: No obvious masses  FACE:No masses  EXT: No lymphedema, but heel pressure wounds noted   ABD: No examination able to be performed as patient prone  NEURO: No focal deficits, no obvious CN deficits     WOUND: Sacral; moderate fibrinous exudate within the wound with some breakdown of the periwound     IMAGING: None     MICRO: Wound culture: None     PATHOLOGY: None     LABS     Nutrition: Prealbumin NA; Transferrin NA; Albumin 3.8     IMP: 69 y.o.male with stage 4 pressure wound  PLAN: Continue with local wound care with vac changes qMWF.   In the interim, will need a specialty mattress

## 2020-04-06 NOTE — TELEPHONE ENCOUNTER
Spoke with Josefa Barcenas from [facility] reporting that Dr. Megan Richter is disinclined to manage INR/Coumadin as \"he did not start it\". The Coumadin was started in ARU and upon discharge, per Dr. Nicolle Bautista, was to be managed by PCP. Per documentation by [facility], will follow up to see if referral to a coumadin clinic has been completed. In the interim, INR today was 1.7.  Pt has been taking 4 mg and will continue to do so until management established.  Angel Tomas

## 2020-04-07 ENCOUNTER — TELEPHONE (OUTPATIENT)
Dept: CARDIOTHORACIC SURGERY | Age: 70
End: 2020-04-07

## 2020-04-09 ENCOUNTER — TELEPHONE (OUTPATIENT)
Dept: PHARMACY | Age: 70
End: 2020-04-09

## 2020-04-10 ENCOUNTER — ANTI-COAG VISIT (OUTPATIENT)
Dept: CARDIOTHORACIC SURGERY | Age: 70
End: 2020-04-10

## 2020-04-10 LAB — INR BLD: 2.2

## 2020-04-10 NOTE — PROGRESS NOTES
Message received from [facility] Coumadin clinic. Pt INR to be managed thru them. INR  Due today.  Jessika Frankel

## 2020-04-13 ENCOUNTER — ANTI-COAG VISIT (OUTPATIENT)
Dept: PHARMACY | Age: 70
End: 2020-04-13

## 2020-04-13 LAB — INR BLD: 2.2

## 2020-04-13 NOTE — PROGRESS NOTES
Mr. Shirley Aparicio is here for management of anticoagulation for **. PMH also significant for **. He/She presents today w/out complaint. Pt verifies dosing regimen as listed above. Pt denies s/s bleeding/bruising/swelling/SOB. No BRBPR. No melena. Address missed doses  Reviewed pt medication list/Ask pt to bring medication list at next visit. No changes in RX/OTCs/Herbal medications. Reviewed dietary concerns  Address EToH and tobacco use. As initial clinic visit, provided pt with counseling for medication use/compliance, adverse events, and nutrition; clinic overview & orientation; and educational materials. Referral sent to [facility] [address] [facility]. Forwarded to me at [place] [address] as patient lives in [place]. Called last week. He still has home health RN coming out. Advised that they can have RN call results to me at the clinic for management and provided clinic phone number. Hayde Spain RN called in INR on Fri 4/10 of 2.2. RN will call back later today as she is visiting again today. INR 2.2 is within therapeutic range of 2-3  Recommend to continue current dose of 4 mg daily  Patient has 2 mg tablets. Will continue to monitor and check INR in 4 week. Dosing reminder card given with phone number, appointment date and time.    Return to clinic: Hayde Spain 4/13 ~3:00 PM    Eula Elliott, PharmD 9:56 AM EDT 4/13/20

## 2020-04-15 ENCOUNTER — TELEMEDICINE (OUTPATIENT)
Dept: SURGERY | Age: 70
End: 2020-04-15
Payer: MEDICARE

## 2020-04-15 PROCEDURE — G8428 CUR MEDS NOT DOCUMENT: HCPCS | Performed by: SURGERY

## 2020-04-15 PROCEDURE — 1111F DSCHRG MED/CURRENT MED MERGE: CPT | Performed by: SURGERY

## 2020-04-15 PROCEDURE — G8417 CALC BMI ABV UP PARAM F/U: HCPCS | Performed by: SURGERY

## 2020-04-15 PROCEDURE — 4004F PT TOBACCO SCREEN RCVD TLK: CPT | Performed by: SURGERY

## 2020-04-15 PROCEDURE — 4040F PNEUMOC VAC/ADMIN/RCVD: CPT | Performed by: SURGERY

## 2020-04-15 PROCEDURE — 1123F ACP DISCUSS/DSCN MKR DOCD: CPT | Performed by: SURGERY

## 2020-04-15 PROCEDURE — 99212 OFFICE O/P EST SF 10 MIN: CPT | Performed by: SURGERY

## 2020-04-15 PROCEDURE — 3017F COLORECTAL CA SCREEN DOC REV: CPT | Performed by: SURGERY

## 2020-04-15 NOTE — PROGRESS NOTES
[facility]    Due to COVID, a video consultation was performed and consent for the video was obtained by both the patient and his family at the beginning of the call. CC: Sacral pressure wound     Consulting physician: Hyman Goodpasture, MD     HPI:69 y.o.male with a PMHx as delineated below who developed a sacral pressure wound. He underwent AVR and did well, though was readmitted with GI bleeding. He developed a sacral pressure wound. The wound has not been implicated in sepsis, but has minimal granulation tissue, thus plastic surgery was consulted for assistance with management and evaluation for reconstruction. At his initial evaluation after discharge the wound had moderate fibrinous exudate. He has been receiving home vac changes. Since his last evaluation, he still has not received his specialty mattress.   He has been working on his nutrition, but it has not been going as well as hoped according to his family.     Ambulatory: Yes  Likely etiology: multifactorial  Incontinent: No  Age of onset: 77  Prior interventions: Vac dressings  Pressure offloading therapies: pressure offloading  Date of most recent procedure: None  Flaps in the past: None  Nutrition: tolerates PO     PMHx:   Past Medical History        Past Medical History:   Diagnosis Date    CAD (coronary artery disease)      [facility]) [facility]      Diabetes mellitus ([facility]      Gout      Hemothorax, left 01/2020    Hyperlipidemia      Hypertension      Obesity, Class I, BMI 30.0-34.9 (see actual BMI) 01/2020     30.7    S/P thoracotomy 01/2020     for hemothorax    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 Jayde gamino w/23mm Magna Ease bioprosthetic valve    BRONCHOSCOPY N/A 1/9/2020     w/BAL, performed by Shaq Amezquita MD at [address] [place] [address] N/A 1/24/2020     BRONCHOSCOPY ALVEOLAR LAVAGE performed by Santiago Mcknight MD at [address] [place] [address]   1/24/2020     BRONCHOSCOPY THERAPUTIC ASPIRATION INITIAL performed by Santiago Mcknight MD at [address] [facility] CABG WITH AORTIC VALVE REPLACEMENT   2001     date approximate, x1 to OM, AVR w/porcine valve    CARDIAC CATHETERIZATION   01/13/2020     Dr. Navjot Holguin Left 01/02/2020     Dr. Suella Closs - 3200 ml blood drained in 1000 ml increments    COLONOSCOPY   02/10/2020     polyps    COLONOSCOPY N/A 2/10/2020     COLONOSCOPY POLYPECTOMY SNARE/COLD BIOPSY performed by Philipp Parra DO at [address] [facility] [address]   12/07/2010     Dr. Rowdy Levy. Roger - NURIS- 4.0 x 28 to Cx    PILONIDAL CYST EXCISION N/A 2/11/2020     DEBRIDEMENT OF SACRAL WOUND performed by Braden Weaver MD at [address] [place] [address]. VALVE CONDUIT/CORON RECONSTR N/A 1/21/2020     Dr. 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 1/2/2020     Dr. Radha Wilde - emergent w/evacuation of hematoma & chest wall hemostasis, pleural biopsy & XENA wedge resection    TRANSESOPHAGEAL ECHOCARDIOGRAM   01/21/2020     during ascending aorta replacement w/redo AVR    TRANSESOPHAGEAL ECHOCARDIOGRAM   12/07/2010         ALLERGIES:        Allergies   Allergen Reactions    Penicillins Hives      FHx: Reviewed and is noncontributory     Meds:   Current Facility-Administered Medications             Current Facility-Administered Medications   Medication Dose Route Frequency Provider Last Rate Last Dose    dextrose 5 % solution

## 2020-04-16 ENCOUNTER — CARE COORDINATION (OUTPATIENT)
Dept: CASE MANAGEMENT | Age: 70
End: 2020-04-16

## 2020-04-16 PROBLEM — R77.8 ELEVATED TROPONIN: Status: RESOLVED | Noted: 2020-03-17 | Resolved: 2020-04-16

## 2020-04-16 NOTE — CARE COORDINATION
You Patient resolved from the Care Transitions episode on 4/16/20  Patient/family has been provided the following resources and education related to COVID-19:                         Signs, symptoms and red flags related to COVID-19            CDC exposure and quarantine guidelines            Conduit exposure contact - [phone]            Contact for their local Department of Health                 Patient currently reports that the following symptoms have improved:  all      No further outreach scheduled with this CTN/ACM. Episode of Care resolved. Patient has this CTN/ACM contact information if future needs arise. Spoke with patient who reported he is doing fine. Patient denied any questions or concerns. CTN advised patient of use of urgent care or physicians 24 hr access line if assistance is needed after hours.     Matthew BENITEZN, RN, [facility]  Care Transition Nurse   [phone]

## 2020-04-17 LAB — INR BLD: 2.3

## 2020-04-20 ENCOUNTER — ANTI-COAG VISIT (OUTPATIENT)
Dept: PHARMACY | Age: 70
End: 2020-04-20

## 2020-04-22 ENCOUNTER — TELEPHONE (OUTPATIENT)
Dept: SURGERY | Age: 70
End: 2020-04-22

## 2020-04-22 ENCOUNTER — TELEMEDICINE (OUTPATIENT)
Dept: SURGERY | Age: 70
End: 2020-04-22
Payer: MEDICARE

## 2020-04-22 PROCEDURE — G8417 CALC BMI ABV UP PARAM F/U: HCPCS | Performed by: SURGERY

## 2020-04-22 PROCEDURE — 3017F COLORECTAL CA SCREEN DOC REV: CPT | Performed by: SURGERY

## 2020-04-22 PROCEDURE — 4004F PT TOBACCO SCREEN RCVD TLK: CPT | Performed by: SURGERY

## 2020-04-22 PROCEDURE — 4040F PNEUMOC VAC/ADMIN/RCVD: CPT | Performed by: SURGERY

## 2020-04-22 PROCEDURE — 1111F DSCHRG MED/CURRENT MED MERGE: CPT | Performed by: SURGERY

## 2020-04-22 PROCEDURE — 99213 OFFICE O/P EST LOW 20 MIN: CPT | Performed by: SURGERY

## 2020-04-22 PROCEDURE — 1123F ACP DISCUSS/DSCN MKR DOCD: CPT | Performed by: SURGERY

## 2020-04-22 PROCEDURE — G8428 CUR MEDS NOT DOCUMENT: HCPCS | Performed by: SURGERY

## 2020-04-22 NOTE — PROGRESS NOTES
Allergies   Allergen Reactions    Penicillins Hives      FHx: Reviewed and is noncontributory     Meds:   Current Facility-Administered Medications             Current Facility-Administered Medications   Medication Dose Route Frequency Provider Last Rate Last Dose    dextrose 5 % solution  100 mL/hr Intravenous PRN Saúl L Heis, DO        dextrose 50 % IV solution  12.5 g Intravenous PRN Saúl L Heis, DO        glucagon (rDNA) injection 1 mg  1 mg Intramuscular PRN Saúl L Heis, DO        glucose (GLUTOSE) 40 % oral gel 15 g  15 g Oral PRN Saúl L Heis, DO        insulin lispro (HUMALOG) injection vial 0-12 Units  0-12 Units Subcutaneous TID  Saúl L Heis, DO   2 Units at 03/20/20 1140    insulin lispro (HUMALOG) injection vial 0-6 Units  0-6 Units Subcutaneous Nightly Saúl L Heis, DO   1 Units at 03/20/20 2040    acetaminophen (TYLENOL) tablet 650 mg  650 mg Oral Q4H PRN Saúl L Heis, DO        bisacodyl (DULCOLAX) EC tablet 5 mg  5 mg Oral Daily Saúl L Heis, DO   5 mg at 03/21/20 0824    magnesium hydroxide (MILK OF MAGNESIA) 400 MG/5ML suspension 30 mL  30 mL Oral Daily PRN Saúl L Heis, DO        polyethylene glycol (GLYCOLAX) packet 17 g  17 g Oral Daily PRN Saúl L Heis, DO        allopurinol (ZYLOPRIM) tablet 300 mg  300 mg Oral Daily Saúl L Heis, DO   300 mg at 03/21/20 0824    atorvastatin (LIPITOR) tablet 20 mg  20 mg Oral Nightly Saúl L Heis, DO   20 mg at 03/20/20 2039    carvedilol (COREG) tablet 12.5 mg  12.5 mg Oral BID  Saúl L Heis, DO   12.5 mg at 03/21/20 0824    citalopram (CELEXA) tablet 10 mg  10 mg Oral QAM Saúl L Heis, DO   10 mg at 03/21/20 0824    levothyroxine (SYNTHROID) tablet 25 mcg  25 mcg Oral Daily Saúl L Heis, DO   25 mcg at 03/21/20 0089    meclizine (ANTIVERT) tablet 25 mg  25 mg Oral Daily Saúl L Heis, DO   25 mg at 03/21/20 0824    oxyCODONE (ROXICODONE) immediate release tablet 5 mg  5 mg Oral Q6H PRN Juliet Munguia pressure     IMAGING: None     MICRO: Wound culture: None     PATHOLOGY: None     LABS     Nutrition: Prealbumin NA; Transferrin NA; Albumin 3.8     IMP: 69 y.o.male with stage 4 pressure wound  PLAN: Will stop the wound vac and transition to BID wet/dry dressings. Will then re-assess his wound next week. If it looks worse, will schedule urgent debridement. Otherwise, will plan for debridement with staged reconstruction after pandemic has resolved. We again discussed the importance of nutrition optimization as well and will continue to perform weekly wound evaluation via video conference. If his wound looks worse, or he has signs of sepsis, will need urgent debridement. The pathology and recurrent nature of pressure wounds were discussed with the patient. He was counseled on medical optimization (nutrition, pressure offloading, dressing changes, etc.) as well as surgical wound optimization with debridement(s). Additional conversation regarding flap reconstructive options as well as postoperative care regimen was carried out with the patient. A significant amount of time was also allocated to nicotine's effect on wound healing and the patient understands that a sub-optimal wound healing and cosmetic result may occur with continued utilization. The risks, benefits, alternatives, outcomes, personnel involved were discussed and all questions were answered in a satisfactory manner according to the patient. Specifically, the risks including, but not limited to:bleeding possibly requiring transfusion or reoperation, infection, seroma, nonhealing, recurrence necessitating reoperation, poor cosmetic outcome, scarring,  partial or total flap loss, VTE(DVT/PE), and death were discussed.      Total encounter time: 15 minutes with greater than 50% spent with face to face (or virtual) counseling and coordination of care.     Livia Aldrich MD  [address] [place] [address] Reconstructive Surgery  [phone]  04/22/20    Pursuant to

## 2020-04-22 NOTE — TELEPHONE ENCOUNTER
The patient had a video visit with Dr. Viktoriya Virk on 4-6-2020 and 4-. The patients daughter was under the impression that the patient needed to have a video visit today and that someone would be reaching out to them to schedule the visit. They do not have anything scheduled and stated that they have not been contacted. Can they be scheduled today? Please call.

## 2020-04-23 ENCOUNTER — HOSPITAL ENCOUNTER (OUTPATIENT)
Age: 70
Setting detail: SPECIMEN
Discharge: HOME | End: 2020-04-23
Payer: MEDICARE

## 2020-04-23 ENCOUNTER — TELEPHONE (OUTPATIENT)
Dept: SURGERY | Age: 70
End: 2020-04-23

## 2020-04-23 LAB
ANION GAP SERPL CALCULATED.3IONS-SCNC: 14 MMOL/L (ref 3–16)
BUN BLDV-MCNC: 15 MG/DL (ref 7–20)
CALCIUM SERPL-MCNC: 9.4 MG/DL (ref 8.3–10.6)
CHLORIDE BLD-SCNC: 102 MMOL/L (ref 99–110)
CO2: 27 MMOL/L (ref 21–32)
CREAT SERPL-MCNC: 0.6 MG/DL (ref 0.8–1.3)
GFR AFRICAN AMERICAN: >60
GFR NON-AFRICAN AMERICAN: >60
GLUCOSE BLD-MCNC: 100 MG/DL (ref 70–99)
POTASSIUM SERPL-SCNC: 4.2 MMOL/L (ref 3.5–5.1)
PRO-BNP: 2924 PG/ML (ref 0–124)
SODIUM BLD-SCNC: 143 MMOL/L (ref 136–145)

## 2020-04-23 PROCEDURE — 80048 BASIC METABOLIC PNL TOTAL CA: CPT

## 2020-04-23 PROCEDURE — 36415 COLL VENOUS BLD VENIPUNCTURE: CPT

## 2020-04-23 PROCEDURE — 83880 ASSAY OF NATRIURETIC PEPTIDE: CPT

## 2020-04-23 NOTE — TELEPHONE ENCOUNTER
Dalton Fraser, patient's caregiver is requesting a return call regarding surgery. Please advise. Thank you!

## 2020-04-24 ENCOUNTER — TELEPHONE (OUTPATIENT)
Dept: SURGERY | Age: 70
End: 2020-04-24

## 2020-04-24 NOTE — TELEPHONE ENCOUNTER
Anika Watson from [address] called wanting to know if Dr Angela Durbin was the prescribing doctor for pts Coumadin - I told her no and gave her prescribing doctor's name

## 2020-04-29 ENCOUNTER — TELEMEDICINE (OUTPATIENT)
Dept: SURGERY | Age: 70
End: 2020-04-29
Payer: MEDICARE

## 2020-04-29 PROCEDURE — 99213 OFFICE O/P EST LOW 20 MIN: CPT | Performed by: SURGERY

## 2020-04-29 PROCEDURE — 1111F DSCHRG MED/CURRENT MED MERGE: CPT | Performed by: SURGERY

## 2020-04-29 PROCEDURE — G8417 CALC BMI ABV UP PARAM F/U: HCPCS | Performed by: SURGERY

## 2020-04-29 PROCEDURE — 3017F COLORECTAL CA SCREEN DOC REV: CPT | Performed by: SURGERY

## 2020-04-29 PROCEDURE — G8428 CUR MEDS NOT DOCUMENT: HCPCS | Performed by: SURGERY

## 2020-04-29 PROCEDURE — 1123F ACP DISCUSS/DSCN MKR DOCD: CPT | Performed by: SURGERY

## 2020-04-29 PROCEDURE — 4040F PNEUMOC VAC/ADMIN/RCVD: CPT | Performed by: SURGERY

## 2020-04-29 PROCEDURE — 4004F PT TOBACCO SCREEN RCVD TLK: CPT | Performed by: SURGERY

## 2020-05-01 LAB — INR BLD: 2.7

## 2020-05-04 ENCOUNTER — ANTI-COAG VISIT (OUTPATIENT)
Dept: PHARMACY | Age: 70
End: 2020-05-04

## 2020-05-04 LAB — INTERNATIONAL NORMALIZATION RATIO, POC: 2.2

## 2020-05-11 ENCOUNTER — ANTI-COAG VISIT (OUTPATIENT)
Dept: PHARMACY | Age: 70
End: 2020-05-11

## 2020-05-11 LAB — INTERNATIONAL NORMALIZATION RATIO, POC: 2.9

## 2020-05-11 NOTE — PROGRESS NOTES
MrAlphonso Yoder is here for management of anticoagulation for AFib   PMH also significant for HTN, HLD, CAD, thoractomy. He presents today w/out complaint. Pt verifies dosing regimen as listed above. Pt denies s/s bleeding/bruising/swelling/SOB. No BRBPR. No melena. Address missed doses  Reviewed pt medication list/Ask pt to bring medication list at next visit. No changes in RX/OTCs/Herbal medications. Reviewed dietary concerns  Address EToH and tobacco use. As initial clinic visit, provided pt with counseling for medication use/compliance, adverse events, and nutrition; clinic overview & orientation; and educational materials. [facility] RN called in INR 2.9 today. INR has remained stable. INR 2.9 is within therapeutic range of 2-3  Recommend to continue current dose of 4 mg daily  Patient has 2 mg tablets. Will continue to monitor and check INR in 7 days. Dosing reminder card given with phone number, appointment date and time.    Return to clinic: [facility] 5/18    Mariella Arevalo, PharmD 4:30 PM EDT 5/11/20

## 2020-05-14 ENCOUNTER — TELEPHONE (OUTPATIENT)
Dept: SURGERY | Age: 70
End: 2020-05-14

## 2020-05-18 ENCOUNTER — ANTI-COAG VISIT (OUTPATIENT)
Dept: PHARMACY | Age: 70
End: 2020-05-18

## 2020-05-18 LAB — INTERNATIONAL NORMALIZATION RATIO, POC: 2.9

## 2020-05-28 ENCOUNTER — OFFICE VISIT (OUTPATIENT)
Dept: CARDIOLOGY CLINIC | Age: 70
End: 2020-05-28
Payer: MEDICARE

## 2020-05-28 ENCOUNTER — TELEPHONE (OUTPATIENT)
Dept: SURGERY | Age: 70
End: 2020-05-28

## 2020-05-28 ENCOUNTER — TELEPHONE (OUTPATIENT)
Dept: CARDIOLOGY CLINIC | Age: 70
End: 2020-05-28

## 2020-05-28 VITALS
BODY MASS INDEX: 29.62 KG/M2 | DIASTOLIC BLOOD PRESSURE: 52 MMHG | WEIGHT: 200 LBS | OXYGEN SATURATION: 98 % | HEART RATE: 78 BPM | SYSTOLIC BLOOD PRESSURE: 130 MMHG | HEIGHT: 69 IN

## 2020-05-28 PROCEDURE — 93000 ELECTROCARDIOGRAM COMPLETE: CPT | Performed by: INTERNAL MEDICINE

## 2020-05-28 PROCEDURE — G8417 CALC BMI ABV UP PARAM F/U: HCPCS | Performed by: INTERNAL MEDICINE

## 2020-05-28 PROCEDURE — 99204 OFFICE O/P NEW MOD 45 MIN: CPT | Performed by: INTERNAL MEDICINE

## 2020-05-28 PROCEDURE — 0296T PR EXT ECG > 48HR TO 21 DAY RCRD W/CONECT INTL RCRD: CPT | Performed by: INTERNAL MEDICINE

## 2020-05-28 PROCEDURE — G8427 DOCREV CUR MEDS BY ELIG CLIN: HCPCS | Performed by: INTERNAL MEDICINE

## 2020-05-28 RX ORDER — FUROSEMIDE 40 MG/1
40 TABLET ORAL 2 TIMES DAILY
COMMUNITY
End: 2020-05-29 | Stop reason: SDUPTHER

## 2020-05-28 RX ORDER — POTASSIUM CHLORIDE 750 MG/1
10 CAPSULE, EXTENDED RELEASE ORAL 2 TIMES DAILY
COMMUNITY
End: 2020-05-29 | Stop reason: SDUPTHER

## 2020-05-28 RX ORDER — ROPINIROLE 1 MG/1
1 TABLET, FILM COATED ORAL
COMMUNITY

## 2020-05-28 RX ORDER — MECLIZINE HYDROCHLORIDE 25 MG/1
25 TABLET ORAL 3 TIMES DAILY PRN
COMMUNITY

## 2020-05-28 NOTE — TELEPHONE ENCOUNTER
Pt daughter called, prescriptions were not at the pharmacy when she went to pick them up. Please call and advise.

## 2020-05-28 NOTE — PROGRESS NOTES
QUALITY MEASURES  1. Tobacco Cessation Counseling: NA  2. Retake of BP if >140/90:   NA  3. Documentation to PCP/referring for new patient:  Sent to PCP at close of office visit  4. CAD patient on anti-platelet: No  5. CAD patient on STATIN therapy:  Yes  6. Patient with CHF and aFib on anticoagulation:  Yes     All questions and concerns were addressed to the patient/family. Alternatives to my treatment were discussed. Dr. Brook Diaz MD  Electrophysiology  [facility] [facility]. [address]. [facility] [address]  Phone: [phone]  Fax: [phone]     NOTE: This report was transcribed using voice recognition software. Every effort was made to ensure accuracy, however, inadvertent computerized transcription errors may be present. This note was scribed in the presence of Dr. Margarette Cee MD by Valerie Peck RN. The scribe's documentation has been prepared under my direction and personally reviewed by me in its entirety. I confirm that the note above accurately reflects all work, physical examination, the discussion of treatments and procedures, and medical decision making performed by me. Kaya Kirk MD personally performed the services described in this documentation as scribed by nurse in my presence, and is both accurate and complete.     Electronically signed by Mirna Stroud MD on 5/28/2020 at 10:41 AM

## 2020-05-29 RX ORDER — FUROSEMIDE 40 MG/1
40 TABLET ORAL 2 TIMES DAILY
Qty: 180 TABLET | Refills: 3 | Status: SHIPPED | OUTPATIENT
Start: 2020-05-29

## 2020-05-29 RX ORDER — ATORVASTATIN CALCIUM 20 MG/1
20 TABLET, FILM COATED ORAL NIGHTLY
Qty: 90 TABLET | Refills: 3 | Status: SHIPPED | OUTPATIENT
Start: 2020-05-29 | End: 2020-06-28

## 2020-05-29 RX ORDER — POTASSIUM CHLORIDE 750 MG/1
10 CAPSULE, EXTENDED RELEASE ORAL 2 TIMES DAILY
Qty: 180 CAPSULE | Refills: 3 | Status: SHIPPED | OUTPATIENT
Start: 2020-05-29

## 2020-05-29 RX ORDER — CARVEDILOL 12.5 MG/1
12.5 TABLET ORAL 2 TIMES DAILY WITH MEALS
Qty: 180 TABLET | Refills: 3 | Status: SHIPPED | OUTPATIENT
Start: 2020-05-29 | End: 2020-11-27

## 2020-05-29 RX ORDER — WARFARIN SODIUM 2 MG/1
4 TABLET ORAL DAILY
Qty: 100 TABLET | Refills: 3 | Status: SHIPPED | OUTPATIENT
Start: 2020-05-29

## 2020-05-29 NOTE — TELEPHONE ENCOUNTER
Meds are atorvastatin (LIPITOR) 20 MG tablet, carvedilol (COREG) 12.5 MG tablet, furosemide (LASIX) 40 MG tablet,  potassium chloride (MICRO-K) 10 MEQ extended release capsule, warfarin (COUMADIN) 2 MG tablet. Please send to [facility].

## 2020-06-05 ENCOUNTER — TELEPHONE (OUTPATIENT)
Dept: CARDIOLOGY CLINIC | Age: 70
End: 2020-06-05

## 2020-06-05 NOTE — TELEPHONE ENCOUNTER
[facility] called and cant get monitor to stick, they are going to call the [phone] to see if they can help.

## 2020-06-08 ENCOUNTER — OFFICE VISIT (OUTPATIENT)
Dept: SURGERY | Age: 70
End: 2020-06-08
Payer: MEDICARE

## 2020-06-08 VITALS
DIASTOLIC BLOOD PRESSURE: 86 MMHG | OXYGEN SATURATION: 98 % | BODY MASS INDEX: 29.33 KG/M2 | SYSTOLIC BLOOD PRESSURE: 165 MMHG | TEMPERATURE: 97.7 F | HEIGHT: 69 IN | RESPIRATION RATE: 16 BRPM | HEART RATE: 90 BPM | WEIGHT: 198 LBS

## 2020-06-08 PROCEDURE — 1123F ACP DISCUSS/DSCN MKR DOCD: CPT | Performed by: SURGERY

## 2020-06-08 PROCEDURE — 3017F COLORECTAL CA SCREEN DOC REV: CPT | Performed by: SURGERY

## 2020-06-08 PROCEDURE — 4004F PT TOBACCO SCREEN RCVD TLK: CPT | Performed by: SURGERY

## 2020-06-08 PROCEDURE — 99214 OFFICE O/P EST MOD 30 MIN: CPT | Performed by: SURGERY

## 2020-06-08 PROCEDURE — G8427 DOCREV CUR MEDS BY ELIG CLIN: HCPCS | Performed by: SURGERY

## 2020-06-08 PROCEDURE — G8417 CALC BMI ABV UP PARAM F/U: HCPCS | Performed by: SURGERY

## 2020-06-08 PROCEDURE — 4040F PNEUMOC VAC/ADMIN/RCVD: CPT | Performed by: SURGERY

## 2020-06-08 NOTE — PROGRESS NOTES
[facility]    CC: Sacral pressure wound     Consulting physician: Brea Vicente MD     HPI:69 y.o.male with a PMHx as delineated below who developed a sacral pressure wound. He underwent AVR and did well, though was readmitted with GI bleeding. He developed a sacral pressure wound. The wound has not been implicated in sepsis, but has minimal granulation tissue, thus plastic surgery was consulted for assistance with management and evaluation for reconstruction. At his initial evaluation after discharge the wound had moderate fibrinous exudate. He has been receiving home vac changes. He has been working on his nutrition, but it has not been going as well as hoped according to his family. Since his last evaluation, he notes that the wound has significantly improved.   He has been pressure offloading and working on his nutrition as well.       Ambulatory: Yes  Likely etiology: multifactorial  Incontinent: No  Age of onset: 77  Prior interventions: Vac dressings  Pressure offloading therapies: pressure offloading  Date of most recent procedure: None  Flaps in the past: None  Nutrition: tolerates PO     PMHx:   Past Medical History        Past Medical History:   Diagnosis Date    CAD (coronary artery disease)      COPD (chronic obstructive pulmonary disease) (HCC)      Diabetes mellitus ([facility]      Gout      Hemothorax, left 01/2020    Hyperlipidemia      Hypertension      Obesity, Class I, BMI 30.0-34.9 (see actual BMI) 01/2020     30.7    S/P thoracotomy 01/2020     for hemothorax    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 Morales Mix - hanny w/23mm Magna Ease bioprosthetic valve    BRONCHOSCOPY N/A 1/9/2020     w/BAL, performed by Gilmer Ordoñez MD at [facility] ENDOSCOPY    BRONCHOSCOPY N/A 1/24/2020     BRONCHOSCOPY ALVEOLAR LAVAGE performed by Nallely Perez MD [address]   1/24/2020     BRONCHOSCOPY THERAPUTIC ASPIRATION INITIAL performed by Nallely Perez MD at [facility] CABG WITH AORTIC VALVE REPLACEMENT   2001     date approximate, x1 to OM, AVR w/porcine valve    CARDIAC CATHETERIZATION   01/13/2020     Dr. Bebeto Olvera Left 01/02/2020     Dr. Beather Leventhal - 3200 ml blood drained in 1000 ml increments    COLONOSCOPY   02/10/2020     polyps    COLONOSCOPY N/A 2/10/2020     COLONOSCOPY POLYPECTOMY SNARE/COLD BIOPSY performed by Nilesh Harris DO at [address] [facility] [address]   12/07/2010     Dr. Alexandra Canales. Roger - NURIS- 4.0 x 28 to Cx    PILONIDAL CYST EXCISION N/A 2/11/2020     DEBRIDEMENT OF SACRAL WOUND performed by Sky Jacobson MD at [address] [facility]. VALVE CONDUIT/CORON RECONSTR N/A 1/21/2020     Dr. 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 Aurther Kehr - emergent w/evacuation of hematoma & chest wall hemostasis, pleural biopsy & XENA wedge resection    TRANSESOPHAGEAL ECHOCARDIOGRAM   01/21/2020     during ascending aorta replacement w/redo AVR    TRANSESOPHAGEAL ECHOCARDIOGRAM   12/07/2010         ALLERGIES:        Allergies   Allergen Reactions    Penicillins Hives      FHx: Reviewed and is noncontributory     Meds:   Current Facility-Administered Medications             Current Facility-Administered Medications   Medication Dose Route Frequency Provider Last Rate Last Dose    dextrose 5 % solution  100 mL/hr Intravenous PRN Saúl Burnett DO        dextrose 50 % IV solution 12.5 g Intravenous PRN Saúl L Heis, DO        glucagon (rDNA) injection 1 mg  1 mg Intramuscular PRN Saúl L Heis, DO        glucose (GLUTOSE) 40 % oral gel 15 g  15 g Oral PRN Saúl L Heis, DO        insulin lispro (HUMALOG) injection vial 0-12 Units  0-12 Units Subcutaneous TID  Saúl L Heis, DO   2 Units at 03/20/20 1140    insulin lispro (HUMALOG) injection vial 0-6 Units  0-6 Units Subcutaneous Nightly Saúl L Heis, DO   1 Units at 03/20/20 2040    acetaminophen (TYLENOL) tablet 650 mg  650 mg Oral Q4H PRN Saúl L Heis, DO        bisacodyl (DULCOLAX) EC tablet 5 mg  5 mg Oral Daily Saúl L Heis, DO   5 mg at 03/21/20 0824    magnesium hydroxide (MILK OF MAGNESIA) 400 MG/5ML suspension 30 mL  30 mL Oral Daily PRN Saúl L Heis, DO        polyethylene glycol (GLYCOLAX) packet 17 g  17 g Oral Daily PRN [facility] Heis, DO        allopurinol (ZYLOPRIM) tablet 300 mg  300 mg Oral Daily Saúl L Heis, DO   300 mg at 03/21/20 0824    atorvastatin (LIPITOR) tablet 20 mg  20 mg Oral Nightly Saúl L Heis, DO   20 mg at 03/20/20 2039    carvedilol (COREG) tablet 12.5 mg  12.5 mg Oral BID  Saúl L Heis, DO   12.5 mg at 03/21/20 0824    citalopram (CELEXA) tablet 10 mg  10 mg Oral QAM Saúl L Heis, DO   10 mg at 03/21/20 0824    levothyroxine (SYNTHROID) tablet 25 mcg  25 mcg Oral Daily Saúl L Heis, DO   25 mcg at 03/21/20 9861    meclizine (ANTIVERT) tablet 25 mg  25 mg Oral Daily Saúl L Heis, DO   25 mg at 03/21/20 8936    oxyCODONE (ROXICODONE) immediate release tablet 5 mg  5 mg Oral Q6H PRN Saúl L Heis, DO   5 mg at 03/21/20 0630    pantoprazole (PROTONIX) tablet 40 mg  40 mg Oral QAM  Saúl L Heis, DO   40 mg at 03/21/20 0624    [START ON 3/17/2021] warfarin (COUMADIN) daily dosing (placeholder)   Other RX Placeholder Saúl Burnett DO             SocHx: Smoking: Former               ETOH: occasional               Drug use:  No                 ROS

## 2020-07-07 PROCEDURE — 0298T PR EXT ECG > 48HR TO 21 DAY REVIEW AND INTERPRETATN: CPT | Performed by: INTERNAL MEDICINE

## 2020-07-08 ENCOUNTER — TELEPHONE (OUTPATIENT)
Dept: CARDIOLOGY CLINIC | Age: 70
End: 2020-07-08

## 2020-10-09 NOTE — PROGRESS NOTES
Physical Therapy  Facility/Department: [facility] C2 CARD TELEMETRY  Daily Treatment Note  NAME: Mike Hernandez  : 1950  MRN: [id number]    Date of Service: 2020    Discharge Recommendations:  Subacute/Skilled Nursing Facility   PT Equipment Recommendations  Equipment Needed: No(defer to facility)    Assessment   Body structures, Functions, Activity limitations: Decreased posture;Decreased safe awareness;Decreased functional mobility ; Decreased balance;Decreased endurance;Decreased strength;Decreased ROM; Decreased cognition  Assessment: Pt with decreased tolerance to therapy today vs. yesterday due to increased confusion and restlessness. Pt required maxA x 2-3 for sit<>stand and bed>chair transfers with Heydi-Stedy with frequent cues needed for safety and to maintain sternal precautions. ICU RN reports pt has been struggling with worsening hospital-associated delirium. Cont. to recommend SNF at D/C. Treatment Diagnosis: Decreased (I) with ambulation  Specific instructions for Next Treatment: Progress ther ex and mobility as tolerated  Prognosis: Fair;Guarded  Decision Making: High Complexity  Barriers to Learning: Confusion & lethargy. Pt requires frequent cues for sternal precautions. REQUIRES PT FOLLOW UP: Yes  Activity Tolerance: Patient limited by cognitive status  Activity Tolerance: BP = 131/74 after bed>chair transfer. Patient Diagnosis(es): There were no encounter diagnoses. has a past medical history of CAD (coronary artery disease), COPD (chronic obstructive pulmonary disease) ([facility] [address] Diabetes mellitus ([facility] [address] Gout, Hemothorax, left, Hyperlipidemia, Hypertension, Obesity, Class I, BMI 30.0-34.9 (see actual BMI), S/P thoracotomy, Status post partial removal of lung, and Thyroid disease. has a past surgical history that includes thoracotomy (Left, 2020); bronchoscopy (N/A, 2020); Coronary angioplasty with stent (2010); pr ascend aorta graft w root replacment. valve conduit/coron reconstr (N/A, 2020); transesophageal echocardiogram (2020); Cardiac catheterization (2020); chest tube insertion (Left, 2020); Aortic valve replacement (2011); transesophageal echocardiogram (2010); Coronary artery bypass graft (); bronchoscopy (N/A, 2020); bronchoscopy (2020); and sigmoidoscopy (N/A, 2020). Restrictions  Restrictions/Precautions  Restrictions/Precautions: Fall Risk, Cardiac, General Precautions, Swallowing - Thickened Liquids  Position Activity Restriction  Sternal Precautions: No Pushing, No Pulling, 5# Lifting Restrictions  Other position/activity restrictions: Ambulate pt; Hari, BILL, telemetry, honey-thick liquids  Subjective   General  Chart Reviewed: Yes  Response To Previous Treatment: Patient unable to report, no changes reported from family or staff  Family / Caregiver Present: No  Referring Practitioner: Kelly Dutton MD  Subjective  Subjective: Pt very restless and confused/disoriented during session. Responded to name with extra cueing but disoriented to place/time/situation. Pt did not verbalize any c/o pain. General Comment  Comments: Pt resting in bed upon entry of therapy staff, ICU RN cleared pt to work with therapy although reports pt is very restless/confused today  Pain Screening  Patient Currently in Pain: No       Orientation  Orientation  Overall Orientation Status: Impaired  Orientation Level: Disoriented to time;Oriented to person;Disoriented to place; Disoriented to situation     Objective   Bed mobility  Rolling to Right: 2 Person assistance(modA x 2)  Supine to Sit: 2 Person assistance(modA x 2 moving toward R side)  Scootin Person assistance(dep x 1 to scoot forward to EOB, dep x 2 to scoot backward in chair)  Comment: Max cues needed to avoid using UE's during bed mobility and to maintain sternal precautions.      Transfers  Sit to Stand: 2 Person Assistance(maxA x 2-3 from EOB to Heydi-Stedy)  Stand to sit: 2 Person Assistance(maxA x 2-3 using Heydi-Stedy)  Bed to Chair: 2 Person Assistance(maxA x 2-3 using Heydi-Stedy)  Comment: Max cues needed during transfers to avoid using UE's to assist and for safety (pt very restless, trying to climb out of Stedy at one point and leaning far forward on several occasions). Ambulation  Ambulation?: No(unsafe to attempt given difficulty with transfers & confusion)     Balance  Posture: Poor  Sitting - Static: Poor  Sitting - Dynamic: Poor  Standing - Static: Poor  Standing - Dynamic: Poor  Comments: Pt able to sit EOB x 2-3 minutes with max-totalA x 1-2 needed to maintain upright trunk. Pt experienced 3-4 LOB episodes at EOB (usually toward R side & posteriorly). MaxA x 2 needed to maintain standing position during transfers with Heydi-Stedy. Exercises  Comments: Deferred LE ther ex today due to increased confusion & difficulty following commands/attending to therapist's instructions. AM-[facility] Score  AM-PAC Inpatient Mobility Raw Score : 10 (02/04/20 0918)  AM-PAC Inpatient T-Scale Score : 32.29 (02/04/20 0918)  Mobility Inpatient CMS 0-100% Score: 76.75 (02/04/20 5346)  Mobility Inpatient CMS G-Code Modifier : CL (02/04/20 9210)    Goals  Short term goals  Time Frame for Short term goals: 2/9/20  Short term goal 1: Pt will complete bed mobility with maxA x 1. Short term goal 2: Pt will complete sit<>stand with modA x 1. Short term goal 3: Pt will complete bed<>chair with modA x 1 and LRAD. Short term goal 4: Pt will ambulate 10 ft with LRAD and modA x 1. Short term goal 5: Pt will verbalize understanding of sternal precautions and maintain them throughout mobility. Patient Goals   Patient goals :  \"To get stronger\"    Plan    Times per week: 5-7x/week in acute care  Times per day: Daily  Specific instructions for Next Treatment: Progress ther ex and mobility as tolerated  Current Treatment Recommendations: Strengthening, ROM, Balance Training, Transfer Training, Functional Mobility Training, Endurance Training, Gait Training, Neuromuscular Re-education, Safety Education & Training, Home Exercise Program, Patient/Caregiver Education & Training, Equipment Evaluation, Education, & procurement, Positioning  Safety Devices:  All fall risk precautions in place, Call light within reach, Nurse notified, Gait belt, Patient at risk for falls, Left in chair, Chair alarm in place, Telesitter in use    Therapy Time   Individual Concurrent Group Co-treatment   Time In 0830         Time Out 0853         Minutes 23         Timed Code Treatment Minutes: Gualberto Zhang [address] [place], [place] #[id number] Use Therapeutic Ranged Or Therapeutic Target: please select Range or Target

## 2020-11-27 ENCOUNTER — APPOINTMENT (OUTPATIENT)
Dept: CT IMAGING | Age: 70
DRG: 291 | End: 2020-11-27
Payer: MEDICARE

## 2020-11-27 ENCOUNTER — APPOINTMENT (OUTPATIENT)
Dept: GENERAL RADIOLOGY | Age: 70
DRG: 291 | End: 2020-11-27
Payer: MEDICARE

## 2020-11-27 ENCOUNTER — HOSPITAL ENCOUNTER (INPATIENT)
Age: 70
LOS: 6 days | Discharge: NURSING, REHAB OR LONG-TERM CARE | DRG: 291 | End: 2020-12-03
Attending: EMERGENCY MEDICINE | Admitting: INTERNAL MEDICINE
Payer: MEDICARE

## 2020-11-27 PROBLEM — I50.43 CHF (CONGESTIVE HEART FAILURE), NYHA CLASS I, ACUTE ON CHRONIC, COMBINED (HCC): Status: ACTIVE | Noted: 2020-11-27

## 2020-11-27 LAB
A/G RATIO: 1.6 (ref 1.1–2.2)
ALBUMIN SERPL-MCNC: 3.7 G/DL (ref 3.4–5)
ALP BLD-CCNC: 97 U/L (ref 40–129)
ALT SERPL-CCNC: 20 U/L (ref 10–40)
AMMONIA: 35 UMOL/L (ref 16–60)
ANION GAP SERPL CALCULATED.3IONS-SCNC: 8 MMOL/L (ref 3–16)
APTT: 36.5 SEC (ref 24.2–36.2)
AST SERPL-CCNC: 39 U/L (ref 15–37)
BASE EXCESS VENOUS: 7.5 MMOL/L (ref -3–3)
BASOPHILS ABSOLUTE: 0.1 K/UL (ref 0–0.2)
BASOPHILS RELATIVE PERCENT: 0.6 %
BILIRUB SERPL-MCNC: 1.7 MG/DL (ref 0–1)
BILIRUBIN URINE: NEGATIVE
BLOOD, URINE: NEGATIVE
BUN BLDV-MCNC: 18 MG/DL (ref 7–20)
CALCIUM SERPL-MCNC: 8.5 MG/DL (ref 8.3–10.6)
CARBOXYHEMOGLOBIN: 5.4 % (ref 0–1.5)
CHLORIDE BLD-SCNC: 96 MMOL/L (ref 99–110)
CLARITY: CLEAR
CO2: 33 MMOL/L (ref 21–32)
COLOR: YELLOW
CREAT SERPL-MCNC: 1.3 MG/DL (ref 0.8–1.3)
D DIMER: 749 NG/ML DDU (ref 0–229)
EOSINOPHILS ABSOLUTE: 0 K/UL (ref 0–0.6)
EOSINOPHILS RELATIVE PERCENT: 0.2 %
GFR AFRICAN AMERICAN: >60
GFR NON-AFRICAN AMERICAN: 55
GLOBULIN: 2.3 G/DL
GLUCOSE BLD-MCNC: 112 MG/DL (ref 70–99)
GLUCOSE BLD-MCNC: 175 MG/DL (ref 70–99)
GLUCOSE URINE: NEGATIVE MG/DL
HCO3 VENOUS: 33.4 MMOL/L (ref 23–29)
HCT VFR BLD CALC: 25.9 % (ref 40.5–52.5)
HEMOGLOBIN: 8.2 G/DL (ref 13.5–17.5)
INR BLD: 2.77 (ref 0.86–1.14)
KETONES, URINE: NEGATIVE MG/DL
LACTIC ACID: 1 MMOL/L (ref 0.4–2)
LEUKOCYTE ESTERASE, URINE: NEGATIVE
LIPASE: 21 U/L (ref 13–60)
LYMPHOCYTES ABSOLUTE: 0.5 K/UL (ref 1–5.1)
LYMPHOCYTES RELATIVE PERCENT: 5.3 %
MAGNESIUM: 1.8 MG/DL (ref 1.8–2.4)
MCH RBC QN AUTO: 26.2 PG (ref 26–34)
MCHC RBC AUTO-ENTMCNC: 31.5 G/DL (ref 31–36)
MCV RBC AUTO: 83.1 FL (ref 80–100)
METHEMOGLOBIN VENOUS: 0.3 %
MICROSCOPIC EXAMINATION: ABNORMAL
MONOCYTES ABSOLUTE: 1 K/UL (ref 0–1.3)
MONOCYTES RELATIVE PERCENT: 10.4 %
NEUTROPHILS ABSOLUTE: 7.6 K/UL (ref 1.7–7.7)
NEUTROPHILS RELATIVE PERCENT: 83.5 %
NITRITE, URINE: NEGATIVE
O2 CONTENT, VEN: 9 VOL %
O2 SAT, VEN: 74 %
O2 THERAPY: ABNORMAL
PCO2, VEN: 55.1 MMHG (ref 40–50)
PDW BLD-RTO: 21.7 % (ref 12.4–15.4)
PERFORMED ON: ABNORMAL
PH UA: 7 (ref 5–8)
PH VENOUS: 7.4 (ref 7.35–7.45)
PLATELET # BLD: 166 K/UL (ref 135–450)
PMV BLD AUTO: 8 FL (ref 5–10.5)
PO2, VEN: 40.1 MMHG (ref 25–40)
POTASSIUM SERPL-SCNC: 3.9 MMOL/L (ref 3.5–5.1)
PRO-BNP: ABNORMAL PG/ML (ref 0–124)
PROCALCITONIN: 0.23 NG/ML (ref 0–0.15)
PROTEIN UA: NEGATIVE MG/DL
PROTHROMBIN TIME: 32.5 SEC (ref 10–13.2)
RBC # BLD: 3.12 M/UL (ref 4.2–5.9)
SARS-COV-2, NAAT: NOT DETECTED
SODIUM BLD-SCNC: 137 MMOL/L (ref 136–145)
SPECIFIC GRAVITY UA: 1.02 (ref 1–1.03)
TCO2 CALC VENOUS: 35 MMOL/L
TOTAL PROTEIN: 6 G/DL (ref 6.4–8.2)
TROPONIN: 0.03 NG/ML
TROPONIN: 0.03 NG/ML
URINE REFLEX TO CULTURE: ABNORMAL
URINE TYPE: ABNORMAL
UROBILINOGEN, URINE: 4 E.U./DL
WBC # BLD: 9.2 K/UL (ref 4–11)

## 2020-11-27 PROCEDURE — 74177 CT ABD & PELVIS W/CONTRAST: CPT

## 2020-11-27 PROCEDURE — 83735 ASSAY OF MAGNESIUM: CPT

## 2020-11-27 PROCEDURE — 2060000000 HC ICU INTERMEDIATE R&B

## 2020-11-27 PROCEDURE — 84145 PROCALCITONIN (PCT): CPT

## 2020-11-27 PROCEDURE — 82803 BLOOD GASES ANY COMBINATION: CPT

## 2020-11-27 PROCEDURE — 82140 ASSAY OF AMMONIA: CPT

## 2020-11-27 PROCEDURE — 83880 ASSAY OF NATRIURETIC PEPTIDE: CPT

## 2020-11-27 PROCEDURE — 6370000000 HC RX 637 (ALT 250 FOR IP): Performed by: EMERGENCY MEDICINE

## 2020-11-27 PROCEDURE — 81003 URINALYSIS AUTO W/O SCOPE: CPT

## 2020-11-27 PROCEDURE — 85025 COMPLETE CBC W/AUTO DIFF WBC: CPT

## 2020-11-27 PROCEDURE — 71260 CT THORAX DX C+: CPT

## 2020-11-27 PROCEDURE — 96375 TX/PRO/DX INJ NEW DRUG ADDON: CPT

## 2020-11-27 PROCEDURE — U0002 COVID-19 LAB TEST NON-CDC: HCPCS

## 2020-11-27 PROCEDURE — 83550 IRON BINDING TEST: CPT

## 2020-11-27 PROCEDURE — 2580000003 HC RX 258: Performed by: EMERGENCY MEDICINE

## 2020-11-27 PROCEDURE — 6360000002 HC RX W HCPCS: Performed by: EMERGENCY MEDICINE

## 2020-11-27 PROCEDURE — 1200000000 HC SEMI PRIVATE

## 2020-11-27 PROCEDURE — 6360000004 HC RX CONTRAST MEDICATION: Performed by: EMERGENCY MEDICINE

## 2020-11-27 PROCEDURE — 83690 ASSAY OF LIPASE: CPT

## 2020-11-27 PROCEDURE — 83540 ASSAY OF IRON: CPT

## 2020-11-27 PROCEDURE — 36415 COLL VENOUS BLD VENIPUNCTURE: CPT

## 2020-11-27 PROCEDURE — 2580000003 HC RX 258: Performed by: INTERNAL MEDICINE

## 2020-11-27 PROCEDURE — 83605 ASSAY OF LACTIC ACID: CPT

## 2020-11-27 PROCEDURE — 84484 ASSAY OF TROPONIN QUANT: CPT

## 2020-11-27 PROCEDURE — 71045 X-RAY EXAM CHEST 1 VIEW: CPT

## 2020-11-27 PROCEDURE — 96374 THER/PROPH/DIAG INJ IV PUSH: CPT

## 2020-11-27 PROCEDURE — 85730 THROMBOPLASTIN TIME PARTIAL: CPT

## 2020-11-27 PROCEDURE — 85610 PROTHROMBIN TIME: CPT

## 2020-11-27 PROCEDURE — 93005 ELECTROCARDIOGRAM TRACING: CPT | Performed by: EMERGENCY MEDICINE

## 2020-11-27 PROCEDURE — 99284 EMERGENCY DEPT VISIT MOD MDM: CPT

## 2020-11-27 PROCEDURE — 85379 FIBRIN DEGRADATION QUANT: CPT

## 2020-11-27 PROCEDURE — 80053 COMPREHEN METABOLIC PANEL: CPT

## 2020-11-27 RX ORDER — ASPIRIN 81 MG/1
81 TABLET, CHEWABLE ORAL DAILY
COMMUNITY

## 2020-11-27 RX ORDER — SODIUM CHLORIDE 0.9 % (FLUSH) 0.9 %
10 SYRINGE (ML) INJECTION PRN
Status: DISCONTINUED | OUTPATIENT
Start: 2020-11-27 | End: 2020-12-03 | Stop reason: HOSPADM

## 2020-11-27 RX ORDER — METHYLPREDNISOLONE SODIUM SUCCINATE 125 MG/2ML
125 INJECTION, POWDER, LYOPHILIZED, FOR SOLUTION INTRAMUSCULAR; INTRAVENOUS DAILY
Status: DISCONTINUED | OUTPATIENT
Start: 2020-11-27 | End: 2020-11-29

## 2020-11-27 RX ORDER — FUROSEMIDE 10 MG/ML
40 INJECTION INTRAMUSCULAR; INTRAVENOUS 2 TIMES DAILY
Status: DISCONTINUED | OUTPATIENT
Start: 2020-11-28 | End: 2020-12-03

## 2020-11-27 RX ORDER — CARVEDILOL 6.25 MG/1
12.5 TABLET ORAL 2 TIMES DAILY WITH MEALS
Status: DISCONTINUED | OUTPATIENT
Start: 2020-11-27 | End: 2020-12-03 | Stop reason: HOSPADM

## 2020-11-27 RX ORDER — ACETAMINOPHEN 650 MG/1
650 SUPPOSITORY RECTAL EVERY 6 HOURS PRN
Status: DISCONTINUED | OUTPATIENT
Start: 2020-11-27 | End: 2020-12-03 | Stop reason: HOSPADM

## 2020-11-27 RX ORDER — PROMETHAZINE HYDROCHLORIDE 25 MG/1
12.5 TABLET ORAL EVERY 6 HOURS PRN
Status: DISCONTINUED | OUTPATIENT
Start: 2020-11-27 | End: 2020-12-03 | Stop reason: HOSPADM

## 2020-11-27 RX ORDER — LEVOTHYROXINE SODIUM 0.03 MG/1
25 TABLET ORAL DAILY
Status: DISCONTINUED | OUTPATIENT
Start: 2020-11-28 | End: 2020-12-03 | Stop reason: HOSPADM

## 2020-11-27 RX ORDER — IPRATROPIUM BROMIDE AND ALBUTEROL SULFATE 2.5; .5 MG/3ML; MG/3ML
1 SOLUTION RESPIRATORY (INHALATION) ONCE
Status: COMPLETED | OUTPATIENT
Start: 2020-11-27 | End: 2020-11-27

## 2020-11-27 RX ORDER — POTASSIUM CHLORIDE 750 MG/1
10 TABLET, EXTENDED RELEASE ORAL 2 TIMES DAILY WITH MEALS
Status: DISCONTINUED | OUTPATIENT
Start: 2020-11-27 | End: 2020-12-03 | Stop reason: HOSPADM

## 2020-11-27 RX ORDER — PANTOPRAZOLE SODIUM 40 MG/1
40 TABLET, DELAYED RELEASE ORAL
Status: DISCONTINUED | OUTPATIENT
Start: 2020-11-28 | End: 2020-12-03 | Stop reason: HOSPADM

## 2020-11-27 RX ORDER — CITALOPRAM 20 MG/1
10 TABLET ORAL EVERY MORNING
Status: DISCONTINUED | OUTPATIENT
Start: 2020-11-28 | End: 2020-12-03 | Stop reason: HOSPADM

## 2020-11-27 RX ORDER — ATORVASTATIN CALCIUM 10 MG/1
20 TABLET, FILM COATED ORAL NIGHTLY
Status: DISCONTINUED | OUTPATIENT
Start: 2020-11-27 | End: 2020-12-03 | Stop reason: HOSPADM

## 2020-11-27 RX ORDER — ACETAMINOPHEN 325 MG/1
650 TABLET ORAL EVERY 6 HOURS PRN
Status: DISCONTINUED | OUTPATIENT
Start: 2020-11-27 | End: 2020-12-03 | Stop reason: HOSPADM

## 2020-11-27 RX ORDER — TAMSULOSIN HYDROCHLORIDE 0.4 MG/1
0.4 CAPSULE ORAL DAILY
COMMUNITY

## 2020-11-27 RX ORDER — FUROSEMIDE 10 MG/ML
40 INJECTION INTRAMUSCULAR; INTRAVENOUS ONCE
Status: COMPLETED | OUTPATIENT
Start: 2020-11-27 | End: 2020-11-27

## 2020-11-27 RX ORDER — WARFARIN SODIUM 2 MG/1
4 TABLET ORAL DAILY
Status: DISCONTINUED | OUTPATIENT
Start: 2020-11-27 | End: 2020-11-28

## 2020-11-27 RX ORDER — POLYETHYLENE GLYCOL 3350 17 G/17G
17 POWDER, FOR SOLUTION ORAL DAILY PRN
Status: DISCONTINUED | OUTPATIENT
Start: 2020-11-27 | End: 2020-12-03 | Stop reason: HOSPADM

## 2020-11-27 RX ORDER — ALLOPURINOL 100 MG/1
200 TABLET ORAL DAILY
Status: DISCONTINUED | OUTPATIENT
Start: 2020-11-28 | End: 2020-12-03 | Stop reason: HOSPADM

## 2020-11-27 RX ORDER — ONDANSETRON 2 MG/ML
4 INJECTION INTRAMUSCULAR; INTRAVENOUS EVERY 6 HOURS PRN
Status: DISCONTINUED | OUTPATIENT
Start: 2020-11-27 | End: 2020-12-03 | Stop reason: HOSPADM

## 2020-11-27 RX ORDER — MORPHINE SULFATE 2 MG/ML
2 INJECTION, SOLUTION INTRAMUSCULAR; INTRAVENOUS
Status: DISCONTINUED | OUTPATIENT
Start: 2020-11-27 | End: 2020-12-03 | Stop reason: HOSPADM

## 2020-11-27 RX ORDER — SODIUM CHLORIDE 0.9 % (FLUSH) 0.9 %
10 SYRINGE (ML) INJECTION EVERY 12 HOURS SCHEDULED
Status: DISCONTINUED | OUTPATIENT
Start: 2020-11-27 | End: 2020-12-03 | Stop reason: HOSPADM

## 2020-11-27 RX ADMIN — IPRATROPIUM BROMIDE AND ALBUTEROL SULFATE 1 AMPULE: .5; 3 SOLUTION RESPIRATORY (INHALATION) at 18:09

## 2020-11-27 RX ADMIN — IOPAMIDOL 85 ML: 755 INJECTION, SOLUTION INTRAVENOUS at 17:41

## 2020-11-27 RX ADMIN — FUROSEMIDE 40 MG: 10 INJECTION, SOLUTION INTRAMUSCULAR; INTRAVENOUS at 17:17

## 2020-11-27 RX ADMIN — METHYLPREDNISOLONE SODIUM SUCCINATE 125 MG: 125 INJECTION, POWDER, FOR SOLUTION INTRAMUSCULAR; INTRAVENOUS at 17:17

## 2020-11-27 RX ADMIN — Medication 10 ML: at 23:42

## 2020-11-27 RX ADMIN — MEROPENEM 1 G: 1 INJECTION, POWDER, FOR SOLUTION INTRAVENOUS at 17:17

## 2020-11-27 NOTE — ED PROVIDER NOTES
[address] ED      CHIEF COMPLAINT  Altered Mental Status       HISTORY OF PRESENT ILLNESS  Gonzalo Clarke is a 72 y.o. male who recently had ablation for atrial fibrillation at outside hospital  11/10 who is brought in from [place] [address] for evaluation of altered mental status since yesterday. Patient is drowsy on presentation and arousable to verbal stimulation. Patient is able to state his name. Complains of feeling short of breath. Patient was noted to be to 80s and placed on 2 L nasal cannula O2 to maintain sat above 90. Patient denies having chest pain, fevers, chills, nausea, or vomiting. Patient denies having abdominal pain. However, patient is tender to palpation over the abdomen. No other complaints, modifying factors or associated symptoms. I have reviewed the following from the nursing documentation.     Past Medical History:   Diagnosis Date    CAD (coronary artery disease)     COPD (chronic obstructive pulmonary disease) ([facility] [address]     Diabetes mellitus ([facility] [address]     Gout     Hemothorax, left 01/2020    Hyperlipidemia     Hypertension     Obesity, Class I, BMI 30.0-34.9 (see actual BMI) 01/2020    30.7    S/P thoracotomy 01/2020    for hemothorax    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 Maci Joseph - hanny w/23mm Magna Ease bioprosthetic valve    BRONCHOSCOPY N/A 1/9/2020    w/BAL, performed by Emilia Srinivasan MD at [address] [place] [address] N/A 1/24/2020    BRONCHOSCOPY ALVEOLAR LAVAGE performed by Gilbert Yanes MD at [address] [place] [address]  1/24/2020    BRONCHOSCOPY THERAPUTIC ASPIRATION INITIAL performed by Gilbert Yanes MD at [facility] CABG WITH AORTIC VALVE REPLACEMENT  2001    date approximate, x1 to OM, AVR w/porcine valve    CARDIAC CATHETERIZATION  01/13/2020    Dr. Khalil Sol Left 2020    Dr. China Weldon - 3200 ml blood drained in 1000 ml increments    COLONOSCOPY  02/10/2020    polyps    COLONOSCOPY N/A 2/10/2020    COLONOSCOPY POLYPECTOMY SNARE/COLD BIOPSY performed by Erwin Robbins DO at [address] [facility] [address]  2010    Dr. Elli Tavares Roger - NURIS- 4.0 x 28 to Cx    PILONIDAL CYST EXCISION N/A 2020    DEBRIDEMENT OF SACRAL WOUND performed by Fani Powell MD at [facility]. VALVE CONDUIT/CORON RECONSTR N/A 2020    Dr. 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 Mandie Silvestre - emergent w/evacuation of hematoma & chest wall hemostasis, pleural biopsy & XENA wedge resection    TRANSESOPHAGEAL ECHOCARDIOGRAM  2020    during ascending aorta replacement w/redo AVR    TRANSESOPHAGEAL ECHOCARDIOGRAM  2010     History reviewed. No pertinent family history. Social History     Socioeconomic History    Marital status: Single     Spouse name: Not on file    Number of children: Not on file    Years of education: Not on file    Highest education level: Not on file   Occupational History    Not on file   Social Needs    Financial resource strain: Not on file    Food insecurity     Worry: Not on file     Inability: Not on file    Transportation needs     Medical: Not on file     Non-medical: Not on file   Tobacco Use    Smoking status: Former Smoker     Packs/day: 2.00     Types: Cigarettes     Last attempt to quit: 2020     Years since quittin.8    Smokeless tobacco: Current User   Substance and Sexual Activity    Alcohol use:  Yes     Alcohol/week: 6.0 standard drinks     Types: 6 Cans of beer per week     Frequency: 4 or more times a week     Drinks per session: 5 or 6     Binge frequency: Daily or almost daily    Drug use: Not Currently    Sexual activity: Not on file   Lifestyle    Physical activity     Days per week: Not on file     Minutes per session: Not on file    Stress: Not on file   Relationships    Social connections     Talks on phone: Not on file     Gets together: Not on file     Attends Spiritism service: Not on file     Active member of club or organization: Not on file     Attends meetings of clubs or organizations: Not on file     Relationship status: Not on file    Intimate partner violence     Fear of current or ex partner: Not on file     Emotionally abused: Not on file     Physically abused: Not on file     Forced sexual activity: Not on file   Other Topics Concern    Not on file   Social History Narrative    Not on file     Current Facility-Administered Medications   Medication Dose Route Frequency Provider Last Rate Last Dose    morphine (PF) injection 2 mg  2 mg Intravenous Q2H PRN Steven Chan MD        methylPREDNISolone sodium (SOLU-MEDROL) injection 125 mg  125 mg Intravenous Daily Steven Chan MD   125 mg at 11/27/20 1717    ipratropium-albuterol (DUONEB) nebulizer solution 1 ampule  1 ampule Inhalation Once Steven Chan MD        meropenem (MERREM) 1 g in sterile water 20 mL IV syringe  1 g Intravenous Once Steven Chan MD   1 g at 11/27/20 1717    iopamidol (ISOVUE-370) 76 % injection 85 mL  85 mL Intravenous ONCE PRN Steven Chan MD         Current Outpatient Medications   Medication Sig Dispense Refill    warfarin (COUMADIN) 2 MG tablet Take 2 tablets by mouth daily 100 tablet 3    carvedilol (COREG) 12.5 MG tablet Take 1 tablet by mouth 2 times daily (with meals) 180 tablet 3    atorvastatin (LIPITOR) 20 MG tablet Take 1 tablet by mouth nightly 90 tablet 3    furosemide (LASIX) 40 MG tablet Take 1 tablet by mouth 2 times daily 180 tablet 3    potassium chloride (MICRO-K) 10 MEQ extended release capsule Take 1 capsule by mouth 2 times daily 180 capsule 3    meclizine (ANTIVERT) 25 MG tablet Take 25 mg by mouth 3 times daily as needed      rOPINIRole (REQUIP) 1 MG tablet Take 1 mg by mouth twice a week      citalopram (CELEXA) 10 MG tablet Take 1 tablet by mouth every morning 30 tablet 1    levothyroxine (SYNTHROID) 25 MCG tablet Take 1 tablet by mouth Daily 30 tablet 1    pantoprazole (PROTONIX) 40 MG tablet Take 1 tablet by mouth every morning (before breakfast) 30 tablet 1    glipiZIDE (GLUCOTROL) 5 MG tablet Take 1 tablet by mouth daily 30 tablet 1    allopurinol (ZYLOPRIM) 300 MG tablet Take 1 tablet by mouth daily 30 tablet 1     Allergies   Allergen Reactions    Penicillins Hives       REVIEW OF SYSTEMS  10 systems reviewed, pertinent positives per HPI otherwise noted to be negative. PHYSICAL EXAM  BP (!) 119/46   Pulse 70   Temp 98.9 °F (37.2 °C) (Oral)   Resp 20   Ht 5' 9\" (1.753 m)   Wt 198 lb (89.8 kg)   SpO2 99%   BMI 29.24 kg/m²    GENERAL APPEARANCE: Arousable to verbal stimulation. Able to state his name. HENT: Normocephalic. Atraumatic. CN  2-12 grossly intact. HEART/CHEST: RRR. No murmurs appreciated  LUNGS: Tachypneic. On 3 L nasal cannula O2 to maintain sat above 90. ABDOMEN: Soft, distended abdomen. Tenderness to palpation throughout all 4 quadrants. . No guarding. No rebound. EXTREMITIES: No gross deformities. Weakness of all extremities. Claudia Money SKIN: Warm and dry. No acute rashes. NEUROLOGICAL: CN's 2-12 intact. Sensation intact all extremities. Withdrawing all extremities from pain. PSYCHIATRIC: Normal mood and affect.     LABS  Results for orders placed or performed during the hospital encounter of 11/27/20   Blood Gas, Venous   Result Value Ref Range    pH, [place] 7.401 7.350 - 7.450    pCO2, [place] 55.1 (H) 40.0 - 50.0 mmHg    pO2, [place] 40.1 (H) 25.0 - 40.0 mmHg    HCO3, Venous 33.4 (H) 23.0 - 29.0 mmol/L    Base Excess, [place] 7.5 (H) -3.0 - 3.0 mmol/L    O2 Sat, [place] 74 Not Established %    Carboxyhemoglobin 5.4 (H) 0.0 - 1.5 %    MetHgb, [place] 0.3 <1.5 %    TC02 (Calc), [place] 35 Not Established mmol/L    O2 Content, Muk[place] 9 Not Established VOL %    O2 Therapy Unknown    Lactic Acid, Plasma   Result Value Ref Range    Lactic Acid 1.0 0.4 - 2.0 mmol/L   Lipase   Result Value Ref Range    Lipase 21.0 13.0 - 60.0 U/L   Magnesium   Result Value Ref Range    Magnesium 1.80 1.80 - 2.40 mg/dL   Troponin   Result Value Ref Range    Troponin 0.03 (H) <0.01 ng/mL   Urinalysis Reflex to Culture    Specimen: Urine, clean catch   Result Value Ref Range    Urine Type NotGiven    Brain Natriuretic Peptide   Result Value Ref Range    Pro-BNP 13,559 (H) 0 - 124 pg/mL   CBC Auto Differential   Result Value Ref Range    WBC 9.2 4.0 - 11.0 K/uL    RBC 3.12 (L) 4.20 - 5.90 M/uL    Hemoglobin 8.2 (L) 13.5 - 17.5 g/dL    Hematocrit 25.9 (L) 40.5 - 52.5 %    MCV 83.1 80.0 - 100.0 fL    MCH 26.2 26.0 - 34.0 pg    MCHC 31.5 31.0 - 36.0 g/dL    RDW 21.7 (H) 12.4 - 15.4 %    Platelets 711 538 - 826 K/uL    MPV 8.0 5.0 - 10.5 fL    Neutrophils % 83.5 %    Lymphocytes % 5.3 %    Monocytes % 10.4 %    Eosinophils % 0.2 %    Basophils % 0.6 %    Neutrophils Absolute 7.6 1.7 - 7.7 K/uL    Lymphocytes Absolute 0.5 (L) 1.0 - 5.1 K/uL    Monocytes Absolute 1.0 0.0 - 1.3 K/uL    Eosinophils Absolute 0.0 0.0 - 0.6 K/uL    Basophils Absolute 0.1 0.0 - 0.2 K/uL   Comprehensive Metabolic Panel   Result Value Ref Range    Sodium 137 136 - 145 mmol/L    Potassium 3.9 3.5 - 5.1 mmol/L    Chloride 96 (L) 99 - 110 mmol/L    CO2 33 (H) 21 - 32 mmol/L    Anion Gap 8 3 - 16    Glucose 112 (H) 70 - 99 mg/dL    BUN 18 7 - 20 mg/dL    CREATININE 1.3 0.8 - 1.3 mg/dL    GFR Non-African American 55 (A) >60    GFR African American >60 >60    Calcium 8.5 8.3 - 10.6 mg/dL    Total Protein 6.0 (L) 6.4 - 8.2 g/dL    Alb 3.7 3.4 - 5.0 g/dL    Albumin/Globulin Ratio 1.6 1.1 - 2.2    Total Bilirubin 1.7 (H) 0.0 - 1.0 mg/dL    Alkaline Phosphatase 97 40 - 129 U/L    ALT 20 10 - 40 U/L    AST 39 (H) 15 - 37 U/L    Globulin 2.3 g/dL   APTT   Result Value Ref Range    aPTT 36.5 (H) 24.2 - 36.2 sec   Protime-INR   Result Value Ref Range    Protime 32.5 (H) 10.0 - 13.2 sec    INR 2.77 (H) 0.86 - 1.14   Procalcitonin   Result Value Ref Range    Procalcitonin 0.23 (H) 0.00 - 0.15 ng/mL   D-Dimer, Quantitative   Result Value Ref Range    D-Dimer, Quant 749 (H) 0 - 229 ng/mL DDU   COVID-19   Result Value Ref Range    SARS-CoV-2, NAAT Not Detected Not Detected   Ammonia   Result Value Ref Range    Ammonia 35 16 - 60 umol/L       I have reviewed all labs for this visit. ECG  The Ekg interpreted by me shows  Ventricular paced rhythm with a rate of 62  Axis is normal  QTc is prolonged  Ventricular paced rhythm  ST Segments: Nonspecific ST changes    The Ekg interpreted by me shows  Ventricular paced rhythm with a rate of 64  Axis is normal  QTc is prolonged  Ventricular paced rhythm  ST Segments: Nonspecific ST changes  No significant change from prior EKG     RADIOLOGY  Xr Chest Portable    Result Date: 11/27/2020  EXAMINATION: ONE XRAY VIEW OF THE CHEST 11/27/2020 4:23 pm COMPARISON: March 16, 2020 HISTORY: ORDERING SYSTEM PROVIDED HISTORY: other TECHNOLOGIST PROVIDED HISTORY: Reason for exam:->other Reason for Exam: cough sob Initial evaluation for acute cough and shortness of breath. FINDINGS: The cardiomediastinal silhouette is moderately enlarged. Vascular congestive changes are present. Dual lead left-sided pacer appears adequately positioned. Increased airspace and interstitial opacities are present diffusely favoring pulmonary edema. There is more dense consolidation involving the region of the left lower lobe and left parahilar region. No pneumothorax. Cannot exclude a small left pleural effusion. 1. Cardiomegaly with vascular congestive changes and pulmonary edema.  2. More dense focal consolidation involving the left lower lobe and left parahilar region which could indicate an evolving pneumonia. ED COURSE/MDM  Patient seen and evaluated. At presentation, patient was, alert, requiring 2 L nasal cannula O2 to maintain oxygen saturation above 90, mildly tachypneic. The hypoxia is unchanged from baseline. Patient placed on cardiac monitor for close observation. Differential diagnosis includes COPD exacerbation, Covid, viral URI, CHF exacerbation, cardiac tamponade, PE, among others. Bedside ultrasound obtained as there is history of pericardial effusion per chart review. However, there was no significant pericardial effusion or right ventricular diastolic collapse. Work-up remarkable for normal pH, hypercapnia with a CO2 of 55. Patient given Solu-Medrol and DuoNeb x2. Lactate 1. Hgb 8, but no significant change from baseline. Creatinine was elevated at 1.3, which is a change from prior of 0.6. D-dimer was elevated at 749. Rapid Covid test was negative. Chest x-ray showed vascular congestive changes and pulmonary edema. There was concern for possible evolving pneumonia and patient given meropenam due to patient's penicillin allergy. BNP is also elevated at 13,000, which is a change from baseline of 2000. I suspect patient's new oxygen requirement is secondary to the pulmonary edema from CHF exacerbation. Patient given 40 mg IV Lasix. As D-dimer elevated, CT PE obtained. Troponin elevated at 0.03, which appears to be patient's baseline. Repeat EKG obtained as I do not see a prior EKG with a ventricular paced rhythm. Repeat EKG shows no change compared to prior EKG. repeat troponin again 0.03. CT PE showed no acute pulmonary embolus. There was pulmonary edema, bilateral effusion, and mild cardiomegaly. Hospitalist consulted for admission. Admit.     During the patient's ED course, the patient was given:  Medications   morphine (PF) injection 2 mg (has no administration in time range)   methylPREDNISolone sodium (SOLU-MEDROL) injection 125 mg (125 mg Intravenous Given 11/27/20 1717)   ipratropium-albuterol (DUONEB) nebulizer solution 1 ampule (has no administration in time range)   meropenem (MERREM) 1 g in sterile water 20 mL IV syringe (1 g Intravenous Given 11/27/20 1717)   iopamidol (ISOVUE-370) 76 % injection 85 mL (has no administration in time range)   furosemide (LASIX) injection 40 mg (40 mg Intravenous Given 11/27/20 1717)        CLINICAL IMPRESSION  1. Altered mental status, unspecified altered mental status type    2. Elevated troponin    3. Hypoxia    4. Chronic obstructive pulmonary disease, unspecified COPD type ([facility] [address]    5. PAL (acute kidney injury) ([facility] [address]    6. Acute on chronic congestive heart failure, unspecified heart failure type (HCC)        Blood pressure (!) 119/46, pulse 70, temperature 98.9 °F (37.2 °C), temperature source Oral, resp. rate 20, height 5' 9\" (1.753 m), weight 198 lb (89.8 kg), SpO2 99 %. DISPOSITION  Tan  was admitted to the hospital.      Patient was given scripts for the following medications. I counseled patient how to take these medications. New Prescriptions    No medications on file       Follow-up with:  No follow-up provider specified. DISCLAIMER: This chart was created using Dragon dictation software. Efforts were made by me to ensure accuracy, however some errors may be present due to limitations of this technology and occasionally words are not transcribed correctly.         Don Spencer MD  11/29/20 0010

## 2020-11-27 NOTE — ED TRIAGE NOTES
Patient from [address]. Patient SP cardiac ablasion. Patient normally A&O. This day patient has altered mental status and is jaundice. Patient normally on RA, O2 saturation around 91% was placed on 2L.

## 2020-11-27 NOTE — ED NOTES
Bed: 02  Expected date:   Expected time:   Means of arrival:   Comments:  [place]: lethargic AMS X2days. - biliary obstruction.  Jaundice ETA 5300 Tasha Panda RN  11/27/20 1020

## 2020-11-28 LAB
ANION GAP SERPL CALCULATED.3IONS-SCNC: 8 MMOL/L (ref 3–16)
BUN BLDV-MCNC: 21 MG/DL (ref 7–20)
CALCIUM SERPL-MCNC: 8.9 MG/DL (ref 8.3–10.6)
CHLORIDE BLD-SCNC: 95 MMOL/L (ref 99–110)
CHOLESTEROL, TOTAL: 97 MG/DL (ref 0–199)
CO2: 32 MMOL/L (ref 21–32)
CREAT SERPL-MCNC: 1 MG/DL (ref 0.8–1.3)
EKG ATRIAL RATE: 234 BPM
EKG ATRIAL RATE: 63 BPM
EKG DIAGNOSIS: NORMAL
EKG DIAGNOSIS: NORMAL
EKG Q-T INTERVAL: 530 MS
EKG Q-T INTERVAL: 536 MS
EKG QRS DURATION: 194 MS
EKG QRS DURATION: 194 MS
EKG QTC CALCULATION (BAZETT): 537 MS
EKG QTC CALCULATION (BAZETT): 552 MS
EKG R AXIS: -57 DEGREES
EKG R AXIS: -61 DEGREES
EKG T AXIS: 137 DEGREES
EKG T AXIS: 138 DEGREES
EKG VENTRICULAR RATE: 62 BPM
EKG VENTRICULAR RATE: 64 BPM
GFR AFRICAN AMERICAN: >60
GFR NON-AFRICAN AMERICAN: >60
GLUCOSE BLD-MCNC: 149 MG/DL (ref 70–99)
GLUCOSE BLD-MCNC: 166 MG/DL (ref 70–99)
GLUCOSE BLD-MCNC: 251 MG/DL (ref 70–99)
GLUCOSE BLD-MCNC: 373 MG/DL (ref 70–99)
GLUCOSE BLD-MCNC: 444 MG/DL (ref 70–99)
HDLC SERPL-MCNC: 34 MG/DL (ref 40–60)
INR BLD: 2.51 (ref 0.86–1.14)
IRON SATURATION: 7 % (ref 20–50)
IRON: 20 UG/DL (ref 59–158)
LDL CHOLESTEROL CALCULATED: 52 MG/DL
MAGNESIUM: 2 MG/DL (ref 1.8–2.4)
PERFORMED ON: ABNORMAL
POTASSIUM SERPL-SCNC: 4 MMOL/L (ref 3.5–5.1)
PROCALCITONIN: 0.25 NG/ML (ref 0–0.15)
PROTHROMBIN TIME: 29.4 SEC (ref 10–13.2)
SODIUM BLD-SCNC: 135 MMOL/L (ref 136–145)
TOTAL IRON BINDING CAPACITY: 287 UG/DL (ref 260–445)
TRIGL SERPL-MCNC: 55 MG/DL (ref 0–150)
VLDLC SERPL CALC-MCNC: 11 MG/DL

## 2020-11-28 PROCEDURE — 2580000003 HC RX 258: Performed by: INTERNAL MEDICINE

## 2020-11-28 PROCEDURE — 2700000000 HC OXYGEN THERAPY PER DAY

## 2020-11-28 PROCEDURE — 36415 COLL VENOUS BLD VENIPUNCTURE: CPT

## 2020-11-28 PROCEDURE — 94761 N-INVAS EAR/PLS OXIMETRY MLT: CPT

## 2020-11-28 PROCEDURE — 2500000003 HC RX 250 WO HCPCS: Performed by: INTERNAL MEDICINE

## 2020-11-28 PROCEDURE — 6370000000 HC RX 637 (ALT 250 FOR IP): Performed by: INTERNAL MEDICINE

## 2020-11-28 PROCEDURE — 2060000000 HC ICU INTERMEDIATE R&B

## 2020-11-28 PROCEDURE — 93010 ELECTROCARDIOGRAM REPORT: CPT | Performed by: INTERNAL MEDICINE

## 2020-11-28 PROCEDURE — 80061 LIPID PANEL: CPT

## 2020-11-28 PROCEDURE — 83735 ASSAY OF MAGNESIUM: CPT

## 2020-11-28 PROCEDURE — 84145 PROCALCITONIN (PCT): CPT

## 2020-11-28 PROCEDURE — 6360000002 HC RX W HCPCS: Performed by: INTERNAL MEDICINE

## 2020-11-28 PROCEDURE — 80048 BASIC METABOLIC PNL TOTAL CA: CPT

## 2020-11-28 PROCEDURE — 85610 PROTHROMBIN TIME: CPT

## 2020-11-28 RX ORDER — WARFARIN SODIUM 2 MG/1
2 TABLET ORAL
Status: COMPLETED | OUTPATIENT
Start: 2020-11-28 | End: 2020-11-28

## 2020-11-28 RX ORDER — CIPROFLOXACIN 2 MG/ML
400 INJECTION, SOLUTION INTRAVENOUS EVERY 12 HOURS
Status: DISCONTINUED | OUTPATIENT
Start: 2020-11-28 | End: 2020-12-03 | Stop reason: HOSPADM

## 2020-11-28 RX ADMIN — CIPROFLOXACIN 400 MG: 2 INJECTION, SOLUTION INTRAVENOUS at 14:56

## 2020-11-28 RX ADMIN — PANTOPRAZOLE SODIUM 40 MG: 40 TABLET, DELAYED RELEASE ORAL at 06:31

## 2020-11-28 RX ADMIN — METRONIDAZOLE 500 MG: 500 INJECTION, SOLUTION INTRAVENOUS at 14:56

## 2020-11-28 RX ADMIN — POTASSIUM CHLORIDE 10 MEQ: 1500 TABLET, EXTENDED RELEASE ORAL at 16:25

## 2020-11-28 RX ADMIN — METRONIDAZOLE 500 MG: 500 INJECTION, SOLUTION INTRAVENOUS at 22:14

## 2020-11-28 RX ADMIN — INSULIN LISPRO 12 UNITS: 100 INJECTION, SOLUTION INTRAVENOUS; SUBCUTANEOUS at 16:26

## 2020-11-28 RX ADMIN — CARVEDILOL 12.5 MG: 6.25 TABLET, FILM COATED ORAL at 16:24

## 2020-11-28 RX ADMIN — Medication 10 ML: at 22:14

## 2020-11-28 RX ADMIN — Medication 10 ML: at 09:08

## 2020-11-28 RX ADMIN — ALLOPURINOL 200 MG: 100 TABLET ORAL at 09:08

## 2020-11-28 RX ADMIN — LEVOTHYROXINE SODIUM 25 MCG: 25 TABLET ORAL at 06:31

## 2020-11-28 RX ADMIN — METHYLPREDNISOLONE SODIUM SUCCINATE 125 MG: 125 INJECTION, POWDER, FOR SOLUTION INTRAMUSCULAR; INTRAVENOUS at 09:09

## 2020-11-28 RX ADMIN — ATORVASTATIN CALCIUM 20 MG: 10 TABLET, FILM COATED ORAL at 22:13

## 2020-11-28 RX ADMIN — WARFARIN SODIUM 2 MG: 2 TABLET ORAL at 16:25

## 2020-11-28 RX ADMIN — CARVEDILOL 12.5 MG: 6.25 TABLET, FILM COATED ORAL at 09:08

## 2020-11-28 RX ADMIN — POTASSIUM CHLORIDE 10 MEQ: 1500 TABLET, EXTENDED RELEASE ORAL at 09:08

## 2020-11-28 RX ADMIN — FUROSEMIDE 40 MG: 10 INJECTION, SOLUTION INTRAMUSCULAR; INTRAVENOUS at 09:09

## 2020-11-28 RX ADMIN — INSULIN LISPRO 3 UNITS: 100 INJECTION, SOLUTION INTRAVENOUS; SUBCUTANEOUS at 22:15

## 2020-11-28 RX ADMIN — FUROSEMIDE 40 MG: 10 INJECTION, SOLUTION INTRAMUSCULAR; INTRAVENOUS at 16:25

## 2020-11-28 RX ADMIN — SODIUM CHLORIDE 200 MG: 9 INJECTION, SOLUTION INTRAVENOUS at 15:04

## 2020-11-28 RX ADMIN — CITALOPRAM HYDROBROMIDE 10 MG: 20 TABLET ORAL at 09:08

## 2020-11-28 NOTE — PROGRESS NOTES
Report from [facility] in ED. Transported to [facility] in stretcher, moved with assist x3 to bed. Report to Al Hollingsworth RN and care of pt transferred.  Celeste Becerra Clinical

## 2020-11-28 NOTE — PROGRESS NOTES
Patient admitted to room 220 from ER/PCU. Patient is confused at this time. Alert to self only. Pt will open his eyes when his name is called out. Pt able to follow some directions. Bed alarm in place, patient aware of placement and reason. Number Number 14 Telemetry box in place, patient aware of placement and reason. Bed locked, in lowest position, side rails up 2/4, call light within reach. Recliner Assessment  Patient is not able to demonstrated the ability to move from a reclining position to an upright position within the recliner. Patient is confused, demented and /or unable to follow instruction. 4 Eyes Skin Assessment     The patient is being assess for   Admission    I agree that 2 RN's have performed a thorough Head to Toe Skin Assessment on the patient. ALL assessment sites listed below have been assessed. Areas assessed by both nurses:   [x]   Head, Face, and Ears   [x]   Shoulders, Back, and Chest, Abdomen  [x]   Arms, Elbows, and Hands   [x]   Coccyx, Sacrum, and Ischium  [x]   Legs, Feet, and Heels        Scattered bruising and abrations. Pressure ulcer to Left Heel. Mepilex in place. **SHARE this note so that the co-signing nurse is able to place an eSignature**    Co-signer eSignature: Electronically signed by Bill Prather RN on 11/27/20 at 11:52 PM EST    Does the Patient have Skin Breakdown?   Yes LDA WOUND CARE was Initiated documentation include the Luz-wound, Wound Assessment, Measurements, Dressing Treatment, Drainage, and Color\",          Wes Prevention initiated:  Yes   Wound Care Orders initiated:  Yes      [address]  nurse consulted for Pressure Injury (Stage 3,4, Unstageable, DTI, NWPT, Complex wounds)and New or Established Ostomies:  Yes      Primary Nurse eSignature: Electronically signed by Eddi Cartwright RN on 11/27/20 at 11:39 PM EST

## 2020-11-28 NOTE — PROGRESS NOTES
Consult has been called to Dr. Diego Fox on 11/28/2020. Spoke with Karmen Cifuentes.  2:55 PM    Lulu Quinn  11/28/2020

## 2020-11-28 NOTE — PROGRESS NOTES
PHARMACY TO DOSE WARFARIN PER DR. Suzette Burciaga. Pharmacy Note  Warfarin Consult  Dx: AFIB  Goal INR range 2-3   Home Warfarin dose: 4mg daily     Date  INR  Warfarin  11/28               2.51                 2mg    Pt started on flagyl and cipro today. Recommend Warfarin 2mg tonight x1. Daily INR ordered. Rx will continue to manage therapy per consult order.     Sean Henson [facility]

## 2020-11-28 NOTE — ED NOTES
Went in to talk to patient. Pt follows my voice but would not answer any questions. Pt pulled his mask up over his eyes.       Sherryle Purl, RN  11/27/20 2022

## 2020-11-28 NOTE — PROGRESS NOTES
73 yo male patient of Dr. Krissy Pearl with pancreatic mass. Will plan further evaluation with MRI imaging.

## 2020-11-28 NOTE — PROGRESS NOTES
Consult has been called to Dr. Marly Sorensen on 11/28/2020.  Spoke with Maurizio Manuel. 2:37 PM    Annie Morales  11/28/2020

## 2020-11-28 NOTE — PROGRESS NOTES
Pt is lying in bed with their eyes closed. Respirations are easy and even. Call light within reach bed in lowest position with the wheels locked. Will continue to monitor.  Tatiana Mojica

## 2020-11-28 NOTE — CARE COORDINATION
Case Management Assessment  Initial Evaluation      Patient Name: Jeny Villarreal  YOB: 1950  Diagnosis: CHF (congestive heart failure), NYHA class I, acute on chronic, combined ([facility] [I50.43]  Date / Time: 11/27/2020  3:42 PM    Admission status/Date: IP 11/27/2020  Chart Reviewed: Yes      Patient Interviewed: NA  Family Interviewed:  Yes - daughter     Hospitalization in the last 30 days:  Yes      Health Care Decision Maker :   Primary Decision Maker: Yassine [place] - Child - [phone]      Met with: daughter, Waldron Merlin conducted  (bedside/phone): phone    Current PCP: [address] [employer]  Precert required for SNF : WAIVED        3 night stay required - NA    ADLS  Support Systems/Care Needs:    Transportation: EMS transportation    Meal Preparation: facility    Housing  Living Arrangements: from [address] [facility] at [facility] (recent admit from [address] [place] [address] on [facility] 11/25  Steps: NA  Intent for return to present living arrangements: Yes  Identified Issues: Prior to admit to [facility] ([facility]) pt lived alone. IPTA. Daughter lives nearby            [address] [place] [address] Provider: JUANITA  Equipment: JUANITA  Walker_X__Cane_X__RTS___ BSC___Shower Chair___Hospital Bed___W/C____Other________  02 at ____Liter(s)---wears(frequency)_______ [facility] ___ CPAP___ BiPap___   N/A____      Home O2 Use :  No  - upoon admission pt is now requiring supplemental O2 at 2 liters. CM will follow for potential ongoing needs. Community Service Affiliation  Dialysis:  No    · Agency:  · Location:  · Dialysis Schedule:  · Phone:   · Fax: Other Community Services: [facility]    DISCHARGE PLAN: Explained Case Management role/services. Chart reviewed. Met with pt's daughter via phone due to C19 restrictions and explained the role of the CM. Pt has AMS (alert to self). Per daughter at baseline pt is oriented and independent w/ADLS.  Pt was recently admitted to [facility] for rehab after a Cardiac Ablation at [facility] and plans to return to complete rehab.  Per Ricardo Finder pt will be accepted back and will need a rapid COVID with in 72 hours of DC. +CM will follow

## 2020-11-29 LAB
ALBUMIN SERPL-MCNC: 3.3 G/DL (ref 3.4–5)
ALP BLD-CCNC: 81 U/L (ref 40–129)
ALT SERPL-CCNC: 17 U/L (ref 10–40)
ANION GAP SERPL CALCULATED.3IONS-SCNC: 7 MMOL/L (ref 3–16)
AST SERPL-CCNC: 24 U/L (ref 15–37)
BASOPHILS ABSOLUTE: 0 K/UL (ref 0–0.2)
BASOPHILS RELATIVE PERCENT: 0.2 %
BILIRUB SERPL-MCNC: 1 MG/DL (ref 0–1)
BILIRUBIN DIRECT: 0.6 MG/DL (ref 0–0.3)
BILIRUBIN, INDIRECT: 0.4 MG/DL (ref 0–1)
BUN BLDV-MCNC: 24 MG/DL (ref 7–20)
CALCIUM SERPL-MCNC: 8.8 MG/DL (ref 8.3–10.6)
CHLORIDE BLD-SCNC: 95 MMOL/L (ref 99–110)
CO2: 33 MMOL/L (ref 21–32)
CREAT SERPL-MCNC: 0.9 MG/DL (ref 0.8–1.3)
EOSINOPHILS ABSOLUTE: 0 K/UL (ref 0–0.6)
EOSINOPHILS RELATIVE PERCENT: 0 %
GFR AFRICAN AMERICAN: >60
GFR NON-AFRICAN AMERICAN: >60
GLUCOSE BLD-MCNC: 146 MG/DL (ref 70–99)
GLUCOSE BLD-MCNC: 158 MG/DL (ref 70–99)
GLUCOSE BLD-MCNC: 199 MG/DL (ref 70–99)
GLUCOSE BLD-MCNC: 211 MG/DL (ref 70–99)
GLUCOSE BLD-MCNC: 232 MG/DL (ref 70–99)
HCT VFR BLD CALC: 22.8 % (ref 40.5–52.5)
HEMOGLOBIN: 7.1 G/DL (ref 13.5–17.5)
INR BLD: 2.95 (ref 0.86–1.14)
LYMPHOCYTES ABSOLUTE: 0.3 K/UL (ref 1–5.1)
LYMPHOCYTES RELATIVE PERCENT: 3.2 %
MAGNESIUM: 2 MG/DL (ref 1.8–2.4)
MCH RBC QN AUTO: 26.3 PG (ref 26–34)
MCHC RBC AUTO-ENTMCNC: 31.2 G/DL (ref 31–36)
MCV RBC AUTO: 84.2 FL (ref 80–100)
MONOCYTES ABSOLUTE: 0.4 K/UL (ref 0–1.3)
MONOCYTES RELATIVE PERCENT: 4.8 %
NEUTROPHILS ABSOLUTE: 7.3 K/UL (ref 1.7–7.7)
NEUTROPHILS RELATIVE PERCENT: 91.8 %
OCCULT BLOOD DIAGNOSTIC: NORMAL
PDW BLD-RTO: 21.2 % (ref 12.4–15.4)
PERFORMED ON: ABNORMAL
PLATELET # BLD: 141 K/UL (ref 135–450)
PMV BLD AUTO: 8.3 FL (ref 5–10.5)
POTASSIUM SERPL-SCNC: 4.5 MMOL/L (ref 3.5–5.1)
PROTHROMBIN TIME: 34.6 SEC (ref 10–13.2)
RBC # BLD: 2.7 M/UL (ref 4.2–5.9)
SODIUM BLD-SCNC: 135 MMOL/L (ref 136–145)
TOTAL PROTEIN: 6.2 G/DL (ref 6.4–8.2)
WBC # BLD: 7.9 K/UL (ref 4–11)

## 2020-11-29 PROCEDURE — 2580000003 HC RX 258: Performed by: INTERNAL MEDICINE

## 2020-11-29 PROCEDURE — 80076 HEPATIC FUNCTION PANEL: CPT

## 2020-11-29 PROCEDURE — 36415 COLL VENOUS BLD VENIPUNCTURE: CPT

## 2020-11-29 PROCEDURE — 6370000000 HC RX 637 (ALT 250 FOR IP): Performed by: INTERNAL MEDICINE

## 2020-11-29 PROCEDURE — 99223 1ST HOSP IP/OBS HIGH 75: CPT | Performed by: INTERNAL MEDICINE

## 2020-11-29 PROCEDURE — 94761 N-INVAS EAR/PLS OXIMETRY MLT: CPT

## 2020-11-29 PROCEDURE — 2500000003 HC RX 250 WO HCPCS: Performed by: INTERNAL MEDICINE

## 2020-11-29 PROCEDURE — 85610 PROTHROMBIN TIME: CPT

## 2020-11-29 PROCEDURE — 6360000002 HC RX W HCPCS: Performed by: INTERNAL MEDICINE

## 2020-11-29 PROCEDURE — 2060000000 HC ICU INTERMEDIATE R&B

## 2020-11-29 PROCEDURE — 80048 BASIC METABOLIC PNL TOTAL CA: CPT

## 2020-11-29 PROCEDURE — 83735 ASSAY OF MAGNESIUM: CPT

## 2020-11-29 PROCEDURE — G0328 FECAL BLOOD SCRN IMMUNOASSAY: HCPCS

## 2020-11-29 PROCEDURE — 85025 COMPLETE CBC W/AUTO DIFF WBC: CPT

## 2020-11-29 PROCEDURE — 2700000000 HC OXYGEN THERAPY PER DAY

## 2020-11-29 RX ADMIN — CARVEDILOL 12.5 MG: 6.25 TABLET, FILM COATED ORAL at 09:32

## 2020-11-29 RX ADMIN — METRONIDAZOLE 500 MG: 500 INJECTION, SOLUTION INTRAVENOUS at 13:58

## 2020-11-29 RX ADMIN — SODIUM CHLORIDE 200 MG: 9 INJECTION, SOLUTION INTRAVENOUS at 14:59

## 2020-11-29 RX ADMIN — CARVEDILOL 12.5 MG: 6.25 TABLET, FILM COATED ORAL at 16:17

## 2020-11-29 RX ADMIN — CITALOPRAM HYDROBROMIDE 10 MG: 20 TABLET ORAL at 09:32

## 2020-11-29 RX ADMIN — INSULIN LISPRO 2 UNITS: 100 INJECTION, SOLUTION INTRAVENOUS; SUBCUTANEOUS at 22:30

## 2020-11-29 RX ADMIN — METHYLPREDNISOLONE SODIUM SUCCINATE 125 MG: 125 INJECTION, POWDER, FOR SOLUTION INTRAMUSCULAR; INTRAVENOUS at 09:32

## 2020-11-29 RX ADMIN — METRONIDAZOLE 500 MG: 500 INJECTION, SOLUTION INTRAVENOUS at 06:33

## 2020-11-29 RX ADMIN — ATORVASTATIN CALCIUM 20 MG: 10 TABLET, FILM COATED ORAL at 21:51

## 2020-11-29 RX ADMIN — INSULIN LISPRO 2 UNITS: 100 INJECTION, SOLUTION INTRAVENOUS; SUBCUTANEOUS at 09:33

## 2020-11-29 RX ADMIN — CIPROFLOXACIN 400 MG: 2 INJECTION, SOLUTION INTRAVENOUS at 04:15

## 2020-11-29 RX ADMIN — POTASSIUM CHLORIDE 10 MEQ: 1500 TABLET, EXTENDED RELEASE ORAL at 09:32

## 2020-11-29 RX ADMIN — FUROSEMIDE 40 MG: 10 INJECTION, SOLUTION INTRAMUSCULAR; INTRAVENOUS at 09:32

## 2020-11-29 RX ADMIN — ALLOPURINOL 200 MG: 100 TABLET ORAL at 09:32

## 2020-11-29 RX ADMIN — Medication 10 ML: at 09:33

## 2020-11-29 RX ADMIN — CIPROFLOXACIN 400 MG: 2 INJECTION, SOLUTION INTRAVENOUS at 13:58

## 2020-11-29 RX ADMIN — PANTOPRAZOLE SODIUM 40 MG: 40 TABLET, DELAYED RELEASE ORAL at 06:31

## 2020-11-29 RX ADMIN — INSULIN LISPRO 4 UNITS: 100 INJECTION, SOLUTION INTRAVENOUS; SUBCUTANEOUS at 16:19

## 2020-11-29 RX ADMIN — POTASSIUM CHLORIDE 10 MEQ: 1500 TABLET, EXTENDED RELEASE ORAL at 16:17

## 2020-11-29 RX ADMIN — INSULIN LISPRO 4 UNITS: 100 INJECTION, SOLUTION INTRAVENOUS; SUBCUTANEOUS at 11:54

## 2020-11-29 RX ADMIN — INSULIN LISPRO 1 UNITS: 100 INJECTION, SOLUTION INTRAVENOUS; SUBCUTANEOUS at 21:52

## 2020-11-29 RX ADMIN — FUROSEMIDE 40 MG: 10 INJECTION, SOLUTION INTRAMUSCULAR; INTRAVENOUS at 16:17

## 2020-11-29 RX ADMIN — Medication 10 ML: at 21:51

## 2020-11-29 RX ADMIN — LEVOTHYROXINE SODIUM 25 MCG: 25 TABLET ORAL at 09:32

## 2020-11-29 RX ADMIN — METRONIDAZOLE 500 MG: 500 INJECTION, SOLUTION INTRAVENOUS at 21:51

## 2020-11-29 ASSESSMENT — ENCOUNTER SYMPTOMS
RESPIRATORY NEGATIVE: 1
EYES NEGATIVE: 1
GASTROINTESTINAL NEGATIVE: 1
ALLERGIC/IMMUNOLOGIC NEGATIVE: 1

## 2020-11-29 NOTE — FLOWSHEET NOTE
11/29/20 0732   Vital Signs   Temp 97.1 °F (36.2 °C)   Temp Source Oral   Pulse 85   Heart Rate Source Monitor   Resp 16   /71   BP Location Right upper arm   Patient Position Semi fowlers   Level of Consciousness 0   MEWS Score 1   Oxygen Therapy   SpO2 96 %   O2 Device Nasal cannula   O2 Flow Rate (L/min) 2.5 L/min   Pt sitting up on side of bed, AM assessment complete. Pt alert and oriented to person, place, time but does not recall why he is here in the hospital. No signs of distress at this time. Call light in reach.

## 2020-11-29 NOTE — PROGRESS NOTES
Assisted pt in calling sister Chantal Angulo and updating on plan of care. Pt is semi holden in bed, and no signs of distress. Call light in reach.

## 2020-11-29 NOTE — PROGRESS NOTES
PHARMACY TO DOSE WARFARIN PER DR. Jacky Cespedes. Pharmacy Note  Warfarin Consult  Dx: AFIB  Goal INR range 2-3   Home Warfarin dose: 4mg daily     Date  INR  Warfarin  11/28               2.51                 2mg  11/29               2.95                  Hold    Pt started on flagyl and cipro 11/28/20. Recommend hold warfarin tonight x1. Daily INR ordered. Rx will continue to manage therapy per consult order.     Natalya Carnes RP

## 2020-11-29 NOTE — CONSULTS
Gastroenterology Consult Note    Patient:   Fredrick Angelucci   :    1950   Facility:     [address] [id number]   Referring/PCP: Speedy Watson DO  Date:     2020  Consultant:   Thuan Barraza DO    Subjective: This 72 y.o. male was admitted 2020 with a diagnosis of \"CHF (congestive heart failure), NYHA class I, acute on chronic, combined ([facility] [I50.43]\" and is seen in consultation regarding abnormal imaging    73 yo male patient of Dr. Vance Rajan who is a poor historian, with history of CAD s/p PCI, CABG, DM, HTN, AVR, aortopulmonary fistula closure, paravalvular leak, COPD, atrial fibrillation, dual chamber pacemaker. CT of the abdomen demonstrated a pancreatic cyst measuring 16x19 mm.       Past Medical History:   Diagnosis Date    CAD (coronary artery disease)     CHF (congestive heart failure) (HCC)     COPD (chronic obstructive pulmonary disease) ([facility]     Diabetes mellitus ([facility]     Gout     Hemothorax, left 2020    Hyperlipidemia     Hypertension     Obesity, Class I, BMI 30.0-34.9 (see actual BMI) 2020    30.7    S/P thoracotomy 2020    for hemothorax    Status post partial removal of lung 2020    XENA    Thyroid disease      Past Surgical History:   Procedure Laterality Date    AORTIC VALVE REPLACEMENT  2011    Dr. Apple Medel - redo w/23mm Magna Ease bioprosthetic valve    BRONCHOSCOPY N/A 2020    w/BAL, performed by Pooja Peña MD at [address] [place] [address] N/A 2020    BRONCHOSCOPY ALVEOLAR LAVAGE performed by Valencia Bragg MD at [address] [place] [address]  2020    BRONCHOSCOPY THERAPUTIC ASPIRATION INITIAL performed by Valencia Bragg MD at [address] CABG WITH AORTIC VALVE REPLACEMENT      date approximate, x1 to OM, AVR w/porcine valve    CARDIAC CATHETERIZATION  2020    Dr. Malcolm Bernard Left 2020    Dr. Karla Gallagher - 3200 ml blood drained in 1000 ml increments    COLONOSCOPY  02/10/2020    polyps    COLONOSCOPY N/A 2/10/2020    COLONOSCOPY POLYPECTOMY SNARE/COLD BIOPSY performed by Gino Wilder DO at [facility]  2010    Dr. Babs Williamson. Roger - NURIS- 4.0 x 28 to Cx    PILONIDAL CYST EXCISION N/A 2020    DEBRIDEMENT OF SACRAL WOUND performed by Raudel Pan MD at [address] [place] [address]. VALVE CONDUIT/CORON RECONSTR N/A 2020    Dr. 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 Izabel Trujillo - emergent w/evacuation of hematoma & chest wall hemostasis, pleural biopsy & XENA wedge resection    TRANSESOPHAGEAL ECHOCARDIOGRAM  2020    during ascending aorta replacement w/redo AVR    TRANSESOPHAGEAL ECHOCARDIOGRAM  2010       Social:   Social History     Tobacco Use    Smoking status: Former Smoker     Packs/day: 2.00     Types: Cigarettes     Last attempt to quit: 2020     Years since quittin.8    Smokeless tobacco: Current User   Substance Use Topics    Alcohol use: Yes     Alcohol/week: 6.0 standard drinks     Types: 6 Cans of beer per week     Frequency: 4 or more times a week     Drinks per session: 5 or 6     Binge frequency: Daily or almost daily     Family: History reviewed. No pertinent family history.     Scheduled Medications:    iron sucrose (VENOFER) iv piggyback 100 mL (Admin over 60 minutes)  200 mg Intravenous Q24H    insulin lispro  0-12 Units Subcutaneous 4x Daily AC & HS    insulin lispro  0-6 Units Subcutaneous Nightly    ciprofloxacin  400 mg Intravenous Q12H    metroNIDAZOLE  500 mg Intravenous Q8H    warfarin (COUMADIN) daily dosing (placeholder)   Other RX Placeholder    methylPREDNISolone  125 mg Intravenous Daily    allopurinol  200 mg Oral Daily    atorvastatin  20 mg Oral Nightly    carvedilol  12.5 mg Oral BID WC    citalopram  10 mg Oral QAM    levothyroxine  25 mcg Oral Daily    pantoprazole  40 mg Oral QAM AC    potassium chloride  10 mEq Oral BID WC    sodium chloride flush  10 mL Intravenous 2 times per day    furosemide  40 mg Intravenous BID     Infusions:   PRN Medications: morphine, sodium chloride flush, acetaminophen **OR** acetaminophen, polyethylene glycol, promethazine **OR** ondansetron  Allergies: Allergies   Allergen Reactions    Lisinopril     Metformin And Related     Penicillins Hives    Pravastatin     Trilipix [Choline Fenofibrate]     Vitamin D Analogs        ROS:   Review of Systems   Constitutional: Negative. HENT: Negative. Eyes: Negative. Respiratory: Negative. Cardiovascular: Negative. See hpi   Gastrointestinal: Negative. Endocrine: Negative. Genitourinary: Negative. Musculoskeletal: Negative. Skin: Negative. Allergic/Immunologic: Negative. Neurological: Negative. Hematological: Negative. Psychiatric/Behavioral: Negative. Objective:     Physical Exam:   Vitals:    11/29/20 0732   BP: 116/71   Pulse: 85   Resp: 16   Temp: 97.1 °F (36.2 °C)   SpO2: 96%     I/O last 3 completed shifts:   In: 120 [P.O.:120]  Out: 400 [Urine:400]   General appearance: alert, appears stated age and cooperative  HEENT: PERRLA  Neck: no adenopathy, no carotid bruit, no JVD, supple, symmetrical, trachea midline and thyroid not enlarged, symmetric, no tenderness/mass/nodules  Lungs: clear to auscultation bilaterally  Heart: regular rate and rhythm, S1, S2 normal, no murmur, click, rub or gallop  Abdomen: soft, non-tender; bowel sounds normal; no masses,  no organomegaly  Extremities: extremities normal, atraumatic, no cyanosis or edema     Lab and Imaging Review   Labs:  CBC:   Recent Labs     11/27/20  1605 11/29/20  0638   WBC 9.2 7.9   HGB 8.2* 7.1*   HCT 25.9* 22.8*   MCV 83.1 84.2    141     BMP:   Recent Labs     11/27/20  1605 11/28/20  0707 11/29/20  0638    135* 135*   K 3.9 4.0 4.5   CL 96* 95* 95*   CO2 33* 32 33*   BUN 18 21* 24*   CREATININE 1.3 1.0 0.9     LIVER PROFILE:   Recent Labs     11/27/20  1605 11/29/20  0638   AST 39* 24   ALT 20 17   LIPASE 21.0  --    PROT 6.0* 6.2*   BILIDIR  --  0.6*   BILITOT 1.7* 1.0   ALKPHOS 97 81     PT/INR:   Recent Labs     11/27/20  1605 11/28/20  0924 11/29/20  0638   INR 2.77* 2.51* 2.95*       IMAGING:  Ct Abdomen Pelvis W Iv Contrast Additional Contrast? None    Result Date: 11/27/2020  EXAMINATION: CT OF THE ABDOMEN AND PELVIS WITH CONTRAST 11/27/2020 5:42 pm TECHNIQUE: CT of the abdomen and pelvis was performed with the administration of intravenous contrast. Multiplanar reformatted images are provided for review. Dose modulation, iterative reconstruction, and/or weight based adjustment of the mA/kV was utilized to reduce the radiation dose to as low as reasonably achievable. COMPARISON: None. HISTORY: ORDERING SYSTEM PROVIDED HISTORY: From [facility] -- \"patient denies having abdominal pain. However, patient is tender to palpation over the abdomen. Reason for Exam: sob FINDINGS: Lower Chest: Correlate with report from CTA chest performed at the same time. Bibasilar consolidation and bilateral pleural effusion. Organs: The spleen is enlarged. No discrete hepatic lesion evident. Cholelithiasis with gallbladder wall thickening and pericholecystic fluid. Dystrophic vascular calcifications are demonstrated bilateral kidneys without definite renal calculus. No hydronephrosis or discrete renal lesion. Mild nodularity bilateral adrenal glands typical of mild hyperplasia. Indeterminate cystic lesion at the pancreatic body/tail junction axial series 7, image 70 is 16 x 19 mm.  GI/Bowel: Multifocal diverticula involve the descending and sigmoid colon without evidence of acute inflammatory change. No diffuse or focal bowel wall thickening evident. No inflammatory changes evident. No obstruction is seen. The appendix is not seen, possibly prior appendectomy. Pelvis: Prostate gland, seminal vesicles and urinary bladder appear unremarkable. Moderate volume pelvic ascites. No pneumoperitoneum or adenopathy. Vascular calcifications are noted reflecting calcific atherosclerosis. Peritoneum/Retroperitoneum: Moderate volume abdominal ascites. Calcific atherosclerosis aorta and its branches. Inferior vena cava appears unremarkable. No pneumoperitoneum or adenopathy. Bones/Soft Tissues: No acute superficial soft tissue or osseous structure abnormality evident. Prominent degenerative sequela L2-3 and L3-4 with Modic type changes. Vacuum disc phenomena L2-3, L3-4 and L4-5.     1. Indeterminate cystic lesion (1.9 cm) at the junction of the pancreatic body and tail could reflect neoplastic process. Recommend additional characterization on MRI abdomen attention pancreas without and with gadolinium. 2. Cholelithiasis with nonspecific findings suggesting acute cholecystitis. 3.  Diverticulosis coli without CT evidence of acute diverticulitis. 4. Splenomegaly. 5. Correlate with report from CTA chest performed at the same time. Bibasilar consolidation and bilateral pleural effusion. 6. Degenerative changes lumbar spine. Xr Chest Portable    Result Date: 11/27/2020  EXAMINATION: ONE XRAY VIEW OF THE CHEST 11/27/2020 4:23 pm COMPARISON: March 16, 2020 HISTORY: ORDERING SYSTEM PROVIDED HISTORY: other TECHNOLOGIST PROVIDED HISTORY: Reason for exam:->other Reason for Exam: cough sob Initial evaluation for acute cough and shortness of breath. FINDINGS: The cardiomediastinal silhouette is moderately enlarged. Vascular congestive changes are present. Dual lead left-sided pacer appears adequately positioned.  Increased airspace and interstitial opacities are present diffusely favoring pulmonary edema. There is more dense consolidation involving the region of the left lower lobe and left parahilar region. No pneumothorax. Cannot exclude a small left pleural effusion. 1. Cardiomegaly with vascular congestive changes and pulmonary edema. 2. More dense focal consolidation involving the left lower lobe and left parahilar region which could indicate an evolving pneumonia. Ct Chest Pulmonary Embolism W Contrast    Result Date: 11/27/2020  EXAMINATION: CTA OF THE CHEST 11/27/2020 5:41 pm TECHNIQUE: CTA of the chest was performed after the administration of intravenous contrast.  Multiplanar reformatted images are provided for review. MIP images are provided for review. Dose modulation, iterative reconstruction, and/or weight based adjustment of the mA/kV was utilized to reduce the radiation dose to as low as reasonably achievable. COMPARISON: Chest radiograph 11/27/2020, chest CT 01/14/2020 HISTORY: ORDERING SYSTEM PROVIDED HISTORY: new hypoxia. elevated d dimer TECHNOLOGIST PROVIDED HISTORY: Reason for exam:->new hypoxia. elevated d dimer Reason for Exam: pain FINDINGS: PULMONARY ARTERIES:  Normal caliber. Adequately opacified for evaluation. No filling defects consistent with emboli. MEDIASTINUM:  Changes of open aortic valve replacement. Left subclavian dual chamber pacemaker with intact appearing leads in expected position. Moderate right ventricular hypertrophy. Moderate to severe concentric left ventricular hypertrophy. Flattening of the interventricular septum. No pericardial effusion. Mitral and aortic valve annular calcifications. Moderate to severe coronary atherosclerotic calcifications. Moderate systemic atherosclerosis. Reflux of the contrast bolus into the azygos vein. New nonenlarged to mildly enlarged mediastinal and bilateral hilar lymph nodes, likely reactive.   Partially calcified para-aortic, subaortic, and left hilar nodes, likely sequelae of prior granulomatous disease. LUNGS/PLEURA:  Patent central airways. Mild bronchial wall thickening. Minimal centrilobular and paraseptal emphysema. Smooth interlobular septal thickening predominantly near the apices and bases. Partial collapse of the left lower with minimal involvement of the lingula, both with minimal air bronchograms. Passive atelectasis elsewhere in each lung. Linear opacities in the mid to basilar portions of the aerated left lung, likely atelectasis or scarring. Free-flowing small right pleural effusion. Loculated moderate left pleural effusion with associated smooth left pleural thickening. No pneumothoraces. UPPER ABDOMEN:  Hepatic granulomatous calcifications. Reflux of the contrast bolus into the inferior vena cava and hepatic veins. Large free intraperitoneal fluid. SOFT TISSUES/BONES:   Reflux of the contrast bolus into venous collaterals about the left shoulder. At least minimal subcutaneous edema. No supraclavicular nor axillary lymphadenopathy. Minimal bilateral gynecomastia. Interim redo sternotomy with nonunion of the manubrial portion and incomplete union of the sternal body portion. Intact appearing sternotomy wires. No acute fractures nor suspicious osseous lesions. Healing nondisplaced fractures of the anterior right 5th and anterior left 3rd, 4th, 7th, and 8th ribs. Healed osteotomies of the posterolateral left 5th and 6th ribs. 1. No findings of pulmonary embolism. 2. Pulmonary edema, bilateral effusions, and mild cardiomegaly, suggesting congestive heart failure. Reflux of the contrast bolus suggests associated right heart dysfunction. 3. Loculated moderate left pleural effusion resulting in partial collapse of basilar portions of the left lung, possibly with superimposed pneumonia or aspiration.  4. Mild bronchial wall thickening potentially due to pulmonary vascular congestion, reactive airways disease, or bronchitis. 5. Additional incidental findings as above. Hospital Problems           Last Modified POA    CHF (congestive heart failure), NYHA class I, acute on chronic, combined ([facility] 11/27/2020 Yes        Assessment:   71 yo male with multiple comorbidities with pancreatic cyst    Plan:   1.  MRI for further evaluation      Kishore Salvador DO  1:41 PM 11/29/2020            [address] [place] [address]    [address]      [address] [place] [address]     Phone: [phone]     Fax: [phone]

## 2020-11-29 NOTE — PROGRESS NOTES
Pt sat up in recliner for 1 hour, walked to bathroom with walker, BM and urinated 300ml. Assisted back to bed, denies needs.

## 2020-11-29 NOTE — H&P
Hospital Medicine History & Physical      PCP: Jc Garcia DO    Date of Admission: 11/27/2020    Date of Service: Pt seen/examined on 11/28/2020 and Admitted to Inpatient    Chief Complaint: AMS    History Of Present Illness: The patient is a 72 y.o. male who presents to [address] [place] [address] with AMS. Pt pleasantly confused, hence history obtained from daughter on phone. Pt apparently has h/o a fib and recently underwent cardiac ablation and was dced to [facility], but pt was found to be lethargic and confused and hence sent to the ER.  Per daughter pt has been having CHF and needed diuresis and thoracentesis in the past. 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 Zoey gamino w/23mm Magna Ease bioprosthetic valve    BRONCHOSCOPY N/A 1/9/2020    w/BAL, performed by Sury Flowers MD at [address] [place] [address] N/A 1/24/2020    BRONCHOSCOPY ALVEOLAR LAVAGE performed by Narciso Perkins MD at [address] [place] [address]  1/24/2020    BRONCHOSCOPY THERAPUTIC ASPIRATION INITIAL performed by Narciso Perkins MD at [facility] CABG WITH AORTIC VALVE REPLACEMENT  2001    date approximate, x1 to OM, AVR w/porcine valve    CARDIAC CATHETERIZATION  01/13/2020    Dr. Sutherland Basques Left 01/02/2020    Dr. Jasen Prakash - 3200 ml blood drained in 1000 ml increments    COLONOSCOPY  02/10/2020    polyps    COLONOSCOPY N/A 2/10/2020    COLONOSCOPY POLYPECTOMY SNARE/COLD BIOPSY performed by Pamela Nicole DO at [address]  12/07/2010    Dr. Bhavik Crawley. Roger - NURIS- 4.0 x 28 to Cx    PILONIDAL CYST EXCISION N/A 2/11/2020    DEBRIDEMENT OF SACRAL WOUND performed by Marquise Mcfarland MD [facility] VALVE CONDUIT/CORON RECONSTR N/A 1/21/2020    Dr. Bird Hurt - redo sternotomy x3, replacement of ascending aorta, redo AVR w/19mm Carrasquillo Intuity valve, closure of outflow tract of atrial fistula    SIGMOIDOSCOPY N/A 2/1/2020    emergent, performed by Jared Parsons MD at [address] Left 1/2/2020    Dr. Joana Franklin - emergent w/evacuation of hematoma & chest wall hemostasis, pleural biopsy & XENA wedge resection    TRANSESOPHAGEAL ECHOCARDIOGRAM  01/21/2020    during ascending aorta replacement w/redo AVR    TRANSESOPHAGEAL ECHOCARDIOGRAM  12/07/2010       Medications Prior to Admission:    Prior to Admission medications    Medication Sig Start Date End Date Taking?  Authorizing Provider   aspirin 81 MG chewable tablet Take 81 mg by mouth daily   Yes Historical Provider, MD   tamsulosin (FLOMAX) 0.4 MG capsule Take 0.4 mg by mouth daily   Yes Historical Provider, MD   warfarin (COUMADIN) 2 MG tablet Take 2 tablets by mouth daily 5/29/20  Yes Jeffery Cease., MD   carvedilol (COREG) 12.5 MG tablet Take 1 tablet by mouth 2 times daily (with meals) 5/29/20 11/27/20 Yes Jeffery Cease., MD   furosemide (LASIX) 40 MG tablet Take 1 tablet by mouth 2 times daily 5/29/20  Yes Jeffery Cease., MD   rOPINIRole (REQUIP) 1 MG tablet Take 1 mg by mouth twice a week   Yes Historical Provider, MD   citalopram (CELEXA) 10 MG tablet Take 1 tablet by mouth every morning 3/16/20 11/27/20 Yes Vinay Roberts MD   levothyroxine (SYNTHROID) 25 MCG tablet Take 1 tablet by mouth Daily 3/16/20 11/27/20 Yes Vinay Roberts MD   pantoprazole (PROTONIX) 40 MG tablet Take 1 tablet by mouth every morning (before breakfast) 3/16/20 11/27/20 Yes Vinay Roberts MD   allopurinol (ZYLOPRIM) 300 MG tablet Take 1 tablet by mouth daily 3/16/20 11/27/20 Yes Vinay Roberts MD   atorvastatin (LIPITOR) 20 MG tablet Take 1 tablet by mouth nightly 5/29/20 6/28/20  Gayathri Mott MD   potassium chloride (MICRO-K) 10 MEQ extended release capsule Take 1 capsule by mouth 2 times daily 5/29/20   Gayathri Mott MD   meclizine (ANTIVERT) 25 MG tablet Take 25 mg by mouth 3 times daily as needed    Historical Provider, MD   glipiZIDE (GLUCOTROL) 5 MG tablet Take 1 tablet by mouth daily 3/16/20 4/15/20  Vinay Roberts MD       Allergies:  Lisinopril; Metformin and related; Penicillins; Pravastatin; Trilipix [choline fenofibrate]; and Vitamin d analogs    Social History:  The patient currently lives at home    TOBACCO:   reports that he quit smoking about 10 months ago. His smoking use included cigarettes. He smoked 2.00 packs per day. He uses smokeless tobacco.  ETOH:   reports current alcohol use of about 6.0 standard drinks of alcohol per week. Family History:  Reviewed in detail and negative for DM, Early CAD, Cancer, CVA. Positive as follows:    History reviewed. No pertinent family history. REVIEW OF SYSTEMS:   Unable to be obtained 2/2 AMS    PHYSICAL EXAM:    /60   Pulse 65   Temp 96.9 °F (36.1 °C) (Axillary)   Resp 16   Ht 5' 9\" (1.753 m)   Wt 198 lb (89.8 kg)   SpO2 99%   BMI 29.24 kg/m²     General appearance: No apparent distress appears stated age and cooperative. HEENT Normal cephalic, atraumatic without obvious deformity. Pupils equal, round, and reactive to light. Extra ocular muscles intact.   Conjunctivae/corneas

## 2020-11-29 NOTE — CONSULTS
[address]   Electrophysiology Consultation   Date: 11/29/2020  Reason for Consultation: History of atrial fibrillation    Consult Requesting Physician: Vandana Nash MD   Chief Complaint   Patient presents with    Altered Mental Status       CC: Altered mental status    HPI: Kasie Guerrero is a 72 y.o. male history of CAD s/p PCI, CABG (2002), DM, HTN, s/p AVR (2011), aortopulmonary fistula closure and history of paravalvular leak, COPD, atrial fibrillation/flutter, history of dual chamber pacemaker implantation. Patient had Afib ablation (reported: PVI, roof line and posterior line) on 11/10/2020 at [facility] (Dr. Diego Yu). Later had pericardial effusion / pleural effusion and had thoracentesis on 11/23/2020 with removal of 1.3 lit of fluid. Discharged from hospital on 11/25/2020 at [facility]. Admitted from [facility] with altered mental status and SOB and hypoxemia. In ER bedside US reported no significant pericardial/pleural effusion. He had hypercapnea and treated with solu-medrol and oxyen. CXR with congestive changes and high pro-BNP. CTPA with no PE but patient had pulmonary congestion/edema and bilateral effusion. Patient is poor historian. Does not remember when he has been brought to the hospital. He states that he has SOB, but reports no chest pain. Nursing staff reports significant improvement of his mental status today. Assessment and plan:     Acute diastolic heart failure:    IV diuretics. Strict I/Os   Weight daily   Check Pro-BNP    Aggressive BP control. rate control for Afib    - Atrial fibrillation/flutter:    S/p recent ablation. Had pleural effusion post ablation s/p thoracentesis. Underlying atrial rhythm appears to be afib. Check Pacemaker tomorrow. On coumadin.     INR is therapeutic.     - Respiratory failure:    Large pleural effusion on the left, pneumonia and pulmonary infiltrate   On antibiotic therapy per hospitalist team.     -  S/p Intravenous Daily    allopurinol  200 mg Oral Daily    atorvastatin  20 mg Oral Nightly    carvedilol  12.5 mg Oral BID WC    citalopram  10 mg Oral QAM    levothyroxine  25 mcg Oral Daily    pantoprazole  40 mg Oral QAM AC    potassium chloride  10 mEq Oral BID WC    sodium chloride flush  10 mL Intravenous 2 times per day    furosemide  40 mg Intravenous BID     Continuous Infusions:  PRN Meds:.morphine, sodium chloride flush, acetaminophen **OR** acetaminophen, polyethylene glycol, promethazine **OR** ondansetron     Review of System:  [x] Full ROS obtained and negative except as mentioned in HPI    Prior to Admission medications    Medication Sig Start Date End Date Taking?  Authorizing Provider   aspirin 81 MG chewable tablet Take 81 mg by mouth daily   Yes Historical Provider, MD   tamsulosin (FLOMAX) 0.4 MG capsule Take 0.4 mg by mouth daily   Yes Historical Provider, MD   warfarin (COUMADIN) 2 MG tablet Take 2 tablets by mouth daily 5/29/20  Yes Aaliyah Noon., MD   carvedilol (COREG) 12.5 MG tablet Take 1 tablet by mouth 2 times daily (with meals) 5/29/20 11/27/20 Yes Aaliyah Noon., MD   furosemide (LASIX) 40 MG tablet Take 1 tablet by mouth 2 times daily 5/29/20  Yes Aaliyah Noon., MD   rOPINIRole (REQUIP) 1 MG tablet Take 1 mg by mouth twice a week   Yes Historical Provider, MD   citalopram (CELEXA) 10 MG tablet Take 1 tablet by mouth every morning 3/16/20 11/27/20 Yes John Morse MD   levothyroxine (SYNTHROID) 25 MCG tablet Take 1 tablet by mouth Daily 3/16/20 11/27/20 Yes John Morse MD   pantoprazole (PROTONIX) 40 MG tablet Take 1 tablet by mouth every morning (before breakfast) 3/16/20 11/27/20 Yes John Morse MD   allopurinol (ZYLOPRIM) 300 MG tablet Take 1 tablet by mouth daily 3/16/20 11/27/20 Yes John Morse MD   atorvastatin (LIPITOR) 20 MG tablet Take 1 tablet by mouth nightly 5/29/20 6/28/20  Aaliyah Noon., MD   potassium chloride (MICRO-K) 10 MEQ extended release capsule Take 1 capsule by mouth 2 times daily 5/29/20   SCOTT Hernandez MD   meclizine (ANTIVERT) 25 MG tablet Take 25 mg by mouth 3 times daily as needed    Historical Provider, MD   glipiZIDE (GLUCOTROL) 5 MG tablet Take 1 tablet by mouth daily 3/16/20 4/15/20  Armani Daly MD       Past Medical History:   Diagnosis Date    CAD (coronary artery disease)     CHF (congestive heart failure) (HCC)     [facility]) [facility]     [facility]     Gout     Hemothorax, left 01/2020    Hyperlipidemia     Hypertension     Obesity, Class I, BMI 30.0-34.9 (see actual BMI) 01/2020    30.7    S/P thoracotomy 01/2020    for hemothorax    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 Miriam Oleary - redo w/23mm Magna Ease bioprosthetic valve    BRONCHOSCOPY N/A 1/9/2020    w/BAL, performed by Amaris Celis MD at [address] [place] [address] N/A 1/24/2020    [address] performed by Wyatt Costa MD at [address] [place] [address]  1/24/2020    BRONCHOSCOPY THERAPUTIC ASPIRATION INITIAL performed by Wyatt Costa MD at [facility] CABG WITH AORTIC VALVE REPLACEMENT  2001    date approximate, x1 to OM, AVR w/porcine valve    CARDIAC CATHETERIZATION  01/13/2020    Dr. Abelino Johnson Left 01/02/2020    Dr. Karla Gallagher - 3200 ml blood drained in 1000 ml increments    COLONOSCOPY  02/10/2020    polyps    COLONOSCOPY N/A 2/10/2020    COLONOSCOPY POLYPECTOMY SNARE/COLD BIOPSY performed by Gino Wilder DO at [address]  12/07/2010    Dr. Babs Williamson. Roger - NURIS- 4.0 x 28 to Cx    PILONIDAL CYST EXCISION N/A 2/11/2020    DEBRIDEMENT OF SACRAL WOUND performed by Raudel Pan MD at [address] affecting prognosis and outcome of EP interventions  Severe exacerbation of underlying medical condition requiring hospitalization and at risk of decompensation. All questions and concerns were addressed to the patient/family. Alternatives to my treatment were discussed. I have discussed the above stated plan and the patient verbalized understanding and agreed with the plan. NOTE: This report was transcribed using voice recognition software. Every effort was made to ensure accuracy, however, inadvertent computerized transcription errors may be present.      Sandie Johnson MD, MPH  [place] [address]   Office: [phone]

## 2020-11-30 PROBLEM — K86.9 LESION OF PANCREAS: Status: ACTIVE | Noted: 2020-11-30

## 2020-11-30 PROBLEM — G93.41 ACUTE METABOLIC ENCEPHALOPATHY: Status: ACTIVE | Noted: 2020-11-30

## 2020-11-30 PROBLEM — D64.9 ANEMIA: Status: ACTIVE | Noted: 2020-11-30

## 2020-11-30 LAB
ANION GAP SERPL CALCULATED.3IONS-SCNC: 7 MMOL/L (ref 3–16)
BASOPHILS ABSOLUTE: 0 K/UL (ref 0–0.2)
BASOPHILS RELATIVE PERCENT: 0.1 %
BUN BLDV-MCNC: 23 MG/DL (ref 7–20)
CALCIUM SERPL-MCNC: 8.7 MG/DL (ref 8.3–10.6)
CHLORIDE BLD-SCNC: 94 MMOL/L (ref 99–110)
CO2: 35 MMOL/L (ref 21–32)
CREAT SERPL-MCNC: 0.8 MG/DL (ref 0.8–1.3)
EOSINOPHILS ABSOLUTE: 0 K/UL (ref 0–0.6)
EOSINOPHILS RELATIVE PERCENT: 0 %
GFR AFRICAN AMERICAN: >60
GFR NON-AFRICAN AMERICAN: >60
GLUCOSE BLD-MCNC: 122 MG/DL (ref 70–99)
GLUCOSE BLD-MCNC: 125 MG/DL (ref 70–99)
GLUCOSE BLD-MCNC: 134 MG/DL (ref 70–99)
GLUCOSE BLD-MCNC: 134 MG/DL (ref 70–99)
GLUCOSE BLD-MCNC: 160 MG/DL (ref 70–99)
HCT VFR BLD CALC: 24.4 % (ref 40.5–52.5)
HCT VFR BLD CALC: 25.6 % (ref 40.5–52.5)
HCT VFR BLD CALC: 27.9 % (ref 40.5–52.5)
HEMOGLOBIN: 7.6 G/DL (ref 13.5–17.5)
HEMOGLOBIN: 7.9 G/DL (ref 13.5–17.5)
HEMOGLOBIN: 8.3 G/DL (ref 13.5–17.5)
INR BLD: 2.87 (ref 0.86–1.14)
LYMPHOCYTES ABSOLUTE: 0.1 K/UL (ref 1–5.1)
LYMPHOCYTES RELATIVE PERCENT: 1.2 %
MAGNESIUM: 2.1 MG/DL (ref 1.8–2.4)
MCH RBC QN AUTO: 26.5 PG (ref 26–34)
MCHC RBC AUTO-ENTMCNC: 30.8 G/DL (ref 31–36)
MCV RBC AUTO: 86.1 FL (ref 80–100)
MONOCYTES ABSOLUTE: 0.5 K/UL (ref 0–1.3)
MONOCYTES RELATIVE PERCENT: 5.8 %
NEUTROPHILS ABSOLUTE: 8.3 K/UL (ref 1.7–7.7)
NEUTROPHILS RELATIVE PERCENT: 92.9 %
PDW BLD-RTO: 20.8 % (ref 12.4–15.4)
PERFORMED ON: ABNORMAL
PLATELET # BLD: 137 K/UL (ref 135–450)
PMV BLD AUTO: 7.8 FL (ref 5–10.5)
POTASSIUM SERPL-SCNC: 4.3 MMOL/L (ref 3.5–5.1)
PRO-BNP: 8982 PG/ML (ref 0–124)
PROTHROMBIN TIME: 33.7 SEC (ref 10–13.2)
RBC # BLD: 2.98 M/UL (ref 4.2–5.9)
SODIUM BLD-SCNC: 136 MMOL/L (ref 136–145)
WBC # BLD: 9 K/UL (ref 4–11)

## 2020-11-30 PROCEDURE — 85025 COMPLETE CBC W/AUTO DIFF WBC: CPT

## 2020-11-30 PROCEDURE — 2700000000 HC OXYGEN THERAPY PER DAY

## 2020-11-30 PROCEDURE — 94761 N-INVAS EAR/PLS OXIMETRY MLT: CPT

## 2020-11-30 PROCEDURE — 2580000003 HC RX 258: Performed by: INTERNAL MEDICINE

## 2020-11-30 PROCEDURE — 6360000002 HC RX W HCPCS: Performed by: INTERNAL MEDICINE

## 2020-11-30 PROCEDURE — 80048 BASIC METABOLIC PNL TOTAL CA: CPT

## 2020-11-30 PROCEDURE — 83735 ASSAY OF MAGNESIUM: CPT

## 2020-11-30 PROCEDURE — 36415 COLL VENOUS BLD VENIPUNCTURE: CPT

## 2020-11-30 PROCEDURE — 85018 HEMOGLOBIN: CPT

## 2020-11-30 PROCEDURE — 6370000000 HC RX 637 (ALT 250 FOR IP): Performed by: INTERNAL MEDICINE

## 2020-11-30 PROCEDURE — 99232 SBSQ HOSP IP/OBS MODERATE 35: CPT | Performed by: INTERNAL MEDICINE

## 2020-11-30 PROCEDURE — 2060000000 HC ICU INTERMEDIATE R&B

## 2020-11-30 PROCEDURE — 2500000003 HC RX 250 WO HCPCS: Performed by: INTERNAL MEDICINE

## 2020-11-30 PROCEDURE — 85014 HEMATOCRIT: CPT

## 2020-11-30 PROCEDURE — 83880 ASSAY OF NATRIURETIC PEPTIDE: CPT

## 2020-11-30 PROCEDURE — 85610 PROTHROMBIN TIME: CPT

## 2020-11-30 RX ORDER — WARFARIN SODIUM 1 MG/1
1 TABLET ORAL
Status: COMPLETED | OUTPATIENT
Start: 2020-11-30 | End: 2020-11-30

## 2020-11-30 RX ADMIN — PANTOPRAZOLE SODIUM 40 MG: 40 TABLET, DELAYED RELEASE ORAL at 05:02

## 2020-11-30 RX ADMIN — METRONIDAZOLE 500 MG: 500 INJECTION, SOLUTION INTRAVENOUS at 05:02

## 2020-11-30 RX ADMIN — CIPROFLOXACIN 400 MG: 2 INJECTION, SOLUTION INTRAVENOUS at 15:08

## 2020-11-30 RX ADMIN — FUROSEMIDE 40 MG: 10 INJECTION, SOLUTION INTRAMUSCULAR; INTRAVENOUS at 16:26

## 2020-11-30 RX ADMIN — METRONIDAZOLE 500 MG: 500 INJECTION, SOLUTION INTRAVENOUS at 15:06

## 2020-11-30 RX ADMIN — FUROSEMIDE 40 MG: 10 INJECTION, SOLUTION INTRAMUSCULAR; INTRAVENOUS at 09:34

## 2020-11-30 RX ADMIN — Medication 10 ML: at 22:54

## 2020-11-30 RX ADMIN — POTASSIUM CHLORIDE 10 MEQ: 1500 TABLET, EXTENDED RELEASE ORAL at 09:34

## 2020-11-30 RX ADMIN — LEVOTHYROXINE SODIUM 25 MCG: 25 TABLET ORAL at 05:02

## 2020-11-30 RX ADMIN — METRONIDAZOLE 500 MG: 500 INJECTION, SOLUTION INTRAVENOUS at 22:55

## 2020-11-30 RX ADMIN — CIPROFLOXACIN 400 MG: 2 INJECTION, SOLUTION INTRAVENOUS at 02:47

## 2020-11-30 RX ADMIN — Medication 10 ML: at 09:40

## 2020-11-30 RX ADMIN — CARVEDILOL 12.5 MG: 6.25 TABLET, FILM COATED ORAL at 16:26

## 2020-11-30 RX ADMIN — ALLOPURINOL 200 MG: 100 TABLET ORAL at 09:34

## 2020-11-30 RX ADMIN — POTASSIUM CHLORIDE 10 MEQ: 1500 TABLET, EXTENDED RELEASE ORAL at 16:27

## 2020-11-30 RX ADMIN — SODIUM CHLORIDE 200 MG: 9 INJECTION, SOLUTION INTRAVENOUS at 16:25

## 2020-11-30 RX ADMIN — CARVEDILOL 12.5 MG: 6.25 TABLET, FILM COATED ORAL at 09:34

## 2020-11-30 RX ADMIN — CITALOPRAM HYDROBROMIDE 10 MG: 20 TABLET ORAL at 09:40

## 2020-11-30 RX ADMIN — INSULIN LISPRO 2 UNITS: 100 INJECTION, SOLUTION INTRAVENOUS; SUBCUTANEOUS at 11:30

## 2020-11-30 RX ADMIN — WARFARIN SODIUM 1 MG: 1 TABLET ORAL at 18:31

## 2020-11-30 RX ADMIN — ATORVASTATIN CALCIUM 20 MG: 10 TABLET, FILM COATED ORAL at 22:54

## 2020-11-30 NOTE — PROGRESS NOTES
Progress Note    Admit Date:  11/27/2020    Subjective:  Mr. Lisette Haynes is feeling better. He is not a very good historian. He had issues with confusion that seem to be improved. His breathing is better. No pedal edema. He is on 2.5 L of oxygen. He does not use oxygen at home. He denies any RUQ abdominal pain. No fever or chills. Objective:   /66   Pulse 66   Temp 98 °F (36.7 °C) (Oral)   Resp 18   Ht 5' 9\" (1.753 m)   Wt 197 lb 14.4 oz (89.8 kg)   SpO2 97%   BMI 29.22 kg/m²          Intake/Output Summary (Last 24 hours) at 11/30/2020 9559  Last data filed at 11/30/2020 0203  Gross per 24 hour   Intake 1200 ml   Output 650 ml   Net 550 ml       Physical Exam:    General appearance: alert, appears stated age and cooperative  Head: Normocephalic, without obvious abnormality, atraumatic  Eyes: conjunctivae/corneas clear. PERRL, EOM's intact. Neck: no adenopathy, no carotid bruit, no JVD, supple, symmetrical, trachea midline and thyroid not enlarged, symmetric, no tenderness/mass/nodules  Lungs: crackles both lungs more in the right. Diminished BS left lung with dullness to percussion left lung base.    Heart: regular rate and rhythm, S1, S2 normal, no murmur, click, rub or gallop  Abdomen: soft, non-tender; bowel sounds normal; no masses,  no organomegaly  Extremities: extremities normal, atraumatic, no cyanosis or edema  Pulses: 2+ and symmetric  Skin: Skin color, texture, turgor normal. No rashes or lesions  Neurologic: Grossly normal    Scheduled Meds:   influenza virus vaccine  0.5 mL Intramuscular Prior to discharge    iron sucrose (VENOFER) iv piggyback 100 mL (Admin over 60 minutes)  200 mg Intravenous Q24H    insulin lispro  0-12 Units Subcutaneous 4x Daily AC & HS    insulin lispro  0-6 Units Subcutaneous Nightly    ciprofloxacin  400 mg Intravenous Q12H    metroNIDAZOLE  500 mg Intravenous Q8H    warfarin (COUMADIN) daily dosing (placeholder)   Other RX Placeholder    allopurinol  200 post redo aortic valve replacement. #Anemia. H&H dropped but presently stable. Got Venofer. Hemoccult negative. #Metabolic encephalopathy due to above. Seems to be resolving. #Pancreatic lesion seen on CT of the abdomen. MRI to be done today. #CT was read as acute cholecystitis. No clinical evidence of cholecystitis. Await MRCP. On IV antibiotics. #Acute respiratory failure. Not on home oxygen. Likely due to heart failure. We will try to wean oxygen off. #Diabetes mellitus type 2. Monitor sugars. #Hypertension. Blood pressure well controlled. #Hyperlipidemia stable. #Lovenox for DVT prophylaxis. Discharge planning.     Zuhair Rodriguez MD 11/30/2020 7:13 AM

## 2020-11-30 NOTE — PROGRESS NOTES
[facility] Progress Note      Admit Date:  11/27/2020    Subjective:  Mr. Tiffani Squires is seen by me today for cardiology follow up first visit this admission  He is confused. He is a former smoker comes in with shortness of breath and is on O2 2 L/min  Spoke to his nurse who says he is going to [facility] and has pleural effusion. [place]:  Altered mental status   CAD s/p PCI, CABG (2002), DM, HTN, s/p AVR (2011), aortopulmonary fistula closure and history of paravalvular leak, COPD, permanent  atrial fibrillation/flutter, history of dual chamber pacemaker implantation. Patient had Afib ablation (reported: PVI, roof line and posterior line) on 11/10/2020 at [facility] (Dr. Wendy Lynn). Later had pericardial effusion / pleural effusion and had thoracentesis on 11/23/2020 with removal of 1.3 lit of fluid.     ROS:  12 point ROS negative in all areas as listed below except as in [place]  Constitutional, EENT, GI, , Musculoskeletal, skin, neurological, hematological, endocrine,     Past Medical History:   Diagnosis Date    AS (aortic stenosis) 12/21/2011    CAD (coronary artery disease)     CHF (congestive heart failure) (HCC)     COPD (chronic obstructive pulmonary disease) [facility]     Diabetes mellitus ([facility]     Gout     Hemothorax, left 01/2020    Hyperlipidemia     Hypertension     Obesity, Class I, BMI 30.0-34.9 (see actual BMI) 01/2020    30.7    S/P thoracotomy 01/2020    for hemothorax    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 Blake gamino w/23mm Magna Ease bioprosthetic valve    BRONCHOSCOPY N/A 1/9/2020    w/BAL, performed by Gris Mcdonald MD at [address] [facility] 1/24/2020    [address] performed by Den Hanna MD at [facility]  1/24/2020    BRONCHOSCOPY [facility] performed by Deandre Quarles Jennifer Cisneros MD at [address] CABG WITH AORTIC VALVE REPLACEMENT  2001    date approximate, x1 to OM, AVR w/porcine valve    CARDIAC CATHETERIZATION  01/13/2020    Dr. Keny Sanchezils Left 01/02/2020    Dr. García Parra - 3200 ml blood drained in 1000 ml increments    COLONOSCOPY  02/10/2020    polyps    COLONOSCOPY N/A 2/10/2020    COLONOSCOPY POLYPECTOMY SNARE/COLD BIOPSY performed by Osman Mcqueen DO at [facility]  12/07/2010    Dr. Kim Lara. Roger - NURIS- 4.0 x 28 to Cx    PILONIDAL CYST EXCISION N/A 2/11/2020    DEBRIDEMENT OF SACRAL WOUND performed by Dario Albarado MD at [facility]. VALVE CONDUIT/CORON RECONSTR N/A 1/21/2020    Dr. Estrella Lamar - redo sternotomy x3, replacement of ascending aorta, redo AVR w/19mm Carrasquillo Intuity valve, closure of outflow tract of atrial fistula    SIGMOIDOSCOPY N/A 2/1/2020    emergent, performed by Kalli Bello MD at [address] Left 1/2/2020    Dr. Ton Ledesma - emergent w/evacuation of hematoma & chest wall hemostasis, pleural biopsy & XENA wedge resection    TRANSESOPHAGEAL ECHOCARDIOGRAM  01/21/2020    during ascending aorta replacement w/redo AVR    TRANSESOPHAGEAL ECHOCARDIOGRAM  12/07/2010       Objective:   /60   Pulse 65   Temp 98.4 °F (36.9 °C) (Oral)   Resp 18   Ht 5' 9\" (1.753 m)   Wt 197 lb 14.4 oz (89.8 kg)   SpO2 96%   BMI 29.22 kg/m²       Intake/Output Summary (Last 24 hours) at 11/30/2020 4619  Last data filed at 11/30/2020 0203  Gross per 24 hour   Intake 1200 ml   Output 650 ml   Net 550 ml       TELEMETRY: V paced     Physical Exam:  General: No Respiratory distress, appears well developed and well nourished.    Eyes:  Sclera nonicteric  Nose/Sinuses:  negative findings: nose shows no deformity, asymmetry, or inflammation, nasal mucosa normal, septum midline with no perforation or bleeding  Back:  no pain to palpation  Joint:  no active joint inflammation  Musculoskeletal:  negative  Skin:  Warm and dry  Neck:  Negative for JVD and Carotid Bruits. Chest:  Diminished bilat with crackles  to auscultation, respiration easy  Cardiovascular:  RRR, S1S2 normal,  No metallic sounds   2/6 ALANNAH all over heart, no rub or thrill.   Abdomen:  Soft normal liver and spleen  Extremities:   No edema, clubbing, cyanosis,  Pulses: Femoral and pedal pulses are normal.  Neuro: intact    Medications:    influenza virus vaccine  0.5 mL Intramuscular Prior to discharge    iron sucrose (VENOFER) iv piggyback 100 mL (Admin over 60 minutes)  200 mg Intravenous Q24H    insulin lispro  0-12 Units Subcutaneous 4x Daily AC & HS    insulin lispro  0-6 Units Subcutaneous Nightly    ciprofloxacin  400 mg Intravenous Q12H    metroNIDAZOLE  500 mg Intravenous Q8H    warfarin (COUMADIN) daily dosing (placeholder)   Other RX Placeholder    allopurinol  200 mg Oral Daily    atorvastatin  20 mg Oral Nightly    carvedilol  12.5 mg Oral BID WC    citalopram  10 mg Oral QAM    levothyroxine  25 mcg Oral Daily    pantoprazole  40 mg Oral QAM AC    potassium chloride  10 mEq Oral BID WC    sodium chloride flush  10 mL Intravenous 2 times per day    furosemide  40 mg Intravenous BID       morphine, sodium chloride flush, acetaminophen **OR** acetaminophen, polyethylene glycol, promethazine **OR** ondansetron    Lab Data:  CBC:   Recent Labs     11/27/20  1605 11/29/20  0638 11/30/20  0215 11/30/20  0618   WBC 9.2 7.9  --  9.0   HGB 8.2* 7.1* 7.6* 7.9*   HCT 25.9* 22.8* 24.4* 25.6*   MCV 83.1 84.2  --  86.1    141  --  137     BMP:   Recent Labs     11/28/20  0707 11/29/20  0638 11/30/20  0618   * 135* 136   K 4.0 4.5 4.3   CL 95* 95* 94*   CO2 32 33* 35*   BUN 21* 24* 23*   CREATININE 1.0 0.9 0.8     LIVER PROFILE:   Recent Labs     11/27/20  1605 11/29/20  0638   AST 39* 24   ALT 20 17   LIPASE 21.0  --    BILIDIR  --  0.6* BILITOT 1.7* 1.0   ALKPHOS 97 81     PT/INR:   Recent Labs     11/28/20  0924 11/29/20  0638 11/30/20  0618   PROTIME 29.4* 34.6* 33.7*   INR 2.51* 2.95* 2.87*     APTT:   Recent Labs     11/27/20  1605   APTT 36.5*     BNP:  No results for input(s): BNP in the last 72 hours. IMAGING:   Chest xray 11.27.20     1. Cardiomegaly with vascular congestive changes and pulmonary edema. 2. More dense focal consolidation involving the left lower lobe and left    parahilar region which could indicate an evolving pneumonia.               Echo 1.23.20   Extremely technically difficult examination due to poor acoustic windows. Only available window is a poor quality parasternal long axis. No true   apical window available, subcostal view could not be evaluated, suprasternal   notch CW complicated by bipap. Left ventricular systolic function is difficult to estimate but appears to   be within normal limits with an estimated ejection fraction of 50-55%. The left ventricle is normal in size with normal wall thickness. Cannot exclude regional wall motion abnormalities secondary to poor   endocardial visualization. Indeterminate diastolic function. A 19mm Carrasquillo Intuity bioprosthetic aortic valve is not well visualized but   appears to be well seated. Inadequate Doppler angles to assess function. There is no evidence of aortic valve regurgitation.    No pericardial effusion noted    Assessment:  S/p AVR  CAD without angina  Chronic myocardial injury    Patient Active Problem List    Diagnosis Date Noted    Lesion of pancreas 11/30/2020    Acute metabolic encephalopathy 61/91/1160    Anemia 11/30/2020    CHF (congestive heart failure), NYHA class I, acute on chronic, combined ([address] 11/27/2020    Debility 03/19/2020    Atrial fibrillation ([address] 03/17/2020    Frequent falls 03/17/2020    Sacral ulcer ([address] 03/17/2020    Subtherapeutic international normalized ratio (INR) 03/17/2020    Generalized weakness 03/16/2020    Sacral wound     Type 2 diabetes mellitus, without long-term current use of insulin ([facility] 02/08/2020    Bright red blood per rectum 02/08/2020    Lower gastrointestinal hemorrhage 02/07/2020    Restrictive lung disease 01/27/2020    Myocardial infarction ([facility] 01/24/2020    Acute on chronic diastolic congestive heart failure ([facility]     Hyperglycemia 01/09/2020    Tobacco abuse 01/02/2020    CAD (coronary artery disease) s/p stent in 2010 01/02/2020    Hyperlipidemia 01/02/2020    Obesity, Class I, BMI 30.0-34.9 (see actual BMI) 01/01/2020    S/P thoracotomy 01/01/2020    Status post partial removal of lung 01/01/2020    S/P AVR (aortic valve replacement) 12/27/2011    Essential hypertension 12/03/2010       Plan:  1. continue statin beta blockers lasix   2. Patient is anticoagulated for afib  3.  Nothing more to add will follow as needed    Core Measures:  · Discharge instructions:   · LVEF documented: 50-55  · ACEI for LV dysfunction: NA  · Smoking Cessation: quit smoking after 40 yrs    [address] Brea Feliciano MD 11/30/2020 8:23 AM

## 2020-11-30 NOTE — CONSULTS
[facility] Nurse  Consult Note       NAME:  Ericka Martinez RECORD NUMBER:  [id number]  AGE: 72 y.o. GENDER: male  : 1950  TODAY'S DATE:  2020    Subjective Pt lying face down in bed, confused.   But states wound has been present \"forever\"   Reason for WOCN Evaluation and Assessment: left heel      Randi Betancourt is a 72 y.o. male referred by:   [x] Physician  [x] Nursing  [] Other:     Wound Identification:  Wound Type: pressure, chronic        Patient Goal of Care:  [] Wound Healing  [] Odor Control  [] Palliative Care  [] Pain Control   [] Other:         PAST MEDICAL HISTORY        Diagnosis Date    AS (aortic stenosis) 2011    CAD (coronary artery disease)     [facility]     COPD (chronic obstructive pulmonary disease) [facility]     Diabetes mellitus ([facility]     Gout     Hemothorax, left 2020    Hyperlipidemia     Hypertension     Obesity, Class I, BMI 30.0-34.9 (see actual BMI) 2020    30.7    S/P thoracotomy 2020    for hemothorax    Status post partial removal of lung 2020    XENA    Thyroid disease        PAST SURGICAL HISTORY    Past Surgical History:   Procedure Laterality Date    AORTIC VALVE REPLACEMENT  2011    Dr. Francisco Monrealh - redo w/23mm Magna Ease bioprosthetic valve    BRONCHOSCOPY N/A 2020    w/BAL, performed by Darryl Panda MD at [facility] N/A 2020    [address] performed by Rita Du MD at [facility]  2020    BRONCHOSCOPY [address] performed by Rita Du MD at [facility] CABG WITH AORTIC VALVE REPLACEMENT      date approximate, x1 to OM, AVR w/porcine valve    CARDIAC CATHETERIZATION  2020    Dr. Keny Steiner Left 2020    Dr. García Parra - 3200 ml blood drained in 1000 ml increments    COLONOSCOPY  02/10/2020    polyps    COLONOSCOPY N/A 2/10/2020    COLONOSCOPY POLYPECTOMY SNARE/COLD BIOPSY performed by Renato Clancy DO at [address] [facility] [address]  2010     [facility] [place]. [place] - NURIS- 4.0 x 28 to Cx    PILONIDAL CYST EXCISION N/A 2020    DEBRIDEMENT OF SACRAL WOUND performed by Veena Persaud MD at [address] VALVE CONDUIT/CORON RECONSTR N/A 2020    Dr. 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 Romina Lizarraga - emergent w/evacuation of hematoma & chest wall hemostasis, pleural biopsy & XENA wedge resection    TRANSESOPHAGEAL ECHOCARDIOGRAM  2020    during ascending aorta replacement w/redo AVR    TRANSESOPHAGEAL ECHOCARDIOGRAM  2010       FAMILY HISTORY    History reviewed. No pertinent family history. SOCIAL HISTORY    Social History     Tobacco Use    Smoking status: Former Smoker     Packs/day: 2.00     Types: Cigarettes     Last attempt to quit: 2020     Years since quittin.8    Smokeless tobacco: Current User   Substance Use Topics    Alcohol use: Yes     Alcohol/week: 6.0 standard drinks     Types: 6 Cans of beer per week     Frequency: 4 or more times a week     Drinks per session: 5 or 6     Binge frequency: Daily or almost daily    Drug use: Not Currently       ALLERGIES    Allergies   Allergen Reactions    Lisinopril     Metformin And Related     Penicillins Hives    Pravastatin     Trilipix [Choline Fenofibrate]     Vitamin D Analogs        MEDICATIONS    No current facility-administered medications on file prior to encounter.       Current Outpatient Medications on File Prior to Encounter   Medication Sig Dispense Refill    aspirin 81 MG chewable tablet Take 81 mg by mouth daily      tamsulosin (FLOMAX) 0.4 MG capsule Take 0.4 mg by mouth daily      warfarin (COUMADIN) 2 MG tablet Take 2 tablets by mouth daily 100 tablet 3    carvedilol (COREG) 12.5 MG tablet Take 1 tablet by mouth 2 times daily (with meals) 180 tablet 3    furosemide (LASIX) 40 MG tablet Take 1 tablet by mouth 2 times daily 180 tablet 3    rOPINIRole (REQUIP) 1 MG tablet Take 1 mg by mouth twice a week      citalopram (CELEXA) 10 MG tablet Take 1 tablet by mouth every morning 30 tablet 1    levothyroxine (SYNTHROID) 25 MCG tablet Take 1 tablet by mouth Daily 30 tablet 1    pantoprazole (PROTONIX) 40 MG tablet Take 1 tablet by mouth every morning (before breakfast) 30 tablet 1    allopurinol (ZYLOPRIM) 300 MG tablet Take 1 tablet by mouth daily 30 tablet 1    atorvastatin (LIPITOR) 20 MG tablet Take 1 tablet by mouth nightly 90 tablet 3    potassium chloride (MICRO-K) 10 MEQ extended release capsule Take 1 capsule by mouth 2 times daily 180 capsule 3    meclizine (ANTIVERT) 25 MG tablet Take 25 mg by mouth 3 times daily as needed      glipiZIDE (GLUCOTROL) 5 MG tablet Take 1 tablet by mouth daily 30 tablet 1       Objective    /60   Pulse 65   Temp 98.4 °F (36.9 °C) (Oral)   Resp 18   Ht 5' 9\" (1.753 m)   Wt 197 lb 14.4 oz (89.8 kg)   SpO2 96%   BMI 29.22 kg/m²     LABS:  WBC:    Lab Results   Component Value Date    WBC 9.0 11/30/2020     H/H:    Lab Results   Component Value Date    HGB 7.9 11/30/2020    HCT 25.6 11/30/2020     PTT:    Lab Results   Component Value Date    APTT 36.5 11/27/2020   [APTT}  PT/INR:    Lab Results   Component Value Date    PROTIME 33.7 11/30/2020    INR 2.87 11/30/2020     HgBA1c:    Lab Results   Component Value Date    LABA1C 4.7 01/21/2020       Assessment   Wse Risk Score: Wes Scale Score: 18    Patient Active Problem List   Diagnosis Code    Tobacco abuse Z72.0    CAD (coronary artery disease) s/p stent in 2010 I25.10    Essential hypertension I10    Hyperlipidemia E78.5    S/P AVR (aortic valve replacement) Z95.2    Hyperglycemia R73.9    Acute on chronic diastolic congestive heart failure ([facility] I50.33    Myocardial infarction ([facility]) I21.9    Obesity, Class I, BMI 30.0-34.9 (see actual BMI) E66.9    S/P thoracotomy Z98.890    Status post partial removal of lung Z90.2    Restrictive lung disease J98.4    Lower gastrointestinal hemorrhage K92.2    Type 2 diabetes mellitus, without long-term current use of insulin ([facility]) E11.9    Bright red blood per rectum K62.5    Sacral wound S31.000A    Generalized weakness R53.1    Atrial fibrillation ([facility]) I48.91    Frequent falls R29.6    Sacral ulcer ([facility]) L98.429    Subtherapeutic international normalized ratio (INR) R79.1    Debility R53.81    CHF (congestive heart failure), NYHA class I, acute on chronic, combined ([facility]) I50.43    Lesion of pancreas K86.9    Acute metabolic encephalopathy J65.50    Anemia D64.9     Left buttock:  Eccymotic, not consistent with deep tissue injury    Left heel:  Chronic, dry, scabbed, unstageable, POA, calloused.       Measurements:  Negative Pressure Wound Therapy Sacrum Mid (Active)   Number of days: 258       Wound 03/16/20 Sacrum with wound vac in place (Active)   Number of days: 258       Wound 03/16/20 Heel Right (Active)   Number of days: 258       Wound 03/16/20 Heel Left unstageable with necrotic tissue (Active)   Number of days: 258                    Plan  Foam dressing in place to heel-continue foam dressing          Specialty Bed Required : N/A   [] Low Air Loss   [] Pressure Redistribution  [] Fluid Immersion  [] Bariatric  [] Total Pressure Relief  [x] Other: offload heel, encourage pt to offload     Current Diet: DIET LOW SODIUM 2 GM; Carb Control: 4 carb choices (60 gms)/meal  Dietician consult:  Yes    Discharge Plan:  Placement for patient upon discharge: TBD   Patient appropriate for Outpatient [address]:

## 2020-11-30 NOTE — PROGRESS NOTES
Shift assessment complete; see flow sheet. Scheduled medications administered; See MAR. IV infusing without difficulty. O2 2.5 LPM nc in place. Pt denies any needs at this time. Call light within reach, bed in low locked position, bed alarm on.

## 2020-11-30 NOTE — PROGRESS NOTES
PHARMACY TO DOSE WARFARIN PER DR. Jacky Cespedes. Pharmacy Note  Warfarin Consult  Dx: AFIB  Goal INR range 2-3   Home Warfarin dose: 4mg daily     Date  INR  Warfarin  11/28               2.51                 2mg  11/29               2.95                  Hold  11/30               2.87                 1mg  Pt started on flagyl and cipro 11/28/20. Recommend  warfarin 1mg tonight x1. Daily INR ordered. Rx will continue to manage therapy per consult order.     Dolores Soriano Pharm D 11/30/20203:19 PM  .

## 2020-11-30 NOTE — PROGRESS NOTES
Hospitalist Progress Note      PCP: Zoya Robert DO    Date of Admission: 11/27/2020    Subjective: more awake and alert today, SOB improved    Medications:  Reviewed    Infusion Medications   Scheduled Medications    iron sucrose (VENOFER) iv piggyback 100 mL (Admin over 60 minutes)  200 mg Intravenous Q24H    insulin lispro  0-12 Units Subcutaneous 4x Daily AC & HS    insulin lispro  0-6 Units Subcutaneous Nightly    ciprofloxacin  400 mg Intravenous Q12H    metroNIDAZOLE  500 mg Intravenous Q8H    warfarin (COUMADIN) daily dosing (placeholder)   Other RX Placeholder    allopurinol  200 mg Oral Daily    atorvastatin  20 mg Oral Nightly    carvedilol  12.5 mg Oral BID WC    citalopram  10 mg Oral QAM    levothyroxine  25 mcg Oral Daily    pantoprazole  40 mg Oral QAM AC    potassium chloride  10 mEq Oral BID WC    sodium chloride flush  10 mL Intravenous 2 times per day    furosemide  40 mg Intravenous BID     PRN Meds: morphine, sodium chloride flush, acetaminophen **OR** acetaminophen, polyethylene glycol, promethazine **OR** ondansetron      Intake/Output Summary (Last 24 hours) at 11/29/2020 1903  Last data filed at 11/29/2020 1814  Gross per 24 hour   Intake 1080 ml   Output 650 ml   Net 430 ml       Physical Exam Performed:    BP (!) 117/53   Pulse 59   Temp 97.1 °F (36.2 °C) (Oral)   Resp 16   Ht 5' 9\" (1.753 m)   Wt 198 lb (89.8 kg)   SpO2 93%   BMI 29.24 kg/m²     General appearance: NAD  Lungs: clear but diminished in bases  Heart: Regular rate and rhythm with Normal S1/S2 without murmurs  Abdomen: Soft, slightly tender RUQ, non-distended without rigidity or guarding and positive bowel sounds   Extremities: No clubbing, cyanosis, or edema bilaterally. Skin: Skin color, texture, turgor normal.  No rashes or lesions. Neurologic: Awake, oriented to self/place/year, neurovascularly intact with sensory/motor intact upper extremities/lower extremities, bilaterally. Cranial nerves: II-XII intact, grossly non-focal.  Mental status: Awake  Capillary Refill: Acceptable  < 3 seconds  Peripheral Pulses: +3 Easily felt, not easily obliterated with pressure    Labs:   Recent Labs     11/27/20  1605 11/29/20  0638   WBC 9.2 7.9   HGB 8.2* 7.1*   HCT 25.9* 22.8*    141     Recent Labs     11/27/20  1605 11/28/20  0707 11/29/20  0638    135* 135*   K 3.9 4.0 4.5   CL 96* 95* 95*   CO2 33* 32 33*   BUN 18 21* 24*   CREATININE 1.3 1.0 0.9   CALCIUM 8.5 8.9 8.8     Recent Labs     11/27/20  1605 11/29/20  0638   AST 39* 24   ALT 20 17   BILIDIR  --  0.6*   BILITOT 1.7* 1.0   ALKPHOS 97 81     Recent Labs     11/27/20  1605 11/28/20  0924 11/29/20  0638   INR 2.77* 2.51* 2.95*     Recent Labs     11/27/20  1605 11/27/20  1900   TROPONINI 0.03* 0.03*       Urinalysis:      Lab Results   Component Value Date    NITRU Negative 11/27/2020    BLOODU Negative 11/27/2020    SPECGRAV 1.020 11/27/2020    GLUCOSEU Negative 11/27/2020       Radiology:  CT CHEST PULMONARY EMBOLISM W CONTRAST   Final Result   1. No findings of pulmonary embolism. 2. Pulmonary edema, bilateral effusions, and mild cardiomegaly, suggesting   congestive heart failure. Reflux of the contrast bolus suggests associated   right heart dysfunction. 3. Loculated moderate left pleural effusion resulting in partial collapse of   basilar portions of the left lung, possibly with superimposed pneumonia or   aspiration. 4. Mild bronchial wall thickening potentially due to pulmonary vascular   congestion, reactive airways disease, or bronchitis. 5. Additional incidental findings as above. CT ABDOMEN PELVIS W IV CONTRAST Additional Contrast? None   Final Result   1. Indeterminate cystic lesion (1.9 cm) at the junction of the pancreatic   body and tail could reflect neoplastic process. Recommend additional   characterization on MRI abdomen attention pancreas without and with   gadolinium.    2. Cholelithiasis

## 2020-11-30 NOTE — PLAN OF CARE
Problem: Falls - Risk of:  Goal: Will remain free from falls  Description: Will remain free from falls  Outcome: Ongoing     Problem: Skin Integrity:  Goal: Will show no infection signs and symptoms  Description: Will show no infection signs and symptoms  Outcome: Ongoing     Problem: OXYGENATION/RESPIRATORY FUNCTION  Goal: Patient will achieve/maintain normal respiratory rate/effort  Description: Respiratory rate and effort will be within normal limits for the patient  Outcome: Ongoing

## 2020-11-30 NOTE — PROGRESS NOTES
Handoff report and transfer of care given to [facility]. Pt in stable condition. Call light within reach. Bed locked in lowest position. Denies further needs at this time.

## 2020-11-30 NOTE — PROGRESS NOTES
73 yo male who is a poor historian, with history of CAD s/p PCI, CABG, DM, HTN, s/p hemothorax/emergent AVR repair/aortopulmonary fistula closure in 1/2020, paravalvular leak, COPD, atrial fibrillation, dual chamber pacemaker, admitted w SOB and ?cholecystitis. CT of the abdomen demonstrated a pancreatic cyst measuring 16x19 mm. He also has Fe def anemia H/H 7/23, Fe sat 7%, but is heme-neg. Is s/p colonoscopy in 2/2020 w 6 polyps and fair prep. Plan:   1. MRI for further evaluation  2. Agree w Venofer  3. Daily H/H  4. Will need an EGD at some point but no urgency  5. Is on Abx's for a presumed calculous cholecystitis (consider Surgery consult)  6.  Will follow    Kalli Bello MD       (O) [phone]

## 2020-11-30 NOTE — PROGRESS NOTES
Awake, in bed. Pt had o2 off. o2 sat was 86%. Replaced o2 to 2L. O2 sat up to 96%. Pt is somewhat confused to current event. Reoriented to situation and general plan of care. Bed alarm on, call light within reach. Will continue to monitor.

## 2020-12-01 ENCOUNTER — APPOINTMENT (OUTPATIENT)
Dept: GENERAL RADIOLOGY | Age: 70
DRG: 291 | End: 2020-12-01
Payer: MEDICARE

## 2020-12-01 LAB
ANION GAP SERPL CALCULATED.3IONS-SCNC: 6 MMOL/L (ref 3–16)
BASOPHILS ABSOLUTE: 0 K/UL (ref 0–0.2)
BASOPHILS RELATIVE PERCENT: 0.1 %
BUN BLDV-MCNC: 30 MG/DL (ref 7–20)
CALCIUM SERPL-MCNC: 8.7 MG/DL (ref 8.3–10.6)
CHLORIDE BLD-SCNC: 95 MMOL/L (ref 99–110)
CO2: 34 MMOL/L (ref 21–32)
CREAT SERPL-MCNC: 1 MG/DL (ref 0.8–1.3)
EOSINOPHILS ABSOLUTE: 0 K/UL (ref 0–0.6)
EOSINOPHILS RELATIVE PERCENT: 0 %
GFR AFRICAN AMERICAN: >60
GFR NON-AFRICAN AMERICAN: >60
GLUCOSE BLD-MCNC: 109 MG/DL (ref 70–99)
GLUCOSE BLD-MCNC: 116 MG/DL (ref 70–99)
GLUCOSE BLD-MCNC: 129 MG/DL (ref 70–99)
GLUCOSE BLD-MCNC: 149 MG/DL (ref 70–99)
GLUCOSE BLD-MCNC: 184 MG/DL (ref 70–99)
HCT VFR BLD CALC: 25.5 % (ref 40.5–52.5)
HCT VFR BLD CALC: 27.1 % (ref 40.5–52.5)
HEMOGLOBIN: 7.9 G/DL (ref 13.5–17.5)
HEMOGLOBIN: 8.1 G/DL (ref 13.5–17.5)
INR BLD: 3.5 (ref 0.86–1.14)
LYMPHOCYTES ABSOLUTE: 0.3 K/UL (ref 1–5.1)
LYMPHOCYTES RELATIVE PERCENT: 3.3 %
MAGNESIUM: 2.1 MG/DL (ref 1.8–2.4)
MCH RBC QN AUTO: 26.4 PG (ref 26–34)
MCHC RBC AUTO-ENTMCNC: 30.9 G/DL (ref 31–36)
MCV RBC AUTO: 85.5 FL (ref 80–100)
MONOCYTES ABSOLUTE: 0.7 K/UL (ref 0–1.3)
MONOCYTES RELATIVE PERCENT: 7.9 %
NEUTROPHILS ABSOLUTE: 8.4 K/UL (ref 1.7–7.7)
NEUTROPHILS RELATIVE PERCENT: 88.7 %
PDW BLD-RTO: 21 % (ref 12.4–15.4)
PERFORMED ON: ABNORMAL
PLATELET # BLD: 111 K/UL (ref 135–450)
PMV BLD AUTO: 7.9 FL (ref 5–10.5)
POTASSIUM SERPL-SCNC: 4.7 MMOL/L (ref 3.5–5.1)
PROTHROMBIN TIME: 41.1 SEC (ref 10–13.2)
RBC # BLD: 2.98 M/UL (ref 4.2–5.9)
SODIUM BLD-SCNC: 135 MMOL/L (ref 136–145)
WBC # BLD: 9.4 K/UL (ref 4–11)

## 2020-12-01 PROCEDURE — 97535 SELF CARE MNGMENT TRAINING: CPT

## 2020-12-01 PROCEDURE — 2060000000 HC ICU INTERMEDIATE R&B

## 2020-12-01 PROCEDURE — 85014 HEMATOCRIT: CPT

## 2020-12-01 PROCEDURE — 6360000002 HC RX W HCPCS: Performed by: INTERNAL MEDICINE

## 2020-12-01 PROCEDURE — 85610 PROTHROMBIN TIME: CPT

## 2020-12-01 PROCEDURE — 94761 N-INVAS EAR/PLS OXIMETRY MLT: CPT

## 2020-12-01 PROCEDURE — 85025 COMPLETE CBC W/AUTO DIFF WBC: CPT

## 2020-12-01 PROCEDURE — 2500000003 HC RX 250 WO HCPCS: Performed by: INTERNAL MEDICINE

## 2020-12-01 PROCEDURE — 71045 X-RAY EXAM CHEST 1 VIEW: CPT

## 2020-12-01 PROCEDURE — 2580000003 HC RX 258: Performed by: INTERNAL MEDICINE

## 2020-12-01 PROCEDURE — 97116 GAIT TRAINING THERAPY: CPT

## 2020-12-01 PROCEDURE — 2700000000 HC OXYGEN THERAPY PER DAY

## 2020-12-01 PROCEDURE — 80048 BASIC METABOLIC PNL TOTAL CA: CPT

## 2020-12-01 PROCEDURE — 85018 HEMOGLOBIN: CPT

## 2020-12-01 PROCEDURE — 36415 COLL VENOUS BLD VENIPUNCTURE: CPT

## 2020-12-01 PROCEDURE — 97165 OT EVAL LOW COMPLEX 30 MIN: CPT

## 2020-12-01 PROCEDURE — 99232 SBSQ HOSP IP/OBS MODERATE 35: CPT | Performed by: INTERNAL MEDICINE

## 2020-12-01 PROCEDURE — 99222 1ST HOSP IP/OBS MODERATE 55: CPT | Performed by: SURGERY

## 2020-12-01 PROCEDURE — 83735 ASSAY OF MAGNESIUM: CPT

## 2020-12-01 PROCEDURE — 6370000000 HC RX 637 (ALT 250 FOR IP): Performed by: INTERNAL MEDICINE

## 2020-12-01 PROCEDURE — 97530 THERAPEUTIC ACTIVITIES: CPT

## 2020-12-01 PROCEDURE — 97110 THERAPEUTIC EXERCISES: CPT

## 2020-12-01 PROCEDURE — 97162 PT EVAL MOD COMPLEX 30 MIN: CPT

## 2020-12-01 RX ORDER — LEVOFLOXACIN 500 MG/1
500 TABLET, FILM COATED ORAL DAILY
Qty: 5 TABLET | Refills: 0
Start: 2020-12-01 | End: 2020-12-06

## 2020-12-01 RX ADMIN — FUROSEMIDE 40 MG: 10 INJECTION, SOLUTION INTRAMUSCULAR; INTRAVENOUS at 18:59

## 2020-12-01 RX ADMIN — INSULIN LISPRO 2 UNITS: 100 INJECTION, SOLUTION INTRAVENOUS; SUBCUTANEOUS at 11:41

## 2020-12-01 RX ADMIN — POTASSIUM CHLORIDE 10 MEQ: 1500 TABLET, EXTENDED RELEASE ORAL at 18:58

## 2020-12-01 RX ADMIN — FUROSEMIDE 40 MG: 10 INJECTION, SOLUTION INTRAMUSCULAR; INTRAVENOUS at 08:29

## 2020-12-01 RX ADMIN — CIPROFLOXACIN 400 MG: 2 INJECTION, SOLUTION INTRAVENOUS at 03:57

## 2020-12-01 RX ADMIN — ATORVASTATIN CALCIUM 20 MG: 10 TABLET, FILM COATED ORAL at 20:49

## 2020-12-01 RX ADMIN — METRONIDAZOLE 500 MG: 500 INJECTION, SOLUTION INTRAVENOUS at 15:05

## 2020-12-01 RX ADMIN — INSULIN LISPRO 1 UNITS: 100 INJECTION, SOLUTION INTRAVENOUS; SUBCUTANEOUS at 20:51

## 2020-12-01 RX ADMIN — PANTOPRAZOLE SODIUM 40 MG: 40 TABLET, DELAYED RELEASE ORAL at 06:09

## 2020-12-01 RX ADMIN — CARVEDILOL 12.5 MG: 6.25 TABLET, FILM COATED ORAL at 08:30

## 2020-12-01 RX ADMIN — ACETAMINOPHEN 650 MG: 325 TABLET ORAL at 20:56

## 2020-12-01 RX ADMIN — CITALOPRAM HYDROBROMIDE 10 MG: 20 TABLET ORAL at 08:30

## 2020-12-01 RX ADMIN — METRONIDAZOLE 500 MG: 500 INJECTION, SOLUTION INTRAVENOUS at 22:17

## 2020-12-01 RX ADMIN — LEVOTHYROXINE SODIUM 25 MCG: 25 TABLET ORAL at 06:09

## 2020-12-01 RX ADMIN — Medication 10 ML: at 08:31

## 2020-12-01 RX ADMIN — CARVEDILOL 12.5 MG: 6.25 TABLET, FILM COATED ORAL at 18:58

## 2020-12-01 RX ADMIN — CIPROFLOXACIN 400 MG: 2 INJECTION, SOLUTION INTRAVENOUS at 15:05

## 2020-12-01 RX ADMIN — ALLOPURINOL 200 MG: 100 TABLET ORAL at 08:29

## 2020-12-01 RX ADMIN — METRONIDAZOLE 500 MG: 500 INJECTION, SOLUTION INTRAVENOUS at 06:09

## 2020-12-01 RX ADMIN — POTASSIUM CHLORIDE 10 MEQ: 1500 TABLET, EXTENDED RELEASE ORAL at 08:29

## 2020-12-01 RX ADMIN — Medication 10 ML: at 20:49

## 2020-12-01 ASSESSMENT — PAIN DESCRIPTION - ONSET: ONSET: ON-GOING

## 2020-12-01 ASSESSMENT — PAIN DESCRIPTION - DESCRIPTORS: DESCRIPTORS: SORE

## 2020-12-01 ASSESSMENT — PAIN DESCRIPTION - PAIN TYPE: TYPE: ACUTE PAIN

## 2020-12-01 ASSESSMENT — PAIN DESCRIPTION - LOCATION: LOCATION: BACK

## 2020-12-01 ASSESSMENT — PAIN DESCRIPTION - ORIENTATION: ORIENTATION: LOWER

## 2020-12-01 ASSESSMENT — PAIN SCALES - GENERAL
PAINLEVEL_OUTOF10: 7
PAINLEVEL_OUTOF10: 5

## 2020-12-01 ASSESSMENT — PAIN DESCRIPTION - PROGRESSION: CLINICAL_PROGRESSION: GRADUALLY WORSENING

## 2020-12-01 ASSESSMENT — PAIN - FUNCTIONAL ASSESSMENT: PAIN_FUNCTIONAL_ASSESSMENT: PREVENTS OR INTERFERES SOME ACTIVE ACTIVITIES AND ADLS

## 2020-12-01 ASSESSMENT — PAIN DESCRIPTION - FREQUENCY: FREQUENCY: INTERMITTENT

## 2020-12-01 NOTE — PROGRESS NOTES
71 yo male who is a poor historian, with history of CAD s/p PCI, CABG, DM, HTN, s/p hemothorax/emergent AVR repair/aortopulmonary fistula closure in 1/2020, paravalvular leak, COPD, atrial fibrillation, dual chamber pacemaker, admitted w SOB and ?cholecystitis. CT of the abdomen demonstrated a pancreatic cyst measuring 16x19 mm. He also has Fe def anemia H/H 7/23, Fe sat 7%, but is heme-neg. Is s/p colonoscopy in 2/2020 w 6 polyps and fair prep.     Plan:   1. MRI for further evaluation could not be done at [facility] due to pacemaker. Radiology reportedly stated it could be done at [address] [place] [address] per nurse. Will either need an outpatient MRI at [address] [place] [address] or an outpatient EUS  2. Agree w Venofer  3. Daily H/H  4. Will need an EGD at some point but no urgency (can be done as outpatient as well)  5. Is on Abx's for a presumed calculous cholecystitis (consider Surgery consult)  6.  Will follow  Brenden Kahn MD       (O) [phone]

## 2020-12-01 NOTE — PROGRESS NOTES
PHARMACY TO DOSE WARFARIN PER DR. Rosalio Calvo. Pharmacy Note  Warfarin Consult  Dx: AFIB  Goal INR range 2-3   Home Warfarin dose: 4mg daily     Date  INR  Warfarin  11/28               2.51                 2mg  11/29               2.95                  Hold  11/30               2.87                 1mg  12/1                 3.50                 hold  Pt started on flagyl and cipro 11/28/20. Recommend  holding warfarin tonight x1. Daily INR ordered. Rx will continue to manage therapy per consult order.   Judie Hart D 12/1/202012:50 PM  .        Brian MCCLENDON 11/30/20203:19 PM  .

## 2020-12-01 NOTE — PROGRESS NOTES
Shift assessment complete. See doc flow. Nightly medications given see MAR. Patient with no complaints at this time. Call light and bedside table within easy reach. Will continue to monitor.

## 2020-12-01 NOTE — PROGRESS NOTES
AM assessment completed, see flow sheet. Pt is alert and oriented to self, situation, and place. Vital signs are WNL. Respirations are even & easy on 2 L/NC/O2. Pt is SOB with activity up to chair. Pt desaturates on RA down to 79% per MD assessment this AM. No complaints voiced. Pt denies needs at this time. SR up x 2, and bed in low position. Call light is within reach.

## 2020-12-01 NOTE — PROGRESS NOTES
Physician Progress Note      Rachelle Morrow  [facility] #:                  [id number]  :                       1950  ADMIT DATE:       2020 3:42 PM  [address] [place] DATE:  RESPONDING  PROVIDER #:        Tamia Jain MD          QUERY TEXT:    Dear Attending Provider,  Pt admitted with acute on chronic diastolic CHF. Pt noted to also have   hypertension and CAD. If possible, please document in progress notes and   discharge summary the etiology of CHF, if able to be determined. The medical record reflects the following:  Risk Factors: advanced age, chf, cad, htn,  S/P AVR (aortic valve replacement  Clinical Indicators: Left ventricular systolic function is difficult to   estimate but appears to ?be within normal limits with an estimated ejection   fraction of 50-55%. Treatment: Cardiology seeing patient. On IV Lasix. Monitor I's and O's    Thank you for your assistance,  Delmy Blanco RN,BSN,CCDS,CRCR  Options provided:  -- CHF due to Hypertensive Heart Disease  -- CHF due to Hypertensive Heart Disease and CAD  -- CHF not due to Hypertension but due to CAD  -- CHF due to Hypertensive Heart Disease and Valvular Heart Disease  -- CHF not due to Hypertension but due to valvular heart disease  -- Other - I will add my own diagnosis  -- Disagree - Not applicable / Not valid  -- Disagree - Clinically unable to determine / Unknown  -- Refer to Clinical Documentation Reviewer    PROVIDER RESPONSE TEXT:    This patient has CHF due to hypertensive heart disease and CAD.     Query created by: Melissa Hammonds on 2020 3:12 PM      Electronically signed by:  Tamia Jain MD 2020 7:01 AM

## 2020-12-01 NOTE — PROGRESS NOTES
Inpatient Occupational Therapy  Evaluation and Treatment    Unit: [address] [place]  Date:  12/1/2020  Patient Name:    Janette Piedra  Admitting diagnosis:  CHF (congestive heart failure), NYHA class I, acute on chronic, combined ([facility] [I50.43]  Admit Date:  11/27/2020  Precautions/Restrictions/WB Status/ Lines/ Wounds/ Oxygen: fall risk, IV, bed/chair alarm, supplemental O2 (2L) and confusion    Treatment Time:  10:08-10:42  Treatment Number: 1     Billable Treatment Time: 24 minutes   Total Treatment Time:   34   minutes    Patient Goals for Therapy:  \" to go to rest home \"      Discharge Recommendations: SNF  DME needs for discharge: defer to facility       Therapy recommendations for staff:   Assist of 2 with use of rolling walker (RW) and gait belt for all ambulation to/from bathroom    History of Present Illness: 71 yo male who is a poor historian, with history of CAD s/p PCI, CABG, DM, HTN, s/p hemothorax/emergent AVR repair/aortopulmonary fistula closure in 1/2020, paravalvular leak, COPD, atrial fibrillation, dual chamber pacemaker, admitted w SOB and ?cholecystitis. CT of the abdomen demonstrated a pancreatic cyst measuring 16x19 mm. Home Health S4 Level Recommendation:  NA  AM-PAC Score: AM-PAC Inpatient Daily Activity Raw Score: 15    Preadmission Environment    Pt. Lives Alone. 24/7 assist available  Home environment:  mobile home/trailer  Steps to enter first floor:   1 steps to enter and hand rail unilateral    Bathroom:  Tub/Shower unit, Grab bars, Shower Chair  and Raised toilet seat w/ arms  Equipment owned:  Deisi Hoyt (SHAYY), Rollator  and manual W/C     Preadmission Status / PLOF:  History of falls   Yes (~1 week ago. LOB)   Pt. Able to drive   Yes  Pt Fully independent with ADL's  Yes  Pt. Required assistance from family for: Independent PTA    Pt.  Fully independent for transfers and gait and walked with: Rollator    Pain  No  Rating:NA  Location:  Pain Medicine Status: Denies need      Cognition A&O Person and Place. Disoriented to date and situation. Able to follow 1 step commands. Patient requires increased time to process questions and commands. Reports increased fatigue throughout. Subjective  Patient lying supine in bed with no family present. Pt agreeable to this OT eval & tx. Upper Extremity ROM:    WFL,  pt able to perform all bed mobility, transfers, and gait without ROM limitation. Upper Extremity Strength:    BUE strength impaired but not formally assessed w/ MMT    Upper Extremity Sensation    WFL    Upper Extremity Proprioception:  Impaired    Coordination and Tone  Impaired    Balance  Functional Sitting Balance:  WFL  Functional Standing Balance:Impaired    Bed mobility:    Supine to sit:   Not Tested  Sit to supine:   Not Tested  Rolling:    Not Tested  Scooting in sitting:  SBA  Scooting to head of bed:   Not Tested    Bridging:   Not Tested    Transfers:    Sit to stand:  Min A  Stand to sit:  Min A  Bed to chair:   Mod A with RW  Standard toilet: Mod A with RW  Bed to [facility]:  Not Tested    Dressing:      UE:   Not Tested  LE:    Min A to don briefs     Bathing:    UE:  Not Tested  LE:  Not Tested    Eating:   Not Tested    Toileting:  Min A    Activity Tolerance   Pt completed therapy session with SOB noted with mobility. SpO2: dropped to 84% after functional mobility to and from bathroom. Recovered in 60 seconds. HR: 40-50s  BP:     Positioning Needs:   Reclined in chair with call light and needs in reach. Alarm Set    Exercise / Activities Initiated:   N/A    Patient/Family Education:   Role of OT  Recommendations for DC  Safe RW use/hand placement    Assessment of Deficits: Pt seen for Occupational therapy evaluation in acute care setting. Pt demonstrated decreased Activity tolerance, ADLs, IADLs, Balance , Bathing, Bed mobility, Dressing, ROM, Safety Awareness, Strength, Transfers, Cognition and Coping Skills.  Pt functioning below baseline and will likely benefit from skilled occupational therapy services to maximize safety and independence. Goal(s) : To be met in 3 Visits:  1). Bed to toilet/BSC: CGA    To be met in 5 Visits:  1). Supine to/from Sit:  SBA  2). Upper Body Bathing:   SBA  3). Lower Body Bathing:   Min A  4). Upper Body Dressing:  SBA  5). Lower Body Dressing:  Min A  6). Pt to demonstrate UE exs x 15 reps with minimal cues    Rehabilitation Potential:  Fair for goals listed above. Strengths for achieving goals include: Pt motivated, PLOF and Pt cooperative  Barriers to achieving goals include:  Complexity of condition     Plan: To be seen 3-5 x/wk while in acute care setting for therapeutic exercises, bed mobility, transfers, dressing, bathing, family/patient education, ADL/IADL retraining, energy conservation training.        Geneva Starks, OTR/L #[id number]        If patient discharges from this facility prior to next visit, this note will serve as the Discharge Summary

## 2020-12-01 NOTE — PROGRESS NOTES
Units Subcutaneous Nightly    ciprofloxacin  400 mg Intravenous Q12H    metroNIDAZOLE  500 mg Intravenous Q8H    warfarin (COUMADIN) daily dosing (placeholder)   Other RX Placeholder    allopurinol  200 mg Oral Daily    atorvastatin  20 mg Oral Nightly    carvedilol  12.5 mg Oral BID WC    citalopram  10 mg Oral QAM    levothyroxine  25 mcg Oral Daily    pantoprazole  40 mg Oral QAM AC    potassium chloride  10 mEq Oral BID WC    sodium chloride flush  10 mL Intravenous 2 times per day    furosemide  40 mg Intravenous BID       Continuous Infusions:      PRN Meds:  morphine, sodium chloride flush, acetaminophen **OR** acetaminophen, polyethylene glycol, promethazine **OR** ondansetron      Data:  CBC:   Recent Labs     11/29/20  0638  11/30/20  0618 11/30/20  1434 12/01/20  0210 12/01/20  1353   WBC 7.9  --  9.0  --  9.4  --    HGB 7.1*   < > 7.9* 8.3* 7.9* 8.1*   HCT 22.8*   < > 25.6* 27.9* 25.5* 27.1*   MCV 84.2  --  86.1  --  85.5  --      --  137  --  111*  --     < > = values in this interval not displayed. BMP:   Recent Labs     11/29/20 0638 11/30/20 0618 12/01/20  0210   * 136 135*   K 4.5 4.3 4.7   CL 95* 94* 95*   CO2 33* 35* 34*   BUN 24* 23* 30*   CREATININE 0.9 0.8 1.0     LIVER PROFILE:   Recent Labs     11/29/20 0638   AST 24   ALT 17   BILIDIR 0.6*   BILITOT 1.0   ALKPHOS 81     PT/INR:   Recent Labs     11/29/20  0638 11/30/20  0618 12/01/20  0210   PROTIME 34.6* 33.7* 41.1*   INR 2.95* 2.87* 3.50*     Cultures:   Results for Yuli Tobar (MRN [id number]) as of 11/30/2020 07:06   Ref. Range 11/27/2020 16:04   SARS-CoV-2, NAAT Latest Ref Range: Not Detected  Not Detected       XR CHEST 1 VIEW   Final Result   In this patient with CHF, relatively stable pattern of perihilar and left   basilar airspace change. This may represent edema. Superimposed pneumonitis   can not be excluded in the left lower lobe.          CT CHEST PULMONARY EMBOLISM W CONTRAST   Final Result

## 2020-12-01 NOTE — PROGRESS NOTES
Signed              Progress Note  Date:2020       Room:0220/0220-01  Patient Name:Saúl Purcell     YOB: 1950     Age:70 y.o.           Subjective     Subjective:  Symptoms:  Stable. Pain:  He reports no pain. Review of Systems  Objective          Vitals Last 24 Hours:  TEMPERATURE:  Temp  Av.8 °F (37.1 °C)  Min: 98.4 °F (36.9 °C)  Max: 99.9 °F (37.7 °C)  RESPIRATIONS RANGE: Resp  Av.2  Min: 17  Max: 20  PULSE OXIMETRY RANGE: SpO2  Av.3 %  Min: 86 %  Max: 96 %  PULSE RANGE: Pulse  Av  Min: 65  Max: 84  BLOOD PRESSURE RANGE: Systolic (74ENN), XGD:514 , Min:118 , KXA:294   ; Diastolic (73WNK), TLW:44, Min:58, Max:65     I/O (24Hr):     Intake/Output Summary (Last 24 hours) at 2020 8736  Last data filed at 2020 0745      Gross per 24 hour   Intake 240 ml   Output --   Net 240 ml      Objective:  General Appearance:  Not in pain. Vital signs: (most recent): Blood pressure 127/65, pulse 81, temperature 98.4 °F (36.9 °C), temperature source Oral, resp. rate 17, height 5' 9\" (1.753 m), weight 197 lb 14.4 oz (89.8 kg), SpO2 91 %. Abdomen: Abdomen is soft.   Bowel sounds are normal.   There is no abdominal tenderness.        Labs/Imaging/Diagnostics     Labs:  CBC:          Recent Labs     20  0638   20  0618 20  1434 20  0210   WBC 7.9  --  9.0  --  9.4   RBC 2.70*  --  2.98*  --  2.98*   HGB 7.1*   < > 7.9* 8.3* 7.9*   HCT 22.8*   < > 25.6* 27.9* 25.5*   MCV 84.2  --  86.1  --  85.5   RDW 21.2*  --  20.8*  --  21.0*     --  137  --  111*    < > = values in this interval not displayed.      CHEMISTRIES:        Recent Labs     20  0638 20  0618 20  0210   * 136 135*   K 4.5 4.3 4.7   CL 95* 94* 95*   CO2 33* 35* 34*   BUN 24* 23* 30*   CREATININE 0.9 0.8 1.0   GLUCOSE 146* 122* 116*   MG 2.00 2.10 2.10      PT/INR:        Recent Labs     20  0638 20  0618 20  0210   PROTIME 34.6* 33.7* 41.1*

## 2020-12-01 NOTE — CONSULTS
Surgery Consult Note     Bud Alvarado  Pt Name: Randi Betancourt  MRN: [id number]  YOB: 1950  Date of evaluation: 12/1/2020  Primary Care Physician: Kodi Wen DO  Patient evaluated at the request of  Dr. Thong Daily  Reason for evaluation: Acute cholecystitis   IMPRESSIONS:   1. CT abd and pelvis 11/27: cystic lesion at the junction of pancreatic tail and body, Gallstones with mild wall thickening and pericholi fluid, moderate ascites noted  2. CT chest: pulmonary edema, bilateral effusions, loculated left pleural effusion ? For superimposed PNA or aspiration  3. ABD: soft, + distention, + mild diffuse tenderness, no V, + BM  4. Pt is awake but, falls asleep during examination. 5. VSS  6. S/P left thoracentesis at [address] [place] [address] on 11/23/20  7. S/P Ablation on 11/10/20  8. S/P bioprosthetic AV January 2020   9. Pt is alert to name but, not place or situation. 10. does not have any pertinent problems on file. PLANS:   1. Pt getting Cipro Flagyl: may want to broaden antibiotics given question of PNA/Aspiration PNA  2. Hard to tell if perichole fluid vs ascites: either way would plan to treat patient's possible acute cholecystitis with antibiotics. Pt is a poor surgical candidate. SUBJECTIVE:   History of Chief Complaint:  Pt is poor historian so this if from a chart review. Randi Betancourt is a 72 y.o. male who presented to the Er with mental status changes from [facility]. Which started on 11/26. In the Er, the patient states he was \"short of breath. \" He was noted to have an O2 sat in the 80s and places on O2. Per the ER MD examination, the patient was tender over his abdomen. A CT was performed. There was question of a pancreatic mass. GI was consulted and plan for MRI at some point to further evaluate. On the CT, there were findings suggestive of acute cholecystitis. We were consulted to evaluate this patient for acute cholecystitis.       Past Medical History   has a past medical history of AS (aortic stenosis), CAD (coronary artery disease), CHF (congestive heart failure) ([facility] COPD (chronic obstructive pulmonary disease) ([facility] Diabetes mellitus [facility] Gout, Hemothorax, left, Hyperlipidemia, Hypertension, Obesity, Class I, BMI 30.0-34.9 (see actual BMI), S/P thoracotomy, Status post partial removal of lung, and Thyroid disease. Past Surgical History   has a past surgical history that includes thoracotomy (Left, 1/2/2020); bronchoscopy (N/A, 1/9/2020); Coronary angioplasty with stent (12/07/2010); pr ascend aorta graft w root replacment. valve conduit/coron reconstr (N/A, 1/21/2020); transesophageal echocardiogram (01/21/2020); Cardiac catheterization (01/13/2020); chest tube insertion (Left, 01/02/2020); Aortic valve replacement (12/27/2011); transesophageal echocardiogram (12/07/2010); Coronary artery bypass graft (2001); bronchoscopy (N/A, 1/24/2020); bronchoscopy (1/24/2020); sigmoidoscopy (N/A, 2/1/2020); Colonoscopy (02/10/2020); Colonoscopy (N/A, 2/10/2020); and Pilonidal cyst excision (N/A, 2/11/2020). Medications  Prior to Admission medications    Medication Sig Start Date End Date Taking?  Authorizing Provider   levoFLOXacin (LEVAQUIN) 500 MG tablet Take 1 tablet by mouth daily for 5 days 12/1/20 12/6/20 Yes Naveen Holliday MD   aspirin 81 MG chewable tablet Take 81 mg by mouth daily   Yes Historical Provider, MD   tamsulosin (FLOMAX) 0.4 MG capsule Take 0.4 mg by mouth daily   Yes Historical Provider, MD   warfarin (COUMADIN) 2 MG tablet Take 2 tablets by mouth daily 5/29/20  Yes Zo Marley MD   carvedilol (COREG) 12.5 MG tablet Take 1 tablet by mouth 2 times daily (with meals) 5/29/20 11/27/20 Yes Zo Marley MD   furosemide (LASIX) 40 MG tablet Take 1 tablet by mouth 2 times daily 5/29/20  Yes Arvell Rear., MD   rOPINIRole (REQUIP) 1 MG tablet Take 1 mg by mouth twice a week   Yes Historical Provider, MD   citalopram (CELEXA) 10 MG tablet Take 1 tablet by mouth every morning 3/16/20 11/27/20 Yes Nuria Rain MD   levothyroxine (SYNTHROID) 25 MCG tablet Take 1 tablet by mouth Daily 3/16/20 11/27/20 Yes Nuria Rain MD   pantoprazole (PROTONIX) 40 MG tablet Take 1 tablet by mouth every morning (before breakfast) 3/16/20 11/27/20 Yes Nuria Rain MD   allopurinol (ZYLOPRIM) 300 MG tablet Take 1 tablet by mouth daily 3/16/20 11/27/20 Yes Nuria Rain MD   atorvastatin (LIPITOR) 20 MG tablet Take 1 tablet by mouth nightly 5/29/20 6/28/20  SCOTT Coffey MD   potassium chloride (MICRO-K) 10 MEQ extended release capsule Take 1 capsule by mouth 2 times daily 5/29/20   SCOTT Coffey MD   meclizine (ANTIVERT) 25 MG tablet Take 25 mg by mouth 3 times daily as needed    Historical Provider, MD   glipiZIDE (GLUCOTROL) 5 MG tablet Take 1 tablet by mouth daily 3/16/20 4/15/20  Nuria Rain MD    Scheduled Meds:   influenza virus vaccine  0.5 mL Intramuscular Prior to discharge    insulin lispro  0-12 Units Subcutaneous 4x Daily AC & HS    insulin lispro  0-6 Units Subcutaneous Nightly    ciprofloxacin  400 mg Intravenous Q12H    metroNIDAZOLE  500 mg Intravenous Q8H    warfarin (COUMADIN) daily dosing (placeholder)   Other RX Placeholder    allopurinol  200 mg Oral Daily    atorvastatin  20 mg Oral Nightly    carvedilol  12.5 mg Oral BID WC    citalopram  10 mg Oral QAM    levothyroxine  25 mcg Oral Daily    pantoprazole  40 mg Oral QAM AC    potassium chloride  10 mEq Oral BID WC    sodium chloride flush  10 mL Intravenous 2 times per day    furosemide  40 mg Intravenous BID     Continuous Infusions:  PRN Meds:.morphine, sodium chloride flush, acetaminophen **OR** acetaminophen, polyethylene glycol, promethazine **OR** ondansetron    Allergies  is allergic to lisinopril; metformin and related; penicillins; pravastatin; trilipix [choline fenofibrate]; and vitamin d analogs.     Family History  family history is not on CBC with Differential:    Lab Results   Component Value Date    WBC 9.4 12/01/2020    RBC 2.98 12/01/2020    HGB 8.1 12/01/2020    HCT 27.1 12/01/2020     12/01/2020    MCV 85.5 12/01/2020    MCH 26.4 12/01/2020    MCHC 30.9 12/01/2020    RDW 21.0 12/01/2020    NRBC 1 03/23/2020    NRBC 1 03/23/2020    BANDSPCT 25 03/27/2020    METASPCT 2 03/27/2020    LYMPHOPCT 3.3 12/01/2020    MONOPCT 7.9 12/01/2020    MYELOPCT 3 03/27/2020    BASOPCT 0.1 12/01/2020    MONOSABS 0.7 12/01/2020    LYMPHSABS 0.3 12/01/2020    EOSABS 0.0 12/01/2020    BASOSABS 0.0 12/01/2020     CMP:    Lab Results   Component Value Date     12/01/2020    K 4.7 12/01/2020    K 4.2 03/30/2020    CL 95 12/01/2020    CO2 34 12/01/2020    BUN 30 12/01/2020    CREATININE 1.0 12/01/2020    GFRAA >60 12/01/2020    AGRATIO 1.6 11/27/2020    LABGLOM >60 12/01/2020    GLUCOSE 116 12/01/2020    PROT 6.2 11/29/2020    LABALBU 3.3 11/29/2020    CALCIUM 8.7 12/01/2020    BILITOT 1.0 11/29/2020    ALKPHOS 81 11/29/2020    AST 24 11/29/2020    ALT 17 11/29/2020       RADIOLOGY:   I have personally reviewed the following films:    CT scan abd/pelvis: See radiology report  Thank you for the interesting evaluation. Further recommendations to follow.       Electronically signed by MIKO Huang CNP on 12/1/2020 at 3:43 PM

## 2020-12-01 NOTE — PROGRESS NOTES
Inpatient Physical Therapy Evaluation and Treatment    Unit: 2 [address]  Date:  12/1/2020  Patient Name:    Jensen Tran  Admitting diagnosis:  CHF (congestive heart failure), NYHA class I, acute on chronic, combined ([facility] [I50.43]  Admit Date:  11/27/2020  Precautions/Restrictions/WB Status/ Lines/ Wounds/ Oxygen: Fall risk, Bed/chair alarm, Lines -IV and Supplemental O2 (3), Confusion, Telemetry and Continuous pulse oximetry  Pacemaker    Treatment Time:  1008 - 1041  Treatment Number:  1   Timed Code Treatment Minutes: 23 minutes  Total Treatment Minutes:  33  minutes    Patient Goals for Therapy: Unable to state goals. Discharge Recommendations: SNF  DME needs for discharge: defer to facility       Therapy recommendation for EMS Transport: can transport by wheelchair    Therapy recommendations for staff:   Assist of 2 one person for stabilizing the patient within walker and other patient to manage lines with use of rolling walker (RW) and gait belt for all transfers and ambulation to/from chair  to/from bathroom. History of Present Illness: The patient is a 72 y.o. male who presents to [address] [place] [address] with AMS. Pt pleasantly confused, hence history obtained from daughter on phone. Pt apparently has h/o a fib and recently underwent cardiac ablation and was dced to [facility], but pt was found to be lethargic and confused and hence sent to the ER. Per daughter pt has been having CHF and needed diuresis and thoracentesis in the past. Started on IV lasix and admitted for further workup of [address] [place] [address] S4 Level Recommendation:  Level 3 Safety  AM-PAC Mobility Score    AM-PAC Inpatient Mobility Raw Score : 16     Preadmission Environment  During current admission patient came from [address] [place] [address] at [facility] as per 's note dated 11/28/2020  Pt. Lives Alone.  24/7 assist available  Home environment:    mobile home/trailer  Steps to enter first floor:   1 steps to enter and hand rail needed to improve safety awareness and to maintain the COG within VANIA. Stair Training deferred, pt unsafe/ not appropriate to complete stairs at this time    Activity Tolerance   Pt completed therapy session with SOB noted with activity  Spo2 = 94% at rest with 3L/min of O2, with ambulation to bathroom and back to chair SpO2 dropped to 84% and needed pursed lip breathing exercises with consistent cueing to improve her SpO2 to 92% within 5 min. HR = 43 - 62 BPM    Positioning Needs   Pt reclined in chair, alarm set, positioned in proper neutral alignment and pressure relief provided. Call light provided and all needs within reach    Exercises Initiated  all completed bilaterally unless indicated  Ankle Pumps x 10 reps  LAQ x 10 reps  Pursed lip breathing exercises 3-5 reps after each activity    Other  Patient used toilet for urination and needed 2 person assist for hygiene and 1 person assist for changing depends. Patient/Family Education   Pt educated on role of inpatient PT, POC, importance of continued activity, DC recommendations, safety awareness, transfer techniques, pursed lip breathing, energy conservation, pacing activity and calling for assist with mobility. Assessment  Pt seen for Physical Therapy evaluation in acute care setting. Pt demonstrated decreased Activity tolerance, Balance, Safety and Strength as well as decreased independence with Ambulation, Bed Mobility  and Transfers. Recommending SNF upon discharge as patient functioning well below baseline, demonstrates good rehab potential and unable to return home due to limited or no family support, inability to negotiate stairs to enter home/bedroom/bathroom, burden of care beyond caregiver ability, home environment not conducive to patient recovery and limited safety awareness. Goals : To be met in 3 visits:  1). Independent with LE Ex x 10 reps    To be met in 6 visits:  1). Supine to/from sit: SBA  2).   Sit to/from stand: CGA  3). Bed to chair: CGA  4). Gait: Ambulate 50 ft.  with  CGA and use of LRAD (least restrictive assistive device)  5). Tolerate B LE exercises 3 sets of 10-15 reps  6). Ascend/descend 1 steps with SBA with use of hand rail unilateral and LRAD (least restrictive assistive device)    Rehabilitation Potential: Fair  Strengths for achieving goals include:   Pt cooperative   Barriers to achieving goals include:    Weakness and impaired safety awareness and body position awareness. Plan    To be seen 3-5 x / week  while in acute care setting for therapeutic exercises, bed mobility, transfers, progressive gait training, balance training, and family/patient education. Signature: Ji Denney MS PT, # [id number]      If patient discharges from this facility prior to next visit, this note will serve as the Discharge Summary.

## 2020-12-01 NOTE — DISCHARGE SUMMARY
Name:  Rudolph Collet  Room:  9301/5310-65  MRN:    [id number]    Discharge Summary      This discharge summary is in conjunction with a complete physical exam done on the day of discharge. Discharging Physician: Dr. Jaci Vásquez: 11/27/2020  Discharge:   12/3/2020     HPI taken from admission H&P:     The patient is a 72 y.o. male who presents to [facility]  with AMS. Pt pleasantly confused, hence history obtained from daughter on phone. Pt apparently has h/o a fib and recently underwent cardiac ablation and was dced to [facility], but pt was found to be lethargic and confused and hence sent to the ER. Per daughter pt has been having CHF and needed diuresis and thoracentesis in the past. Started on IV lasix and admitted for further workup of AMS    Diagnoses this Admission and Hospital Course     #Acute on chronic diastolic heart failure. Cardiology seeing patient. Sp IV lasix with improvement in symptoms. Discharge on PO Lasix. See his cardiologist in two weeks.     #Paroxysmal A. fib/a flutter. He had recent ablation. INR therapeutic continued Coumadin. On Coreg. Heart rate controlled. He is in sinus rhythm. He apparently developed loculated pleural effusion after the procedure. He has had thoracentesis on this.     #CAD. Status post CABG. Stable.     #Status post redo aortic valve replacement.     #Anemia. H&H dropped but presently stable. Got Venofer. Hemoccult negative.     #Metabolic encephalopathy due to above. Resolved.     #Pancreatic lesion seen on CT of the abdomen. MRI ordered but cannot be done at [facility] 2/2 pacemaker. Plan is for outpatient EUS or MRI of the abdomen to be done at [facility] as an outpatient.     #CT was read as acute cholecystitis. Having some tenderness today. Surgery saw the patient. Given multiple medical issues he would be a poor surgical candidate. Manage with IV antibiotics and consider OP management of  Gall bladder disease.  He may need percutaneous drainage of gall bladder and lap cholecystectomy. No surgical plans today. D/c on Levaquin. Explained all this to the daughter on the phone. I talked to her prior to his discharge.     #Acute on chronic  respiratory failure. Not on home oxygen. Likely due to heart failure. Oxygen was weaned to 2L, he will need home O2, this was discussed face to face with patient. Try to wean off oxygen at SNF.     #Diabetes mellitus type 2. Monitored sugars.     #Hypertension. Blood pressure well controlled.     #Hyperlipidemia stable.     #Lovenox for DVT prophylaxis. Procedures (Please Review Full Report for Details)  None     Consults    GI  Cardio  Gen surg       Physical Exam at Discharge:    /60   Pulse 79   Temp 97.9 °F (36.6 °C) (Oral)   Resp 16   Ht 5' 9\" (1.753 m)   Wt 200 lb (90.7 kg)   SpO2 94%   BMI 29.53 kg/m²        General appearance: alert, appears stated age and cooperative  Head: Normocephalic, without obvious abnormality, atraumatic  Eyes: conjunctivae/corneas clear. PERRL, EOM's intact. Neck: no adenopathy, no carotid bruit, no JVD, supple, symmetrical, trachea midline and thyroid not enlarged, symmetric, no tenderness/mass/nodules  Lungs: Few crackles at the bases. Diminished BS left lung with dullness to percussion left lung base.    Heart: regular rate and rhythm, S1, S2 normal, no murmur, click, rub or gallop  Abdomen: soft, minimal right upper quadrant tenderness is present; bowel sounds normal; no masses,  no organomegaly  Extremities: extremities normal, atraumatic, no cyanosis or edema  Pulses: 2+ and symmetric  Skin: Skin color, texture, turgor normal. No rashes or lesions  Neurologic: Grossly normal    CBC:   Recent Labs     11/29/20  0638  11/30/20  0618 11/30/20  1434 12/01/20  0210 12/01/20  1353   WBC 7.9  --  9.0  --  9.4  --    HGB 7.1*   < > 7.9* 8.3* 7.9* 8.1*   HCT 22.8*   < > 25.6* 27.9* 25.5* 27.1*   MCV 84.2  --  86.1  --  85.5  --      --  137 to get these medications is not yet available    Ask your nurse or doctor about these medications  · levoFLOXacin 500 MG tablet           Discharged in stable condition to home     Follow Up: Follow up with PCP in 1 week, GI as recommended- need OP MRI for pancreatic lesion and/or EUS. See cardiology as OP.  See general surgery as OP      More than 35 mts spent      [place] 12/5/2020 1:08 PM

## 2020-12-01 NOTE — PROGRESS NOTES
Progress Note  Date:2020       Room:0220/0220-01  Patient Name:Saúl Sheikh     Date of Birth:[birth year]     Age:70 y.o. Subjective    Subjective:  Symptoms:  Stable. Pain:  He reports no pain. Review of Systems  Objective         Vitals Last 24 Hours:  TEMPERATURE:  Temp  Av.8 °F (37.1 °C)  Min: 98.4 °F (36.9 °C)  Max: 99.9 °F (37.7 °C)  RESPIRATIONS RANGE: Resp  Av.2  Min: 17  Max: 20  PULSE OXIMETRY RANGE: SpO2  Av.3 %  Min: 86 %  Max: 96 %  PULSE RANGE: Pulse  Av  Min: 65  Max: 84  BLOOD PRESSURE RANGE: Systolic (93NFF), XYB:081 , Min:118 , GLT:236   ; Diastolic (45NHL), MWT:71, Min:58, Max:65    I/O (24Hr): Intake/Output Summary (Last 24 hours) at 2020 1418  Last data filed at 2020 0745  Gross per 24 hour   Intake 240 ml   Output --   Net 240 ml     Objective:  General Appearance:  Not in pain. Vital signs: (most recent): Blood pressure 127/65, pulse 81, temperature 98.4 °F (36.9 °C), temperature source Oral, resp. rate 17, height 5' 9\" (1.753 m), weight 197 lb 14.4 oz (89.8 kg), SpO2 91 %. Abdomen: Abdomen is soft. Bowel sounds are normal.   There is no abdominal tenderness. Labs/Imaging/Diagnostics    Labs:  CBC:  Recent Labs     20  0638  20  0618 20  1434 20  0210   WBC 7.9  --  9.0  --  9.4   RBC 2.70*  --  2.98*  --  2.98*   HGB 7.1*   < > 7.9* 8.3* 7.9*   HCT 22.8*   < > 25.6* 27.9* 25.5*   MCV 84.2  --  86.1  --  85.5   RDW 21.2*  --  20.8*  --  21.0*     --  137  --  111*    < > = values in this interval not displayed.      CHEMISTRIES:  Recent Labs     20  0638 20  0618 20  0210   * 136 135*   K 4.5 4.3 4.7   CL 95* 94* 95*   CO2 33* 35* 34*   BUN 24* 23* 30*   CREATININE 0.9 0.8 1.0   GLUCOSE 146* 122* 116*   MG 2.00 2.10 2.10     PT/INR:  Recent Labs     20  0638 20  0618 20  0210   PROTIME 34.6* 33.7* 41.1*   INR 2.95* 2.87* 3.50*     APTT:No results for input(s): APTT in the last 72 hours. LIVER PROFILE:  Recent Labs     11/29/20  0638   AST 24   ALT 17   BILIDIR 0.6*   BILITOT 1.0   ALKPHOS 81       Imaging Last 24 Hours:  No results found. Assessment//Plan           Hospital Problems           Last Modified POA    * (Principal) Acute on chronic diastolic congestive heart failure ([facility] 11/30/2020 Yes    CAD (coronary artery disease) s/p stent in 2010 11/30/2020 Yes    Essential hypertension 11/30/2020 Yes    Hyperlipidemia 11/30/2020 Yes    S/P AVR (aortic valve replacement) 11/30/2020 Yes    Type 2 diabetes mellitus, without long-term current use of insulin (HCC) (Chronic) 11/30/2020 Yes    Atrial fibrillation ([facility] 11/30/2020 Yes    Frequent falls 11/30/2020 Yes    Lesion of pancreas 11/30/2020 Yes    Acute metabolic encephalopathy 70/26/3952 Yes    Anemia 11/30/2020 Yes        Assessment & Plan  71 yo male who is a poor historian, with history of CAD s/p PCI, CABG, DM, HTN, s/p hemothorax/emergent AVR repair/aortopulmonary fistula closure in 1/2020, paravalvular leak, COPD, atrial fibrillation, dual chamber pacemaker, admitted w SOB and ?cholecystitis. CT of the abdomen demonstrated a pancreatic cyst measuring 16x19 mm. He also has Fe def anemia H/H 7/23, Fe sat 7%, but is heme-neg. Is s/p colonoscopy in 2/2020 w 6 polyps and fair prep.     Plan:   1. MRI for further evaluation  2. Agree w Venofer  3. Daily H/H  4. Will need an EGD at some point but no urgency  5. Is on Abx's for a presumed calculous cholecystitis (consider Surgery consult)  6.  Will follow     Carol Rm MD       (O) [phone]    Electronically signed by Carol Rm MD on 12/1/20 at 6:11 AM EST

## 2020-12-02 LAB
ANION GAP SERPL CALCULATED.3IONS-SCNC: 5 MMOL/L (ref 3–16)
BASOPHILS ABSOLUTE: 0 K/UL (ref 0–0.2)
BASOPHILS RELATIVE PERCENT: 0.2 %
BUN BLDV-MCNC: 28 MG/DL (ref 7–20)
CALCIUM SERPL-MCNC: 8 MG/DL (ref 8.3–10.6)
CHLORIDE BLD-SCNC: 95 MMOL/L (ref 99–110)
CO2: 36 MMOL/L (ref 21–32)
CREAT SERPL-MCNC: 0.9 MG/DL (ref 0.8–1.3)
EOSINOPHILS ABSOLUTE: 0 K/UL (ref 0–0.6)
EOSINOPHILS RELATIVE PERCENT: 0.4 %
GFR AFRICAN AMERICAN: >60
GFR NON-AFRICAN AMERICAN: >60
GLUCOSE BLD-MCNC: 107 MG/DL (ref 70–99)
GLUCOSE BLD-MCNC: 141 MG/DL (ref 70–99)
GLUCOSE BLD-MCNC: 86 MG/DL (ref 70–99)
GLUCOSE BLD-MCNC: 89 MG/DL (ref 70–99)
GLUCOSE BLD-MCNC: 94 MG/DL (ref 70–99)
HCT VFR BLD CALC: 24 % (ref 40.5–52.5)
HCT VFR BLD CALC: 24.3 % (ref 40.5–52.5)
HCT VFR BLD CALC: 26.3 % (ref 40.5–52.5)
HEMOGLOBIN: 7.4 G/DL (ref 13.5–17.5)
HEMOGLOBIN: 7.5 G/DL (ref 13.5–17.5)
HEMOGLOBIN: 8 G/DL (ref 13.5–17.5)
INR BLD: 2.87 (ref 0.86–1.14)
LYMPHOCYTES ABSOLUTE: 0.5 K/UL (ref 1–5.1)
LYMPHOCYTES RELATIVE PERCENT: 5.5 %
MAGNESIUM: 1.9 MG/DL (ref 1.8–2.4)
MCH RBC QN AUTO: 26 PG (ref 26–34)
MCHC RBC AUTO-ENTMCNC: 30.8 G/DL (ref 31–36)
MCV RBC AUTO: 84.3 FL (ref 80–100)
MONOCYTES ABSOLUTE: 0.6 K/UL (ref 0–1.3)
MONOCYTES RELATIVE PERCENT: 6.7 %
NEUTROPHILS ABSOLUTE: 7.4 K/UL (ref 1.7–7.7)
NEUTROPHILS RELATIVE PERCENT: 87.2 %
PDW BLD-RTO: 21.3 % (ref 12.4–15.4)
PERFORMED ON: ABNORMAL
PERFORMED ON: ABNORMAL
PERFORMED ON: NORMAL
PERFORMED ON: NORMAL
PLATELET # BLD: 101 K/UL (ref 135–450)
PMV BLD AUTO: 8.6 FL (ref 5–10.5)
POTASSIUM SERPL-SCNC: 4 MMOL/L (ref 3.5–5.1)
PROTHROMBIN TIME: 33.7 SEC (ref 10–13.2)
RBC # BLD: 2.88 M/UL (ref 4.2–5.9)
SODIUM BLD-SCNC: 136 MMOL/L (ref 136–145)
WBC # BLD: 8.5 K/UL (ref 4–11)

## 2020-12-02 PROCEDURE — 6360000002 HC RX W HCPCS: Performed by: INTERNAL MEDICINE

## 2020-12-02 PROCEDURE — 80048 BASIC METABOLIC PNL TOTAL CA: CPT

## 2020-12-02 PROCEDURE — 2580000003 HC RX 258: Performed by: INTERNAL MEDICINE

## 2020-12-02 PROCEDURE — 6370000000 HC RX 637 (ALT 250 FOR IP): Performed by: INTERNAL MEDICINE

## 2020-12-02 PROCEDURE — 99233 SBSQ HOSP IP/OBS HIGH 50: CPT | Performed by: INTERNAL MEDICINE

## 2020-12-02 PROCEDURE — 99233 SBSQ HOSP IP/OBS HIGH 50: CPT | Performed by: SURGERY

## 2020-12-02 PROCEDURE — 85025 COMPLETE CBC W/AUTO DIFF WBC: CPT

## 2020-12-02 PROCEDURE — 83735 ASSAY OF MAGNESIUM: CPT

## 2020-12-02 PROCEDURE — 85018 HEMOGLOBIN: CPT

## 2020-12-02 PROCEDURE — 2500000003 HC RX 250 WO HCPCS: Performed by: INTERNAL MEDICINE

## 2020-12-02 PROCEDURE — 94761 N-INVAS EAR/PLS OXIMETRY MLT: CPT

## 2020-12-02 PROCEDURE — 85610 PROTHROMBIN TIME: CPT

## 2020-12-02 PROCEDURE — 85014 HEMATOCRIT: CPT

## 2020-12-02 PROCEDURE — 2060000000 HC ICU INTERMEDIATE R&B

## 2020-12-02 PROCEDURE — 36415 COLL VENOUS BLD VENIPUNCTURE: CPT

## 2020-12-02 PROCEDURE — 2700000000 HC OXYGEN THERAPY PER DAY

## 2020-12-02 RX ORDER — HYDROCORTISONE 0.5 %
CREAM (GRAM) TOPICAL 3 TIMES DAILY PRN
Status: DISCONTINUED | OUTPATIENT
Start: 2020-12-02 | End: 2020-12-03 | Stop reason: HOSPADM

## 2020-12-02 RX ORDER — WARFARIN SODIUM 1 MG/1
0.5 TABLET ORAL
Status: COMPLETED | OUTPATIENT
Start: 2020-12-02 | End: 2020-12-02

## 2020-12-02 RX ADMIN — LEVOTHYROXINE SODIUM 25 MCG: 25 TABLET ORAL at 05:56

## 2020-12-02 RX ADMIN — CIPROFLOXACIN 400 MG: 2 INJECTION, SOLUTION INTRAVENOUS at 14:37

## 2020-12-02 RX ADMIN — Medication: at 14:54

## 2020-12-02 RX ADMIN — ATORVASTATIN CALCIUM 20 MG: 10 TABLET, FILM COATED ORAL at 21:23

## 2020-12-02 RX ADMIN — FUROSEMIDE 40 MG: 10 INJECTION, SOLUTION INTRAMUSCULAR; INTRAVENOUS at 18:09

## 2020-12-02 RX ADMIN — POTASSIUM CHLORIDE 10 MEQ: 1500 TABLET, EXTENDED RELEASE ORAL at 18:11

## 2020-12-02 RX ADMIN — CIPROFLOXACIN 400 MG: 2 INJECTION, SOLUTION INTRAVENOUS at 02:32

## 2020-12-02 RX ADMIN — Medication 10 ML: at 21:26

## 2020-12-02 RX ADMIN — ALLOPURINOL 200 MG: 100 TABLET ORAL at 08:38

## 2020-12-02 RX ADMIN — ONDANSETRON HYDROCHLORIDE 4 MG: 2 INJECTION, SOLUTION INTRAMUSCULAR; INTRAVENOUS at 11:03

## 2020-12-02 RX ADMIN — METRONIDAZOLE 500 MG: 500 INJECTION, SOLUTION INTRAVENOUS at 21:23

## 2020-12-02 RX ADMIN — PANTOPRAZOLE SODIUM 40 MG: 40 TABLET, DELAYED RELEASE ORAL at 05:56

## 2020-12-02 RX ADMIN — METRONIDAZOLE 500 MG: 500 INJECTION, SOLUTION INTRAVENOUS at 05:56

## 2020-12-02 RX ADMIN — SODIUM CHLORIDE 200 MG: 9 INJECTION, SOLUTION INTRAVENOUS at 08:38

## 2020-12-02 RX ADMIN — FUROSEMIDE 40 MG: 10 INJECTION, SOLUTION INTRAMUSCULAR; INTRAVENOUS at 08:38

## 2020-12-02 RX ADMIN — POTASSIUM CHLORIDE 10 MEQ: 1500 TABLET, EXTENDED RELEASE ORAL at 08:38

## 2020-12-02 RX ADMIN — CARVEDILOL 12.5 MG: 6.25 TABLET, FILM COATED ORAL at 18:09

## 2020-12-02 RX ADMIN — Medication 10 ML: at 08:39

## 2020-12-02 RX ADMIN — CARVEDILOL 12.5 MG: 6.25 TABLET, FILM COATED ORAL at 08:38

## 2020-12-02 RX ADMIN — WARFARIN SODIUM 0.5 MG: 1 TABLET ORAL at 18:09

## 2020-12-02 RX ADMIN — CITALOPRAM HYDROBROMIDE 10 MG: 20 TABLET ORAL at 08:37

## 2020-12-02 RX ADMIN — METRONIDAZOLE 500 MG: 500 INJECTION, SOLUTION INTRAVENOUS at 14:37

## 2020-12-02 NOTE — PLAN OF CARE
Problem: Falls - Risk of:  Goal: Will remain free from falls  Description: Will remain free from falls  Outcome: Ongoing  Goal: Absence of physical injury  Description: Absence of physical injury  Outcome: Ongoing     Problem: Skin Integrity:  Goal: Will show no infection signs and symptoms  Description: Will show no infection signs and symptoms  Outcome: Ongoing  Goal: Absence of new skin breakdown  Description: Absence of new skin breakdown  Outcome: Ongoing     Problem: OXYGENATION/RESPIRATORY FUNCTION  Goal: Patient will maintain patent airway  Outcome: Ongoing  Goal: Patient will achieve/maintain normal respiratory rate/effort  Description: Respiratory rate and effort will be within normal limits for the patient  Outcome: Ongoing     Problem: HEMODYNAMIC STATUS  Goal: Patient has stable vital signs and fluid balance  Outcome: Ongoing     Problem: FLUID AND ELECTROLYTE IMBALANCE  Goal: Fluid and electrolyte balance are achieved/maintained  Outcome: Ongoing     Problem: ACTIVITY INTOLERANCE/IMPAIRED MOBILITY  Goal: Mobility/activity is maintained at optimum level for patient  Outcome: Ongoing     Problem: Pain:  Goal: Pain level will decrease  Description: Pain level will decrease  Outcome: Ongoing  Goal: Control of acute pain  Description: Control of acute pain  Outcome: Ongoing  Goal: Control of chronic pain  Description: Control of chronic pain  Outcome: Ongoing

## 2020-12-02 NOTE — PROGRESS NOTES
Called updated Luanna Hamman pt's daughter and patient that per their request  to be transferred to [facility] in [facility] (where pt's primary care team of physician is) was started by Bernardino Golden NP per Dr. Palak Ardon telephone order. They are aware that it is a process that is dependant on acceptance and bed availability. Both patient and daughter Luanna Hamman indicated understanding. Dr. Jordana Chester here rounding was updated to current plan of care.

## 2020-12-02 NOTE — PROGRESS NOTES
General Surgery - Bailey Laws, [facility]  Daily Progress Note    Pt Name: Alexus Boyce  Medical Record Number: [id number]  Date of Birth 1950   Today's Date: 12/2/2020    ASSESSMENT  1. Ct abd and pelvis 11/27: cystic lesion at the junction of the pancreatic tail and body, gallstones with mild wall thickening and pericholi fluid with moderate ascites   2. CT chest: pulmonary edema, bilateral effusions with loculated left pleural effusion ? For superimposed PNA or aspiration  3. ABD: soft, + distention, + N, + dry heaves today, + mild diffuse abdominal tenderness  4. Anemia stable H&H 8/26.3  5. leuks 8.5  6. VSS  7. On Coumadin PT/INR 2.87 (2-3 INR goal)  8. S/P left thoracentesis at [address] [place] [address] on 11/23/20  9. S/P ablation on 11/10/20  10. S/P bioprosthetic AV valve January 2020    PLAN  1. Carb control. low fat diet  2. Unable to determine if pt in fact has acute cholecystitis: Wound treat conservatively with antibiotics. Pt is a poor surgical candidate. Pamela Alejandro has improved from yesterday. Pain apears well controlled. He has nausea and dry heaves. He has not passed flatus and has had a bowel movement. He is somewhat tolerating regular diet. Current activity is up with assistance    OBJECTIVE  VITALS:  height is 5' 9\" (1.753 m) and weight is 197 lb 12.8 oz (89.7 kg). His oral temperature is 98 °F (36.7 °C). His blood pressure is 124/75 and his pulse is 69. His respiration is 16 and oxygen saturation is 98%. VITALS:  /75   Pulse 69   Temp 98 °F (36.7 °C) (Oral)   Resp 16   Ht 5' 9\" (1.753 m)   Wt 197 lb 12.8 oz (89.7 kg)   SpO2 98%   BMI 29.21 kg/m²   INTAKE/OUTPUT:    Intake/Output Summary (Last 24 hours) at 12/2/2020 1548  Last data filed at 12/2/2020 1229  Gross per 24 hour   Intake 630 ml   Output 1250 ml   Net -620 ml     GENERAL: alert, cooperative, no acute distress    I/O last 3 completed shifts:   In: 630 [P.O.:630]  Out: 1250 [Urine:1250]  No intake/output data recorded. LABS  Recent Labs     12/02/20  0507 12/02/20  1349   WBC 8.5  --    HGB 7.5* 8.0*   HCT 24.3* 26.3*   *  --      --    K 4.0  --    CL 95*  --    CO2 36*  --    BUN 28*  --    CREATININE 0.9  --    MG 1.90  --    CALCIUM 8.0*  --    INR 2.87*  --      CBC with Differential:    Lab Results   Component Value Date    WBC 8.5 12/02/2020    RBC 2.88 12/02/2020    HGB 8.0 12/02/2020    HCT 26.3 12/02/2020     12/02/2020    MCV 84.3 12/02/2020    MCH 26.0 12/02/2020    MCHC 30.8 12/02/2020    RDW 21.3 12/02/2020    NRBC 1 03/23/2020    NRBC 1 03/23/2020    BANDSPCT 25 03/27/2020    METASPCT 2 03/27/2020    LYMPHOPCT 5.5 12/02/2020    MONOPCT 6.7 12/02/2020    MYELOPCT 3 03/27/2020    BASOPCT 0.2 12/02/2020    MONOSABS 0.6 12/02/2020    LYMPHSABS 0.5 12/02/2020    EOSABS 0.0 12/02/2020    BASOSABS 0.0 12/02/2020     CMP:    Lab Results   Component Value Date     12/02/2020    K 4.0 12/02/2020    K 4.2 03/30/2020    CL 95 12/02/2020    CO2 36 12/02/2020    BUN 28 12/02/2020    CREATININE 0.9 12/02/2020    GFRAA >60 12/02/2020    AGRATIO 1.6 11/27/2020    LABGLOM >60 12/02/2020    GLUCOSE 86 12/02/2020    PROT 6.2 11/29/2020    LABALBU 3.3 11/29/2020    CALCIUM 8.0 12/02/2020    BILITOT 1.0 11/29/2020    ALKPHOS 81 11/29/2020    AST 24 11/29/2020    ALT 17 11/29/2020         Bailey Guzman Parcel  Electronically signed 12/2/2020 at 3:35 PM     Patient seen and examined. I agree with the assessment and plan from MIKE Matthews. Patient feels better this afternoon. Abdomen soft. Moderate tenderness. Continue antibiotics and observation. Discussed with Dr. Jayleen Sánchez.       [facility]

## 2020-12-02 NOTE — CARE COORDINATION
INTERDISCIPLINARY PLAN OF CARE CONFERENCE    Date/Time: 12/2/2020 12:32 PM  Completed by: Courtney López, Case Management      Patient Name:  Ariel Lin  YOB: 1950  Admitting Diagnosis: CHF (congestive heart failure), NYHA class I, acute on chronic, combined ([facility] [I50.43]     Admit Date/Time:  11/27/2020  3:42 PM    Chart reviewed. Interdisciplinary team contacted or reviewed plan related to patient progress and discharge plans. Disciplines included Case Management, Nursing, and Dietitian. Current Status:Stable  PT/OT recommendation for discharge plan of care: SNF    Expected D/C Disposition:  Rehab  Confirmed plan with patient  Yes   Discharge Plan Comments: Reviewed chart and attempted to meet with pt who is sleeping. Will cont plan for return to [facility] at [facility]. Spoke with Theador Cushing at [facility] who states can re acept at [facility] and will start pre cert Karl Spence states is aware that pre cert are being waived but will fax info as will need pre cert once at [facility].) Will follow for additional needs.  Noted pt is on O2 2LNC currently    Home O2 in place on admit: From STR-O2 Available if neededv  Pt informed of need to bring portable home O2 tank on day of discharge for nursing to connect prior to leaving:  Not Indicated  Verbalized agreement/Understanding:  Not Indicated

## 2020-12-02 NOTE — PROGRESS NOTES
AM assessment completed, see flow sheet. Pt is alert and oriented x3. Vital signs are WNL. Respirations are even & easy. No complaints voiced. Pt denies needs at this time. SR up x 2, and bed in low position. Call light is within reach.

## 2020-12-02 NOTE — PROGRESS NOTES
PHARMACY TO DOSE WARFARIN PER DR. Mora Ocasio. Pharmacy Note  Warfarin Consult  Dx: AFIB  Goal INR range 2-3   Home Warfarin dose: 4mg daily     Date  INR  Warfarin  11/28               2.51                 2mg  11/29               2.95                  Hold  11/30               2.87                 1mg  12/1                 3.50                 hold  12/2                 2.87                 0.5mg  Pt started on flagyl and cipro 11/28/20. Recommend  warfarin 0.5mg tonight x1. Daily INR ordered. Rx will continue to manage therapy per consult order.     Roosevelt Gaytan Pharm D 11/30/20203:19 PM  .

## 2020-12-02 NOTE — PROGRESS NOTES
Progress Note    Admit Date:  11/27/2020    Subjective:  Mr. Arsenio Ritchie is feeling better. He is not a very good historian. He had issues with confusion that seem to be improved. His breathing is better. No pedal edema. He is on 2.5 L of oxygen. He does not use oxygen at home. He denies any RUQ abdominal pain. No fever or chills. 12/1-he is doing better. Still requiring oxygen which is trying to be weaned off. He is 96% on 3 L. Cardiology seen the patient had a signed off. MRI of the abdomen could not be done at present. He has a pacemaker. GI is planning on doing an MRI of the abdomen either at [facility] or [facility] as an outpatient. 12/2-mentation is much improved. He is awake and alert and oriented x3. He had an episode of emesis this morning. He also had some right upper quadrant abdominal pain. He did not eat his lunch as is not very hungry. He is afebrile. Objective:   /75   Pulse 69   Temp 98 °F (36.7 °C) (Oral)   Resp 16   Ht 5' 9\" (1.753 m)   Wt 197 lb 12.8 oz (89.7 kg)   SpO2 98%   BMI 29.21 kg/m²          Intake/Output Summary (Last 24 hours) at 12/2/2020 1529  Last data filed at 12/2/2020 1229  Gross per 24 hour   Intake 630 ml   Output 1250 ml   Net -620 ml       Physical Exam:    General appearance: alert, appears stated age and cooperative  Head: Normocephalic, without obvious abnormality, atraumatic  Eyes: conjunctivae/corneas clear. PERRL, EOM's intact. Neck: no adenopathy, no carotid bruit, no JVD, supple, symmetrical, trachea midline and thyroid not enlarged, symmetric, no tenderness/mass/nodules  Lungs: Few crackles at the bases. Diminished BS left lung with dullness to percussion left lung base.    Heart: regular rate and rhythm, S1, S2 normal, no murmur, click, rub or gallop  Abdomen: soft, mild to moderate right upper quadrant tenderness is present; bowel sounds normal; no masses,  no organomegaly  Extremities: extremities normal, atraumatic, no cyanosis or edema  Pulses: 2+ and symmetric  Skin: Skin color, texture, turgor normal. No rashes or lesions  Neurologic: Grossly normal    Scheduled Meds:   insulin lispro  0-12 Units Subcutaneous TID WC    warfarin  0.5 mg Oral Once    influenza virus vaccine  0.5 mL Intramuscular Prior to discharge    insulin lispro  0-6 Units Subcutaneous Nightly    ciprofloxacin  400 mg Intravenous Q12H    metroNIDAZOLE  500 mg Intravenous Q8H    warfarin (COUMADIN) daily dosing (placeholder)   Other RX Placeholder    allopurinol  200 mg Oral Daily    atorvastatin  20 mg Oral Nightly    carvedilol  12.5 mg Oral BID WC    citalopram  10 mg Oral QAM    levothyroxine  25 mcg Oral Daily    pantoprazole  40 mg Oral QAM AC    potassium chloride  10 mEq Oral BID WC    sodium chloride flush  10 mL Intravenous 2 times per day    furosemide  40 mg Intravenous BID       Continuous Infusions:      PRN Meds:  methyl salicylate-menthol, morphine, sodium chloride flush, acetaminophen **OR** acetaminophen, polyethylene glycol, promethazine **OR** ondansetron      Data:  CBC:   Recent Labs     11/30/20 0618 12/01/20 0210 12/02/20  0205 12/02/20  0507 12/02/20  1349   WBC 9.0  --  9.4  --   --  8.5  --    HGB 7.9*   < > 7.9*   < > 7.4* 7.5* 8.0*   HCT 25.6*   < > 25.5*   < > 24.0* 24.3* 26.3*   MCV 86.1  --  85.5  --   --  84.3  --      --  111*  --   --  101*  --     < > = values in this interval not displayed. BMP:   Recent Labs     11/30/20 0618 12/01/20 0210 12/02/20  0507    135* 136   K 4.3 4.7 4.0   CL 94* 95* 95*   CO2 35* 34* 36*   BUN 23* 30* 28*   CREATININE 0.8 1.0 0.9     LIVER PROFILE:   No results for input(s): AST, ALT, LIPASE, BILIDIR, BILITOT, ALKPHOS in the last 72 hours. Invalid input(s):   AMYLASE,  ALB  PT/INR:   Recent Labs     11/30/20 0618 12/01/20 0210 12/02/20  0507   PROTIME 33.7* 41.1* 33.7*   INR 2.87* 3.50* 2.87*     Cultures:   Results for Suzann Cheadle (MRN [id number]) as of 11/30/2020 07:06   Ref. Range 11/27/2020 16:04   SARS-CoV-2, NAAT Latest Ref Range: Not Detected  Not Detected       XR CHEST 1 VIEW   Final Result   In this patient with CHF, relatively stable pattern of perihilar and left   basilar airspace change. This may represent edema. Superimposed pneumonitis   can not be excluded in the left lower lobe. CT CHEST PULMONARY EMBOLISM W CONTRAST   Final Result   1. No findings of pulmonary embolism. 2. Pulmonary edema, bilateral effusions, and mild cardiomegaly, suggesting   congestive heart failure. Reflux of the contrast bolus suggests associated   right heart dysfunction. 3. Loculated moderate left pleural effusion resulting in partial collapse of   basilar portions of the left lung, possibly with superimposed pneumonia or   aspiration. 4. Mild bronchial wall thickening potentially due to pulmonary vascular   congestion, reactive airways disease, or bronchitis. 5. Additional incidental findings as above. CT ABDOMEN PELVIS W IV CONTRAST Additional Contrast? None   Final Result   1. Indeterminate cystic lesion (1.9 cm) at the junction of the pancreatic   body and tail could reflect neoplastic process. Recommend additional   characterization on MRI abdomen attention pancreas without and with   gadolinium. 2. Cholelithiasis with nonspecific findings suggesting acute cholecystitis. 3.  Diverticulosis coli without CT evidence of acute diverticulitis. 4. Splenomegaly. 5. Correlate with report from CTA chest performed at the same time. Bibasilar   consolidation and bilateral pleural effusion. 6. Degenerative changes lumbar spine. XR CHEST PORTABLE   Final Result   1. Cardiomegaly with vascular congestive changes and pulmonary edema. 2. More dense focal consolidation involving the left lower lobe and left   parahilar region which could indicate an evolving pneumonia.                Assessment:  Principal Problem:    Acute on chronic diastolic congestive heart failure (HCC)  Active Problems:    CAD (coronary artery disease) s/p stent in 2010    Essential hypertension    Hyperlipidemia    S/P AVR (aortic valve replacement)    Type 2 diabetes mellitus, without long-term current use of insulin (HCC)    Atrial fibrillation (HCC)    Frequent falls    Lesion of pancreas    Acute metabolic encephalopathy    Anemia    Altered mental status    Gallstones  Resolved Problems:    * No resolved hospital problems. *      Plan:    #Acute on chronic diastolic heart failure. Cardiology seeing patient. On IV Lasix. Monitor I's and O's. He is improved. Try to wean off oxygen. #Paroxysmal A. fib/a flutter. He had recent ablation. INR therapeutic continue Coumadin. On Coreg. Heart rate controlled. He is in sinus rhythm. He apparently developed loculated pleural effusion after the procedure. I reviewed his note from [place].    #CAD. Status post CABG. Stable. #Status post redo aortic valve replacement. #Anemia. H&H dropped but presently stable. Got Venofer. Hemoccult negative. #Metabolic encephalopathy due to above. Resolved. #Pancreatic lesion seen on CT of the abdomen. He has a pacemaker and the MRI of the abdomen could not be done here. Plan is for outpatient EUS or MRI of the abdomen to be done at [facility] as an outpatient. #CT was read as acute cholecystitis. Having some tenderness today. Surgery saw the patient. Given multiple medical issues he would be a poor surgical candidate. Manage with IV antibiotics and consider OP management of  Gall bladder disease. He may need percutaneous drainage of gall bladder and lap cholecystectomy. No surgical plans today. #Acute respiratory failure resolved. Not on home oxygen. Likely due to heart failure. We will try to wean oxygen off. #Diabetes mellitus type 2. Monitor sugars. #Hypertension. Blood pressure well controlled. #Hyperlipidemia stable.     #Lovenox for DVT

## 2020-12-02 NOTE — PROGRESS NOTES
Spoke with Dr. Felix Floyd r/t daughters request for call in AM to discuss POC. Assisted pt to make phone call to care giver Saida. Report given @ bedside to [facility], for transferral of care. Pt resting in bed. Call light in reach.

## 2020-12-03 ENCOUNTER — APPOINTMENT (OUTPATIENT)
Dept: GENERAL RADIOLOGY | Age: 70
DRG: 291 | End: 2020-12-03
Payer: MEDICARE

## 2020-12-03 VITALS
SYSTOLIC BLOOD PRESSURE: 118 MMHG | RESPIRATION RATE: 18 BRPM | HEART RATE: 68 BPM | DIASTOLIC BLOOD PRESSURE: 60 MMHG | TEMPERATURE: 97 F | HEIGHT: 69 IN | WEIGHT: 200 LBS | OXYGEN SATURATION: 95 % | BODY MASS INDEX: 29.62 KG/M2

## 2020-12-03 LAB
ANION GAP SERPL CALCULATED.3IONS-SCNC: 9 MMOL/L (ref 3–16)
BASOPHILS ABSOLUTE: 0 K/UL (ref 0–0.2)
BASOPHILS RELATIVE PERCENT: 0.5 %
BUN BLDV-MCNC: 26 MG/DL (ref 7–20)
CALCIUM SERPL-MCNC: 8 MG/DL (ref 8.3–10.6)
CHLORIDE BLD-SCNC: 94 MMOL/L (ref 99–110)
CO2: 33 MMOL/L (ref 21–32)
CREAT SERPL-MCNC: 0.9 MG/DL (ref 0.8–1.3)
EOSINOPHILS ABSOLUTE: 0 K/UL (ref 0–0.6)
EOSINOPHILS RELATIVE PERCENT: 0.1 %
GFR AFRICAN AMERICAN: >60
GFR NON-AFRICAN AMERICAN: >60
GLUCOSE BLD-MCNC: 132 MG/DL (ref 70–99)
GLUCOSE BLD-MCNC: 135 MG/DL (ref 70–99)
GLUCOSE BLD-MCNC: 74 MG/DL (ref 70–99)
GLUCOSE BLD-MCNC: 82 MG/DL (ref 70–99)
HCT VFR BLD CALC: 26.1 % (ref 40.5–52.5)
HCT VFR BLD CALC: 26.5 % (ref 40.5–52.5)
HCT VFR BLD CALC: 27.4 % (ref 40.5–52.5)
HEMOGLOBIN: 8 G/DL (ref 13.5–17.5)
HEMOGLOBIN: 8.2 G/DL (ref 13.5–17.5)
HEMOGLOBIN: 8.4 G/DL (ref 13.5–17.5)
INR BLD: 2.98 (ref 0.86–1.14)
LYMPHOCYTES ABSOLUTE: 0.5 K/UL (ref 1–5.1)
LYMPHOCYTES RELATIVE PERCENT: 5.4 %
MCH RBC QN AUTO: 26.2 PG (ref 26–34)
MCHC RBC AUTO-ENTMCNC: 30.5 G/DL (ref 31–36)
MCV RBC AUTO: 86.1 FL (ref 80–100)
MONOCYTES ABSOLUTE: 0.7 K/UL (ref 0–1.3)
MONOCYTES RELATIVE PERCENT: 7.8 %
NEUTROPHILS ABSOLUTE: 8.1 K/UL (ref 1.7–7.7)
NEUTROPHILS RELATIVE PERCENT: 86.2 %
PDW BLD-RTO: 21.9 % (ref 12.4–15.4)
PERFORMED ON: ABNORMAL
PERFORMED ON: ABNORMAL
PERFORMED ON: NORMAL
PLATELET # BLD: 94 K/UL (ref 135–450)
PMV BLD AUTO: 8.6 FL (ref 5–10.5)
POTASSIUM SERPL-SCNC: 4.8 MMOL/L (ref 3.5–5.1)
PRO-BNP: ABNORMAL PG/ML (ref 0–124)
PROTHROMBIN TIME: 35 SEC (ref 10–13.2)
RBC # BLD: 3.04 M/UL (ref 4.2–5.9)
SARS-COV-2, NAAT: NOT DETECTED
SODIUM BLD-SCNC: 136 MMOL/L (ref 136–145)
WBC # BLD: 9.4 K/UL (ref 4–11)

## 2020-12-03 PROCEDURE — 85025 COMPLETE CBC W/AUTO DIFF WBC: CPT

## 2020-12-03 PROCEDURE — 85014 HEMATOCRIT: CPT

## 2020-12-03 PROCEDURE — 6360000002 HC RX W HCPCS: Performed by: INTERNAL MEDICINE

## 2020-12-03 PROCEDURE — 36415 COLL VENOUS BLD VENIPUNCTURE: CPT

## 2020-12-03 PROCEDURE — 83880 ASSAY OF NATRIURETIC PEPTIDE: CPT

## 2020-12-03 PROCEDURE — 85018 HEMOGLOBIN: CPT

## 2020-12-03 PROCEDURE — 71046 X-RAY EXAM CHEST 2 VIEWS: CPT

## 2020-12-03 PROCEDURE — 99239 HOSP IP/OBS DSCHRG MGMT >30: CPT | Performed by: INTERNAL MEDICINE

## 2020-12-03 PROCEDURE — 99232 SBSQ HOSP IP/OBS MODERATE 35: CPT | Performed by: SURGERY

## 2020-12-03 PROCEDURE — 2500000003 HC RX 250 WO HCPCS: Performed by: INTERNAL MEDICINE

## 2020-12-03 PROCEDURE — 6370000000 HC RX 637 (ALT 250 FOR IP): Performed by: INTERNAL MEDICINE

## 2020-12-03 PROCEDURE — 2580000003 HC RX 258: Performed by: INTERNAL MEDICINE

## 2020-12-03 PROCEDURE — 85610 PROTHROMBIN TIME: CPT

## 2020-12-03 PROCEDURE — 80048 BASIC METABOLIC PNL TOTAL CA: CPT

## 2020-12-03 PROCEDURE — U0002 COVID-19 LAB TEST NON-CDC: HCPCS

## 2020-12-03 PROCEDURE — 90686 IIV4 VACC NO PRSV 0.5 ML IM: CPT | Performed by: INTERNAL MEDICINE

## 2020-12-03 RX ORDER — FUROSEMIDE 40 MG/1
40 TABLET ORAL DAILY
Status: DISCONTINUED | OUTPATIENT
Start: 2020-12-03 | End: 2020-12-03

## 2020-12-03 RX ORDER — WARFARIN SODIUM 1 MG/1
0.5 TABLET ORAL
Status: COMPLETED | OUTPATIENT
Start: 2020-12-03 | End: 2020-12-03

## 2020-12-03 RX ORDER — FUROSEMIDE 40 MG/1
40 TABLET ORAL 2 TIMES DAILY
Status: DISCONTINUED | OUTPATIENT
Start: 2020-12-03 | End: 2020-12-03 | Stop reason: HOSPADM

## 2020-12-03 RX ADMIN — CIPROFLOXACIN 400 MG: 2 INJECTION, SOLUTION INTRAVENOUS at 01:54

## 2020-12-03 RX ADMIN — METRONIDAZOLE 500 MG: 500 INJECTION, SOLUTION INTRAVENOUS at 06:13

## 2020-12-03 RX ADMIN — POTASSIUM CHLORIDE 10 MEQ: 1500 TABLET, EXTENDED RELEASE ORAL at 08:37

## 2020-12-03 RX ADMIN — POTASSIUM CHLORIDE 10 MEQ: 1500 TABLET, EXTENDED RELEASE ORAL at 17:09

## 2020-12-03 RX ADMIN — CITALOPRAM HYDROBROMIDE 10 MG: 20 TABLET ORAL at 08:37

## 2020-12-03 RX ADMIN — PANTOPRAZOLE SODIUM 40 MG: 40 TABLET, DELAYED RELEASE ORAL at 06:13

## 2020-12-03 RX ADMIN — CIPROFLOXACIN 400 MG: 2 INJECTION, SOLUTION INTRAVENOUS at 14:23

## 2020-12-03 RX ADMIN — LEVOTHYROXINE SODIUM 25 MCG: 25 TABLET ORAL at 06:14

## 2020-12-03 RX ADMIN — ALLOPURINOL 200 MG: 100 TABLET ORAL at 08:37

## 2020-12-03 RX ADMIN — CARVEDILOL 12.5 MG: 6.25 TABLET, FILM COATED ORAL at 08:37

## 2020-12-03 RX ADMIN — CARVEDILOL 12.5 MG: 6.25 TABLET, FILM COATED ORAL at 17:09

## 2020-12-03 RX ADMIN — FUROSEMIDE 40 MG: 40 TABLET ORAL at 08:37

## 2020-12-03 RX ADMIN — WARFARIN SODIUM 0.5 MG: 1 TABLET ORAL at 17:09

## 2020-12-03 RX ADMIN — Medication 10 ML: at 08:36

## 2020-12-03 RX ADMIN — INFLUENZA A VIRUS A/VICTORIA/2454/2019 IVR-207 (H1N1) ANTIGEN (PROPIOLACTONE INACTIVATED), INFLUENZA A VIRUS A/HONG KONG/2671/2019 IVR-208 (H3N2) ANTIGEN (PROPIOLACTONE INACTIVATED), INFLUENZA B VIRUS B/VICTORIA/705/2018 BVR-11 ANTIGEN (PROPIOLACTONE INACTIVATED), INFLUENZA B VIRUS B/PHUKET/3073/2013 BVR-1B ANTIGEN (PROPIOLACTONE INACTIVATED) 0.5 ML: 15; 15; 15; 15 INJECTION, SUSPENSION INTRAMUSCULAR at 11:52

## 2020-12-03 RX ADMIN — FUROSEMIDE 40 MG: 40 TABLET ORAL at 17:09

## 2020-12-03 RX ADMIN — METRONIDAZOLE 500 MG: 500 INJECTION, SOLUTION INTRAVENOUS at 15:22

## 2020-12-03 ASSESSMENT — PAIN SCALES - GENERAL
PAINLEVEL_OUTOF10: 0
PAINLEVEL_OUTOF10: 0

## 2020-12-03 NOTE — PROGRESS NOTES
Report given @ bedside to [facility], for transferral of care. Pt resting in bed. Call light in reach.

## 2020-12-03 NOTE — CARE COORDINATION
DISCHARGE ORDER  Date/Time 12/3/2020 11:13 AM  Completed by: Radha Koch, Case Management    Patient Name: Aubree Pettit    : 1950      Admit order Date and Status: 20 inpt  Noted discharge order. (verify MD's last order for status of admission/Traditional Medicare 3 [place] Inpatient qualifying stay required for SNF)    Confirmed discharge plan with:              Patient:  Yes              When pt confirms DC plan does any support person need to be contacted by CM Yes if yes who Maryjo Rey                      Discharge to Facility: [facility] phone number for staff giving report: [phone]   Pre-certification completed:  [address] Exemption Notification (HENS) completed: N/A   Discharge orders and Continuity of Care faxed to facility:  Yes      Transportation:               Medical Transport explained with choice list offered to pt/family. Choice:(no preference)  Agency used: [facility]   time:   17:00      Pt/family/Nursing/Facility aware of  time:YES  Names: Danice Hodgkins.,   Ambulance form completed:  Yes:      Comments: Order for dc noted. Spoke with pt who cont plan to return to [facility]. Left VM for Jodie re: dc and . Spoke with pt maryjo Rey re: dc and . Chart reviewed and no other dc needs identified. Pt is being d/c'd to SNF today. Pt's O2 sats are 94% on 2l NC. Discharge timeout done with  Nsg,CM and pt and maryjo via phone. All discharge needs and concerns addressed. Discharging nurse to complete NAVEEN, reconcile AVS, and place final copy with patient's discharge packet. Discharging RN to ensure that written prescriptions for  Level II medications are sent with patient to the facility as per protocol.

## 2020-12-03 NOTE — DISCHARGE INSTR - COC
Continuity of Care Form    Patient Name: Phoenix Wayne   :  1950  MRN:  [id number]    [facility] date:  2020  Discharge date:  12/3/2020    Code Status Order: Full Code   Advance Directives:   [facility] Documentation       Date/Time Healthcare Directive Type of Healthcare Directive Copy in [address] Agent's Name Healthcare Agent's Phone Number    [phone]  Unknown, patient unable to respond due to medical condition -- -- -- -- --            Admitting Physician:  Homero Rodríguez MD  PCP: Li Wolfe DO    Discharging Nurse: [facility] Unit/Room#: 0220/0220-01  Discharging Unit Phone Number: [phone]    Emergency Contact:   Extended Emergency Contact Information  Primary Emergency Contact: Christianne Edgar[address] Phone: [phone]  Relation: Child  Preferred language: English   needed?  No  Secondary Emergency Contact: Keiry Carias  Mobile Phone: [phone]  Relation: Lay Caregiver    Past Surgical History:  Past Surgical History:   Procedure Laterality Date    AORTIC VALVE REPLACEMENT  2011    Dr. Roxana Galloway w/23mm Magna Ease bioprosthetic valve    BRONCHOSCOPY N/A 2020    w/BAL, performed by Elaine Mariee MD at [address] [place] [address] N/A 2020    BRONCHOSCOPY ALVEOLAR LAVAGE performed by Jina Block MD at [address] [place] [address]  2020    BRONCHOSCOPY THERAPUTIC ASPIRATION INITIAL performed by Jina Block MD at [facility] CABG WITH AORTIC VALVE REPLACEMENT      date approximate, x1 to OM, AVR w/porcine valve    CARDIAC CATHETERIZATION  2020    Dr. Ac Tracy Left 2020    Dr. Jhonathan Willingham - 3200 ml blood drained in 1000 ml increments    COLONOSCOPY  02/10/2020    polyps    COLONOSCOPY N/A 2/10/2020    COLONOSCOPY POLYPECTOMY SNARE/COLD BIOPSY performed by Rosario Null DO at [address] [place] [address] I48.91    Frequent falls R29.6    Sacral ulcer ([address] L98.429    Subtherapeutic international normalized ratio (INR) R79.1    Debility R53.81    CHF (congestive heart failure), NYHA class I, acute on chronic, combined (HCC) I50.43    Lesion of pancreas K86.9    Acute metabolic encephalopathy D99.68    Anemia D64.9    Altered mental status R41.82    Gallstones K80.20       Isolation/Infection:   Isolation            No Isolation          Patient Infection Status       Infection Onset Added Last Indicated Last Indicated By Review Planned Expiration Resolved Resolved By    None active    Resolved    COVID-19 Rule Out 11/27/20 11/27/20 11/27/20 COVID-19 (Ordered)   11/27/20 Rule-Out Test Resulted            Nurse Assessment:  Last Vital Signs: /60   Pulse 79   Temp 97.9 °F (36.6 °C) (Oral)   Resp 16   Ht 5' 9\" (1.753 m)   Wt 200 lb (90.7 kg)   SpO2 94%   BMI 29.53 kg/m²     Last documented pain score (0-10 scale): Pain Level: 0  Last Weight:   Wt Readings from Last 1 Encounters:   12/03/20 200 lb (90.7 kg)     Mental Status:  disoriented, oriented and alert    IV Access:  - None    Nursing Mobility/ADLs:  Walking   Assisted  Transfer  Assisted  Bathing  Assisted  Dressing  Assisted  Toileting  Assisted  Feeding  Assisted  Med Admin  Assisted  Med Delivery   whole    Wound Care Documentation and Therapy:  Negative Pressure Wound Therapy Sacrum Mid (Active)   Number of days: 261       Wound 03/16/20 Sacrum with wound vac in place (Active)   Number of days: 261       Wound 03/16/20 Heel Right (Active)   Number of days: 261       Wound 03/16/20 Heel Left unstageable with necrotic tissue (Active)   Number of days: 261        Elimination:  Continence:   · Bowel: No  · Bladder: No  Urinary Catheter: None   Colostomy/Ileostomy/Ileal Conduit:        Date of Last BM: 12/3/2020    Intake/Output Summary (Last 24 hours) at 12/3/2020 1030  Last data filed at 12/2/2020 2133  Gross per 24 hour   Intake 370 ml Update Admission H&P: No change in H&P    PHYSICIAN SIGNATURE:  Electronically signed by Sarah Lindsay MD on 12/3/20 at 10:30 AM EST

## 2020-12-03 NOTE — PROGRESS NOTES
General Surgery - [place], [place]  Daily Progress Note    Pt Name: Ariel Lin  Medical Record Number: [id number]  Date of Birth 1950   Today's Date: 12/3/2020    ASSESSMENT  1. Ct abd and pelvis 11/27: cystic lesion at the junction of the pancreatic tail and body, gallstones with mild wall thickening and pericholi fluid with moderate ascites   2. CT chest: pulmonary edema, bilateral effusions with loculated left pleural effusion ? For superimposed PNA or aspiration  3. ABD: soft, + distention, denies N, no V today + mild diffuse abdominal tenderness  4. Anemia stable H&H 8/26.3  5. leuks 8.5  6. PT/INR: 35/2.98 (On Coumadin)  7. VSS  8. Pt reports overall he \"feels better\" ate 1 pancake this am.   9. S/P left thoracentesis at [facility] on 11/23/20  10. S/P ablation on 11/10/20  11. S/P bioprosthetic AV valve January 2020    PLAN  1. Carb control/low fat diet  2. Can be discharged home on PO antibiotics and low fat diet from a surgical standpoint. 3. Unable to determine if pt in fact has acute cholecystitis: Wound treat conservatively with antibiotics. Pt is a poor surgical candidate 2/2 ascites, anticoagulation, heart hx. Pt needs work-up for pancreatic lesion. Gallbladder is not distended on imaging therefor percutaneous drainage would not be an option. Discussed case with Dr. Cha Costello has improved from yesterday. Pain apears well controlled. He has no  N/V with diet today. He has not passed flatus and has had a bowel movement. He is tolerating some regular food. Current activity is up with assistance    OBJECTIVE  VITALS:  height is 5' 9\" (1.753 m) and weight is 200 lb (90.7 kg). His oral temperature is 97.9 °F (36.6 °C). His blood pressure is 114/60 and his pulse is 79. His respiration is 16 and oxygen saturation is 94%.  VITALS:  /60   Pulse 79   Temp 97.9 °F (36.6 °C) (Oral)   Resp 16   Ht 5' 9\" (1.753 m)   Wt 200 lb (90.7 kg)   SpO2 94%   BMI 29.53 kg/m²

## 2020-12-03 NOTE — PROGRESS NOTES
Pt resting. Resp e/e. Shift assessment completed and charted. No needs. Will monitor.  Brad Melendez

## 2020-12-03 NOTE — PROGRESS NOTES
Resting in bed awake. No complaints or needs at present time. Alert and oriented at present time. Helped up to chair with assist x1 and walker. Pt very weak. Chair alarm in place and turned on. Call light in reach. Oriented to room and call light. Patient is able to demonstrate the ability to move from a reclining position to an upright position within the recliner.

## 2021-02-02 NOTE — PROGRESS NOTES
CVTS Thoracic Progress Note:          CC:  Post op follow up 1/02/2020 Emergent left thoracotomy. Evacuation of hematoma. Chest wall hemostasis. Left pleural biopsy. Left upper lobe wedge resection. Subj: awake, in bed, on spontaneous breathing trial - appears to be tolerating it well    Obj:    Blood pressure 117/83, pulse 93, temperature 98.5 °F (36.9 °C), temperature source Core, resp. rate 20, height 5' 9\" (1.753 m), weight 216 lb 11.4 oz (98.3 kg), SpO2 96 %. Lungs diminished in bases   Abdomen soft, nontender   Incisions with occlusive dressings, CDI   Chest tube # 1 with 20/75/33= 128 ml drainage in last 24 hours/no airleak   Chest tube # 2 with 50/68/18= 136 ml drainage in last 24 hours/no airleak    Diagnostics:   Recent Labs     01/02/20  0742  01/02/20  1207 01/02/20  1433 01/03/20  0550   WBC 16.1*  --   --  16.5* 16.5*   HGB 6.0*   < > 7.0* 10.2* 9.4*   HCT 17.8*  --   --  29.5* 27.1*   PLT 80*  --   --  120* 128*    < > = values in this interval not displayed. Recent Labs     01/02/20  0742 01/02/20  1519 01/03/20  0550    136 139   K 5.6* 4.5 4.6    102 103   CO2 20* 24 27   BUN 19 19 27*   CREATININE 1.5* 1.1 1.2   GLUCOSE 224* 182* 201*            Recent Labs     01/03/20  0550   MG 1.50*      Recent Labs     01/01/20  2334 01/02/20  0742   TROPONINI 0.02* 0.09*     Recent Labs     01/02/20  0300 01/02/20  1320 01/02/20  1433   INR 1.56* 4.54* 2.04*     CXR: 1/02/2020 s/p surgical intervention- Resolution of large hemothorax. No evidence of ptx. Assess/Plan:   Care per primary team     Hemothorax: s/p evacuation  Continue chest tubes to suction. Anticipate placing to water seal tomorrow. Was weaned off vent and extubated this morning to 5L per NC.     ________________________________________________________________    SCIP Measures:     · Noriega catheter for:  ? IV Diuresis  ? Accurate I/O  ?  D/C Patient/Caregiver provided printed discharge information. Valtrex Pregnancy And Lactation Text: this medication is Pregnancy Category B and is considered safe during pregnancy. This medication is not directly found in breast milk but it's metabolite acyclovir is present.

## 2022-06-20 NOTE — PROGRESS NOTES
Dear Ángel Cobian,    We are sorry you are not feeling well. Based on the responses you provided, it is recommended that you be seen in-person in urgent care so we can better evaluate your symptoms. Please click here to find the nearest urgent care location to you.   You will not be charged for this Visit. Thank you for trusting us with your care.    Sadia Arreguin MD     [facility]    [facility]              [phone]                       Plan :     Renal function is stable  1.9 L urine yesterday  Recommend holding diuretics Vladislav with sodium high  Also on water flushes. Hold diuretics       Assessment :     Acute Kidney Injury  Creatinine peaked to 1.6- now 1.1  Likely due to cardiac surgery, hypotension, Cardiorenal syndrome  Getting inotropes-helped his kidney function  Stop Lasix      hyperkalemia  Low due to diuretics and getting treated    Acidosis  Lactic acidosis likely due to hypotension poor tissue perfusion  Improved  Now has metabolic alkalosis and also respiratory alkalosis    Hypotension- now Hypertension  BP: ()/(54-77) Pulse:  [74-94]   Low-dose of Levophed- switched to dobutamine  Now needing nicardipine drip  Switched to Clevidipine drip now         [facility] would like to thank Sailaja Ferris MD   for opportunity to serve this patient      Please call with questions at-   24 Hrs Answering service [phone] or  7 am- 5 pm via Perfect serve or cell phone  [facility]           CC/reason for consult :   Acute kidney injury   HPI :     From consult note-   Chad Bran is a 77 y.o. male presented to   the hospital on 1/8/2020 with shortness of breath. Not able to give history because of being on BiPAP. He is known to have severe aortic stenosis and he had redo sternotomy and replacement of ascending aorta done, and also replacement with [place]quillo Intuity valve done. Following that he is in ICU and is requiring BiPAP and has a poor urine output and he is on a low-dose of vasopressor. He was here earlier this month with shortness of breath pneumonia and large left hemothorax and pneumothorax. Also had PAL and hyperkalemia.   Did not require dialysis during that hospitalization and his kidney function improved to normal.    We are consulted for PAL and related issues     Interval History:     Extubated  Making urine  On diuretics    ROS:     Seen with- RN,     KAMINI-   none  Edema-   none  Nausea/vomiting/diarrhea-   none  Poor appetite/oral intake-  No  Confusion  No  Difficulty passing urine-  No  Drop in urine output-  No  Any other complaints-  No  All other ROS are reviewed and are Negative            Medication:     Current Facility-Administered Medications: amLODIPine (NORVASC) tablet 5 mg, 5 mg, Oral, Daily  lisinopril (PRINIVIL;ZESTRIL) tablet 2.5 mg, 2.5 mg, Oral, Daily  phenylephrine (SHAYY-SYNEPHRINE) 50 mg in dextrose 5 % 250 mL infusion, 100 mcg/min, Intravenous, Continuous  0.9 % sodium chloride bolus, 20 mL, Intravenous, Once  furosemide (LASIX) injection 40 mg, 40 mg, Intravenous, BID  collagenase ointment 1 g, 1 each, Topical, Daily  carvedilol (COREG) tablet 12.5 mg, 12.5 mg, Oral, BID WC  oxyCODONE (ROXICODONE) immediate release tablet 5 mg, 5 mg, Oral, Q6H PRN  amiodarone (CORDARONE) tablet 200 mg, 200 mg, Oral, Daily  insulin glargine (LANTUS) injection vial 10 Units, 10 Units, Subcutaneous, BID  aspirin EC tablet 325 mg, 325 mg, Oral, Daily  niCARdipine (CARDENE) 50 mg in sodium chloride 0.9 % 250 mL infusion, 5 mg/hr, Intravenous, Titrated  Venelex ointment, , Topical, BID  insulin lispro (HUMALOG) injection vial 0-18 Units, 0-18 Units, Subcutaneous, Q4H  famotidine (PEPCID) injection 20 mg, 20 mg, Intravenous, BID  sodium chloride flush 0.9 % injection 10 mL, 10 mL, Intravenous, 2 times per day  sodium chloride flush 0.9 % injection 10 mL, 10 mL, Intravenous, PRN  potassium chloride 20 mEq/50 mL IVPB (Central Line), 20 mEq, Intravenous, PRN  magnesium sulfate 2 g in 50 mL IVPB premix, 2 g, Intravenous, PRN  acetaminophen (TYLENOL) tablet 650 mg, 650 mg, Oral, Q4H PRN  acetaminophen (TYLENOL) suppository 650 mg, 650 mg, Rectal, Q4H PRN  docusate sodium (COLACE) capsule 100 mg, 100 mg, Oral, BID  ondansetron (ZOFRAN) injection 4 mg, 4 mg, Intravenous, Q8H PRN  chlorhexidine (PERIDEX) 0.12 % solution 15 mL, 15 mL, Mouth/Throat, BID  [Held by provider] atorvastatin (LIPITOR) tablet 20 mg, 20 mg, Oral, Nightly  albuterol (PROVENTIL) nebulizer solution 2.5 mg, 2.5 mg, Nebulization, Q4H WA  glucose (GLUTOSE) 40 % oral gel 15 g, 15 g, Oral, PRN  dextrose 50 % IV solution, 12.5 g, Intravenous, PRN  glucagon (rDNA) injection 1 mg, 1 mg, Intramuscular, PRN  dextrose 5 % solution, 100 mL/hr, Intravenous, PRN       Vitals :     Vitals:    02/02/20 1501   BP: (!) 114/54   Pulse: 92   Resp: 25   Temp:    SpO2:        I & O :       Intake/Output Summary (Last 24 hours) at 2/2/2020 1552  Last data filed at 2/2/2020 0800  Gross per 24 hour   Intake 109 ml   Output 2450 ml   Net -2341 ml        Physical Examination :     General appearance: Anxious- no, distressed- no, in good spirits- yes,   Comfortable, communicative  AAO X 3  HEENT: Lips- normal, teeth- ok , oral mucosa- moist  Neck : Mass- no, appears symmetrical, JVD- not visible  Respiratory: Respiratory effort-  normal, wheeze- no, crackles - no, Oxygen- 4 L  Cardiovascular: Ausculation- No M/R/G, Edema none  Abdomen: visible mass- no, distention- no, scar- no, tenderness- no                            hepatosplenomegaly-  no  Musculoskeletal:  clubbing no,cyanosis- no , digital ischemia- no                           muscle strength- grossly normal , tone - grossly normal  Skin: rashes- no , ulcers- no, induration- no, tightening - no  Psychiatric:  Judgement and insight- normal         LABS:     Recent Labs     01/31/20  0330 02/01/20  0340  02/01/20  2349 02/02/20  0506 02/02/20  1240   WBC 10.8 9.6  --   --  11.0  --    HGB 9.9* 9.9*   < > 9.4* 9.6* 9.7*   HCT 29.0* 30.1*   < > 28.6* 28.9* 29.1*   * 131*  --   --  139  --     < > = values in this interval not displayed.      Recent Labs     01/31/20  0330 02/01/20  0340 02/02/20  0506   * 150* 156*   K 3.7 3.6 3.5    104 109   CO2 35* 35* 36*   BUN 35* 39* 48*   CREATININE 0.8 0.9 1.1   GLUCOSE 137*

## 2023-06-13 NOTE — PROGRESS NOTES
1515: Pt returned to pre/post holding area. Pt alert and oriented asking for PO fluid. Taking without issue. Pt left arm in sling. Site was left pectoral area, site is covered by small aquacell dressing, surrounding skin is flat and dry. 1527: Laurent from [facility] at bedside reviewing info with pt.  1530: Portable CXR obtained. Chart reviewed. Pt admitted overnight by my partner with [facility]. Had 3 episodes of hematochezia overnight with none today per RN. BP low this am, IV bolus given with improvement. Coreg held. No bleeding noted so far. Pt asymptomatic. hgb 7.3, monitoring serial hgb. GI consulted recs pending. Will transfer to  for closer monitoring. Discussed with pt, sister and RN.

## (undated) DEVICE — SUTURE ABSORBABLE BRAIDED 2-0 CT-1 27 IN UD VICRYL J259H

## (undated) DEVICE — AIRLIFE™ NASAL OXYGEN CANNULA CURVED, FLARED TIP, WITH 7 FEET (2.1 M) CRUSH RESISTANT TUBING, OVER-THE-EAR STYLE: Brand: AIRLIFE™

## (undated) DEVICE — COVER TRNSDUC W6XL96IN POLY TELESCOPICALLY FLD W/ ATTCH FRM

## (undated) DEVICE — Device

## (undated) DEVICE — TROCAR: Brand: KII FIOS FIRST ENTRY

## (undated) DEVICE — SYRINGE MED 10ML SLIP TIP BLNT FILL AND LUERLOCK DISP

## (undated) DEVICE — ADAPTER TBNG DIA15MM SWVL FBROPT BRONCHSCP TERM 2 AXIS PEEP

## (undated) DEVICE — CANNULA PERF 7FR L5.5IN AORT ROOT RADPQ STD TIP W/ VENT LN

## (undated) DEVICE — SUTURE VCRL + SZ 3-0 L18IN ABSRB UD SH 1/2 CIR TAPERCUT NDL VCP864D

## (undated) DEVICE — SUTURE PROL SZ 3-0 L36IN NONABSORBABLE BLU L26MM SH 1/2 CIR 8522H

## (undated) DEVICE — TRAP SPEC POLYPR SGL CHMBR FN MESH SCRN

## (undated) DEVICE — CANNULA PERF AD 20FR L8.5IN ART 3/8IN CONN NVENT MTL TIP

## (undated) DEVICE — CANNULA PERF 15FR L12.5IN RG STPCOCK WIREWOUND BODY

## (undated) DEVICE — GOWN SIRUS NONREIN XL W/TWL: Brand: MEDLINE INDUSTRIES, INC.

## (undated) DEVICE — STERILE LATEX POWDER-FREE SURGICAL GLOVESWITH NITRILE COATING: Brand: PROTEXIS

## (undated) DEVICE — BOWL MED M 16OZ PLAS CAP GRAD

## (undated) DEVICE — SUTURE ETHBND EXCEL SZ 0 L18IN NONABSORBABLE GRN L36MM CT-1 CX21D

## (undated) DEVICE — AGENT HEMSTAT W4XL8IN OXIDIZED REGENERATED CELOS ABSRB

## (undated) DEVICE — CATHETER L HRT VENT SIL 16 IN OVERALL LEN 20FR N VENT CONN

## (undated) DEVICE — SOLUTION IRRIG 1000ML 09% SOD CHL USP PIC PLAS CONTAINER

## (undated) DEVICE — CANNULA 96530 119 BIO MEDICUS 19FR EA

## (undated) DEVICE — SYRINGE MED 50ML LUERSLIP TIP

## (undated) DEVICE — CATHETER THOR 28FR SIL 6 EYE STR HI TEAR STRENGTH

## (undated) DEVICE — CANNULA HI FLOW COR ART OSTIAL 12FR OVERALL LEN 75 IN 45 DEG

## (undated) DEVICE — Z DISCONTINUED USE 2276105 GOWN PROTCT UNIV CHST W28IN L49IN SL 24IN BLU SPUNBOND FLM

## (undated) DEVICE — CANNULA 96670 125 BIO MEDICUS 25FR EA

## (undated) DEVICE — SET PERF L15IN BLU CLMP MULT FEM LUER ON SGL INLET LEG DLP

## (undated) DEVICE — LINER,SOFT,SUCTION CANISTER,1500CC: Brand: MEDLINE

## (undated) DEVICE — APPLIER CLP L9.375IN APER 2.1MM CLS L3.8MM 20 SM TI CLP

## (undated) DEVICE — SUTURE MCRYL SZ 4-0 L18IN ABSRB UD L19MM PS-2 3/8 CIR PRIM Y496G

## (undated) DEVICE — CANNULA PERF L125IN OD15FR POLYVI CHL VEN RG AUTO INFL SMOOT

## (undated) DEVICE — TRAP,MUCUS SPECIMEN, 80CC: Brand: MEDLINE

## (undated) DEVICE — TROCAR: Brand: KII SLEEVE

## (undated) DEVICE — ADHESIVE SKIN CLSR 0.7ML TOP DERMBND ADV

## (undated) DEVICE — SUMP INTCARD SUCT AD 20FR PERICARD MAYO STYL FLX VERSATILE

## (undated) DEVICE — ELECTRODE,RADIOTRANSLUCENT,FOAM,3PK: Brand: MEDLINE

## (undated) DEVICE — CANNULA,OXY,ADULT,SUPERSOFT,W/7'TUB,SC: Brand: MEDLINE INDUSTRIES, INC.

## (undated) DEVICE — SUTURE MCRYL + SZ 4-0 L18IN ABSRB UD L19MM PS-2 3/8 CIR MCP496G

## (undated) DEVICE — LEAD PACE 2.6FR L50CM UPLR TEMP ATR NONSTEROID ELUT PIN

## (undated) DEVICE — UNDERPAD INCONT XL MESH PROTCT + DISP FOR MAT USE

## (undated) DEVICE — GLOVE,SURG,SENSICARE SLT,LF,PF,7: Brand: MEDLINE

## (undated) DEVICE — SET TRNQT W/ STD 7IN 12FR 5 TB 1 SNR DLP

## (undated) DEVICE — SINGLE USE BIOPSY VALVE MAJ-210: Brand: SINGLE USE BIOPSY VALVE (STERILE)

## (undated) DEVICE — SYRINGE BLB 50CC IRRIG PLIABLE FNGR FLNG GRAD FLSK DISP

## (undated) DEVICE — COVER LT HNDL PLAS RIG 2 PER PK

## (undated) DEVICE — GLOVE ORANGE PI 7 1/2   MSG9075

## (undated) DEVICE — CATHETER THOR L21IN DIA28FR R ANG SFT RADPQ STRP SIL

## (undated) DEVICE — YANKAUER,OPEN TIP,W/O VENT,STERILE: Brand: MEDLINE INDUSTRIES, INC.

## (undated) DEVICE — CATHETER THORACENTHESIS 9 FRX20 IN EYES TOP

## (undated) DEVICE — STAPLER INT L16CM STD UNIV RELD DISP TRI-STAPLE ENDO GIA

## (undated) DEVICE — CATHETER URETH 20FR W/ RED RUB INTMIT ALL PURP

## (undated) DEVICE — PAD,ABDOMINAL,8"X10",ST,LF: Brand: MEDLINE

## (undated) DEVICE — ELECTRODE ECG MONITR FOAM TEAR DROP ADLT RED

## (undated) DEVICE — CONMED SCOPE SAVER BITE BLOCK, 20X27 MM: Brand: SCOPE SAVER

## (undated) DEVICE — VESSEL LOOPS,MAXI, BLUE: Brand: DEVON

## (undated) DEVICE — SUTURE PERMA-HAND SZ 0 L18IN NONABSORBABLE BLK L30MM FSL 678G

## (undated) DEVICE — SUTURE NONABSORBABLE MONOFILAMENT 3-0 PS-1 18 IN BLK ETHILON 1663H

## (undated) DEVICE — RELOAD STPL 45MM THCK TISS GRN W/ GRIPPING SURF TECHNOLOGY

## (undated) DEVICE — CANNULA COR 10FR OVERALL LEN 75IN 90DEG ANG ART OSTIAL HI FL

## (undated) DEVICE — ENDO CARRY-ON PROCEDURE KIT INCLUDES SUCTION TUBING, LUBRICANT, GAUZE, BIOHAZARD STICKER, TRANSPORT PAD AND INTERCEPT BEDSIDE KIT.: Brand: ENDO CARRY-ON PROCEDURE KIT

## (undated) DEVICE — PACK PROCEDURE SURG OPN HRT A BASIC

## (undated) DEVICE — SUTURE ETHBND 2-0 L30IN NONABSORBABLE GRN WHT V-5 L17IN 1/2 10X52

## (undated) DEVICE — Device: Brand: DISPOSABLE ELECTROSURGICAL SNARE

## (undated) DEVICE — CHLORAPREP 26ML ORANGE

## (undated) DEVICE — CLEANER ES TIP W2XL2IN ADH BK RADPQ FOR S STL ELECTRD

## (undated) DEVICE — KIT INF CTRL 2OZ LUB TBNG L12FT DBL END BRSH SYR OP4

## (undated) DEVICE — ELECTRODE LAP L36CM PTFE WIRE J HK CLEANCOAT

## (undated) DEVICE — SYRINGE MED 50ML LUERLOCK TIP

## (undated) DEVICE — SUTURE NONABSORBABLE MONOFILAMENT 6-0 C-1 1X30 IN PROLENE 8706H

## (undated) DEVICE — SINGLE USE SUCTION VALVE MAJ-209: Brand: SINGLE USE SUCTION VALVE (STERILE)

## (undated) DEVICE — SOLUTION IV IRRIG POUR BRL 0.9% SODIUM CHL 2F7124

## (undated) DEVICE — DISSECTOR LAP DIA5MM BLNT TIP ENDOPATH

## (undated) DEVICE — GAUZE,SPONGE,4"X4",8PLY,STRL,LF,10/TRAY: Brand: MEDLINE

## (undated) DEVICE — TUBING, SUCTION, 3/16" X 6', STRAIGHT: Brand: MEDLINE

## (undated) DEVICE — SUTURE SZ 7 L18IN NONABSORBABLE SIL CCS L48MM 1/2 CIR STRNM M655G

## (undated) DEVICE — GUIDEWIRE VASC L150CM DIA0.035IN FLX END L7CM J 3MM PTFE

## (undated) DEVICE — SUTURE NONABSORBABLE MONOFILAMENT 4-0 RB-1 36 IN BLU PROLENE 8557H

## (undated) DEVICE — KIT INSRT AD PED VEN GWIRE L180CM DIA0038IN STP VES DIL 8FR

## (undated) DEVICE — 3M™ TEGADERM™ CHG DRESSING 25/CARTON 4 CARTONS/CASE 1660: Brand: TEGADERM™

## (undated) DEVICE — EVERGRIP INSERT SET 61MM: Brand: FOGARTY EVERGRIP

## (undated) DEVICE — YANKAUER,BULB TIP,W/O VENT,RIGID,STERILE: Brand: MEDLINE

## (undated) DEVICE — SUTURE ETHBND EXCEL SZ 2-0 L36IN NONABSORBABLE GRN SH-2 X559H

## (undated) DEVICE — Device: Brand: MEDEX

## (undated) DEVICE — AGENT HEMSTAT W3XL4IN OXIDIZED REGENERATED CELOS ABSRB FOR

## (undated) DEVICE — TUBING, SUCTION, 3/16" X 12', STRAIGHT: Brand: MEDLINE

## (undated) DEVICE — COR-KNOT MINI® COMBO KITBASE PACKAGE TYPE - KITEACH STERILE PACKAGE KIT CONTAINS (2) SINGLE PATIENT USE COR-KNOT MINI® DEVICES AND (12) COR-KNOT® QUICK LOADS®.: Brand: COR-KNOT MINI®

## (undated) DEVICE — CARDINAL HEALTH LAP SPONGE 18 X 18 IN. PREWASHED X RAY DETECTABLE WITHOUT LOOPS SOFTPACK: Brand: CARDINAL HEALTH

## (undated) DEVICE — RELOAD STPL 3.5MM L60MM 0DEG UNIV TISS PUR TI 6 ROW LIN